# Patient Record
Sex: FEMALE | Race: BLACK OR AFRICAN AMERICAN | NOT HISPANIC OR LATINO | Employment: OTHER | URBAN - METROPOLITAN AREA
[De-identification: names, ages, dates, MRNs, and addresses within clinical notes are randomized per-mention and may not be internally consistent; named-entity substitution may affect disease eponyms.]

---

## 2023-10-01 RX ORDER — TRAMADOL HYDROCHLORIDE 50 MG/1
1 TABLET ORAL EVERY 6 HOURS PRN
COMMUNITY
End: 2024-01-16 | Stop reason: HOSPADM

## 2023-10-05 PROBLEM — L76.82 PAIN AT SURGICAL INCISION: Status: ACTIVE | Noted: 2023-10-05

## 2023-10-05 RX ORDER — IBUPROFEN 400 MG/1
TABLET ORAL
Status: ON HOLD | COMMUNITY
Start: 2023-05-18 | End: 2024-01-12 | Stop reason: WASHOUT

## 2023-10-05 RX ORDER — ISOPROPYL ALCOHOL 70 ML/100ML
SWAB TOPICAL
COMMUNITY
Start: 2023-04-18

## 2023-10-05 RX ORDER — HYDROCHLOROTHIAZIDE 50 MG/1
50 TABLET ORAL DAILY
COMMUNITY
Start: 2022-10-18 | End: 2024-01-16 | Stop reason: HOSPADM

## 2023-10-05 RX ORDER — ACETAMINOPHEN 500 MG
TABLET ORAL
Status: ON HOLD | COMMUNITY
Start: 2023-09-20 | End: 2024-01-16 | Stop reason: SDUPTHER

## 2023-10-05 RX ORDER — METRONIDAZOLE 500 MG/1
TABLET ORAL
Status: ON HOLD | COMMUNITY
Start: 2023-03-02 | End: 2024-01-12 | Stop reason: WASHOUT

## 2023-10-05 RX ORDER — SKIN PROTECTANT 44 G/100G
OINTMENT TOPICAL
Status: ON HOLD | COMMUNITY
Start: 2023-07-07 | End: 2024-01-12 | Stop reason: WASHOUT

## 2023-10-05 RX ORDER — INSULIN GLARGINE 100 [IU]/ML
20 INJECTION, SOLUTION SUBCUTANEOUS DAILY
Status: ON HOLD | COMMUNITY
Start: 2023-04-19 | End: 2024-01-16 | Stop reason: SDUPTHER

## 2023-10-05 RX ORDER — FLUCONAZOLE 150 MG/1
TABLET ORAL
Status: ON HOLD | COMMUNITY
Start: 2023-03-02 | End: 2024-01-12 | Stop reason: WASHOUT

## 2023-10-05 RX ORDER — AMPICILLIN TRIHYDRATE 500 MG
50 CAPSULE ORAL DAILY
COMMUNITY
Start: 2023-01-19 | End: 2024-01-16 | Stop reason: HOSPADM

## 2023-10-05 RX ORDER — NITROFURANTOIN 25; 75 MG/1; MG/1
CAPSULE ORAL
Status: ON HOLD | COMMUNITY
Start: 2023-03-02 | End: 2024-01-12 | Stop reason: WASHOUT

## 2023-10-05 RX ORDER — PEN NEEDLE, DIABETIC 32 GX 1/4"
NEEDLE, DISPOSABLE MISCELLANEOUS
COMMUNITY
Start: 2023-04-18

## 2023-10-10 ENCOUNTER — APPOINTMENT (OUTPATIENT)
Dept: CARDIAC SURGERY | Facility: CLINIC | Age: 63
End: 2023-10-10
Payer: COMMERCIAL

## 2023-11-03 ENCOUNTER — PHARMACY VISIT (OUTPATIENT)
Dept: PHARMACY | Facility: CLINIC | Age: 63
End: 2023-11-03
Payer: MEDICARE

## 2024-01-11 ENCOUNTER — HOSPITAL ENCOUNTER (EMERGENCY)
Facility: HOSPITAL | Age: 64
Discharge: HOME | DRG: 637 | End: 2024-01-11
Attending: EMERGENCY MEDICINE
Payer: COMMERCIAL

## 2024-01-11 ENCOUNTER — APPOINTMENT (OUTPATIENT)
Dept: CARDIOLOGY | Facility: HOSPITAL | Age: 64
DRG: 637 | End: 2024-01-11
Payer: COMMERCIAL

## 2024-01-11 ENCOUNTER — APPOINTMENT (OUTPATIENT)
Dept: RADIOLOGY | Facility: HOSPITAL | Age: 64
DRG: 637 | End: 2024-01-11
Payer: COMMERCIAL

## 2024-01-11 ENCOUNTER — HOSPITAL ENCOUNTER (INPATIENT)
Facility: HOSPITAL | Age: 64
LOS: 5 days | Discharge: HOME | DRG: 637 | End: 2024-01-16
Attending: EMERGENCY MEDICINE | Admitting: INTERNAL MEDICINE
Payer: COMMERCIAL

## 2024-01-11 VITALS
SYSTOLIC BLOOD PRESSURE: 165 MMHG | BODY MASS INDEX: 28.35 KG/M2 | WEIGHT: 160 LBS | HEIGHT: 63 IN | HEART RATE: 79 BPM | DIASTOLIC BLOOD PRESSURE: 97 MMHG | OXYGEN SATURATION: 99 % | RESPIRATION RATE: 14 BRPM

## 2024-01-11 DIAGNOSIS — E11.00 HYPEROSMOLAR HYPERGLYCEMIC STATE (HHS) (MULTI): Primary | ICD-10-CM

## 2024-01-11 DIAGNOSIS — L76.82 PAIN AT SURGICAL INCISION: ICD-10-CM

## 2024-01-11 DIAGNOSIS — I10 PRIMARY HYPERTENSION: ICD-10-CM

## 2024-01-11 LAB
ALBUMIN SERPL BCP-MCNC: 4 G/DL (ref 3.4–5)
ALBUMIN SERPL BCP-MCNC: 4 G/DL (ref 3.4–5)
ALP SERPL-CCNC: 70 U/L (ref 33–136)
ALT SERPL W P-5'-P-CCNC: 60 U/L (ref 7–45)
ANION GAP BLDA CALCULATED.4IONS-SCNC: 11 MMO/L (ref 10–25)
ANION GAP BLDV CALCULATED.4IONS-SCNC: 12 MMOL/L (ref 10–25)
ANION GAP BLDV CALCULATED.4IONS-SCNC: 5 MMOL/L (ref 10–25)
ANION GAP SERPL CALC-SCNC: 13 MMOL/L (ref 10–20)
ANION GAP SERPL CALC-SCNC: 16 MMOL/L (ref 10–20)
APPEARANCE UR: ABNORMAL
APPEARANCE UR: ABNORMAL
AST SERPL W P-5'-P-CCNC: 70 U/L (ref 9–39)
BACTERIA #/AREA URNS AUTO: ABNORMAL /HPF
BASE EXCESS BLDA CALC-SCNC: 1.2 MMOL/L (ref -2–3)
BASE EXCESS BLDV CALC-SCNC: 1.4 MMOL/L (ref -2–3)
BASE EXCESS BLDV CALC-SCNC: 4 MMOL/L (ref -2–3)
BASOPHILS # BLD MANUAL: 0.1 X10*3/UL (ref 0–0.1)
BASOPHILS NFR BLD MANUAL: 1.7 %
BILIRUB SERPL-MCNC: 0.6 MG/DL (ref 0–1.2)
BILIRUB UR STRIP.AUTO-MCNC: NEGATIVE MG/DL
BILIRUB UR STRIP.AUTO-MCNC: NEGATIVE MG/DL
BODY TEMPERATURE: 37 DEGREES CELSIUS
BUN SERPL-MCNC: 9 MG/DL (ref 6–23)
BUN SERPL-MCNC: 9 MG/DL (ref 6–23)
CA-I BLD-SCNC: 1.19 MMOL/L (ref 1.1–1.33)
CA-I BLDA-SCNC: 1.23 MMOL/L (ref 1.1–1.33)
CA-I BLDV-SCNC: 1.09 MMOL/L (ref 1.1–1.33)
CA-I BLDV-SCNC: 1.24 MMOL/L (ref 1.1–1.33)
CALCIUM SERPL-MCNC: 9.1 MG/DL (ref 8.6–10.6)
CALCIUM SERPL-MCNC: 9.6 MG/DL (ref 8.6–10.6)
CHLORIDE BLDA-SCNC: 98 MMOL/L (ref 98–107)
CHLORIDE BLDV-SCNC: 101 MMOL/L (ref 98–107)
CHLORIDE BLDV-SCNC: 89 MMOL/L (ref 98–107)
CHLORIDE SERPL-SCNC: 90 MMOL/L (ref 98–107)
CHLORIDE SERPL-SCNC: 98 MMOL/L (ref 98–107)
CO2 SERPL-SCNC: 23 MMOL/L (ref 21–32)
CO2 SERPL-SCNC: 26 MMOL/L (ref 21–32)
COLOR UR: YELLOW
COLOR UR: YELLOW
CREAT SERPL-MCNC: 0.73 MG/DL (ref 0.5–1.05)
CREAT SERPL-MCNC: 0.97 MG/DL (ref 0.5–1.05)
EGFRCR SERPLBLD CKD-EPI 2021: 66 ML/MIN/1.73M*2
EGFRCR SERPLBLD CKD-EPI 2021: >90 ML/MIN/1.73M*2
EOSINOPHIL # BLD MANUAL: 0.05 X10*3/UL (ref 0–0.7)
EOSINOPHIL NFR BLD MANUAL: 0.9 %
ERYTHROCYTE [DISTWIDTH] IN BLOOD BY AUTOMATED COUNT: 12.7 % (ref 11.5–14.5)
FLUAV RNA RESP QL NAA+PROBE: NOT DETECTED
FLUBV RNA RESP QL NAA+PROBE: NOT DETECTED
GLUCOSE BLD MANUAL STRIP-MCNC: 585 MG/DL (ref 74–99)
GLUCOSE BLD MANUAL STRIP-MCNC: >600 MG/DL (ref 74–99)
GLUCOSE BLD MANUAL STRIP-MCNC: >600 MG/DL (ref 74–99)
GLUCOSE BLDA-MCNC: 493 MG/DL (ref 74–99)
GLUCOSE BLDV-MCNC: 356 MG/DL (ref 74–99)
GLUCOSE BLDV-MCNC: >685 MG/DL (ref 74–99)
GLUCOSE SERPL-MCNC: 1116 MG/DL (ref 74–99)
GLUCOSE SERPL-MCNC: 413 MG/DL (ref 74–99)
GLUCOSE UR STRIP.AUTO-MCNC: ABNORMAL MG/DL
GLUCOSE UR STRIP.AUTO-MCNC: ABNORMAL MG/DL
HCO3 BLDA-SCNC: 26 MMOL/L (ref 22–26)
HCO3 BLDV-SCNC: 27.2 MMOL/L (ref 22–26)
HCO3 BLDV-SCNC: 29.2 MMOL/L (ref 22–26)
HCT VFR BLD AUTO: 39.1 % (ref 36–46)
HCT VFR BLD EST: 40 % (ref 36–46)
HCT VFR BLD EST: 43 % (ref 36–46)
HCT VFR BLD EST: 46 % (ref 36–46)
HGB BLD-MCNC: 15 G/DL (ref 12–16)
HGB BLDA-MCNC: 13.4 G/DL (ref 12–16)
HGB BLDV-MCNC: 14.2 G/DL (ref 12–16)
HGB BLDV-MCNC: 15.3 G/DL (ref 12–16)
IMM GRANULOCYTES # BLD AUTO: 0.01 X10*3/UL (ref 0–0.7)
IMM GRANULOCYTES NFR BLD AUTO: 0.2 % (ref 0–0.9)
INHALED O2 CONCENTRATION: 21 %
KETONES UR STRIP.AUTO-MCNC: NEGATIVE MG/DL
KETONES UR STRIP.AUTO-MCNC: NEGATIVE MG/DL
LACTATE BLDA-SCNC: 2.4 MMOL/L (ref 0.4–2)
LACTATE BLDV-SCNC: 2.5 MMOL/L (ref 0.4–2)
LACTATE BLDV-SCNC: 3.5 MMOL/L (ref 0.4–2)
LACTATE SERPL-SCNC: 2.9 MMOL/L (ref 0.4–2)
LEUKOCYTE ESTERASE UR QL STRIP.AUTO: NEGATIVE
LEUKOCYTE ESTERASE UR QL STRIP.AUTO: NEGATIVE
LYMPHOCYTES # BLD MANUAL: 2.7 X10*3/UL (ref 1.2–4.8)
LYMPHOCYTES NFR BLD MANUAL: 46.5 %
MAGNESIUM SERPL-MCNC: 1.76 MG/DL (ref 1.6–2.4)
MCH RBC QN AUTO: 32.6 PG (ref 26–34)
MCHC RBC AUTO-ENTMCNC: 38.4 G/DL (ref 32–36)
MCV RBC AUTO: 85 FL (ref 80–100)
MONOCYTES # BLD MANUAL: 0.65 X10*3/UL (ref 0.1–1)
MONOCYTES NFR BLD MANUAL: 11.2 %
NEUTS SEG # BLD MANUAL: 2.3 X10*3/UL (ref 1.2–7)
NEUTS SEG NFR BLD MANUAL: 39.7 %
NITRITE UR QL STRIP.AUTO: POSITIVE
NITRITE UR QL STRIP.AUTO: POSITIVE
NRBC BLD-RTO: 0 /100 WBCS (ref 0–0)
OXYHGB MFR BLDA: 94.6 % (ref 94–98)
OXYHGB MFR BLDV: 64.1 % (ref 45–75)
OXYHGB MFR BLDV: 75.4 % (ref 45–75)
PCO2 BLDA: 41 MM HG (ref 38–42)
PCO2 BLDV: 45 MM HG (ref 41–51)
PCO2 BLDV: 46 MM HG (ref 41–51)
PH BLDA: 7.41 PH (ref 7.38–7.42)
PH BLDV: 7.38 PH (ref 7.33–7.43)
PH BLDV: 7.42 PH (ref 7.33–7.43)
PH UR STRIP.AUTO: 6 [PH]
PH UR STRIP.AUTO: 6 [PH]
PHOSPHATE SERPL-MCNC: 2.5 MG/DL (ref 2.5–4.9)
PLATELET # BLD AUTO: 165 X10*3/UL (ref 150–450)
PO2 BLDA: 94 MM HG (ref 85–95)
PO2 BLDV: 39 MM HG (ref 35–45)
PO2 BLDV: 48 MM HG (ref 35–45)
POTASSIUM BLDA-SCNC: 3.5 MMOL/L (ref 3.5–5.3)
POTASSIUM BLDV-SCNC: 4.5 MMOL/L (ref 3.5–5.3)
POTASSIUM BLDV-SCNC: 8.5 MMOL/L (ref 3.5–5.3)
POTASSIUM SERPL-SCNC: 4 MMOL/L (ref 3.5–5.3)
POTASSIUM SERPL-SCNC: 5.3 MMOL/L (ref 3.5–5.3)
PROT SERPL-MCNC: 7.1 G/DL (ref 6.4–8.2)
PROT UR STRIP.AUTO-MCNC: ABNORMAL MG/DL
PROT UR STRIP.AUTO-MCNC: ABNORMAL MG/DL
RBC # BLD AUTO: 4.6 X10*6/UL (ref 4–5.2)
RBC # UR STRIP.AUTO: ABNORMAL /UL
RBC # UR STRIP.AUTO: ABNORMAL /UL
RBC #/AREA URNS AUTO: >20 /HPF
RBC #/AREA URNS AUTO: >20 /HPF
RBC MORPH BLD: NORMAL
SAO2 % BLDA: 99 % (ref 94–100)
SAO2 % BLDV: 67 % (ref 45–75)
SAO2 % BLDV: 80 % (ref 45–75)
SARS-COV-2 RNA RESP QL NAA+PROBE: NOT DETECTED
SODIUM BLDA-SCNC: 131 MMOL/L (ref 136–145)
SODIUM BLDV-SCNC: 124 MMOL/L (ref 136–145)
SODIUM BLDV-SCNC: 127 MMOL/L (ref 136–145)
SODIUM SERPL-SCNC: 125 MMOL/L (ref 136–145)
SODIUM SERPL-SCNC: 132 MMOL/L (ref 136–145)
SP GR UR STRIP.AUTO: 1.02
SP GR UR STRIP.AUTO: 1.02
TOTAL CELLS COUNTED BLD: 116
UROBILINOGEN UR STRIP.AUTO-MCNC: <2 MG/DL
UROBILINOGEN UR STRIP.AUTO-MCNC: <2 MG/DL
WBC # BLD AUTO: 5.8 X10*3/UL (ref 4.4–11.3)
WBC #/AREA URNS AUTO: ABNORMAL /HPF
WBC #/AREA URNS AUTO: ABNORMAL /HPF

## 2024-01-11 PROCEDURE — 87636 SARSCOV2 & INF A&B AMP PRB: CPT | Performed by: STUDENT IN AN ORGANIZED HEALTH CARE EDUCATION/TRAINING PROGRAM

## 2024-01-11 PROCEDURE — 81001 URINALYSIS AUTO W/SCOPE: CPT | Performed by: STUDENT IN AN ORGANIZED HEALTH CARE EDUCATION/TRAINING PROGRAM

## 2024-01-11 PROCEDURE — 85007 BL SMEAR W/DIFF WBC COUNT: CPT | Performed by: STUDENT IN AN ORGANIZED HEALTH CARE EDUCATION/TRAINING PROGRAM

## 2024-01-11 PROCEDURE — 93005 ELECTROCARDIOGRAM TRACING: CPT

## 2024-01-11 PROCEDURE — 99291 CRITICAL CARE FIRST HOUR: CPT | Performed by: EMERGENCY MEDICINE

## 2024-01-11 PROCEDURE — 82947 ASSAY GLUCOSE BLOOD QUANT: CPT

## 2024-01-11 PROCEDURE — 2020000001 HC ICU ROOM DAILY

## 2024-01-11 PROCEDURE — 36415 COLL VENOUS BLD VENIPUNCTURE: CPT | Performed by: STUDENT IN AN ORGANIZED HEALTH CARE EDUCATION/TRAINING PROGRAM

## 2024-01-11 PROCEDURE — 84132 ASSAY OF SERUM POTASSIUM: CPT | Performed by: STUDENT IN AN ORGANIZED HEALTH CARE EDUCATION/TRAINING PROGRAM

## 2024-01-11 PROCEDURE — 2500000004 HC RX 250 GENERAL PHARMACY W/ HCPCS (ALT 636 FOR OP/ED): Performed by: STUDENT IN AN ORGANIZED HEALTH CARE EDUCATION/TRAINING PROGRAM

## 2024-01-11 PROCEDURE — 85027 COMPLETE CBC AUTOMATED: CPT | Performed by: STUDENT IN AN ORGANIZED HEALTH CARE EDUCATION/TRAINING PROGRAM

## 2024-01-11 PROCEDURE — 84132 ASSAY OF SERUM POTASSIUM: CPT | Mod: 91 | Performed by: INTERNAL MEDICINE

## 2024-01-11 PROCEDURE — 93010 ELECTROCARDIOGRAM REPORT: CPT | Performed by: INTERNAL MEDICINE

## 2024-01-11 PROCEDURE — 84075 ASSAY ALKALINE PHOSPHATASE: CPT | Performed by: STUDENT IN AN ORGANIZED HEALTH CARE EDUCATION/TRAINING PROGRAM

## 2024-01-11 PROCEDURE — 2500000004 HC RX 250 GENERAL PHARMACY W/ HCPCS (ALT 636 FOR OP/ED)

## 2024-01-11 PROCEDURE — 36415 COLL VENOUS BLD VENIPUNCTURE: CPT | Performed by: INTERNAL MEDICINE

## 2024-01-11 PROCEDURE — 2500000004 HC RX 250 GENERAL PHARMACY W/ HCPCS (ALT 636 FOR OP/ED): Performed by: INTERNAL MEDICINE

## 2024-01-11 PROCEDURE — 4500999001 HC ED NO CHARGE

## 2024-01-11 PROCEDURE — 96374 THER/PROPH/DIAG INJ IV PUSH: CPT

## 2024-01-11 PROCEDURE — 84132 ASSAY OF SERUM POTASSIUM: CPT

## 2024-01-11 PROCEDURE — 81001 URINALYSIS AUTO W/SCOPE: CPT | Mod: 91 | Performed by: EMERGENCY MEDICINE

## 2024-01-11 PROCEDURE — 83735 ASSAY OF MAGNESIUM: CPT | Performed by: INTERNAL MEDICINE

## 2024-01-11 PROCEDURE — 2500000004 HC RX 250 GENERAL PHARMACY W/ HCPCS (ALT 636 FOR OP/ED): Mod: SE | Performed by: STUDENT IN AN ORGANIZED HEALTH CARE EDUCATION/TRAINING PROGRAM

## 2024-01-11 PROCEDURE — 70450 CT HEAD/BRAIN W/O DYE: CPT | Performed by: RADIOLOGY

## 2024-01-11 PROCEDURE — 87086 URINE CULTURE/COLONY COUNT: CPT | Performed by: STUDENT IN AN ORGANIZED HEALTH CARE EDUCATION/TRAINING PROGRAM

## 2024-01-11 PROCEDURE — 70450 CT HEAD/BRAIN W/O DYE: CPT

## 2024-01-11 PROCEDURE — 99283 EMERGENCY DEPT VISIT LOW MDM: CPT | Performed by: EMERGENCY MEDICINE

## 2024-01-11 PROCEDURE — 99285 EMERGENCY DEPT VISIT HI MDM: CPT | Performed by: EMERGENCY MEDICINE

## 2024-01-11 PROCEDURE — 84484 ASSAY OF TROPONIN QUANT: CPT | Performed by: STUDENT IN AN ORGANIZED HEALTH CARE EDUCATION/TRAINING PROGRAM

## 2024-01-11 PROCEDURE — 82947 ASSAY GLUCOSE BLOOD QUANT: CPT | Mod: 91

## 2024-01-11 PROCEDURE — 96375 TX/PRO/DX INJ NEW DRUG ADDON: CPT

## 2024-01-11 PROCEDURE — 99291 CRITICAL CARE FIRST HOUR: CPT | Performed by: STUDENT IN AN ORGANIZED HEALTH CARE EDUCATION/TRAINING PROGRAM

## 2024-01-11 PROCEDURE — 71045 X-RAY EXAM CHEST 1 VIEW: CPT

## 2024-01-11 PROCEDURE — 82330 ASSAY OF CALCIUM: CPT | Performed by: INTERNAL MEDICINE

## 2024-01-11 PROCEDURE — 96361 HYDRATE IV INFUSION ADD-ON: CPT

## 2024-01-11 PROCEDURE — 83605 ASSAY OF LACTIC ACID: CPT | Mod: 91 | Performed by: INTERNAL MEDICINE

## 2024-01-11 RX ORDER — INSULIN GLARGINE 100 [IU]/ML
20 INJECTION, SOLUTION SUBCUTANEOUS NIGHTLY
Status: DISCONTINUED | OUTPATIENT
Start: 2024-01-11 | End: 2024-01-11

## 2024-01-11 RX ORDER — DEXTROSE MONOHYDRATE 100 MG/ML
150 INJECTION, SOLUTION INTRAVENOUS CONTINUOUS
Status: DISCONTINUED | OUTPATIENT
Start: 2024-01-11 | End: 2024-01-11

## 2024-01-11 RX ORDER — CEFTRIAXONE 1 G/50ML
1 INJECTION, SOLUTION INTRAVENOUS EVERY 24 HOURS
Status: DISCONTINUED | OUTPATIENT
Start: 2024-01-11 | End: 2024-01-16 | Stop reason: HOSPADM

## 2024-01-11 RX ORDER — MAGNESIUM SULFATE HEPTAHYDRATE 40 MG/ML
2 INJECTION, SOLUTION INTRAVENOUS ONCE
Status: COMPLETED | OUTPATIENT
Start: 2024-01-11 | End: 2024-01-11

## 2024-01-11 RX ORDER — SPIRONOLACTONE 25 MG/1
25 TABLET ORAL DAILY
Status: DISCONTINUED | OUTPATIENT
Start: 2024-01-12 | End: 2024-01-16 | Stop reason: HOSPADM

## 2024-01-11 RX ORDER — INSULIN GLARGINE 100 [IU]/ML
10 INJECTION, SOLUTION SUBCUTANEOUS NIGHTLY
Status: DISCONTINUED | OUTPATIENT
Start: 2024-01-12 | End: 2024-01-13

## 2024-01-11 RX ORDER — SODIUM CHLORIDE 450 MG/100ML
250 INJECTION, SOLUTION INTRAVENOUS CONTINUOUS
Status: DISCONTINUED | OUTPATIENT
Start: 2024-01-11 | End: 2024-01-13

## 2024-01-11 RX ORDER — DEXTROSE 50 % IN WATER (D50W) INTRAVENOUS SYRINGE
50 AS NEEDED
Status: DISCONTINUED | OUTPATIENT
Start: 2024-01-11 | End: 2024-01-16 | Stop reason: HOSPADM

## 2024-01-11 RX ORDER — POTASSIUM CHLORIDE 14.9 MG/ML
20 INJECTION INTRAVENOUS ONCE
Status: COMPLETED | OUTPATIENT
Start: 2024-01-11 | End: 2024-01-11

## 2024-01-11 RX ORDER — NAPROXEN SODIUM 220 MG/1
81 TABLET, FILM COATED ORAL DAILY
Status: DISCONTINUED | OUTPATIENT
Start: 2024-01-12 | End: 2024-01-16 | Stop reason: HOSPADM

## 2024-01-11 RX ORDER — POTASSIUM CHLORIDE 14.9 MG/ML
20 INJECTION INTRAVENOUS ONCE
Status: DISCONTINUED | OUTPATIENT
Start: 2024-01-11 | End: 2024-01-11

## 2024-01-11 RX ORDER — DEXTROSE MONOHYDRATE AND SODIUM CHLORIDE 5; .9 G/100ML; G/100ML
150 INJECTION, SOLUTION INTRAVENOUS CONTINUOUS
Status: DISCONTINUED | OUTPATIENT
Start: 2024-01-11 | End: 2024-01-11

## 2024-01-11 RX ORDER — CARVEDILOL 25 MG/1
25 TABLET ORAL 2 TIMES DAILY
Status: DISCONTINUED | OUTPATIENT
Start: 2024-01-11 | End: 2024-01-16 | Stop reason: HOSPADM

## 2024-01-11 RX ORDER — ATORVASTATIN CALCIUM 80 MG/1
80 TABLET, FILM COATED ORAL DAILY
Status: DISCONTINUED | OUTPATIENT
Start: 2024-01-12 | End: 2024-01-16 | Stop reason: HOSPADM

## 2024-01-11 RX ORDER — SODIUM CHLORIDE 450 MG/100ML
250 INJECTION, SOLUTION INTRAVENOUS CONTINUOUS
Status: DISCONTINUED | OUTPATIENT
Start: 2024-01-11 | End: 2024-01-11

## 2024-01-11 RX ORDER — DEXTROSE 50 % IN WATER (D50W) INTRAVENOUS SYRINGE
50 AS NEEDED
Status: DISCONTINUED | OUTPATIENT
Start: 2024-01-11 | End: 2024-01-11

## 2024-01-11 RX ORDER — AMLODIPINE BESYLATE 10 MG/1
10 TABLET ORAL DAILY
Status: DISCONTINUED | OUTPATIENT
Start: 2024-01-12 | End: 2024-01-16 | Stop reason: HOSPADM

## 2024-01-11 RX ORDER — CLOPIDOGREL BISULFATE 75 MG/1
75 TABLET ORAL DAILY
Status: DISCONTINUED | OUTPATIENT
Start: 2024-01-12 | End: 2024-01-16 | Stop reason: HOSPADM

## 2024-01-11 RX ORDER — ENOXAPARIN SODIUM 100 MG/ML
40 INJECTION SUBCUTANEOUS EVERY 24 HOURS
Status: DISCONTINUED | OUTPATIENT
Start: 2024-01-11 | End: 2024-01-16 | Stop reason: HOSPADM

## 2024-01-11 RX ORDER — HYDRALAZINE HYDROCHLORIDE 25 MG/1
100 TABLET, FILM COATED ORAL EVERY 8 HOURS
Status: DISCONTINUED | OUTPATIENT
Start: 2024-01-11 | End: 2024-01-16 | Stop reason: HOSPADM

## 2024-01-11 RX ORDER — DEXTROSE MONOHYDRATE 100 MG/ML
0.3 INJECTION, SOLUTION INTRAVENOUS ONCE AS NEEDED
Status: DISCONTINUED | OUTPATIENT
Start: 2024-01-11 | End: 2024-01-16 | Stop reason: HOSPADM

## 2024-01-11 RX ORDER — INSULIN LISPRO 100 [IU]/ML
0-5 INJECTION, SOLUTION INTRAVENOUS; SUBCUTANEOUS EVERY 4 HOURS
Status: DISCONTINUED | OUTPATIENT
Start: 2024-01-11 | End: 2024-01-13

## 2024-01-11 RX ORDER — DEXTROSE MONOHYDRATE 100 MG/ML
150 INJECTION, SOLUTION INTRAVENOUS CONTINUOUS
Status: DISCONTINUED | OUTPATIENT
Start: 2024-01-11 | End: 2024-01-13

## 2024-01-11 RX ORDER — INSULIN GLARGINE 100 [IU]/ML
10 INJECTION, SOLUTION SUBCUTANEOUS ONCE
Status: COMPLETED | OUTPATIENT
Start: 2024-01-11 | End: 2024-01-12

## 2024-01-11 RX ORDER — DEXTROSE MONOHYDRATE AND SODIUM CHLORIDE 5; .9 G/100ML; G/100ML
150 INJECTION, SOLUTION INTRAVENOUS CONTINUOUS
Status: DISCONTINUED | OUTPATIENT
Start: 2024-01-11 | End: 2024-01-13

## 2024-01-11 RX ADMIN — INSULIN HUMAN 4 UNITS/HR: 1 INJECTION, SOLUTION INTRAVENOUS at 20:31

## 2024-01-11 RX ADMIN — SODIUM CHLORIDE, SODIUM LACTATE, POTASSIUM CHLORIDE, AND CALCIUM CHLORIDE 1000 ML: 600; 310; 30; 20 INJECTION, SOLUTION INTRAVENOUS at 16:45

## 2024-01-11 RX ADMIN — INSULIN HUMAN 4 UNITS/HR: 1 INJECTION, SOLUTION INTRAVENOUS at 16:16

## 2024-01-11 RX ADMIN — CEFTRIAXONE SODIUM 1 G: 1 INJECTION, SOLUTION INTRAVENOUS at 21:45

## 2024-01-11 RX ADMIN — MAGNESIUM SULFATE HEPTAHYDRATE 2 G: 40 INJECTION, SOLUTION INTRAVENOUS at 22:22

## 2024-01-11 RX ADMIN — SODIUM CHLORIDE 1000 ML: 9 INJECTION, SOLUTION INTRAVENOUS at 15:57

## 2024-01-11 RX ADMIN — ENOXAPARIN SODIUM 40 MG: 100 INJECTION SUBCUTANEOUS at 20:46

## 2024-01-11 RX ADMIN — POTASSIUM CHLORIDE 20 MEQ: 14.9 INJECTION, SOLUTION INTRAVENOUS at 15:58

## 2024-01-11 RX ADMIN — INSULIN HUMAN 4 UNITS/HR: 1 INJECTION, SOLUTION INTRAVENOUS at 21:10

## 2024-01-11 SDOH — SOCIAL STABILITY: SOCIAL INSECURITY: WERE YOU ABLE TO COMPLETE ALL THE BEHAVIORAL HEALTH SCREENINGS?: YES

## 2024-01-11 SDOH — SOCIAL STABILITY: SOCIAL INSECURITY: ABUSE: ADULT

## 2024-01-11 SDOH — SOCIAL STABILITY: SOCIAL INSECURITY: DOES ANYONE TRY TO KEEP YOU FROM HAVING/CONTACTING OTHER FRIENDS OR DOING THINGS OUTSIDE YOUR HOME?: NO

## 2024-01-11 SDOH — SOCIAL STABILITY: SOCIAL INSECURITY: DO YOU FEEL ANYONE HAS EXPLOITED OR TAKEN ADVANTAGE OF YOU FINANCIALLY OR OF YOUR PERSONAL PROPERTY?: NO

## 2024-01-11 SDOH — SOCIAL STABILITY: SOCIAL INSECURITY: HAVE YOU HAD THOUGHTS OF HARMING ANYONE ELSE?: NO

## 2024-01-11 SDOH — SOCIAL STABILITY: SOCIAL INSECURITY: ARE THERE ANY APPARENT SIGNS OF INJURIES/BEHAVIORS THAT COULD BE RELATED TO ABUSE/NEGLECT?: NO

## 2024-01-11 SDOH — SOCIAL STABILITY: SOCIAL INSECURITY: DO YOU FEEL UNSAFE GOING BACK TO THE PLACE WHERE YOU ARE LIVING?: NO

## 2024-01-11 SDOH — SOCIAL STABILITY: SOCIAL INSECURITY: ARE YOU OR HAVE YOU BEEN THREATENED OR ABUSED PHYSICALLY, EMOTIONALLY, OR SEXUALLY BY ANYONE?: NO

## 2024-01-11 SDOH — SOCIAL STABILITY: SOCIAL INSECURITY: HAS ANYONE EVER THREATENED TO HURT YOUR FAMILY OR YOUR PETS?: NO

## 2024-01-11 ASSESSMENT — COGNITIVE AND FUNCTIONAL STATUS - GENERAL
MOBILITY SCORE: 24
DAILY ACTIVITIY SCORE: 24
PATIENT BASELINE BEDBOUND: NO

## 2024-01-11 ASSESSMENT — LIFESTYLE VARIABLES
SKIP TO QUESTIONS 9-10: 1
HOW OFTEN DO YOU HAVE A DRINK CONTAINING ALCOHOL: NEVER
HOW MANY STANDARD DRINKS CONTAINING ALCOHOL DO YOU HAVE ON A TYPICAL DAY: PATIENT DOES NOT DRINK
AUDIT-C TOTAL SCORE: 0
HOW OFTEN DO YOU HAVE 6 OR MORE DRINKS ON ONE OCCASION: NEVER
AUDIT-C TOTAL SCORE: 0

## 2024-01-11 ASSESSMENT — COLUMBIA-SUICIDE SEVERITY RATING SCALE - C-SSRS
6. HAVE YOU EVER DONE ANYTHING, STARTED TO DO ANYTHING, OR PREPARED TO DO ANYTHING TO END YOUR LIFE?: NO
1. IN THE PAST MONTH, HAVE YOU WISHED YOU WERE DEAD OR WISHED YOU COULD GO TO SLEEP AND NOT WAKE UP?: NO
2. HAVE YOU ACTUALLY HAD ANY THOUGHTS OF KILLING YOURSELF?: NO

## 2024-01-11 ASSESSMENT — ACTIVITIES OF DAILY LIVING (ADL)
JUDGMENT_ADEQUATE_SAFELY_COMPLETE_DAILY_ACTIVITIES: YES
HEARING - LEFT EAR: FUNCTIONAL
WALKS IN HOME: INDEPENDENT
ADEQUATE_TO_COMPLETE_ADL: YES
TOILETING: INDEPENDENT
LACK_OF_TRANSPORTATION: NO
BATHING: INDEPENDENT
PATIENT'S MEMORY ADEQUATE TO SAFELY COMPLETE DAILY ACTIVITIES?: YES
HEARING - RIGHT EAR: FUNCTIONAL
DRESSING YOURSELF: INDEPENDENT
GROOMING: INDEPENDENT
FEEDING YOURSELF: INDEPENDENT

## 2024-01-11 ASSESSMENT — PAIN SCALES - GENERAL: PAINLEVEL_OUTOF10: 0 - NO PAIN

## 2024-01-11 ASSESSMENT — PATIENT HEALTH QUESTIONNAIRE - PHQ9
SUM OF ALL RESPONSES TO PHQ9 QUESTIONS 1 & 2: 0
2. FEELING DOWN, DEPRESSED OR HOPELESS: NOT AT ALL
1. LITTLE INTEREST OR PLEASURE IN DOING THINGS: NOT AT ALL

## 2024-01-11 ASSESSMENT — PAIN - FUNCTIONAL ASSESSMENT: PAIN_FUNCTIONAL_ASSESSMENT: 0-10

## 2024-01-11 NOTE — ED TRIAGE NOTES
Pt came in as critical via [facility] pt found unresponsive in elevator and BG reading HI, pt altered on arrival

## 2024-01-11 NOTE — ED PROVIDER NOTES
HPI   Chief Complaint   Patient presents with    Hyperglycemia       Patient is a 63-year-old female with history of insulin-dependent diabetes presenting to the ED for altered mental status and hyperglycemia.  Patient reportedly found down in an elevator by .  Per EMS, blood glucose undetectably high.  Other vital stable per EMS.  Rest of history gathering limited by patient's altered status.      Limitations to History: AMS    Additional History Obtained from: EMS    Records Reviewed: Recent available ED and inpatient notes reviewed in EMR.                          Abrahan Coma Scale Score: 15                  Patient History   No past medical history on file.  No past surgical history on file.  No family history on file.  Social History     Tobacco Use    Smoking status: Not on file    Smokeless tobacco: Not on file   Substance Use Topics    Alcohol use: Not on file    Drug use: Not on file       Physical Exam   ED Triage Vitals [01/11/24 1510]   Temp Heart Rate Resp BP   35.9 °C (96.6 °F) 79 14 (!) 165/97      SpO2 Temp Source Heart Rate Source Patient Position   99 % Temporal -- --      BP Location FiO2 (%)     -- --       Physical Exam  Vitals and nursing note reviewed.   Constitutional:       General: She is not in acute distress.     Appearance: She is ill-appearing.   HENT:      Head: Normocephalic and atraumatic.      Mouth/Throat:      Mouth: Mucous membranes are dry.      Pharynx: Oropharynx is clear.   Eyes:      General: No scleral icterus.     Extraocular Movements: Extraocular movements intact.      Conjunctiva/sclera: Conjunctivae normal.      Pupils: Pupils are equal, round, and reactive to light.   Cardiovascular:      Rate and Rhythm: Normal rate and regular rhythm.      Pulses: Normal pulses.      Heart sounds: Normal heart sounds.   Pulmonary:      Effort: Pulmonary effort is normal.      Breath sounds: Normal breath sounds.   Abdominal:      General: Abdomen is flat.       Palpations: Abdomen is soft.      Tenderness: There is no abdominal tenderness. There is no guarding or rebound.   Musculoskeletal:         General: No deformity.      Cervical back: Neck supple.      Right lower leg: No edema.      Left lower leg: No edema.   Skin:     General: Skin is warm.      Capillary Refill: Capillary refill takes less than 2 seconds.   Neurological:      General: No focal deficit present.      Mental Status: She is disoriented.      Sensory: No sensory deficit.      Motor: No weakness.   Psychiatric:         Mood and Affect: Mood normal.         ED Course & MDM   ED Course as of 24 1441   Thu 2024   164 Initial VB.38/46. Glu: >685. Lactate: 3.5.   CXR: no acute processes.  [KR]      ED Course User Index  [KR] Cori Segundo MD         Diagnoses as of 24 1441   Hyperosmolar hyperglycemic state (HHS) (CMS/[facility])       Medical Decision Making  This is a 63-year-old female with above-stated history including IDDM presenting to the ED for altered mental status.  Patient arrives as a critical for undetectably high glucose and altered mental status.  Patient arrives hemodynamically stable and not in acute distress.  Patient is disoriented but nontoxic-appearing and no focal deficits on my exam.  IV access established and IV fluid resuscitation initiated.  No signs of trauma on exam or focal signs of infection.  Patient's initial VBG inadvertently not uploaded the chart but showed no acidosis with a pH of 7.38 CO2 46, undetectably high glucose, and a lactate of 3.5.  Patient also had a chest x-ray that was obtained and intermittently uploaded to wrong chart which shows no acute processes or infiltrate.  Basic labs obtained and showed no anion gap, normal bicarb.  Believe patient's picture most consistent with HHS over DKA.  Patient started on insulin drip and potassium repleted.  CT of the head was obtained out of precaution and showed no acute intracranial abnormality.   Patient mental status did start to slightly improve after initiation of treatment.  Patient will be admitted to the medical ICU for further management.  Patient significant other at bedside updated and agreeable to the plan.  All questions answered.    Patient seen and discussed with attending physician.    Dispo: Admit to ICU        Procedure  Critical Care    Performed by: Tyler Dobson MD  Authorized by: Tyler Dobson MD    Critical care provider statement:     Critical care time (minutes):  30    Critical care was necessary to treat or prevent imminent or life-threatening deterioration of the following conditions:  CNS failure or compromise (toxic metabolic encephalopathy secondary to HHS requiring insulin infusion, frequent neuro checks and clsoe glucose monitoring)    Critical care was time spent personally by me on the following activities:  Development of treatment plan with patient or surrogate, discussions with consultants, obtaining history from patient or surrogate, re-evaluation of patient's condition, ordering and review of radiographic studies, ordering and review of laboratory studies, examination of patient and evaluation of patient's response to treatment       Cori Segundo MD  Resident  01/12/24 9206    The patient was seen by the resident/fellow.  I have personally performed a substantive portion of the encounter.  I have seen and examined the patient; agree with the workup, evaluation, MDM, management and diagnosis.  The care plan has been discussed with the resident; I have reviewed the resident’s note and agree with the documented findings.         Tyler Dobson MD  01/15/24 7830

## 2024-01-11 NOTE — H&P
MEDICAL INTENSIVE CARE UNIT  ADMISSION H&P    Subjective   HPI:  Myrna Khan is a 63 y.o. female with PMH HFrEF (last TTE 35-40% EF, severely dilated LA mild-mod MR, atrial septal aneurysm), CAD (cath 8/23 with severe 2 vessel disease mLAD and mLCx, no PCI, Off Pump CABG x 2;-LIMA to LAD;-APRIL to OM), HTN, PAD (sp L SFA stent), Renal artery stenosis (renal US showing >60% stenosis of R, unable to visualize L), Carotid artery stenosis (50-69% in L pICA), IDDM2, who presents with AMS after being found unconscious in elevator, glucose reading above upper limit of assay.     Patient had recent admission in 9/23 for hypertensive emergency found to have newly reduced EF and severe 2 vessel CAD, had off pump CABG on 9/5/23 and was discharged home in stable condition. Saw PCP on 11/6 and saw vascular surgery on 11/15 for LLE claudication, who plan for future revascularization of her LLE due to occluded L SFA stent.    ...      ED Course:  Vitals:   T 35.9 °C (96.6 °F)  HR 79  BP (!) 165/97  RR 14  O2 99 % None (Room air)    Relevant studies:  CTH: 1. No evidence of acute cortical infarct, intracranial hemorrhage or  intracranial mass effect.  2. Findings of probable chronic small vessel ischemic changes and age  related diffuse cerebral volume loss.  3. 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    Results from last 7 days   Lab Units 01/11/24  1537   WBC AUTO x10*3/uL 5.8   HEMOGLOBIN g/dL 15.0   HEMATOCRIT % 39.1   PLATELETS AUTO x10*3/uL 165     Results from last 7 days   Lab Units 01/11/24  1537   CO2 mmol/L 23   ANION GAP mmol/L 16   BUN mg/dL 9   CREATININE mg/dL 0.97   GLUCOSE mg/dL 1,116*   CALCIUM mg/dL 9.6      Results from last 7 days   Lab Units 01/11/24  1537   ALT U/L 60*   AST U/L 70*   ALK PHOS U/L 70         Interventions:  ***    Medical History:  PMH: Per HPI    Medications: reviewed ***    Allergies:   Allergies   Allergen Reactions   • Lisinopril Itching      PSH: off-pump CABGx2  "9/5/23  FamHx:     SocHx:  Home: lives ***  ADLs: performs ADLs on own ***  Education/work: ***  Sexual activity: ***  Substance use:   -Alcohol: denies***  -Tobacco: denies***  -Recreational drugs: denies***    ROS: 10-point ROS negative unless otherwise mentioned in HPI      Objective   Vitals:    01/11/24 1515   BP: 153/80   Pulse: 69   Resp: 17   Temp:    SpO2: 99%        Physical Exam:  Physical Exam     Labs:    Lab Results   Component Value Date    WBC 5.8 01/11/2024    HGB 15.0 01/11/2024    HCT 39.1 01/11/2024    MCV 85 01/11/2024     01/11/2024      Hemoglobin   Date Value Ref Range Status   01/11/2024 15.0 12.0 - 16.0 g/dL Final   09/13/2023 10.8 (L) 12.0 - 16.0 g/dL Final   09/12/2023 9.6 (L) 12.0 - 16.0 g/dL Final   09/11/2023 10.2 (L) 12.0 - 16.0 g/dL Final   09/10/2023 9.6 (L) 12.0 - 16.0 g/dL Final        Lab Results   Component Value Date    CREATININE 0.97 01/11/2024    BUN 9 01/11/2024     (L) 01/11/2024    K 4.0 01/11/2024    CL 90 (L) 01/11/2024    CO2 23 01/11/2024      Glucose   Date Value Ref Range Status   01/11/2024 1,116 (HH) 74 - 99 mg/dL Final      Creatinine   Date Value Ref Range Status   01/11/2024 0.97 0.50 - 1.05 mg/dL Final   09/13/2023 0.94 0.50 - 1.05 mg/dL Final   09/12/2023 0.98 0.50 - 1.05 mg/dL Final   09/11/2023 0.92 0.50 - 1.05 mg/dL Final   09/10/2023 0.81 0.50 - 1.05 mg/dL Final      Lab Results   Component Value Date    CALCIUM 9.6 01/11/2024    PHOS 4.3 09/13/2023      No results found for: \"MG\"   Lab Results   Component Value Date    ALT 60 (H) 01/11/2024    AST 70 (H) 01/11/2024    ALKPHOS 70 01/11/2024    BILITOT 0.6 01/11/2024      Lab Results   Component Value Date    INR CANCELED 09/06/2023    INR 1.3 (H) 09/06/2023    INR 1.5 (H) 09/05/2023    PROTIME CANCELED 09/06/2023    PROTIME 14.8 (H) 09/06/2023    PROTIME 16.8 (H) 09/05/2023        Imaging:  === Results for orders placed during the hospital encounter of 01/11/24 ===    CT head wo IV contrast " [[id number]] 01/11/2024    Status: Normal  1. No evidence of acute cortical infarct, intracranial hemorrhage or  intracranial mass effect.  2. Findings of probable chronic small vessel ischemic changes and age  related diffuse cerebral volume loss.  3. 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    I personally reviewed the images/study and I agree with the findings  as stated by Resident Erica Gardner.    MACRO:  None    Signed by: Antwon Harper 1/11/2024 5:02 PM  Dictation workstation:   [id number]     Micro:  No results found for the last 90 days.            Assessment/Plan   Myrna Khan is a 63 y.o. female with PMH HFrEF (last TTE 35-40% EF, severely dilated LA mild-mod MR, atrial septal aneurysm), CAD (cath 8/23 with severe 2 vessel disease mLAD and mLCx, no PCI, Off Pump CABG x 2;-LIMA to LAD;-APRIL to OM), HTN, PAD (sp L SFA stent), Renal artery stenosis (renal US showing >60% stenosis of R, unable to visualize L), Carotid artery stenosis (50-69% in L pICA), IDDM2, who presents with AMS after being found unconscious in elevator, glucose reading above upper limit of assay. High suspicious for HHS, admitted to MICU for insulin gtt.    CNS  #AMS likely 22 HHS    PULM  ***    CV  #HFrEF   #mild-moderate MR  ::last TTE 35-40% EF, severely dilated LA mild-mod MR, atrial septal aneurysm    #CAD  ::sp cath 8/23 with severe 2 vessel disease mLAD and mLCx, no PCI   ::sp Off Pump CABG x 2;-LIMA to LAD;-APRIL to OM      #PAD/LLE Claudication  ::sp L SFA stent, occluded pending outpatient  re-vascularization with vascular      GI  ***    RENAL/  #Pseudohyponatremia  ::Iso HHS    ID  ***    HEME/ONC  ***    ENDO  #HHS  #IDDM type II  ::BG 1116 on admission with associated AMS  ::Discharged 9/13 on lantus 30u, lispro 5u AC, SSI, dapagliflozin, metformin XL 1000mg daily  -HHS protocol    MSK/Skin  ***      F: PRN  E: K>4 Phos>3 Mag>2  N: NPO  A:   O2:   Drips:   Abx:   DVT PPx:   GI PPx:     CODE STATUS: FULL (confirmed  with *** on admission)  SURROGATE DECISION MAKER:     Juliano Ayers MD  Resident, PGY-1

## 2024-01-12 ENCOUNTER — APPOINTMENT (OUTPATIENT)
Dept: CARDIOLOGY | Facility: HOSPITAL | Age: 64
DRG: 637 | End: 2024-01-12
Payer: COMMERCIAL

## 2024-01-12 ENCOUNTER — APPOINTMENT (OUTPATIENT)
Dept: RADIOLOGY | Facility: HOSPITAL | Age: 64
DRG: 637 | End: 2024-01-12
Payer: COMMERCIAL

## 2024-01-12 LAB
ALBUMIN SERPL BCP-MCNC: 3.5 G/DL (ref 3.4–5)
ANION GAP BLDV CALCULATED.4IONS-SCNC: ABNORMAL MMOL/L
ANION GAP SERPL CALC-SCNC: 11 MMOL/L (ref 10–20)
BASE EXCESS BLDV CALC-SCNC: 2.5 MMOL/L (ref -2–3)
BODY TEMPERATURE: 37 DEGREES CELSIUS
BUN SERPL-MCNC: 8 MG/DL (ref 6–23)
CA-I BLD-SCNC: 1.15 MMOL/L (ref 1.1–1.33)
CA-I BLDV-SCNC: 1.1 MMOL/L (ref 1.1–1.33)
CALCIUM SERPL-MCNC: 9.1 MG/DL (ref 8.6–10.6)
CARDIAC TROPONIN I PNL SERPL HS: 22 NG/L (ref 0–34)
CHLORIDE BLDV-SCNC: 97 MMOL/L (ref 98–107)
CHLORIDE SERPL-SCNC: 102 MMOL/L (ref 98–107)
CO2 SERPL-SCNC: 27 MMOL/L (ref 21–32)
CREAT SERPL-MCNC: 0.54 MG/DL (ref 0.5–1.05)
EGFRCR SERPLBLD CKD-EPI 2021: >90 ML/MIN/1.73M*2
ERYTHROCYTE [DISTWIDTH] IN BLOOD BY AUTOMATED COUNT: 12.7 % (ref 11.5–14.5)
GLUCOSE BLD MANUAL STRIP-MCNC: 162 MG/DL (ref 74–99)
GLUCOSE BLD MANUAL STRIP-MCNC: 180 MG/DL (ref 74–99)
GLUCOSE BLD MANUAL STRIP-MCNC: 187 MG/DL (ref 74–99)
GLUCOSE BLD MANUAL STRIP-MCNC: 202 MG/DL (ref 74–99)
GLUCOSE BLD MANUAL STRIP-MCNC: 203 MG/DL (ref 74–99)
GLUCOSE BLD MANUAL STRIP-MCNC: 203 MG/DL (ref 74–99)
GLUCOSE BLD MANUAL STRIP-MCNC: 241 MG/DL (ref 74–99)
GLUCOSE BLD MANUAL STRIP-MCNC: 289 MG/DL (ref 74–99)
GLUCOSE BLD MANUAL STRIP-MCNC: 294 MG/DL (ref 74–99)
GLUCOSE BLD MANUAL STRIP-MCNC: 323 MG/DL (ref 74–99)
GLUCOSE BLD MANUAL STRIP-MCNC: 356 MG/DL (ref 74–99)
GLUCOSE BLD MANUAL STRIP-MCNC: 397 MG/DL (ref 74–99)
GLUCOSE BLD MANUAL STRIP-MCNC: 405 MG/DL (ref 74–99)
GLUCOSE BLD MANUAL STRIP-MCNC: 432 MG/DL (ref 74–99)
GLUCOSE BLDV-MCNC: 399 MG/DL (ref 74–99)
GLUCOSE SERPL-MCNC: 157 MG/DL (ref 74–99)
HCO3 BLDV-SCNC: 29.7 MMOL/L (ref 22–26)
HCT VFR BLD AUTO: 38.1 % (ref 36–46)
HCT VFR BLD EST: 46 % (ref 36–46)
HGB BLD-MCNC: 13.9 G/DL (ref 12–16)
HGB BLDV-MCNC: 15.3 G/DL (ref 12–16)
LACTATE BLDV-SCNC: 2.7 MMOL/L (ref 0.4–2)
LACTATE SERPL-SCNC: 1.3 MMOL/L (ref 0.4–2)
MAGNESIUM SERPL-MCNC: 1.91 MG/DL (ref 1.6–2.4)
MCH RBC QN AUTO: 31.8 PG (ref 26–34)
MCHC RBC AUTO-ENTMCNC: 36.5 G/DL (ref 32–36)
MCV RBC AUTO: 87 FL (ref 80–100)
NRBC BLD-RTO: 0 /100 WBCS (ref 0–0)
OXYHGB MFR BLDV: 18.8 % (ref 45–75)
PCO2 BLDV: 55 MM HG (ref 41–51)
PH BLDV: 7.34 PH (ref 7.33–7.43)
PHOSPHATE SERPL-MCNC: 2.3 MG/DL (ref 2.5–4.9)
PLATELET # BLD AUTO: 179 X10*3/UL (ref 150–450)
PO2 BLDV: 21 MM HG (ref 35–45)
POTASSIUM BLDV-SCNC: >10 MMOL/L (ref 3.5–5.3)
POTASSIUM SERPL-SCNC: 3.8 MMOL/L (ref 3.5–5.3)
RBC # BLD AUTO: 4.37 X10*6/UL (ref 4–5.2)
SAO2 % BLDV: 19 % (ref 45–75)
SODIUM BLDV-SCNC: 125 MMOL/L (ref 136–145)
SODIUM SERPL-SCNC: 136 MMOL/L (ref 136–145)
WBC # BLD AUTO: 8.4 X10*3/UL (ref 4.4–11.3)

## 2024-01-12 PROCEDURE — 97161 PT EVAL LOW COMPLEX 20 MIN: CPT | Mod: GP

## 2024-01-12 PROCEDURE — 71045 X-RAY EXAM CHEST 1 VIEW: CPT

## 2024-01-12 PROCEDURE — 2500000004 HC RX 250 GENERAL PHARMACY W/ HCPCS (ALT 636 FOR OP/ED): Performed by: NURSE PRACTITIONER

## 2024-01-12 PROCEDURE — 2500000001 HC RX 250 WO HCPCS SELF ADMINISTERED DRUGS (ALT 637 FOR MEDICARE OP): Performed by: NURSE PRACTITIONER

## 2024-01-12 PROCEDURE — 73564 X-RAY EXAM KNEE 4 OR MORE: CPT | Mod: RT

## 2024-01-12 PROCEDURE — 85027 COMPLETE CBC AUTOMATED: CPT | Performed by: STUDENT IN AN ORGANIZED HEALTH CARE EDUCATION/TRAINING PROGRAM

## 2024-01-12 PROCEDURE — 2500000001 HC RX 250 WO HCPCS SELF ADMINISTERED DRUGS (ALT 637 FOR MEDICARE OP): Performed by: STUDENT IN AN ORGANIZED HEALTH CARE EDUCATION/TRAINING PROGRAM

## 2024-01-12 PROCEDURE — 82947 ASSAY GLUCOSE BLOOD QUANT: CPT

## 2024-01-12 PROCEDURE — 93010 ELECTROCARDIOGRAM REPORT: CPT | Performed by: INTERNAL MEDICINE

## 2024-01-12 PROCEDURE — 2500000002 HC RX 250 W HCPCS SELF ADMINISTERED DRUGS (ALT 637 FOR MEDICARE OP, ALT 636 FOR OP/ED): Performed by: NURSE PRACTITIONER

## 2024-01-12 PROCEDURE — 2500000002 HC RX 250 W HCPCS SELF ADMINISTERED DRUGS (ALT 637 FOR MEDICARE OP, ALT 636 FOR OP/ED): Performed by: STUDENT IN AN ORGANIZED HEALTH CARE EDUCATION/TRAINING PROGRAM

## 2024-01-12 PROCEDURE — 1210000001 HC SEMI-PRIVATE ROOM DAILY

## 2024-01-12 PROCEDURE — 82330 ASSAY OF CALCIUM: CPT | Performed by: STUDENT IN AN ORGANIZED HEALTH CARE EDUCATION/TRAINING PROGRAM

## 2024-01-12 PROCEDURE — 93005 ELECTROCARDIOGRAM TRACING: CPT

## 2024-01-12 PROCEDURE — 36415 COLL VENOUS BLD VENIPUNCTURE: CPT | Performed by: STUDENT IN AN ORGANIZED HEALTH CARE EDUCATION/TRAINING PROGRAM

## 2024-01-12 PROCEDURE — 83605 ASSAY OF LACTIC ACID: CPT | Performed by: INTERNAL MEDICINE

## 2024-01-12 PROCEDURE — 99231 SBSQ HOSP IP/OBS SF/LOW 25: CPT | Performed by: NURSE PRACTITIONER

## 2024-01-12 PROCEDURE — 71045 X-RAY EXAM CHEST 1 VIEW: CPT | Performed by: RADIOLOGY

## 2024-01-12 PROCEDURE — 73564 X-RAY EXAM KNEE 4 OR MORE: CPT | Mod: RIGHT SIDE | Performed by: RADIOLOGY

## 2024-01-12 PROCEDURE — 80069 RENAL FUNCTION PANEL: CPT | Performed by: STUDENT IN AN ORGANIZED HEALTH CARE EDUCATION/TRAINING PROGRAM

## 2024-01-12 PROCEDURE — 83735 ASSAY OF MAGNESIUM: CPT | Performed by: STUDENT IN AN ORGANIZED HEALTH CARE EDUCATION/TRAINING PROGRAM

## 2024-01-12 RX ORDER — POLYETHYLENE GLYCOL 3350 17 G/17G
17 POWDER, FOR SOLUTION ORAL DAILY PRN
COMMUNITY

## 2024-01-12 RX ORDER — MULTIVIT-MIN/IRON FUM/FOLIC AC 7.5 MG-4
1 TABLET ORAL DAILY
COMMUNITY

## 2024-01-12 RX ORDER — SENNOSIDES 8.6 MG/1
2 TABLET ORAL 2 TIMES DAILY PRN
COMMUNITY

## 2024-01-12 RX ORDER — INSULIN LISPRO 100 [IU]/ML
6 INJECTION, SOLUTION INTRAVENOUS; SUBCUTANEOUS ONCE
Status: COMPLETED | OUTPATIENT
Start: 2024-01-12 | End: 2024-01-12

## 2024-01-12 RX ORDER — ACETAMINOPHEN 325 MG/1
650 TABLET ORAL EVERY 6 HOURS PRN
Status: DISCONTINUED | OUTPATIENT
Start: 2024-01-12 | End: 2024-01-16 | Stop reason: HOSPADM

## 2024-01-12 RX ADMIN — INSULIN GLARGINE 10 UNITS: 100 INJECTION, SOLUTION SUBCUTANEOUS at 20:04

## 2024-01-12 RX ADMIN — INSULIN LISPRO 2 UNITS: 100 INJECTION, SOLUTION INTRAVENOUS; SUBCUTANEOUS at 04:27

## 2024-01-12 RX ADMIN — INSULIN GLARGINE 10 UNITS: 100 INJECTION, SOLUTION SUBCUTANEOUS at 01:03

## 2024-01-12 RX ADMIN — INSULIN LISPRO 5 UNITS: 100 INJECTION, SOLUTION INTRAVENOUS; SUBCUTANEOUS at 20:04

## 2024-01-12 RX ADMIN — AMLODIPINE BESYLATE 10 MG: 10 TABLET ORAL at 08:17

## 2024-01-12 RX ADMIN — CARVEDILOL 25 MG: 25 TABLET, FILM COATED ORAL at 08:17

## 2024-01-12 RX ADMIN — ATORVASTATIN CALCIUM 80 MG: 80 TABLET, FILM COATED ORAL at 08:17

## 2024-01-12 RX ADMIN — INSULIN LISPRO 3 UNITS: 100 INJECTION, SOLUTION INTRAVENOUS; SUBCUTANEOUS at 12:07

## 2024-01-12 RX ADMIN — ACETAMINOPHEN 650 MG: 325 TABLET ORAL at 08:36

## 2024-01-12 RX ADMIN — INSULIN LISPRO 2 UNITS: 100 INJECTION, SOLUTION INTRAVENOUS; SUBCUTANEOUS at 01:03

## 2024-01-12 RX ADMIN — HYDRALAZINE HYDROCHLORIDE 100 MG: 25 TABLET ORAL at 20:02

## 2024-01-12 RX ADMIN — INSULIN LISPRO 6 UNITS: 100 INJECTION, SOLUTION INTRAVENOUS; SUBCUTANEOUS at 16:45

## 2024-01-12 RX ADMIN — HYDRALAZINE HYDROCHLORIDE 100 MG: 25 TABLET ORAL at 13:37

## 2024-01-12 RX ADMIN — INSULIN LISPRO 5 UNITS: 100 INJECTION, SOLUTION INTRAVENOUS; SUBCUTANEOUS at 16:01

## 2024-01-12 RX ADMIN — ENOXAPARIN SODIUM 40 MG: 100 INJECTION SUBCUTANEOUS at 20:03

## 2024-01-12 RX ADMIN — ASPIRIN 81 MG CHEWABLE TABLET 81 MG: 81 TABLET CHEWABLE at 08:17

## 2024-01-12 RX ADMIN — HYDRALAZINE HYDROCHLORIDE 100 MG: 25 TABLET ORAL at 06:06

## 2024-01-12 RX ADMIN — CEFTRIAXONE SODIUM 1 G: 1 INJECTION, SOLUTION INTRAVENOUS at 20:10

## 2024-01-12 RX ADMIN — INSULIN LISPRO 1 UNITS: 100 INJECTION, SOLUTION INTRAVENOUS; SUBCUTANEOUS at 08:00

## 2024-01-12 RX ADMIN — CARVEDILOL 25 MG: 25 TABLET, FILM COATED ORAL at 20:03

## 2024-01-12 RX ADMIN — CLOPIDOGREL BISULFATE 75 MG: 75 TABLET ORAL at 08:17

## 2024-01-12 ASSESSMENT — COGNITIVE AND FUNCTIONAL STATUS - GENERAL
MOVING TO AND FROM BED TO CHAIR: A LITTLE
STANDING UP FROM CHAIR USING ARMS: A LITTLE
WALKING IN HOSPITAL ROOM: A LITTLE
DAILY ACTIVITIY SCORE: 24
CLIMB 3 TO 5 STEPS WITH RAILING: A LOT
MOVING TO AND FROM BED TO CHAIR: A LITTLE
STANDING UP FROM CHAIR USING ARMS: A LITTLE
MOVING FROM LYING ON BACK TO SITTING ON SIDE OF FLAT BED WITH BEDRAILS: A LITTLE
CLIMB 3 TO 5 STEPS WITH RAILING: A LOT
MOVING FROM LYING ON BACK TO SITTING ON SIDE OF FLAT BED WITH BEDRAILS: A LITTLE
MOBILITY SCORE: 17
WALKING IN HOSPITAL ROOM: A LITTLE
TURNING FROM BACK TO SIDE WHILE IN FLAT BAD: A LITTLE
MOBILITY SCORE: 17
TURNING FROM BACK TO SIDE WHILE IN FLAT BAD: A LITTLE

## 2024-01-12 ASSESSMENT — PAIN - FUNCTIONAL ASSESSMENT
PAIN_FUNCTIONAL_ASSESSMENT: 0-10

## 2024-01-12 ASSESSMENT — PAIN SCALES - GENERAL
PAINLEVEL_OUTOF10: 0 - NO PAIN

## 2024-01-12 NOTE — PROGRESS NOTES
"Pharmacy Medication History Review    Myrna Khan is a 63 y.o. female admitted for Hyperosmolar hyperglycemic state (HHS) (CMS/[facility]). Pharmacy reviewed the patient's [id number] medications and allergies for accuracy.    The list below reflects the updated PTA list. Comments regarding how patient may be taking medications differently can be found in the Admit Orders Activity  Prior to Admission Medications   Prescriptions Last Dose Informant   BD Ultra-Fine Micro Pen Needle 32 gauge x 1/4\" needle     Sig: Take per directed   Lantus Solostar U-100 Insulin 100 unit/mL (3 mL) pen     Sig: Inject 20 Units under the skin once daily. Take per directed   Vitamin D3 25 mcg (1,000 unit) capsule     Sig: Take 2 capsules (50 mcg) by mouth once daily. Take per directed   acetaminophen (Tylenol) 500 mg tablet     Sig: Take per directed   alcohol swabs pads, medicated     Sig: Take per directed   amLODIPine (Norvasc) 10 mg tablet     Sig: TAKE 1 TABLET BY MOUTH ONCE DAILY   aspirin 81 mg chewable tablet     Sig: CHEW 1 TABLET BY MOUTH ONCE DAILY   atorvastatin (Lipitor) 80 mg tablet     Sig: TAKE 1 TABLET BY MOUTH ONCE DAILY   carvedilol (Coreg) 25 mg tablet     Sig: TAKE 1 TABLET BY MOUTH TWO TIMES A DAY   clopidogrel (Plavix) 75 mg tablet     Sig: TAKE 1 TABLET BY MOUTH ONCE DAILY   dapagliflozin propanediol (Farxiga) 10 mg Not Taking    Sig: TAKE 1 TABLET BY MOUTH ONCE DAILY   Patient not taking: Reported on 1/12/2024   dapagliflozin propanediol (Farxiga) 10 mg     Sig: TAKE 1 TABLET BY MOUTH ONCE DAILY   furosemide (Lasix) 20 mg tablet     Sig: TAKE 2 TABLETS BY MOUTH ONCE DAILY FOR 5 DAYS THEN THEN DECREASE TO 1 TABLET ONCE DAILY FOR 7 DAYS   hydrALAZINE (Apresoline) 100 mg tablet     Sig: TAKE 1 TABLET BY MOUTH EVERY 8 HOURS   hydroCHLOROthiazide (HYDRODiuril) 50 mg tablet     Sig: Take 1 tablet (50 mg) by mouth once daily. Take per directed   insulin aspart (NovoLOG) 100 unit/mL (3 mL) pen     Sig: INJECT 5 " "UNITS UNDER THE SKIN THREE TIMES A DAY (WITH MEALS)   iron polysaccharides (Nu-Iron,Niferex) 150 mg iron capsule Not Taking    Sig: TAKE 1 CAPSULE BY MOUTH ONCE DAILY FOR ONE MONTH THEN STOP   Patient not taking: Reported on 1/12/2024   isosorbide mononitrate ER (Imdur) 60 mg 24 hr tablet     Sig: TAKE 1 TABLET BY MOUTH ONCE DAILY   metFORMIN XR (Glucophage-XR) 500 mg 24 hr tablet     Sig: TAKE 2 TABLETS BY MOUTH ONCE DAILY   multivitamin with minerals tablet     Sig: Take 1 tablet by mouth once daily.   nicotine (Nicoderm CQ) 7 mg/24 hr patch Not Taking    Sig: APPLY 1 PATCH TRANSDERMAL EVERY 24 HOURS.   Patient not taking: Reported on 1/12/2024   oxyCODONE-acetaminophen (Percocet) 5-325 mg tablet Not Taking    Sig: TAKE 1 TABLET BY MOUTH EVERY 6 HOURS AS NEEDED FOR 2 DAYS FOR POST-SURGICAL PAIN. DO NOT TAKE ANY ADDITIONAL TYLENOL WITH THIS MEDICATION; MAY SWITCH TO PLAIN TYLENOL WHEN NO LONGER TAKING PERCOCET.   Patient not taking: Reported on 1/12/2024   pen needle, diabetic 31 gauge x 3/16\" needle     Sig: USE THREE TIMES A DAY   polyethylene glycol (Glycolax, Miralax) 17 gram packet     Sig: Take 17 g by mouth once daily as needed (constipation).   sacubitriL-valsartan (Entresto) 49-51 mg tablet Not Taking    Sig: TAKE 1 TABLET BY MOUTH TWO TIMES A DAY   Patient not taking: Reported on 1/12/2024   sacubitriL-valsartan (Entresto) 49-51 mg tablet Not Taking    Sig: TAKE 1 TABLET BY MOUTH TWO TIMES A DAY   Patient not taking: Reported on 1/12/2024   sennosides (Senokot) 8.6 mg tablet     Sig: Take 2 tablets (17.2 mg) by mouth 2 times a day as needed for constipation.   spironolactone (Aldactone) 25 mg tablet     Sig: TAKE 1 TABLET BY MOUTH ONCE DAILY   traMADol (Ultram) 50 mg tablet     Sig: Take 1 tablet (50 mg) by mouth every 6 hours if needed (for pain).      Facility-Administered Medications: None       The list below reflects the updated allergy list. Please review each documented allergy for additional " clarification and justification.  Allergies  Reviewed by Thalia Mccabe RN on 1/11/2024        Severity Reactions Comments    Lisinopril Not Specified Itching             Patient accepts M2B at discharge. Pharmacy has been updated to [facility].    Sources used to complete the med history include out patient fill history, [facility], office visit [facility] vascular 11/15/23, discharge note cardiology [facility] 12/8/23, and patient interview - modest historian, recognized medications if listed    Below are additional concerns with the patient's PTA list.  Hydralazine reported as once daily in some [facility] documentation    Domingo Bonner, PharmD  Transitions of Care Pharmacist  [facility]  Please reach out via Secure Chat for questions, or if no response call [employer] or [employer] [facility]

## 2024-01-12 NOTE — CARE PLAN
Problem: Pain - Adult  Goal: Verbalizes/displays adequate comfort level or baseline comfort level  Outcome: Progressing     Problem: Safety - Adult  Goal: Free from fall injury  Outcome: Progressing     Problem: Discharge Planning  Goal: Discharge to home or other facility with appropriate resources  Outcome: Progressing     Problem: Diabetes  Goal: Achieve decreasing blood glucose levels by end of shift  Outcome: Progressing  Goal: Increase stability of blood glucose readings by end of shift  Outcome: Progressing  Goal: Decrease in ketones present in urine by end of shift  Outcome: Progressing  Goal: Maintain electrolyte levels within acceptable range throughout shift  Outcome: Progressing  Goal: Maintain glucose levels >70mg/dl to <250mg/dl throughout shift  Outcome: Progressing  Goal: No changes in neurological exam by end of shift  Outcome: Progressing

## 2024-01-12 NOTE — SIGNIFICANT EVENT
Floor Readiness Note       I, personally, evaluated Myrna Khan prior to transfer to the floor, including reviewing all current laboratory and imaging studies. The patient remains appropriate for transfer to the floor. Bedside nurse and respiratory therapy are also in agreement of patient's readiness for the floor.     Brief summary:  Myrna Khan is a 63 y.o. female who was admitted to the MICU on 1/11/24 for HHS. They have been treated with insulin gtt and antibiotics.     Updated focused Physical Exam:  Neuro:  A/Ox2, follows commands, no focal deficits  CV:  RRR  PULM:  CTAB  GI:  abdomen flat, bowel sounds heard  SKIN:  warm, dry  EXT:  no edema    Current Vital Signs:  Heart Rate: 65 (01/12/24 1000 : Chary Galicia RN)  BP: 107/69 (01/12/24 1000 : Chary Galicia, RN)  Temp: 36.6 °C (97.9 °F) (01/12/24 0800 : Hakan Houser)  Resp: 17 (01/12/24 1000 : Chary Galicia, RN)  SpO2: 100 % (01/12/24 0900 : Chary Galicia RN)    Relevant updates since rounds:  Off insulin gtt and transitioned to subcutaneous long acting insulin   Taking PO diet    Accepting team, Hospitalist CARMEN, received verbal sign out and the Provider Care team/Attending has been updated. Bedside nurse will now call accepting nurse for report and patient will be transferred to [place] [id number].    LEONIDES Edmond-CNP

## 2024-01-12 NOTE — HOSPITAL COURSE
Myrna Khan is a 63 y.o. female presenting with hx IDDM, HFrEF (50-55%), CAD, CABG (09/2023), HTN, PAD s/p L SFA stent, renal artery stenosis, carotid artery stenosis, admitted to MICU 2/2 HHS on an insulin gtt. Presenting to the ED with initial c/f AMS, hyperglycemia found down in building elevator by manager with undetectable glucose per EMS. In the ER, found to be in HHS with glucose on metabolic panel at 1100, Na 125 (correct 141), started on an insulin drip, and given 2L IVF. CT head negative.

## 2024-01-12 NOTE — H&P
History Of Present Illness  Myrna Khan is a 63 y.o. female presenting with hx IDDM, HFrEF (50-55%), CAD, CABG (09/2023), HTN, PAD s/p L SFA stent, renal artery stenosis, carotid artery stenosis, admitted to MICU 2/2 HHS on an insulin gtt. Presenting to the ED with initial c/f AMS, hyperglycemia found down in building elevator by manager with undetectable glucose per EMS. In the ER, found to be in HHS with glucose on metabolic panel at 1100, Na 125 (correct 141), started on an insulin drip, and given 2L IVF. CT head negative.     On arrival to the MICU, pt is mentating well. Endorses she felt dizzy this morning, denying any recent fevers, chest pain, sob, nausea/vomiting, diarrhea, abdominal pain. States she missed her last 2 days of lantus (reported as 20U qd), has otherwise been compliant with her medications. Endorses recalling going into the elevator while feeling dizzy and next recalls being in the hospital. Unclear if syncopal episode, denies cp/sob prior to episode. Currently, states she feels well and denies any persistent dizziness, current chest pain, or sob.     ED course:   Labs: glucose 1116, Na (corrected) 141, Cr 0.97, ALT 60, AST 70, CBC 5.8, hgb 15, pH 7.41, lactate 2.4.   UA: 3+ glucose, 1+ protein, positive nitrite, 1+ bacteria   COVID/Influenza negative  Medications: 1L LR, 1L NS, insulin gtt, 20mEq K  CT Head: no acute changes. chronic small vessel ischemic changes, age related diffuse cerebral volume loss, old small infarct in R occipital lobe.      Past Medical History  No past medical history on file.    Surgical History  No past surgical history on file.     Social History  She reports that she has never smoked. She has never used smokeless tobacco. No history on file for alcohol use and drug use.    Family History  No family history on file.     Allergies  Lisinopril      Physical Exam  Constitutional:       General: She is not in acute distress.     Appearance: Normal appearance. She  "is not ill-appearing.   HENT:      Head: Normocephalic and atraumatic.      Mouth/Throat:      Mouth: Mucous membranes are dry.   Eyes:      Extraocular Movements: Extraocular movements intact.      Pupils: Pupils are equal, round, and reactive to light.   Cardiovascular:      Rate and Rhythm: Normal rate and regular rhythm.      Pulses: Normal pulses.   Pulmonary:      Effort: Pulmonary effort is normal. No respiratory distress.      Breath sounds: Normal breath sounds. No wheezing, rhonchi or rales.   Abdominal:      General: Abdomen is flat. Bowel sounds are normal. There is no distension.      Palpations: Abdomen is soft.      Tenderness: There is no abdominal tenderness. There is no guarding or rebound.   Musculoskeletal:         General: No swelling.      Cervical back: Normal range of motion and neck supple.   Skin:     General: Skin is warm and dry.      Capillary Refill: Capillary refill takes less than 2 seconds.   Neurological:      Mental Status: She is alert.      Comments: Oriented to self and place. Intermittently confused on time (able to state month, not year). Following commands, PAINTING x4   Psychiatric:         Mood and Affect: Mood normal.          Last Recorded Vitals  Blood pressure 167/79, pulse 69, temperature 35.7 °C (96.3 °F), temperature source Temporal, resp. rate 14, height 1.6 m (5' 2.99\"), weight 50.1 kg (110 lb 7.2 oz), SpO2 100 %.    Relevant Results           Assessment/Plan   Principal Problem:    Hyperosmolar hyperglycemic state (HHS) (CMS/HCC)      CNS  AMS 2/2 HHS   -mental status improving, A&Ox2-3 on arrival to ICU (intermittent confused on year, knows month)   -CT Head (1/11) no acute changes   -ctm neuro status     PULM  -satting well on RA   -viral swabs negative     CV  HFrEF  CAD  HTN  HLD  PAD   -c/f syncopal episode, EKG sinus rhythm, borderline 1st degree block, remaining intervals wnl, TWI lateral leads, no STEMI    -repeat echo during hospitalization   -IRENE (09/2023): " EF 50-55%, LA moderate/severely dilated, trace AR, MR   -restart home PO meds amlodipine, asa, atorvastatin, carvedilol, clopidogrel, hydralazine, spironolactone     GI   -NPO while on insulin gtt     RENAL/  Pseudohyponatremia 2/2 hyperglycemia  -corrected Na wnl   -Mg>2, K>4  -monitor I/Os    HEME/ONC   -daily CBC     ENDO  HHS  IDDM   -insulin gtt, fluids per ICU [facility] protocol, glucose rapidly improving will monitor POCT glucose off gtt and restart home lantus, SSI   -replete electrolytes    MSK/SKIN   -per ICU protocol     ID  UTI  -ctx qd  -ucx (1/11) pending       FEN: NPO   A: PIV  O2: RA   Gtt: insulin gtt   Abx: ceftriaxone (1/11-)    DVT ppx: lovenox   GI ppx: hold     CODE STATUS: Full code     Catrachita Hanna DO   Emergency Medicine, PGY3              Catrachita Hanna DO

## 2024-01-12 NOTE — PROGRESS NOTES
Physical Therapy    Physical Therapy Evaluation    Patient Name: Myrna Khan  MRN: [id number]  Today's Date: 1/12/2024   Time Calculation  Start Time: 1204  Stop Time: 1219  Time Calculation (min): 15 min    Assessment/Plan   PT Assessment  PT Assessment Results: Decreased strength, Impaired balance, Decreased mobility, Decreased safety awareness  Rehab Prognosis: Good  End of Session Communication: Bedside nurse  End of Session Patient Position: Bed, 3 rail up, Alarm off, not on at start of session  IP OR SWING BED PT PLAN  Inpatient or Swing Bed: Inpatient  PT Plan  Treatment/Interventions: Bed mobility, Transfer training, Gait training, Balance training, Strengthening, Therapeutic exercise, Therapeutic activity  PT Plan: Skilled PT  PT Frequency: 4 times per week  PT Discharge Recommendations: High intensity level of continued care  PT Recommended Transfer Status: Assist x1  PT - OK to Discharge: Yes      Subjective   General Visit Information:  General  Reason for Referral: admitted to MICU 2/2 HHS on an insulin gtt. Initially presented to the ED with c/f AMS, hyperglycemia, found down in building elevator by manager with undetectable glucose per EMS. CTH (-); X-ray R knee (-) fracture  Past Medical History Relevant to Rehab: IDDM, HFrEF (50-55%), CAD, CABG (09/2023), HTN, PAD s/p L SFA stent, renal artery stenosis, carotid artery stenosis.  Family/Caregiver Present: No  Prior to Session Communication: Bedside nurse  Patient Position Received: Bed, 3 rail up, Alarm off, not on at start of session  General Comment: agreeable to participate in therapy; however, appeared easily irritated during session.  Home Living:  Home Living  Home Living Comments: difficult obtaining information as patient was tangential in conversation; lives in an apartment alone, reports her  stays in another apartment down the patel; tub shower  Prior Level of Function:  Prior Function Per Pt/Caregiver Report  Prior Function  "Comments: reports being independent, but then reported that her  goes grocery shopping for her, (-) drive  Precautions:  Precautions  Medical Precautions: Fall precautions  Vital Signs:       Objective   Pain:  Pain Assessment  Pain Assessment: 0-10  Pain Score:  (reported leg pain, did not rate numerically)  Cognition:  Cognition  Overall Cognitive Status: Impaired  Orientation Level:  (oriented x3)  Safety Judgment:  (appeared mildly impulsive)  Cognition Comments: patient with limited sustained attention span    General Assessments:        Strength  Strength Comments: BUE: appears grossly WFL ((B) LE: grossly >/=3/5 assessed via functional mobility tasks. Patient with inconsistent questionable buckling of R knee in standing)       Static Sitting Balance  Static Sitting-Balance Support: No upper extremity supported  Static Sitting-Level of Assistance: Close supervision  Dynamic Sitting Balance  Dynamic Sitting-Balance Support: No upper extremity supported  Dynamic Sitting-Comments: CGx1    Static Standing Balance  Static Standing-Balance Support: Bilateral upper extremity supported  Static Standing-Comment/Number of Minutes: completed x2 STS with only PT assist with patient holding onto BR and therapist's UE, MINx1 however, patient unable to remain standing, patient stating, \"my knee can't do this!\" PT had RN assist, patient complete 3rd STS with MINx2 with initially buckling of R knee but then patient able to stand without buckling with verbal distraction from PT. Increase retrolean with standing both static and dynamic.  Dynamic Standing Balance  Dynamic Standing-Balance Support: Bilateral upper extremity supported  Dynamic Standing-Comments: MINx2 for lateral side stepping  Functional Assessments:  Bed Mobility  Bed Mobility: Yes  Bed Mobility 1  Bed Mobility 1: Supine to sitting, Sitting to supine  Level of Assistance 1: Contact guard, Minimal verbal cues  Bed Mobility Comments 1: HOB " elevated    Transfers  Transfer: Yes  Transfer 1  Transfer From 1: Sit to, Stand to  Transfer to 1: Sit, Stand  Technique 1: Sit to stand, Stand to sit  Trials/Comments 1: MINx1 durign 1st and 2nd STS attempt, unable to remain in full standing d/t R knee buckling, patient abruptly sitting back EOB, stating that she is unable to stand. PT and RN assisted patient for 3rd STS, MINx2, patient able to complete task.    Ambulation/Gait Training  Ambulation/Gait Training Performed: Yes  Ambulation/Gait Training 1  Surface 1: Level tile  Assistance 1: Arm in arm assistance, Minimum assistance, Moderate verbal cues, Moderate tactile cues  Comments/Distance (ft) 1: x2 assist with lateral side stepping x2 ft towards HOB; patient with impaired step sequencing, retrolean  Extremity/Trunk Assessments:  RUE   RUE :  (AROM WFL)  LUE   LUE:  (AROM WFL)  RLE   RLE :  (AROM WFL)  LLE   LLE :  (AROM WFL)  Outcome Measures:  [facility] Basic Mobility  Turning from your back to your side while in a flat bed without using bedrails: A little  Moving from lying on your back to sitting on the side of a flat bed without using bedrails: A little  Moving to and from bed to chair (including a wheelchair): A little  Standing up from a chair using your arms (e.g. wheelchair or bedside chair): A little  To walk in hospital room: A little  Climbing 3-5 steps with railing: A lot  Basic Mobility - Total Score: 17    FSS-ICU  Ambulation: Walks <50 feet with any assistance x1 or walks any distance with assistance x2 people  Rolling: Minimal assistance (performs 75% or more of task)  Sitting: Minimal assistance (performs 75% or more of task)  Transfer Sit-to-Stand: Total assistance (performs 25% or requires another person)  Transfer Supine-to-Sit: Minimal assistance (performs 75% or more of task)  Total Score: 14      E = Exercise and Early Mobility  Current Activity: Standing    Encounter Problems       Encounter Problems (Active)       PT Problem        Patient will complete bed mobility with supervision x1         Start:  01/12/24    Expected End:  02/02/24            Patient will complete STS with SBAx1 using LRAD without acute LOB         Start:  01/12/24    Expected End:  02/02/24            Patient will ambulate >/=50' with LRAD with SBAx1 without acute LOB, path deviation or postural sway        Start:  01/12/24    Expected End:  02/02/24            Patient will complete static (supervision) and dynamic (stand by assist) standing balance activities using LRAD without acute LOB.        Start:  01/12/24    Expected End:  02/02/24            Patient will complete bed<->chair transfer with stand by assist using LRD as needed without acute LOB        Start:  01/12/24    Expected End:  02/02/24                            Education Documentation  Mobility Training, taught by Laura Barton PT at 1/12/2024  3:10 PM.  Learner: Patient  Readiness: Acceptance  Method: Explanation  Response: Needs Reinforcement    Education Comments  No comments found.      Laura Barton PT, DPT         Jp

## 2024-01-12 NOTE — PROGRESS NOTES
ICU to [facility] Transfer Summary     I:  ICU Admission Reason & Brief ICU Course:    Myrna Khan is a 63 y.o. female presenting with hx IDDM, HFrEF (50-55%), CAD, CABG (09/2023), HTN, PAD s/p L SFA stent, renal artery stenosis, carotid artery stenosis, admitted to MICU 2/2 HHS on an insulin gtt. Presenting to the ED with initial c/f AMS, hyperglycemia found down in building elevator by manager with undetectable glucose per EMS. In the ER, found to be in HHS with glucose on metabolic panel at 1100, Na 125 (correct 141), started on an insulin drip, and given 2L IVF. CT head negative.  UA c/w UTI and started on Ceftriaxone.  Insulin gtt off and on long acting/sliding scale.  Taking PO diet.      C: Code Status/DPOA Info/Goals of Care/ACP Note    Full Code  DPOA/Contact Number: Richard Khan () [phone]    U: Unprescribing & Pertinent High-Risk Medications    Changes to home meds: holding farxiga and entresto     Anticoagulation: Yes - SQHLOVENOX    Antibiotics:   [] N/A - no current planned antimicrobioals  [x]  Ceftriaxone for UTI, follow up cutlures.    P: Pending Tests at the Time of Transfer   EKG, CXR      A: Active consultants, including Rehab:   []  Subspecialty Consultants: NA  [x]  PT  [x]  OT  []  SLP  []  Wound Care    U: Uncertainty Measure/Diagnostic Pause:    Working diagnosis at the time of transfer altered mental status 2/2 HHS  (UTI)     Diagnosis Degree of Certainty: 1. High degree of certainty about the clinical diagnosis.     S: Summary of Major Problems and To-Dos:   #when to resume Farxiga and Entresto    To-do list prior to transfer:  []NA     E: Exam, including Lines/Drains/Airways & Data Review:   Neuro:  A/Ox2, follows commands, no focal deficits  CV:  RRR  PULM:  CTAB  GI:  abdomen flat, bowel sounds heard  SKIN:  warm, dry  EXT:  no edema     Difficult airway? No  Lines/drains assessed for removal? Yes, describe: PIV x2, external catheter    Within 30 minutes of the patient  physically leaving the floor, a Floor Readiness Note needs to be placed with updated vitals.

## 2024-01-13 LAB
GLUCOSE BLD MANUAL STRIP-MCNC: 214 MG/DL (ref 74–99)
GLUCOSE BLD MANUAL STRIP-MCNC: 246 MG/DL (ref 74–99)
GLUCOSE BLD MANUAL STRIP-MCNC: 259 MG/DL (ref 74–99)
GLUCOSE BLD MANUAL STRIP-MCNC: 283 MG/DL (ref 74–99)
GLUCOSE BLD MANUAL STRIP-MCNC: 319 MG/DL (ref 74–99)
GLUCOSE BLD MANUAL STRIP-MCNC: 323 MG/DL (ref 74–99)
GLUCOSE BLD MANUAL STRIP-MCNC: 327 MG/DL (ref 74–99)

## 2024-01-13 PROCEDURE — 2500000002 HC RX 250 W HCPCS SELF ADMINISTERED DRUGS (ALT 637 FOR MEDICARE OP, ALT 636 FOR OP/ED): Performed by: STUDENT IN AN ORGANIZED HEALTH CARE EDUCATION/TRAINING PROGRAM

## 2024-01-13 PROCEDURE — 2500000004 HC RX 250 GENERAL PHARMACY W/ HCPCS (ALT 636 FOR OP/ED): Performed by: NURSE PRACTITIONER

## 2024-01-13 PROCEDURE — 2500000001 HC RX 250 WO HCPCS SELF ADMINISTERED DRUGS (ALT 637 FOR MEDICARE OP): Performed by: NURSE PRACTITIONER

## 2024-01-13 PROCEDURE — 82947 ASSAY GLUCOSE BLOOD QUANT: CPT

## 2024-01-13 PROCEDURE — 99233 SBSQ HOSP IP/OBS HIGH 50: CPT | Performed by: STUDENT IN AN ORGANIZED HEALTH CARE EDUCATION/TRAINING PROGRAM

## 2024-01-13 PROCEDURE — 1210000001 HC SEMI-PRIVATE ROOM DAILY

## 2024-01-13 RX ORDER — INSULIN LISPRO 100 [IU]/ML
0-5 INJECTION, SOLUTION INTRAVENOUS; SUBCUTANEOUS
Status: DISCONTINUED | OUTPATIENT
Start: 2024-01-13 | End: 2024-01-16 | Stop reason: HOSPADM

## 2024-01-13 RX ORDER — INSULIN LISPRO 100 [IU]/ML
5 INJECTION, SOLUTION INTRAVENOUS; SUBCUTANEOUS
Status: DISCONTINUED | OUTPATIENT
Start: 2024-01-13 | End: 2024-01-13

## 2024-01-13 RX ORDER — INSULIN GLARGINE 100 [IU]/ML
20 INJECTION, SOLUTION SUBCUTANEOUS NIGHTLY
Status: DISCONTINUED | OUTPATIENT
Start: 2024-01-13 | End: 2024-01-16

## 2024-01-13 RX ORDER — INSULIN LISPRO 100 [IU]/ML
8 INJECTION, SOLUTION INTRAVENOUS; SUBCUTANEOUS
Status: DISCONTINUED | OUTPATIENT
Start: 2024-01-13 | End: 2024-01-16

## 2024-01-13 RX ADMIN — INSULIN LISPRO 8 UNITS: 100 INJECTION, SOLUTION INTRAVENOUS; SUBCUTANEOUS at 17:03

## 2024-01-13 RX ADMIN — INSULIN LISPRO 5 UNITS: 100 INJECTION, SOLUTION INTRAVENOUS; SUBCUTANEOUS at 10:38

## 2024-01-13 RX ADMIN — CEFTRIAXONE SODIUM 1 G: 1 INJECTION, SOLUTION INTRAVENOUS at 20:36

## 2024-01-13 RX ADMIN — CARVEDILOL 25 MG: 25 TABLET, FILM COATED ORAL at 20:36

## 2024-01-13 RX ADMIN — INSULIN LISPRO 4 UNITS: 100 INJECTION, SOLUTION INTRAVENOUS; SUBCUTANEOUS at 14:30

## 2024-01-13 RX ADMIN — CLOPIDOGREL BISULFATE 75 MG: 75 TABLET ORAL at 09:25

## 2024-01-13 RX ADMIN — HYDRALAZINE HYDROCHLORIDE 100 MG: 25 TABLET ORAL at 20:36

## 2024-01-13 RX ADMIN — INSULIN GLARGINE 20 UNITS: 100 INJECTION, SOLUTION SUBCUTANEOUS at 20:43

## 2024-01-13 RX ADMIN — INSULIN LISPRO 2 UNITS: 100 INJECTION, SOLUTION INTRAVENOUS; SUBCUTANEOUS at 20:43

## 2024-01-13 RX ADMIN — ASPIRIN 81 MG CHEWABLE TABLET 81 MG: 81 TABLET CHEWABLE at 09:25

## 2024-01-13 RX ADMIN — ATORVASTATIN CALCIUM 80 MG: 80 TABLET, FILM COATED ORAL at 09:25

## 2024-01-13 RX ADMIN — INSULIN LISPRO 8 UNITS: 100 INJECTION, SOLUTION INTRAVENOUS; SUBCUTANEOUS at 14:34

## 2024-01-13 RX ADMIN — INSULIN LISPRO 3 UNITS: 100 INJECTION, SOLUTION INTRAVENOUS; SUBCUTANEOUS at 05:27

## 2024-01-13 RX ADMIN — ENOXAPARIN SODIUM 40 MG: 100 INJECTION SUBCUTANEOUS at 20:36

## 2024-01-13 RX ADMIN — HYDRALAZINE HYDROCHLORIDE 100 MG: 25 TABLET ORAL at 14:33

## 2024-01-13 RX ADMIN — INSULIN LISPRO 2 UNITS: 100 INJECTION, SOLUTION INTRAVENOUS; SUBCUTANEOUS at 01:24

## 2024-01-13 RX ADMIN — AMLODIPINE BESYLATE 10 MG: 10 TABLET ORAL at 09:25

## 2024-01-13 RX ADMIN — HYDRALAZINE HYDROCHLORIDE 100 MG: 25 TABLET ORAL at 05:27

## 2024-01-13 RX ADMIN — CARVEDILOL 25 MG: 25 TABLET, FILM COATED ORAL at 09:25

## 2024-01-13 RX ADMIN — INSULIN LISPRO 4 UNITS: 100 INJECTION, SOLUTION INTRAVENOUS; SUBCUTANEOUS at 10:30

## 2024-01-13 RX ADMIN — INSULIN LISPRO 4 UNITS: 100 INJECTION, SOLUTION INTRAVENOUS; SUBCUTANEOUS at 16:56

## 2024-01-13 ASSESSMENT — COGNITIVE AND FUNCTIONAL STATUS - GENERAL
DAILY ACTIVITIY SCORE: 24
TURNING FROM BACK TO SIDE WHILE IN FLAT BAD: A LITTLE
MOVING FROM LYING ON BACK TO SITTING ON SIDE OF FLAT BED WITH BEDRAILS: A LITTLE
MOBILITY SCORE: 18
CLIMB 3 TO 5 STEPS WITH RAILING: A LOT
STANDING UP FROM CHAIR USING ARMS: A LITTLE
WALKING IN HOSPITAL ROOM: A LITTLE

## 2024-01-13 NOTE — PROGRESS NOTES
"Myrna Khan is a 63 y.o. female on hospital day 2 of admission presenting with Hyperosmolar hyperglycemic state (HHS) (CMS/HCC).    Subjective     Patient at baseline mentation. BG remains high. Regimen adjusted. DC when BG controlled. Initiate placement to high intensity therapy per PT.    Objective     GENERAL APPEARANCE: A&Ox3, appears in no acute distress  HEAD: normocephalic, atraumatic  THROAT: Oral cavity and pharynx normal. No inflammation, swelling, exudate, or lesions.  NECK: Neck supple, non-tender without lymphadenopathy, masses or thyromegaly.  CARDIAC: Normal S1 and S2. No S3, S4 or murmurs. Rhythm is regular. There is no peripheral edema, cyanosis or pallor. Extremities are warm and well perfused. No carotid bruits.  LUNGS: Clear to auscultation bilaterally without rales, rhonchi, wheezing or diminished breath sounds.  ABDOMEN: Positive bowel sounds. Soft, nondistended, nontender. No guarding or rebound. No masses.  EXTREMITIES: No significant deformity or joint abnormality. No edema. Peripheral pulses intact. No varicosities.  SKIN: Skin normal color, texture and turgor with no lesions or rash  PSYCHIATRIC: oriented to person, place, and time, good judgement and reason, without hallucinations, abnormal affect or abnormal behaviors during the examination. Patient is not suicidal.        Last Recorded Vitals  Blood pressure 165/85, pulse 66, temperature 36.5 °C (97.7 °F), resp. rate 18, height 1.6 m (5' 2.99\"), weight 51.3 kg (113 lb 1.5 oz), SpO2 99 %.  Intake/Output last 3 Shifts:  I/O last 3 completed shifts:  In: 150 (2.9 mL/kg) [I.V.:50 (1 mL/kg); IV Piggyback:100]  Out: 325 (6.3 mL/kg) [Urine:325 (0.2 mL/kg/hr)]  Weight: 51.3 kg     Relevant Results  Lab Results   Component Value Date    WBC 8.4 01/12/2024    HGB 13.9 01/12/2024    HCT 38.1 01/12/2024    MCV 87 01/12/2024     01/12/2024      Lab Results   Component Value Date    GLUCOSE 157 (H) 01/12/2024    CALCIUM 9.1 01/12/2024    "  01/12/2024    K 3.8 01/12/2024    CO2 27 01/12/2024     01/12/2024    BUN 8 01/12/2024    CREATININE 0.54 01/12/2024     Scheduled medications  amLODIPine, 10 mg, oral, Daily  aspirin, 81 mg, oral, Daily  atorvastatin, 80 mg, oral, Daily  carvedilol, 25 mg, oral, BID  cefTRIAXone, 1 g, intravenous, q24h  clopidogrel, 75 mg, oral, Daily  enoxaparin, 40 mg, subcutaneous, q24h  hydrALAZINE, 100 mg, oral, q8h  insulin glargine, 20 Units, subcutaneous, Nightly  insulin lispro, 0-5 Units, subcutaneous, Before meals & nightly  insulin lispro, 5 Units, subcutaneous, TID with meals  [Held by provider] spironolactone, 25 mg, oral, Daily      Continuous medications     PRN medications  PRN medications: acetaminophen, dextrose 10 % in water (D10W), dextrose, glucagon    Assessment/Plan     Principal Problem:    Hyperosmolar hyperglycemic state (HHS) (CMS/[facility])    HHS  IDDM   - Insulin gtt, fluids per ICU HHS protocol, glucose rapidly improving will monitor POCT glucose off gtt and restart home lantus, SSI   - replete electrolytes  - Increased lantus and added meal time insulin, will continue to trend BG    AMS 2/2 HHS, resolved  - mental status improving, A&Ox2-3 on arrival to ICU (intermittent confused on year, knows month)   - CT Head (1/11) no acute changes   - ctm neuro status      HFrEF  CAD  HTN  HLD  PAD   -c/f syncopal episode, EKG sinus rhythm, borderline 1st degree block, remaining intervals wnl, TWI lateral leads, no STEMI    -repeat echo during hospitalization   -IRENE (09/2023): EF 50-55%, LA moderate/severely dilated, trace AR, MR   -restart home PO meds amlodipine, asa, atorvastatin, carvedilol, clopidogrel, hydralazine, spironolactone      Pseudohyponatremia 2/2 hyperglycemia  - corrected Na wnl   - Mg>2, K>4  - monitor I/O     UTI  - ctx qd  - ucx (1/11) pending         A: PIV  O2: RA   Gtt: insulin gtt   Abx: ceftriaxone (1/11-)    DVT ppx: lovenox   GI ppx: hold      CODE STATUS: Full code     Joni  MD Sylvester     The patient encounter includes all but not limited to; Evaluation of laboratory results, pertinent imaging, and vital signs. Daily updates are discussed with any consulting services and family/medical power of  as needed. The patient's discharge  process begins at admission and daily contact with the patient's TCC and SW is pertinent in their efficient and safe discharge.

## 2024-01-14 LAB
ANION GAP SERPL CALC-SCNC: 12 MMOL/L (ref 10–20)
BACTERIA UR CULT: ABNORMAL
BUN SERPL-MCNC: 12 MG/DL (ref 6–23)
CALCIUM SERPL-MCNC: 8.5 MG/DL (ref 8.6–10.6)
CHLORIDE SERPL-SCNC: 106 MMOL/L (ref 98–107)
CO2 SERPL-SCNC: 21 MMOL/L (ref 21–32)
CREAT SERPL-MCNC: 0.64 MG/DL (ref 0.5–1.05)
EGFRCR SERPLBLD CKD-EPI 2021: >90 ML/MIN/1.73M*2
ERYTHROCYTE [DISTWIDTH] IN BLOOD BY AUTOMATED COUNT: 13 % (ref 11.5–14.5)
GLUCOSE BLD MANUAL STRIP-MCNC: 285 MG/DL (ref 74–99)
GLUCOSE BLD MANUAL STRIP-MCNC: 328 MG/DL (ref 74–99)
GLUCOSE BLD MANUAL STRIP-MCNC: 341 MG/DL (ref 74–99)
GLUCOSE BLD MANUAL STRIP-MCNC: 356 MG/DL (ref 74–99)
GLUCOSE BLD MANUAL STRIP-MCNC: 417 MG/DL (ref 74–99)
GLUCOSE SERPL-MCNC: 368 MG/DL (ref 74–99)
HCT VFR BLD AUTO: 40.5 % (ref 36–46)
HGB BLD-MCNC: 13.7 G/DL (ref 12–16)
MCH RBC QN AUTO: 31.1 PG (ref 26–34)
MCHC RBC AUTO-ENTMCNC: 33.8 G/DL (ref 32–36)
MCV RBC AUTO: 92 FL (ref 80–100)
NRBC BLD-RTO: 0 /100 WBCS (ref 0–0)
PLATELET # BLD AUTO: 161 X10*3/UL (ref 150–450)
POTASSIUM SERPL-SCNC: 3 MMOL/L (ref 3.5–5.3)
RBC # BLD AUTO: 4.41 X10*6/UL (ref 4–5.2)
SODIUM SERPL-SCNC: 136 MMOL/L (ref 136–145)
WBC # BLD AUTO: 6.4 X10*3/UL (ref 4.4–11.3)

## 2024-01-14 PROCEDURE — 2500000004 HC RX 250 GENERAL PHARMACY W/ HCPCS (ALT 636 FOR OP/ED): Performed by: NURSE PRACTITIONER

## 2024-01-14 PROCEDURE — 99232 SBSQ HOSP IP/OBS MODERATE 35: CPT | Performed by: STUDENT IN AN ORGANIZED HEALTH CARE EDUCATION/TRAINING PROGRAM

## 2024-01-14 PROCEDURE — 1210000001 HC SEMI-PRIVATE ROOM DAILY

## 2024-01-14 PROCEDURE — 2500000002 HC RX 250 W HCPCS SELF ADMINISTERED DRUGS (ALT 637 FOR MEDICARE OP, ALT 636 FOR OP/ED): Performed by: STUDENT IN AN ORGANIZED HEALTH CARE EDUCATION/TRAINING PROGRAM

## 2024-01-14 PROCEDURE — 36415 COLL VENOUS BLD VENIPUNCTURE: CPT | Performed by: STUDENT IN AN ORGANIZED HEALTH CARE EDUCATION/TRAINING PROGRAM

## 2024-01-14 PROCEDURE — 2500000004 HC RX 250 GENERAL PHARMACY W/ HCPCS (ALT 636 FOR OP/ED): Performed by: STUDENT IN AN ORGANIZED HEALTH CARE EDUCATION/TRAINING PROGRAM

## 2024-01-14 PROCEDURE — 82947 ASSAY GLUCOSE BLOOD QUANT: CPT

## 2024-01-14 PROCEDURE — 85027 COMPLETE CBC AUTOMATED: CPT | Performed by: STUDENT IN AN ORGANIZED HEALTH CARE EDUCATION/TRAINING PROGRAM

## 2024-01-14 PROCEDURE — 80048 BASIC METABOLIC PNL TOTAL CA: CPT | Performed by: STUDENT IN AN ORGANIZED HEALTH CARE EDUCATION/TRAINING PROGRAM

## 2024-01-14 PROCEDURE — 2500000001 HC RX 250 WO HCPCS SELF ADMINISTERED DRUGS (ALT 637 FOR MEDICARE OP): Performed by: NURSE PRACTITIONER

## 2024-01-14 RX ORDER — INSULIN LISPRO 100 [IU]/ML
6 INJECTION, SOLUTION INTRAVENOUS; SUBCUTANEOUS ONCE
Status: COMPLETED | OUTPATIENT
Start: 2024-01-14 | End: 2024-01-14

## 2024-01-14 RX ORDER — POTASSIUM CHLORIDE 20 MEQ/1
40 TABLET, EXTENDED RELEASE ORAL ONCE
Status: COMPLETED | OUTPATIENT
Start: 2024-01-14 | End: 2024-01-14

## 2024-01-14 RX ADMIN — ENOXAPARIN SODIUM 40 MG: 100 INJECTION SUBCUTANEOUS at 21:10

## 2024-01-14 RX ADMIN — INSULIN LISPRO 4 UNITS: 100 INJECTION, SOLUTION INTRAVENOUS; SUBCUTANEOUS at 08:46

## 2024-01-14 RX ADMIN — INSULIN GLARGINE 20 UNITS: 100 INJECTION, SOLUTION SUBCUTANEOUS at 22:51

## 2024-01-14 RX ADMIN — CARVEDILOL 25 MG: 25 TABLET, FILM COATED ORAL at 21:10

## 2024-01-14 RX ADMIN — CARVEDILOL 25 MG: 25 TABLET, FILM COATED ORAL at 08:45

## 2024-01-14 RX ADMIN — INSULIN LISPRO 6 UNITS: 100 INJECTION, SOLUTION INTRAVENOUS; SUBCUTANEOUS at 15:11

## 2024-01-14 RX ADMIN — HYDRALAZINE HYDROCHLORIDE 100 MG: 25 TABLET ORAL at 15:00

## 2024-01-14 RX ADMIN — POTASSIUM CHLORIDE 40 MEQ: 1500 TABLET, EXTENDED RELEASE ORAL at 17:17

## 2024-01-14 RX ADMIN — ATORVASTATIN CALCIUM 80 MG: 80 TABLET, FILM COATED ORAL at 08:45

## 2024-01-14 RX ADMIN — HYDRALAZINE HYDROCHLORIDE 100 MG: 25 TABLET ORAL at 04:44

## 2024-01-14 RX ADMIN — ACETAMINOPHEN 650 MG: 325 TABLET ORAL at 04:44

## 2024-01-14 RX ADMIN — INSULIN LISPRO 8 UNITS: 100 INJECTION, SOLUTION INTRAVENOUS; SUBCUTANEOUS at 17:19

## 2024-01-14 RX ADMIN — AMLODIPINE BESYLATE 10 MG: 10 TABLET ORAL at 08:45

## 2024-01-14 RX ADMIN — INSULIN LISPRO 3 UNITS: 100 INJECTION, SOLUTION INTRAVENOUS; SUBCUTANEOUS at 22:50

## 2024-01-14 RX ADMIN — HYDRALAZINE HYDROCHLORIDE 100 MG: 25 TABLET ORAL at 22:54

## 2024-01-14 RX ADMIN — INSULIN LISPRO 8 UNITS: 100 INJECTION, SOLUTION INTRAVENOUS; SUBCUTANEOUS at 08:51

## 2024-01-14 RX ADMIN — INSULIN LISPRO 8 UNITS: 100 INJECTION, SOLUTION INTRAVENOUS; SUBCUTANEOUS at 15:00

## 2024-01-14 RX ADMIN — CLOPIDOGREL BISULFATE 75 MG: 75 TABLET ORAL at 08:45

## 2024-01-14 RX ADMIN — INSULIN LISPRO 5 UNITS: 100 INJECTION, SOLUTION INTRAVENOUS; SUBCUTANEOUS at 17:21

## 2024-01-14 RX ADMIN — ASPIRIN 81 MG CHEWABLE TABLET 81 MG: 81 TABLET CHEWABLE at 08:45

## 2024-01-14 ASSESSMENT — COGNITIVE AND FUNCTIONAL STATUS - GENERAL
STANDING UP FROM CHAIR USING ARMS: A LITTLE
MOBILITY SCORE: 24
MOBILITY SCORE: 22
CLIMB 3 TO 5 STEPS WITH RAILING: A LITTLE
DAILY ACTIVITIY SCORE: 24
DAILY ACTIVITIY SCORE: 24

## 2024-01-14 ASSESSMENT — PAIN SCALES - GENERAL
PAINLEVEL_OUTOF10: 0 - NO PAIN
PAINLEVEL_OUTOF10: 7

## 2024-01-14 NOTE — PROGRESS NOTES
"Myrna Khan is a 63 y.o. female on hospital day 3 of admission presenting with Hyperosmolar hyperglycemic state (HHS) (CMS/HCC).    Subjective     Patient's BG still high, however, eating outside of her diet.    Objective     GENERAL APPEARANCE: A&Ox3, appears in no acute distress  HEAD: normocephalic, atraumatic  THROAT: Oral cavity and pharynx normal. No inflammation, swelling, exudate, or lesions.  NECK: Neck supple, non-tender without lymphadenopathy, masses or thyromegaly.  CARDIAC: Normal S1 and S2. No S3, S4 or murmurs. Rhythm is regular. There is no peripheral edema, cyanosis or pallor. Extremities are warm and well perfused. No carotid bruits.  LUNGS: Clear to auscultation bilaterally without rales, rhonchi, wheezing or diminished breath sounds.  ABDOMEN: Positive bowel sounds. Soft, nondistended, nontender. No guarding or rebound. No masses.  EXTREMITIES: No significant deformity or joint abnormality. No edema. Peripheral pulses intact. No varicosities.  SKIN: Skin normal color, texture and turgor with no lesions or rash  PSYCHIATRIC: oriented to person, place, and time, good judgement and reason, without hallucinations, abnormal affect or abnormal behaviors during the examination. Patient is not suicidal.        Last Recorded Vitals  Blood pressure 134/70, pulse 66, temperature 36.7 °C (98.1 °F), resp. rate 20, height 1.6 m (5' 2.99\"), weight 51.3 kg (113 lb 1.5 oz), SpO2 98 %.  Intake/Output last 3 Shifts:  I/O last 3 completed shifts:  In: 580 (11.3 mL/kg) [P.O.:480; IV Piggyback:100]  Out: - (0 mL/kg)   Weight: 51.3 kg     Relevant Results  Lab Results   Component Value Date    WBC 6.4 01/14/2024    HGB 13.7 01/14/2024    HCT 40.5 01/14/2024    MCV 92 01/14/2024     01/14/2024      Lab Results   Component Value Date    GLUCOSE 368 (H) 01/14/2024    CALCIUM 8.5 (L) 01/14/2024     01/14/2024    K 3.0 (L) 01/14/2024    CO2 21 01/14/2024     01/14/2024    BUN 12 01/14/2024    " CREATININE 0.64 01/14/2024     Scheduled medications  amLODIPine, 10 mg, oral, Daily  aspirin, 81 mg, oral, Daily  atorvastatin, 80 mg, oral, Daily  carvedilol, 25 mg, oral, BID  cefTRIAXone, 1 g, intravenous, q24h  clopidogrel, 75 mg, oral, Daily  enoxaparin, 40 mg, subcutaneous, q24h  hydrALAZINE, 100 mg, oral, q8h  insulin glargine, 20 Units, subcutaneous, Nightly  insulin lispro, 0-5 Units, subcutaneous, Before meals & nightly  insulin lispro, 8 Units, subcutaneous, TID with meals  [Held by provider] spironolactone, 25 mg, oral, Daily      Continuous medications     PRN medications  PRN medications: acetaminophen, dextrose 10 % in water (D10W), dextrose, glucagon    Assessment/Plan     Principal Problem:    Hyperosmolar hyperglycemic state (HHS) (CMS/[facility])     HHS  IDDM   - Insulin gtt, fluids per ICU HHS protocol, glucose rapidly improving will monitor POCT glucose off gtt and restart home lantus, SSI   - replete electrolytes  - Increased lantus and added meal time insulin, will continue to trend BG     AMS 2/2 HHS, resolved  - mental status improving, A&Ox2-3 on arrival to ICU (intermittent confused on year, knows month)   - CT Head (1/11) no acute changes   - ctm neuro status      HFrEF  CAD  HTN  HLD  PAD   -c/f syncopal episode, EKG sinus rhythm, borderline 1st degree block, remaining intervals wnl, TWI lateral leads, no STEMI    -repeat echo during hospitalization   -IRENE (09/2023): EF 50-55%, LA moderate/severely dilated, trace AR, MR   -restart home PO meds amlodipine, asa, atorvastatin, carvedilol, clopidogrel, hydralazine, spironolactone      Pseudohyponatremia 2/2 hyperglycemia  - corrected Na wnl   - Mg>2, K>4  - monitor I/O     UTI  - ctx qd  - ucx (1/11) pending         A: PIV  O2: RA   Gtt: insulin gtt   Abx: ceftriaxone (1/11-)    DVT ppx: lovenox   GI ppx: hold      CODE STATUS: Full code      Joni Phan MD     The patient encounter includes all but not limited to; Evaluation of laboratory  results, pertinent imaging, and vital signs. Daily updates are discussed with any consulting services and family/medical power of  as needed. The patient's discharge  process begins at admission and daily contact with the patient's TCC and SW is pertinent in their efficient and safe discharge.

## 2024-01-15 ENCOUNTER — APPOINTMENT (OUTPATIENT)
Dept: CARDIOLOGY | Facility: HOSPITAL | Age: 64
DRG: 637 | End: 2024-01-15
Payer: COMMERCIAL

## 2024-01-15 LAB
ANION GAP SERPL CALC-SCNC: 12 MMOL/L (ref 10–20)
BUN SERPL-MCNC: 10 MG/DL (ref 6–23)
CALCIUM SERPL-MCNC: 8.3 MG/DL (ref 8.6–10.6)
CHLORIDE SERPL-SCNC: 105 MMOL/L (ref 98–107)
CO2 SERPL-SCNC: 21 MMOL/L (ref 21–32)
CREAT SERPL-MCNC: 0.61 MG/DL (ref 0.5–1.05)
EGFRCR SERPLBLD CKD-EPI 2021: >90 ML/MIN/1.73M*2
GLUCOSE BLD MANUAL STRIP-MCNC: 252 MG/DL (ref 74–99)
GLUCOSE BLD MANUAL STRIP-MCNC: 252 MG/DL (ref 74–99)
GLUCOSE BLD MANUAL STRIP-MCNC: 304 MG/DL (ref 74–99)
GLUCOSE BLD MANUAL STRIP-MCNC: 389 MG/DL (ref 74–99)
GLUCOSE SERPL-MCNC: 304 MG/DL (ref 74–99)
POTASSIUM SERPL-SCNC: 3.3 MMOL/L (ref 3.5–5.3)
SODIUM SERPL-SCNC: 135 MMOL/L (ref 136–145)

## 2024-01-15 PROCEDURE — 2500000001 HC RX 250 WO HCPCS SELF ADMINISTERED DRUGS (ALT 637 FOR MEDICARE OP): Performed by: NURSE PRACTITIONER

## 2024-01-15 PROCEDURE — 99233 SBSQ HOSP IP/OBS HIGH 50: CPT | Performed by: STUDENT IN AN ORGANIZED HEALTH CARE EDUCATION/TRAINING PROGRAM

## 2024-01-15 PROCEDURE — 97530 THERAPEUTIC ACTIVITIES: CPT | Mod: GP

## 2024-01-15 PROCEDURE — 2500000004 HC RX 250 GENERAL PHARMACY W/ HCPCS (ALT 636 FOR OP/ED): Performed by: NURSE PRACTITIONER

## 2024-01-15 PROCEDURE — 93005 ELECTROCARDIOGRAM TRACING: CPT

## 2024-01-15 PROCEDURE — 97165 OT EVAL LOW COMPLEX 30 MIN: CPT | Mod: GO

## 2024-01-15 PROCEDURE — 1210000001 HC SEMI-PRIVATE ROOM DAILY

## 2024-01-15 PROCEDURE — 80048 BASIC METABOLIC PNL TOTAL CA: CPT | Performed by: STUDENT IN AN ORGANIZED HEALTH CARE EDUCATION/TRAINING PROGRAM

## 2024-01-15 PROCEDURE — 82947 ASSAY GLUCOSE BLOOD QUANT: CPT

## 2024-01-15 PROCEDURE — 36415 COLL VENOUS BLD VENIPUNCTURE: CPT | Performed by: STUDENT IN AN ORGANIZED HEALTH CARE EDUCATION/TRAINING PROGRAM

## 2024-01-15 RX ADMIN — HYDRALAZINE HYDROCHLORIDE 100 MG: 25 TABLET ORAL at 06:28

## 2024-01-15 RX ADMIN — CARVEDILOL 25 MG: 25 TABLET, FILM COATED ORAL at 09:38

## 2024-01-15 RX ADMIN — INSULIN LISPRO 5 UNITS: 100 INJECTION, SOLUTION INTRAVENOUS; SUBCUTANEOUS at 09:22

## 2024-01-15 RX ADMIN — ENOXAPARIN SODIUM 40 MG: 100 INJECTION SUBCUTANEOUS at 21:36

## 2024-01-15 RX ADMIN — ASPIRIN 81 MG CHEWABLE TABLET 81 MG: 81 TABLET CHEWABLE at 09:37

## 2024-01-15 RX ADMIN — CLOPIDOGREL BISULFATE 75 MG: 75 TABLET ORAL at 09:38

## 2024-01-15 RX ADMIN — INSULIN GLARGINE 20 UNITS: 100 INJECTION, SOLUTION SUBCUTANEOUS at 21:36

## 2024-01-15 RX ADMIN — INSULIN LISPRO 3 UNITS: 100 INJECTION, SOLUTION INTRAVENOUS; SUBCUTANEOUS at 21:37

## 2024-01-15 RX ADMIN — INSULIN LISPRO 3 UNITS: 100 INJECTION, SOLUTION INTRAVENOUS; SUBCUTANEOUS at 13:13

## 2024-01-15 RX ADMIN — CARVEDILOL 25 MG: 25 TABLET, FILM COATED ORAL at 21:38

## 2024-01-15 RX ADMIN — INSULIN LISPRO 3 UNITS: 100 INJECTION, SOLUTION INTRAVENOUS; SUBCUTANEOUS at 17:51

## 2024-01-15 RX ADMIN — INSULIN LISPRO 8 UNITS: 100 INJECTION, SOLUTION INTRAVENOUS; SUBCUTANEOUS at 17:50

## 2024-01-15 RX ADMIN — INSULIN LISPRO 8 UNITS: 100 INJECTION, SOLUTION INTRAVENOUS; SUBCUTANEOUS at 13:10

## 2024-01-15 RX ADMIN — CEFTRIAXONE SODIUM 1 G: 1 INJECTION, SOLUTION INTRAVENOUS at 21:36

## 2024-01-15 RX ADMIN — ATORVASTATIN CALCIUM 80 MG: 80 TABLET, FILM COATED ORAL at 09:38

## 2024-01-15 RX ADMIN — INSULIN LISPRO 8 UNITS: 100 INJECTION, SOLUTION INTRAVENOUS; SUBCUTANEOUS at 09:25

## 2024-01-15 RX ADMIN — AMLODIPINE BESYLATE 10 MG: 10 TABLET ORAL at 09:38

## 2024-01-15 RX ADMIN — HYDRALAZINE HYDROCHLORIDE 100 MG: 25 TABLET ORAL at 14:08

## 2024-01-15 RX ADMIN — ACETAMINOPHEN 650 MG: 325 TABLET ORAL at 21:36

## 2024-01-15 RX ADMIN — HYDRALAZINE HYDROCHLORIDE 100 MG: 25 TABLET ORAL at 21:36

## 2024-01-15 ASSESSMENT — COGNITIVE AND FUNCTIONAL STATUS - GENERAL
STANDING UP FROM CHAIR USING ARMS: A LITTLE
WALKING IN HOSPITAL ROOM: A LITTLE
CLIMB 3 TO 5 STEPS WITH RAILING: A LOT
DRESSING REGULAR LOWER BODY CLOTHING: A LITTLE
MOVING FROM LYING ON BACK TO SITTING ON SIDE OF FLAT BED WITH BEDRAILS: A LITTLE
DRESSING REGULAR UPPER BODY CLOTHING: A LITTLE
MOBILITY SCORE: 21
DRESSING REGULAR UPPER BODY CLOTHING: A LITTLE
MOVING TO AND FROM BED TO CHAIR: A LITTLE
CLIMB 3 TO 5 STEPS WITH RAILING: A LOT
DRESSING REGULAR LOWER BODY CLOTHING: A LITTLE
TOILETING: A LITTLE
HELP NEEDED FOR BATHING: A LITTLE
WALKING IN HOSPITAL ROOM: A LITTLE
DAILY ACTIVITIY SCORE: 20
MOBILITY SCORE: 17
DAILY ACTIVITIY SCORE: 20
HELP NEEDED FOR BATHING: A LITTLE
TOILETING: A LITTLE
TURNING FROM BACK TO SIDE WHILE IN FLAT BAD: A LITTLE

## 2024-01-15 ASSESSMENT — PAIN - FUNCTIONAL ASSESSMENT
PAIN_FUNCTIONAL_ASSESSMENT: 0-10

## 2024-01-15 ASSESSMENT — ACTIVITIES OF DAILY LIVING (ADL)
ADL_ASSISTANCE: INDEPENDENT
BATHING_ASSISTANCE: MINIMAL

## 2024-01-15 ASSESSMENT — PAIN SCALES - GENERAL
PAINLEVEL_OUTOF10: 6
PAINLEVEL_OUTOF10: 0 - NO PAIN
PAINLEVEL_OUTOF10: 3
PAINLEVEL_OUTOF10: 0 - NO PAIN
PAINLEVEL_OUTOF10: 0 - NO PAIN
PAINLEVEL_OUTOF10: 6

## 2024-01-15 ASSESSMENT — PAIN SCALES - WONG BAKER
WONGBAKER_NUMERICALRESPONSE: NO HURT
WONGBAKER_NUMERICALRESPONSE: NO HURT

## 2024-01-15 NOTE — PROGRESS NOTES
Physical Therapy    Physical Therapy Treatment    Patient Name: Myrna Khan  MRN: [id number]  Today's Date: 1/15/2024  Time Calculation  Start Time: 0843  Stop Time: 0852  Time Calculation (min): 9 min       Assessment/Plan   PT Assessment  End of Session Communication: Bedside nurse  End of Session Patient Position: Bed, 3 rail up, Alarm off, not on at start of session     PT Plan  Treatment/Interventions: Bed mobility, Transfer training, Gait training, Balance training, Strengthening, Therapeutic exercise, Therapeutic activity  PT Plan: Skilled PT  PT Frequency: 4 times per week  PT Discharge Recommendations:  (Anticipate pt will be appropriate for low intensity therapy with 24/7 assistance)  PT Recommended Transfer Status: Assist x1      General Visit Information:   PT  Visit  PT Received On: 01/15/24  General  Co-Treatment: OT  Co-Treatment Reason: To maximize pt function, participation, and safety requiring 2 sets of skilled hands  Prior to Session Communication: Bedside nurse  Patient Position Received:  (sitting on EOB)  Preferred Learning Style: auditory  General Comment: RN cleared pt for therapy, Pt seated EOB on arrival. Pt was pleasant, cooperative, and willing to participate in therapy    Subjective   Precautions:  Precautions  Medical Precautions: Fall precautions  Vital Signs:       Objective   Pain:  Pain Assessment  Pain Assessment: 0-10  Pain Score: 6  Pain Type: Acute pain  Pain Location:  (R knee)  Cognition:  Cognition  Overall Cognitive Status: Within Functional Limits  Orientation Level:  (initially states 23 as year corrects to 24 promptly)  Safety Judgment: Decreased awareness of need for safety precautions  Insight: Mild  Impulsive: Mildly  Postural Control:  Static Sitting Balance  Static Sitting-Balance Support: No upper extremity supported  Static Sitting-Level of Assistance:  (SBA)  Dynamic Sitting Balance  Dynamic Sitting-Balance Support: No upper extremity supported  Dynamic  Sitting-Comments: SBA  Static Standing Balance  Static Standing-Balance Support: Bilateral upper extremity supported (walker)  Static Standing-Comment/Number of Minutes: min A  Dynamic Standing Balance  Dynamic Standing-Balance Support: Bilateral upper extremity supported (walker)  Dynamic Standing-Comments: CGA    Treatments:  Bed Mobility  Bed Mobility: Yes  Bed Mobility 1  Bed Mobility 1: Sitting to supine  Level of Assistance 1: Minimal verbal cues (SBA)  Bed Mobility Comments 1: VCs, increased time to perform    Ambulation/Gait Training  Ambulation/Gait Training Performed: Yes  Ambulation/Gait Training 1  Surface 1: Level tile  Device 1: Rolling walker  Assistance 1: Contact guard, Moderate verbal cues  Quality of Gait 1: Soft knee(s) (decreased foot clearance, mild trunk lateral sway, decreased control of COM over NAOMI)  Comments/Distance (ft) 1: 25 ft  Transfers  Transfer: Yes  Transfer 1  Transfer From 1: Sit to  Transfer to 1: Stand  Technique 1: Sit to stand  Transfer Device 1: Walker  Transfer Level of Assistance 1: Minimum assistance, Moderate verbal cues  Trials/Comments 1: VCs, increased time to perform, cues for hip and knee extension  Transfers 2  Transfer From 2: Stand to  Transfer to 2: Sit  Technique 2: Stand to sit  Transfer Device 2: Walker  Transfer Level of Assistance 2: Contact guard, Moderate verbal cues  Trials/Comments 2: VCs, decreased control with descent from stand    Stairs  Stairs: No    Outcome Measures:  [facility] Basic Mobility  Turning from your back to your side while in a flat bed without using bedrails: A little  Moving from lying on your back to sitting on the side of a flat bed without using bedrails: A little  Moving to and from bed to chair (including a wheelchair): A little  Standing up from a chair using your arms (e.g. wheelchair or bedside chair): A little  To walk in hospital room: A little  Climbing 3-5 steps with railing: A lot  Basic Mobility - Total Score:  17    Education Documentation  Mobility Training, taught by Melany Mitchell, PT at 1/15/2024 12:49 PM.  Learner: Patient  Readiness: Acceptance  Method: Explanation  Response: Verbalizes Understanding, Needs Reinforcement  Comment: bed mobility, transfers, gait    Education Comments  No comments found.        OP EDUCATION:  Outpatient Education  Individual(s) Educated: Patient  Education Provided: Fall Risk, POC (bed mobility, transfers, gait)  Patient/Caregiver Demonstrated Understanding: yes  Plan of Care Discussed and Agreed Upon: yes  Patient Response to Education: Patient/Caregiver Verbalized Understanding of Information    Encounter Problems       Encounter Problems (Active)       PT Problem       Patient will complete bed mobility with supervision x1         Start:  01/12/24    Expected End:  02/02/24            Patient will complete STS with SBAx1 using LRAD without acute LOB         Start:  01/12/24    Expected End:  02/02/24            Patient will ambulate >/=50' with LRAD with SBAx1 without acute LOB, path deviation or postural sway        Start:  01/12/24    Expected End:  02/02/24            Patient will complete static (supervision) and dynamic (stand by assist) standing balance activities using LRAD without acute LOB.        Start:  01/12/24    Expected End:  02/02/24            Patient will complete bed<->chair transfer with stand by assist using LRD as needed without acute LOB        Start:  01/12/24    Expected End:  02/02/24               Pain - Adult            Melany Mitchell, PT'

## 2024-01-15 NOTE — PROGRESS NOTES
"Occupational Therapy    Occupational Therapy    Evaluation    Patient Name: Myrna Khan  MRN: [id number]  Today's Date: 1/15/2024  Time Calculation  Start Time: 0842  Stop Time: 0852  Time Calculation (min): 10 min    Assessment     Plan:  Treatment Interventions: ADL retraining, Functional transfer training, Equipment evaluation/education  OT Frequency: 2 times per week  OT Discharge Recommendations: Low intensity level of continued care  OT - OK to Discharge: Yes    Subjective   Current Problem:  1. Hyperosmolar hyperglycemic state (HHS) (CMS/[facility])          General:  Reason for Referral: admitted to MICU 2/2 HHS on an insulin gtt. Initially presented to the ED with c/f AMS, hyperglycemia, found down in building elevator by manager with undetectable glucose per EMS. CTH (-); X-ray R knee (-) fracture  Past Medical History Relevant to Rehab: IDDM, HFrEF (50-55%), CAD, CABG (09/2023), HTN, PAD s/p L SFA stent, renal artery stenosis, carotid artery stenosis.  Co-Treatment: PT  Prior to Session Communication: Bedside nurse  Patient Position Received:  (seated EOB)  General Comment: RN cleared pt for therapy, Pt seated EOB on arrival, willing to participate, pt stating she has been up in her room and to bathroom on her own.   Precautions:  STEADI Fall Risk Score (The score of 4 or more indicates an increased risk of falling): '  Medical Precautions: Fall precautions  Vital Signs:     Pain:  Pain Assessment  Pain Assessment: 0-10  Pain Score: 6  Pain Location: Knee  Pain Orientation: Right  Lines/Tubes/Drains:         Objective   Cognition:  Overall Cognitive Status: Within Functional Limits  Orientation Level:  (Oriented to self, hospital, month and year, Pt initially stated year as \"23\" then corrected to \"24\")  Safety Judgment: Decreased awareness of need for assistance  Insight: Mild  Cognition Test Scores  Cognition Tests:  (4 AT negative)        Home Living:  Type of Home: Apartment  Lives With:  (pt reports she " lives alone with her  next bulding to hers and states  is able to help once D/C home)  Home Adaptive Equipment: Cane  Home Layout: One level  Home Access: Elevator   Prior Function:  Level of [place]: Independent with ADLs and functional transfers, Independent with homemaking with ambulation  ADL Assistance: Independent  Homemaking Assistance: Independent  Ambulatory Assistance: Independent  Prior Function Comments: pt does not drive, gets rides from   IADL History:  Homemaking Responsibilities: Yes  Meal Prep Responsibility: Primary  Laundry Responsibility: Primary  Cleaning Responsibility: Primary  Bill Paying/Finance Responsibility: Primary  Shopping Responsibility: Primary  ADL:  Eating Assistance: Independent  Eating Deficit: Setup  Grooming Assistance: Independent  Grooming Deficit: Setup  Bathing Assistance: Minimal (anticipate)  UE Dressing Assistance: Independent  UE Dressing Deficit: Setup  LE Dressing Assistance: Minimal  Toileting Assistance with Device: Stand by  Activity Tolerance:  Endurance: Endurance does not limit participation in activity  Balance:     Bed Mobility/Transfers:     and Transfers  Transfer:  (Pt participated in room functional mobility using WW and CGA)  Transfer 1  Transfer From 1: Sit to  Transfer to 1: Stand  Technique 1: Sit to stand  Transfer Device 1: Walker  Transfer Level of Assistance 1: Minimum assistance  Transfers 2  Transfer From 2: Stand to  Transfer to 2: Sit  Technique 2: Stand to sit  Transfer Device 2: Walker  Transfer Level of Assistance 2: Contact guard  IADL's:   Homemaking Responsibilities: Yes  Meal Prep Responsibility: Primary  Laundry Responsibility: Primary  Cleaning Responsibility: Primary  Bill Paying/Finance Responsibility: Primary  Shopping Responsibility: Primary  Vision: Vision - Basic Assessment  Current Vision: No visual deficits   and    Sensation:  Light Touch: No apparent deficits  Strength:  Strength Comments: CARMEN DUFFY  WFL    Hand Function:  Hand Function  Gross Grasp: Functional  Extremities: RUE   RUE : Within Functional Limits, LUE   LUE: Within Functional Limits, RLE   RLE : Within Functional Limits, and      Outcome Measures: [facility] Daily Activity  Putting on and taking off regular lower body clothing: A little  Bathing (including washing, rinsing, drying): A little  Putting on and taking off regular upper body clothing: A little  Toileting, which includes using toilet, bedpan or urinal: A little  Taking care of personal grooming such as brushing teeth: None  Eating Meals: None  Daily Activity - Total Score: 20         ,          Education Documentation  ADL Training, taught by Ktaerin Isaac OT at 1/15/2024 10:28 AM.  Learner: Patient  Readiness: Acceptance  Method: Explanation  Response: Verbalizes Understanding    Education Comments  No comments found.        Goals:   Encounter Problems       Encounter Problems (Active)       ADLs       Patient will perform UB and LB bathing with set-up level of assistance and shower chair. (Progressing)       Start:  01/15/24    Expected End:  02/05/24            Patient with complete lower body dressing with independent level of assistance donning and doffing all LE clothes  with no adaptive equipment while edge of bed  (Progressing)       Start:  01/15/24    Expected End:  02/05/24            Patient will complete toileting including hygiene clothing management/hygiene with independent level of assistance and raised toilet seat. (Progressing)       Start:  01/15/24    Expected End:  02/05/24               MOBILITY       Patient will perform Functional mobility Household distances with modified independent level of assistance and least restrictive device in order to improve safety and functional mobility. (Progressing)       Start:  01/15/24    Expected End:  02/05/24               TRANSFERS       Patient will perform bed mobility independent level of assistance in order to improve  safety and independence with mobility (Progressing)       Start:  01/15/24    Expected End:  02/05/24            Patient will complete functional transfer with least restrictive device with modified independent level of assistance. (Progressing)       Start:  01/15/24    Expected End:  02/05/24                     01/15/24 at 10:28 AM   Katerin Isaac OT   Rehab Office: [phone]

## 2024-01-15 NOTE — CARE PLAN
The patient's goals for the shift include      The clinical goals for the shift include Pt will maintain BG equal to or less than 300 throughout this shift    Problem: Pain - Adult  Goal: Verbalizes/displays adequate comfort level or baseline comfort level  Outcome: Progressing     Problem: Safety - Adult  Goal: Free from fall injury  Outcome: Progressing     Problem: Discharge Planning  Goal: Discharge to home or other facility with appropriate resources  Outcome: Progressing     Problem: Chronic Conditions and Co-morbidities  Goal: Patient's chronic conditions and co-morbidity symptoms are monitored and maintained or improved  Outcome: Progressing     Problem: Diabetes  Goal: Achieve decreasing blood glucose levels by end of shift  Outcome: Progressing  Goal: Increase stability of blood glucose readings by end of shift  Outcome: Progressing  Goal: Decrease in ketones present in urine by end of shift  Outcome: Progressing  Goal: Maintain electrolyte levels within acceptable range throughout shift  Outcome: Progressing  Goal: Maintain glucose levels >70mg/dl to <250mg/dl throughout shift  Outcome: Progressing  Goal: No changes in neurological exam by end of shift  Outcome: Progressing  Goal: Learn about and adhere to nutrition recommendations by end of shift  Outcome: Progressing  Goal: Vital signs within normal range for age by end of shift  Outcome: Progressing  Goal: Increase self care and/or family involovement by end of shift  Outcome: Progressing  Goal: Receive DSME education by end of shift  Outcome: Progressing

## 2024-01-15 NOTE — PROGRESS NOTES
"Myrna Khan is a 63 y.o. female on hospital day 4 of admission presenting with Hyperosmolar hyperglycemic state (HHS) (CMS/HCC).    Subjective     Possible discharge tomorrow.    Objective     GENERAL APPEARANCE: A&Ox3, appears in no acute distress  HEAD: normocephalic, atraumatic  THROAT: Oral cavity and pharynx normal. No inflammation, swelling, exudate, or lesions.  NECK: Neck supple, non-tender without lymphadenopathy, masses or thyromegaly.  CARDIAC: Normal S1 and S2. No S3, S4 or murmurs. Rhythm is regular. There is no peripheral edema, cyanosis or pallor. Extremities are warm and well perfused. No carotid bruits.  LUNGS: Clear to auscultation bilaterally without rales, rhonchi, wheezing or diminished breath sounds.  ABDOMEN: Positive bowel sounds. Soft, nondistended, nontender. No guarding or rebound. No masses.  EXTREMITIES: No significant deformity or joint abnormality. No edema. Peripheral pulses intact. No varicosities.  SKIN: Skin normal color, texture and turgor with no lesions or rash  PSYCHIATRIC: oriented to person, place, and time, good judgement and reason, without hallucinations, abnormal affect or abnormal behaviors during the examination. Patient is not suicidal.        Last Recorded Vitals  Blood pressure 155/76, pulse 70, temperature 36.6 °C (97.9 °F), resp. rate 18, height 1.6 m (5' 2.99\"), weight 51.3 kg (113 lb 1.5 oz), SpO2 100 %.  Intake/Output last 3 Shifts:  I/O last 3 completed shifts:  In: 530 (10.3 mL/kg) [P.O.:480; IV Piggyback:50]  Out: - (0 mL/kg)   Weight: 51.3 kg     Relevant Results  Lab Results   Component Value Date    WBC 6.4 01/14/2024    HGB 13.7 01/14/2024    HCT 40.5 01/14/2024    MCV 92 01/14/2024     01/14/2024      Lab Results   Component Value Date    GLUCOSE 304 (H) 01/15/2024    CALCIUM 8.3 (L) 01/15/2024     (L) 01/15/2024    K 3.3 (L) 01/15/2024    CO2 21 01/15/2024     01/15/2024    BUN 10 01/15/2024    CREATININE 0.61 01/15/2024 "     Scheduled medications  amLODIPine, 10 mg, oral, Daily  aspirin, 81 mg, oral, Daily  atorvastatin, 80 mg, oral, Daily  carvedilol, 25 mg, oral, BID  cefTRIAXone, 1 g, intravenous, q24h  clopidogrel, 75 mg, oral, Daily  enoxaparin, 40 mg, subcutaneous, q24h  hydrALAZINE, 100 mg, oral, q8h  insulin glargine, 20 Units, subcutaneous, Nightly  insulin lispro, 0-5 Units, subcutaneous, Before meals & nightly  insulin lispro, 8 Units, subcutaneous, TID with meals  [Held by provider] spironolactone, 25 mg, oral, Daily      Continuous medications     PRN medications  PRN medications: acetaminophen, dextrose 10 % in water (D10W), dextrose, glucagon    Assessment/Plan     Principal Problem:    Hyperosmolar hyperglycemic state (HHS) (CMS/[facility])     HHS  IDDM   - Insulin gtt, fluids per ICU HHS protocol, glucose rapidly improving will monitor POCT glucose off gtt and restart home lantus, SSI   - replete electrolytes  - Cont. Insuline regimen     AMS 2/2 HHS, resolved  - mental status improving, A&Ox2-3 on arrival to ICU (intermittent confused on year, knows month)   - CT Head (1/11) no acute changes   - ctm neuro status      HFrEF  CAD  HTN  HLD  PAD   - c/f syncopal episode, EKG sinus rhythm, borderline 1st degree block, remaining intervals wnl, TWI lateral leads, no STEMI    - repeat echo during hospitalization   - IRENE (09/2023): EF 50-55%, LA moderate/severely dilated, trace AR, MR   - restart home PO meds amlodipine, asa, atorvastatin, carvedilol, clopidogrel, hydralazine, spironolactone      Pseudohyponatremia 2/2 hyperglycemia  - corrected Na wnl   - Mg>2, K>4  - monitor I/O     UTI  - ctx qd  - ucx (1/11) pending         A: PIV  O2: RA   Gtt: insulin gtt   Abx: ceftriaxone (1/11-)    DVT ppx: lovenox   GI ppx: hold      CODE STATUS: Full code      Joni Phan MD     The patient encounter includes all but not limited to; Evaluation of laboratory results, pertinent imaging, and vital signs. Daily updates are discussed  with any consulting services and family/medical power of  as needed. The patient's discharge  process begins at admission and daily contact with the patient's TCC and SW is pertinent in their efficient and safe discharge.

## 2024-01-16 ENCOUNTER — PHARMACY VISIT (OUTPATIENT)
Dept: PHARMACY | Facility: CLINIC | Age: 64
End: 2024-01-16
Payer: MEDICARE

## 2024-01-16 ENCOUNTER — HOME HEALTH ADMISSION (OUTPATIENT)
Dept: HOME HEALTH SERVICES | Facility: HOME HEALTH | Age: 64
End: 2024-01-16
Payer: COMMERCIAL

## 2024-01-16 ENCOUNTER — DOCUMENTATION (OUTPATIENT)
Dept: HOME HEALTH SERVICES | Facility: HOME HEALTH | Age: 64
End: 2024-01-16
Payer: COMMERCIAL

## 2024-01-16 VITALS
TEMPERATURE: 97.9 F | OXYGEN SATURATION: 100 % | HEART RATE: 65 BPM | DIASTOLIC BLOOD PRESSURE: 75 MMHG | WEIGHT: 128.3 LBS | SYSTOLIC BLOOD PRESSURE: 156 MMHG | HEIGHT: 63 IN | RESPIRATION RATE: 18 BRPM | BODY MASS INDEX: 22.73 KG/M2

## 2024-01-16 LAB
ALBUMIN SERPL BCP-MCNC: 3 G/DL (ref 3.4–5)
ANION GAP SERPL CALC-SCNC: 12 MMOL/L (ref 10–20)
BUN SERPL-MCNC: 11 MG/DL (ref 6–23)
CALCIUM SERPL-MCNC: 8.5 MG/DL (ref 8.6–10.6)
CHLORIDE SERPL-SCNC: 104 MMOL/L (ref 98–107)
CO2 SERPL-SCNC: 20 MMOL/L (ref 21–32)
CREAT SERPL-MCNC: 0.64 MG/DL (ref 0.5–1.05)
EGFRCR SERPLBLD CKD-EPI 2021: >90 ML/MIN/1.73M*2
ERYTHROCYTE [DISTWIDTH] IN BLOOD BY AUTOMATED COUNT: 13.3 % (ref 11.5–14.5)
GLUCOSE BLD MANUAL STRIP-MCNC: 199 MG/DL (ref 74–99)
GLUCOSE BLD MANUAL STRIP-MCNC: 302 MG/DL (ref 74–99)
GLUCOSE BLD MANUAL STRIP-MCNC: 306 MG/DL (ref 74–99)
GLUCOSE SERPL-MCNC: 326 MG/DL (ref 74–99)
HCT VFR BLD AUTO: 37.7 % (ref 36–46)
HGB BLD-MCNC: 13.1 G/DL (ref 12–16)
MCH RBC QN AUTO: 31.6 PG (ref 26–34)
MCHC RBC AUTO-ENTMCNC: 34.7 G/DL (ref 32–36)
MCV RBC AUTO: 91 FL (ref 80–100)
NRBC BLD-RTO: 0 /100 WBCS (ref 0–0)
PHOSPHATE SERPL-MCNC: 3.3 MG/DL (ref 2.5–4.9)
PLATELET # BLD AUTO: 144 X10*3/UL (ref 150–450)
POTASSIUM SERPL-SCNC: 3.4 MMOL/L (ref 3.5–5.3)
RBC # BLD AUTO: 4.15 X10*6/UL (ref 4–5.2)
SODIUM SERPL-SCNC: 133 MMOL/L (ref 136–145)
WBC # BLD AUTO: 5.8 X10*3/UL (ref 4.4–11.3)

## 2024-01-16 PROCEDURE — 85027 COMPLETE CBC AUTOMATED: CPT | Performed by: STUDENT IN AN ORGANIZED HEALTH CARE EDUCATION/TRAINING PROGRAM

## 2024-01-16 PROCEDURE — 80069 RENAL FUNCTION PANEL: CPT | Performed by: STUDENT IN AN ORGANIZED HEALTH CARE EDUCATION/TRAINING PROGRAM

## 2024-01-16 PROCEDURE — 99239 HOSP IP/OBS DSCHRG MGMT >30: CPT | Performed by: STUDENT IN AN ORGANIZED HEALTH CARE EDUCATION/TRAINING PROGRAM

## 2024-01-16 PROCEDURE — 2500000002 HC RX 250 W HCPCS SELF ADMINISTERED DRUGS (ALT 637 FOR MEDICARE OP, ALT 636 FOR OP/ED): Performed by: STUDENT IN AN ORGANIZED HEALTH CARE EDUCATION/TRAINING PROGRAM

## 2024-01-16 PROCEDURE — RXMED WILLOW AMBULATORY MEDICATION CHARGE

## 2024-01-16 PROCEDURE — 82947 ASSAY GLUCOSE BLOOD QUANT: CPT

## 2024-01-16 PROCEDURE — 36415 COLL VENOUS BLD VENIPUNCTURE: CPT | Performed by: STUDENT IN AN ORGANIZED HEALTH CARE EDUCATION/TRAINING PROGRAM

## 2024-01-16 PROCEDURE — 2500000004 HC RX 250 GENERAL PHARMACY W/ HCPCS (ALT 636 FOR OP/ED): Performed by: STUDENT IN AN ORGANIZED HEALTH CARE EDUCATION/TRAINING PROGRAM

## 2024-01-16 PROCEDURE — 2500000001 HC RX 250 WO HCPCS SELF ADMINISTERED DRUGS (ALT 637 FOR MEDICARE OP): Performed by: NURSE PRACTITIONER

## 2024-01-16 RX ORDER — POTASSIUM CHLORIDE 20 MEQ/1
40 TABLET, EXTENDED RELEASE ORAL ONCE
Status: COMPLETED | OUTPATIENT
Start: 2024-01-16 | End: 2024-01-16

## 2024-01-16 RX ORDER — CARVEDILOL 25 MG/1
25 TABLET ORAL 2 TIMES DAILY
Qty: 60 TABLET | Refills: 2 | Status: SHIPPED | OUTPATIENT
Start: 2024-01-16

## 2024-01-16 RX ORDER — INSULIN GLARGINE 100 [IU]/ML
24 INJECTION, SOLUTION SUBCUTANEOUS NIGHTLY
Status: DISCONTINUED | OUTPATIENT
Start: 2024-01-16 | End: 2024-01-16 | Stop reason: HOSPADM

## 2024-01-16 RX ORDER — AMLODIPINE BESYLATE 10 MG/1
10 TABLET ORAL DAILY
Qty: 30 TABLET | Refills: 0 | Status: SHIPPED | OUTPATIENT
Start: 2024-01-16 | End: 2024-02-15

## 2024-01-16 RX ORDER — INSULIN ASPART 100 [IU]/ML
10 INJECTION, SOLUTION INTRAVENOUS; SUBCUTANEOUS
Qty: 15 ML | Refills: 1 | Status: SHIPPED | OUTPATIENT
Start: 2024-01-16

## 2024-01-16 RX ORDER — LANCETS
EACH MISCELLANEOUS
Qty: 102 EACH | Refills: 0 | Status: SHIPPED | OUTPATIENT
Start: 2024-01-16

## 2024-01-16 RX ORDER — CLOPIDOGREL BISULFATE 75 MG/1
75 TABLET ORAL DAILY
Qty: 30 TABLET | Refills: 1 | Status: SHIPPED | OUTPATIENT
Start: 2024-01-16 | End: 2024-03-16

## 2024-01-16 RX ORDER — INSULIN GLARGINE 100 [IU]/ML
24 INJECTION, SOLUTION SUBCUTANEOUS DAILY
Qty: 15 ML | Refills: 1 | Status: SHIPPED | OUTPATIENT
Start: 2024-01-16

## 2024-01-16 RX ORDER — INSULIN LISPRO 100 [IU]/ML
12 INJECTION, SOLUTION INTRAVENOUS; SUBCUTANEOUS
Status: DISCONTINUED | OUTPATIENT
Start: 2024-01-16 | End: 2024-01-16 | Stop reason: HOSPADM

## 2024-01-16 RX ORDER — INSULIN PUMP SYRINGE, 3 ML
EACH MISCELLANEOUS
Qty: 1 EACH | Refills: 0 | Status: SHIPPED | OUTPATIENT
Start: 2024-01-16 | End: 2024-01-16 | Stop reason: HOSPADM

## 2024-01-16 RX ORDER — HYDRALAZINE HYDROCHLORIDE 100 MG/1
100 TABLET, FILM COATED ORAL DAILY
Start: 2024-01-16

## 2024-01-16 RX ORDER — ATORVASTATIN CALCIUM 80 MG/1
80 TABLET, FILM COATED ORAL DAILY
Qty: 30 TABLET | Refills: 2 | Status: SHIPPED | OUTPATIENT
Start: 2024-01-16

## 2024-01-16 RX ORDER — DEXTROSE 4 G
TABLET,CHEWABLE ORAL
Qty: 1 EACH | Refills: 0 | Status: SHIPPED | OUTPATIENT
Start: 2024-01-16 | End: 2025-01-15

## 2024-01-16 RX ORDER — ACETAMINOPHEN 500 MG
500 TABLET ORAL EVERY 6 HOURS PRN
Qty: 30 TABLET | Refills: 0
Start: 2024-01-16

## 2024-01-16 RX ADMIN — CLOPIDOGREL BISULFATE 75 MG: 75 TABLET ORAL at 09:26

## 2024-01-16 RX ADMIN — AMLODIPINE BESYLATE 10 MG: 10 TABLET ORAL at 09:26

## 2024-01-16 RX ADMIN — ATORVASTATIN CALCIUM 80 MG: 80 TABLET, FILM COATED ORAL at 09:26

## 2024-01-16 RX ADMIN — HYDRALAZINE HYDROCHLORIDE 100 MG: 25 TABLET ORAL at 04:26

## 2024-01-16 RX ADMIN — INSULIN LISPRO 4 UNITS: 100 INJECTION, SOLUTION INTRAVENOUS; SUBCUTANEOUS at 12:21

## 2024-01-16 RX ADMIN — ASPIRIN 81 MG CHEWABLE TABLET 81 MG: 81 TABLET CHEWABLE at 09:26

## 2024-01-16 RX ADMIN — POTASSIUM CHLORIDE 40 MEQ: 1500 TABLET, EXTENDED RELEASE ORAL at 09:26

## 2024-01-16 RX ADMIN — INSULIN LISPRO 8 UNITS: 100 INJECTION, SOLUTION INTRAVENOUS; SUBCUTANEOUS at 09:28

## 2024-01-16 RX ADMIN — INSULIN LISPRO 12 UNITS: 100 INJECTION, SOLUTION INTRAVENOUS; SUBCUTANEOUS at 12:21

## 2024-01-16 RX ADMIN — CARVEDILOL 25 MG: 25 TABLET, FILM COATED ORAL at 09:26

## 2024-01-16 RX ADMIN — HYDRALAZINE HYDROCHLORIDE 100 MG: 25 TABLET ORAL at 12:20

## 2024-01-16 RX ADMIN — INSULIN LISPRO 4 UNITS: 100 INJECTION, SOLUTION INTRAVENOUS; SUBCUTANEOUS at 09:28

## 2024-01-16 ASSESSMENT — ACTIVITIES OF DAILY LIVING (ADL): LACK_OF_TRANSPORTATION: NO

## 2024-01-16 ASSESSMENT — PAIN - FUNCTIONAL ASSESSMENT: PAIN_FUNCTIONAL_ASSESSMENT: 0-10

## 2024-01-16 ASSESSMENT — PAIN SCALES - GENERAL: PAINLEVEL_OUTOF10: 0 - NO PAIN

## 2024-01-16 NOTE — CARE PLAN
The patient's goals for the shift include      Problem: Safety - Adult  Goal: Free from fall injury  Outcome: Progressing     Problem: Diabetes  Goal: Achieve decreasing blood glucose levels by end of shift  Outcome: Progressing     Problem: Diabetes  Goal: Increase stability of blood glucose readings by end of shift  Outcome: Progressing     Problem: Diabetes  Goal: Vital signs within normal range for age by end of shift  Outcome: Progressing     Problem: Pain - Adult  Goal: Verbalizes/displays adequate comfort level or baseline comfort level  Outcome: Progressing     The clinical goals for the shift include Patient will maintain blood sugar greater than 70 and less than 300 throughout shift    Patient remained free from falls and injury throughout shift. Patient currently resting comfortably with stable vital signs.

## 2024-01-16 NOTE — PROGRESS NOTES
01/16/24 1230   Discharge Planning   Living Arrangements Spouse/significant other   Support Systems Spouse/significant other   Assistance Needed Washing, sweeping and making bed.   Type of Residence Private residence   Do you have animals or pets at home? No   Who is requesting discharge planning? Provider   Home or Post Acute Services In home services   Type of Home Care Services Home health aide;Home nursing visits;Home OT;Home PT   Patient expects to be discharged to: Home with home health care   Does the patient need discharge transport arranged? Yes   RoundTrip coordination needed? Yes   Has discharge transport been arranged? No   What day is the transport expected? 01/16/24   What time is the transport expected? 1600   Financial Resource Strain   How hard is it for you to pay for the very basics like food, housing, medical care, and heating? Not hard   Housing Stability   In the last 12 months, was there a time when you were not able to pay the mortgage or rent on time? N   In the last 12 months, how many places have you lived? 1   In the last 12 months, was there a time when you did not have a steady place to sleep or slept in a shelter (including now)? N   Transportation Needs   In the past 12 months, has lack of transportation kept you from medical appointments or from getting medications? no   In the past 12 months, has lack of transportation kept you from meetings, work, or from getting things needed for daily living? No   Patient Choice   Provider Choice list and CMS website (https://medicare.gov/care-compare#search) for post-acute Quality and Resource Measure Data were provided and reviewed with: Patient   Patient / Family choosing to utilize agency / facility established prior to hospitalization No     Met with pt and introduced myself as care coordinator with Care Transitions Team for discharge planning. Pt stated she feels safe at home. Pt stated she passed out prior to admission and does not  recall events leading up to this. Pt's address, phone number, and contact information was verified. Pt denies any social or financial concerns at this time.    Home care: none (pt agreeable to home health care, no HC agency preference).    DME: cane, walker, lost glucometer + supplies (attending aware)   : none  PCP: Dr. Tab Castañeda MD Last appt:  Follow up scheduled 1/29 Transport to appts: Provide A Ride  Pharm: [facility] Thomas (denies issues affording/obtaining medications)    Discharge Planning: Pt is planned to discharge home today. PT is recommending moderate intensity therapy post discharge. Pt is agreeable to above plan of care. [facility] referral sent (40) to nearby HC agencies to see who can accept. [facility] unable to accept due to pt having a [facility] PCP. Final [facility] for RN/LPN (med assistance, PT/OT, and HHA services attached via [facility]. Pt will need a ride at time of discharge. Will request 1600 wheelchair ride via Roundtrip once pt info updated. Attending and nursing updated of above. Care coordinator will continue to follow for discharge planning needs.  Addendum 1343: [facility] confirmed they are able to accept and will update on SOC. Pt provided with phone number to HC agency to call for SOC if she does not hear from them by 1/18. Transport for 1600 confirmed via [facility] wheelchair in Roundtrip. Attending/nursing/pt were updated on transport time.    Pete Lee RN  Transitional Care Coordinator/TCC  [phone]

## 2024-01-16 NOTE — HH CARE COORDINATION
Home Care received a referral for Nursing, Physical Therapy, Occupational Therapy, and Home Health Aide. Unfortunately, we are unable to accept and process the referral at this time.    Patients, please reach out to the referring provider or your PCP to assist in obtaining an alternative home care agency and/or guidance to meet your needs.    Providers, please reach out to  Home Care with any questions regarding the declined referral.

## 2024-01-18 NOTE — DOCUMENTATION CLARIFICATION NOTE
"    PATIENT:               TANNER PIKE  ACCT #:                  [id number]  MRN:                       [id number]  :                       1960  ADMIT DATE:       2024 3:08 PM  DISCH DATE:        2024 4:36 PM  RESPONDING PROVIDER #:        [id number]          PROVIDER RESPONSE TEXT:    Metabolic encephalopathy 2/2 HHS    CDI QUERY TEXT:    UH_Altered Mental Status        Instruction:    Based on your assessment of the patient and the clinical information, please provide the requested documentation by clicking on the appropriate radio button and enter any additional information if prompted.    Question: Please further clarify the most likely etiology of the altered mental status as    When answering this query, please exercise your independent professional judgment. The fact that a question is being asked, does not imply that any particular answer is desired or expected.    The patient's clinical indicators include:  Clinical Information: From ED Provider Notes on 24-\"Patient is a 63-year-old female with history of insulin-dependent diabetes presenting to the ED for altered mental status and hyperglycemia.  Patient reportedly found down in an elevator by .  Per EMS, blood glucose undetectably high.  Other vital stable per EMS.  Rest of history gathering limited by patient's altered status.\"    Clinical Indicators:    From ED Provider Note on 24-\"Mental Status: She is disoriented.\"  \" Patient mental status did start to slightly improve after initiation of treatment. \"    From DPN on 23-\"A/Ox2, follows commands, no focal deficits\"    From Hospital Course note on 23-\"Found to be in HHS with glucose on metabolic panel at 1100, Na 125 (correct 141), started on an insulin drip, and given 2L IVF. CT head negative.\"    From DPN on 23-\"AMS 2/2 HHS, resolved  - mental status improving, A&Ox2-3 on arrival to ICU (intermittent confused on year, knows month)  - CT Head " "(1/11) no acute changes  - ctm neuro status\"    Treatment: Insulin Drip,  IV Fluids, Potassium, CT of head    Risk Factors: HHS, UTI  Options provided:  -- Metabolic encephalopathy 2/2 HHS  -- Other - I will add my own diagnosis  -- Refer to Clinical Documentation Reviewer    Query created by: Radha Dempsey on 1/15/2024 3:55 PM      Electronically signed by:  EMMANUEL ORTA 1/18/2024 9:38 AM          "

## 2024-01-22 LAB
ATRIAL RATE: 59 BPM
P AXIS: 66 DEGREES
P OFFSET: 172 MS
P ONSET: 119 MS
PR INTERVAL: 194 MS
Q ONSET: 216 MS
QRS COUNT: 10 BEATS
QRS DURATION: 100 MS
QT INTERVAL: 426 MS
QTC CALCULATION(BAZETT): 421 MS
QTC FREDERICIA: 423 MS
R AXIS: 26 DEGREES
T AXIS: 66 DEGREES
T OFFSET: 429 MS
VENTRICULAR RATE: 59 BPM

## 2024-01-23 LAB
ATRIAL RATE: 62 BPM
P AXIS: 82 DEGREES
P OFFSET: 165 MS
P ONSET: 109 MS
PR INTERVAL: 202 MS
Q ONSET: 210 MS
QRS COUNT: 10 BEATS
QRS DURATION: 108 MS
QT INTERVAL: 428 MS
QTC CALCULATION(BAZETT): 434 MS
QTC FREDERICIA: 432 MS
R AXIS: 31 DEGREES
T AXIS: 47 DEGREES
T OFFSET: 424 MS
VENTRICULAR RATE: 62 BPM

## 2024-02-05 ENCOUNTER — HOSPITAL ENCOUNTER (OUTPATIENT)
Facility: HOSPITAL | Age: 64
Discharge: HOME | DRG: 638 | End: 2024-02-06
Attending: EMERGENCY MEDICINE | Admitting: STUDENT IN AN ORGANIZED HEALTH CARE EDUCATION/TRAINING PROGRAM
Payer: COMMERCIAL

## 2024-02-05 ENCOUNTER — APPOINTMENT (OUTPATIENT)
Dept: RADIOLOGY | Facility: HOSPITAL | Age: 64
DRG: 638 | End: 2024-02-05
Payer: COMMERCIAL

## 2024-02-05 ENCOUNTER — CLINICAL SUPPORT (OUTPATIENT)
Dept: EMERGENCY MEDICINE | Facility: HOSPITAL | Age: 64
DRG: 638 | End: 2024-02-05
Payer: COMMERCIAL

## 2024-02-05 DIAGNOSIS — E16.2 HYPOGLYCEMIA: Primary | ICD-10-CM

## 2024-02-05 LAB
ALBUMIN SERPL BCP-MCNC: 4 G/DL (ref 3.4–5)
ALP SERPL-CCNC: 115 U/L (ref 33–136)
ALT SERPL W P-5'-P-CCNC: 46 U/L (ref 7–45)
ANION GAP BLDV CALCULATED.4IONS-SCNC: 6 MMOL/L (ref 10–25)
ANION GAP SERPL CALC-SCNC: 13 MMOL/L (ref 10–20)
AST SERPL W P-5'-P-CCNC: 70 U/L (ref 9–39)
ATRIAL RATE: 49 BPM
BASE EXCESS BLDV CALC-SCNC: 6.5 MMOL/L (ref -2–3)
BASOPHILS # BLD AUTO: 0.06 X10*3/UL (ref 0–0.1)
BASOPHILS NFR BLD AUTO: 0.9 %
BILIRUB SERPL-MCNC: 0.5 MG/DL (ref 0–1.2)
BNP SERPL-MCNC: 105 PG/ML (ref 0–99)
BODY TEMPERATURE: 37 DEGREES CELSIUS
BUN SERPL-MCNC: 14 MG/DL (ref 6–23)
CA-I BLDV-SCNC: 1.13 MMOL/L (ref 1.1–1.33)
CALCIUM SERPL-MCNC: 9.2 MG/DL (ref 8.6–10.6)
CHLORIDE BLDV-SCNC: 95 MMOL/L (ref 98–107)
CHLORIDE SERPL-SCNC: 95 MMOL/L (ref 98–107)
CO2 SERPL-SCNC: 26 MMOL/L (ref 21–32)
CREAT SERPL-MCNC: 0.6 MG/DL (ref 0.5–1.05)
EGFRCR SERPLBLD CKD-EPI 2021: >90 ML/MIN/1.73M*2
EOSINOPHIL # BLD AUTO: 0.1 X10*3/UL (ref 0–0.7)
EOSINOPHIL NFR BLD AUTO: 1.5 %
ERYTHROCYTE [DISTWIDTH] IN BLOOD BY AUTOMATED COUNT: 12.5 % (ref 11.5–14.5)
FLUAV RNA RESP QL NAA+PROBE: NOT DETECTED
FLUBV RNA RESP QL NAA+PROBE: NOT DETECTED
GLUCOSE BLD MANUAL STRIP-MCNC: 114 MG/DL (ref 74–99)
GLUCOSE BLD MANUAL STRIP-MCNC: 131 MG/DL (ref 74–99)
GLUCOSE BLD MANUAL STRIP-MCNC: 57 MG/DL (ref 74–99)
GLUCOSE BLD MANUAL STRIP-MCNC: 65 MG/DL (ref 74–99)
GLUCOSE BLDV-MCNC: 136 MG/DL (ref 74–99)
GLUCOSE SERPL-MCNC: 119 MG/DL (ref 74–99)
HCO3 BLDV-SCNC: 32.4 MMOL/L (ref 22–26)
HCT VFR BLD AUTO: 38.1 % (ref 36–46)
HCT VFR BLD EST: 43 % (ref 36–46)
HGB BLD-MCNC: 14 G/DL (ref 12–16)
HGB BLDV-MCNC: 14.2 G/DL (ref 12–16)
IMM GRANULOCYTES # BLD AUTO: 0.02 X10*3/UL (ref 0–0.7)
IMM GRANULOCYTES NFR BLD AUTO: 0.3 % (ref 0–0.9)
LACTATE BLDV-SCNC: 0.8 MMOL/L (ref 0.4–2)
LYMPHOCYTES # BLD AUTO: 3.69 X10*3/UL (ref 1.2–4.8)
LYMPHOCYTES NFR BLD AUTO: 54.3 %
MAGNESIUM SERPL-MCNC: 1.73 MG/DL (ref 1.6–2.4)
MCH RBC QN AUTO: 32.4 PG (ref 26–34)
MCHC RBC AUTO-ENTMCNC: 36.7 G/DL (ref 32–36)
MCV RBC AUTO: 88 FL (ref 80–100)
MONOCYTES # BLD AUTO: 0.46 X10*3/UL (ref 0.1–1)
MONOCYTES NFR BLD AUTO: 6.8 %
NEUTROPHILS # BLD AUTO: 2.47 X10*3/UL (ref 1.2–7.7)
NEUTROPHILS NFR BLD AUTO: 36.2 %
NRBC BLD-RTO: 0 /100 WBCS (ref 0–0)
OXYHGB MFR BLDV: 82.4 % (ref 45–75)
P AXIS: 66 DEGREES
P OFFSET: 173 MS
P ONSET: 111 MS
PCO2 BLDV: 50 MM HG (ref 41–51)
PH BLDV: 7.42 PH (ref 7.33–7.43)
PLATELET # BLD AUTO: 207 X10*3/UL (ref 150–450)
PO2 BLDV: 61 MM HG (ref 35–45)
POTASSIUM BLDV-SCNC: 5.2 MMOL/L (ref 3.5–5.3)
POTASSIUM SERPL-SCNC: 3.6 MMOL/L (ref 3.5–5.3)
PR INTERVAL: 212 MS
PROT SERPL-MCNC: 7.6 G/DL (ref 6.4–8.2)
Q ONSET: 217 MS
QRS COUNT: 8 BEATS
QRS DURATION: 114 MS
QT INTERVAL: 576 MS
QTC CALCULATION(BAZETT): 520 MS
QTC FREDERICIA: 538 MS
R AXIS: 87 DEGREES
RBC # BLD AUTO: 4.32 X10*6/UL (ref 4–5.2)
SAO2 % BLDV: 89 % (ref 45–75)
SARS-COV-2 RNA RESP QL NAA+PROBE: NOT DETECTED
SODIUM BLDV-SCNC: 128 MMOL/L (ref 136–145)
SODIUM SERPL-SCNC: 130 MMOL/L (ref 136–145)
T AXIS: 38 DEGREES
T OFFSET: 505 MS
TSH SERPL-ACNC: 2.77 MIU/L (ref 0.44–3.98)
VENTRICULAR RATE: 49 BPM
WBC # BLD AUTO: 6.8 X10*3/UL (ref 4.4–11.3)

## 2024-02-05 PROCEDURE — 93010 ELECTROCARDIOGRAM REPORT: CPT | Performed by: EMERGENCY MEDICINE

## 2024-02-05 PROCEDURE — 83880 ASSAY OF NATRIURETIC PEPTIDE: CPT | Performed by: STUDENT IN AN ORGANIZED HEALTH CARE EDUCATION/TRAINING PROGRAM

## 2024-02-05 PROCEDURE — 87636 SARSCOV2 & INF A&B AMP PRB: CPT | Performed by: STUDENT IN AN ORGANIZED HEALTH CARE EDUCATION/TRAINING PROGRAM

## 2024-02-05 PROCEDURE — 96361 HYDRATE IV INFUSION ADD-ON: CPT

## 2024-02-05 PROCEDURE — 82947 ASSAY GLUCOSE BLOOD QUANT: CPT | Mod: 59

## 2024-02-05 PROCEDURE — 2500000005 HC RX 250 GENERAL PHARMACY W/O HCPCS: Mod: SE | Performed by: STUDENT IN AN ORGANIZED HEALTH CARE EDUCATION/TRAINING PROGRAM

## 2024-02-05 PROCEDURE — 71045 X-RAY EXAM CHEST 1 VIEW: CPT

## 2024-02-05 PROCEDURE — 36415 COLL VENOUS BLD VENIPUNCTURE: CPT | Performed by: STUDENT IN AN ORGANIZED HEALTH CARE EDUCATION/TRAINING PROGRAM

## 2024-02-05 PROCEDURE — 99291 CRITICAL CARE FIRST HOUR: CPT | Mod: 25,27 | Performed by: EMERGENCY MEDICINE

## 2024-02-05 PROCEDURE — 99291 CRITICAL CARE FIRST HOUR: CPT | Performed by: EMERGENCY MEDICINE

## 2024-02-05 PROCEDURE — 93005 ELECTROCARDIOGRAM TRACING: CPT

## 2024-02-05 PROCEDURE — 99223 1ST HOSP IP/OBS HIGH 75: CPT

## 2024-02-05 PROCEDURE — 96374 THER/PROPH/DIAG INJ IV PUSH: CPT

## 2024-02-05 PROCEDURE — 84132 ASSAY OF SERUM POTASSIUM: CPT

## 2024-02-05 PROCEDURE — 83735 ASSAY OF MAGNESIUM: CPT | Performed by: STUDENT IN AN ORGANIZED HEALTH CARE EDUCATION/TRAINING PROGRAM

## 2024-02-05 PROCEDURE — 84443 ASSAY THYROID STIM HORMONE: CPT | Performed by: STUDENT IN AN ORGANIZED HEALTH CARE EDUCATION/TRAINING PROGRAM

## 2024-02-05 PROCEDURE — 2500000004 HC RX 250 GENERAL PHARMACY W/ HCPCS (ALT 636 FOR OP/ED): Mod: SE | Performed by: STUDENT IN AN ORGANIZED HEALTH CARE EDUCATION/TRAINING PROGRAM

## 2024-02-05 PROCEDURE — 71045 X-RAY EXAM CHEST 1 VIEW: CPT | Performed by: RADIOLOGY

## 2024-02-05 PROCEDURE — 85025 COMPLETE CBC W/AUTO DIFF WBC: CPT | Performed by: STUDENT IN AN ORGANIZED HEALTH CARE EDUCATION/TRAINING PROGRAM

## 2024-02-05 PROCEDURE — 84132 ASSAY OF SERUM POTASSIUM: CPT | Performed by: STUDENT IN AN ORGANIZED HEALTH CARE EDUCATION/TRAINING PROGRAM

## 2024-02-05 RX ORDER — DEXTROSE 50 % IN WATER (D50W) INTRAVENOUS SYRINGE
25
Status: DISCONTINUED | OUTPATIENT
Start: 2024-02-05 | End: 2024-02-06 | Stop reason: HOSPADM

## 2024-02-05 RX ORDER — DEXTROSE MONOHYDRATE 100 MG/ML
0.3 INJECTION, SOLUTION INTRAVENOUS ONCE AS NEEDED
Status: DISCONTINUED | OUTPATIENT
Start: 2024-02-05 | End: 2024-02-06 | Stop reason: HOSPADM

## 2024-02-05 RX ADMIN — DEXTROSE MONOHYDRATE 25 G: 25 INJECTION, SOLUTION INTRAVENOUS at 21:01

## 2024-02-05 RX ADMIN — SODIUM CHLORIDE, POTASSIUM CHLORIDE, SODIUM LACTATE AND CALCIUM CHLORIDE 1000 ML: 600; 310; 30; 20 INJECTION, SOLUTION INTRAVENOUS at 20:19

## 2024-02-05 ASSESSMENT — COLUMBIA-SUICIDE SEVERITY RATING SCALE - C-SSRS
1. IN THE PAST MONTH, HAVE YOU WISHED YOU WERE DEAD OR WISHED YOU COULD GO TO SLEEP AND NOT WAKE UP?: NO
6. HAVE YOU EVER DONE ANYTHING, STARTED TO DO ANYTHING, OR PREPARED TO DO ANYTHING TO END YOUR LIFE?: NO
2. HAVE YOU ACTUALLY HAD ANY THOUGHTS OF KILLING YOURSELF?: NO

## 2024-02-06 ENCOUNTER — APPOINTMENT (OUTPATIENT)
Dept: RADIOLOGY | Facility: HOSPITAL | Age: 64
DRG: 638 | End: 2024-02-06
Payer: COMMERCIAL

## 2024-02-06 VITALS
HEART RATE: 76 BPM | BODY MASS INDEX: 20.38 KG/M2 | DIASTOLIC BLOOD PRESSURE: 88 MMHG | SYSTOLIC BLOOD PRESSURE: 162 MMHG | TEMPERATURE: 98 F | HEIGHT: 63 IN | OXYGEN SATURATION: 100 % | RESPIRATION RATE: 16 BRPM | WEIGHT: 115 LBS

## 2024-02-06 PROBLEM — E16.2 HYPOGLYCEMIA: Status: ACTIVE | Noted: 2024-02-06

## 2024-02-06 PROBLEM — E16.2 HYPOGLYCEMIA: Status: RESOLVED | Noted: 2024-02-06 | Resolved: 2024-02-06

## 2024-02-06 LAB
ALBUMIN SERPL BCP-MCNC: 3.4 G/DL (ref 3.4–5)
AMPHETAMINES UR QL SCN: ABNORMAL
ANION GAP SERPL CALC-SCNC: 10 MMOL/L (ref 10–20)
APAP SERPL-MCNC: <10 UG/ML
APPEARANCE UR: CLEAR
BACTERIA #/AREA URNS AUTO: ABNORMAL /HPF
BACTERIA UR CULT: NORMAL
BARBITURATES UR QL SCN: ABNORMAL
BENZODIAZ UR QL SCN: ABNORMAL
BILIRUB UR STRIP.AUTO-MCNC: NEGATIVE MG/DL
BUN SERPL-MCNC: 13 MG/DL (ref 6–23)
BZE UR QL SCN: ABNORMAL
CALCIUM SERPL-MCNC: 8.6 MG/DL (ref 8.6–10.6)
CANNABINOIDS UR QL SCN: ABNORMAL
CHLORIDE SERPL-SCNC: 98 MMOL/L (ref 98–107)
CO2 SERPL-SCNC: 27 MMOL/L (ref 21–32)
COLOR UR: ABNORMAL
CREAT SERPL-MCNC: 0.63 MG/DL (ref 0.5–1.05)
EGFRCR SERPLBLD CKD-EPI 2021: >90 ML/MIN/1.73M*2
ERYTHROCYTE [DISTWIDTH] IN BLOOD BY AUTOMATED COUNT: 12.5 % (ref 11.5–14.5)
ETHANOL SERPL-MCNC: <10 MG/DL
FENTANYL+NORFENTANYL UR QL SCN: ABNORMAL
GLUCOSE BLD MANUAL STRIP-MCNC: 251 MG/DL (ref 74–99)
GLUCOSE BLD MANUAL STRIP-MCNC: 255 MG/DL (ref 74–99)
GLUCOSE BLD MANUAL STRIP-MCNC: 313 MG/DL (ref 74–99)
GLUCOSE SERPL-MCNC: 318 MG/DL (ref 74–99)
GLUCOSE UR STRIP.AUTO-MCNC: ABNORMAL MG/DL
HCT VFR BLD AUTO: 36.8 % (ref 36–46)
HGB BLD-MCNC: 13.8 G/DL (ref 12–16)
HOLD SPECIMEN: NORMAL
KETONES UR STRIP.AUTO-MCNC: NEGATIVE MG/DL
LEUKOCYTE ESTERASE UR QL STRIP.AUTO: ABNORMAL
MAGNESIUM SERPL-MCNC: 1.53 MG/DL (ref 1.6–2.4)
MCH RBC QN AUTO: 33.5 PG (ref 26–34)
MCHC RBC AUTO-ENTMCNC: 37.5 G/DL (ref 32–36)
MCV RBC AUTO: 89 FL (ref 80–100)
NITRITE UR QL STRIP.AUTO: NEGATIVE
NRBC BLD-RTO: 0 /100 WBCS (ref 0–0)
OPIATES UR QL SCN: ABNORMAL
OXYCODONE+OXYMORPHONE UR QL SCN: ABNORMAL
PCP UR QL SCN: ABNORMAL
PH UR STRIP.AUTO: 6 [PH]
PHOSPHATE SERPL-MCNC: 3.5 MG/DL (ref 2.5–4.9)
PLATELET # BLD AUTO: 214 X10*3/UL (ref 150–450)
POTASSIUM SERPL-SCNC: 3.1 MMOL/L (ref 3.5–5.3)
PROT UR STRIP.AUTO-MCNC: ABNORMAL MG/DL
RBC # BLD AUTO: 4.12 X10*6/UL (ref 4–5.2)
RBC # UR STRIP.AUTO: NEGATIVE /UL
RBC #/AREA URNS AUTO: ABNORMAL /HPF
SALICYLATES SERPL-MCNC: <3 MG/DL
SODIUM SERPL-SCNC: 132 MMOL/L (ref 136–145)
SP GR UR STRIP.AUTO: 1.01
SQUAMOUS #/AREA URNS AUTO: ABNORMAL /HPF
UROBILINOGEN UR STRIP.AUTO-MCNC: <2 MG/DL
WBC # BLD AUTO: 5 X10*3/UL (ref 4.4–11.3)
WBC #/AREA URNS AUTO: ABNORMAL /HPF

## 2024-02-06 PROCEDURE — 80320 DRUG SCREEN QUANTALCOHOLS: CPT

## 2024-02-06 PROCEDURE — 70450 CT HEAD/BRAIN W/O DYE: CPT | Performed by: RADIOLOGY

## 2024-02-06 PROCEDURE — 82947 ASSAY GLUCOSE BLOOD QUANT: CPT | Mod: 59

## 2024-02-06 PROCEDURE — 99239 HOSP IP/OBS DSCHRG MGMT >30: CPT

## 2024-02-06 PROCEDURE — 80069 RENAL FUNCTION PANEL: CPT

## 2024-02-06 PROCEDURE — 81001 URINALYSIS AUTO W/SCOPE: CPT | Mod: 59 | Performed by: STUDENT IN AN ORGANIZED HEALTH CARE EDUCATION/TRAINING PROGRAM

## 2024-02-06 PROCEDURE — 85027 COMPLETE CBC AUTOMATED: CPT

## 2024-02-06 PROCEDURE — 80307 DRUG TEST PRSMV CHEM ANLYZR: CPT

## 2024-02-06 PROCEDURE — 87086 URINE CULTURE/COLONY COUNT: CPT | Performed by: STUDENT IN AN ORGANIZED HEALTH CARE EDUCATION/TRAINING PROGRAM

## 2024-02-06 PROCEDURE — 70450 CT HEAD/BRAIN W/O DYE: CPT

## 2024-02-06 PROCEDURE — 2500000002 HC RX 250 W HCPCS SELF ADMINISTERED DRUGS (ALT 637 FOR MEDICARE OP, ALT 636 FOR OP/ED)

## 2024-02-06 PROCEDURE — 83735 ASSAY OF MAGNESIUM: CPT

## 2024-02-06 PROCEDURE — 2500000004 HC RX 250 GENERAL PHARMACY W/ HCPCS (ALT 636 FOR OP/ED)

## 2024-02-06 PROCEDURE — 1210000001 HC SEMI-PRIVATE ROOM DAILY

## 2024-02-06 PROCEDURE — 2500000001 HC RX 250 WO HCPCS SELF ADMINISTERED DRUGS (ALT 637 FOR MEDICARE OP)

## 2024-02-06 PROCEDURE — 36415 COLL VENOUS BLD VENIPUNCTURE: CPT

## 2024-02-06 RX ORDER — SPIRONOLACTONE 25 MG/1
25 TABLET ORAL DAILY
Status: DISCONTINUED | OUTPATIENT
Start: 2024-02-06 | End: 2024-02-06 | Stop reason: HOSPADM

## 2024-02-06 RX ORDER — INSULIN LISPRO 100 [IU]/ML
0-5 INJECTION, SOLUTION INTRAVENOUS; SUBCUTANEOUS
Status: DISCONTINUED | OUTPATIENT
Start: 2024-02-06 | End: 2024-02-06 | Stop reason: HOSPADM

## 2024-02-06 RX ORDER — AMOXICILLIN 250 MG
2 CAPSULE ORAL 2 TIMES DAILY
Status: DISCONTINUED | OUTPATIENT
Start: 2024-02-06 | End: 2024-02-06 | Stop reason: HOSPADM

## 2024-02-06 RX ORDER — MULTIVIT-MIN/IRON FUM/FOLIC AC 7.5 MG-4
1 TABLET ORAL DAILY
Status: DISCONTINUED | OUTPATIENT
Start: 2024-02-06 | End: 2024-02-06 | Stop reason: HOSPADM

## 2024-02-06 RX ORDER — POLYETHYLENE GLYCOL 3350 17 G/17G
17 POWDER, FOR SOLUTION ORAL DAILY PRN
Status: DISCONTINUED | OUTPATIENT
Start: 2024-02-06 | End: 2024-02-06 | Stop reason: HOSPADM

## 2024-02-06 RX ORDER — INSULIN GLARGINE 100 [IU]/ML
12 INJECTION, SOLUTION SUBCUTANEOUS EVERY 24 HOURS
Status: DISCONTINUED | OUTPATIENT
Start: 2024-02-06 | End: 2024-02-06 | Stop reason: HOSPADM

## 2024-02-06 RX ORDER — HYDRALAZINE HYDROCHLORIDE 25 MG/1
100 TABLET, FILM COATED ORAL DAILY
Status: DISCONTINUED | OUTPATIENT
Start: 2024-02-06 | End: 2024-02-06 | Stop reason: HOSPADM

## 2024-02-06 RX ORDER — ATORVASTATIN CALCIUM 20 MG/1
80 TABLET, FILM COATED ORAL DAILY
Status: DISCONTINUED | OUTPATIENT
Start: 2024-02-06 | End: 2024-02-06 | Stop reason: HOSPADM

## 2024-02-06 RX ORDER — CLOPIDOGREL BISULFATE 75 MG/1
75 TABLET ORAL DAILY
Status: DISCONTINUED | OUTPATIENT
Start: 2024-02-06 | End: 2024-02-06 | Stop reason: HOSPADM

## 2024-02-06 RX ORDER — ISOSORBIDE MONONITRATE 30 MG/1
60 TABLET, EXTENDED RELEASE ORAL DAILY
Status: DISCONTINUED | OUTPATIENT
Start: 2024-02-06 | End: 2024-02-06 | Stop reason: HOSPADM

## 2024-02-06 RX ORDER — CARVEDILOL 12.5 MG/1
25 TABLET ORAL 2 TIMES DAILY
Status: DISCONTINUED | OUTPATIENT
Start: 2024-02-06 | End: 2024-02-06 | Stop reason: HOSPADM

## 2024-02-06 RX ORDER — NAPROXEN SODIUM 220 MG/1
81 TABLET, FILM COATED ORAL DAILY
Status: DISCONTINUED | OUTPATIENT
Start: 2024-02-06 | End: 2024-02-06 | Stop reason: HOSPADM

## 2024-02-06 RX ORDER — AMLODIPINE BESYLATE 5 MG/1
10 TABLET ORAL DAILY
Status: DISCONTINUED | OUTPATIENT
Start: 2024-02-06 | End: 2024-02-06 | Stop reason: HOSPADM

## 2024-02-06 RX ADMIN — Medication 1 TABLET: at 09:23

## 2024-02-06 RX ADMIN — INSULIN LISPRO 4 UNITS: 100 INJECTION, SOLUTION INTRAVENOUS; SUBCUTANEOUS at 18:21

## 2024-02-06 RX ADMIN — ISOSORBIDE MONONITRATE 60 MG: 30 TABLET, EXTENDED RELEASE ORAL at 09:23

## 2024-02-06 RX ADMIN — CLOPIDOGREL BISULFATE 75 MG: 75 TABLET ORAL at 09:24

## 2024-02-06 RX ADMIN — INSULIN LISPRO 3 UNITS: 100 INJECTION, SOLUTION INTRAVENOUS; SUBCUTANEOUS at 12:48

## 2024-02-06 RX ADMIN — SPIRONOLACTONE 25 MG: 25 TABLET, FILM COATED ORAL at 09:24

## 2024-02-06 RX ADMIN — INSULIN GLARGINE 12 UNITS: 100 INJECTION, SOLUTION SUBCUTANEOUS at 09:25

## 2024-02-06 RX ADMIN — SENNOSIDES AND DOCUSATE SODIUM 2 TABLET: 8.6; 5 TABLET ORAL at 09:23

## 2024-02-06 RX ADMIN — INSULIN LISPRO 4 UNITS: 100 INJECTION, SOLUTION INTRAVENOUS; SUBCUTANEOUS at 09:26

## 2024-02-06 RX ADMIN — AMLODIPINE BESYLATE 10 MG: 5 TABLET ORAL at 09:22

## 2024-02-06 RX ADMIN — POLYETHYLENE GLYCOL 3350 17 G: 17 POWDER, FOR SOLUTION ORAL at 09:22

## 2024-02-06 RX ADMIN — HYDRALAZINE HYDROCHLORIDE 100 MG: 25 TABLET ORAL at 09:23

## 2024-02-06 RX ADMIN — ASPIRIN 81 MG CHEWABLE TABLET 81 MG: 81 TABLET CHEWABLE at 09:23

## 2024-02-06 ASSESSMENT — ENCOUNTER SYMPTOMS
FATIGUE: 1
HEADACHES: 0
DIZZINESS: 0
WHEEZING: 0
FEVER: 0
COUGH: 0
LIGHT-HEADEDNESS: 0
SHORTNESS OF BREATH: 0
DIARRHEA: 0
UNEXPECTED WEIGHT CHANGE: 0
ABDOMINAL PAIN: 0
VOMITING: 0
PALPITATIONS: 0
NAUSEA: 0

## 2024-02-06 NOTE — H&P
History Of Present Illness  Myrna Khan is a 64 y.o. female with history of IDDM (A1c 8.2023), HFrEF (50-55%), CAD, CABG (2023), HTN, PAD s/p L SFA stent, renal artery stenosis, carotid artery stenosis presenting with hypoglycemia. Patient reports episode of dizziness while sitting on her couch at home.  Patient subsequently passed out onto the floor.  She admits LOC (approximately 2 minutes), unsure of head trauma, denies any OAC use.  Patient has been called EMS who noted patient to be hypoglycemic blood glucose 36.  Patient provided D10 bolus and transported to [facility] ED.  Patient states she did take her Lantus 24 units earlier in the morning, but denies any additional insulin administration.  Not currently on sulfonylureas.  Patient only reports sweats earlier that day.  She denies any fevers, chills, lightheadedness, chest pain, palpitations, SOB, abdominal pain, nausea, vomiting, dysuria, blood in urine/stool, sick contacts, tinnitus, unsteadiness, poor oral intake.    ED course  Initial temp also low at 90F, on pito hugger,  CBC:  unremarkable  CMP: glcuose 119, Na 130, ALT 46, AST 70  Flu, Covid, RSV:  negative  BNP: 105  VB.42/ 50/ 14.2 Lactate 0.8  UA: pending  TSH: 2.77  EKG: sinus bradycardia w/ 1st degree heart block (unchanged from previous)  CXR: No acute cardiopulmonary process.   CT head: pending     Provided 1L LR bolus, d50 IV x1    Past Medical History  Past Medical History:   Diagnosis Date    Carotid artery stenosis     Diabetes mellitus (CMS/[facility])     Heart disease     Hypertension     PAD (peripheral artery disease) (CMS/[facility])     Renal artery stenosis (CMS/[facility])        Surgical History  Past Surgical History:   Procedure Laterality Date    CORONARY ARTERY BYPASS GRAFT      2023    FEMORAL ARTERY STENT Left     left SFA        Social History  She reports that she has never smoked. She has never used smokeless tobacco. No history on file for alcohol use and drug use.    Family  "History   Noncontributory      Allergies  Lisinopril, Amoxicillin, and Losartan    Review of Systems   Constitutional:  Positive for fatigue. Negative for fever and unexpected weight change.        Reports feeling tired   Respiratory:  Negative for cough, shortness of breath and wheezing.    Cardiovascular:  Negative for chest pain, palpitations and leg swelling.   Gastrointestinal:  Negative for abdominal pain, diarrhea, nausea and vomiting.   Neurological:  Negative for dizziness, light-headedness and headaches.        Physical Exam  Constitutional:       Comments: Somnolent throughout exam. Bri hugger in place. Pt eating crackers   HENT:      Head: Normocephalic and atraumatic.   Eyes:      General: No scleral icterus.     Conjunctiva/sclera: Conjunctivae normal.   Cardiovascular:      Rate and Rhythm: Normal rate and regular rhythm.      Heart sounds: No murmur heard.     No friction rub. No gallop.   Pulmonary:      Effort: Pulmonary effort is normal. No respiratory distress.      Breath sounds: Normal breath sounds.   Abdominal:      General: There is no distension.      Palpations: Abdomen is soft.      Tenderness: There is no abdominal tenderness.   Musculoskeletal:         General: Normal range of motion.   Skin:     General: Skin is warm and dry.   Neurological:      Mental Status: She is alert and oriented to person, place, and time.   Psychiatric:         Mood and Affect: Mood normal.       Last Recorded Vitals  Blood pressure 159/88, pulse 65, temperature 36.4 °C (97.6 °F), temperature source Oral, resp. rate 14, height 1.6 m (5' 3\"), weight 52.2 kg (115 lb), SpO2 99 %.    Relevant Results  Results for orders placed or performed during the hospital encounter of 02/05/24 (from the past 24 hour(s))   Blood Gas Venous Full Panel Unsolicited   Result Value Ref Range    POCT pH, Venous 7.42 7.33 - 7.43 pH    POCT pCO2, Venous 50 41 - 51 mm Hg    POCT pO2, Venous 61 (H) 35 - 45 mm Hg    POCT SO2, Venous 89 " (H) 45 - 75 %    POCT Oxy Hemoglobin, Venous 82.4 (H) 45.0 - 75.0 %    POCT Hematocrit Calculated, Venous 43.0 36.0 - 46.0 %    POCT Sodium, Venous 128 (L) 136 - 145 mmol/L    POCT Potassium, Venous 5.2 3.5 - 5.3 mmol/L    POCT Chloride, Venous 95 (L) 98 - 107 mmol/L    POCT Ionized Calicum, Venous 1.13 1.10 - 1.33 mmol/L    POCT Glucose, Venous 136 (H) 74 - 99 mg/dL    POCT Lactate, Venous 0.8 0.4 - 2.0 mmol/L    POCT Base Excess, Venous 6.5 (H) -2.0 - 3.0 mmol/L    POCT HCO3 Calculated, Venous 32.4 (H) 22.0 - 26.0 mmol/L    POCT Hemoglobin, Venous 14.2 12.0 - 16.0 g/dL    POCT Anion Gap, Venous 6.0 (L) 10.0 - 25.0 mmol/L    Patient Temperature 37.0 degrees Celsius   CBC and Auto Differential   Result Value Ref Range    WBC 6.8 4.4 - 11.3 x10*3/uL    nRBC 0.0 0.0 - 0.0 /100 WBCs    RBC 4.32 4.00 - 5.20 x10*6/uL    Hemoglobin 14.0 12.0 - 16.0 g/dL    Hematocrit 38.1 36.0 - 46.0 %    MCV 88 80 - 100 fL    MCH 32.4 26.0 - 34.0 pg    MCHC 36.7 (H) 32.0 - 36.0 g/dL    RDW 12.5 11.5 - 14.5 %    Platelets 207 150 - 450 x10*3/uL    Neutrophils % 36.2 40.0 - 80.0 %    Immature Granulocytes %, Automated 0.3 0.0 - 0.9 %    Lymphocytes % 54.3 13.0 - 44.0 %    Monocytes % 6.8 2.0 - 10.0 %    Eosinophils % 1.5 0.0 - 6.0 %    Basophils % 0.9 0.0 - 2.0 %    Neutrophils Absolute 2.47 1.20 - 7.70 x10*3/uL    Immature Granulocytes Absolute, Automated 0.02 0.00 - 0.70 x10*3/uL    Lymphocytes Absolute 3.69 1.20 - 4.80 x10*3/uL    Monocytes Absolute 0.46 0.10 - 1.00 x10*3/uL    Eosinophils Absolute 0.10 0.00 - 0.70 x10*3/uL    Basophils Absolute 0.06 0.00 - 0.10 x10*3/uL   Magnesium   Result Value Ref Range    Magnesium 1.73 1.60 - 2.40 mg/dL   Comprehensive metabolic panel   Result Value Ref Range    Glucose 119 (H) 74 - 99 mg/dL    Sodium 130 (L) 136 - 145 mmol/L    Potassium 3.6 3.5 - 5.3 mmol/L    Chloride 95 (L) 98 - 107 mmol/L    Bicarbonate 26 21 - 32 mmol/L    Anion Gap 13 10 - 20 mmol/L    Urea Nitrogen 14 6 - 23 mg/dL     Creatinine 0.60 0.50 - 1.05 mg/dL    eGFR >90 >60 mL/min/1.73m*2    Calcium 9.2 8.6 - 10.6 mg/dL    Albumin 4.0 3.4 - 5.0 g/dL    Alkaline Phosphatase 115 33 - 136 U/L    Total Protein 7.6 6.4 - 8.2 g/dL    AST 70 (H) 9 - 39 U/L    Bilirubin, Total 0.5 0.0 - 1.2 mg/dL    ALT 46 (H) 7 - 45 U/L   B-Type Natriuretic Peptide   Result Value Ref Range     (H) 0 - 99 pg/mL   TSH with reflex to Free T4 if abnormal   Result Value Ref Range    Thyroid Stimulating Hormone 2.77 0.44 - 3.98 mIU/L   Influenza A, and B PCR   Result Value Ref Range    Flu A Result Not Detected Not Detected    Flu B Result Not Detected Not Detected   SARS-CoV-2 RT PCR   Result Value Ref Range    Coronavirus 2019, PCR Not Detected Not Detected   ECG 12 lead   Result Value Ref Range    Ventricular Rate 49 BPM    Atrial Rate 49 BPM    UT Interval 212 ms    QRS Duration 114 ms    QT Interval 576 ms    QTC Calculation(Bazett) 520 ms    P Axis 66 degrees    R Axis 87 degrees    T Axis 38 degrees    QRS Count 8 beats    Q Onset 217 ms    P Onset 111 ms    P Offset 173 ms    T Offset 505 ms    QTC Fredericia 538 ms   POCT GLUCOSE   Result Value Ref Range    POCT Glucose 57 (L) 74 - 99 mg/dL   POCT GLUCOSE   Result Value Ref Range    POCT Glucose 114 (H) 74 - 99 mg/dL   POCT GLUCOSE   Result Value Ref Range    POCT Glucose 65 (L) 74 - 99 mg/dL   POCT GLUCOSE   Result Value Ref Range    POCT Glucose 131 (H) 74 - 99 mg/dL          Assessment/Plan   Principal Problem:    Hypoglycemia  64 y.o. female with history of IDDM (A1c 8.5 Nov 2023), HFrEF (50-55%), CAD, CABG (09/2023), HTN, PAD s/p L SFA stent, renal artery stenosis, carotid artery stenosis presents with a syncopal episode, likely attributable to hypoglycemia. The differential diagnosis includes cardiogenic etiology due to the patient's known CAD and CABG history; however, review of prior EKGs suggests chronic first-degree heart block, which may not fully explain the acute syncopal event. Given  the history of carotid artery stenosis, symptomatic carotid stenosis is considered. Infectious etiologies are less likely given the absence of leukocytosis, negative chest X-ray, and absence of obvious sources of infection on skin examination. Nevertheless, the presence of sweats at home raises concern. Pending UA.    #syncopal episode  :: likely hypoglycemic event. S/p D50 IV x1  :: consider cardiogenic origin, carotid stenosis (CA duplex 8/30/23 shows R carotid <50%, Left 50-69%), dehydration  :: somnolence s/p fall  :: s/p 1L LR bolus  - hypoglycemia protocol  - Admit to tele floor  -obtain orthostats  - encouraged hydration and oral intake  - follow up CT head to r/o acute traumatic brain injury     #DM2  :: A1c 8.5 Nov 2023  :: BG range 255-57 since arrival  :: Reports Lantus 24units daily at home. Denies lispro with meals. Unsure of metformin use.  - Started on Lantus 12U daily w/ mild SSI  - increased to 26 units daily 9/11 for better glucose control; increased to 30 units daily  - diabetic diet  - continue to monitor BG    #mild hypothermia  ::No cruz wave identified on ECG  -c/w External warming with pito hugger. Reach hypothermia target of 32-34ºC ASAP?maintain at hypothermia target for 24h     #HTN  #HFrEF  - /85 on arrival  - held home coreg 25mg in setting of bradycardia (possibly symptomatic)  - c/w Amlodipine  10mg every day, Hydralazine 100mg every day  - c/w spironolactone 25mg every day , Imdur 60mg every day   - No ACE or ARB's given hx renal artery stenosis, allergies     #HLD  #CAD  #PAD  - continue Atorvastatin, ASA  - c/w Plavix 75mg every day       F: prn   E: prn  N: diabetic diet  GI: pericolace, miralax  DVTppx: lovenox 40 bid, SCDs  Code status: Full code  NOK: Richard Khan () [phone]      Jg Escobedo, DO  Family Medicine PGY-2

## 2024-02-06 NOTE — ED PROCEDURE NOTE
Procedure  Critical Care    Performed by: Fabián Valverde MD  Authorized by: Fabián Valverde MD    Critical care provider statement:     Critical care time (minutes):  37    Critical care time was exclusive of:  Teaching time and separately billable procedures and treating other patients    Critical care was necessary to treat or prevent imminent or life-threatening deterioration of the following conditions:  CNS failure or compromise and endocrine crisis    Critical care was time spent personally by me on the following activities:  Development of treatment plan with patient or surrogate, ordering and performing treatments and interventions, ordering and review of laboratory studies, ordering and review of radiographic studies, re-evaluation of patient's condition, examination of patient, evaluation of patient's response to treatment and obtaining history from patient or surrogate               Fabián Valverde MD  02/05/24 2800

## 2024-02-06 NOTE — ED PROVIDER NOTES
HPI   Chief Complaint   Patient presents with    Hypoglycemia       63 y.o. female presenting with hx IDDM, HFrEF (50-55%), CAD, CABG (09/2023), HTN, PAD s/p L SFA stent, renal artery stenosis, carotid artery stenosis here as a critical activation in the setting of altered mental status in the setting of a blood glucose of 36.  Patient was initially obtunded on arrival for EMS, they started the patient on D10 and she immediately began improving her mental status, and was alert and oriented x 4.  Other than noting that she is cold, she denies any other symptoms including trauma, chest pain, shortness of breath, fevers or chills, recent urinary symptoms.  She does note that she did not eat prior to taking her long-acting insulin, and denies any other symptoms.  She was at home with  when he called for evaluation.      History provided by:  Patient and medical records   used: No                        Abrahan Coma Scale Score: 12                  Patient History   No past medical history on file.  No past surgical history on file.  No family history on file.  Social History     Tobacco Use    Smoking status: Never    Smokeless tobacco: Never   Substance Use Topics    Alcohol use: Not on file    Drug use: Not on file       Physical Exam   ED Triage Vitals [02/05/24 2004]   Temp Heart Rate Respirations BP   -- (!) 48 15 140/85      Pulse Ox Temp src Heart Rate Source Patient Position   100 % -- -- --      BP Location FiO2 (%)     -- --       Physical Exam  Constitutional:       General: She is not in acute distress.     Comments: Initially somnolent but improved with glucose administration   HENT:      Head: Normocephalic and atraumatic.      Right Ear: External ear normal.      Left Ear: External ear normal.      Nose: Nose normal.      Mouth/Throat:      Mouth: Mucous membranes are dry.      Pharynx: Oropharynx is clear.   Eyes:      Extraocular Movements: Extraocular movements intact.       Conjunctiva/sclera: Conjunctivae normal.      Pupils: Pupils are equal, round, and reactive to light.   Cardiovascular:      Rate and Rhythm: Regular rhythm. Bradycardia present.      Pulses: Normal pulses.      Heart sounds: Normal heart sounds.   Pulmonary:      Effort: Pulmonary effort is normal.      Breath sounds: Normal breath sounds.   Abdominal:      Palpations: Abdomen is soft.      Tenderness: There is no abdominal tenderness.   Musculoskeletal:         General: Normal range of motion.      Cervical back: Normal range of motion and neck supple.      Right lower leg: No edema.      Left lower leg: No edema.   Skin:     General: Skin is dry.      Capillary Refill: Capillary refill takes less than 2 seconds.      Comments: Called   Neurological:      General: No focal deficit present.      Mental Status: She is oriented to person, place, and time.   Psychiatric:         Mood and Affect: Mood normal.         Behavior: Behavior normal.         ED Course & MDM   Diagnoses as of 02/06/24 0003   Hypoglycemia       Medical Decision Making  64-year-old female here with hypoglycemia in the setting of taking long-acting insulin for her diabetes.  Presents bradycardic otherwise hemodynamically stable, no acute distress, after glucose administration improved mentation, hypothermic to 90 °F and saturating well on room air.  Physical exam notable for a dehydrated but otherwise well-appearing patient, no sequelae of sepsis or focal infection, no signs of trauma, or cardiopulmonary dysfunction and she has a nonischemic EKG.  After initial glucose resuscitation, she did dip back down into the 60s, was promptly given more dextrose, and started on p.o. dextrose as she was mentating better and able to tolerate p.o.  Her labs remarkable for normal white blood cell count, chest x-ray without signs of pneumonia or infection, I do believe that taking her insulin on an empty stomach is what prompted her symptoms, and given the  long-acting nature, she will need admission for continued monitoring, diabetes education, and warming.  While in the emergency department she was placed on Bri hugger, and improved from 90 to 93 °F.  Signed out in improving condition.    Amount and/or Complexity of Data Reviewed  External Data Reviewed: notes.  Labs: ordered.  Radiology: ordered and independent interpretation performed.  ECG/medicine tests: ordered and independent interpretation performed.     Details: sinus bradycardia with first-degree AV block, no ST changes, normal axis    Risk  Decision regarding hospitalization.        Procedure  Procedures     Gina Garza MD  Resident  02/06/24 0008

## 2024-02-06 NOTE — PROGRESS NOTES
I was consulted by provider to arrange transport for the patient to go home. I confirmed the patient's address. [facility] service arranged. Nurse notified.     Jordy Gomez RN

## 2024-02-06 NOTE — DISCHARGE SUMMARY
Discharge Diagnosis  Hypoglycemia  Cocaine Induced Hypoglycemia     Issues Requiring Follow-Up  - Diabetes management, Insulin Regimen   - Accidental or Intentional cocaine use     Test Results Pending At Discharge  Pending Labs       Order Current Status    Blood Gas Venous Full Panel In process    Urine Culture In process          Hospital Course  Myrna Khan is a 64 y.o. female with history of IDDM (A1c 8.5 Nov 2023), HFrEF (50-55%), CAD, CABG (09/2023), HTN, PAD s/p L SFA stent, renal artery stenosis, carotid artery stenosis presenting with hypoglycemia after passing out at home. Per EMS, initial BG was 33. On arrival to the ED, pt was hypothermic and had a  s/p D10 bolus. Na was 130 and EKG showed sinus bradycardia with known 1st degree heart block but initial workup was otherwise negative for infectious, cardiogenic, hemodynamic or ischemic origin of presentation. Pt was given a 1L LR bolus + IV D50 x1 and placed under a bear hugger. BG's normalized and body temperature returned to normal range. Urine drug screen was added on and showed cocaine in pt's system; pt denies cocaine use but endorses purchasing two individual cigarettes from a gentleman in her building yesterday for $4 a piece which she smoked yesterday afternoon. Pt has never purchased cigarettes from this individual before yesterday and pt believes it is plausible that they were laced with cocaine. Pt remained afebrile, HDS and with appropriate BG's on 12u Lantus + SSI (3 and 4 units for breakfast and lunch respectively) which correlates with pt's home insulin regimen of Lantus 24u daily. Pt is stable for discharge and for outpt fu with primary and endocrinology/diabetes education.     Pertinent Physical Exam At Time of Discharge  Physical Exam  Vitals reviewed.   Constitutional:       General: She is not in acute distress.     Appearance: Normal appearance. She is not ill-appearing.   HENT:      Head: Normocephalic and atraumatic.       Nose: Nose normal.   Eyes:      Extraocular Movements: Extraocular movements intact.      Conjunctiva/sclera: Conjunctivae normal.   Cardiovascular:      Rate and Rhythm: Normal rate and regular rhythm.      Heart sounds: Normal heart sounds.   Pulmonary:      Effort: Pulmonary effort is normal.      Breath sounds: Normal breath sounds.   Abdominal:      General: Abdomen is flat. There is no distension.      Palpations: Abdomen is soft.      Tenderness: There is no abdominal tenderness.   Musculoskeletal:         General: Normal range of motion.      Cervical back: Normal range of motion.      Right lower leg: No edema.      Left lower leg: No edema.   Skin:     General: Skin is warm and dry.   Neurological:      General: No focal deficit present.      Mental Status: She is alert and oriented to person, place, and time.      Cranial Nerves: No cranial nerve deficit.      Motor: No weakness.      Gait: Gait normal.   Psychiatric:         Mood and Affect: Mood normal.         Behavior: Behavior normal.       Home Medications     Medication List      CONTINUE taking these medications     Accu-Chek Jennifer Plus test strp strip; Generic drug: blood sugar   diagnostic; Use 1 strip to check blood sugar 3 times a day with meals.   Accu-Chek Guide Glucose Meter misc; Generic drug: blood-glucose meter;   Use daily or as directed for monitoring of diabetes.   Accu-Chek Softclix Lancets misc; Generic drug: lancets; Use three times   a day to test blood sugar w/ meals   acetaminophen 500 mg tablet; Commonly known as: Tylenol; Take 1 tablet   (500 mg) by mouth every 6 hours if needed for mild pain (1 - 3) or   moderate pain (4 - 6). Take per directed   alcohol swabs pads, medicated   amLODIPine 10 mg tablet; Commonly known as: Norvasc; TAKE 1 TABLET BY   MOUTH ONCE DAILY   aspirin 81 mg chewable tablet; CHEW 1 TABLET BY MOUTH ONCE DAILY   atorvastatin 80 mg tablet; Commonly known as: Lipitor; TAKE 1 TABLET BY   MOUTH ONCE DAILY   *  "BD Ultra-Fine Micro Pen Needle 32 gauge x 1/4\" needle; Generic drug:   pen needle, diabetic   * BD Ultra-Fine Mini Pen Needle 31 gauge x 3/16\" needle; Generic drug:   pen needle, diabetic; USE THREE TIMES A DAY   carvedilol 25 mg tablet; Commonly known as: Coreg; TAKE 1 TABLET BY   MOUTH TWO TIMES A DAY   clopidogrel 75 mg tablet; Commonly known as: Plavix; TAKE 1 TABLET BY   MOUTH ONCE DAILY   hydrALAZINE 100 mg tablet; Commonly known as: Apresoline; Take 1 tablet   (100 mg) by mouth once daily.   insulin aspart 100 unit/mL (3 mL) pen; Commonly known as: NovoLOG;   Inject 10 Units under the skin 3 times a day before meals. Take as   directed per insulin instructions.   isosorbide mononitrate ER 60 mg 24 hr tablet; Commonly known as: Imdur;   TAKE 1 TABLET BY MOUTH ONCE DAILY   Lantus Solostar U-100 Insulin 100 unit/mL (3 mL) pen; Generic drug:   insulin glargine; Inject 24 Units under the skin once daily. Take per   directed   metFORMIN  mg 24 hr tablet; Commonly known as: Glucophage-XR; TAKE   2 TABLETS BY MOUTH ONCE DAILY   multivitamin with minerals tablet   polyethylene glycol 17 gram packet; Commonly known as: Glycolax, Miralax   sennosides 8.6 mg tablet; Commonly known as: Senokot  * This list has 2 medication(s) that are the same as other medications   prescribed for you. Read the directions carefully, and ask your doctor or   other care provider to review them with you.     Outpatient Follow-Up  No future appointments.  FU w/ PCP and Endocrinology ordered    Chema Pino, DO  PGY-1 Family Medicine   "

## 2024-02-06 NOTE — CONSULTS
"Inpatient Diabetes Education Consult    Reason for Visit:  Myrna Khan is a 64 y.o. female who presents for hypoglycemia; IDDM with hx CAD, CABG, HN, PAD.    Consulting Service/Provider: Dr. SARINA Mims    Visit Type: Initial visit    Visit Modality: In-person    Discharge Equipment/Supply Needs:       Patient has supplies at home: Blood glucose meter:  , Testing strips:  , Lancets, Pen needles, and Lantus insulin pens    Patient History and Assessment:  New diagnosis:  n/a  Previous diagnosis: Type 2  Patient known to Diabetes Education department: No  Treatment prior to hospital admission: Insulin: Long acting, via pen  Blood glucose testing: Does not routinely test  Complications: cardiovascular disease, CAD s/p CABG, and peripheral vascular disease  PTA Medications:    Current Outpatient Medications   Medication Instructions    acetaminophen (TYLENOL) 500 mg, oral, Every 6 hours PRN, Take per directed    alcohol swabs pads, medicated <BR>Take per directed    amLODIPine (Norvasc) 10 mg tablet TAKE 1 TABLET BY MOUTH ONCE DAILY    aspirin 81 mg chewable tablet CHEW 1 TABLET BY MOUTH ONCE DAILY    atorvastatin (Lipitor) 80 mg tablet TAKE 1 TABLET BY MOUTH ONCE DAILY    BD Ultra-Fine Micro Pen Needle 32 gauge x 1/4\" needle Take per directed    blood sugar diagnostic strip Use 1 strip to check blood sugar 3 times a day with meals.    blood-glucose meter misc Use daily or as directed for monitoring of diabetes.    carvedilol (Coreg) 25 mg tablet TAKE 1 TABLET BY MOUTH TWO TIMES A DAY    clopidogrel (Plavix) 75 mg tablet TAKE 1 TABLET BY MOUTH ONCE DAILY    hydrALAZINE (APRESOLINE) 100 mg, oral, Daily    insulin aspart (NOVOLOG) 10 Units, subcutaneous, 3 times daily before meals, Take as directed per insulin instructions.    isosorbide mononitrate ER (Imdur) 60 mg 24 hr tablet TAKE 1 TABLET BY MOUTH ONCE DAILY    lancets misc Use three times a day to test blood sugar w/ meals    Lantus Solostar U-100 Insulin 24 " "Units, subcutaneous, Daily, Take per directed    metFORMIN XR (Glucophage-XR) 500 mg 24 hr tablet TAKE 2 TABLETS BY MOUTH ONCE DAILY    multivitamin with minerals tablet 1 tablet, oral, Daily    pen needle, diabetic 31 gauge x 3/16\" needle USE THREE TIMES A DAY    polyethylene glycol (GLYCOLAX, MIRALAX) 17 g, oral, Daily PRN    sennosides (Senokot) 8.6 mg tablet 2 tablets, oral, 2 times daily PRN       Glucose   Date/Time Value Ref Range Status   02/06/2024 06:15  (H) 74 - 99 mg/dL Final   02/05/2024 08:13  (H) 74 - 99 mg/dL Final   01/16/2024 06:54  (H) 74 - 99 mg/dL Final   01/15/2024 06:24  (H) 74 - 99 mg/dL Final   01/14/2024 08:54  (H) 74 - 99 mg/dL Final   01/12/2024 06:17  (H) 74 - 99 mg/dL Final   01/11/2024 08:19  (H) 74 - 99 mg/dL Final   01/11/2024 03:37 PM 1,116 (HH) 74 - 99 mg/dL Final   09/13/2023 10:00  (H) 74 - 99 mg/dL Final   09/12/2023 07:11  (H) 74 - 99 mg/dL Final   09/11/2023 08:43  (H) 74 - 99 mg/dL Final   09/10/2023 08:22  (H) 74 - 99 mg/dL Final   09/09/2023 05:41  (H) 74 - 99 mg/dL Final   09/08/2023 07:00  (H) 74 - 99 mg/dL Final   09/07/2023 07:36  (H) 74 - 99 mg/dL Final   09/06/2023 07:00 PM CANCELED       Comment:     Result canceled by the ancillary.   09/06/2023 12:47  (H) 74 - 99 mg/dL Final   09/06/2023 07:41 AM CANCELED       Comment:     Result canceled by the ancillary.     No results found for: \"CPEPTIDE\"  Hemoglobin A1C   Date Value Ref Range Status   11/15/2023 8.5 (H) 4.3 - 5.6 % Final     Comment:     American Diabetes Association guidelines indicate that patients with HgbA1c in the range 5.7-6.4% are at increased risk for development of diabetes, and intervention by lifestyle modification may be beneficial. HgbA1c greater or equal to 6.5% is considered diagnostic of diabetes.   08/27/2023 7.8 (A) % Final     Comment:          Diagnosis of Diabetes-Adults   Non-Diabetic: < or = 5.6%   " Increased risk for developing diabetes: 5.7-6.4%   Diagnostic of diabetes: > or = 6.5%  .       Monitoring of Diabetes                Age (y)     Therapeutic Goal (%)   Adults:          >18           <7.0   Pediatrics:    13-18           <7.5                   7-12           <8.0                   0- 6            7.5-8.5   American Diabetes Association. Diabetes Care 33(S1), Jan 2010.     07/03/2021 14.4 (H) 4.3 - 5.6 % Final     Comment:     American Diabetes Association guidelines indicate that patients with HgbA1c in   the range 5.7-6.4% are at increased risk for development of diabetes, and   intervention by lifestyle modification may be beneficial. HgbA1c greater or   equal to 6.5% is considered diagnostic of diabetes.   08/14/2020 11.4 (H) 4.3 - 5.6 % Final     Comment:     American Diabetes Association guidelines indicate that patients with HgbA1c in   the range 5.7-6.4% are at increased risk for development of diabetes, and   intervention by lifestyle modification may be beneficial. HgbA1c greater or   equal to 6.5% is considered diagnostic of diabetes.     POCT Hemoglobin A1C   Date Value Ref Range Status   12/09/2021 6.9 (A) 4.2 - 5.6 % Final     Comment:     Location:[facility], [address] [place] [address], [phone]  Point of care (POC) Hemoglobin A1c (HGBA1C) testing is intended to assess  glucose control and provide a management tool for patients known to have  diabetes and their healthcare providers.  Target HGBA1C levels may depend on  specific clinical circumstances.  POC HGBA1C is not intended for use as a  diagnostic or screening test; laboratory-based testing should be used for  diagnostic purposes.  The following information is supplemental and may not  be applicable to specific diabetes management situations:  The POC device   provides a normal range of 4.2% to 6.5% for the HGBA1C POC test.  However, the American Diabetes Association  guidelines  "indicate that  patients with HGBA1C in the range of 5.7% to 6.4% are at increased risk for  development of diabetes and that intervention by lifestyle modification may  be beneficial.  A HGBA1C level greater than or equal to 6.5% is considered  diagnostic of diabetes, pending confirmatory testing.  Use of HGBA1C testing  to evaluate glucose control may not be appropriate for patients with  hemoglobin variants or other conditions (e.g. anemia) that alter red blood  cell lifespan.       Patient Learning/Readiness Assessment:  Ms. Khan is agreeable to diabetes education.      Interventions/Topics Covered:  See After Visit Summary for handouts/information sheets provided to patient.  Education Documentation  No documentation found.        Additional topics covered: We reviewed recent BG trends. She states that prior to admission she had purchased 2 individual cigarettes and after smoking one of them she became ill with sweats, hunger, and dizziness.  States she fell off her couch and spouse found her twitching.  She believes \"those smokes must have been laced with cocaine\"  At time of EMS arrival, her BG was < 40 mg/dL. She monitors BG a few times/week.  Recommended to check BG 1-2 x day.  States she is waiting to get a CGM from her provider at [facility].  She follows with the DM educator there.  We reviewed her insulin regimen and she is comfortable with the insulin pen.  She understands lower dose of Lantus while in hospital.     We reviewed hypoglycemia management.  Recommended she monitor BG whenever she does not feel well or feels that \"something is off\" as it may indicate it is her BG.  She understands this point.  Additional materials provided: n/a    CGM:  To have this set up with her endocrinology provider at [facility].    Education Outcome/Recommendations:        Recommendations for bedside nursing: Allow patient to self-inject insulin (supervised)    Recommendations for Providers: Follow-up w/ PCP " and/or Endocrinology    Additional Comments: Ms. Khan states she has had DM for many years.  States she is comfortable with her regimen.  She is agreeable to further visits.  Time spent:  30 mins.

## 2024-02-06 NOTE — ED TRIAGE NOTES
Pt presents to the ED as a critical pt. Pt brought in by [facility] as an unresponsive pt. Per Ems upon arrival glucose was 36. D10 IV drip was started and pt was brought to ED. Intial sugar upon arrival to ED is 136 and pt is fatigued but alert.
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
## 2024-02-08 ENCOUNTER — APPOINTMENT (OUTPATIENT)
Dept: RADIOLOGY | Facility: HOSPITAL | Age: 64
End: 2024-02-08
Payer: COMMERCIAL

## 2024-02-08 ENCOUNTER — HOSPITAL ENCOUNTER (EMERGENCY)
Facility: HOSPITAL | Age: 64
Discharge: HOME | End: 2024-02-08
Attending: EMERGENCY MEDICINE
Payer: COMMERCIAL

## 2024-02-08 VITALS
TEMPERATURE: 98.3 F | HEART RATE: 79 BPM | OXYGEN SATURATION: 100 % | RESPIRATION RATE: 16 BRPM | SYSTOLIC BLOOD PRESSURE: 138 MMHG | DIASTOLIC BLOOD PRESSURE: 77 MMHG

## 2024-02-08 DIAGNOSIS — H05.232 PERIORBITAL HEMATOMA OF LEFT EYE: ICD-10-CM
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
## 2024-02-08 LAB
ALBUMIN SERPL BCP-MCNC: 3.4 G/DL (ref 3.4–5)
ALP SERPL-CCNC: 106 U/L (ref 33–136)
ALT SERPL W P-5'-P-CCNC: 33 U/L (ref 7–45)
ANION GAP BLDV CALCULATED.4IONS-SCNC: 14 MMOL/L (ref 10–25)
ANION GAP SERPL CALC-SCNC: 14 MMOL/L (ref 10–20)
AST SERPL W P-5'-P-CCNC: 40 U/L (ref 9–39)
BASE EXCESS BLDV CALC-SCNC: -1.4 MMOL/L (ref -2–3)
BASOPHILS # BLD MANUAL: 0.06 X10*3/UL (ref 0–0.1)
BASOPHILS NFR BLD MANUAL: 0.9 %
BILIRUB SERPL-MCNC: 0.5 MG/DL (ref 0–1.2)
BODY TEMPERATURE: 37 DEGREES CELSIUS
BUN SERPL-MCNC: 10 MG/DL (ref 6–23)
BURR CELLS BLD QL SMEAR: NORMAL
CA-I BLDV-SCNC: 1.16 MMOL/L (ref 1.1–1.33)
CALCIUM SERPL-MCNC: 8.9 MG/DL (ref 8.6–10.6)
CHLORIDE BLDV-SCNC: 101 MMOL/L (ref 98–107)
CHLORIDE SERPL-SCNC: 102 MMOL/L (ref 98–107)
CO2 SERPL-SCNC: 23 MMOL/L (ref 21–32)
CREAT SERPL-MCNC: 0.6 MG/DL (ref 0.5–1.05)
DACRYOCYTES BLD QL SMEAR: NORMAL
EGFRCR SERPLBLD CKD-EPI 2021: >90 ML/MIN/1.73M*2
EOSINOPHIL # BLD MANUAL: 0 X10*3/UL (ref 0–0.7)
EOSINOPHIL NFR BLD MANUAL: 0 %
ERYTHROCYTE [DISTWIDTH] IN BLOOD BY AUTOMATED COUNT: 12.6 % (ref 11.5–14.5)
GLUCOSE BLDV-MCNC: 367 MG/DL (ref 74–99)
GLUCOSE SERPL-MCNC: 344 MG/DL (ref 74–99)
HCO3 BLDV-SCNC: 22.8 MMOL/L (ref 22–26)
HCT VFR BLD AUTO: 39 % (ref 36–46)
HCT VFR BLD EST: 44 % (ref 36–46)
HGB BLD-MCNC: 14.8 G/DL (ref 12–16)
HGB BLDV-MCNC: 14.5 G/DL (ref 12–16)
HOLD SPECIMEN: NORMAL
HOLD SPECIMEN: NORMAL
IMM GRANULOCYTES # BLD AUTO: 0.02 X10*3/UL (ref 0–0.7)
IMM GRANULOCYTES NFR BLD AUTO: 0.3 % (ref 0–0.9)
LACTATE BLDV-SCNC: 2.7 MMOL/L (ref 0.4–2)
LYMPHOCYTES # BLD MANUAL: 2.46 X10*3/UL (ref 1.2–4.8)
LYMPHOCYTES NFR BLD MANUAL: 35.6 %
MCH RBC QN AUTO: 33.8 PG (ref 26–34)
MCHC RBC AUTO-ENTMCNC: 37.9 G/DL (ref 32–36)
MCV RBC AUTO: 89 FL (ref 80–100)
MONOCYTES # BLD MANUAL: 0.24 X10*3/UL (ref 0.1–1)
MONOCYTES NFR BLD MANUAL: 3.5 %
NEUTS SEG # BLD MANUAL: 4.14 X10*3/UL (ref 1.2–7)
NEUTS SEG NFR BLD MANUAL: 60 %
NRBC BLD-RTO: 0 /100 WBCS (ref 0–0)
OXYHGB MFR BLDV: 75.1 % (ref 45–75)
PCO2 BLDV: 36 MM HG (ref 41–51)
PH BLDV: 7.41 PH (ref 7.33–7.43)
PLATELET # BLD AUTO: 182 X10*3/UL (ref 150–450)
PO2 BLDV: 52 MM HG (ref 35–45)
POLYCHROMASIA BLD QL SMEAR: NORMAL
POTASSIUM BLDV-SCNC: 3.5 MMOL/L (ref 3.5–5.3)
POTASSIUM SERPL-SCNC: 3.2 MMOL/L (ref 3.5–5.3)
PROT SERPL-MCNC: 6.9 G/DL (ref 6.4–8.2)
RBC # BLD AUTO: 4.38 X10*6/UL (ref 4–5.2)
RBC MORPH BLD: NORMAL
SAO2 % BLDV: 80 % (ref 45–75)
SCHISTOCYTES BLD QL SMEAR: NORMAL
SODIUM BLDV-SCNC: 134 MMOL/L (ref 136–145)
SODIUM SERPL-SCNC: 136 MMOL/L (ref 136–145)
STOMATOCYTES BLD QL SMEAR: NORMAL
TARGETS BLD QL SMEAR: NORMAL
TOTAL CELLS COUNTED BLD: 115
WBC # BLD AUTO: 6.9 X10*3/UL (ref 4.4–11.3)

## 2024-02-08 PROCEDURE — 2500000004 HC RX 250 GENERAL PHARMACY W/ HCPCS (ALT 636 FOR OP/ED): Mod: SE | Performed by: PHYSICIAN ASSISTANT

## 2024-02-08 PROCEDURE — 99285 EMERGENCY DEPT VISIT HI MDM: CPT | Performed by: EMERGENCY MEDICINE

## 2024-02-08 PROCEDURE — 2500000002 HC RX 250 W HCPCS SELF ADMINISTERED DRUGS (ALT 637 FOR MEDICARE OP, ALT 636 FOR OP/ED): Mod: SE | Performed by: PHYSICIAN ASSISTANT

## 2024-02-08 PROCEDURE — 73030 X-RAY EXAM OF SHOULDER: CPT | Mod: LT,FR

## 2024-02-08 PROCEDURE — 76377 3D RENDER W/INTRP POSTPROCES: CPT

## 2024-02-08 PROCEDURE — 84132 ASSAY OF SERUM POTASSIUM: CPT | Mod: 59 | Performed by: PHYSICIAN ASSISTANT

## 2024-02-08 PROCEDURE — 36415 COLL VENOUS BLD VENIPUNCTURE: CPT | Performed by: PHYSICIAN ASSISTANT

## 2024-02-08 PROCEDURE — 72125 CT NECK SPINE W/O DYE: CPT

## 2024-02-08 PROCEDURE — 73502 X-RAY EXAM HIP UNI 2-3 VIEWS: CPT | Mod: LEFT SIDE | Performed by: RADIOLOGY

## 2024-02-08 PROCEDURE — 73030 X-RAY EXAM OF SHOULDER: CPT | Mod: LEFT SIDE | Performed by: RADIOLOGY

## 2024-02-08 PROCEDURE — 99285 EMERGENCY DEPT VISIT HI MDM: CPT | Performed by: PHYSICIAN ASSISTANT

## 2024-02-08 PROCEDURE — 70450 CT HEAD/BRAIN W/O DYE: CPT

## 2024-02-08 PROCEDURE — 85007 BL SMEAR W/DIFF WBC COUNT: CPT | Performed by: PHYSICIAN ASSISTANT

## 2024-02-08 PROCEDURE — 72125 CT NECK SPINE W/O DYE: CPT | Performed by: RADIOLOGY

## 2024-02-08 PROCEDURE — 96360 HYDRATION IV INFUSION INIT: CPT

## 2024-02-08 PROCEDURE — 73502 X-RAY EXAM HIP UNI 2-3 VIEWS: CPT | Mod: LT

## 2024-02-08 PROCEDURE — 84132 ASSAY OF SERUM POTASSIUM: CPT

## 2024-02-08 PROCEDURE — 70486 CT MAXILLOFACIAL W/O DYE: CPT

## 2024-02-08 PROCEDURE — 71046 X-RAY EXAM CHEST 2 VIEWS: CPT

## 2024-02-08 PROCEDURE — 85027 COMPLETE CBC AUTOMATED: CPT | Performed by: PHYSICIAN ASSISTANT

## 2024-02-08 PROCEDURE — 71046 X-RAY EXAM CHEST 2 VIEWS: CPT | Mod: FOREIGN READ | Performed by: RADIOLOGY

## 2024-02-08 RX ORDER — ACETAMINOPHEN 500 MG
500 TABLET ORAL EVERY 6 HOURS PRN
Qty: 30 TABLET | Refills: 0 | Status: SHIPPED | OUTPATIENT
Start: 2024-02-08 | End: 2024-02-08 | Stop reason: SDUPTHER

## 2024-02-08 RX ORDER — ACETAMINOPHEN 500 MG
500 TABLET ORAL EVERY 6 HOURS PRN
Qty: 30 TABLET | Refills: 0 | Status: SHIPPED | OUTPATIENT
Start: 2024-02-08

## 2024-02-08 RX ORDER — ACETAMINOPHEN 325 MG/1
975 TABLET ORAL ONCE
Status: COMPLETED | OUTPATIENT
Start: 2024-02-08 | End: 2024-02-08

## 2024-02-08 RX ORDER — METHOCARBAMOL 750 MG/1
1500 TABLET, FILM COATED ORAL 3 TIMES DAILY PRN
Qty: 21 TABLET | Refills: 0 | Status: SHIPPED | OUTPATIENT
Start: 2024-02-08

## 2024-02-08 RX ORDER — INSULIN GLARGINE 100 [IU]/ML
24 INJECTION, SOLUTION SUBCUTANEOUS ONCE
Status: COMPLETED | OUTPATIENT
Start: 2024-02-08 | End: 2024-02-08

## 2024-02-08 RX ORDER — METHOCARBAMOL 750 MG/1
1500 TABLET, FILM COATED ORAL 3 TIMES DAILY PRN
Qty: 21 TABLET | Refills: 0 | Status: SHIPPED | OUTPATIENT
Start: 2024-02-08 | End: 2024-02-08 | Stop reason: SDUPTHER

## 2024-02-08 RX ORDER — POTASSIUM CHLORIDE 1.5 G/1.58G
40 POWDER, FOR SOLUTION ORAL ONCE
Status: DISCONTINUED | OUTPATIENT
Start: 2024-02-08 | End: 2024-02-08 | Stop reason: HOSPADM

## 2024-02-08 RX ADMIN — INSULIN GLARGINE 24 UNITS: 100 INJECTION, SOLUTION SUBCUTANEOUS at 14:11

## 2024-02-08 RX ADMIN — SODIUM CHLORIDE, POTASSIUM CHLORIDE, SODIUM LACTATE AND CALCIUM CHLORIDE 1000 ML: 600; 310; 30; 20 INJECTION, SOLUTION INTRAVENOUS at 14:12

## 2024-02-08 RX ADMIN — ACETAMINOPHEN 975 MG: 325 TABLET ORAL at 14:12

## 2024-02-08 ASSESSMENT — COLUMBIA-SUICIDE SEVERITY RATING SCALE - C-SSRS
2. HAVE YOU ACTUALLY HAD ANY THOUGHTS OF KILLING YOURSELF?: NO
1. IN THE PAST MONTH, HAVE YOU WISHED YOU WERE DEAD OR WISHED YOU COULD GO TO SLEEP AND NOT WAKE UP?: NO
6. HAVE YOU EVER DONE ANYTHING, STARTED TO DO ANYTHING, OR PREPARED TO DO ANYTHING TO END YOUR LIFE?: NO
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
## 2024-02-08 NOTE — PROGRESS NOTES
"Myrna Khan is a 64 y.o. female on day 0 of admission presenting with No Principal Problem: There is no principal problem currently on the Problem List. Please update the Problem List and refresh..    Sw met with pt bedside following a consult for prescription and transportation barriers. Pt reports she does not have transportation to  her medication and her \"legs, aren't working well\". Pt reports [facility] [address] in [place]. Sw contacted [facility] provide a ride and set up transportation from [facility] ED to [facility] and then to the pts home at [address].  Pts ride is scheduled to pick her up between 6-8pm. Sw verified pts phone number for contact with the provide a ride .   MARA Crockett  "

## 2024-02-22 NOTE — DISCHARGE SUMMARY
Discharge Diagnosis  Hyperosmolar hyperglycemic state (HHS) (CMS/[facility])    Issues Requiring Follow-Up  N/A    Test Results Pending At Discharge  N/A    Hospital Course    HHS  IDDM   - Insulin gtt, fluids per ICU HHS protocol, glucose rapidly improving will monitor POCT glucose off gtt and back on home lantus, SSI   - replete electrolytes  - Cont. Insuline regimen on discharge     AMS 2/2 HHS, resolved  - mental status improving, A&Ox2-3 on arrival to ICU (intermittent confused on year, knows month)   - CT Head (1/11) no acute changes   - ctm neuro status      HFrEF  CAD  HTN  HLD  PAD   - c/f syncopal episode, EKG sinus rhythm, borderline 1st degree block, remaining intervals wnl, TWI lateral leads, no STEMI    - repeat echo during hospitalization   - IRENE (09/2023): EF 50-55%, LA moderate/severely dilated, trace AR, MR   - restart home PO meds amlodipine, asa, atorvastatin, carvedilol, clopidogrel, hydralazine, spironolactone      Pseudohyponatremia 2/2 hyperglycemia  - corrected Na wnl      UTI  - Ceftriaxone regimen complete    Pertinent Physical Exam At Time of Discharge    GENERAL APPEARANCE: A&Ox3, appears in no acute distress  HEAD: normocephalic, atraumatic  THROAT: Oral cavity and pharynx normal. No inflammation, swelling, exudate, or lesions.  NECK: Neck supple, non-tender without lymphadenopathy, masses or thyromegaly.  CARDIAC: Normal S1 and S2. No S3, S4 or murmurs. Rhythm is regular. There is no peripheral edema, cyanosis or pallor. Extremities are warm and well perfused. No carotid bruits.  LUNGS: Clear to auscultation bilaterally without rales, rhonchi, wheezing or diminished breath sounds.  ABDOMEN: Positive bowel sounds. Soft, nondistended, nontender. No guarding or rebound. No masses.  EXTREMITIES: No significant deformity or joint abnormality. No edema. Peripheral pulses intact. No varicosities.  SKIN: Skin normal color, texture and turgor with no lesions or rash  PSYCHIATRIC: oriented to person,  "place, and time, good judgement and reason, without hallucinations, abnormal affect or abnormal behaviors during the examination. Patient is not suicidal.        Home Medications     Medication List      START taking these medications     Accu-Chek Jennifer Plus test strp strip; Generic drug: blood sugar   diagnostic; Use 1 strip to check blood sugar 3 times a day with meals.   Accu-Chek Guide Glucose Meter misc; Generic drug: blood-glucose meter;   Use daily or as directed for monitoring of diabetes.   Accu-Chek Softclix Lancets misc; Generic drug: lancets; Use three times   a day to test blood sugar w/ meals     CHANGE how you take these medications     acetaminophen 500 mg tablet; Commonly known as: Tylenol; Take 1 tablet   (500 mg) by mouth every 6 hours if needed for mild pain (1 - 3) or   moderate pain (4 - 6). Take per directed; What changed: how much to take,   how to take this, when to take this, reasons to take this   hydrALAZINE 100 mg tablet; Commonly known as: Apresoline; Take 1 tablet   (100 mg) by mouth once daily.; What changed: when to take this   insulin aspart 100 unit/mL (3 mL) pen; Commonly known as: NovoLOG;   Inject 10 Units under the skin 3 times a day before meals. Take as   directed per insulin instructions.; What changed: how much to take, how to   take this, when to take this, additional instructions   Lantus Solostar U-100 Insulin 100 unit/mL (3 mL) pen; Generic drug:   insulin glargine; Inject 24 Units under the skin once daily. Take per   directed; What changed: how much to take     CONTINUE taking these medications     alcohol swabs pads, medicated   amLODIPine 10 mg tablet; Commonly known as: Norvasc; TAKE 1 TABLET BY   MOUTH ONCE DAILY   aspirin 81 mg chewable tablet; CHEW 1 TABLET BY MOUTH ONCE DAILY   atorvastatin 80 mg tablet; Commonly known as: Lipitor; TAKE 1 TABLET BY   MOUTH ONCE DAILY   * BD Ultra-Fine Micro Pen Needle 32 gauge x 1/4\" needle; Generic drug:   pen needle, " "diabetic   * BD Ultra-Fine Mini Pen Needle 31 gauge x 3/16\" needle; Generic drug:   pen needle, diabetic; USE THREE TIMES A DAY   carvedilol 25 mg tablet; Commonly known as: Coreg; TAKE 1 TABLET BY   MOUTH TWO TIMES A DAY   clopidogrel 75 mg tablet; Commonly known as: Plavix; TAKE 1 TABLET BY   MOUTH ONCE DAILY   isosorbide mononitrate ER 60 mg 24 hr tablet; Commonly known as: Imdur;   TAKE 1 TABLET BY MOUTH ONCE DAILY   metFORMIN  mg 24 hr tablet; Commonly known as: Glucophage-XR; TAKE   2 TABLETS BY MOUTH ONCE DAILY   multivitamin with minerals tablet   polyethylene glycol 17 gram packet; Commonly known as: Glycolax, Miralax   sennosides 8.6 mg tablet; Commonly known as: Senokot  * This list has 2 medication(s) that are the same as other medications   prescribed for you. Read the directions carefully, and ask your doctor or   other care provider to review them with you.     STOP taking these medications     Entresto 49-51 mg tablet; Generic drug: sacubitriL-valsartan   Farxiga 10 mg; Generic drug: dapagliflozin propanediol   Ferrex 150 150 mg iron capsule; Generic drug: iron polysaccharides   furosemide 20 mg tablet; Commonly known as: Lasix   hydroCHLOROthiazide 50 mg tablet; Commonly known as: HYDRODiuril   nicotine 7 mg/24 hr patch; Commonly known as: Nicoderm CQ   oxyCODONE-acetaminophen 5-325 mg tablet; Commonly known as: Percocet   spironolactone 25 mg tablet; Commonly known as: Aldactone   traMADol 50 mg tablet; Commonly known as: Ultram   Vitamin D3 25 MCG (1000 UT) capsule; Generic drug: cholecalciferol       Outpatient Follow-Up  Follow-up with PCP    Nils Phan MD  "

## 2024-07-15 ENCOUNTER — HOSPITAL ENCOUNTER (INPATIENT)
Facility: HOSPITAL | Age: 64
DRG: 312 | End: 2024-07-15
Attending: EMERGENCY MEDICINE | Admitting: STUDENT IN AN ORGANIZED HEALTH CARE EDUCATION/TRAINING PROGRAM
Payer: COMMERCIAL

## 2024-07-15 ENCOUNTER — CLINICAL SUPPORT (OUTPATIENT)
Dept: EMERGENCY MEDICINE | Facility: HOSPITAL | Age: 64
DRG: 312 | End: 2024-07-15
Payer: COMMERCIAL

## 2024-07-15 ENCOUNTER — APPOINTMENT (OUTPATIENT)
Dept: RADIOLOGY | Facility: HOSPITAL | Age: 64
DRG: 312 | End: 2024-07-15
Payer: COMMERCIAL

## 2024-07-15 DIAGNOSIS — A59.01 TRICHOMONAS VAGINALIS (TV) INFECTION: ICD-10-CM
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
## 2024-07-15 DIAGNOSIS — R55 CARDIAC SYNCOPE: ICD-10-CM

## 2024-07-15 DIAGNOSIS — I73.89 OTHER SPECIFIED PERIPHERAL VASCULAR DISEASES (CMS-HCC): ICD-10-CM

## 2024-07-15 DIAGNOSIS — I10 HYPERTENSION, UNSPECIFIED TYPE: ICD-10-CM

## 2024-07-15 DIAGNOSIS — I70.202 FEMORAL ARTERY OCCLUSION, LEFT (CMS-HCC): ICD-10-CM

## 2024-07-15 DIAGNOSIS — E11.610 TYPE 2 DIABETES MELLITUS WITH DIABETIC NEUROPATHIC ARTHROPATHY, WITH LONG-TERM CURRENT USE OF INSULIN (MULTI): ICD-10-CM

## 2024-07-15 DIAGNOSIS — R07.9 CHEST PAIN, UNSPECIFIED TYPE: ICD-10-CM

## 2024-07-15 DIAGNOSIS — Z79.4 TYPE 2 DIABETES MELLITUS WITH DIABETIC NEUROPATHIC ARTHROPATHY, WITH LONG-TERM CURRENT USE OF INSULIN (MULTI): ICD-10-CM

## 2024-07-15 DIAGNOSIS — E83.42 HYPOMAGNESEMIA: ICD-10-CM

## 2024-07-15 DIAGNOSIS — M79.605 LEFT LEG PAIN: ICD-10-CM

## 2024-07-15 DIAGNOSIS — I73.9 PERIPHERAL ARTERIAL DISEASE (CMS-HCC): ICD-10-CM

## 2024-07-15 DIAGNOSIS — I20.89 ANGINA AT REST (CMS-HCC): ICD-10-CM

## 2024-07-15 DIAGNOSIS — R55 SYNCOPE AND COLLAPSE: ICD-10-CM

## 2024-07-15 DIAGNOSIS — R73.9 HYPERGLYCEMIA: ICD-10-CM

## 2024-07-15 DIAGNOSIS — I73.9 PAD (PERIPHERAL ARTERY DISEASE) (CMS-HCC): ICD-10-CM

## 2024-07-15 PROBLEM — Z95.1 S/P CABG (CORONARY ARTERY BYPASS GRAFT): Status: ACTIVE | Noted: 2024-07-15

## 2024-07-15 PROBLEM — T82.855A CORONARY STENT RESTENOSIS: Status: ACTIVE | Noted: 2024-07-15

## 2024-07-15 PROBLEM — I16.0 HYPERTENSIVE URGENCY: Status: ACTIVE | Noted: 2024-07-15

## 2024-07-15 PROBLEM — E11.65 HYPERGLYCEMIA DUE TO DIABETES MELLITUS (MULTI): Status: ACTIVE | Noted: 2024-07-15

## 2024-07-15 PROBLEM — I65.23 BILATERAL CAROTID ARTERY STENOSIS: Status: ACTIVE | Noted: 2023-11-15

## 2024-07-15 PROBLEM — R53.1 WEAKNESS: Status: ACTIVE | Noted: 2024-07-15

## 2024-07-15 PROBLEM — E87.6 HYPOKALEMIA: Status: ACTIVE | Noted: 2024-07-15

## 2024-07-15 LAB
ALBUMIN SERPL BCP-MCNC: 3.3 G/DL (ref 3.4–5)
ALP SERPL-CCNC: 82 U/L (ref 33–136)
ALT SERPL W P-5'-P-CCNC: 30 U/L (ref 7–45)
ANION GAP BLDV CALCULATED.4IONS-SCNC: 5 MMOL/L (ref 10–25)
ANION GAP BLDV CALCULATED.4IONS-SCNC: 9 MMOL/L (ref 10–25)
ANION GAP SERPL CALC-SCNC: 12 MMOL/L (ref 10–20)
APTT PPP: 30 SECONDS (ref 27–38)
AST SERPL W P-5'-P-CCNC: 36 U/L (ref 9–39)
B-OH-BUTYR SERPL-SCNC: 1.24 MMOL/L (ref 0.02–0.27)
BASE EXCESS BLDV CALC-SCNC: 5 MMOL/L (ref -2–3)
BASE EXCESS BLDV CALC-SCNC: 5.4 MMOL/L (ref -2–3)
BASOPHILS # BLD MANUAL: 0.15 X10*3/UL (ref 0–0.1)
BASOPHILS NFR BLD MANUAL: 2.6 %
BILIRUB SERPL-MCNC: 0.4 MG/DL (ref 0–1.2)
BNP SERPL-MCNC: 189 PG/ML (ref 0–99)
BODY TEMPERATURE: 37 DEGREES CELSIUS
BODY TEMPERATURE: 37 DEGREES CELSIUS
BUN SERPL-MCNC: 10 MG/DL (ref 6–23)
CA-I BLDV-SCNC: 1.21 MMOL/L (ref 1.1–1.33)
CA-I BLDV-SCNC: ABNORMAL MMOL/L
CALCIUM SERPL-MCNC: 8.9 MG/DL (ref 8.6–10.6)
CARDIAC TROPONIN I PNL SERPL HS: 14 NG/L (ref 0–34)
CARDIAC TROPONIN I PNL SERPL HS: 15 NG/L (ref 0–34)
CHLORIDE BLDV-SCNC: 96 MMOL/L (ref 98–107)
CHLORIDE BLDV-SCNC: 99 MMOL/L (ref 98–107)
CHLORIDE SERPL-SCNC: 95 MMOL/L (ref 98–107)
CO2 SERPL-SCNC: 26 MMOL/L (ref 21–32)
CREAT SERPL-MCNC: 0.59 MG/DL (ref 0.5–1.05)
EGFRCR SERPLBLD CKD-EPI 2021: >90 ML/MIN/1.73M*2
EOSINOPHIL # BLD MANUAL: 0.05 X10*3/UL (ref 0–0.7)
EOSINOPHIL NFR BLD MANUAL: 0.9 %
ERYTHROCYTE [DISTWIDTH] IN BLOOD BY AUTOMATED COUNT: 11.9 % (ref 11.5–14.5)
EST. AVERAGE GLUCOSE BLD GHB EST-MCNC: 390 MG/DL
ETHANOL SERPL-MCNC: <10 MG/DL
GLUCOSE BLD MANUAL STRIP-MCNC: 225 MG/DL (ref 74–99)
GLUCOSE BLD MANUAL STRIP-MCNC: 340 MG/DL (ref 74–99)
GLUCOSE BLD MANUAL STRIP-MCNC: 355 MG/DL (ref 74–99)
GLUCOSE BLD MANUAL STRIP-MCNC: 428 MG/DL (ref 74–99)
GLUCOSE BLD MANUAL STRIP-MCNC: 508 MG/DL (ref 74–99)
GLUCOSE BLDV-MCNC: 483 MG/DL (ref 74–99)
GLUCOSE BLDV-MCNC: 628 MG/DL (ref 74–99)
GLUCOSE SERPL-MCNC: 557 MG/DL (ref 74–99)
HBA1C MFR BLD: 15.2 %
HCO3 BLDV-SCNC: 27.9 MMOL/L (ref 22–26)
HCO3 BLDV-SCNC: 29.9 MMOL/L (ref 22–26)
HCT VFR BLD AUTO: 37 % (ref 36–46)
HCT VFR BLD EST: 41 % (ref 36–46)
HCT VFR BLD EST: 44 % (ref 36–46)
HGB BLD-MCNC: 14.1 G/DL (ref 12–16)
HGB BLDV-MCNC: 13.8 G/DL (ref 12–16)
HGB BLDV-MCNC: 14.5 G/DL (ref 12–16)
IMM GRANULOCYTES # BLD AUTO: 0.03 X10*3/UL (ref 0–0.7)
IMM GRANULOCYTES NFR BLD AUTO: 0.5 % (ref 0–0.9)
INHALED O2 CONCENTRATION: 21 %
INHALED O2 CONCENTRATION: 21 %
INR PPP: 1 (ref 0.9–1.1)
LACTATE BLDV-SCNC: 1.2 MMOL/L (ref 0.4–2)
LACTATE BLDV-SCNC: 2.1 MMOL/L (ref 0.4–2)
LYMPHOCYTES # BLD MANUAL: 1.47 X10*3/UL (ref 1.2–4.8)
LYMPHOCYTES NFR BLD MANUAL: 25.4 %
MAGNESIUM SERPL-MCNC: 1.53 MG/DL (ref 1.6–2.4)
MCH RBC QN AUTO: 32.9 PG (ref 26–34)
MCHC RBC AUTO-ENTMCNC: 38.1 G/DL (ref 32–36)
MCV RBC AUTO: 86 FL (ref 80–100)
MONOCYTES # BLD MANUAL: 0.2 X10*3/UL (ref 0.1–1)
MONOCYTES NFR BLD MANUAL: 3.5 %
NEUTS SEG # BLD MANUAL: 3.92 X10*3/UL (ref 1.2–7)
NEUTS SEG NFR BLD MANUAL: 67.6 %
NRBC BLD-RTO: 0 /100 WBCS (ref 0–0)
OXYHGB MFR BLDV: 86.6 % (ref 45–75)
OXYHGB MFR BLDV: 88.7 % (ref 45–75)
PCO2 BLDV: 35 MM HG (ref 41–51)
PCO2 BLDV: 42 MM HG (ref 41–51)
PH BLDV: 7.46 PH (ref 7.33–7.43)
PH BLDV: 7.51 PH (ref 7.33–7.43)
PLATELET # BLD AUTO: 147 X10*3/UL (ref 150–450)
PO2 BLDV: 59 MM HG (ref 35–45)
PO2 BLDV: 64 MM HG (ref 35–45)
POLYCHROMASIA BLD QL SMEAR: ABNORMAL
POTASSIUM BLDV-SCNC: 3 MMOL/L (ref 3.5–5.3)
POTASSIUM BLDV-SCNC: 3.6 MMOL/L (ref 3.5–5.3)
POTASSIUM SERPL-SCNC: 3.4 MMOL/L (ref 3.5–5.3)
PROT SERPL-MCNC: 6.8 G/DL (ref 6.4–8.2)
PROTHROMBIN TIME: 11 SECONDS (ref 9.8–12.8)
RBC # BLD AUTO: 4.29 X10*6/UL (ref 4–5.2)
RBC MORPH BLD: ABNORMAL
SAO2 % BLDV: 91 % (ref 45–75)
SAO2 % BLDV: 94 % (ref 45–75)
SODIUM BLDV-SCNC: 129 MMOL/L (ref 136–145)
SODIUM BLDV-SCNC: 131 MMOL/L (ref 136–145)
SODIUM SERPL-SCNC: 130 MMOL/L (ref 136–145)
TOTAL CELLS COUNTED BLD: 114
TSH SERPL-ACNC: 0.88 MIU/L (ref 0.44–3.98)
WBC # BLD AUTO: 5.8 X10*3/UL (ref 4.4–11.3)

## 2024-07-15 PROCEDURE — 82947 ASSAY GLUCOSE BLOOD QUANT: CPT

## 2024-07-15 PROCEDURE — 84132 ASSAY OF SERUM POTASSIUM: CPT

## 2024-07-15 PROCEDURE — 71046 X-RAY EXAM CHEST 2 VIEWS: CPT

## 2024-07-15 PROCEDURE — 83036 HEMOGLOBIN GLYCOSYLATED A1C: CPT | Performed by: NURSE PRACTITIONER

## 2024-07-15 PROCEDURE — 2500000001 HC RX 250 WO HCPCS SELF ADMINISTERED DRUGS (ALT 637 FOR MEDICARE OP): Mod: SE

## 2024-07-15 PROCEDURE — 93971 EXTREMITY STUDY: CPT

## 2024-07-15 PROCEDURE — 83880 ASSAY OF NATRIURETIC PEPTIDE: CPT

## 2024-07-15 PROCEDURE — G0378 HOSPITAL OBSERVATION PER HR: HCPCS

## 2024-07-15 PROCEDURE — 99285 EMERGENCY DEPT VISIT HI MDM: CPT | Performed by: EMERGENCY MEDICINE

## 2024-07-15 PROCEDURE — 2500000004 HC RX 250 GENERAL PHARMACY W/ HCPCS (ALT 636 FOR OP/ED): Mod: SE | Performed by: NURSE PRACTITIONER

## 2024-07-15 PROCEDURE — 84484 ASSAY OF TROPONIN QUANT: CPT

## 2024-07-15 PROCEDURE — 36415 COLL VENOUS BLD VENIPUNCTURE: CPT

## 2024-07-15 PROCEDURE — 2500000002 HC RX 250 W HCPCS SELF ADMINISTERED DRUGS (ALT 637 FOR MEDICARE OP, ALT 636 FOR OP/ED): Mod: SE

## 2024-07-15 PROCEDURE — 72125 CT NECK SPINE W/O DYE: CPT

## 2024-07-15 PROCEDURE — 99223 1ST HOSP IP/OBS HIGH 75: CPT | Performed by: NURSE PRACTITIONER

## 2024-07-15 PROCEDURE — 2500000002 HC RX 250 W HCPCS SELF ADMINISTERED DRUGS (ALT 637 FOR MEDICARE OP, ALT 636 FOR OP/ED): Mod: SE | Performed by: EMERGENCY MEDICINE

## 2024-07-15 PROCEDURE — 73522 X-RAY EXAM HIPS BI 3-4 VIEWS: CPT

## 2024-07-15 PROCEDURE — 84443 ASSAY THYROID STIM HORMONE: CPT | Performed by: NURSE PRACTITIONER

## 2024-07-15 PROCEDURE — 93010 ELECTROCARDIOGRAM REPORT: CPT | Performed by: EMERGENCY MEDICINE

## 2024-07-15 PROCEDURE — 93971 EXTREMITY STUDY: CPT | Performed by: RADIOLOGY

## 2024-07-15 PROCEDURE — 71046 X-RAY EXAM CHEST 2 VIEWS: CPT | Performed by: RADIOLOGY

## 2024-07-15 PROCEDURE — 99285 EMERGENCY DEPT VISIT HI MDM: CPT | Mod: 25

## 2024-07-15 PROCEDURE — 70450 CT HEAD/BRAIN W/O DYE: CPT

## 2024-07-15 PROCEDURE — 85610 PROTHROMBIN TIME: CPT

## 2024-07-15 PROCEDURE — 70450 CT HEAD/BRAIN W/O DYE: CPT | Performed by: RADIOLOGY

## 2024-07-15 PROCEDURE — 82010 KETONE BODYS QUAN: CPT | Performed by: NURSE PRACTITIONER

## 2024-07-15 PROCEDURE — 85007 BL SMEAR W/DIFF WBC COUNT: CPT

## 2024-07-15 PROCEDURE — 2500000004 HC RX 250 GENERAL PHARMACY W/ HCPCS (ALT 636 FOR OP/ED): Mod: SE

## 2024-07-15 PROCEDURE — 72125 CT NECK SPINE W/O DYE: CPT | Performed by: RADIOLOGY

## 2024-07-15 PROCEDURE — 85730 THROMBOPLASTIN TIME PARTIAL: CPT

## 2024-07-15 PROCEDURE — 83735 ASSAY OF MAGNESIUM: CPT

## 2024-07-15 PROCEDURE — 93005 ELECTROCARDIOGRAM TRACING: CPT

## 2024-07-15 PROCEDURE — 85027 COMPLETE CBC AUTOMATED: CPT

## 2024-07-15 PROCEDURE — 82077 ASSAY SPEC XCP UR&BREATH IA: CPT | Performed by: NURSE PRACTITIONER

## 2024-07-15 PROCEDURE — 2500000001 HC RX 250 WO HCPCS SELF ADMINISTERED DRUGS (ALT 637 FOR MEDICARE OP): Mod: SE | Performed by: NURSE PRACTITIONER

## 2024-07-15 PROCEDURE — 96372 THER/PROPH/DIAG INJ SC/IM: CPT | Performed by: NURSE PRACTITIONER

## 2024-07-15 RX ORDER — DAPAGLIFLOZIN 10 MG/1
10 TABLET, FILM COATED ORAL
Status: DISCONTINUED | OUTPATIENT
Start: 2024-07-16 | End: 2024-07-22 | Stop reason: HOSPADM

## 2024-07-15 RX ORDER — MULTIVIT-MIN/IRON FUM/FOLIC AC 7.5 MG-4
1 TABLET ORAL DAILY
Status: DISCONTINUED | OUTPATIENT
Start: 2024-07-16 | End: 2024-07-22 | Stop reason: HOSPADM

## 2024-07-15 RX ORDER — LIDOCAINE 560 MG/1
2 PATCH PERCUTANEOUS; TOPICAL; TRANSDERMAL DAILY
Status: DISCONTINUED | OUTPATIENT
Start: 2024-07-16 | End: 2024-07-19

## 2024-07-15 RX ORDER — ENOXAPARIN SODIUM 100 MG/ML
40 INJECTION SUBCUTANEOUS EVERY 24 HOURS
Status: DISCONTINUED | OUTPATIENT
Start: 2024-07-15 | End: 2024-07-22 | Stop reason: HOSPADM

## 2024-07-15 RX ORDER — DAPAGLIFLOZIN 10 MG/1
1 TABLET, FILM COATED ORAL
COMMUNITY
Start: 2024-07-08 | End: 2025-01-04

## 2024-07-15 RX ORDER — AMLODIPINE BESYLATE 10 MG/1
10 TABLET ORAL DAILY
Status: DISCONTINUED | OUTPATIENT
Start: 2024-07-15 | End: 2024-07-22 | Stop reason: HOSPADM

## 2024-07-15 RX ORDER — ACETAMINOPHEN 325 MG/1
650 TABLET ORAL EVERY 6 HOURS PRN
Status: DISCONTINUED | OUTPATIENT
Start: 2024-07-15 | End: 2024-07-22 | Stop reason: HOSPADM

## 2024-07-15 RX ORDER — POLYETHYLENE GLYCOL 3350 17 G/17G
17 POWDER, FOR SOLUTION ORAL DAILY
Status: DISCONTINUED | OUTPATIENT
Start: 2024-07-16 | End: 2024-07-22 | Stop reason: HOSPADM

## 2024-07-15 RX ORDER — MAGNESIUM SULFATE HEPTAHYDRATE 40 MG/ML
2 INJECTION, SOLUTION INTRAVENOUS ONCE
Status: COMPLETED | OUTPATIENT
Start: 2024-07-15 | End: 2024-07-15

## 2024-07-15 RX ORDER — INSULIN LISPRO 100 [IU]/ML
10 INJECTION, SOLUTION INTRAVENOUS; SUBCUTANEOUS ONCE
Status: COMPLETED | OUTPATIENT
Start: 2024-07-15 | End: 2024-07-15

## 2024-07-15 RX ORDER — INSULIN GLARGINE 100 [IU]/ML
20 INJECTION, SOLUTION SUBCUTANEOUS EVERY 24 HOURS
Status: DISCONTINUED | OUTPATIENT
Start: 2024-07-15 | End: 2024-07-15

## 2024-07-15 RX ORDER — CARVEDILOL 12.5 MG/1
25 TABLET ORAL ONCE
Status: COMPLETED | OUTPATIENT
Start: 2024-07-15 | End: 2024-07-15

## 2024-07-15 RX ORDER — DEXTROSE 50 % IN WATER (D50W) INTRAVENOUS SYRINGE
25
Status: DISCONTINUED | OUTPATIENT
Start: 2024-07-15 | End: 2024-07-22 | Stop reason: HOSPADM

## 2024-07-15 RX ORDER — CLOPIDOGREL BISULFATE 75 MG/1
75 TABLET ORAL DAILY
Status: DISCONTINUED | OUTPATIENT
Start: 2024-07-15 | End: 2024-07-22 | Stop reason: HOSPADM

## 2024-07-15 RX ORDER — INSULIN GLARGINE 100 [IU]/ML
24 INJECTION, SOLUTION SUBCUTANEOUS EVERY 24 HOURS
Status: DISCONTINUED | OUTPATIENT
Start: 2024-07-16 | End: 2024-07-15

## 2024-07-15 RX ORDER — INSULIN GLARGINE 100 [IU]/ML
24 INJECTION, SOLUTION SUBCUTANEOUS EVERY 24 HOURS
Status: DISCONTINUED | OUTPATIENT
Start: 2024-07-16 | End: 2024-07-22 | Stop reason: HOSPADM

## 2024-07-15 RX ORDER — POTASSIUM CHLORIDE 20 MEQ/1
10 TABLET, EXTENDED RELEASE ORAL ONCE
Status: DISCONTINUED | OUTPATIENT
Start: 2024-07-15 | End: 2024-07-15

## 2024-07-15 RX ORDER — METHOCARBAMOL 750 MG/1
1500 TABLET, FILM COATED ORAL 3 TIMES DAILY PRN
COMMUNITY

## 2024-07-15 RX ORDER — ISOSORBIDE MONONITRATE 30 MG/1
60 TABLET, EXTENDED RELEASE ORAL DAILY
Status: DISCONTINUED | OUTPATIENT
Start: 2024-07-15 | End: 2024-07-22 | Stop reason: HOSPADM

## 2024-07-15 RX ORDER — DEXTROSE 50 % IN WATER (D50W) INTRAVENOUS SYRINGE
12.5
Status: DISCONTINUED | OUTPATIENT
Start: 2024-07-15 | End: 2024-07-22 | Stop reason: HOSPADM

## 2024-07-15 RX ORDER — CLOPIDOGREL BISULFATE 75 MG/1
75 TABLET ORAL
COMMUNITY
Start: 2024-02-22

## 2024-07-15 RX ORDER — INSULIN LISPRO 100 [IU]/ML
0-10 INJECTION, SOLUTION INTRAVENOUS; SUBCUTANEOUS
Status: DISCONTINUED | OUTPATIENT
Start: 2024-07-16 | End: 2024-07-22 | Stop reason: HOSPADM

## 2024-07-15 RX ORDER — DIPHENHYDRAMINE HYDROCHLORIDE 50 MG/ML
50 INJECTION INTRAMUSCULAR; INTRAVENOUS ONCE
Status: COMPLETED | OUTPATIENT
Start: 2024-07-15 | End: 2024-07-15

## 2024-07-15 RX ORDER — POTASSIUM CHLORIDE 20 MEQ/1
20 TABLET, EXTENDED RELEASE ORAL ONCE
Status: COMPLETED | OUTPATIENT
Start: 2024-07-15 | End: 2024-07-15

## 2024-07-15 RX ORDER — ATORVASTATIN CALCIUM 80 MG/1
80 TABLET, FILM COATED ORAL NIGHTLY
Status: DISCONTINUED | OUTPATIENT
Start: 2024-07-15 | End: 2024-07-22 | Stop reason: HOSPADM

## 2024-07-15 RX ORDER — CARVEDILOL 25 MG/1
25 TABLET ORAL 2 TIMES DAILY
Status: DISCONTINUED | OUTPATIENT
Start: 2024-07-15 | End: 2024-07-22 | Stop reason: HOSPADM

## 2024-07-15 RX ORDER — VIT C/E/ZN/COPPR/LUTEIN/ZEAXAN 250MG-90MG
2 CAPSULE ORAL DAILY
COMMUNITY
Start: 2024-02-22 | End: 2025-02-21

## 2024-07-15 RX ORDER — CHOLECALCIFEROL (VITAMIN D3) 25 MCG
2000 TABLET ORAL DAILY
Status: DISCONTINUED | OUTPATIENT
Start: 2024-07-16 | End: 2024-07-22 | Stop reason: HOSPADM

## 2024-07-15 RX ORDER — HYDRALAZINE HYDROCHLORIDE 50 MG/1
100 TABLET, FILM COATED ORAL 3 TIMES DAILY
Status: DISCONTINUED | OUTPATIENT
Start: 2024-07-15 | End: 2024-07-22 | Stop reason: HOSPADM

## 2024-07-15 RX ORDER — NAPROXEN SODIUM 220 MG/1
81 TABLET, FILM COATED ORAL DAILY
Status: DISCONTINUED | OUTPATIENT
Start: 2024-07-16 | End: 2024-07-22 | Stop reason: HOSPADM

## 2024-07-15 RX ORDER — ACETAMINOPHEN 500 MG
5 TABLET ORAL NIGHTLY PRN
Status: DISCONTINUED | OUTPATIENT
Start: 2024-07-15 | End: 2024-07-22 | Stop reason: HOSPADM

## 2024-07-15 ASSESSMENT — LIFESTYLE VARIABLES
EVER FELT BAD OR GUILTY ABOUT YOUR DRINKING: NO
HAVE PEOPLE ANNOYED YOU BY CRITICIZING YOUR DRINKING: NO
EVER HAD A DRINK FIRST THING IN THE MORNING TO STEADY YOUR NERVES TO GET RID OF A HANGOVER: NO
TOTAL SCORE: 0
HAVE YOU EVER FELT YOU SHOULD CUT DOWN ON YOUR DRINKING: NO

## 2024-07-15 ASSESSMENT — ENCOUNTER SYMPTOMS
CONSTIPATION: 0
POLYPHAGIA: 0
WEAKNESS: 1
SLEEP DISTURBANCE: 1
HEADACHES: 0
DIFFICULTY URINATING: 0
NAUSEA: 0
ABDOMINAL PAIN: 0
DIZZINESS: 0
CONFUSION: 0
CHILLS: 0
MYALGIAS: 1
LIGHT-HEADEDNESS: 0
COUGH: 0
POLYDIPSIA: 0
VOMITING: 0
SHORTNESS OF BREATH: 0
DIARRHEA: 0
FEVER: 0

## 2024-07-15 ASSESSMENT — PAIN DESCRIPTION - PAIN TYPE: TYPE: ACUTE PAIN

## 2024-07-15 ASSESSMENT — PAIN DESCRIPTION - LOCATION
LOCATION_2: LEG
LOCATION: ARM

## 2024-07-15 ASSESSMENT — PAIN - FUNCTIONAL ASSESSMENT: PAIN_FUNCTIONAL_ASSESSMENT: 0-10

## 2024-07-15 ASSESSMENT — PAIN SCALES - GENERAL
PAINLEVEL_OUTOF10: 8
PAINLEVEL_OUTOF10: 9
PAINLEVEL_OUTOF10: 8
PAINLEVEL_OUTOF10: 7

## 2024-07-15 ASSESSMENT — PAIN DESCRIPTION - ORIENTATION
ORIENTATION: LEFT
ORIENTATION_2: LEFT

## 2024-07-15 ASSESSMENT — PAIN DESCRIPTION - DESCRIPTORS
DESCRIPTORS: DULL
DESCRIPTORS_2: ACHING
DESCRIPTORS: ACHING

## 2024-07-15 NOTE — ED PROVIDER NOTES
CC: Fall and Hyperglycemia     History provided by: Patient  Limitations to History: None    HPI:  Patient is a 64-year-old female with history of insulin-dependent diabetes, heart failure with reduced EF, CAD status post CABG, hypertension, peripheral arterial disease status post left SFA stent, renal artery stenosis, carotid artery stenosis who presents with multiple medical complaints.  She endorses nonradiating midsternal chest pain ongoing for the past few days.  Denies any associated shortness of breath, fevers, chills.  She states she has not been taking her medication for some time.  Denies any illicit drug use.  She also endorses fall yesterday but does not remember if she hit her head.  Denies any blood thinner use.  She also states that she fell on her left leg but has range of motion intact of the bilateral upper extremities and lower extremities.  She denies any abdominal pain, nausea, vomiting, diarrhea, constipation, urinary complaints.  She denies any numbness, tingling, headache, vision changes, neck pain.  Blood sugar is also high in the 500s.  It does not appear that patient has been taking her insulin.  She states she lives alone near [facility].     I reviewed discharge summary from February 2024 when patient was admitted for syncopal episode and hypoglycemia.  I reviewed patient's home medications which include 20 units of Lantus daily.  Patient's home antihypertensives include carvedilol 25 mg, Imdur 60 mg, additional medications include atorvastatin 80 mg, Farxiga 10 mg, metformin.    External Records Reviewed:  I reviewed prior ED visits, Care Everywhere, discharge summaries and outpatient records as appropriate.   ???????????????????????????????????????????????????????????????  Triage Vitals:  T    HR 94  BP (!) 195/105  RR 16  O2 98 % None (Room air)    Physical Exam  Vitals and nursing note reviewed.   Constitutional:       General: She is not in acute distress.     Appearance:  Normal appearance.   HENT:      Head: Normocephalic and atraumatic.   Eyes:      Conjunctiva/sclera: Conjunctivae normal.   Cardiovascular:      Rate and Rhythm: Normal rate and regular rhythm.   Pulmonary:      Effort: Pulmonary effort is normal. No respiratory distress.   Abdominal:      General: Abdomen is flat. There is no distension.      Palpations: Abdomen is soft.      Tenderness: There is no abdominal tenderness. There is no guarding or rebound.   Musculoskeletal:         General: Normal range of motion.      Cervical back: Normal range of motion and neck supple.      Comments: Negative logroll b/l, right lower extremity is more swollen compared to left, palpable bilateral dorsalis pedis pulses, both lower extremities are warm to touch, sensation is symmetric grossly intact in bilateral lower extremities, 3 out of 5 strength in bilateral lower extremities, flexion and extension at knees intact, no obvious open wounds, strength grossly intact in bilateral upper extremities, no midline C-spine tenderness palpation, head is normocephalic, atraumatic   Skin:     General: Skin is warm and dry.   Neurological:      General: No focal deficit present.      Mental Status: She is alert and oriented to person, place, and time. Mental status is at baseline.   Psychiatric:         Mood and Affect: Mood normal.         Behavior: Behavior normal.        ???????????????????????????????????????????????????????????????  ED Course/Treatment/Medical Decision Making  MDM:  Patient is a 64-year-old female with multiple comorbidities who presents with multiple medical complaints.  Vital signs overall for hypertension consistent with known chronic hypertension complicated by medication noncompliance.  Multiple differentials considered given patient's comorbidities and complaints including but not limited to acute coronary syndrome, PE, DVT, pneumonia, urinary tract infection, intracranial bleed, fracture.  Overall lungs are clear  to auscultation bilaterally, she has no shortness of breath or hypoxia therefore lower suspicion for acute pulmonary embolism or pulmonary pathology.  Patient does have a cardiac history given chest pain I did obtain cardiac workup.  Additionally, given unclear history of falls I will obtain CT head and C-spine, no obvious traumatic injury noted.  No obvious lower extremity deformity noted.  Patient does have right-sided lower extremity swelling without tenderness compared to left therefore DVT ultrasound obtained.  She is overall mentating appropriately at this time, has no focal neurologic deficits.  She has symmetric bilateral lower extremity weakness. Patient is hyperglycemic, DKA, HHS on differential as well.  She has no abdominal tenderness, endorsement of nausea, vomiting, fevers therefore low suspicion for intra-abdominal infection warranting imaging.  I did discuss admission for placement given recurrent falls, medication compliance and multiple comorbidities and patient is agreeable.  Patient given IV fluids.    ED Course:  ED Course as of 07/15/24 1416   Mon Jul 15, 2024   1205 VBG with elevated pH 7.51, CO2 35, normal lactate, I do not suspect DKA [SA]   1205 EKG reviewed normal sinus rhythm rate 73, ID interval 182 ms, QRS 96 ms, QTc 458 ms, no acute ST segment elevations or depressions however there is motion artifact limiting interpretation [SA]   1206 Patient given conservative fluids given HF history, home lantus 20u and antihypertensives [SA]   1327 CXR without acute cardiopulmonary abnormality [SA]   1329 Hypomagnesemia repleted, slightly elevated BNP however patient does not appear fluid overloaded, metabolic panel with elevated blood sugar however bicarbonate is within normal limits and normal anion gap [SA]   1411 CBC with thrombocytopenia similar to prior, WBC and Hgb wnl [SA]   1412 LE XR without acute traumatic pathology [SA]   1415 CT imaging overall wnl as below:  IMPRESSION:  CT head:       1. No acute intracranial abnormality.  2. Similar mild generalized parenchymal volume loss.  3. Similar appearing mild periventricular and subcortical white  matter hyperintensities, nonspecific, however likely represents  sequela of chronic microvascular ischemic disease.      CT cervical spine:      1. The common carotid bifurcations have dense atherosclerotic  calcifications bilaterally.  2. No acute fracture or traumatic subluxation of the cervical spine.   [SA]      ED Course User Index  [SA] Sadaf Moreira DO         Diagnoses as of 07/15/24 1416   Fall, initial encounter   Hyperglycemia   Hypomagnesemia           EKG Interpretation:  See ED Course/Below:    Independent Interpretation of Studies:  I independently interpreted labs/imaging as stated in ED Course or below.    Differential diagnoses considered include but are not limited to: See MDM/Below:    Social Determinants Limiting Care:  Financial difficulties, Housing insecurity, Poor housing conditions, and Food insecurity     Discussion of Management with Other Providers: See MDM/Below:    Disposition:  Patient was signed out to Dr. Rico at 1500 pending completion of their work-up.  Please see the next provider's transition of care note for the remainder of the patient's care.       PHILIP Munroe, PGY-3    I reviewed the case with the attending ED physician. The attending ED physician agrees with the plan. Patient and/or patient´s representative was counseled regarding labs, imaging, likely diagnosis, and plan. All questions were answered.    Disclaimer: This note was dictated by speech recognition.  Attempt at proofreading was made to minimize errors.  Errors in transcription may be present.  Please call if questions.    Procedures ? SmartLinks last updated 7/15/2024 2:16 PM        Sadaf Moreira DO  Resident  07/15/24 1416

## 2024-07-15 NOTE — PROGRESS NOTES
"Myrna Khan is a 64 y.o. female on day 0 of admission presenting with No Principal Problem: There is no principal problem currently on the Problem List. Please update the Problem List and refresh..    Last Recorded Vitals  Blood pressure (!) 205/101, pulse 63, resp. rate 16, height 1.6 m (5' 3\"), weight 51.7 kg (114 lb), SpO2 98%.  Intake/Output last 3 Shifts:  No intake/output data recorded.      Assessment/Plan   Active Problems:  There are no active Hospital Problems.    Patient was handed off to me from the previous team. For full history, physical, and prior ED course, please see previous provider note prior to patient handoff. This is an addendum to the record.    Briefly, this is a 64-year-old female with history of insulin-dependent diabetes, heart failure with reduced EF, CAD status post CABG, hypertension, peripheral arterial disease status post left SFA stent, renal artery stenosis, carotid artery stenosis who presents with multiple medical complaints including chest pain, leg swelling, falls. DVT US negative. ACS workup unremarkable. CT head and c spine negative. Per previous provider, patient as given meds for HTN. Patient found to be hyperglycemic and insulin given and rechecked with improved glucose. Patient admitted for syncope workup, placement for multiple falls.      Throughout the ED stay, the patient was monitored and re-examined for any changes in stability or symptomatology.     Jenifer Rico MD      "

## 2024-07-15 NOTE — H&P
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 ACHS  - hypoglycemia order set placed  - diabetic diet     #hypomagnesemia  #hypokalemia   - Mg 2+ 1.53 on admit and K 3.4 on admit  - s/p 2g magnesium sulfate x1 dose and 20 mEq of potassium chloride PO x1 dose  - RFP ordered for AM    #bilateral hip pain  #unable to ambulate  - xray of bilateral hips: No acute abnormality in the hips.   - lidocaine patch to both hips  - tylenol 650 mg q6h PRN mild-moderate pain  -PT/OT consulted    I spent 75 minutes in the professional and overall care of this patient.      Jessica Villagran, APRN-CNP

## 2024-07-15 NOTE — ED TRIAGE NOTES
"Patient states that she fell yesterday in her kitchen; states she is unsure whether or not she hit her head, as she did loose consciousness for about 1 hour before her  found her; patient is on ASA she reports for a hx of DVT; patient comes in today endorsing L leg and arm pain, states her legs have been \"wobbly\" the past couple of days; states she uses a scooter at home to help her get around, and she lives alone; also endorses CP for the past couple days;     Bg 508 on arrival, patient is a Type II diabetic, hx of HTN as well, states she did not take her medications this morning; A7O x4 on arrival, PERRLA, with no acute signs of respiratory distress noted while on room air   "

## 2024-07-16 ENCOUNTER — APPOINTMENT (OUTPATIENT)
Dept: CARDIOLOGY | Facility: HOSPITAL | Age: 64
DRG: 312 | End: 2024-07-16
Payer: COMMERCIAL

## 2024-07-16 ENCOUNTER — APPOINTMENT (OUTPATIENT)
Dept: RADIOLOGY | Facility: HOSPITAL | Age: 64
DRG: 312 | End: 2024-07-16
Payer: COMMERCIAL

## 2024-07-16 LAB
ALBUMIN SERPL BCP-MCNC: 2.8 G/DL (ref 3.4–5)
AMPHETAMINES UR QL SCN: ABNORMAL
ANION GAP SERPL CALC-SCNC: 13 MMOL/L (ref 10–20)
AORTIC VALVE PEAK VELOCITY: 1.32 M/S
ATRIAL RATE: 73 BPM
AV PEAK GRADIENT: 7 MMHG
AVA (PEAK VEL): 1.72 CM2
BARBITURATES UR QL SCN: ABNORMAL
BENZODIAZ UR QL SCN: ABNORMAL
BUN SERPL-MCNC: 14 MG/DL (ref 6–23)
BZE UR QL SCN: ABNORMAL
CALCIUM SERPL-MCNC: 8.5 MG/DL (ref 8.6–10.6)
CANNABINOIDS UR QL SCN: ABNORMAL
CHLORIDE SERPL-SCNC: 100 MMOL/L (ref 98–107)
CHOLEST SERPL-MCNC: 141 MG/DL (ref 0–199)
CHOLESTEROL/HDL RATIO: 2
CO2 SERPL-SCNC: 26 MMOL/L (ref 21–32)
CREAT SERPL-MCNC: 0.68 MG/DL (ref 0.5–1.05)
EGFRCR SERPLBLD CKD-EPI 2021: >90 ML/MIN/1.73M*2
EJECTION FRACTION APICAL 4 CHAMBER: 72
EJECTION FRACTION: 55 %
ERYTHROCYTE [DISTWIDTH] IN BLOOD BY AUTOMATED COUNT: 12 % (ref 11.5–14.5)
FENTANYL+NORFENTANYL UR QL SCN: ABNORMAL
GLOBAL LONGITUDINAL STRAIN: 11.9 %
GLUCOSE BLD MANUAL STRIP-MCNC: 175 MG/DL (ref 74–99)
GLUCOSE BLD MANUAL STRIP-MCNC: 185 MG/DL (ref 74–99)
GLUCOSE BLD MANUAL STRIP-MCNC: 208 MG/DL (ref 74–99)
GLUCOSE BLD MANUAL STRIP-MCNC: 344 MG/DL (ref 74–99)
GLUCOSE BLD MANUAL STRIP-MCNC: 379 MG/DL (ref 74–99)
GLUCOSE SERPL-MCNC: 177 MG/DL (ref 74–99)
HCT VFR BLD AUTO: 36.8 % (ref 36–46)
HDLC SERPL-MCNC: 70 MG/DL
HGB BLD-MCNC: 13.5 G/DL (ref 12–16)
LDLC SERPL CALC-MCNC: 56 MG/DL
LEFT ATRIUM VOLUME AREA LENGTH INDEX BSA: 36.2 ML/M2
LEFT VENTRICLE INTERNAL DIMENSION DIASTOLE: 4.2 CM (ref 3.5–6)
LEFT VENTRICULAR OUTFLOW TRACT DIAMETER: 1.9 CM
MAGNESIUM SERPL-MCNC: 1.76 MG/DL (ref 1.6–2.4)
MCH RBC QN AUTO: 32.3 PG (ref 26–34)
MCHC RBC AUTO-ENTMCNC: 36.7 G/DL (ref 32–36)
MCV RBC AUTO: 88 FL (ref 80–100)
METHADONE UR QL SCN: ABNORMAL
MITRAL VALVE E/A RATIO: 0.84
MITRAL VALVE E/E' RATIO: 18.6
NON HDL CHOLESTEROL: 71 MG/DL (ref 0–149)
NRBC BLD-RTO: 0 /100 WBCS (ref 0–0)
OPIATES UR QL SCN: ABNORMAL
OXYCODONE+OXYMORPHONE UR QL SCN: ABNORMAL
P AXIS: 80 DEGREES
P OFFSET: 183 MS
P ONSET: 128 MS
PCP UR QL SCN: ABNORMAL
PHOSPHATE SERPL-MCNC: 2.8 MG/DL (ref 2.5–4.9)
PLATELET # BLD AUTO: 209 X10*3/UL (ref 150–450)
POTASSIUM SERPL-SCNC: 3.5 MMOL/L (ref 3.5–5.3)
PR INTERVAL: 182 MS
Q ONSET: 219 MS
QRS COUNT: 12 BEATS
QRS DURATION: 96 MS
QT INTERVAL: 416 MS
QTC CALCULATION(BAZETT): 458 MS
QTC FREDERICIA: 444 MS
R AXIS: 4 DEGREES
RBC # BLD AUTO: 4.18 X10*6/UL (ref 4–5.2)
RIGHT VENTRICLE FREE WALL PEAK S': 6.46 CM/S
SODIUM SERPL-SCNC: 135 MMOL/L (ref 136–145)
T AXIS: 94 DEGREES
T OFFSET: 427 MS
TRICUSPID ANNULAR PLANE SYSTOLIC EXCURSION: 1 CM
TRIGL SERPL-MCNC: 74 MG/DL (ref 0–149)
VENTRICULAR RATE: 73 BPM
VLDL: 15 MG/DL (ref 0–40)
WBC # BLD AUTO: 7.7 X10*3/UL (ref 4.4–11.3)

## 2024-07-16 PROCEDURE — 2500000002 HC RX 250 W HCPCS SELF ADMINISTERED DRUGS (ALT 637 FOR MEDICARE OP, ALT 636 FOR OP/ED): Mod: SE | Performed by: NURSE PRACTITIONER

## 2024-07-16 PROCEDURE — 2500000004 HC RX 250 GENERAL PHARMACY W/ HCPCS (ALT 636 FOR OP/ED): Mod: SE | Performed by: NURSE PRACTITIONER

## 2024-07-16 PROCEDURE — 93306 TTE W/DOPPLER COMPLETE: CPT | Performed by: INTERNAL MEDICINE

## 2024-07-16 PROCEDURE — 97161 PT EVAL LOW COMPLEX 20 MIN: CPT | Mod: GP

## 2024-07-16 PROCEDURE — 2500000005 HC RX 250 GENERAL PHARMACY W/O HCPCS: Mod: SE | Performed by: NURSE PRACTITIONER

## 2024-07-16 PROCEDURE — 36415 COLL VENOUS BLD VENIPUNCTURE: CPT | Performed by: NURSE PRACTITIONER

## 2024-07-16 PROCEDURE — 75561 CARDIAC MRI FOR MORPH W/DYE: CPT

## 2024-07-16 PROCEDURE — 75565 CARD MRI VELOC FLOW MAPPING: CPT | Performed by: RADIOLOGY

## 2024-07-16 PROCEDURE — 93356 MYOCRD STRAIN IMG SPCKL TRCK: CPT | Performed by: INTERNAL MEDICINE

## 2024-07-16 PROCEDURE — 93356 MYOCRD STRAIN IMG SPCKL TRCK: CPT

## 2024-07-16 PROCEDURE — 83735 ASSAY OF MAGNESIUM: CPT | Performed by: NURSE PRACTITIONER

## 2024-07-16 PROCEDURE — 82947 ASSAY GLUCOSE BLOOD QUANT: CPT

## 2024-07-16 PROCEDURE — 2500000001 HC RX 250 WO HCPCS SELF ADMINISTERED DRUGS (ALT 637 FOR MEDICARE OP): Mod: SE | Performed by: NURSE PRACTITIONER

## 2024-07-16 PROCEDURE — 85027 COMPLETE CBC AUTOMATED: CPT | Performed by: NURSE PRACTITIONER

## 2024-07-16 PROCEDURE — G0378 HOSPITAL OBSERVATION PER HR: HCPCS

## 2024-07-16 PROCEDURE — 86335 IMMUNFIX E-PHORSIS/URINE/CSF: CPT | Performed by: STUDENT IN AN ORGANIZED HEALTH CARE EDUCATION/TRAINING PROGRAM

## 2024-07-16 PROCEDURE — 80307 DRUG TEST PRSMV CHEM ANLYZR: CPT | Performed by: NURSE PRACTITIONER

## 2024-07-16 PROCEDURE — 75561 CARDIAC MRI FOR MORPH W/DYE: CPT | Performed by: RADIOLOGY

## 2024-07-16 PROCEDURE — 99233 SBSQ HOSP IP/OBS HIGH 50: CPT | Performed by: STUDENT IN AN ORGANIZED HEALTH CARE EDUCATION/TRAINING PROGRAM

## 2024-07-16 PROCEDURE — 99233 SBSQ HOSP IP/OBS HIGH 50: CPT | Performed by: INTERNAL MEDICINE

## 2024-07-16 PROCEDURE — 80069 RENAL FUNCTION PANEL: CPT | Performed by: NURSE PRACTITIONER

## 2024-07-16 PROCEDURE — 96372 THER/PROPH/DIAG INJ SC/IM: CPT | Performed by: NURSE PRACTITIONER

## 2024-07-16 PROCEDURE — A9575 INJ GADOTERATE MEGLUMI 0.1ML: HCPCS | Mod: SE | Performed by: STUDENT IN AN ORGANIZED HEALTH CARE EDUCATION/TRAINING PROGRAM

## 2024-07-16 PROCEDURE — 2550000001 HC RX 255 CONTRASTS: Mod: SE | Performed by: STUDENT IN AN ORGANIZED HEALTH CARE EDUCATION/TRAINING PROGRAM

## 2024-07-16 PROCEDURE — 80061 LIPID PANEL: CPT | Performed by: NURSE PRACTITIONER

## 2024-07-16 RX ORDER — GADOTERATE MEGLUMINE 376.9 MG/ML
21 INJECTION INTRAVENOUS
Status: COMPLETED | OUTPATIENT
Start: 2024-07-16 | End: 2024-07-16

## 2024-07-16 SDOH — SOCIAL STABILITY: SOCIAL INSECURITY: HAS ANYONE EVER THREATENED TO HURT YOUR FAMILY OR YOUR PETS?: NO

## 2024-07-16 SDOH — ECONOMIC STABILITY: HOUSING INSECURITY: AT ANY TIME IN THE PAST 12 MONTHS, WERE YOU HOMELESS OR LIVING IN A SHELTER (INCLUDING NOW)?: NO

## 2024-07-16 SDOH — ECONOMIC STABILITY: INCOME INSECURITY: HOW HARD IS IT FOR YOU TO PAY FOR THE VERY BASICS LIKE FOOD, HOUSING, MEDICAL CARE, AND HEATING?: NOT HARD AT ALL

## 2024-07-16 SDOH — ECONOMIC STABILITY: TRANSPORTATION INSECURITY
IN THE PAST 12 MONTHS, HAS LACK OF TRANSPORTATION KEPT YOU FROM MEETINGS, WORK, OR FROM GETTING THINGS NEEDED FOR DAILY LIVING?: NO

## 2024-07-16 SDOH — ECONOMIC STABILITY: TRANSPORTATION INSECURITY
IN THE PAST 12 MONTHS, HAS THE LACK OF TRANSPORTATION KEPT YOU FROM MEDICAL APPOINTMENTS OR FROM GETTING MEDICATIONS?: YES

## 2024-07-16 SDOH — SOCIAL STABILITY: SOCIAL INSECURITY: DOES ANYONE TRY TO KEEP YOU FROM HAVING/CONTACTING OTHER FRIENDS OR DOING THINGS OUTSIDE YOUR HOME?: NO

## 2024-07-16 SDOH — ECONOMIC STABILITY: HOUSING INSECURITY: IN THE PAST 12 MONTHS, HOW MANY TIMES HAVE YOU MOVED WHERE YOU WERE LIVING?: 0

## 2024-07-16 SDOH — ECONOMIC STABILITY: INCOME INSECURITY: IN THE LAST 12 MONTHS, WAS THERE A TIME WHEN YOU WERE NOT ABLE TO PAY THE MORTGAGE OR RENT ON TIME?: NO

## 2024-07-16 SDOH — SOCIAL STABILITY: SOCIAL INSECURITY: DO YOU FEEL ANYONE HAS EXPLOITED OR TAKEN ADVANTAGE OF YOU FINANCIALLY OR OF YOUR PERSONAL PROPERTY?: NO

## 2024-07-16 SDOH — SOCIAL STABILITY: SOCIAL INSECURITY: DO YOU FEEL UNSAFE GOING BACK TO THE PLACE WHERE YOU ARE LIVING?: NO

## 2024-07-16 SDOH — HEALTH STABILITY: PHYSICAL HEALTH: ON AVERAGE, HOW MANY DAYS PER WEEK DO YOU ENGAGE IN MODERATE TO STRENUOUS EXERCISE (LIKE A BRISK WALK)?: 0 DAYS

## 2024-07-16 SDOH — ECONOMIC STABILITY: TRANSPORTATION INSECURITY
IN THE PAST 12 MONTHS, HAS THE LACK OF TRANSPORTATION KEPT YOU FROM MEDICAL APPOINTMENTS OR FROM GETTING MEDICATIONS?: NO

## 2024-07-16 SDOH — SOCIAL STABILITY: SOCIAL INSECURITY: WERE YOU ABLE TO COMPLETE ALL THE BEHAVIORAL HEALTH SCREENINGS?: YES

## 2024-07-16 SDOH — SOCIAL STABILITY: SOCIAL INSECURITY: HAVE YOU HAD THOUGHTS OF HARMING ANYONE ELSE?: NO

## 2024-07-16 SDOH — SOCIAL STABILITY: SOCIAL INSECURITY: HAVE YOU HAD ANY THOUGHTS OF HARMING ANYONE ELSE?: NO

## 2024-07-16 SDOH — SOCIAL STABILITY: SOCIAL INSECURITY: ABUSE: ADULT

## 2024-07-16 SDOH — SOCIAL STABILITY: SOCIAL INSECURITY: ARE THERE ANY APPARENT SIGNS OF INJURIES/BEHAVIORS THAT COULD BE RELATED TO ABUSE/NEGLECT?: NO

## 2024-07-16 SDOH — SOCIAL STABILITY: SOCIAL INSECURITY: ARE YOU OR HAVE YOU BEEN THREATENED OR ABUSED PHYSICALLY, EMOTIONALLY, OR SEXUALLY BY ANYONE?: NO

## 2024-07-16 ASSESSMENT — COGNITIVE AND FUNCTIONAL STATUS - GENERAL
STANDING UP FROM CHAIR USING ARMS: A LITTLE
WALKING IN HOSPITAL ROOM: A LOT
MOVING FROM LYING ON BACK TO SITTING ON SIDE OF FLAT BED WITH BEDRAILS: A LITTLE
WALKING IN HOSPITAL ROOM: A LITTLE
DAILY ACTIVITIY SCORE: 24
CLIMB 3 TO 5 STEPS WITH RAILING: A LITTLE
MOBILITY SCORE: 16
PATIENT BASELINE BEDBOUND: NO
MOVING TO AND FROM BED TO CHAIR: A LITTLE
MOBILITY SCORE: 22
CLIMB 3 TO 5 STEPS WITH RAILING: A LOT
TURNING FROM BACK TO SIDE WHILE IN FLAT BAD: A LITTLE

## 2024-07-16 ASSESSMENT — ACTIVITIES OF DAILY LIVING (ADL)
PATIENT'S MEMORY ADEQUATE TO SAFELY COMPLETE DAILY ACTIVITIES?: YES
HEARING - LEFT EAR: FUNCTIONAL
HEARING - RIGHT EAR: FUNCTIONAL
ADL_ASSISTANCE: INDEPENDENT
BATHING: INDEPENDENT
LACK_OF_TRANSPORTATION: NO
JUDGMENT_ADEQUATE_SAFELY_COMPLETE_DAILY_ACTIVITIES: YES
ADEQUATE_TO_COMPLETE_ADL: YES
WALKS IN HOME: INDEPENDENT
TOILETING: INDEPENDENT
DRESSING YOURSELF: INDEPENDENT
GROOMING: INDEPENDENT
FEEDING YOURSELF: INDEPENDENT
ASSISTIVE_DEVICE: WALKER

## 2024-07-16 ASSESSMENT — LIFESTYLE VARIABLES
HOW MANY STANDARD DRINKS CONTAINING ALCOHOL DO YOU HAVE ON A TYPICAL DAY: 1 OR 2
HOW OFTEN DO YOU HAVE 6 OR MORE DRINKS ON ONE OCCASION: WEEKLY
SUBSTANCE_ABUSE_PAST_12_MONTHS: NO
HOW OFTEN DO YOU HAVE A DRINK CONTAINING ALCOHOL: 2-3 TIMES A WEEK
AUDIT-C TOTAL SCORE: 6
AUDIT-C TOTAL SCORE: 6
PRESCIPTION_ABUSE_PAST_12_MONTHS: NO
SKIP TO QUESTIONS 9-10: 0

## 2024-07-16 ASSESSMENT — PATIENT HEALTH QUESTIONNAIRE - PHQ9
2. FEELING DOWN, DEPRESSED OR HOPELESS: NOT AT ALL
SUM OF ALL RESPONSES TO PHQ9 QUESTIONS 1 & 2: 0
1. LITTLE INTEREST OR PLEASURE IN DOING THINGS: NOT AT ALL

## 2024-07-16 ASSESSMENT — PAIN - FUNCTIONAL ASSESSMENT: PAIN_FUNCTIONAL_ASSESSMENT: 0-10

## 2024-07-16 ASSESSMENT — PAIN SCALES - GENERAL
PAINLEVEL_OUTOF10: 0 - NO PAIN

## 2024-07-16 NOTE — HOSPITAL COURSE
64 y.o. female with a PMH of T2DM (last HgA1c 8.5% 11/15/23), HFrEF (last EF 35-40% 8/26/23), CAD s/p CABG, HTN, PAD s/p left SFA stent, renal artery stenosis, and carotid artery stenosis who presents for several days of chest pain and inability to walk. States her claudication sx are unbarable and not able to ambulate safely. There was concern for cardiac amyloidosis given wall thickening on imaging.  Cardiology recommended an MRI and a NM PYP study which were unremarkable.  UPEP was negative.  SPEP remains in process. Overall lower likelihood of cardiac amyloidosis. Given worsening claudication symptoms, and DENISE and lower extremity vascular study was completed.  Patient was seen by vascular surgery who recommended no intervention and recommended following up with her vascular surgeon at [facility]. Pt did complain of vaginal discharge and was found to have trichomonas.  Started on course of metronidazole for 7 days. PT OT recommending SNF and pt agreeable      DISPO: SNF      #PAD s/p SFA stent   - continue home dose of Aspirin 81 mg PO every day and Plavix 75 mg PO every day  -seen at [facility] in 2023 and the [facility] surg note says they want to do revascularization but I am unable to find any documentation of a procedure or follow up  -DENISE ordered and reviewed   -LE vascular/doppler study reviewed. Vascular surgery update reviewed and appreciate. No intervention follow with established surgeon at [facility].    #cardiac wall thickening   -cards cs reviewed   -MRI reviewed   -serum kappa/jeremie free light chains ratio is wnl  -Spep pending   -Upep negative   -PYP scan not indicative of amyloid   -follow up cards OP    #syncope  Per pt, she fell bc her legs gave out on her   - PT/OT   -cocaine +     #Trichomonas infection   -vaginal/discharge swab done and labs ordered follow up  -metronidzole 500 mg every 12 hours for 7 days    #chest pain 2/2 coronary vasospasm 2/2 cocaine use -reolved  #CAD s/p CABG  #HFrEF (last EF 35-40% 8/26/23) -  "ECHO 07/16/24 improved EF 50%  #angina at rest  #cocaine use disorder  - Troponin negative   - EKG on admit: NSR   - Echo discussed with reading cardiologist: concern for cardiac amyloid   - utox: cocaine +    -start Ranolazine 500 mg BID    - continue home dose of Farxiga 10 mg PO every day, Coreg 25 mg PO BID, and Imdur 60 mg PO every day    #uncontrolled hypertension   - Amlodipine 10 mg PO every day   - Hydralazine 100 mg PO TID;  - Carvedilol  25 mg oral, BID    #hyperglycemia in DM2 2/2 pt snacking and eating high sugar foods   #uncontrolled T2DM (last HgA1c 8.5% 11/15/23)  - HgA1c 15.2  - diabetes education  - pt noncompliant with insulin outpatient  - metformin  - continue home dose of Farxiga 10 mg PO every day  -Ctn  Lantus 24 units at bedtime  - accucheck ACHS  - diabetic diet  -discussed with pt that sugars are too high and are 2/2 her eating cookies and soda. Patient responded \"my sugar was good when I woke up\"       #hypomagnesemia  #hypokalemia   - resolved     #bilateral hip pain  #unable to ambulate  - xray of bilateral hips: No acute abnormality in the hips.   - lidocaine patch to both hips  - tylenol 650 mg q6h PRN mild-moderate pain  -PT/OT    "

## 2024-07-16 NOTE — PROGRESS NOTES
Physical Therapy    Physical Therapy Evaluation    Patient Name: Myrna Khan  MRN: [id number]  Today's Date: 7/16/2024   Time Calculation  Start Time: 1107  Stop Time: 1136  Time Calculation (min): 29 min    Assessment/Plan   PT Assessment  PT Assessment Results: Decreased strength, Decreased endurance, Impaired balance, Decreased mobility  Rehab Prognosis: Good  Barriers to Discharge: none  Evaluation/Treatment Tolerance: Patient limited by fatigue  Medical Staff Made Aware: Yes  Strengths: Attitude of self, Coping skills  Barriers to Participation: Comorbidities  End of Session Communication: Bedside nurse  Assessment Comment: The pt demonstrated unsafe mobility using a wheeled walker and appropriate for moderate intensity level therapy after d/c.  End of Session Patient Position: Bed, 2 rail up, Alarm off, not on at start of session  IP OR SWING BED PT PLAN  Inpatient or Swing Bed: Inpatient  PT Plan  Treatment/Interventions: Bed mobility, Transfer training, Gait training, Balance training, Strengthening, Endurance training, Therapeutic exercise, Therapeutic activity, Home exercise program  PT Plan: Ongoing PT  PT Frequency: 3 times per week  PT Discharge Recommendations: Moderate intensity level of continued care  Equipment Recommended upon Discharge:  (none)  PT Recommended Transfer Status: Contact guard  PT - OK to Discharge: Yes      Subjective   General Visit Information:  General  Reason for Referral: Syncope, chest pain and generalized weakness  Past Medical History Relevant to Rehab: h/o DVT, DM II, HTN, heart failure with reduced EF, CAD status post CABG, peripheral arterial disease status post left SFA stent, renal artery stenosis, carotid artery stenosis  Prior to Session Communication: Bedside nurse  Patient Position Received: Bed, 2 rail up, Alarm off, not on at start of session  Preferred Learning Style: verbal, visual, written  General Comment: The pt was pleasant, cooperative and willing to  participate in therapy.  Home Living:  Home Living  Type of Home: Apartment  Lives With: Alone  Home Adaptive Equipment:  (Rollator)  Home Layout: One level  Home Access: Elevator  Bathroom Shower/Tub: Tub/shower unit  Bathroom Toilet: Standard  Bathroom Equipment: Grab bars in shower, Shower chair without back  Prior Level of Function:  Prior Function Per Pt/Caregiver Report  Level of [place]: Independent with ADLs and functional transfers, Independent with homemaking with ambulation  Receives Help From: Family, Friends  ADL Assistance: Independent  Homemaking Assistance: Independent  Ambulatory Assistance: Independent  Vocational: Retired, On disability  Leisure: Swim and run  Hand Dominance: Right  Prior Function Comments: The pt was independent without an assistive device in the household and in the community.  Precautions:  Precautions  Hearing/Visual Limitations: Hearing and vision WFL with glasses.  Medical Precautions: Fall precautions  Precautions Comment: Pt in compliance with precaution throughout PT session.     Objective   Pain:  Pain Assessment  Pain Assessment: 0-10  0-10 (Numeric) Pain Score: 0 - No pain  Cognition:  Cognition  Overall Cognitive Status: Within Functional Limits    General Assessments:  General Observation  General Observation: The pt presented significantly unsteady gait using the wheeled walker.     Activity Tolerance  Endurance: Decreased tolerance for upright activites    Sensation  Light Touch: No apparent deficits  Sensation Comment: WFL    Strength  Strength Comments: Naveen LE decreased strength.  Perception  Inattention/Neglect: Appears intact      Coordination  Movements are Fluid and Coordinated: Yes  Coordination Comment: WFL    Postural Control  Postural Control: Within Functional Limits  Posture Comment: The pt presented with good sitting and standing posture using a wheeled walker.    Static Sitting Balance  Static Sitting-Balance Support: Bilateral upper extremity  supported, Feet supported  Static Sitting-Level of Assistance: Independent  Dynamic Sitting Balance  Dynamic Sitting-Balance Support: Bilateral upper extremity supported, Feet supported  Dynamic Sitting-Comments: Independent    Static Standing Balance  Static Standing-Balance Support: Bilateral upper extremity supported  Static Standing-Level of Assistance: Contact guard  Static Standing-Comment/Number of Minutes: using a wheeled walker.  Dynamic Standing Balance  Dynamic Standing-Balance Support: Bilateral upper extremity supported  Dynamic Standing-Comments: CGA using a wheeled walker.  Functional Assessments:  Bed Mobility  Bed Mobility: No    Transfers  Transfer: Yes  Transfer 1  Transfer From 1: Sit to  Transfer to 1: Stand  Transfer Device 1: Walker  Transfer Level of Assistance 1: Close supervision  Transfers 2  Transfer From 2: Stand to  Transfer to 2: Sit  Transfer Device 2: Walker  Transfer Level of Assistance 2: Close supervision    Ambulation/Gait Training  Ambulation/Gait Training Performed: Yes  Ambulation/Gait Training 1  Surface 1: Level tile  Device 1: Rolling walker  Assistance 1: Contact guard  Quality of Gait 1: Decreased step length, Forward flexed posture, Soft knee(s) (unsteady, decreased bianca, decreased endurance)  Comments/Distance (ft) 1: 25ft  Extremity/Trunk Assessments:  Cervical Spine   Cervical Spine: Within Functional Limits  Lumbar Spine   Lumbar Spine : Within Functional Limits    RUE   RUE : Within Functional Limits  LUE   LUE: Within Functional Limits  RLE   RLE : Exceptions to WFL  AROM RLE (degrees)  RLE AROM Comment: WFL  Strength RLE  R Hip Flexion: 4-/5  R Knee Flexion: 4/5  R Knee Extension: 4/5  R Ankle Dorsiflexion: 5/5  R Ankle Plantar Flexion: 5/5  LLE   LLE : Exceptions to WFL  AROM LLE (degrees)  LLE AROM Comment: WFL  Strength LLE  L Hip Flexion: 4-/5  L Knee Flexion: 4/5  L Knee Extension: 4/5  L Ankle Dorsiflexion: 5/5  L Ankle Plantar Flexion: 5/5  Outcome  Measures:  [facility] Basic Mobility  Turning from your back to your side while in a flat bed without using bedrails: A little  Moving from lying on your back to sitting on the side of a flat bed without using bedrails: A little  Moving to and from bed to chair (including a wheelchair): A little  Standing up from a chair using your arms (e.g. wheelchair or bedside chair): A little  To walk in hospital room: A lot  Climbing 3-5 steps with railing: A lot  Basic Mobility - Total Score: 16    Encounter Problems       Encounter Problems (Active)       Balance       STG - Maintains supervision assistance dynamic standing balance with upper extremity support using a wheeled walker. (Progressing)       Start:  07/16/24    Expected End:  07/30/24            STG - Maintains supervision assistance static standing balance with upper extremity support using a wheeled walker. (Progressing)       Start:  07/16/24    Expected End:  07/30/24               Mobility       STG - Patient will ambulate 125ft with supervision assistance using a wheeled walker. (Progressing)       Start:  07/16/24    Expected End:  07/30/24               PT Transfers       STG - Transfer from bed to chair with supervision assistance using a wheeled walker. (Progressing)       Start:  07/16/24    Expected End:  07/30/24            STG - Patient to transfer to and from sit to supine, independently. (Progressing)       Start:  07/16/24    Expected End:  07/30/24            STG - Patient will transfer sit to and from stand with supervision assistance using a wheeled walker. (Progressing)       Start:  07/16/24    Expected End:  07/30/24                   Education Documentation  Body Mechanics, taught by Warren Oquendo PT at 7/16/2024 12:58 PM.  Learner: Patient  Readiness: Acceptance  Method: Explanation, Demonstration  Response: Verbalizes Understanding, Demonstrated Understanding    Home Exercise Program, taught by Warren Oquendo, BOOGIE at 7/16/2024 12:58  PM.  Learner: Patient  Readiness: Acceptance  Method: Explanation, Demonstration  Response: Verbalizes Understanding, Demonstrated Understanding    Mobility Training, taught by Warren Oquendo PT at 7/16/2024 12:58 PM.  Learner: Patient  Readiness: Acceptance  Method: Explanation, Demonstration  Response: Verbalizes Understanding, Demonstrated Understanding    Education Comments  No comments found.

## 2024-07-16 NOTE — PROGRESS NOTES
Pharmacy Medication History Review    Myrna Khan is a 64 y.o. female admitted for Syncope and collapse. Pharmacy reviewed the patient's [id number] medications and allergies for accuracy.    The list below reflects the updated PTA list. Comments regarding how patient may be taking medications differently can be found in the Admit Orders Activity  Prior to Admission Medications   Prescriptions Informant Patient/Chart Reported?   Farxiga 10 mg Self Yes   Sig: Take 1 tablet (10 mg) by mouth once daily with breakfast.   Lantus Solostar U-100 Insulin 100 unit/mL (3 mL) pen Self Yes   Sig: Inject 20 Units under the skin once daily. Take per directed   acetaminophen (Tylenol) 500 mg tablet Self PRN   Sig: Take 1 tablet (500 mg) by mouth every 6 hours if needed   for mild pain (1 - 3) or moderate pain (4 - 6). Take per directed   amLODIPine (Norvasc) 10 mg tablet Self Yes   Sig: TAKE 1 TABLET BY MOUTH ONCE DAILY   aspirin 81 mg chewable tablet Self Yes   Sig: CHEW 1 TABLET BY MOUTH ONCE DAILY   atorvastatin (Lipitor) 80 mg tablet Self Yes   Sig: TAKE 1 TABLET BY MOUTH ONCE DAILY      carvedilol (Coreg) 25 mg tablet Self Yes   Sig: TAKE 1 TABLET BY MOUTH TWO TIMES A DAY   cholecalciferol (Vitamin D-3) 25 MCG (1000 UT) capsule Self Yes   Sig: Take 2 capsules (50 mcg) by mouth once daily.   clopidogrel (Plavix) 75 mg tablet Self Yes   Sig: Take 1 tablet (75 mg) by mouth once daily.   hydrALAZINE (Apresoline) 100 mg tablet Self Yes   Sig: Take 1 tablet (100 mg) by mouth once daily.  **Patient states she still takes this medication, No Fill History**      isosorbide mononitrate ER (Imdur) 60 mg 24 hr tablet Self Yes   Sig: TAKE 1 TABLET BY MOUTH ONCE DAILY      metFORMIN XR (Glucophage-XR) 500 mg 24 hr tablet Self Yes   Sig: TAKE 2 TABLETS BY MOUTH ONCE DAILY   methocarbamol (Robaxin) 750 mg tablet Self PRN   Sig: Take 2 tablets (1,500 mg) by mouth 3 times a day as   needed (muscle strain).   multivitamin with  minerals tablet Self Yes   Sig: Take 1 tablet by mouth once daily.      polyethylene glycol (Glycolax, Miralax) 17 gram packet Self PRN   Sig: Take 17 g by mouth once daily as needed (constipation).   sennosides (Senokot) 8.6 mg tablet Self PRN   Sig: Take 2 tablets (17.2 mg) by mouth 2 times a day as needed   for constipation.      Facility-Administered Medications: None        The list below reflects the updated allergy list. Please review each documented allergy for additional clarification and justification.  Allergies  Reviewed by Yinka Greenberg on 7/15/2024        Severity Reactions Comments    Lisinopril Not Specified Itching     Amoxicillin Low Rash     Losartan Low Itching, Rash             Patient accepts M2B at discharge. Pharmacy has been updated to [facility].    Sources used to complete the med history include:  Patient interviewed about medications, alert, cooperative, pleasant, knew her medications and doses  Epic Dispense Report reviewed  Care Everywhere reviewed   [facility] Clinical Summary reviewed      Below are additional concerns with the patient's PTA list.  None    ---------------------------------  Yinka Greenberg CPhT  Transitions of Care Technician  Medication reconciliation complete  Please reach out via [employer] Secure Chat for questions,   or if no response call [place] or [employer].  [facility]

## 2024-07-16 NOTE — CONSULTS
Inpatient consult to Cardiology  Consult performed by: Jayy Lehman MD  Consult ordered by: Jerald Ireland DO        Inpatient Cardiology Consult Note  Reason for consult:     HPI:   Myrna Khan is a 64 y.o. female presenting with PMH of T2DM (last HgA1c 8.5% 11/15/23), HFrEF (last EF 35-40% 8/26/23), CAD s/p CABG x2 LIMA to LAD, APRIL-OM, HTN, PAD s/p left SFA stent, renal artery stenosis, and carotid artery stenosis who is admitted for syncope and left sided chest pain. Patient reports chest pain has been ongoing since CABG and is exertional and relieved with rest. Reports was having similar pain on presentation to the ED which resolved with pain medication (unclear what was given though patient described SL pill, though no record of this in chart). Patient also describes episodes of LOC where she is unable to remember preceeding episodes. Denies any prodrome and has prior episodes similar. These episodes have been unwitnessed thus far. Also endorses NIELSEN and feeling unsteady with gait as well. Denies fevers, chills, or sick contacts.     Patient was admitted for observation , and repeat TTE was obtained as part of syncope workup.     Cardiology is consulted on TTE findings suggestive of cardiac amyloidosis.     All system reviewed and negative unless mentioned above.     Cardiac studies:   EKG with no ischemic changes 7/15     Echo:   TTE 7/15   CONCLUSIONS:   1. Left ventricular ejection fraction is low normal, by visual estimate at 55%.   2. Spectral Doppler shows an impaired relaxation pattern of left ventricular diastolic filling.   3. Abnormal septal motion consistent with post-operative status.   4. GLS is reduced at -11.9% with an apical sparing pattern suggestive of possible cardiac amyloid. LV systolic function is difficult to assess given that with swinging of the heart many images become foreshortened and LV appears better in some views than others. Overall does appear to be normal or low  normal without regional wall motion abnormalities. Given the combination of thickened LV and RV walls with somewhat thickened valves and pericardial effusion with reduced GLS with the pattern of apical sparing, would consider cardiac MRI to further assess for possible cardiac amyloid.   5. There is reduced right ventricular systolic function.   6. The left atrium is mildly dilated.   7. Mobile atrial septal aneurysm wtih an agitated saline contrast study that was negative for intracardiac shunt.   8. Mildly thickened MV leaflet tips with doming of the AML with only trace MR and mean MV gradient only 1.5mmHg.   9. Primary service notified of the findings at the time of reporting.  10. Compared with the prior exam from 8/26/2023 there has been improvement of the LV systolic function Previously there was moderatley reduced LV systolic function with apex having worst function. There has been interval surgical revascularization between the two studies. Today's exam was the [facility] echocadiogram to include GLS asessment and raised the possibility of cardiac amyloid.    Cath:   8/2023   Coronary Intervention Comments:  Therapeutic heparin given. Using Ikari L 3.5 guide, the left system was engaged. FFR guidewire was prepared in usual fashion and advanced into LAD. A baseline FFR was 0.84, Following adenosine infusion FFR went down to 0.74. The FFR wire was then taken to LCx, baseline FFR was 0.88, post adenosine FFR went down to 0.69.     Coronary Lesion Summary:  Vessel            Stenosis         Vessel Segment  LAD             70% stenosis            mid  Circumflex 85% in-stent restenosis      mid      Current Facility-Administered Medications:     acetaminophen (Tylenol) tablet 650 mg, 650 mg, oral, q6h PRN, YOU Rich    amLODIPine (Norvasc) tablet 10 mg, 10 mg, oral, Daily, YOU Rich, 10 mg at 07/15/24 2104    aspirin chewable tablet 81 mg, 81 mg, oral, Daily, Jessica Trinidad  T Tyshawn, APRN-CNP, 81 mg at 07/16/24 0815    atorvastatin (Lipitor) tablet 80 mg, 80 mg, oral, Nightly, YOU Rich, 80 mg at 07/15/24 2010    carvedilol (Coreg) tablet 25 mg, 25 mg, oral, BID, YOU Rich, 25 mg at 07/16/24 0814    cholecalciferol (Vitamin D-3) tablet 2,000 Units, 2,000 Units, oral, Daily, LEONIDES Rich-CNP, 2,000 Units at 07/16/24 0815    clopidogrel (Plavix) tablet 75 mg, 75 mg, oral, Daily, YOU Rich, 75 mg at 07/15/24 2104    dapagliflozin propanediol (Farxiga) tablet 10 mg, 10 mg, oral, Daily with breakfast, YOU Rich, 10 mg at 07/16/24 0814    dextrose 50 % injection 12.5 g, 12.5 g, intravenous, q15 min PRN, LEONIDES Rich-CNP    dextrose 50 % injection 25 g, 25 g, intravenous, q15 min PRN, YOU Rich    enoxaparin (Lovenox) syringe 40 mg, 40 mg, subcutaneous, q24h, LEONIDES Rich-CNP, 40 mg at 07/15/24 2104    glucagon (Glucagen) injection 1 mg, 1 mg, intramuscular, q15 min PRN, LEONIDES Rich-CNP    glucagon (Glucagen) injection 1 mg, 1 mg, intramuscular, q15 min PRN, YOU Rich    hydrALAZINE (Apresoline) tablet 100 mg, 100 mg, oral, TID, LEONIDES Rich-CNP, 100 mg at 07/16/24 1559    insulin glargine (Lantus) injection 24 Units, 24 Units, subcutaneous, q24h, YOU Rich    insulin lispro (HumaLOG) injection 0-10 Units, 0-10 Units, subcutaneous, TID, YOU Rich, 2 Units at 07/16/24 1358    isosorbide mononitrate ER (Imdur) 24 hr tablet 60 mg, 60 mg, oral, Daily, YOU Rich, 60 mg at 07/15/24 2104    lidocaine 4 % patch 2 patch, 2 patch, transdermal, Daily, YOU Rich, 2 patch at 07/16/24 0815    melatonin tablet 5 mg, 5 mg, oral, Nightly PRN, YOU Rich, 5 mg at 07/15/24 2104    multivitamin with  minerals 1 tablet, 1 tablet, oral, Daily, YOU Rich, 1 tablet at 07/16/24 0815    polyethylene glycol (Glycolax, Miralax) packet 17 g, 17 g, oral, Daily, YOU Rich    Current Outpatient Medications:     cholecalciferol (Vitamin D-3) 25 MCG (1000 UT) capsule, Take 2 capsules (50 mcg) by mouth once daily., Disp: , Rfl:     clopidogrel (Plavix) 75 mg tablet, Take 1 tablet (75 mg) by mouth once daily., Disp: , Rfl:     Farxiga 10 mg, Take 1 tablet (10 mg) by mouth once daily with breakfast., Disp: , Rfl:     acetaminophen (Tylenol) 500 mg tablet, Take 1 tablet (500 mg) by mouth every 6 hours if needed for mild pain (1 - 3) or moderate pain (4 - 6). Take per directed, Disp: 30 tablet, Rfl: 0    amLODIPine (Norvasc) 10 mg tablet, TAKE 1 TABLET BY MOUTH ONCE DAILY, Disp: 30 tablet, Rfl: 0    aspirin 81 mg chewable tablet, CHEW 1 TABLET BY MOUTH ONCE DAILY, Disp: 30 tablet, Rfl: 3    atorvastatin (Lipitor) 80 mg tablet, TAKE 1 TABLET BY MOUTH ONCE DAILY, Disp: 30 tablet, Rfl: 2    blood sugar diagnostic strip, Use 1 strip to check blood sugar 3 times a day with meals., Disp: 100 each, Rfl: 0    blood-glucose meter misc, Use daily or as directed for monitoring of diabetes., Disp: 1 each, Rfl: 0    carvedilol (Coreg) 25 mg tablet, TAKE 1 TABLET BY MOUTH TWO TIMES A DAY, Disp: 60 tablet, Rfl: 2    hydrALAZINE (Apresoline) 100 mg tablet, Take 1 tablet (100 mg) by mouth once daily., Disp: , Rfl:     insulin aspart (NovoLOG) 100 unit/mL (3 mL) pen, Inject 10 Units under the skin 3 times a day before meals. Take as directed per insulin instructions. (Patient not taking: Reported on 7/15/2024), Disp: 15 mL, Rfl: 1    isosorbide mononitrate ER (Imdur) 60 mg 24 hr tablet, TAKE 1 TABLET BY MOUTH ONCE DAILY, Disp: 30 tablet, Rfl: 0    lancets misc, Use three times a day to test blood sugar w/ meals, Disp: 102 each, Rfl: 0    Lantus Solostar U-100 Insulin 100 unit/mL (3 mL) pen, Inject 24  "Units under the skin once daily. Take per directed (Patient taking differently: Inject 20 Units under the skin once daily. Take per directed), Disp: 15 mL, Rfl: 1    metFORMIN XR (Glucophage-XR) 500 mg 24 hr tablet, TAKE 2 TABLETS BY MOUTH ONCE DAILY, Disp: 60 tablet, Rfl: 0    methocarbamol (Robaxin) 750 mg tablet, Take 2 tablets (1,500 mg) by mouth 3 times a day as needed (muscle strain)., Disp: , Rfl:     multivitamin with minerals tablet, Take 1 tablet by mouth once daily., Disp: , Rfl:     pen needle, diabetic 31 gauge x 3/16\" needle, USE THREE TIMES A DAY, Disp: 200 each, Rfl: 0    polyethylene glycol (Glycolax, Miralax) 17 gram packet, Take 17 g by mouth once daily as needed (constipation)., Disp: , Rfl:     sennosides (Senokot) 8.6 mg tablet, Take 2 tablets (17.2 mg) by mouth 2 times a day as needed for constipation., Disp: , Rfl:     Objective:    Vitals:    07/16/24 1534   BP: 149/77   Pulse: 64   Resp: 16   Temp:    SpO2: 98%       Exam:  General - well appearing   Neck - no JVD   Chest - CTAB   Cardiac - S1, S2, no murmur heard   Lower ext - No LE edema     Labs/Imaging:     Results from last 72 hours   Lab Units 07/16/24  0458 07/15/24  1501   TROPHSCMC ng/L  --  14   SODIUM mmol/L 135*  --    POTASSIUM mmol/L 3.5  --    CO2 mmol/L 26  --    BUN mg/dL 14  --    CREATININE mg/dL 0.68  --    MAGNESIUM mg/dL 1.76  --    PHOSPHORUS mg/dL 2.8  --       Results from last 72 hours   Lab Units 07/16/24  0458   WBC AUTO x10*3/uL 7.7   HEMOGLOBIN g/dL 13.5   PLATELETS AUTO x10*3/uL 209        Assessment and Plan:   64 y.o. female presenting with PMH of T2DM (last HgA1c 8.5% 11/15/23), HFrEF (last EF 35-40% 8/26/23), CAD s/p CABG x2 LIMA to LAD, APRIL-OM, HTN, PAD s/p left SFA stent, renal artery stenosis, and carotid artery stenosis who is admitted for syncope and left sided chest pain with new TTE findings suggestive of amyloidosis.     #Apical Sparing on TTE   #HFrecEF 55%   - TTE obtained 7/15 as part of " syncope workup and significant for apical sparing with strain, but recovered EF to 55%   - cMR obtained with diffuse left ventricle hypertrophy measuring up to 1.8 cm. There  is extensive sub endocardial delayed enhancement with more prominent  involvement of the lateral wall. Findings are nonspecific but can be  seen in cardiac amyloidosis. Cannot exclude component of infarction  in the lateral wall.  Recommendations   - findings can be seen with cardiac amyloidosis and warrants further workup in the setting of hx of cardiomyopathy (can be done outpatient, if patient however is to stay admitted would be reasonable to do inpatient):   - would obtain serum kappa/jeremie free light chains and SPEP and UPEP along with EMILIA   - obtain PYP scan  - if PYP negative but serum testing positive may warrant biopsy       #Stable Angina   #CAD s/p CABGx2 2023   - chest pain likely cardiac given history   - patient reports intensity and frequency unchanged from after CABG, and EKG and troponin reassuring that likely not ACS   - ddx: deconditioning along with stable angina with known CAD   Recommendations:   - start Ranolazine 500 mg BID   - continue imdur 60 mg daily and increase amlodipine to 10 mg (for better anginal and blood pressure control)   - continue coreg 25mg BID     #Syncope   - hx concerning for cardiac etiology, though cMR without evidence of scar and telemetry without any malignant findings   - given extensive cardiac hx would be prudent however to rule out arrythmia with event monitor   Recommendations   - 30 day event monitor and outpatient follow up       - Please arrange Cardiology follow up placing ambulatory referral upon discharge      Recommendations are preliminary until note is co-signed by an attending.     Jayy Lehman MD   Cardiology Fellow   PGY5

## 2024-07-16 NOTE — PROGRESS NOTES
[facility] MEDICINE     PROGRESS NOTE       PATIENT NAME: Myrna Khan   MRN: [id number]   SERVICE DATE: 07/16/24   SERVICE TIME: 11:39 AM      Hospital Medicine/Primary Attending: Jerald Ireland DO   LENGTH OF STAY: 0     CHIEF COMPLAINT: Syncope and collapse   Principal Problem:    Syncope and collapse  Active Problems:    Hypertensive urgency    Hyperglycemia due to diabetes mellitus (Multi)    Hypokalemia    Hypomagnesemia        Assessment/Plan      I reviewed:    All new and relevant labs and imaging   New EKGs  Consultant notes   Vitals Signs   Nursing notes       64 y.o. female with a PMH of T2DM (last HgA1c 8.5% 11/15/23), HFrEF (last EF 35-40% 8/26/23), CAD s/p CABG, HTN, PAD s/p left SFA stent, renal artery stenosis, and carotid artery stenosis who presents for several days of chest pain and inability to walk       #syncope  Per pt, she fell bc her legs gave out on her   - orthostatic BP  - PT/OT     #chest pain  #CAD s/p CABG  #HFrEF (last EF 35-40% 8/26/23) - ECHO 07/16/24 improved EF 50%  - Troponin negative x1  - EKG on admit: NSR   - Echo discussed with reading cardiologist: concern for cardiac amyloid rec cardiology consult  -cardiology consult   - utox still pending collection   - continue home dose of Farxiga 10 mg PO every day, Coreg 25 mg PO BID, and Imdur 60 mg PO every day    #PAD s/p SFA stent   - continue home dose of Aspirin 81 mg PO every day and Plavix 75 mg PO QD    #uncontrolled hypertension   - Amlodipine 10 mg PO every day   - Hydralazine 100 mg PO TID;  - Carvedilol  25 mg oral, BID    #hyperglycemia  #uncontrolled T2DM (last HgA1c 8.5% 11/15/23)  - HgA1c 15.2  - diabetes education  - pt noncompliant with insulin outpatient  - holding home dose of Metformin while hospitalized resume on discharge  - continue home dose of Farxiga 10 mg PO every day  -Ctn  Lantus 24 units at bedtime  - Lispro SSI (0-10) units ordered  - accucheck  "ACHS  - hypoglycemia order set placed  - diabetic diet      #hypomagnesemia  #hypokalemia   - replete and recheck      #bilateral hip pain  #unable to ambulate  - xray of bilateral hips: No acute abnormality in the hips.   - lidocaine patch to both hips  - tylenol 650 mg q6h PRN mild-moderate pain  -PT/OT consulted             DISPOSITION:  Functional Status Prior to Admit:     Medical Necessity for Continued Hospitalization y     Plan of care discussed with:         Subjective   SUBJECTIVE:  Pt seen and examined.   y    Patient denies CP, SOB, fevers, chills, nausea, or emesis.         Objective      PHYSICAL EXAM: Patient Vitals for the past 24 hrs:   BP Temp Temp src Pulse Resp SpO2 Height Weight   07/16/24 0812 (!) 167/106 -- -- 64 17 98 % -- --   07/16/24 0505 120/78 -- -- 65 18 -- -- --   07/16/24 0316 167/87 -- -- 66 14 99 % -- --   07/15/24 2209 152/83 -- -- 68 16 100 % -- --   07/15/24 2138 (!) 173/92 -- -- 71 16 98 % -- --   07/15/24 1953 (!) 198/104 36.3 °C (97.4 °F) -- 69 -- 98 % -- --   07/15/24 1830 165/87 36.6 °C (97.9 °F) Temporal 70 16 99 % -- --   07/15/24 1730 (!) 167/118 -- -- 65 18 99 % -- --   07/15/24 1615 167/90 -- -- 64 18 99 % -- --   07/15/24 1515 (!) 195/106 -- -- 65 16 99 % -- --   07/15/24 1335 (!) 205/101 -- -- 63 16 98 % -- --   07/15/24 1151 -- 36.6 °C (97.9 °F) Temporal -- -- -- -- --   07/15/24 1150 (!) 195/105 -- -- 94 16 98 % 1.6 m (5' 3\") 51.7 kg (114 lb)      Physical Exam  Vitals and nursing note reviewed.   Constitutional:       General: She is not in acute distress.     Appearance: Normal appearance. She is not ill-appearing.   HENT:      Head: Normocephalic and atraumatic.   Pulmonary:      Effort: No respiratory distress.   Abdominal:      Tenderness: There is no guarding.   Musculoskeletal:      Right lower leg: Edema present.      Left lower leg: Edema present.   Skin:     Coloration: Skin is not jaundiced or pale.   Neurological:      Mental Status: She is alert and " "oriented to person, place, and time.   Psychiatric:         Mood and Affect: Mood normal.            MEDICATIONS:   Scheduled medications  amLODIPine, 10 mg, oral, Daily  aspirin, 81 mg, oral, Daily  atorvastatin, 80 mg, oral, Nightly  carvedilol, 25 mg, oral, BID  cholecalciferol, 2,000 Units, oral, Daily  clopidogrel, 75 mg, oral, Daily  dapagliflozin propanediol, 10 mg, oral, Daily with breakfast  enoxaparin, 40 mg, subcutaneous, q24h  hydrALAZINE, 100 mg, oral, TID  insulin glargine, 24 Units, subcutaneous, q24h  insulin lispro, 0-10 Units, subcutaneous, TID  isosorbide mononitrate ER, 60 mg, oral, Daily  lidocaine, 2 patch, transdermal, Daily  multivitamin with minerals, 1 tablet, oral, Daily  polyethylene glycol, 17 g, oral, Daily      Continuous medications     PRN medications  PRN medications: acetaminophen, dextrose, dextrose, glucagon, glucagon, melatonin       DATA:      Diagnostic tests reviewed for today's visit:     CBC:   Results from last 72 hours   Lab Units 07/16/24  0458   WBC AUTO x10*3/uL 7.7   HEMATOCRIT % 36.8   PLATELETS AUTO x10*3/uL 209   MCV fL 88     Coags:   Results from last 72 hours   Lab Units 07/15/24  1843   INR  1.0   APTT seconds 30     CMP:   Results from last 72 hours   Lab Units 07/16/24  0458 07/15/24  1155   SODIUM mmol/L 135* 130*   POTASSIUM mmol/L 3.5 3.4*   CO2 mmol/L 26 26   BUN mg/dL 14 10   MAGNESIUM mg/dL 1.76 1.53*   ALBUMIN g/dL 2.8* 3.3*   ALK PHOS U/L  --  82   ALT U/L  --  30   AST U/L  --  36      Cardiac Enzymes: No lab exists for component: \"TROP\" .lab  Liver Function, Amylase, Lipase:   Results from last 72 hours   Lab Units 07/15/24  1155   ALT U/L 30   AST U/L 36   ALK PHOS U/L 82      MG/PHOS: [id number](mg,p)@   Renal Panel:    Results from last 72 hours   Lab Units 07/16/24  0458   ALBUMIN g/dL 2.8*   BUN mg/dL 14   POTASSIUM mmol/L 3.5   CO2 mmol/L 26   SODIUM mmol/L 135*      Heme:        No lab exists for component: \"RETICP\", \"ABSRETIC\", \"LD\", " "\"AURA\", \"FE\", \"TRANSFERSAT\"   No components found for: \"UALBCR\"      Medication and Non-Pharmacologic VTE Prophylaxis/Anticoagulants    The patient has received anticoagulants recently:  Heparin is ordered or has been given within the last 24 hours OR  Lovenox has been given in the last 24 hours OR  An anticoagulant other than Heparin or Lovenox is on the home med list OR  An anticoagulant other than Heparin or Lovenox has been given within the last 5 days  Last Anticoag Admin            enoxaparin (Lovenox) syringe 40 mg    Given 40 mg at 07/15/24 2104    Frequency: Every 24 hours         No unadministered anticoagulant orders found.                   SIGNATURE: Jerald Ireland [facility] Medicine    PAGER/CONTACT #: Epic Shantal      Disclaimer: Portions of this note may have been generated using Dragon voice recognition software. Reasonable efforts were made to correct any dictation errors that resulted due to the programming of this software but some may still be present.     Portions of this note including HPI, ROS, impression/plan, and examination may have been copied forward from yesterday to July 16, 2024 as to provide important historical information essential in contributing to medical decision making. Documentation has been reviewed and edited as necessary to support clinical decision making for today's visit and to reflect my own independent evaluation of this patient.       The time of this note does not reflect the time I saw the patient but the time that this note was written     "

## 2024-07-16 NOTE — CONSULTS
"Inpatient Diabetes Education Consult    Reason for Visit:  Myrna Khan is a 64 y.o. female who presents for Hyperglycemia; hx T2DM; syncope and falls.    Consulting Service/Provider: YOU Rodriguez    Visit Type: Initial visit    Visit Modality: In-person    Discharge Equipment/Supply Needs:       Patient has supplies at home: Blood glucose meter:  , Testing strips:  , Lancets, and Pen needles    Patient History and Assessment:  New diagnosis:  hyperglycemia  Previous diagnosis: Type 2  Patient known to Diabetes Education department: Yes  Treatment prior to hospital admission: Oral medications Farxiga and Metformin  Insulin: Long acting, via pen  Diet: \"I watch the sugars\"  Blood glucose testing: Daily  Complications: cardiovascular disease and hyperglycemia 2' med non-compliance  PTA Medications:    Current Outpatient Medications   Medication Instructions    acetaminophen (TYLENOL) 500 mg, oral, Every 6 hours PRN, Take per directed    amLODIPine (Norvasc) 10 mg tablet TAKE 1 TABLET BY MOUTH ONCE DAILY    aspirin 81 mg chewable tablet CHEW 1 TABLET BY MOUTH ONCE DAILY    atorvastatin (Lipitor) 80 mg tablet TAKE 1 TABLET BY MOUTH ONCE DAILY    blood sugar diagnostic strip Use 1 strip to check blood sugar 3 times a day with meals.    blood-glucose meter misc Use daily or as directed for monitoring of diabetes.    carvedilol (Coreg) 25 mg tablet TAKE 1 TABLET BY MOUTH TWO TIMES A DAY    cholecalciferol (Vitamin D-3) 25 MCG (1000 UT) capsule 2 capsules, oral, Daily    clopidogrel (PLAVIX) 75 mg, oral, Daily RT    Farxiga 10 mg 1 tablet, oral, Daily with breakfast    hydrALAZINE (APRESOLINE) 100 mg, oral, Daily    insulin aspart (NOVOLOG) 10 Units, subcutaneous, 3 times daily before meals, Take as directed per insulin instructions.    isosorbide mononitrate ER (Imdur) 60 mg 24 hr tablet TAKE 1 TABLET BY MOUTH ONCE DAILY    lancets misc Use three times a day to test blood sugar w/ meals    Lantus Solostar U-100 " "Insulin 24 Units, subcutaneous, Daily, Take per directed    metFORMIN XR (Glucophage-XR) 500 mg 24 hr tablet TAKE 2 TABLETS BY MOUTH ONCE DAILY    methocarbamol (ROBAXIN) 1,500 mg, oral, 3 times daily PRN    multivitamin with minerals tablet 1 tablet, oral, Daily    pen needle, diabetic 31 gauge x 3/16\" needle USE THREE TIMES A DAY    polyethylene glycol (GLYCOLAX, MIRALAX) 17 g, oral, Daily PRN    sennosides (Senokot) 8.6 mg tablet 2 tablets, oral, 2 times daily PRN       Glucose   Date/Time Value Ref Range Status   07/16/2024 04:58  (H) 74 - 99 mg/dL Final   07/15/2024 11:55  (HH) 74 - 99 mg/dL Final   02/08/2024 01:24  (H) 74 - 99 mg/dL Final   02/06/2024 06:15  (H) 74 - 99 mg/dL Final   02/05/2024 08:13  (H) 74 - 99 mg/dL Final   01/16/2024 06:54  (H) 74 - 99 mg/dL Final   01/15/2024 06:24  (H) 74 - 99 mg/dL Final   01/14/2024 08:54  (H) 74 - 99 mg/dL Final   01/12/2024 06:17  (H) 74 - 99 mg/dL Final   01/11/2024 08:19  (H) 74 - 99 mg/dL Final     No results found for: \"CPEPTIDE\"  Hemoglobin A1C   Date Value Ref Range Status   07/15/2024 15.2 (H) see below % Final     Comment:     Hemoglobin A1c is >15%. Marked elevations in HbA1c are commonly due to poor glycemic control in diabetic patients. Rarely, hemoglobin variants may cause false elevation of HbA1c that is discordant with glycemic control status.    11/15/2023 8.5 (H) 4.3 - 5.6 % Final     Comment:     American Diabetes Association guidelines indicate that patients with HgbA1c in the range 5.7-6.4% are at increased risk for development of diabetes, and intervention by lifestyle modification may be beneficial. HgbA1c greater or equal to 6.5% is considered diagnostic of diabetes.   08/27/2023 7.8 (A) % Final     Comment:          Diagnosis of Diabetes-Adults   Non-Diabetic: < or = 5.6%   Increased risk for developing diabetes: 5.7-6.4%   Diagnostic of diabetes: > or = 6.5%  .       Monitoring of " Diabetes                Age (y)     Therapeutic Goal (%)   Adults:          >18           <7.0   Pediatrics:    13-18           <7.5                   7-12           <8.0                   0- 6            7.5-8.5   American Diabetes Association. Diabetes Care 33(S1), Jan 2010.     07/03/2021 14.4 (H) 4.3 - 5.6 % Final     Comment:     American Diabetes Association guidelines indicate that patients with HgbA1c in   the range 5.7-6.4% are at increased risk for development of diabetes, and   intervention by lifestyle modification may be beneficial. HgbA1c greater or   equal to 6.5% is considered diagnostic of diabetes.     POCT Hemoglobin A1C   Date Value Ref Range Status   12/09/2021 6.9 (A) 4.2 - 5.6 % Final     Comment:     Location:[facility], [address] [place] [address], [id number]  Point of care (POC) Hemoglobin A1c (HGBA1C) testing is intended to assess  glucose control and provide a management tool for patients known to have  diabetes and their healthcare providers.  Target HGBA1C levels may depend on  specific clinical circumstances.  POC HGBA1C is not intended for use as a  diagnostic or screening test; laboratory-based testing should be used for  diagnostic purposes.  The following information is supplemental and may not  be applicable to specific diabetes management situations:  The POC device   provides a normal range of 4.2% to 6.5% for the HGBA1C POC test.  However, the American Diabetes Association  guidelines indicate that  patients with HGBA1C in the range of 5.7% to 6.4% are at increased risk for  development of diabetes and that intervention by lifestyle modification may  be beneficial.  A HGBA1C level greater than or equal to 6.5% is considered  diagnostic of diabetes, pending confirmatory testing.  Use of HGBA1C testing  to evaluate glucose control may not be appropriate for patients with  hemoglobin variants or other conditions (e.g. anemia) that alter  "red blood  cell lifespan.       Patient Learning/Readiness Assessment:  Ms. Khan is agreeable to diabetes ed visit.      Interventions/Topics Covered:  See After Visit Summary for handouts/information sheets provided to patient.  Education Documentation  No documentation found.        Additional topics covered: Review of events leading up to hospital admission.  States \"I'll be honest... I don't take my medicine everyday\"  We reviewed her oral meds.  States she takes Metformin but thought that \"Farxiga was too much... I didn't think I needed both\"  Re insulin states she did not take glargine every day.  She monitors BG once a day \"most of the time.\"  Education provided on how the Metformin and Farxiga work to achieve a better BG value.  Review of how glargine insulin works.  After discussion, states, \"I didn't know it was okay to take all of that\"      Additional materials provided: n/a    CGM:  n/a    Education Outcome/Recommendations:        Recommendations for bedside nursing: Allow patient to self-inject insulin (supervised)    Recommendations for Providers: Follow-up w/ PCP and/or Endocrinology    Additional Comments: Ms. Khan follow with endocrinology team at [facility].  Next appt is in August (?)  Encouraged her to follow the regimen as recommended by provider including daily BG monitoring.  States she has an unsteady gait and wants to go to SNF to get her strength improved.  Will follow while inpatient.  Time spent:  35 mins.             "

## 2024-07-17 ENCOUNTER — APPOINTMENT (OUTPATIENT)
Dept: RADIOLOGY | Facility: HOSPITAL | Age: 64
DRG: 312 | End: 2024-07-17
Payer: COMMERCIAL

## 2024-07-17 LAB
ALBUMIN SERPL BCP-MCNC: 2.8 G/DL (ref 3.4–5)
ANION GAP SERPL CALC-SCNC: 12 MMOL/L (ref 10–20)
BUN SERPL-MCNC: 15 MG/DL (ref 6–23)
CALCIUM SERPL-MCNC: 8.3 MG/DL (ref 8.6–10.6)
CHLORIDE SERPL-SCNC: 102 MMOL/L (ref 98–107)
CO2 SERPL-SCNC: 23 MMOL/L (ref 21–32)
CREAT SERPL-MCNC: 0.54 MG/DL (ref 0.5–1.05)
EGFRCR SERPLBLD CKD-EPI 2021: >90 ML/MIN/1.73M*2
GLUCOSE BLD MANUAL STRIP-MCNC: 282 MG/DL (ref 74–99)
GLUCOSE BLD MANUAL STRIP-MCNC: 392 MG/DL (ref 74–99)
GLUCOSE BLD MANUAL STRIP-MCNC: 499 MG/DL (ref 74–99)
GLUCOSE BLD MANUAL STRIP-MCNC: 82 MG/DL (ref 74–99)
GLUCOSE SERPL-MCNC: 121 MG/DL (ref 74–99)
MAGNESIUM SERPL-MCNC: 1.46 MG/DL (ref 1.6–2.4)
PHOSPHATE SERPL-MCNC: 3.8 MG/DL (ref 2.5–4.9)
POTASSIUM SERPL-SCNC: 2.7 MMOL/L (ref 3.5–5.3)
PROT SERPL-MCNC: 5.7 G/DL (ref 6.4–8.2)
PROT UR-ACNC: 129 MG/DL (ref 5–25)
SODIUM SERPL-SCNC: 134 MMOL/L (ref 136–145)

## 2024-07-17 PROCEDURE — 2500000002 HC RX 250 W HCPCS SELF ADMINISTERED DRUGS (ALT 637 FOR MEDICARE OP, ALT 636 FOR OP/ED): Mod: SE | Performed by: NURSE PRACTITIONER

## 2024-07-17 PROCEDURE — 86318 IA INFECTIOUS AGENT ANTIBODY: CPT | Performed by: STUDENT IN AN ORGANIZED HEALTH CARE EDUCATION/TRAINING PROGRAM

## 2024-07-17 PROCEDURE — G0378 HOSPITAL OBSERVATION PER HR: HCPCS

## 2024-07-17 PROCEDURE — 36415 COLL VENOUS BLD VENIPUNCTURE: CPT | Performed by: STUDENT IN AN ORGANIZED HEALTH CARE EDUCATION/TRAINING PROGRAM

## 2024-07-17 PROCEDURE — 2500000004 HC RX 250 GENERAL PHARMACY W/ HCPCS (ALT 636 FOR OP/ED): Mod: SE | Performed by: NURSE PRACTITIONER

## 2024-07-17 PROCEDURE — 2500000001 HC RX 250 WO HCPCS SELF ADMINISTERED DRUGS (ALT 637 FOR MEDICARE OP): Mod: SE | Performed by: STUDENT IN AN ORGANIZED HEALTH CARE EDUCATION/TRAINING PROGRAM

## 2024-07-17 PROCEDURE — 78469 HEART INFARCT IMAGE (3D): CPT

## 2024-07-17 PROCEDURE — 83521 IG LIGHT CHAINS FREE EACH: CPT | Performed by: STUDENT IN AN ORGANIZED HEALTH CARE EDUCATION/TRAINING PROGRAM

## 2024-07-17 PROCEDURE — 2500000005 HC RX 250 GENERAL PHARMACY W/O HCPCS: Mod: SE | Performed by: NURSE PRACTITIONER

## 2024-07-17 PROCEDURE — 84155 ASSAY OF PROTEIN SERUM: CPT | Performed by: STUDENT IN AN ORGANIZED HEALTH CARE EDUCATION/TRAINING PROGRAM

## 2024-07-17 PROCEDURE — 96372 THER/PROPH/DIAG INJ SC/IM: CPT | Performed by: NURSE PRACTITIONER

## 2024-07-17 PROCEDURE — A9538 TC99M PYROPHOSPHATE: HCPCS | Mod: SE | Performed by: STUDENT IN AN ORGANIZED HEALTH CARE EDUCATION/TRAINING PROGRAM

## 2024-07-17 PROCEDURE — 2500000001 HC RX 250 WO HCPCS SELF ADMINISTERED DRUGS (ALT 637 FOR MEDICARE OP): Mod: SE | Performed by: NURSE PRACTITIONER

## 2024-07-17 PROCEDURE — 82947 ASSAY GLUCOSE BLOOD QUANT: CPT

## 2024-07-17 PROCEDURE — 86780 TREPONEMA PALLIDUM: CPT | Performed by: STUDENT IN AN ORGANIZED HEALTH CARE EDUCATION/TRAINING PROGRAM

## 2024-07-17 PROCEDURE — 80069 RENAL FUNCTION PANEL: CPT | Performed by: STUDENT IN AN ORGANIZED HEALTH CARE EDUCATION/TRAINING PROGRAM

## 2024-07-17 PROCEDURE — 3430000001 HC RX 343 DIAGNOSTIC RADIOPHARMACEUTICALS: Mod: SE | Performed by: STUDENT IN AN ORGANIZED HEALTH CARE EDUCATION/TRAINING PROGRAM

## 2024-07-17 PROCEDURE — 97535 SELF CARE MNGMENT TRAINING: CPT | Mod: GO | Performed by: OCCUPATIONAL THERAPIST

## 2024-07-17 PROCEDURE — 2500000002 HC RX 250 W HCPCS SELF ADMINISTERED DRUGS (ALT 637 FOR MEDICARE OP, ALT 636 FOR OP/ED): Mod: SE | Performed by: STUDENT IN AN ORGANIZED HEALTH CARE EDUCATION/TRAINING PROGRAM

## 2024-07-17 PROCEDURE — 97166 OT EVAL MOD COMPLEX 45 MIN: CPT | Mod: GO | Performed by: OCCUPATIONAL THERAPIST

## 2024-07-17 PROCEDURE — 86334 IMMUNOFIX E-PHORESIS SERUM: CPT | Performed by: STUDENT IN AN ORGANIZED HEALTH CARE EDUCATION/TRAINING PROGRAM

## 2024-07-17 PROCEDURE — 99232 SBSQ HOSP IP/OBS MODERATE 35: CPT | Performed by: STUDENT IN AN ORGANIZED HEALTH CARE EDUCATION/TRAINING PROGRAM

## 2024-07-17 PROCEDURE — 83735 ASSAY OF MAGNESIUM: CPT | Performed by: STUDENT IN AN ORGANIZED HEALTH CARE EDUCATION/TRAINING PROGRAM

## 2024-07-17 RX ORDER — RANOLAZINE 500 MG/1
500 TABLET, EXTENDED RELEASE ORAL 2 TIMES DAILY
Status: DISCONTINUED | OUTPATIENT
Start: 2024-07-17 | End: 2024-07-22 | Stop reason: HOSPADM

## 2024-07-17 RX ORDER — INSULIN GLARGINE 100 [IU]/ML
10 INJECTION, SOLUTION SUBCUTANEOUS ONCE
Status: COMPLETED | OUTPATIENT
Start: 2024-07-17 | End: 2024-07-17

## 2024-07-17 RX ORDER — INSULIN LISPRO 100 [IU]/ML
10 INJECTION, SOLUTION INTRAVENOUS; SUBCUTANEOUS ONCE
Status: COMPLETED | OUTPATIENT
Start: 2024-07-17 | End: 2024-07-17

## 2024-07-17 RX ORDER — INSULIN LISPRO 100 [IU]/ML
6 INJECTION, SOLUTION INTRAVENOUS; SUBCUTANEOUS ONCE
Status: COMPLETED | OUTPATIENT
Start: 2024-07-17 | End: 2024-07-17

## 2024-07-17 ASSESSMENT — COGNITIVE AND FUNCTIONAL STATUS - GENERAL
PERSONAL GROOMING: A LITTLE
DAILY ACTIVITIY SCORE: 19
DAILY ACTIVITIY SCORE: 24
HELP NEEDED FOR BATHING: A LITTLE
STANDING UP FROM CHAIR USING ARMS: A LITTLE
WALKING IN HOSPITAL ROOM: A LITTLE
CLIMB 3 TO 5 STEPS WITH RAILING: A LOT
MOVING TO AND FROM BED TO CHAIR: A LITTLE
DRESSING REGULAR LOWER BODY CLOTHING: A LITTLE
TOILETING: A LITTLE
MOBILITY SCORE: 18
DRESSING REGULAR UPPER BODY CLOTHING: A LITTLE
TURNING FROM BACK TO SIDE WHILE IN FLAT BAD: A LITTLE

## 2024-07-17 ASSESSMENT — PAIN SCALES - GENERAL
PAINLEVEL_OUTOF10: 0 - NO PAIN

## 2024-07-17 ASSESSMENT — ACTIVITIES OF DAILY LIVING (ADL)
HOME_MANAGEMENT_TIME_ENTRY: 12
LACK_OF_TRANSPORTATION: NO
ADL_ASSISTANCE: INDEPENDENT

## 2024-07-17 ASSESSMENT — PAIN - FUNCTIONAL ASSESSMENT
PAIN_FUNCTIONAL_ASSESSMENT: 0-10
PAIN_FUNCTIONAL_ASSESSMENT: 0-10

## 2024-07-17 NOTE — SIGNIFICANT EVENT
7000 Physician Certification        As the individual's attending physician , I certify that the above-named patient:  Is being discharged to a nursing facility directly from a hospital after receiving acute patient care at the hospital, and  Requires nursing facility services for the condition for which he/she received care in the hospital, and  Requires fewer than 30 days of nursing facility services, no later than the date of discharge.     I certify that inpatient care is required at the level recommended above. To the best of my knowledge, all information provided about the individual is a true and accurate reflection of the individual's condition.

## 2024-07-17 NOTE — CONSULTS
"Nutrition Initial Assessment:   Nutrition Assessment    Reason for Assessment: Admission nursing screening    Patient is a 64 y.o. female with PMH of T2DM (last HgA1c 8.5% 11/15/23), HFrEF (last EF 35-40% 8/26/23), CAD s/p CABG, HTN, PAD s/p left SFA stent, renal artery stenosis, and carotid artery stenosis. Pt presented to ED this morning for further evaluation on non-radiating chest pain that has been present for the last few days. She also endorses experiencing multiple falls at home yesterday and she is not sure if she hit her head.      Nutrition History:  Energy Intake: Good > 75 %  Food and Nutrient History: Pt reports she has a good appetite and consumes 3 meals daily. Reports usually consumes 100% of meals. Pt reports she has lost some wt without trying. Reports she drinks Ensure supplements at home and would like some during hospital admission.  Food Allergies/Intolerances:  None  GI Symptoms: None  Oral Problems: None       Anthropometrics:  Height: 160 cm (5' 3\")   Weight: 51.7 kg (114 lb)   BMI (Calculated): 20.2  IBW/kg (Dietitian Calculated): 52.3 kg  Percent of IBW: 99 %       Weight History:   Wt Readings from Last 10 Encounters:   07/15/24 51.7 kg (114 lb)   02/05/24 52.2 kg (115 lb)   01/16/24 58.2 kg (128 lb 4.8 oz) - 11.2% loss   01/11/24 72.6 kg (160 lb) - outlier, question accuracy of this wt     Per chart review:  12/08/23 49.4 kg (109 lb)  11/06/23 50 kg (110 lb 4.8 oz)     Weight Change %:  Weight History / % Weight Change: Reports usual body wt of 120lb.  Significant Weight Loss: Yes  Interpretation of Weight Loss: >10% in 6 months    Nutrition Focused Physical Exam Findings:    Subcutaneous Fat Loss:   Orbital Fat Pads: Mild-Moderate (slight dark circles and slight hollowing)  Buccal Fat Pads: Mild-Moderate (flat cheeks, minimal bounce)  Triceps: Severe (negligible fat tissue)  Muscle Wasting:  Temporalis: Mild-Moderate (slight depression)  Pectoralis (Clavicular Region): Mild-Moderate " (some protrusion of clavicle)  Deltoid/Trapezius: Mild-Moderate (slight protrusion of acromion process)  Quadriceps: Severe (depressions on inner and outer thigh)  Gastrocnemius: Severe (minimal muscle definition)  Edema:  Edema: +1 trace  Edema Location: RLE  Physical Findings:  Hair: Negative  Eyes: Negative  Mouth: Negative  Nails: Negative  Skin: Negative    Nutrition Significant Labs:  CBC Trend:   Results from last 7 days   Lab Units 07/16/24  0458 07/15/24  1155   WBC AUTO x10*3/uL 7.7 5.8   RBC AUTO x10*6/uL 4.18 4.29   HEMOGLOBIN g/dL 13.5 14.1   HEMATOCRIT % 36.8 37.0   MCV fL 88 86   PLATELETS AUTO x10*3/uL 209 147*    , BMP Trend:   Results from last 7 days   Lab Units 07/17/24  0550 07/16/24  0458 07/15/24  1155   GLUCOSE mg/dL 121* 177* 557*   CALCIUM mg/dL 8.3* 8.5* 8.9   SODIUM mmol/L 134* 135* 130*   POTASSIUM mmol/L 2.7* 3.5 3.4*   CO2 mmol/L 23 26 26   CHLORIDE mmol/L 102 100 95*   BUN mg/dL 15 14 10   CREATININE mg/dL 0.54 0.68 0.59    , BG POCT trend:   Results from last 7 days   Lab Units 07/17/24  1337 07/17/24  0818 07/16/24  2022 07/16/24  1809 07/16/24  1353   POCT GLUCOSE mg/dL 282* 82 344* 379* 185*    , Renal Lab Trend:   Results from last 7 days   Lab Units 07/17/24  0550 07/16/24  0458 07/15/24  1155 07/15/24  1155   POTASSIUM mmol/L 2.7* 3.5  --  3.4*   PHOSPHORUS mg/dL 3.8 2.8   < >  --    SODIUM mmol/L 134* 135*  --  130*   MAGNESIUM mg/dL 1.46* 1.76  --  1.53*   EGFR mL/min/1.73m*2 >90 >90  --  >90   BUN mg/dL 15 14  --  10   CREATININE mg/dL 0.54 0.68  --  0.59    < > = values in this interval not displayed.       Nutrition Specific Medications:  Scheduled medications  amLODIPine, 10 mg, oral, Daily  aspirin, 81 mg, oral, Daily  atorvastatin, 80 mg, oral, Nightly  carvedilol, 25 mg, oral, BID  cholecalciferol, 2,000 Units, oral, Daily  clopidogrel, 75 mg, oral, Daily  dapagliflozin propanediol, 10 mg, oral, Daily with breakfast  enoxaparin, 40 mg, subcutaneous, q24h  hydrALAZINE,  100 mg, oral, TID  insulin glargine, 24 Units, subcutaneous, q24h  insulin lispro, 0-10 Units, subcutaneous, TID  isosorbide mononitrate ER, 60 mg, oral, Daily  lidocaine, 2 patch, transdermal, Daily  multivitamin with minerals, 1 tablet, oral, Daily  polyethylene glycol, 17 g, oral, Daily  ranolazine, 500 mg, oral, BID      I/O:   No BM documented since admission ;      Dietary Orders (From admission, onward)       Start     Ordered    07/15/24 2004  Adult diet Carb Controlled, 2-3 grams sodium; 75 gram carb/meal, 45 gram Carb evening snack  Diet effective now        Question Answer Comment   Diet type Carb Controlled    Diet type 2-3 grams sodium    Carb diet selection: 75 gram carb/meal, 45 gram Carb evening snack        07/15/24 2004                     Estimated Needs:   Total Energy Estimated Needs (kCal): 1400 kCal  Method for Estimating Needs: 27kcal/kg IBW 52.3kg  Total Protein Estimated Needs (g):  (60-65)  Method for Estimating Needs: 1.2g/kg IBW  Total Fluid Estimated Needs (mL):  (1mL/kcal or per team)           Nutrition Diagnosis   Malnutrition Diagnosis  Patient has Malnutrition Diagnosis: Yes  Diagnosis Status: New (in setting of increased metabolic demand)  Malnutrition Diagnosis: Severe malnutrition related to chronic disease or condition  As Evidenced by: suspect patient meeting <75% of estimated energy needs for >1 month; 11.2% significant weight loss in 6 months; moderate-severe muscle and subcutaneous fat loss.            Nutrition Interventions/Recommendations         Nutrition Prescription:  Glucerna Shake BID (220kcal, 10g protein, 26g carb each).  Recommend carb controlled diet 60 gm carb/meal, 30 gm carb evening snack.  Continue multivitamin with minerals.        Nutrition Interventions:   Interventions: Meals and snacks, Medical food supplement  Goal: consume >50% of meals  Medical Food Supplement: Commercial beverage  Goal: Glucerna Shake BID       Nutrition Education:   Encouraged PO  intake of oral nutrition supplements. Provided pt with educational handouts: [place] Carbohydrate Counting for People with Diabetes. Educated on how many grams of carbohydrates are in 1 serving which is 15g. Educated pt to have 3-4 carbohydrate servings per meal and 1-2 carbohydrate servings per snack. Went over proper portion sizes of grains, starchy vegetables, fruits. Went over protein foods and educated on how they can lower glycemic index.        Nutrition Monitoring and Evaluation   Food/Nutrient Related History Monitoring  Monitoring and Evaluation Plan: Energy intake, Amount of food  Criteria: meet >75% of estimated energy needs  Amount of Food: Medical food intake  Criteria: Glucerna Shake BID    Body Composition/Growth/Weight History  Monitoring and Evaluation Plan: Weight  Criteria: stable weight    Biochemical Data, Medical Tests and Procedures  Monitoring and Evaluation Plan: Electrolyte/renal panel, Glucose/endocrine profile  Criteria: WNL              Time Spent (min): 60 minutes

## 2024-07-17 NOTE — PROGRESS NOTES
Occupational Therapy    Evaluation and Treatment    Patient Name: Myrna Khan  MRN: [id number]  Today's Date: 7/17/2024  [facility]  Time Calculation  Start Time: 1519  Stop Time: 1555  Time Calculation (min): 36 min    Assessment  IP OT Assessment  OT Assessment: Patient is a 64 year old female admitted to hospital with syncope and collapse. Patient participated in OT evaluation and ADL retraining this date. She presents with impaired ADL, functional mobility, functional transfer, activity tolerance, balance and would benefit from skilled OT services while in hospital and post discharge.  Prognosis: Good  Barriers to Discharge: Decreased caregiver support  Evaluation/Treatment Tolerance: Patient tolerated treatment well  Medical Staff Made Aware: Yes  End of Session Communication: Bedside nurse  End of Session Patient Position: Bed, 3 rail up, Alarm on  Plan:  Inpatient Plan  Treatment Interventions: ADL retraining, Functional transfer training, UE strengthening/ROM, Endurance training, Patient/family training, Compensatory technique education (functional bed mobility training, functional mobility training, balance retraining)  OT Frequency: 3 times per week  OT Discharge Recommendations: Moderate intensity level of continued care  OT Recommended Transfer Status:  (CGA)  OT - OK to Discharge: Yes  OT Assessment  Prognosis: Good  Barriers to Discharge: Decreased caregiver support  Evaluation/Treatment Tolerance: Patient tolerated treatment well  Medical Staff Made Aware: Yes  Strengths: Ability to acquire knowledge, Attitude of self  Barriers to Participation: Comorbidities    Subjective   Current Problem:  1. Fall, initial encounter        2. Hyperglycemia        3. Hypomagnesemia        4. Hypertension, unspecified type        5. Syncope and collapse  Transthoracic Echo (TTE) Complete    Transthoracic Echo (TTE) Complete      6. Chest pain, unspecified type  Transthoracic Echo (TTE) Complete     Transthoracic Echo (TTE) Complete      7. PAD (peripheral artery disease) (CMS-[facility])        8. Femoral artery occlusion, left (CMS-[facility])  Vascular US upper extremity venous duplex left    Vascular US upper extremity venous duplex left      9. Left leg pain  Vascular US upper extremity venous duplex left    Vascular US upper extremity venous duplex left      10. Cardiac syncope  Holter Or Event Cardiac Monitor    Holter Or Event Cardiac Monitor        General:  Reason for Referral: Syncope, chest pain and generalized weakness  Past Medical History Relevant to Rehab: h/o DVT, DM II, HTN, heart failure with reduced EF, CAD status post CABG, peripheral arterial disease status post left SFA stent, renal artery stenosis, carotid artery stenosis  Prior to Session Communication: Bedside nurse  Patient Position Received: Bed, 3 rail up, Alarm on   Precautions:  Hearing/Visual Limitations: hearing WFL, reading glasses  Medical Precautions: Fall precautions  Precautions Comment: Patient reports multiple falls within the last year. Patient also reports 5 instances of passing out due to diabetes and  or maintenance finding her.       Pain:  Pain Assessment  Pain Assessment: 0-10  0-10 (Numeric) Pain Score: 0 - No pain           Objective   Cognition:  Overall Cognitive Status: Within Functional Limits  Orientation Level: Oriented X4  Following Commands: Follows all commands and directions without difficulty      4AT  Alertness    xNormal (fully alert, but not agitated, throughout assessment) 0          Mild sleepiness for <10 seconds after waking, then normal 0  Clearly abnormal 4  Score: 0    AMT4  Age, date of birth, place (name of the hospital or building), current year  xNo mistakes 0  1 mistake 1  2 or more mistakes/untestable 2  Score: 0    Attention  Months of the year backwards Achieves   7 months or more correctly 0  xStarts but scores <7 months / refuses to start 1  Untestable (cannot start because unwell, drowsy,  inattentive) 2  Score: 1    Acute change or fluctuating course  Evidence of significant change or fluctuation in: alertness, cognition, other mental function  (eg. paranoia, hallucinations) arising over the last 2 weeks and still evident in last 24hrs  xNo 0  Yes 4  Score: 0    Total score: 1  4 or above: possible delirium +/- cognitive impairment  1-3: possible cognitive impairment  0: delirium or severe cognitive impairment unlikely (but  delirium still possible if [4] information incomplete)      Home Living:  Type of Home: Apartment  Lives With: Alone  Home Adaptive Equipment:  (rollator, electric scooter on order per patient report)  Home Layout:  (apartment on 5th floor)  Home Access: Elevator  Bathroom Shower/Tub: Tub/shower unit  Bathroom Toilet: Standard  Bathroom Equipment: Grab bars in shower, Raised toilet seat without rails   Prior Function:  Level of [place]: Independent with ADLs and functional transfers  Receives Help From: Family, Friends  ADL Assistance: Independent  Homemaking Assistance:  ([employer] reports that  used to assist her, but no longer able to. Patient able to cook and complete only very light IADL.)  Ambulatory Assistance: Independent  Leisure: enjoys wimming, activities at [facility], line dancing  Hand Dominance: Right       ADL:  Eating Assistance: Independent  Grooming Assistance:  (CGA in standing, SBA in sitting)  Bathing Assistance:  (CGA)  UE Dressing Assistance: Minimal  LE Dressing Assistance:  (CGA)  Toileting Assistance with Device:  (CGA)  Activity Tolerance:  Endurance: Tolerates 10 - 20 min exercise with multiple rests  Balance:  Dynamic Sitting Balance  Dynamic Sitting-Balance:  (CGA)  Dynamic Standing Balance  Dynamic Standing-Balance:  (CGA with occasional min assist for balance correction)  Static Sitting Balance  Static Sitting-Level of Assistance: Independent  Static Standing Balance  Static Standing-Level of Assistance: Contact guard  Bed  Mobility/Transfers: Bed Mobility/Transfers: Bed Mobility  Bed Mobility: No   and Transfers  Transfer: Yes  Transfer 1  Transfer From 1: Sit to, Stand to  Transfer to 1: Stand, Sit  Transfer Device 1: Walker  Transfer Level of Assistance 1: Contact guard    Sensation:  Light Touch: No apparent deficits     Coordination:  Movements are Fluid and Coordinated: Yes  Coordination Comment: Patient reports writing impaired. Coordination WFL for self care tasks.        Extremities:   RUE   RUE : Exceptions to WFL  RUE AROM (degrees)  RUE AROM Comment: WFL AROM  RUE Strength  RUE Overall Strength:  (3/5  overall), LUE   LUE: Exceptions to WFL  LUE AROM (degrees)  LUE AROM Comment: WFL  LUE Strength  LUE Overall Strength:  (3/5 overall),  , and        Outcome Measures: [facility] Daily Activity  Putting on and taking off regular lower body clothing: A little  Bathing (including washing, rinsing, drying): A little  Putting on and taking off regular upper body clothing: A little  Toileting, which includes using toilet, bedpan or urinal: A little  Taking care of personal grooming such as brushing teeth: A little  Eating Meals: None  Daily Activity - Total Score: 19         ,     OT Adult Other Outcome Measures  4AT: 1- possible cognitive impairment    Education Documentation  Precautions, taught by Shani Nelson OT at 7/17/2024  6:45 PM.  Learner: Patient  Readiness: Acceptance  Method: Explanation  Response: Verbalizes Understanding    ADL Training, taught by Shani Nelson OT at 7/17/2024  6:45 PM.  Learner: Patient  Readiness: Acceptance  Method: Explanation  Response: Verbalizes Understanding    Education Comments  No comments found.        Goals:   Encounter Problems       Encounter Problems (Active)       ADLs       Patient will perform UB and LB bathing seated on edge of bed with set-up and supervision level of assistance  (Progressing)       Start:  07/17/24    Expected End:  07/31/24            Patient with complete upper  body dressing with set-up and supervision level of assistance donning and doffing all UE clothes with no adaptive equipment while edge of bed  (Progressing)       Start:  07/17/24    Expected End:  07/31/24            Patient with complete lower body dressing with set-up and supervision level of assistance donning and doffing all LE clothes  with no adaptive equipment while edge of bed  (Progressing)       Start:  07/17/24    Expected End:  07/31/24            Patient will complete daily grooming tasks with supervision level of assistance and PRN adaptive equipment while standing sink side. (Progressing)       Start:  07/17/24    Expected End:  07/31/24            Patient will complete toileting including hygiene clothing management/hygiene with supervision level of assistance and grab bars. (Progressing)       Start:  07/17/24    Expected End:  07/31/24               BALANCE       Pt will maintain dynamic standing balance during ADL task with supervision level of assistance in order to demonstrate decreased risk of falling and improved postural control. (Progressing)       Start:  07/17/24    Expected End:  07/31/24               MOBILITY       Patient will perform Functional mobility mod  Household distances/Community Distances with supervision level of assistance and front wheeled walker in order to improve safety and functional mobility. (Progressing)       Start:  07/17/24    Expected End:  07/31/24               TRANSFERS       Patient will perform bed mobility modified independent level of assistance and bed rails in order to improve safety and independence with mobility (Progressing)       Start:  07/17/24    Expected End:  07/31/24            Patient will complete functional transfer to all surfaces with front wheeled walker with supervision level of assistance. (Progressing)       Start:  07/17/24    Expected End:  07/31/24                   Treatment Completed on Evaluation  Cognitive Skill Development:        Activities of Daily Living: Toilet Transfers  Toilet Transfer From: Bed  Toilet Transfer Type: To and from  Toilet Transfer to: Standard toilet  Toilet Transfers: Contact guard (and verbal cues)              LE Dressing  LE Dressing: Yes  Sock Level of Assistance: Contact guard  Shoe Level of Assistance: Contact guard  LE Dressing Comments: Patient instructed in compensatory techniques for donning socks. Patient receptive to instruction.  Toileting  Toileting Adaptive Equipment:  (grab bar)  Toileting Level of Assistance: Contact guard (and verbal cues)  Where Assessed: Toilet    IADL's:               Therapy/Activity:                Splintin/17/24 at 6:45 PM   Shani Nelson, OT   Rehab Office: [phone]

## 2024-07-17 NOTE — NURSING NOTE
Ms. Khan is resting in bed and states she is feeling better today.  We reviewed her BG values and diabetes med regimen.  Re-enforced how the meds work to manage BG.  She is in agreement.  She is looking forward to discharge to rehab after this hospitalization.  Will follow up in am.  Time spent:  15 mins.

## 2024-07-17 NOTE — PROGRESS NOTES
07/17/24 1139   Discharge Planning   Living Arrangements Alone   Support Systems Family members;Spouse/significant other   Assistance Needed new SNF   Type of Residence Private residence   Do you have animals or pets at home? No   Who is requesting discharge planning? Patient   Home or Post Acute Services Post acute facilities (Rehab/SNF/etc)   Type of Post Acute Facility Services Skilled nursing   Expected Discharge Disposition SNF   Does the patient need discharge transport arranged? Yes   RoundTrip coordination needed? Yes   Has discharge transport been arranged? No   Financial Resource Strain   How hard is it for you to pay for the very basics like food, housing, medical care, and heating? Not hard   Housing Stability   In the last 12 months, was there a time when you were not able to pay the mortgage or rent on time? N   At any time in the past 12 months, were you homeless or living in a shelter (including now)? N   Transportation Needs   In the past 12 months, has lack of transportation kept you from medical appointments or from getting medications? no   In the past 12 months, has lack of transportation kept you from meetings, work, or from getting things needed for daily living? No   Patient Choice   Provider Choice list and CMS website (https://medicare.gov/care-compare#search) for post-acute Quality and Resource Measure Data were provided and reviewed with: Patient     Assessment Note:  Met with pt and introduced myself as care coordinator and member of the Care Transitions team for discharge planning.   Pt feels safe at home alone, independent prior to admission.  Pt states she would like HHAs to assist with cooking and cleaning.  Pt uses [facility] or [facility] transport for drs appts.  Pt's address, phone number and contact information was verified.  Pt does not have any other questions/concerns at this time.     Previous Home Care: None  DME: [employer].  Per pt, [facility]  "ordered a \"scooter\".  Pharmacy: [facility]  Falls: Pt had multiple falls prior to admission.  Pt states he legs usually \"give out\".  PCP:  [facility] (recent appt but cannot recall the name of the provider).     Pt admitted with c/o chest pain and syncope, cardiac work up in progress.  ADOD 7/19. PT is recommending moderate intensity, pt is agreeable. Pt was offered preference for SNF; pt did not have a preference and ok with mass referral sent to SNFs close to her home.  Referrals were sent to 9 SNFs via [employer].  TCC will follow up with pt to review responses.     Zoila Son MSN, RN-BC  Transitional Care Coordinator (TCC)  [phone]   "

## 2024-07-17 NOTE — PROGRESS NOTES
[facility]   HOSPITAL MEDICINE     PROGRESS NOTE       PATIENT NAME: Myrna Khan   MRN: [id number]   SERVICE DATE: 07/17/24   SERVICE TIME: 11:54 AM      Hospital Medicine/Primary Attending: Jerald Ireland DO   LENGTH OF STAY: 0     CHIEF COMPLAINT: Syncope and collapse   Principal Problem:    Syncope and collapse  Active Problems:    Hypertensive urgency    Hyperglycemia due to diabetes mellitus (Multi)    Hypokalemia    Hypomagnesemia        Assessment/Plan      I reviewed:    All new and relevant labs and imaging   New EKGs  Consultant notes   Vitals Signs   Nursing notes       64 y.o. female with a PMH of T2DM (last HgA1c 8.5% 11/15/23), HFrEF (last EF 35-40% 8/26/23), CAD s/p CABG, HTN, PAD s/p left SFA stent, renal artery stenosis, and carotid artery stenosis who presents for several days of chest pain and inability to walk     DISPO: SNF when we have pre-cert           #cardiac wall thickening   -cards cs reviewed   -MRI reviewed   -serum kappa/jeremie free light chains and SPEP and UPEP along with EMILIA   -PYP scan   -follow up cards recs   -per cards note, this can be done OP so no need to keep IP if pre-cert issued.    #syncope  Per pt, she fell bc her legs gave out on her   - orthostatic BP  - PT/OT   -cocaine +     #chest pain  #CAD s/p CABG  #HFrEF (last EF 35-40% 8/26/23) - ECHO 07/16/24 improved EF 50%  #angina at rest  #cocaine use disorder  - Troponin negative x1  - EKG on admit: NSR   - Echo discussed with reading cardiologist: concern for cardiac amyloid   - utox: cocaine +    -start Ranolazine 500 mg BID    - continue home dose of Farxiga 10 mg PO every day, Coreg 25 mg PO BID, and Imdur 60 mg PO every day    #PAD s/p SFA stent   - continue home dose of Aspirin 81 mg PO every day and Plavix 75 mg PO QD    #uncontrolled hypertension   - Amlodipine 10 mg PO every day   - Hydralazine 100 mg PO TID;  - Carvedilol  25 mg oral, BID    #hyperglycemia  #uncontrolled  T2DM (last HgA1c 8.5% 11/15/23)  - HgA1c 15.2  - diabetes education  - pt noncompliant with insulin outpatient  - holding home dose of Metformin while hospitalized resume on discharge  - continue home dose of Farxiga 10 mg PO every day  -Ctn  Lantus 24 units at bedtime  - Lispro SSI (0-10) units ordered  - accucheck ACHS  - hypoglycemia order set placed  - diabetic diet      #hypomagnesemia  #hypokalemia   - replete and recheck      #bilateral hip pain  #unable to ambulate  - xray of bilateral hips: No acute abnormality in the hips.   - lidocaine patch to both hips  - tylenol 650 mg q6h PRN mild-moderate pain  -PT/OT                 DISPOSITION:  Functional Status Prior to Admit:     Medical Necessity for Continued Hospitalization y     Plan of care discussed with:         Subjective   SUBJECTIVE:  Pt seen and examined.   y    Patient denies CP, SOB, fevers, chills, nausea, or emesis.         Objective      PHYSICAL EXAM: Patient Vitals for the past 24 hrs:   BP Temp Temp src Pulse Resp SpO2   07/17/24 0819 (!) 182/94 36.5 °C (97.7 °F) -- 65 16 99 %   07/17/24 0119 164/84 (!) 39.6 °C (103.3 °F) Temporal 71 18 98 %   07/16/24 2041 (!) 192/102 -- -- 72 -- --   07/16/24 1945 160/80 36.7 °C (98.1 °F) Temporal 71 17 100 %   07/16/24 1700 106/64 36.6 °C (97.9 °F) Temporal 66 17 99 %   07/16/24 1534 149/77 -- -- 64 16 98 %   07/16/24 1200 (!) 153/97 36.6 °C (97.9 °F) Temporal 62 17 99 %      Physical Exam  Vitals and nursing note reviewed.   Constitutional:       General: She is not in acute distress.     Appearance: Normal appearance. She is not ill-appearing.   HENT:      Head: Normocephalic and atraumatic.   Pulmonary:      Effort: No respiratory distress.   Abdominal:      Tenderness: There is no guarding.   Musculoskeletal:      Right lower leg: Edema present.      Left lower leg: Edema present.   Skin:     Coloration: Skin is not jaundiced or pale.   Neurological:      Mental Status: She is alert and oriented to  "person, place, and time.   Psychiatric:         Mood and Affect: Mood normal.            MEDICATIONS:   Scheduled medications  amLODIPine, 10 mg, oral, Daily  aspirin, 81 mg, oral, Daily  atorvastatin, 80 mg, oral, Nightly  carvedilol, 25 mg, oral, BID  cholecalciferol, 2,000 Units, oral, Daily  clopidogrel, 75 mg, oral, Daily  dapagliflozin propanediol, 10 mg, oral, Daily with breakfast  enoxaparin, 40 mg, subcutaneous, q24h  hydrALAZINE, 100 mg, oral, TID  insulin glargine, 24 Units, subcutaneous, q24h  insulin lispro, 0-10 Units, subcutaneous, TID  isosorbide mononitrate ER, 60 mg, oral, Daily  lidocaine, 2 patch, transdermal, Daily  multivitamin with minerals, 1 tablet, oral, Daily  polyethylene glycol, 17 g, oral, Daily  ranolazine, 500 mg, oral, BID      Continuous medications     PRN medications  PRN medications: acetaminophen, dextrose, dextrose, glucagon, glucagon, melatonin       DATA:      Diagnostic tests reviewed for today's visit:     CBC:   Results from last 72 hours   Lab Units 07/16/24  0458   WBC AUTO x10*3/uL 7.7   HEMATOCRIT % 36.8   PLATELETS AUTO x10*3/uL 209   MCV fL 88     Coags:   Results from last 72 hours   Lab Units 07/15/24  1843   INR  1.0   APTT seconds 30     CMP:   Results from last 72 hours   Lab Units 07/17/24  0550 07/16/24  0458 07/15/24  1155   SODIUM mmol/L 134*   < > 130*   POTASSIUM mmol/L 2.7*   < > 3.4*   CO2 mmol/L 23   < > 26   BUN mg/dL 15   < > 10   MAGNESIUM mg/dL 1.46*   < > 1.53*   ALBUMIN g/dL 2.8*   < > 3.3*   ALK PHOS U/L  --   --  82   ALT U/L  --   --  30   AST U/L  --   --  36    < > = values in this interval not displayed.      Cardiac Enzymes: No lab exists for component: \"TROP\" .lab  Liver Function, Amylase, Lipase:   Results from last 72 hours   Lab Units 07/15/24  1155   ALT U/L 30   AST U/L 36   ALK PHOS U/L 82      MG/PHOS: [id number](mg,p)@   Renal Panel:    Results from last 72 hours   Lab Units 07/17/24  0550   ALBUMIN g/dL 2.8*   BUN mg/dL 15 " "  POTASSIUM mmol/L 2.7*   CO2 mmol/L 23   SODIUM mmol/L 134*      Heme:        No lab exists for component: \"RETICP\", \"ABSRETIC\", \"LD\", \"AURA\", \"FE\", \"TRANSFERSAT\"   No components found for: \"UALBCR\"      Medication and Non-Pharmacologic VTE Prophylaxis/Anticoagulants    The patient has received anticoagulants recently:  Heparin is ordered or has been given within the last 24 hours OR  Lovenox has been given in the last 24 hours OR  An anticoagulant other than Heparin or Lovenox is on the home med list OR  An anticoagulant other than Heparin or Lovenox has been given within the last 5 days  Last Anticoag Admin            enoxaparin (Lovenox) syringe 40 mg    Given 40 mg at 07/15/24 2104    Frequency: Every 24 hours         No unadministered anticoagulant orders found.                   SIGNATURE: Jerald Ireland, [facility] Medicine    PAGER/CONTACT #: Epic Chat      Disclaimer: Portions of this note may have been generated using Dragon voice recognition software. Reasonable efforts were made to correct any dictation errors that resulted due to the programming of this software but some may still be present.     Portions of this note including HPI, ROS, impression/plan, and examination may have been copied forward from yesterday to July 17, 2024 as to provide important historical information essential in contributing to medical decision making. Documentation has been reviewed and edited as necessary to support clinical decision making for today's visit and to reflect my own independent evaluation of this patient.       The time of this note does not reflect the time I saw the patient but the time that this note was written     "

## 2024-07-17 NOTE — CARE PLAN
Problem: Nutrition  Goal: Less than 5 days NPO/clear liquids  Outcome: Progressing  Goal: Oral intake greater than 50%  Outcome: Progressing  Goal: Oral intake greater 75%  Outcome: Progressing  Goal: Consume prescribed supplement  Outcome: Progressing  Goal: Adequate PO fluid intake  Outcome: Progressing  Goal: Nutrition support goals are met within 48 hrs  Outcome: Progressing  Goal: Nutrition support is meeting 75% of nutrient needs  Outcome: Progressing  Goal: Tube feed tolerance  Outcome: Progressing  Goal: BG  mg/dL  Outcome: Progressing  Goal: Lab values WNL  Outcome: Progressing  Goal: Electrolytes WNL  Outcome: Progressing  Goal: Promote healing  Outcome: Progressing  Goal: Maintain stable weight  Outcome: Progressing  Goal: Reduce weight from edema/fluid  Outcome: Progressing  Goal: Gradual weight gain  Outcome: Progressing  Goal: Improve ostomy output  Outcome: Progressing     Problem: Diabetes  Goal: Achieve decreasing blood glucose levels by end of shift  Outcome: Progressing  Goal: Increase stability of blood glucose readings by end of shift  Outcome: Progressing  Goal: Decrease in ketones present in urine by end of shift  Outcome: Progressing  Goal: Maintain electrolyte levels within acceptable range throughout shift  Outcome: Progressing  Goal: Maintain glucose levels >70mg/dl to <250mg/dl throughout shift  Outcome: Progressing  Goal: No changes in neurological exam by end of shift  Outcome: Progressing  Goal: Learn about and adhere to nutrition recommendations by end of shift  Outcome: Progressing  Goal: Vital signs within normal range for age by end of shift  Outcome: Progressing  Goal: Increase self care and/or family involovement by end of shift  Outcome: Progressing  Goal: Receive DSME education by end of shift  Outcome: Progressing     Problem: Fall/Injury  Goal: Not fall by end of shift  Outcome: Progressing  Goal: Be free from injury by end of the shift  Outcome: Progressing  Goal:  Verbalize understanding of personal risk factors for fall in the hospital  Outcome: Progressing  Goal: Verbalize understanding of risk factor reduction measures to prevent injury from fall in the home  Outcome: Progressing  Goal: Use assistive devices by end of the shift  Outcome: Progressing  Goal: Pace activities to prevent fatigue by end of the shift  Outcome: Progressing         The clinical goals for the shift include Patients blood glucose will be controlled during the shift
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
## 2024-07-18 LAB
ALBUMIN MFR UR ELPH: 75.8 %
ALBUMIN SERPL BCP-MCNC: 3 G/DL (ref 3.4–5)
ALPHA1 GLOB MFR UR ELPH: 4.1 %
ALPHA2 GLOB MFR UR ELPH: 3.3 %
ANION GAP SERPL CALC-SCNC: 12 MMOL/L (ref 10–20)
B-GLOBULIN MFR UR ELPH: 7.5 %
BUN SERPL-MCNC: 18 MG/DL (ref 6–23)
CALCIUM SERPL-MCNC: 8.5 MG/DL (ref 8.6–10.6)
CHLORIDE SERPL-SCNC: 103 MMOL/L (ref 98–107)
CO2 SERPL-SCNC: 23 MMOL/L (ref 21–32)
CREAT SERPL-MCNC: 0.72 MG/DL (ref 0.5–1.05)
EGFRCR SERPLBLD CKD-EPI 2021: >90 ML/MIN/1.73M*2
GAMMA GLOB MFR UR ELPH: 9.3 %
GLUCOSE BLD MANUAL STRIP-MCNC: 102 MG/DL (ref 74–99)
GLUCOSE BLD MANUAL STRIP-MCNC: 119 MG/DL (ref 74–99)
GLUCOSE BLD MANUAL STRIP-MCNC: 334 MG/DL (ref 74–99)
GLUCOSE BLD MANUAL STRIP-MCNC: 408 MG/DL (ref 74–99)
GLUCOSE SERPL-MCNC: 81 MG/DL (ref 74–99)
IMMUNOFIXATION COMMENT: NORMAL
KAPPA LC SERPL-MCNC: 5.21 MG/DL (ref 0.33–1.94)
KAPPA LC/LAMBDA SER: 1.62 {RATIO} (ref 0.26–1.65)
LAMBDA LC SERPL-MCNC: 3.21 MG/DL (ref 0.57–2.63)
MAGNESIUM SERPL-MCNC: 1.6 MG/DL (ref 1.6–2.4)
PATH REVIEW - URINE IMMUNOFIXATION: NORMAL
PATH REVIEW-URINE PROTEIN ELECTROPHORESIS: NORMAL
PHOSPHATE SERPL-MCNC: 4 MG/DL (ref 2.5–4.9)
POTASSIUM SERPL-SCNC: 3.4 MMOL/L (ref 3.5–5.3)
SODIUM SERPL-SCNC: 135 MMOL/L (ref 136–145)
URINE ELECTROPHORESIS COMMENT: NORMAL

## 2024-07-18 PROCEDURE — 83735 ASSAY OF MAGNESIUM: CPT | Performed by: STUDENT IN AN ORGANIZED HEALTH CARE EDUCATION/TRAINING PROGRAM

## 2024-07-18 PROCEDURE — 99233 SBSQ HOSP IP/OBS HIGH 50: CPT | Performed by: STUDENT IN AN ORGANIZED HEALTH CARE EDUCATION/TRAINING PROGRAM

## 2024-07-18 PROCEDURE — 2500000005 HC RX 250 GENERAL PHARMACY W/O HCPCS: Mod: SE | Performed by: NURSE PRACTITIONER

## 2024-07-18 PROCEDURE — 2500000004 HC RX 250 GENERAL PHARMACY W/ HCPCS (ALT 636 FOR OP/ED): Performed by: NURSE PRACTITIONER

## 2024-07-18 PROCEDURE — 2500000001 HC RX 250 WO HCPCS SELF ADMINISTERED DRUGS (ALT 637 FOR MEDICARE OP): Mod: SE | Performed by: NURSE PRACTITIONER

## 2024-07-18 PROCEDURE — 2500000002 HC RX 250 W HCPCS SELF ADMINISTERED DRUGS (ALT 637 FOR MEDICARE OP, ALT 636 FOR OP/ED): Performed by: NURSE PRACTITIONER

## 2024-07-18 PROCEDURE — 2500000001 HC RX 250 WO HCPCS SELF ADMINISTERED DRUGS (ALT 637 FOR MEDICARE OP): Mod: SE | Performed by: STUDENT IN AN ORGANIZED HEALTH CARE EDUCATION/TRAINING PROGRAM

## 2024-07-18 PROCEDURE — 80069 RENAL FUNCTION PANEL: CPT | Performed by: STUDENT IN AN ORGANIZED HEALTH CARE EDUCATION/TRAINING PROGRAM

## 2024-07-18 PROCEDURE — 1200000002 HC GENERAL ROOM WITH TELEMETRY DAILY

## 2024-07-18 PROCEDURE — 82947 ASSAY GLUCOSE BLOOD QUANT: CPT

## 2024-07-18 PROCEDURE — 99221 1ST HOSP IP/OBS SF/LOW 40: CPT | Performed by: SURGERY

## 2024-07-18 PROCEDURE — 36415 COLL VENOUS BLD VENIPUNCTURE: CPT | Performed by: STUDENT IN AN ORGANIZED HEALTH CARE EDUCATION/TRAINING PROGRAM

## 2024-07-18 PROCEDURE — 2500000002 HC RX 250 W HCPCS SELF ADMINISTERED DRUGS (ALT 637 FOR MEDICARE OP, ALT 636 FOR OP/ED): Performed by: STUDENT IN AN ORGANIZED HEALTH CARE EDUCATION/TRAINING PROGRAM

## 2024-07-18 RX ORDER — INSULIN LISPRO 100 [IU]/ML
6 INJECTION, SOLUTION INTRAVENOUS; SUBCUTANEOUS
Status: DISCONTINUED | OUTPATIENT
Start: 2024-07-18 | End: 2024-07-22 | Stop reason: HOSPADM

## 2024-07-18 ASSESSMENT — COGNITIVE AND FUNCTIONAL STATUS - GENERAL
MOBILITY SCORE: 18
TOILETING: A LITTLE
DRESSING REGULAR LOWER BODY CLOTHING: A LITTLE
CLIMB 3 TO 5 STEPS WITH RAILING: A LOT
MOVING TO AND FROM BED TO CHAIR: A LITTLE
HELP NEEDED FOR BATHING: A LITTLE
DRESSING REGULAR LOWER BODY CLOTHING: A LITTLE
TOILETING: A LITTLE
DAILY ACTIVITIY SCORE: 21
STANDING UP FROM CHAIR USING ARMS: A LITTLE
MOVING TO AND FROM BED TO CHAIR: A LITTLE
WALKING IN HOSPITAL ROOM: A LOT
MOBILITY SCORE: 19
CLIMB 3 TO 5 STEPS WITH RAILING: A LOT
WALKING IN HOSPITAL ROOM: A LITTLE
STANDING UP FROM CHAIR USING ARMS: A LITTLE
DAILY ACTIVITIY SCORE: 22

## 2024-07-18 ASSESSMENT — PAIN SCALES - GENERAL
PAINLEVEL_OUTOF10: 0 - NO PAIN

## 2024-07-18 NOTE — CARE PLAN
Problem: Diabetes  Goal: Achieve decreasing blood glucose levels by end of shift  Outcome: Progressing  Goal: Increase stability of blood glucose readings by end of shift  Outcome: Progressing  Goal: Decrease in ketones present in urine by end of shift  Outcome: Progressing  Goal: Maintain electrolyte levels within acceptable range throughout shift  Outcome: Progressing  Goal: Maintain glucose levels >70mg/dl to <250mg/dl throughout shift  Outcome: Progressing  Goal: No changes in neurological exam by end of shift  Outcome: Progressing  Goal: Learn about and adhere to nutrition recommendations by end of shift  Outcome: Progressing  Goal: Vital signs within normal range for age by end of shift  Outcome: Progressing  Goal: Increase self care and/or family involovement by end of shift  Outcome: Progressing  Goal: Receive DSME education by end of shift  Outcome: Progressing     The patient's goals for the shift include wants to sleep    The clinical goals for the shift include patient's blood sugar will be WNL.    Over the shift, the patient did not make progress toward the following goals. Barriers to progression include non compliant with her diet (keeps on asking for food). Recommendations to address these barriers include re-educating about her diet, administering her bedtime insulin and extra lispro per MD.

## 2024-07-18 NOTE — PROGRESS NOTES
[facility]   HOSPITAL MEDICINE     PROGRESS NOTE       PATIENT NAME: Myrna Khan   MRN: [id number]   SERVICE DATE: 07/18/24   SERVICE TIME: 11:49 AM      Hospital Medicine/Primary Attending: Jerald Ireland DO   LENGTH OF STAY: 0     CHIEF COMPLAINT: Syncope and collapse   Principal Problem:    Syncope and collapse  Active Problems:    Hypertensive urgency    Hyperglycemia due to diabetes mellitus (Multi)    Hypokalemia    Hypomagnesemia        Assessment/Plan      I reviewed:    All new and relevant labs and imaging   New EKGs  Consultant notes   Vitals Signs   Nursing notes       64 y.o. female with a PMH of T2DM (last HgA1c 8.5% 11/15/23), HFrEF (last EF 35-40% 8/26/23), CAD s/p CABG, HTN, PAD s/p left SFA stent, renal artery stenosis, and carotid artery stenosis who presents for several days of chest pain and inability to walk     DISPO: SNF when we have pre-cert       #PAD s/p SFA stent   - continue home dose of Aspirin 81 mg PO every day and Plavix 75 mg PO every day  -seen at [facility] in 2023 and the vasc surg note says they want to do revascularization but I am unable to find any documentation of a procedure or follow up  -given worsening caludication sx and now inability to walk more than a few steps, will ask vasc surg opinion on need for further workup inpatient.    #cardiac wall thickening   -cards cs reviewed   -MRI reviewed   -serum kappa/jeremie free light chains and SPEP and UPEP along with EMILIA   -PYP scan not indicative of amyloid   -follow up cards recs     #syncope  Per pt, she fell bc her legs gave out on her   - orthostatic BP  - PT/OT   -cocaine +     #chest pain  #CAD s/p CABG  #HFrEF (last EF 35-40% 8/26/23) - ECHO 07/16/24 improved EF 50%  #angina at rest  #cocaine use disorder  - Troponin negative x1  - EKG on admit: NSR   - Echo discussed with reading cardiologist: concern for cardiac amyloid   - utox: cocaine +    -start Ranolazine 500 mg BID    -  continue home dose of Farxiga 10 mg PO every day, Coreg 25 mg PO BID, and Imdur 60 mg PO every day    #uncontrolled hypertension   - Amlodipine 10 mg PO every day   - Hydralazine 100 mg PO TID;  - Carvedilol  25 mg oral, BID    #hyperglycemia  #uncontrolled T2DM (last HgA1c 8.5% 11/15/23)  - HgA1c 15.2  - diabetes education  - pt noncompliant with insulin outpatient  - holding home dose of Metformin while hospitalized resume on discharge  - continue home dose of Farxiga 10 mg PO every day  -Ctn  Lantus 24 units at bedtime  - Lispro SSI (0-10) units ordered  - accucheck ACHS  - hypoglycemia order set placed  - diabetic diet      #hypomagnesemia  #hypokalemia   - replete and recheck      #bilateral hip pain  #unable to ambulate  - xray of bilateral hips: No acute abnormality in the hips.   - lidocaine patch to both hips  - tylenol 650 mg q6h PRN mild-moderate pain  -PT/OT    I spent 60 minutes in professional medical decision making, face-to-face examination and counseling, care coordination, chart review, discussions with consultants, and care planning        Patient does not have a new or declining malnutrition diagnosis that was not addressed      DISPOSITION:  Functional Status Prior to Admit:     Medical Necessity for Continued Hospitalization y     Plan of care discussed with:         Subjective   SUBJECTIVE:  Pt seen and examined.   y    Patient denies CP, SOB, fevers, chills, nausea, or emesis.         Objective      PHYSICAL EXAM: Patient Vitals for the past 24 hrs:   BP Temp Temp src Pulse Resp SpO2   07/18/24 0742 (!) 175/92 -- -- 59 17 98 %   07/18/24 0448 179/83 36 °C (96.8 °F) Temporal 55 18 96 %   07/17/24 2225 163/82 37 °C (98.6 °F) -- 73 20 98 %   07/17/24 2035 168/84 36.9 °C (98.4 °F) -- 79 18 98 %   07/17/24 1713 163/78 36.4 °C (97.5 °F) Temporal 75 16 99 %   07/17/24 1340 162/79 36.3 °C (97.4 °F) -- 64 16 98 %      Physical Exam  Vitals and nursing note reviewed.   Constitutional:       General:  She is not in acute distress.     Appearance: Normal appearance. She is not ill-appearing.   HENT:      Head: Normocephalic and atraumatic.   Pulmonary:      Effort: No respiratory distress.   Abdominal:      Tenderness: There is no guarding.   Musculoskeletal:      Right lower leg: Edema present.      Left lower leg: Edema present.   Skin:     Coloration: Skin is not jaundiced or pale.   Neurological:      Mental Status: She is alert and oriented to person, place, and time.   Psychiatric:         Mood and Affect: Mood normal.            MEDICATIONS:   Scheduled medications  amLODIPine, 10 mg, oral, Daily  aspirin, 81 mg, oral, Daily  atorvastatin, 80 mg, oral, Nightly  carvedilol, 25 mg, oral, BID  cholecalciferol, 2,000 Units, oral, Daily  clopidogrel, 75 mg, oral, Daily  dapagliflozin propanediol, 10 mg, oral, Daily with breakfast  enoxaparin, 40 mg, subcutaneous, q24h  hydrALAZINE, 100 mg, oral, TID  insulin glargine, 24 Units, subcutaneous, q24h  insulin lispro, 0-10 Units, subcutaneous, TID  insulin lispro, 6 Units, subcutaneous, TID  isosorbide mononitrate ER, 60 mg, oral, Daily  lidocaine, 2 patch, transdermal, Daily  multivitamin with minerals, 1 tablet, oral, Daily  polyethylene glycol, 17 g, oral, Daily  ranolazine, 500 mg, oral, BID      Continuous medications     PRN medications  PRN medications: acetaminophen, dextrose, dextrose, glucagon, glucagon, melatonin       DATA:      Diagnostic tests reviewed for today's visit:     CBC:   Results from last 72 hours   Lab Units 07/16/24  0458   WBC AUTO x10*3/uL 7.7   HEMATOCRIT % 36.8   PLATELETS AUTO x10*3/uL 209   MCV fL 88     Coags:   Results from last 72 hours   Lab Units 07/15/24  1843   INR  1.0   APTT seconds 30     CMP:   Results from last 72 hours   Lab Units 07/18/24  0653 07/16/24  0458 07/15/24  1155   SODIUM mmol/L 135*   < > 130*   POTASSIUM mmol/L 3.4*   < > 3.4*   CO2 mmol/L 23   < > 26   BUN mg/dL 18   < > 10   MAGNESIUM mg/dL 1.60   < >  "1.53*   ALBUMIN g/dL 3.0*   < > 3.3*   ALK PHOS U/L  --   --  82   ALT U/L  --   --  30   AST U/L  --   --  36    < > = values in this interval not displayed.      Cardiac Enzymes: No lab exists for component: \"TROP\" .lab  Liver Function, Amylase, Lipase:   Results from last 72 hours   Lab Units 07/15/24  1155   ALT U/L 30   AST U/L 36   ALK PHOS U/L 82      MG/PHOS: [id number](mg,p)@   Renal Panel:    Results from last 72 hours   Lab Units 07/18/24  0653   ALBUMIN g/dL 3.0*   BUN mg/dL 18   POTASSIUM mmol/L 3.4*   CO2 mmol/L 23   SODIUM mmol/L 135*      Heme:        No lab exists for component: \"RETICP\", \"ABSRETIC\", \"LD\", \"AURA\", \"FE\", \"TRANSFERSAT\"   No components found for: \"UALBCR\"      Medication and Non-Pharmacologic VTE Prophylaxis/Anticoagulants    The patient has received anticoagulants recently:  Heparin is ordered or has been given within the last 24 hours OR  Lovenox has been given in the last 24 hours OR  An anticoagulant other than Heparin or Lovenox is on the home med list OR  An anticoagulant other than Heparin or Lovenox has been given within the last 5 days  Last Anticoag Admin            enoxaparin (Lovenox) syringe 40 mg    Given 40 mg at 07/15/24 2104    Frequency: Every 24 hours         No unadministered anticoagulant orders found.                   SIGNATURE: Jerald Ireland [facility]    PAGER/CONTACT #: Epic Shantal      Disclaimer: Portions of this note may have been generated using Dragon voice recognition software. Reasonable efforts were made to correct any dictation errors that resulted due to the programming of this software but some may still be present.     Portions of this note including HPI, ROS, impression/plan, and examination may have been copied forward from yesterday to July 18, 2024 as to provide important historical information essential in contributing to medical decision making. Documentation has been reviewed and edited as necessary to support clinical " decision making for today's visit and to reflect my own independent evaluation of this patient.       The time of this note does not reflect the time I saw the patient but the time that this note was written

## 2024-07-18 NOTE — CARE PLAN
Per attending patient will be discharging Friday or Saturday to [facility].  Asked him to complete goldenrod.  Loida Otto RN, BSN, TCC.  UPDATE:  Jake completed.  Asked DSC to intiate precert.

## 2024-07-18 NOTE — NURSING NOTE
"Ms. Khan is resting in bed.  She recently had 2 packages of yumiko crackers and juice.  .  She is surprised with this result.  Reviewed with her the impact of her \"snack\" on the BG.  We discussed options to carb snacks and perhaps more protein based snacks.  States seen by dietician yesterday.   I will message dietician for other options.  Reviewed insulin regimen with her .  Reviewed how the various meds (Farxiga, Lantus) work to manage BG.    Ms. Khan is agreeable to visit in am to review diabetes self management.  Time spent:  30 mins.  "

## 2024-07-18 NOTE — CARE PLAN
Problem: Diabetes  Goal: Increase stability of blood glucose readings by end of shift  Outcome: Progressing  Goal: Learn about and adhere to nutrition recommendations by end of shift  Outcome: Progressing     Problem: Fall/Injury  Goal: Not fall by end of shift  Outcome: Progressing  Goal: Be free from injury by end of the shift  Outcome: Progressing

## 2024-07-18 NOTE — CONSULTS
"Reason For Consult  B/l LE claudication    History Of Present Illness  Myrna Khan is a 64 y.o. female with hx of CABGx2 in 2023, L SFA stent, presenting initially to hospital with syncope, chest pain and hyperglycemia.  She has a hx of left SFA stent and follows outpatient with Dr. Lawson ([facility]) for her chronic claudication symptoms.  Per review of outside records from November 2023, it appeared that her L SFA stent was occluded, with reconstitution of distal SFA.      Vascular surgery is consulted for her chronic claudication symptoms.       Past Medical History  She has a past medical history of Carotid artery stenosis, Diabetes mellitus (Multi), Heart disease, Hypertension, PAD (peripheral artery disease) (CMS-HCC), and Renal artery stenosis (CMS-[facility]).    Surgical History  She has a past surgical history that includes Coronary artery bypass graft and Femoral artery stent (Left).     Social History  She reports that she has been smoking cigarettes. She has never used smokeless tobacco. She reports that she does not drink alcohol and does not use drugs.    Family History  No family history on file.     Allergies  Lisinopril, Amoxicillin, and Losartan    Review of Systems  All systems are reviewed and negative otherwise stated above.     Physical Exam  Constitutional: no acute distress  Neuro: no gross deficits   Psych: normal affect  HEENT: No deformities, no scleral icterus   Cardiac: RR  Pulmonary: unlabored respirations   Abdomen: soft, non distended, non tender  Skin: warm and dry overall    Extremities: no swelling noted; multiphasic R DP, PT, femoral, multiphasic R DP & fem, monophasic PT, no wound, motor and sensory intact.    MSK: moving all four       Last Recorded Vitals  Blood pressure (!) 175/92, pulse 59, temperature 36 °C (96.8 °F), temperature source Temporal, resp. rate 17, height 1.6 m (5' 3\"), weight 51.7 kg (114 lb), SpO2 98%.    Relevant Results  Medications:  Scheduled " medications  amLODIPine, 10 mg, oral, Daily  aspirin, 81 mg, oral, Daily  atorvastatin, 80 mg, oral, Nightly  carvedilol, 25 mg, oral, BID  cholecalciferol, 2,000 Units, oral, Daily  clopidogrel, 75 mg, oral, Daily  dapagliflozin propanediol, 10 mg, oral, Daily with breakfast  enoxaparin, 40 mg, subcutaneous, q24h  hydrALAZINE, 100 mg, oral, TID  insulin glargine, 24 Units, subcutaneous, q24h  insulin lispro, 0-10 Units, subcutaneous, TID  insulin lispro, 6 Units, subcutaneous, TID  isosorbide mononitrate ER, 60 mg, oral, Daily  lidocaine, 2 patch, transdermal, Daily  multivitamin with minerals, 1 tablet, oral, Daily  polyethylene glycol, 17 g, oral, Daily  ranolazine, 500 mg, oral, BID      Continuous medications     PRN medications  PRN medications: acetaminophen, dextrose, dextrose, glucagon, glucagon, melatonin    Recent Labs:  Results for orders placed or performed during the hospital encounter of 07/15/24 (from the past 24 hour(s))   POCT GLUCOSE   Result Value Ref Range    POCT Glucose 282 (H) 74 - 99 mg/dL   Protein, Total   Result Value Ref Range    Total Protein 5.7 (L) 6.4 - 8.2 g/dL   POCT GLUCOSE   Result Value Ref Range    POCT Glucose 499 (H) 74 - 99 mg/dL   POCT GLUCOSE   Result Value Ref Range    POCT Glucose 392 (H) 74 - 99 mg/dL   Renal Function Panel   Result Value Ref Range    Glucose 81 74 - 99 mg/dL    Sodium 135 (L) 136 - 145 mmol/L    Potassium 3.4 (L) 3.5 - 5.3 mmol/L    Chloride 103 98 - 107 mmol/L    Bicarbonate 23 21 - 32 mmol/L    Anion Gap 12 10 - 20 mmol/L    Urea Nitrogen 18 6 - 23 mg/dL    Creatinine 0.72 0.50 - 1.05 mg/dL    eGFR >90 >60 mL/min/1.73m*2    Calcium 8.5 (L) 8.6 - 10.6 mg/dL    Phosphorus 4.0 2.5 - 4.9 mg/dL    Albumin 3.0 (L) 3.4 - 5.0 g/dL   Magnesium   Result Value Ref Range    Magnesium 1.60 1.60 - 2.40 mg/dL   POCT GLUCOSE   Result Value Ref Range    POCT Glucose 102 (H) 74 - 99 mg/dL   POCT GLUCOSE   Result Value Ref Range    POCT Glucose 408 (H) 74 - 99 mg/dL        Imaging:  NM PYP Cardiac Amyloidosis SPECT only  Narrative: Interpreted By:  Kip Palomino and Liu Scott   STUDY:  Amyloid SPECT Heart Study      INDICATION:  PYP scan to rule out ATTR amyloid depostion      COMPARISON:  None      ACCESSION NUMBER(S):  [id number]      ORDERING CLINICIAN:  AMI HUYNH      TECHNIQUE:  Following the intravenous administration 20.7 mCi of Tc-99m PYP,  images localized to the thorax in the anterior projection were  obtained at 1 hour post injection. In addition, SPECT/CT images were  obtained. Semi-quantitation was performed on these images with  regions of interest drawn over the heart and contralateral lung. In  addition, visual quantitative analysis was performed on SPECT images.      FINDINGS:  Analysis of the planar images reveals mildly increased radiotracer  deposition in the region of the heart.      The Heart to Contralateral lung ratio is 1.24. (Normal ratio is less  than < 1.2, equivocal = 1.21-1.4, > 1.5 is positive for TTR  amyloidosis)      SPECT/CT images reveal no radiotracer deposition in the myocardium  relative to the ribs.      Semi-quantitative visual analysis reveals a GRADE of 0  Grade values are as follows:  1) Grade 0: No uptake in MYOCARDIUM (not bloodpool) and normal bone  uptake  2) Grade 1: Uptake in MYOCARDIUM (not bloodpool) less than rib uptake  3) Grade 2: Uptake in MYOCARDIUM (not bloodpool) equal to rib uptake  4) Grade 3: Uptake in MYOCARDIUM (not bloodpool) greater than rib  uptake with mild/absent rib uptake      Low dose CT demonstrates no overt findings.      Impression: 1. Negative amyloid SPECT heart study without evidence of TTR  amyloidosis.      Note: A negative amyloid SPECT heart study does not rule out other  forms of possible cardiac amyloid deposition such as amyloid light  chain deposition.      I personally reviewed the images/study and I agree with the findings  as stated by resident physician Kenny Loza MD. This study  was  interpreted at [facility],  [place], [place].      MACRO:  None      Signed by: Kip Palomino 7/17/2024 2:09 PM  Dictation workstation:   [id number]          Assessment/Plan   Myrna Khan is a 64 y.o. female with hx of CABGx2 in 2023, L SFA stent, presenting initially to hospital with syncope, chest pain and hyperglycemia.  She has a hx of left SFA stent and follows outpatient with Dr. Lawson ([facility]) for her chronic claudication symptoms.  Vascular surgery is consulted for her chronic claudication symptoms.      - Obtain DENISE (her last one was one year ago)  - Symptom management   - Rest of care per primary team     Pt d/w fellow Dr. Schaffer,    Plans not finalized until signed by attending,    Aki Hernandez MD  General Surgery, PGY-3  Please page service pager with questions  Vascular Surgery [phone]

## 2024-07-18 NOTE — CARE PLAN
Informed by DSC that [facility] cannot take patient's insurance.  Attending updated.  UPDATE: Jamison Goode now saying that they can use secondary insurance of [facility].  DSC will follow up tomorrow with TCC.

## 2024-07-19 ENCOUNTER — APPOINTMENT (OUTPATIENT)
Dept: VASCULAR MEDICINE | Facility: HOSPITAL | Age: 64
DRG: 312 | End: 2024-07-19
Payer: COMMERCIAL

## 2024-07-19 LAB
ALBUMIN SERPL BCP-MCNC: 2.8 G/DL (ref 3.4–5)
ANION GAP SERPL CALC-SCNC: 14 MMOL/L (ref 10–20)
BUN SERPL-MCNC: 17 MG/DL (ref 6–23)
CALCIUM SERPL-MCNC: 8.1 MG/DL (ref 8.6–10.6)
CHLORIDE SERPL-SCNC: 103 MMOL/L (ref 98–107)
CO2 SERPL-SCNC: 22 MMOL/L (ref 21–32)
CREAT SERPL-MCNC: 0.6 MG/DL (ref 0.5–1.05)
EGFRCR SERPLBLD CKD-EPI 2021: >90 ML/MIN/1.73M*2
GLUCOSE BLD MANUAL STRIP-MCNC: 240 MG/DL (ref 74–99)
GLUCOSE BLD MANUAL STRIP-MCNC: 339 MG/DL (ref 74–99)
GLUCOSE BLD MANUAL STRIP-MCNC: 401 MG/DL (ref 74–99)
GLUCOSE BLD MANUAL STRIP-MCNC: 453 MG/DL (ref 74–99)
GLUCOSE BLD MANUAL STRIP-MCNC: 94 MG/DL (ref 74–99)
GLUCOSE SERPL-MCNC: 61 MG/DL (ref 74–99)
MAGNESIUM SERPL-MCNC: 1.61 MG/DL (ref 1.6–2.4)
PHOSPHATE SERPL-MCNC: 3.4 MG/DL (ref 2.5–4.9)
POTASSIUM SERPL-SCNC: 3.3 MMOL/L (ref 3.5–5.3)
SODIUM SERPL-SCNC: 136 MMOL/L (ref 136–145)

## 2024-07-19 PROCEDURE — 2500000002 HC RX 250 W HCPCS SELF ADMINISTERED DRUGS (ALT 637 FOR MEDICARE OP, ALT 636 FOR OP/ED): Performed by: NURSE PRACTITIONER

## 2024-07-19 PROCEDURE — 93926 LOWER EXTREMITY STUDY: CPT

## 2024-07-19 PROCEDURE — 2500000002 HC RX 250 W HCPCS SELF ADMINISTERED DRUGS (ALT 637 FOR MEDICARE OP, ALT 636 FOR OP/ED): Performed by: STUDENT IN AN ORGANIZED HEALTH CARE EDUCATION/TRAINING PROGRAM

## 2024-07-19 PROCEDURE — 80069 RENAL FUNCTION PANEL: CPT | Performed by: STUDENT IN AN ORGANIZED HEALTH CARE EDUCATION/TRAINING PROGRAM

## 2024-07-19 PROCEDURE — 2500000005 HC RX 250 GENERAL PHARMACY W/O HCPCS: Performed by: STUDENT IN AN ORGANIZED HEALTH CARE EDUCATION/TRAINING PROGRAM

## 2024-07-19 PROCEDURE — 2500000004 HC RX 250 GENERAL PHARMACY W/ HCPCS (ALT 636 FOR OP/ED): Performed by: NURSE PRACTITIONER

## 2024-07-19 PROCEDURE — 1100000001 HC PRIVATE ROOM DAILY

## 2024-07-19 PROCEDURE — 83735 ASSAY OF MAGNESIUM: CPT | Performed by: STUDENT IN AN ORGANIZED HEALTH CARE EDUCATION/TRAINING PROGRAM

## 2024-07-19 PROCEDURE — 93922 UPR/L XTREMITY ART 2 LEVELS: CPT | Performed by: INTERNAL MEDICINE

## 2024-07-19 PROCEDURE — 2500000001 HC RX 250 WO HCPCS SELF ADMINISTERED DRUGS (ALT 637 FOR MEDICARE OP): Performed by: NURSE PRACTITIONER

## 2024-07-19 PROCEDURE — 99233 SBSQ HOSP IP/OBS HIGH 50: CPT | Performed by: STUDENT IN AN ORGANIZED HEALTH CARE EDUCATION/TRAINING PROGRAM

## 2024-07-19 PROCEDURE — 93922 UPR/L XTREMITY ART 2 LEVELS: CPT

## 2024-07-19 PROCEDURE — 36415 COLL VENOUS BLD VENIPUNCTURE: CPT | Performed by: STUDENT IN AN ORGANIZED HEALTH CARE EDUCATION/TRAINING PROGRAM

## 2024-07-19 PROCEDURE — 2500000001 HC RX 250 WO HCPCS SELF ADMINISTERED DRUGS (ALT 637 FOR MEDICARE OP): Performed by: STUDENT IN AN ORGANIZED HEALTH CARE EDUCATION/TRAINING PROGRAM

## 2024-07-19 PROCEDURE — 93926 LOWER EXTREMITY STUDY: CPT | Performed by: INTERNAL MEDICINE

## 2024-07-19 PROCEDURE — 82947 ASSAY GLUCOSE BLOOD QUANT: CPT

## 2024-07-19 RX ORDER — LIDOCAINE 560 MG/1
1 PATCH PERCUTANEOUS; TOPICAL; TRANSDERMAL DAILY
Status: DISCONTINUED | OUTPATIENT
Start: 2024-07-20 | End: 2024-07-19

## 2024-07-19 RX ORDER — INSULIN LISPRO 100 [IU]/ML
14 INJECTION, SOLUTION INTRAVENOUS; SUBCUTANEOUS ONCE
Status: COMPLETED | OUTPATIENT
Start: 2024-07-19 | End: 2024-07-19

## 2024-07-19 RX ORDER — LIDOCAINE 560 MG/1
1 PATCH PERCUTANEOUS; TOPICAL; TRANSDERMAL DAILY
Status: DISCONTINUED | OUTPATIENT
Start: 2024-07-19 | End: 2024-07-22 | Stop reason: HOSPADM

## 2024-07-19 RX ORDER — INSULIN LISPRO 100 [IU]/ML
16 INJECTION, SOLUTION INTRAVENOUS; SUBCUTANEOUS ONCE
Status: DISCONTINUED | OUTPATIENT
Start: 2024-07-19 | End: 2024-07-19

## 2024-07-19 ASSESSMENT — COGNITIVE AND FUNCTIONAL STATUS - GENERAL
HELP NEEDED FOR BATHING: A LITTLE
DAILY ACTIVITIY SCORE: 19
DRESSING REGULAR UPPER BODY CLOTHING: A LITTLE
TOILETING: A LITTLE
STANDING UP FROM CHAIR USING ARMS: A LITTLE
WALKING IN HOSPITAL ROOM: A LITTLE
CLIMB 3 TO 5 STEPS WITH RAILING: A LITTLE
DRESSING REGULAR LOWER BODY CLOTHING: A LITTLE
PERSONAL GROOMING: A LITTLE
MOBILITY SCORE: 20
MOVING TO AND FROM BED TO CHAIR: A LITTLE

## 2024-07-19 ASSESSMENT — PAIN SCALES - GENERAL: PAINLEVEL_OUTOF10: 0 - NO PAIN

## 2024-07-19 NOTE — NURSING NOTE
Ms. Khan is on her way off the unit for testing.  Met with her nurse to let her know need for re-enforcing diet and med regimen for discharge.    Ms. Khan is to be discharged to SNF  - date not yet known.   Will follow up next week if she is still inpatient.

## 2024-07-19 NOTE — CARE PLAN
Problem: Nutrition  Goal: Less than 5 days NPO/clear liquids  Outcome: Progressing  Goal: Oral intake greater than 50%  Outcome: Progressing  Goal: Oral intake greater 75%  Outcome: Progressing  Goal: Consume prescribed supplement  Outcome: Progressing  Goal: Adequate PO fluid intake  Outcome: Progressing  Goal: Nutrition support goals are met within 48 hrs  Outcome: Progressing  Goal: Nutrition support is meeting 75% of nutrient needs  Outcome: Progressing  Goal: Tube feed tolerance  Outcome: Progressing  Goal: BG  mg/dL  Outcome: Progressing  Goal: Lab values WNL  Outcome: Progressing  Goal: Electrolytes WNL  Outcome: Progressing  Goal: Promote healing  Outcome: Progressing  Goal: Maintain stable weight  Outcome: Progressing  Goal: Reduce weight from edema/fluid  Outcome: Progressing  Goal: Gradual weight gain  Outcome: Progressing  Goal: Improve ostomy output  Outcome: Progressing   The patient's goals for the shift include wants to sleep    The clinical goals for the shift include Pt will be free from falls/injuries during this shift

## 2024-07-19 NOTE — PROGRESS NOTES
"7/19/24 @1141 Transitional Care Coordinator Note  Received a secure chat from Kecia Preciado RN stating that \"The nurse for this patient (Myrna Khan [id number]) received a phone call from Black, the  at [facility] and would like to speak with TCC/SW about her. Are you able to contact him? [phone].\" I called Black at [phone] and was able to speak with him. Black wanted to know about discharge information pertaining to the pt. I did let him know that pt was accepted to [facility] and that the pt needed a precert for the facility which could take some time. He had no other questions. Kecia and the pt's floor nurse Fatimah Harry LPN was updated that I did speak with Black. Will continue to follow.     7/19/24 @1526 addendum   Went into pt's room to introduce myself, role and to discuss discharge planning. Pt did sign her IMM letter with her initials and was given a copy of her IMM letter. Signed copy in chart. Will continue to follow.   "

## 2024-07-20 PROBLEM — N89.8 VAGINAL DISCHARGE: Status: ACTIVE | Noted: 2024-07-20

## 2024-07-20 LAB
ALBUMIN SERPL BCP-MCNC: 2.8 G/DL (ref 3.4–5)
ANION GAP SERPL CALC-SCNC: 11 MMOL/L (ref 10–20)
BUN SERPL-MCNC: 17 MG/DL (ref 6–23)
CALCIUM SERPL-MCNC: 8.3 MG/DL (ref 8.6–10.6)
CHLORIDE SERPL-SCNC: 104 MMOL/L (ref 98–107)
CLUE CELLS SPEC QL WET PREP: ABNORMAL
CO2 SERPL-SCNC: 25 MMOL/L (ref 21–32)
CREAT SERPL-MCNC: 0.64 MG/DL (ref 0.5–1.05)
EGFRCR SERPLBLD CKD-EPI 2021: >90 ML/MIN/1.73M*2
GLUCOSE BLD MANUAL STRIP-MCNC: 182 MG/DL (ref 74–99)
GLUCOSE BLD MANUAL STRIP-MCNC: 238 MG/DL (ref 74–99)
GLUCOSE BLD MANUAL STRIP-MCNC: 289 MG/DL (ref 74–99)
GLUCOSE BLD MANUAL STRIP-MCNC: 81 MG/DL (ref 74–99)
GLUCOSE SERPL-MCNC: 43 MG/DL (ref 74–99)
HIV 1+2 AB+HIV1 P24 AG SERPL QL IA: NONREACTIVE
MAGNESIUM SERPL-MCNC: 1.73 MG/DL (ref 1.6–2.4)
PHOSPHATE SERPL-MCNC: 3 MG/DL (ref 2.5–4.9)
POTASSIUM SERPL-SCNC: 3.4 MMOL/L (ref 3.5–5.3)
SODIUM SERPL-SCNC: 137 MMOL/L (ref 136–145)
T VAGINALIS SPEC QL WET PREP: PRESENT
TREPONEMA PALLIDUM IGG+IGM AB [PRESENCE] IN SERUM OR PLASMA BY IMMUNOASSAY: REACTIVE
WBC VAG QL WET PREP: ABNORMAL
YEAST VAG QL WET PREP: ABNORMAL

## 2024-07-20 PROCEDURE — 99232 SBSQ HOSP IP/OBS MODERATE 35: CPT | Performed by: STUDENT IN AN ORGANIZED HEALTH CARE EDUCATION/TRAINING PROGRAM

## 2024-07-20 PROCEDURE — 87389 HIV-1 AG W/HIV-1&-2 AB AG IA: CPT | Performed by: STUDENT IN AN ORGANIZED HEALTH CARE EDUCATION/TRAINING PROGRAM

## 2024-07-20 PROCEDURE — 87210 SMEAR WET MOUNT SALINE/INK: CPT | Performed by: STUDENT IN AN ORGANIZED HEALTH CARE EDUCATION/TRAINING PROGRAM

## 2024-07-20 PROCEDURE — 2500000001 HC RX 250 WO HCPCS SELF ADMINISTERED DRUGS (ALT 637 FOR MEDICARE OP): Performed by: NURSE PRACTITIONER

## 2024-07-20 PROCEDURE — 2500000002 HC RX 250 W HCPCS SELF ADMINISTERED DRUGS (ALT 637 FOR MEDICARE OP, ALT 636 FOR OP/ED): Performed by: STUDENT IN AN ORGANIZED HEALTH CARE EDUCATION/TRAINING PROGRAM

## 2024-07-20 PROCEDURE — 87491 CHLMYD TRACH DNA AMP PROBE: CPT | Performed by: STUDENT IN AN ORGANIZED HEALTH CARE EDUCATION/TRAINING PROGRAM

## 2024-07-20 PROCEDURE — 36415 COLL VENOUS BLD VENIPUNCTURE: CPT | Performed by: STUDENT IN AN ORGANIZED HEALTH CARE EDUCATION/TRAINING PROGRAM

## 2024-07-20 PROCEDURE — 2500000001 HC RX 250 WO HCPCS SELF ADMINISTERED DRUGS (ALT 637 FOR MEDICARE OP): Performed by: STUDENT IN AN ORGANIZED HEALTH CARE EDUCATION/TRAINING PROGRAM

## 2024-07-20 PROCEDURE — 82947 ASSAY GLUCOSE BLOOD QUANT: CPT

## 2024-07-20 PROCEDURE — 1100000001 HC PRIVATE ROOM DAILY

## 2024-07-20 PROCEDURE — 2500000002 HC RX 250 W HCPCS SELF ADMINISTERED DRUGS (ALT 637 FOR MEDICARE OP, ALT 636 FOR OP/ED): Performed by: NURSE PRACTITIONER

## 2024-07-20 PROCEDURE — 2500000004 HC RX 250 GENERAL PHARMACY W/ HCPCS (ALT 636 FOR OP/ED): Performed by: NURSE PRACTITIONER

## 2024-07-20 PROCEDURE — 83735 ASSAY OF MAGNESIUM: CPT | Performed by: STUDENT IN AN ORGANIZED HEALTH CARE EDUCATION/TRAINING PROGRAM

## 2024-07-20 PROCEDURE — 80069 RENAL FUNCTION PANEL: CPT | Performed by: STUDENT IN AN ORGANIZED HEALTH CARE EDUCATION/TRAINING PROGRAM

## 2024-07-20 PROCEDURE — 87205 SMEAR GRAM STAIN: CPT | Performed by: STUDENT IN AN ORGANIZED HEALTH CARE EDUCATION/TRAINING PROGRAM

## 2024-07-20 RX ORDER — POTASSIUM CHLORIDE 20 MEQ/1
20 TABLET, EXTENDED RELEASE ORAL 2 TIMES DAILY
Status: COMPLETED | OUTPATIENT
Start: 2024-07-20 | End: 2024-07-22

## 2024-07-20 RX ORDER — METRONIDAZOLE 500 MG/1
500 TABLET ORAL EVERY 12 HOURS SCHEDULED
Status: DISCONTINUED | OUTPATIENT
Start: 2024-07-20 | End: 2024-07-22 | Stop reason: HOSPADM

## 2024-07-20 RX ORDER — FERROUS SULFATE, DRIED 160(50) MG
2 TABLET, EXTENDED RELEASE ORAL DAILY
Status: DISCONTINUED | OUTPATIENT
Start: 2024-07-20 | End: 2024-07-22 | Stop reason: HOSPADM

## 2024-07-20 ASSESSMENT — COGNITIVE AND FUNCTIONAL STATUS - GENERAL
PERSONAL GROOMING: A LITTLE
HELP NEEDED FOR BATHING: A LITTLE
MOBILITY SCORE: 20
CLIMB 3 TO 5 STEPS WITH RAILING: A LITTLE
DRESSING REGULAR LOWER BODY CLOTHING: A LITTLE
WALKING IN HOSPITAL ROOM: A LITTLE
TOILETING: A LITTLE
STANDING UP FROM CHAIR USING ARMS: A LITTLE
DAILY ACTIVITIY SCORE: 19
MOVING TO AND FROM BED TO CHAIR: A LITTLE
DRESSING REGULAR UPPER BODY CLOTHING: A LITTLE

## 2024-07-20 ASSESSMENT — PAIN SCALES - GENERAL
PAINLEVEL_OUTOF10: 0 - NO PAIN
PAINLEVEL_OUTOF10: 1

## 2024-07-20 NOTE — CARE PLAN
The patient's goals for the shift include wants to sleep    The clinical goals for the shift include Pt will remain safe and free of fall/injury during this shift.    Problem: Fall/Injury  Goal: Not fall by end of shift  Outcome: Progressing  Goal: Verbalize understanding of personal risk factors for fall in the hospital  Outcome: Progressing     Problem: Skin  Goal: Participates in plan/prevention/treatment measures  Outcome: Progressing  Goal: Prevent/manage excess moisture  Outcome: Progressing  Goal: Prevent/minimize sheer/friction injuries  Outcome: Progressing

## 2024-07-20 NOTE — CARE PLAN
The patient's goals for the shift include wants to sleep    The clinical goals for the shift include Pt safety will be maintained

## 2024-07-20 NOTE — PROGRESS NOTES
[facility]   HOSPITAL MEDICINE     PROGRESS NOTE       PATIENT NAME: Myrna Khan   MRN: [id number]   SERVICE DATE: 07/20/24   SERVICE TIME: 3:12 PM      Hospital Medicine/Primary Attending: Jerald Ireland DO   LENGTH OF STAY: 2     CHIEF COMPLAINT: Syncope and collapse   Principal Problem:    Syncope and collapse  Active Problems:    Hypertensive urgency    Hyperglycemia due to diabetes mellitus (Multi)    Hypokalemia    Hypomagnesemia    Fall, initial encounter    Vaginal discharge        Assessment/Plan      I reviewed:    All new and relevant labs and imaging   New EKGs  Consultant notes   Vitals Signs   Nursing notes       64 y.o. female with a PMH of T2DM (last HgA1c 8.5% 11/15/23), HFrEF (last EF 35-40% 8/26/23), CAD s/p CABG, HTN, PAD s/p left SFA stent, renal artery stenosis, and carotid artery stenosis who presents for several days of chest pain and inability to walk. States her claudication sx are unbarable and not able to ambulate safely. Additionaly complained of vaginal discharge. ECHO was c/f cardiac amyloid pt underwent workup that has been negative so far (please see below)     DISPO: SNF when we have pre-cert       #PAD s/p SFA stent   - continue home dose of Aspirin 81 mg PO every day and Plavix 75 mg PO every day  -seen at [facility] in 2023 and the [facility] surg note says they want to do revascularization but I am unable to find any documentation of a procedure or follow up  -DENISE ordered and reviewed   -LE vascular/doppler study reviewed. Will discuss with vascular surgery team.     #cardiac wall thickening   -cards cs reviewed   -MRI reviewed   -serum kappa/jeremie free light chains ratio is wnl  -Spep pending   -Upep negative   -PYP scan not indicative of amyloid   -follow up cards recs     #syncope  Per pt, she fell bc her legs gave out on her   - PT/OT   -cocaine +     #Vaginal discharge with c/f bleeding   -vaginal/discharge swab done and labs ordered follow  "up  -follow results   -if cts to have bleeding > pelvic US    #chest pain -reolved  #CAD s/p CABG  #HFrEF (last EF 35-40% 8/26/23) - ECHO 07/16/24 improved EF 50%  #angina at rest  #cocaine use disorder  - Troponin negative   - EKG on admit: NSR   - Echo discussed with reading cardiologist: concern for cardiac amyloid   - utox: cocaine +    -start Ranolazine 500 mg BID    - continue home dose of Farxiga 10 mg PO every day, Coreg 25 mg PO BID, and Imdur 60 mg PO every day    #uncontrolled hypertension   - Amlodipine 10 mg PO every day   - Hydralazine 100 mg PO TID;  - Carvedilol  25 mg oral, BID    #hyperglycemia in DM2 2/2 pt snacking and eating high sugar foods   #uncontrolled T2DM (last HgA1c 8.5% 11/15/23)  - HgA1c 15.2  - diabetes education  - pt noncompliant with insulin outpatient  - holding home dose of Metformin while hospitalized resume on discharge  - continue home dose of Farxiga 10 mg PO every day  -Ctn  Lantus 24 units at bedtime  - Lispro SSI (0-10) units ordered  - accucheck ACHS  - hypoglycemia order set placed  - diabetic diet  -discussed with pt that sugars are too high and are 2/2 her eating cookies and soda. Patient responded \"my sugar was good when I woke up\"       #hypomagnesemia  #hypokalemia   - replete and recheck      #bilateral hip pain  #unable to ambulate  - xray of bilateral hips: No acute abnormality in the hips.   - lidocaine patch to both hips  - tylenol 650 mg q6h PRN mild-moderate pain  -PT/OT        Patient does not have a new or declining malnutrition diagnosis that was not addressed      DISPOSITION:  Functional Status Prior to Admit:     Medical Necessity for Continued Hospitalization y     Plan of care discussed with:         Subjective   SUBJECTIVE:  Pt seen and examined.   y    Patient denies CP, SOB, fevers, chills, nausea, or emesis.         Objective      PHYSICAL EXAM: Patient Vitals for the past 24 hrs:   BP Temp Temp src Pulse Resp SpO2   07/20/24 1155 135/70 36.4 °C " (97.5 °F) Tympanic 63 17 98 %   07/20/24 0749 (!) 192/92 36.4 °C (97.5 °F) Tympanic 62 17 100 %   07/20/24 0341 135/71 36.6 °C (97.9 °F) -- 66 17 100 %   07/20/24 0017 160/76 37.3 °C (99.1 °F) -- 78 17 100 %   07/19/24 1919 146/78 37.5 °C (99.5 °F) -- -- 18 --   07/19/24 1550 141/68 36.9 °C (98.4 °F) -- 70 15 100 %      Physical Exam  Vitals and nursing note reviewed.   Constitutional:       General: She is not in acute distress.     Appearance: Normal appearance. She is not ill-appearing.   HENT:      Head: Normocephalic and atraumatic.   Pulmonary:      Effort: No respiratory distress.   Abdominal:      Tenderness: There is no guarding.   Musculoskeletal:      Right lower leg: Edema present.      Left lower leg: Edema present.   Skin:     Coloration: Skin is not jaundiced or pale.   Neurological:      Mental Status: She is alert and oriented to person, place, and time.   Psychiatric:         Mood and Affect: Mood normal.            MEDICATIONS:   Scheduled medications  amLODIPine, 10 mg, oral, Daily  aspirin, 81 mg, oral, Daily  atorvastatin, 80 mg, oral, Nightly  carvedilol, 25 mg, oral, BID  cholecalciferol, 2,000 Units, oral, Daily  clopidogrel, 75 mg, oral, Daily  dapagliflozin propanediol, 10 mg, oral, Daily with breakfast  enoxaparin, 40 mg, subcutaneous, q24h  hydrALAZINE, 100 mg, oral, TID  insulin glargine, 24 Units, subcutaneous, q24h  insulin lispro, 0-10 Units, subcutaneous, TID  insulin lispro, 6 Units, subcutaneous, TID  isosorbide mononitrate ER, 60 mg, oral, Daily  lidocaine, 1 patch, transdermal, Daily  multivitamin with minerals, 1 tablet, oral, Daily  polyethylene glycol, 17 g, oral, Daily  ranolazine, 500 mg, oral, BID      Continuous medications     PRN medications  PRN medications: acetaminophen, dextrose, dextrose, glucagon, glucagon, melatonin       DATA:      Diagnostic tests reviewed for today's visit:     CBC:         Coags:         CMP:   Results from last 72 hours   Lab Units  "07/20/24  0742   SODIUM mmol/L 137   POTASSIUM mmol/L 3.4*   CO2 mmol/L 25   BUN mg/dL 17   MAGNESIUM mg/dL 1.73   ALBUMIN g/dL 2.8*      Cardiac Enzymes: No lab exists for component: \"TROP\" .lab  Liver Function, Amylase, Lipase:         No lab exists for component: \"TPROT\", \"ALB\", \"TBILI\"     MG/PHOS: [id number](mg,p)@   Renal Panel:    Results from last 72 hours   Lab Units 07/20/24  0742   ALBUMIN g/dL 2.8*   BUN mg/dL 17   POTASSIUM mmol/L 3.4*   CO2 mmol/L 25   SODIUM mmol/L 137      Heme:        No lab exists for component: \"RETICP\", \"ABSRETIC\", \"LD\", \"AURA\", \"FE\", \"TRANSFERSAT\"   No components found for: \"UALBCR\"      Medication and Non-Pharmacologic VTE Prophylaxis/Anticoagulants    The patient has received anticoagulants recently:  Heparin is ordered or has been given within the last 24 hours OR  Lovenox has been given in the last 24 hours OR  An anticoagulant other than Heparin or Lovenox is on the home med list OR  An anticoagulant other than Heparin or Lovenox has been given within the last 5 days  Last Anticoag Admin            enoxaparin (Lovenox) syringe 40 mg    Given 40 mg at 07/15/24 2104    Frequency: Every 24 hours         No unadministered anticoagulant orders found.                   SIGNATURE: Jerald Ireland [facility]    PAGER/CONTACT #: Epic Chat      Disclaimer: Portions of this note may have been generated using Dragon voice recognition software. Reasonable efforts were made to correct any dictation errors that resulted due to the programming of this software but some may still be present.     Portions of this note including HPI, ROS, impression/plan, and examination may have been copied forward from yesterday to July 20, 2024 as to provide important historical information essential in contributing to medical decision making. Documentation has been reviewed and edited as necessary to support clinical decision making for today's visit and to reflect my own independent " evaluation of this patient.       The time of this note does not reflect the time I saw the patient but the time that this note was written

## 2024-07-20 NOTE — PROGRESS NOTES
[facility]   HOSPITAL MEDICINE     PROGRESS NOTE       PATIENT NAME: Myrna Khan   MRN: [id number]   SERVICE DATE: 07/19/24   SERVICE TIME: 9:39 PM      Hospital Medicine/Primary Attending: Jerald Ireland DO   LENGTH OF STAY: 1     CHIEF COMPLAINT: Syncope and collapse   Principal Problem:    Syncope and collapse  Active Problems:    Hypertensive urgency    Hyperglycemia due to diabetes mellitus (Multi)    Hypokalemia    Hypomagnesemia    Fall, initial encounter        Assessment/Plan      I reviewed:    All new and relevant labs and imaging   New EKGs  Consultant notes   Vitals Signs   Nursing notes       64 y.o. female with a PMH of T2DM (last HgA1c 8.5% 11/15/23), HFrEF (last EF 35-40% 8/26/23), CAD s/p CABG, HTN, PAD s/p left SFA stent, renal artery stenosis, and carotid artery stenosis who presents for several days of chest pain and inability to walk. States her claudication sx are unbarable.     DISPO: SNF when we have pre-cert       #PAD s/p SFA stent   - continue home dose of Aspirin 81 mg PO every day and Plavix 75 mg PO every day  -seen at [facility] in 2023 and the [facility] surg note says they want to do revascularization but I am unable to find any documentation of a procedure or follow up  -DENISE ordered and reviewed   -LE vascular/doppler study reviewed. Will discuss with vascular surgery team.     #cardiac wall thickening   -cards cs reviewed   -MRI reviewed   -serum kappa/jeremie free light chains ratio is wnl  -Spep pending   -Upep negative   -PYP scan not indicative of amyloid   -follow up cards recs     #syncope  Per pt, she fell bc her legs gave out on her   - PT/OT   -cocaine +     #Vaginal discharge with c/f bleeding   -start with chlamydia and nisseria and tric amplification   -wanted to do wet prep and fungal cx but no swabs available!  -follow results   -if cts to have bleeding > pelvic US    #chest pain -reolved  #CAD s/p CABG  #HFrEF (last EF 35-40% 8/26/23) -  "ECHO 07/16/24 improved EF 50%  #angina at rest  #cocaine use disorder  - Troponin negative x1  - EKG on admit: NSR   - Echo discussed with reading cardiologist: concern for cardiac amyloid   - utox: cocaine +    -start Ranolazine 500 mg BID    - continue home dose of Farxiga 10 mg PO every day, Coreg 25 mg PO BID, and Imdur 60 mg PO every day    #uncontrolled hypertension   - Amlodipine 10 mg PO every day   - Hydralazine 100 mg PO TID;  - Carvedilol  25 mg oral, BID    #hyperglycemia in DM2 2/2 pt snacking and eating high sugar foods   #uncontrolled T2DM (last HgA1c 8.5% 11/15/23)  - HgA1c 15.2  - diabetes education  - pt noncompliant with insulin outpatient  - holding home dose of Metformin while hospitalized resume on discharge  - continue home dose of Farxiga 10 mg PO every day  -Ctn  Lantus 24 units at bedtime  - Lispro SSI (0-10) units ordered  - accucheck ACHS  - hypoglycemia order set placed  - diabetic diet  -discussed with pt that sugars are too high and are 2/2 her eating cookies and soda. Patient responded \"my sugar was good when I woke up\"       #hypomagnesemia  #hypokalemia   - replete and recheck      #bilateral hip pain  #unable to ambulate  - xray of bilateral hips: No acute abnormality in the hips.   - lidocaine patch to both hips  - tylenol 650 mg q6h PRN mild-moderate pain  -PT/OT        Patient does not have a new or declining malnutrition diagnosis that was not addressed      DISPOSITION:  Functional Status Prior to Admit:     Medical Necessity for Continued Hospitalization y     Plan of care discussed with:         Subjective   SUBJECTIVE:  Pt seen and examined.   y    Patient denies CP, SOB, fevers, chills, nausea, or emesis.         Objective      PHYSICAL EXAM: Patient Vitals for the past 24 hrs:   BP Temp Pulse Resp SpO2   07/19/24 1919 146/78 37.5 °C (99.5 °F) -- 18 --   07/19/24 1550 141/68 36.9 °C (98.4 °F) 70 15 100 %   07/19/24 0812 163/84 -- 72 -- 97 %   07/19/24 0423 155/76 36.6 °C " "(97.9 °F) 71 16 100 %   07/19/24 0007 155/81 -- 77 17 98 %      Physical Exam  Vitals and nursing note reviewed.   Constitutional:       General: She is not in acute distress.     Appearance: Normal appearance. She is not ill-appearing.   HENT:      Head: Normocephalic and atraumatic.   Pulmonary:      Effort: No respiratory distress.   Abdominal:      Tenderness: There is no guarding.   Musculoskeletal:      Right lower leg: Edema present.      Left lower leg: Edema present.   Skin:     Coloration: Skin is not jaundiced or pale.   Neurological:      Mental Status: She is alert and oriented to person, place, and time.   Psychiatric:         Mood and Affect: Mood normal.            MEDICATIONS:   Scheduled medications  amLODIPine, 10 mg, oral, Daily  aspirin, 81 mg, oral, Daily  atorvastatin, 80 mg, oral, Nightly  carvedilol, 25 mg, oral, BID  cholecalciferol, 2,000 Units, oral, Daily  clopidogrel, 75 mg, oral, Daily  dapagliflozin propanediol, 10 mg, oral, Daily with breakfast  enoxaparin, 40 mg, subcutaneous, q24h  hydrALAZINE, 100 mg, oral, TID  insulin glargine, 24 Units, subcutaneous, q24h  insulin lispro, 0-10 Units, subcutaneous, TID  insulin lispro, 6 Units, subcutaneous, TID  isosorbide mononitrate ER, 60 mg, oral, Daily  lidocaine, 1 patch, transdermal, Daily  multivitamin with minerals, 1 tablet, oral, Daily  polyethylene glycol, 17 g, oral, Daily  ranolazine, 500 mg, oral, BID      Continuous medications     PRN medications  PRN medications: acetaminophen, dextrose, dextrose, glucagon, glucagon, melatonin       DATA:      Diagnostic tests reviewed for today's visit:     CBC:         Coags:         CMP:   Results from last 72 hours   Lab Units 07/19/24  0549   SODIUM mmol/L 136   POTASSIUM mmol/L 3.3*   CO2 mmol/L 22   BUN mg/dL 17   MAGNESIUM mg/dL 1.61   ALBUMIN g/dL 2.8*      Cardiac Enzymes: No lab exists for component: \"TROP\" .lab  Liver Function, Amylase, Lipase:         No lab exists for component: " "\"TPROT\", \"ALB\", \"TBILI\"     MG/PHOS: [id number](mg,p)@   Renal Panel:    Results from last 72 hours   Lab Units 07/19/24  0549   ALBUMIN g/dL 2.8*   BUN mg/dL 17   POTASSIUM mmol/L 3.3*   CO2 mmol/L 22   SODIUM mmol/L 136      Heme:        No lab exists for component: \"RETICP\", \"ABSRETIC\", \"LD\", \"AURA\", \"FE\", \"TRANSFERSAT\"   No components found for: \"UALBCR\"      Medication and Non-Pharmacologic VTE Prophylaxis/Anticoagulants    The patient has received anticoagulants recently:  Heparin is ordered or has been given within the last 24 hours OR  Lovenox has been given in the last 24 hours OR  An anticoagulant other than Heparin or Lovenox is on the home med list OR  An anticoagulant other than Heparin or Lovenox has been given within the last 5 days  Last Anticoag Admin            enoxaparin (Lovenox) syringe 40 mg    Given 40 mg at 07/15/24 2104    Frequency: Every 24 hours         No unadministered anticoagulant orders found.                   SIGNATURE: Jerald Ireland [facility]    PAGER/CONTACT #: Epic Chat      Disclaimer: Portions of this note may have been generated using Dragon voice recognition software. Reasonable efforts were made to correct any dictation errors that resulted due to the programming of this software but some may still be present.     Portions of this note including HPI, ROS, impression/plan, and examination may have been copied forward from yesterday to July 19, 2024 as to provide important historical information essential in contributing to medical decision making. Documentation has been reviewed and edited as necessary to support clinical decision making for today's visit and to reflect my own independent evaluation of this patient.       The time of this note does not reflect the time I saw the patient but the time that this note was written     "

## 2024-07-21 VITALS
RESPIRATION RATE: 16 BRPM | WEIGHT: 134.7 LBS | OXYGEN SATURATION: 99 % | HEIGHT: 63 IN | BODY MASS INDEX: 23.87 KG/M2 | HEART RATE: 83 BPM | SYSTOLIC BLOOD PRESSURE: 174 MMHG | TEMPERATURE: 98.6 F | DIASTOLIC BLOOD PRESSURE: 83 MMHG

## 2024-07-21 LAB
C TRACH RRNA SPEC QL NAA+PROBE: NEGATIVE
GLUCOSE BLD MANUAL STRIP-MCNC: 161 MG/DL (ref 74–99)
GLUCOSE BLD MANUAL STRIP-MCNC: 264 MG/DL (ref 74–99)
GLUCOSE BLD MANUAL STRIP-MCNC: 349 MG/DL (ref 74–99)
GLUCOSE BLD MANUAL STRIP-MCNC: 50 MG/DL (ref 74–99)
N GONORRHOEA DNA SPEC QL PROBE+SIG AMP: NEGATIVE
RPR SER QL: NONREACTIVE

## 2024-07-21 PROCEDURE — 82947 ASSAY GLUCOSE BLOOD QUANT: CPT

## 2024-07-21 PROCEDURE — 2500000004 HC RX 250 GENERAL PHARMACY W/ HCPCS (ALT 636 FOR OP/ED): Performed by: NURSE PRACTITIONER

## 2024-07-21 PROCEDURE — 2500000002 HC RX 250 W HCPCS SELF ADMINISTERED DRUGS (ALT 637 FOR MEDICARE OP, ALT 636 FOR OP/ED): Performed by: STUDENT IN AN ORGANIZED HEALTH CARE EDUCATION/TRAINING PROGRAM

## 2024-07-21 PROCEDURE — 2500000001 HC RX 250 WO HCPCS SELF ADMINISTERED DRUGS (ALT 637 FOR MEDICARE OP): Performed by: NURSE PRACTITIONER

## 2024-07-21 PROCEDURE — 2500000001 HC RX 250 WO HCPCS SELF ADMINISTERED DRUGS (ALT 637 FOR MEDICARE OP): Performed by: STUDENT IN AN ORGANIZED HEALTH CARE EDUCATION/TRAINING PROGRAM

## 2024-07-21 PROCEDURE — 2500000002 HC RX 250 W HCPCS SELF ADMINISTERED DRUGS (ALT 637 FOR MEDICARE OP, ALT 636 FOR OP/ED): Performed by: NURSE PRACTITIONER

## 2024-07-21 PROCEDURE — 99232 SBSQ HOSP IP/OBS MODERATE 35: CPT | Performed by: STUDENT IN AN ORGANIZED HEALTH CARE EDUCATION/TRAINING PROGRAM

## 2024-07-21 PROCEDURE — 1100000001 HC PRIVATE ROOM DAILY

## 2024-07-21 PROCEDURE — 2500000005 HC RX 250 GENERAL PHARMACY W/O HCPCS: Performed by: STUDENT IN AN ORGANIZED HEALTH CARE EDUCATION/TRAINING PROGRAM

## 2024-07-21 ASSESSMENT — COGNITIVE AND FUNCTIONAL STATUS - GENERAL
DRESSING REGULAR LOWER BODY CLOTHING: A LITTLE
DRESSING REGULAR LOWER BODY CLOTHING: A LITTLE
HELP NEEDED FOR BATHING: A LITTLE
MOBILITY SCORE: 20
PERSONAL GROOMING: A LITTLE
PERSONAL GROOMING: A LITTLE
HELP NEEDED FOR BATHING: A LITTLE
STANDING UP FROM CHAIR USING ARMS: A LITTLE
MOBILITY SCORE: 20
DAILY ACTIVITIY SCORE: 20
WALKING IN HOSPITAL ROOM: A LITTLE
TOILETING: A LITTLE
MOVING TO AND FROM BED TO CHAIR: A LITTLE
CLIMB 3 TO 5 STEPS WITH RAILING: A LITTLE
WALKING IN HOSPITAL ROOM: A LITTLE
DAILY ACTIVITIY SCORE: 20
CLIMB 3 TO 5 STEPS WITH RAILING: A LITTLE
MOVING TO AND FROM BED TO CHAIR: A LITTLE
STANDING UP FROM CHAIR USING ARMS: A LITTLE
TOILETING: A LITTLE

## 2024-07-21 ASSESSMENT — PAIN SCALES - WONG BAKER: WONGBAKER_NUMERICALRESPONSE: NO HURT

## 2024-07-21 ASSESSMENT — PAIN SCALES - GENERAL: PAINLEVEL_OUTOF10: 0 - NO PAIN

## 2024-07-21 NOTE — PROGRESS NOTES
[facility]   HOSPITAL MEDICINE     PROGRESS NOTE       PATIENT NAME: Myrna Khan   MRN: [id number]   SERVICE DATE: 07/21/24   SERVICE TIME: 1:20 PM      Hospital Medicine/Primary Attending: Jerald Ireland DO   LENGTH OF STAY: 3     CHIEF COMPLAINT: Syncope and collapse   Principal Problem:    Syncope and collapse  Active Problems:    Hypertensive urgency    Hyperglycemia due to diabetes mellitus (Multi)    Hypokalemia    Hypomagnesemia    Fall, initial encounter    Vaginal discharge        Assessment/Plan      I reviewed:    All new and relevant labs and imaging   New EKGs  Consultant notes   Vitals Signs   Nursing notes       64 y.o. female with a PMH of T2DM (last HgA1c 8.5% 11/15/23), HFrEF (last EF 35-40% 8/26/23), CAD s/p CABG, HTN, PAD s/p left SFA stent, renal artery stenosis, and carotid artery stenosis who presents for several days of chest pain and inability to walk. States her claudication sx are unbarable and not able to ambulate safely. There was concern for cardiac amyloidosis given wall thickening on imaging.  Cardiology recommended an MRI and a NM PYP study which were unremarkable.  UPEP was negative.  SPEP remains in process. Overall lower likelihood of cardiac amyloidosis. Given worsening claudication symptoms, and DENISE and lower extremity vascular study was completed.  Patient was seen by vascular surgery who recommended no intervention and recommended following up with her vascular surgeon at [facility]. Pt did complain of vaginal discharge and was found to have trichomonas.  Started on course of metronidazole for 7 days. PT OT recommending SNF and patient is pending that    Dispo: pending SNF      DISPO: SNF when we have pre-cert       #PAD s/p SFA stent   - continue home dose of Aspirin 81 mg PO every day and Plavix 75 mg PO every day  -seen at [facility] in 2023 and the vasc surg note says they want to do revascularization but I am unable to find any documentation of  "a procedure or follow up  -DENISE ordered and reviewed   -LE vascular/doppler study reviewed. Vascular surgery update reviewed and appreciate. No intervention follow with established surgeon at [facility].    #cardiac wall thickening   -cards cs reviewed   -MRI reviewed   -serum kappa/jeremie free light chains ratio is wnl  -Spep pending   -Upep negative   -PYP scan not indicative of amyloid   -follow up cards OP    #syncope  Per pt, she fell bc her legs gave out on her   - PT/OT   -cocaine +     #Trichomonas infection   -vaginal/discharge swab done and labs ordered follow up  -metronidzole 500 mg every 12 hours for 7 days    #chest pain -reolved  #CAD s/p CABG  #HFrEF (last EF 35-40% 8/26/23) - ECHO 07/16/24 improved EF 50%  #angina at rest  #cocaine use disorder  - Troponin negative   - EKG on admit: NSR   - Echo discussed with reading cardiologist: concern for cardiac amyloid   - utox: cocaine +    -start Ranolazine 500 mg BID    - continue home dose of Farxiga 10 mg PO every day, Coreg 25 mg PO BID, and Imdur 60 mg PO every day    #uncontrolled hypertension   - Amlodipine 10 mg PO every day   - Hydralazine 100 mg PO TID;  - Carvedilol  25 mg oral, BID    #hyperglycemia in DM2 2/2 pt snacking and eating high sugar foods   #uncontrolled T2DM (last HgA1c 8.5% 11/15/23)  - HgA1c 15.2  - diabetes education  - pt noncompliant with insulin outpatient  - holding home dose of Metformin while hospitalized resume on discharge  - continue home dose of Farxiga 10 mg PO every day  -Ctn  Lantus 24 units at bedtime  - Lispro SSI (0-10) units ordered  - accucheck ACHS  - hypoglycemia order set placed  - diabetic diet  -discussed with pt that sugars are too high and are 2/2 her eating cookies and soda. Patient responded \"my sugar was good when I woke up\"       #hypomagnesemia  #hypokalemia   - replete and recheck      #bilateral hip pain  #unable to ambulate  - xray of bilateral hips: No acute abnormality in the hips.   - lidocaine patch to " both hips  - tylenol 650 mg q6h PRN mild-moderate pain  -PT/OT        Patient does not have a new or declining malnutrition diagnosis that was not addressed      DISPOSITION:  Functional Status Prior to Admit:     Medical Necessity for Continued Hospitalization y     Plan of care discussed with:         Subjective   SUBJECTIVE:  Pt seen and examined.   y    Patient denies CP, SOB, fevers, chills, nausea, or emesis.         Objective      PHYSICAL EXAM: Patient Vitals for the past 24 hrs:   BP Temp Temp src Pulse Resp SpO2 Weight   07/21/24 0600 -- -- -- -- -- -- 61.1 kg (134 lb 11.2 oz)   07/21/24 0424 -- -- -- -- -- -- 52.9 kg (116 lb 10 oz)   07/20/24 2356 169/83 37 °C (98.6 °F) Tympanic 74 16 96 % --   07/20/24 2032 158/82 36.6 °C (97.9 °F) -- 78 20 97 % --   07/20/24 1619 138/75 36.6 °C (97.9 °F) Tympanic 68 17 98 % --      Physical Exam  Vitals and nursing note reviewed.   Constitutional:       General: She is not in acute distress.     Appearance: Normal appearance. She is not ill-appearing.   HENT:      Head: Normocephalic and atraumatic.   Pulmonary:      Effort: No respiratory distress.   Abdominal:      Tenderness: There is no guarding.   Musculoskeletal:      Right lower leg: Edema present.      Left lower leg: Edema present.   Skin:     Coloration: Skin is not jaundiced or pale.   Neurological:      Mental Status: She is alert and oriented to person, place, and time.   Psychiatric:         Mood and Affect: Mood normal.            MEDICATIONS:   Scheduled medications  amLODIPine, 10 mg, oral, Daily  aspirin, 81 mg, oral, Daily  atorvastatin, 80 mg, oral, Nightly  calcium carbonate-vitamin D3, 2 tablet, oral, Daily  carvedilol, 25 mg, oral, BID  cholecalciferol, 2,000 Units, oral, Daily  clopidogrel, 75 mg, oral, Daily  dapagliflozin propanediol, 10 mg, oral, Daily with breakfast  enoxaparin, 40 mg, subcutaneous, q24h  hydrALAZINE, 100 mg, oral, TID  insulin glargine, 24 Units, subcutaneous, q24h  insulin  "lispro, 0-10 Units, subcutaneous, TID  insulin lispro, 6 Units, subcutaneous, TID  isosorbide mononitrate ER, 60 mg, oral, Daily  lidocaine, 1 patch, transdermal, Daily  metroNIDAZOLE, 500 mg, oral, q12h BOLIVAR  multivitamin with minerals, 1 tablet, oral, Daily  polyethylene glycol, 17 g, oral, Daily  potassium chloride CR, 20 mEq, oral, BID  ranolazine, 500 mg, oral, BID      Continuous medications     PRN medications  PRN medications: acetaminophen, dextrose, dextrose, glucagon, glucagon, melatonin       DATA:      Diagnostic tests reviewed for today's visit:     CBC:         Coags:         CMP:   Results from last 72 hours   Lab Units 07/20/24  0742   SODIUM mmol/L 137   POTASSIUM mmol/L 3.4*   CO2 mmol/L 25   BUN mg/dL 17   MAGNESIUM mg/dL 1.73   ALBUMIN g/dL 2.8*      Cardiac Enzymes: No lab exists for component: \"TROP\" .lab  Liver Function, Amylase, Lipase:         No lab exists for component: \"TPROT\", \"ALB\", \"TBILI\"     MG/PHOS: [id number](mg,p)@   Renal Panel:    Results from last 72 hours   Lab Units 07/20/24  0742   ALBUMIN g/dL 2.8*   BUN mg/dL 17   POTASSIUM mmol/L 3.4*   CO2 mmol/L 25   SODIUM mmol/L 137      Heme:        No lab exists for component: \"RETICP\", \"ABSRETIC\", \"LD\", \"AURA\", \"FE\", \"TRANSFERSAT\"   No components found for: \"UALBCR\"      Medication and Non-Pharmacologic VTE Prophylaxis/Anticoagulants    The patient has received anticoagulants recently:  Heparin is ordered or has been given within the last 24 hours OR  Lovenox has been given in the last 24 hours OR  An anticoagulant other than Heparin or Lovenox is on the home med list OR  An anticoagulant other than Heparin or Lovenox has been given within the last 5 days  Last Anticoag Admin            enoxaparin (Lovenox) syringe 40 mg    Given 40 mg at 07/15/24 3000    Frequency: Every 24 hours         No unadministered anticoagulant orders found.                   SIGNATURE: Jerald Ireland [facility]    PAGER/CONTACT #: Epic " Chat      Disclaimer: Portions of this note may have been generated using Dragon voice recognition software. Reasonable efforts were made to correct any dictation errors that resulted due to the programming of this software but some may still be present.     Portions of this note including HPI, ROS, impression/plan, and examination may have been copied forward from yesterday to July 21, 2024 as to provide important historical information essential in contributing to medical decision making. Documentation has been reviewed and edited as necessary to support clinical decision making for today's visit and to reflect my own independent evaluation of this patient.       The time of this note does not reflect the time I saw the patient but the time that this note was written

## 2024-07-21 NOTE — PROGRESS NOTES
Myrna Khan is a 64 y.o. female on day 3 of admission presenting with Syncope and collapse.    Has hx of L SFA stent, and chronic claudication symptoms      Subjective   Continues to report some lower extremity leg pain       Review of Systems   All systems are reviewed and negative otherwise stated       Objective     Last Recorded Vitals  /83 (BP Location: Left arm, Patient Position: Lying)   Pulse 74   Temp 37 °C (98.6 °F) (Tympanic)   Resp 16   Wt 61.1 kg (134 lb 11.2 oz)   SpO2 96%   Intake/Output last 3 Shifts:    Intake/Output Summary (Last 24 hours) at 7/21/2024 0859  Last data filed at 7/20/2024 2043  Gross per 24 hour   Intake 480 ml   Output --   Net 480 ml       Admission Weight  Weight: 51.7 kg (114 lb) (07/15/24 1150)    Daily Weight  07/21/24 : 61.1 kg (134 lb 11.2 oz)    Image Results  Vascular US lower extremity arterial duplex left              [place] [address]    Tel [phone] and Fax [phone]       Vascular Lab Report  [facility] US LOWER EXTREMITY ARTERIAL DUPLEX LEFT       Patient Name:      MYRNA KHAN     Reading Physician:  [id number] Kenneth Cartagena MD, RPVI  Study Date:        7/19/2024            Ordering Physician: [id number] AMI HUYNH  MRN/PID:           [id number]             Technologist:       Peyton HAIR  Accession#:        [id number]         Technologist 2:  Date of Birth/Age: 1960 / 64 years Encounter#:         [id number]  Gender:            F  Admission Status:  Inpatient            Location Performed: [facility]       Diagnosis/ICD: Unspecified (asymptomatic) atherosclerosis of native arteries of                 extremities, left leg-I70.202  Indication:    Assess flow of SFA stent  CPT Codes:     94687 Peripheral artery Lower arterial Duplex limited       Patient  History CAD and HTN. Outside rercords on 11-23 L SFA occluded stent and                  reconstitution at distal left SFA.       CONCLUSIONS:  Left Lower Arterial: There is >50% stenosis documented at the common femoral artery. Left SFA stent is occluded and reconstitution at distal SFA. Decreased monophasic flow noted distally in left lower extremity. Multiple collateral arteries noted left lower extremity.     Imaging & Doppler Findings:     Right                     Left   PSV                      PSV               EIA        34 cm/s               CFA        185 cm/s        Profunda Proximal 77 cm/s           SFA Distal     10 cm/s            Popliteal     29 cm/s          TONI Proximal    25 cm/s             TONI Mid      48 cm/s           TONI Distal     29 cm/s        Peroneal Proximal 20 cm/s          Peroneal Mid    17 cm/s         Peroneal Distal  14 cm/s          PTA Proximal    15 cm/s             PTA Mid      18 cm/s           PTA Distal     13 cm/s       Left Superficial Femoral Prox Stent 1   0 cm/s  Left Superficial Femoral Mid Stent 1    0 cm/s  Left Superficial Femoral Distal Stent 1 0 cm/s          [id number] Kenneth Cartagena MD, RPVI  Electronically signed by [id number] Kenneth Cartagena MD, RPORALIA on 7/19/2024 at 3:55:12 PM       ** Final **  DENISE without exercise              [place] [address]    Tel [phone] and Fax [phone]       Vascular Lab Report  [facility] ANKLE BRACHIAL INDEX (DENISE) WITHOUT EXERCISE       Patient Name:      TANNER LANDON Rodriguez Physician:  [id number] Cartagena MD, RPVI  Study Date:        7/19/2024            Ordering Physician: [id number] AMI HUYNH  MRN/PID:           [id number]             Technologist:       Peyton HAIR  Accession#:        [id number]         Technologist 2:  Date of Birth/Age:  1960 / 64 years Encounter#:         [id number]  Gender:            F  Admission Status:  Inpatient            Location Performed: [facility]       Diagnosis/ICD: Other specified peripheral vascular diseases-I73.89  Indication:    PAD  CPT Codes:     83710 Peripheral artery DENISE Only       Patient History CAD and HTN. Stent in Left SFA.       CONCLUSIONS:  Right Lower PVR: No evidence of arterial occlusive disease in the right lower extremity at rest. Normal digital perfusion noted. Multiphasic flow is noted in the right common femoral artery, right posterior tibial artery and right dorsalis pedis artery.  Left Lower PVR: No evidence of arterial occlusive disease in the left lower extremity at rest. Decreased digital perfusion noted. Monophasic flow is noted in the left posterior tibial artery and left dorsalis pedis artery. Multiphasic flow is noted in the left common femoral artery.     Imaging & Doppler Findings:     RIGHT Lower PVR                Pressures Ratios  Right Posterior Tibial (Ankle) 176 mmHg  1.30  Right Dorsalis Pedis (Ankle)   156 mmHg  1.16  Right Digit (Great Toe)        119 mmHg  0.88          LEFT Lower PVR                Pressures Ratios  Left Posterior Tibial (Ankle) 176 mmHg  1.30  Left Dorsalis Pedis (Ankle)   156 mmHg  1.16  Left Digit (Great Toe)        35 mmHg   0.26                          Right     Left  Brachial Pressure 135 mmHg 125 mmHg          [id number] Kenneth Cartagena MD, RPVI  Electronically signed by [id number] Kenneth Cartagena MD, RPVI on 7/19/2024 at 3:45:38 PM       ** Final **        Physical Exam  CConstitutional: no acute distress  Neuro: no gross deficits   Psych: normal affect  HEENT: No deformities, no scleral icterus   Cardiac: RR  Pulmonary: unlabored respirations   Abdomen: soft, non distended, non tender  Skin: warm and dry overall    Extremities: no swelling noted; multiphasic R DP, PT, femoral, multiphasic R DP & fem, monophasic PT, no wound, motor and sensory  intact.    MSK: moving all four    Medications:  Scheduled medications  amLODIPine, 10 mg, oral, Daily  aspirin, 81 mg, oral, Daily  atorvastatin, 80 mg, oral, Nightly  calcium carbonate-vitamin D3, 2 tablet, oral, Daily  carvedilol, 25 mg, oral, BID  cholecalciferol, 2,000 Units, oral, Daily  clopidogrel, 75 mg, oral, Daily  dapagliflozin propanediol, 10 mg, oral, Daily with breakfast  enoxaparin, 40 mg, subcutaneous, q24h  hydrALAZINE, 100 mg, oral, TID  insulin glargine, 24 Units, subcutaneous, q24h  insulin lispro, 0-10 Units, subcutaneous, TID  insulin lispro, 6 Units, subcutaneous, TID  isosorbide mononitrate ER, 60 mg, oral, Daily  lidocaine, 1 patch, transdermal, Daily  metroNIDAZOLE, 500 mg, oral, q12h BOLIVAR  multivitamin with minerals, 1 tablet, oral, Daily  polyethylene glycol, 17 g, oral, Daily  potassium chloride CR, 20 mEq, oral, BID  ranolazine, 500 mg, oral, BID      Continuous medications     PRN medications  PRN medications: acetaminophen, dextrose, dextrose, glucagon, glucagon, melatonin    Recent Labs:  Results for orders placed or performed during the hospital encounter of 07/15/24 (from the past 24 hour(s))   POCT GLUCOSE   Result Value Ref Range    POCT Glucose 182 (H) 74 - 99 mg/dL   Trichomonas Wet Prep Reflex to PCR   Result Value Ref Range    Trichomonas Present (A) None Seen    Clue Cells None Seen None Seen    Yeast None Seen None Seen    WBC 11-20    POCT GLUCOSE   Result Value Ref Range    POCT Glucose 289 (H) 74 - 99 mg/dL   POCT GLUCOSE   Result Value Ref Range    POCT Glucose 238 (H) 74 - 99 mg/dL   POCT GLUCOSE   Result Value Ref Range    POCT Glucose 50 (L) 74 - 99 mg/dL         Assessment/Plan      Myrna Khan is a 64 y.o. female with hx of CABGx2 in 2023, L SFA stent, presenting initially to hospital with syncope, chest pain and hyperglycemia.  She has a hx of left SFA stent and follows outpatient with Dr. Lawson ([facility]) for her chronic claudication symptoms.  Vascular surgery  is consulted for her chronic claudication symptoms.       - DENISE and arterial duplex reviewed, no need for vascular surgical intervention at this time is needed  - Patient can continue outpatient follow-up with her vascular surgeon at [facility] (Dr. Lawson)  - Continue ASA, and Plavix  - Symptom management   - Rest of care per primary team     Pt seen with fellow Dr. Ma, and d/w attending surgeon, Dr. Bain,    Aki Hernandez MD  General Surgery, PGY-3  Please page service pager with questions  Vascular Surgery [id number]

## 2024-07-22 VITALS
HEART RATE: 72 BPM | WEIGHT: 123.02 LBS | SYSTOLIC BLOOD PRESSURE: 142 MMHG | DIASTOLIC BLOOD PRESSURE: 76 MMHG | HEIGHT: 63 IN | BODY MASS INDEX: 21.8 KG/M2 | OXYGEN SATURATION: 99 % | TEMPERATURE: 97.9 F | RESPIRATION RATE: 16 BRPM

## 2024-07-22 LAB
BACTERIAL VAGINOSIS VAG-IMP: NORMAL
CLUE CELLS VAG LPF-#/AREA: NORMAL /[LPF]
GLUCOSE BLD MANUAL STRIP-MCNC: 131 MG/DL (ref 74–99)
GLUCOSE BLD MANUAL STRIP-MCNC: 148 MG/DL (ref 74–99)
NUGENT SCORE: 4
T PALLIDUM AB SER QL AGGL: REACTIVE
YEAST VAG WET PREP-#/AREA: NORMAL

## 2024-07-22 PROCEDURE — 2500000005 HC RX 250 GENERAL PHARMACY W/O HCPCS: Performed by: STUDENT IN AN ORGANIZED HEALTH CARE EDUCATION/TRAINING PROGRAM

## 2024-07-22 PROCEDURE — 2500000001 HC RX 250 WO HCPCS SELF ADMINISTERED DRUGS (ALT 637 FOR MEDICARE OP): Performed by: STUDENT IN AN ORGANIZED HEALTH CARE EDUCATION/TRAINING PROGRAM

## 2024-07-22 PROCEDURE — 2500000001 HC RX 250 WO HCPCS SELF ADMINISTERED DRUGS (ALT 637 FOR MEDICARE OP): Performed by: NURSE PRACTITIONER

## 2024-07-22 PROCEDURE — 2500000002 HC RX 250 W HCPCS SELF ADMINISTERED DRUGS (ALT 637 FOR MEDICARE OP, ALT 636 FOR OP/ED): Performed by: STUDENT IN AN ORGANIZED HEALTH CARE EDUCATION/TRAINING PROGRAM

## 2024-07-22 PROCEDURE — 82947 ASSAY GLUCOSE BLOOD QUANT: CPT

## 2024-07-22 PROCEDURE — 99238 HOSP IP/OBS DSCHRG MGMT 30/<: CPT | Performed by: STUDENT IN AN ORGANIZED HEALTH CARE EDUCATION/TRAINING PROGRAM

## 2024-07-22 RX ORDER — INSULIN LISPRO 100 [IU]/ML
6 INJECTION, SOLUTION INTRAVENOUS; SUBCUTANEOUS
Start: 2024-07-22

## 2024-07-22 RX ORDER — METRONIDAZOLE 500 MG/1
500 TABLET ORAL EVERY 12 HOURS SCHEDULED
Start: 2024-07-22 | End: 2024-07-27

## 2024-07-22 RX ORDER — INSULIN LISPRO 100 [IU]/ML
0-10 INJECTION, SOLUTION INTRAVENOUS; SUBCUTANEOUS
Start: 2024-07-22

## 2024-07-22 RX ORDER — RANOLAZINE 500 MG/1
500 TABLET, EXTENDED RELEASE ORAL 2 TIMES DAILY
Start: 2024-07-22

## 2024-07-22 ASSESSMENT — PAIN SCALES - GENERAL
PAINLEVEL_OUTOF10: 0 - NO PAIN
PAINLEVEL_OUTOF10: 0 - NO PAIN

## 2024-07-22 NOTE — CARE PLAN
The patient's goals for the shift include wants to sleep    The clinical goals for the shift include Patient will free from fall/injury during the shift.    Problem: Nutrition  Goal: Oral intake greater 75%  Outcome: Met

## 2024-07-22 NOTE — NURSING NOTE
"Ms. Khan is resting in bed.  States she is to be discharged \"soon\" to SNF.    We reviewed recent BG values and diabetes med regimen:  Farxiga, Lantus and Metformin.  She is able to describe each med and how it basically works to manage DM.    Per nursing she is to be discharged later this afternoon.  Will sign off at this time.  Time spent:  15 mins.  "

## 2024-07-22 NOTE — DISCHARGE SUMMARY
Discharge Diagnosis  Syncope and collapse    Issues Requiring Follow-Up  Follow up vascular surgery at [facility] Dr. Murphy     Discharge Meds     Your medication list        START taking these medications        Instructions Last Dose Given Next Dose Due   insulin lispro 100 unit/mL injection  Commonly known as: HumaLOG      Inject 6 Units under the skin 3 times daily (morning, midday, late afternoon). Take as directed per insulin instructions.       insulin lispro 100 unit/mL injection  Commonly known as: HumaLOG      Inject 0-10 Units under the skin 3 times daily (morning, midday, late afternoon). 0 unit(s) if BG ; 2 unit(s) if -200; 4 unit(s) if -250; 6 unit(s) if -300; 8 unit(s) if -350; 10 unit(s) if -400       metroNIDAZOLE 500 mg tablet  Commonly known as: Flagyl      Take 1 tablet (500 mg) by mouth every 12 hours for 10 doses.       ranolazine 500 mg 12 hr tablet  Commonly known as: Ranexa      Take 1 tablet (500 mg) by mouth 2 times a day. Do not crush, chew, or split.              CONTINUE taking these medications        Instructions Last Dose Given Next Dose Due   Accu-Chek Jennifer Plus test strp strip  Generic drug: blood sugar diagnostic      Use 1 strip to check blood sugar 3 times a day with meals.       Accu-Chek Guide Glucose Meter misc  Generic drug: blood-glucose meter      Use daily or as directed for monitoring of diabetes.       Accu-Chek Softclix Lancets misc  Generic drug: lancets      Use three times a day to test blood sugar w/ meals       acetaminophen 500 mg tablet  Commonly known as: Tylenol      Take 1 tablet (500 mg) by mouth every 6 hours if needed for mild pain (1 - 3) or moderate pain (4 - 6). Take per directed       amLODIPine 10 mg tablet  Commonly known as: Norvasc      TAKE 1 TABLET BY MOUTH ONCE DAILY       aspirin 81 mg chewable tablet      CHEW 1 TABLET BY MOUTH ONCE DAILY       atorvastatin 80 mg tablet  Commonly known as: Lipitor      TAKE 1  "TABLET BY MOUTH ONCE DAILY       BD Ultra-Fine Mini Pen Needle 31 gauge x 3/16\" needle  Generic drug: pen needle, diabetic      USE THREE TIMES A DAY       carvedilol 25 mg tablet  Commonly known as: Coreg      TAKE 1 TABLET BY MOUTH TWO TIMES A DAY       cholecalciferol 25 MCG (1000 UT) capsule  Commonly known as: Vitamin D-3           clopidogrel 75 mg tablet  Commonly known as: Plavix           Farxiga 10 mg  Generic drug: dapagliflozin propanediol           hydrALAZINE 100 mg tablet  Commonly known as: Apresoline      Take 1 tablet (100 mg) by mouth once daily.       isosorbide mononitrate ER 60 mg 24 hr tablet  Commonly known as: Imdur      TAKE 1 TABLET BY MOUTH ONCE DAILY       Lantus Solostar U-100 Insulin 100 unit/mL (3 mL) pen  Generic drug: insulin glargine      Inject 24 Units under the skin once daily. Take per directed       metFORMIN  mg 24 hr tablet  Commonly known as: Glucophage-XR      TAKE 2 TABLETS BY MOUTH ONCE DAILY       methocarbamol 750 mg tablet  Commonly known as: Robaxin           multivitamin with minerals tablet           polyethylene glycol 17 gram packet  Commonly known as: Glycolax, Miralax           sennosides 8.6 mg tablet  Commonly known as: Senokot                  STOP taking these medications      insulin aspart 100 unit/mL (3 mL) pen  Commonly known as: NovoLOG                  Where to Get Your Medications        Information about where to get these medications is not yet available    Ask your nurse or doctor about these medications  insulin lispro 100 unit/mL injection  insulin lispro 100 unit/mL injection  metroNIDAZOLE 500 mg tablet  ranolazine 500 mg 12 hr tablet         Test Results Pending At Discharge  Pending Labs       Order Current Status    Extra Urine Gray Tube Collected (07/15/24 1419)    Urinalysis with Reflex Culture and Microscopic Collected (07/15/24 1419)    Serum Protein Electrophoresis + Immunofixation In process    Serum Protein Electrophoresis + " Immunofixation In process            Hospital Course  64 y.o. female with a PMH of T2DM (last HgA1c 8.5% 11/15/23), HFrEF (last EF 35-40% 8/26/23), CAD s/p CABG, HTN, PAD s/p left SFA stent, renal artery stenosis, and carotid artery stenosis who presents for several days of chest pain and inability to walk. States her claudication sx are unbarable and not able to ambulate safely. There was concern for cardiac amyloidosis given wall thickening on imaging.  Cardiology recommended an MRI and a NM PYP study which were unremarkable.  UPEP was negative.  SPEP remains in process. Overall lower likelihood of cardiac amyloidosis. Given worsening claudication symptoms, and DENISE and lower extremity vascular study was completed.  Patient was seen by vascular surgery who recommended no intervention and recommended following up with her vascular surgeon at [facility]. Pt did complain of vaginal discharge and was found to have trichomonas.  Started on course of metronidazole for 7 days. PT OT recommending SNF and pt agreeable      DISPO: SNF      #PAD s/p SFA stent   - continue home dose of Aspirin 81 mg PO every day and Plavix 75 mg PO every day  -seen at [facility] in 2023 and the vas surg note says they want to do revascularization but I am unable to find any documentation of a procedure or follow up  -DENISE ordered and reviewed   -LE vascular/doppler study reviewed. Vascular surgery update reviewed and appreciate. No intervention follow with established surgeon at [facility].    #cardiac wall thickening   -cards cs reviewed   -MRI reviewed   -serum kappa/jeremie free light chains ratio is wnl  -Spep pending   -Upep negative   -PYP scan not indicative of amyloid   -follow up cards OP    #syncope  Per pt, she fell bc her legs gave out on her   - PT/OT   -cocaine +     #Trichomonas infection   -vaginal/discharge swab done and labs ordered follow up  -metronidzole 500 mg every 12 hours for 7 days    #chest pain 2/2 coronary vasospasm 2/2 cocaine use  "-reolved  #CAD s/p CABG  #HFrEF (last EF 35-40% 8/26/23) - ECHO 07/16/24 improved EF 50%  #angina at rest  #cocaine use disorder  - Troponin negative   - EKG on admit: NSR   - Echo discussed with reading cardiologist: concern for cardiac amyloid   - utox: cocaine +    -start Ranolazine 500 mg BID    - continue home dose of Farxiga 10 mg PO every day, Coreg 25 mg PO BID, and Imdur 60 mg PO every day    #uncontrolled hypertension   - Amlodipine 10 mg PO every day   - Hydralazine 100 mg PO TID;  - Carvedilol  25 mg oral, BID    #hyperglycemia in DM2 2/2 pt snacking and eating high sugar foods   #uncontrolled T2DM (last HgA1c 8.5% 11/15/23)  - HgA1c 15.2  - diabetes education  - pt noncompliant with insulin outpatient  - metformin  - continue home dose of Farxiga 10 mg PO every day  -Ctn  Lantus 24 units at bedtime  - accucheck ACHS  - diabetic diet  -discussed with pt that sugars are too high and are 2/2 her eating cookies and soda. Patient responded \"my sugar was good when I woke up\"       #hypomagnesemia  #hypokalemia   - resolved     #bilateral hip pain  #unable to ambulate  - xray of bilateral hips: No acute abnormality in the hips.   - lidocaine patch to both hips  - tylenol 650 mg q6h PRN mild-moderate pain  -PT/OT      Pertinent Physical Exam At Time of Discharge  Physical Exam  Vitals and nursing note reviewed.   Constitutional:       General: She is not in acute distress.     Appearance: Normal appearance. She is not ill-appearing.   HENT:      Head: Normocephalic and atraumatic.   Pulmonary:      Effort: No respiratory distress.   Abdominal:      Tenderness: There is no guarding.   Skin:     Coloration: Skin is not jaundiced or pale.   Neurological:      Mental Status: She is alert and oriented to person, place, and time.   Psychiatric:         Mood and Affect: Mood normal.         Outpatient Follow-Up  No future appointments.      Jerald Ireland, DO  "

## 2024-07-22 NOTE — NURSING NOTE
Patient discharged to [facility] this evening. A copy of the [place] and AVS were printed and sent with her in a discharge folder. I helped her pack all her belongings. Her IV was removed with tip intact. Her bedside nurse Ariadna CARPENTER called nursing report to [phone]. She was picked up and transported there by the [facility] ambulette service.

## 2024-07-22 NOTE — PROGRESS NOTES
07/22/24 1430   Discharge Planning   Home or Post Acute Services Post acute facilities (Rehab/SNF/etc)   Type of Post Acute Facility Services Skilled nursing   Expected Discharge Disposition SNF   Does the patient need discharge transport arranged? Yes   RoundTrip coordination needed? Yes   Has discharge transport been arranged? Yes   What day is the transport expected? 07/22/24   What time is the transport expected? 1530       Patient will discharge to [facility] under her secondary insurance. Per facility, no precert is needed. Final goldenrod and AVS sent. Transportation is arranged for 3:30 pm via [facility]. Patient will transport by wheelchair. Nurse given number for report.    Number for report: [phone]

## 2024-07-22 NOTE — PROGRESS NOTES
7/22/24 1151 Transitional Care Coordinator Notes:    Updated progress notes sent to [facility]. Awaiting to see if facility can accept under the patient secondary insurance.         Assessment/Plan   Principal Problem:    Syncope and collapse  Active Problems:    Hypertensive urgency    Hyperglycemia due to diabetes mellitus (Multi)    Hypokalemia    Hypomagnesemia    Fall, initial encounter    Vaginal discharge               Alyssa Bryant RN

## 2024-07-22 NOTE — CARE PLAN
The patient's goals for the shift include wants to sleep    The clinical goals for the shift include pt will be free of falls this shift       Problem: Diabetes  Goal: Achieve decreasing blood glucose levels by end of shift  Outcome: Progressing

## 2024-07-22 NOTE — DISCHARGE INSTRUCTIONS
Please make sure to follow up with your vascular surgeon at [facility]   Please follow up with your primary doctor

## 2024-07-24 LAB
ALBUMIN: 2.9 G/DL (ref 3.4–5)
ALPHA 1 GLOBULIN: 0.2 G/DL (ref 0.2–0.6)
ALPHA 2 GLOBULIN: 0.9 G/DL (ref 0.4–1.1)
BETA GLOBULIN: 0.7 G/DL (ref 0.5–1.2)
GAMMA GLOBULIN: 1 G/DL (ref 0.5–1.4)
IMMUNOFIXATION COMMENT: ABNORMAL
PATH REVIEW - SERUM IMMUNOFIXATION: ABNORMAL
PATH REVIEW-SERUM PROTEIN ELECTROPHORESIS: ABNORMAL
PROTEIN ELECTROPHORESIS COMMENT: ABNORMAL

## 2024-08-27 ENCOUNTER — NURSING HOME VISIT (OUTPATIENT)
Dept: POST ACUTE CARE | Facility: EXTERNAL LOCATION | Age: 64
End: 2024-08-27
Payer: COMMERCIAL

## 2024-08-27 VITALS
WEIGHT: 116 LBS | TEMPERATURE: 98 F | BODY MASS INDEX: 20.55 KG/M2 | HEIGHT: 63 IN | RESPIRATION RATE: 18 BRPM | OXYGEN SATURATION: 99 % | DIASTOLIC BLOOD PRESSURE: 80 MMHG | HEART RATE: 77 BPM | SYSTOLIC BLOOD PRESSURE: 150 MMHG

## 2024-08-27 DIAGNOSIS — D69.6 THROMBOCYTOPENIA, UNSPECIFIED (CMS-HCC): ICD-10-CM

## 2024-08-27 DIAGNOSIS — Z79.4 CONTROLLED TYPE 2 DIABETES MELLITUS WITHOUT COMPLICATION, WITH LONG-TERM CURRENT USE OF INSULIN (MULTI): ICD-10-CM

## 2024-08-27 DIAGNOSIS — I73.9 PAD (PERIPHERAL ARTERY DISEASE) (CMS-HCC): ICD-10-CM

## 2024-08-27 DIAGNOSIS — E11.9 CONTROLLED TYPE 2 DIABETES MELLITUS WITHOUT COMPLICATION, WITH LONG-TERM CURRENT USE OF INSULIN (MULTI): ICD-10-CM

## 2024-08-27 DIAGNOSIS — J44.0 CHRONIC OBSTRUCTIVE PULMONARY DISEASE WITH (ACUTE) LOWER RESPIRATORY INFECTION (MULTI): ICD-10-CM

## 2024-08-27 DIAGNOSIS — I25.85 CHRONIC CORONARY MICROVASCULAR DYSFUNCTION: ICD-10-CM

## 2024-08-27 DIAGNOSIS — I10 PRIMARY HYPERTENSION: ICD-10-CM

## 2024-08-27 DIAGNOSIS — J96.01 ACUTE RESPIRATORY FAILURE WITH HYPOXIA (MULTI): Primary | ICD-10-CM

## 2024-08-27 PROCEDURE — 99305 1ST NF CARE MODERATE MDM 35: CPT | Performed by: INTERNAL MEDICINE

## 2024-08-27 ASSESSMENT — PAIN SCALES - GENERAL: PAINLEVEL: 4

## 2024-08-27 NOTE — LETTER
Patient: Myrna Khan  : 1960    Encounter Date: 2024        Subjective  Patient ID: Myrna Khan is a 64 y.o. female who is seen and examined in the facility for an admission note.  Any issues were discussed with the facility staff.   The patient has a history of hypertension, hyperlipidemia, diabetes, who was admitted to the facility after a hospitalization related to a fall late to syncope.  She was stabilized and sent to us.  Her bed was completely surrounded by an array of salty snacks, and in the window seal she had a large sugary sweetened beverage.  I took the liberty to remind her of her diabetes and accelerated high blood pressure and the connection between salt and sugar in the control of these 2 hard to control diseases, particularly in her.  She currently denies chest pain, shortness of breath, fever, cough, or any other new symptoms.   HPI    Review of Systems   Constitutional:  Negative for activity change and chills.   HENT:  Negative for congestion.    Respiratory:  Positive for shortness of breath and wheezing. Negative for chest tightness.    Cardiovascular:  Negative for chest pain and palpitations.   Gastrointestinal:  Positive for constipation. Negative for diarrhea.   Genitourinary:  Positive for frequency.   Musculoskeletal:  Positive for arthralgias and gait problem. Negative for myalgias.   Neurological:  Negative for weakness, light-headedness and headaches.   Psychiatric/Behavioral:  Negative for agitation, behavioral problems and decreased concentration.          Visit Vitals  /80   Pulse 77   Temp 36.7 °C (98 °F)   Resp 18      Objective  Physical Exam  Constitutional:       Appearance: Normal appearance.   HENT:      Head: Normocephalic and atraumatic.      Nose: Nose normal.      Mouth/Throat:      Mouth: Mucous membranes are moist.   Eyes:      Extraocular Movements: Extraocular movements intact.   Cardiovascular:      Rate and Rhythm: Normal rate and  regular rhythm.      Heart sounds: Murmur heard.   Pulmonary:      Effort: No respiratory distress.      Breath sounds: Wheezing present.   Abdominal:      General: Bowel sounds are normal. There is distension.   Musculoskeletal:      Cervical back: Normal range of motion.      Right lower leg: No edema.      Left lower leg: No edema.   Skin:     Comments: See nursing notes   Neurological:      General: No focal deficit present.      Mental Status: She is oriented to person, place, and time.   Psychiatric:         Mood and Affect: Mood normal.         Behavior: Behavior normal.         Assessment/Plan  Assessment & Plan  Acute respiratory failure with hypoxia (Multi)  Her breathing has improved somewhat.       Chronic obstructive pulmonary disease with (acute) lower respiratory infection (Multi)  I advised her she needs to stop smoking.       Thrombocytopenia, unspecified (CMS-[facility])  She shows no signs of bleeding.       PAD (peripheral artery disease) (CMS-HCC)  I also advised her regarding the connection between smoking and circulation issues.       Primary hypertension  She had a number of salty snacks surrounding her bed.  I again made the connection between salt and those snacks and her poorly controlled high blood pressure.       Chronic coronary microvascular dysfunction  We will continue the amlodipine and other medications as ordered.       Controlled type 2 diabetes mellitus without complication, with long-term current use of insulin (Multi)  Her blood sugars have been somewhat labile since admission and she reports a hypoglycemic episode earlier today.  So we will keep a closer eye on her blood sugars.           The patient is stable overall.  Labs reviewed as generated.       Electronically Signed By: Ted Bone MD   8/29/24 10:13 AM

## 2024-08-29 PROBLEM — J96.01 ACUTE RESPIRATORY FAILURE WITH HYPOXIA (MULTI): Status: ACTIVE | Noted: 2024-08-29

## 2024-08-29 PROBLEM — J44.0 CHRONIC OBSTRUCTIVE PULMONARY DISEASE WITH (ACUTE) LOWER RESPIRATORY INFECTION (MULTI): Status: ACTIVE | Noted: 2024-08-29

## 2024-08-29 PROBLEM — D69.6 THROMBOCYTOPENIA, UNSPECIFIED (CMS-HCC): Status: ACTIVE | Noted: 2024-08-29

## 2024-08-29 ASSESSMENT — ENCOUNTER SYMPTOMS
DECREASED CONCENTRATION: 0
DIARRHEA: 0
CONSTIPATION: 1
SHORTNESS OF BREATH: 1
CHILLS: 0
HEADACHES: 0
ACTIVITY CHANGE: 0
ARTHRALGIAS: 1
WHEEZING: 1
MYALGIAS: 0
AGITATION: 0
PALPITATIONS: 0
CHEST TIGHTNESS: 0
WEAKNESS: 0
FREQUENCY: 1
LIGHT-HEADEDNESS: 0

## 2024-08-29 NOTE — PROGRESS NOTES
Subjective   Patient ID: Myrna Khan is a 64 y.o. female who is seen and examined in the facility for an admission note.  Any issues were discussed with the facility staff.   The patient has a history of hypertension, hyperlipidemia, diabetes, who was admitted to the facility after a hospitalization related to a fall late to syncope.  She was stabilized and sent to us.  Her bed was completely surrounded by an array of salty snacks, and in the window seal she had a large sugary sweetened beverage.  I took the liberty to remind her of her diabetes and accelerated high blood pressure and the connection between salt and sugar in the control of these 2 hard to control diseases, particularly in her.  She currently denies chest pain, shortness of breath, fever, cough, or any other new symptoms.   HPI    Review of Systems   Constitutional:  Negative for activity change and chills.   HENT:  Negative for congestion.    Respiratory:  Positive for shortness of breath and wheezing. Negative for chest tightness.    Cardiovascular:  Negative for chest pain and palpitations.   Gastrointestinal:  Positive for constipation. Negative for diarrhea.   Genitourinary:  Positive for frequency.   Musculoskeletal:  Positive for arthralgias and gait problem. Negative for myalgias.   Neurological:  Negative for weakness, light-headedness and headaches.   Psychiatric/Behavioral:  Negative for agitation, behavioral problems and decreased concentration.          Visit Vitals  /80   Pulse 77   Temp 36.7 °C (98 °F)   Resp 18      Objective   Physical Exam  Constitutional:       Appearance: Normal appearance.   HENT:      Head: Normocephalic and atraumatic.      Nose: Nose normal.      Mouth/Throat:      Mouth: Mucous membranes are moist.   Eyes:      Extraocular Movements: Extraocular movements intact.   Cardiovascular:      Rate and Rhythm: Normal rate and regular rhythm.      Heart sounds: Murmur heard.   Pulmonary:      Effort: No  respiratory distress.      Breath sounds: Wheezing present.   Abdominal:      General: Bowel sounds are normal. There is distension.   Musculoskeletal:      Cervical back: Normal range of motion.      Right lower leg: No edema.      Left lower leg: No edema.   Skin:     Comments: See nursing notes   Neurological:      General: No focal deficit present.      Mental Status: She is oriented to person, place, and time.   Psychiatric:         Mood and Affect: Mood normal.         Behavior: Behavior normal.         Assessment/Plan   Assessment & Plan  Acute respiratory failure with hypoxia (Multi)  Her breathing has improved somewhat.       Chronic obstructive pulmonary disease with (acute) lower respiratory infection (Multi)  I advised her she needs to stop smoking.       Thrombocytopenia, unspecified (CMS-HCC)  She shows no signs of bleeding.       PAD (peripheral artery disease) (CMS-HCC)  I also advised her regarding the connection between smoking and circulation issues.       Primary hypertension  She had a number of salty snacks surrounding her bed.  I again made the connection between salt and those snacks and her poorly controlled high blood pressure.       Chronic coronary microvascular dysfunction  We will continue the amlodipine and other medications as ordered.       Controlled type 2 diabetes mellitus without complication, with long-term current use of insulin (Multi)  Her blood sugars have been somewhat labile since admission and she reports a hypoglycemic episode earlier today.  So we will keep a closer eye on her blood sugars.           The patient is stable overall.  Labs reviewed as generated.

## 2024-08-29 NOTE — ASSESSMENT & PLAN NOTE
She had a number of salty snacks surrounding her bed.  I again made the connection between salt and those snacks and her poorly controlled high blood pressure.

## 2025-01-01 ENCOUNTER — APPOINTMENT (OUTPATIENT)
Dept: RADIOLOGY | Facility: HOSPITAL | Age: 65
DRG: 870 | End: 2025-01-01
Payer: MEDICARE

## 2025-01-01 ENCOUNTER — APPOINTMENT (OUTPATIENT)
Dept: CARDIOLOGY | Facility: HOSPITAL | Age: 65
DRG: 870 | End: 2025-01-01
Payer: MEDICARE

## 2025-01-01 ENCOUNTER — CLINICAL SUPPORT (OUTPATIENT)
Dept: EMERGENCY MEDICINE | Facility: HOSPITAL | Age: 65
DRG: 870 | End: 2025-01-01
Payer: MEDICARE

## 2025-01-01 ENCOUNTER — APPOINTMENT (OUTPATIENT)
Dept: NEUROLOGY | Facility: HOSPITAL | Age: 65
DRG: 870 | End: 2025-01-01
Payer: MEDICARE

## 2025-01-01 LAB
ANION GAP BLDV CALCULATED.4IONS-SCNC: 13 MMOL/L (ref 10–25)
BASE EXCESS BLDV CALC-SCNC: -10.6 MMOL/L (ref -2–3)
BODY TEMPERATURE: 37 DEGREES CELSIUS
CA-I BLDV-SCNC: 1.16 MMOL/L (ref 1.1–1.33)
CHLORIDE BLDV-SCNC: 106 MMOL/L (ref 98–107)
GLUCOSE BLDV-MCNC: 149 MG/DL (ref 74–99)
HCO3 BLDV-SCNC: 19.4 MMOL/L (ref 22–26)
HCT VFR BLD EST: 37 % (ref 36–46)
HGB BLDV-MCNC: 12.4 G/DL (ref 12–16)
LACTATE BLDV-SCNC: 1.9 MMOL/L (ref 0.4–2)
OXYHGB MFR BLDV: 77 % (ref 45–75)
PCO2 BLDV: 61 MM HG (ref 41–51)
PH BLDV: 7.11 PH (ref 7.33–7.43)
PO2 BLDV: 53 MM HG (ref 35–45)
POTASSIUM BLDV-SCNC: 4.6 MMOL/L (ref 3.5–5.3)
SAO2 % BLDV: 79 % (ref 45–75)
SODIUM BLDV-SCNC: 134 MMOL/L (ref 136–145)

## 2025-01-01 PROCEDURE — 93005 ELECTROCARDIOGRAM TRACING: CPT

## 2025-01-01 PROCEDURE — 74177 CT ABD & PELVIS W/CONTRAST: CPT

## 2025-01-01 PROCEDURE — 70498 CT ANGIOGRAPHY NECK: CPT

## 2025-01-01 PROCEDURE — 71045 X-RAY EXAM CHEST 1 VIEW: CPT

## 2025-01-01 PROCEDURE — 74018 RADEX ABDOMEN 1 VIEW: CPT

## 2025-01-01 PROCEDURE — 70450 CT HEAD/BRAIN W/O DYE: CPT

## 2025-01-01 PROCEDURE — 74174 CTA ABD&PLVS W/CONTRAST: CPT

## 2025-01-01 PROCEDURE — 70553 MRI BRAIN STEM W/O & W/DYE: CPT

## 2025-01-01 PROCEDURE — 93970 EXTREMITY STUDY: CPT

## 2025-01-01 PROCEDURE — 84132 ASSAY OF SERUM POTASSIUM: CPT

## 2025-01-06 ENCOUNTER — CLINICAL SUPPORT (OUTPATIENT)
Dept: EMERGENCY MEDICINE | Facility: HOSPITAL | Age: 65
DRG: 637 | End: 2025-01-06
Payer: COMMERCIAL

## 2025-01-06 ENCOUNTER — APPOINTMENT (OUTPATIENT)
Dept: RADIOLOGY | Facility: HOSPITAL | Age: 65
End: 2025-01-06
Payer: COMMERCIAL

## 2025-01-06 ENCOUNTER — HOSPITAL ENCOUNTER (INPATIENT)
Facility: HOSPITAL | Age: 65
End: 2025-01-06
Attending: EMERGENCY MEDICINE | Admitting: STUDENT IN AN ORGANIZED HEALTH CARE EDUCATION/TRAINING PROGRAM
Payer: COMMERCIAL

## 2025-01-06 DIAGNOSIS — E11.610 TYPE 2 DIABETES MELLITUS WITH DIABETIC NEUROPATHIC ARTHROPATHY, WITH LONG-TERM CURRENT USE OF INSULIN (MULTI): ICD-10-CM

## 2025-01-06 DIAGNOSIS — Z79.4 TYPE 2 DIABETES MELLITUS WITHOUT COMPLICATION, WITH LONG-TERM CURRENT USE OF INSULIN (MULTI): ICD-10-CM

## 2025-01-06 DIAGNOSIS — M79.671 RIGHT FOOT PAIN: ICD-10-CM

## 2025-01-06 DIAGNOSIS — M79.89 RIGHT LEG SWELLING: ICD-10-CM

## 2025-01-06 DIAGNOSIS — I25.85 CHRONIC CORONARY MICROVASCULAR DYSFUNCTION: ICD-10-CM

## 2025-01-06 DIAGNOSIS — M79.604 PAIN IN RIGHT LEG: ICD-10-CM

## 2025-01-06 DIAGNOSIS — Z79.4 TYPE 2 DIABETES MELLITUS WITH DIABETIC NEUROPATHIC ARTHROPATHY, WITH LONG-TERM CURRENT USE OF INSULIN (MULTI): ICD-10-CM

## 2025-01-06 DIAGNOSIS — E11.65 HYPERGLYCEMIA DUE TO DIABETES MELLITUS (MULTI): ICD-10-CM

## 2025-01-06 DIAGNOSIS — E11.9 TYPE 2 DIABETES MELLITUS WITHOUT COMPLICATION, WITH LONG-TERM CURRENT USE OF INSULIN (MULTI): ICD-10-CM

## 2025-01-06 DIAGNOSIS — I16.0 HYPERTENSIVE URGENCY: ICD-10-CM
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
## 2025-01-06 LAB
ALBUMIN SERPL BCP-MCNC: 3.5 G/DL (ref 3.4–5)
ALP SERPL-CCNC: 235 U/L (ref 33–136)
ALT SERPL W P-5'-P-CCNC: 59 U/L (ref 7–45)
AMPHETAMINES UR QL SCN: ABNORMAL
ANION GAP BLDV CALCULATED.4IONS-SCNC: 10 MMOL/L (ref 10–25)
ANION GAP SERPL CALC-SCNC: 19 MMOL/L (ref 10–20)
APAP SERPL-MCNC: <10 UG/ML
APPEARANCE UR: CLEAR
APTT PPP: 30 SECONDS (ref 27–38)
AST SERPL W P-5'-P-CCNC: 78 U/L (ref 9–39)
BARBITURATES UR QL SCN: ABNORMAL
BASE EXCESS BLDV CALC-SCNC: 9.4 MMOL/L (ref -2–3)
BASOPHILS # BLD AUTO: 0.05 X10*3/UL (ref 0–0.1)
BASOPHILS NFR BLD AUTO: 0.7 %
BENZODIAZ UR QL SCN: ABNORMAL
BILIRUB SERPL-MCNC: 0.7 MG/DL (ref 0–1.2)
BILIRUB UR STRIP.AUTO-MCNC: NEGATIVE MG/DL
BODY TEMPERATURE: 37 DEGREES CELSIUS
BUN SERPL-MCNC: 21 MG/DL (ref 6–23)
BZE UR QL SCN: ABNORMAL
CA-I BLDV-SCNC: 1.2 MMOL/L (ref 1.1–1.33)
CALCIUM SERPL-MCNC: 9.6 MG/DL (ref 8.6–10.6)
CANNABINOIDS UR QL SCN: ABNORMAL
CHLORIDE BLDV-SCNC: 93 MMOL/L (ref 98–107)
CHLORIDE SERPL-SCNC: 91 MMOL/L (ref 98–107)
CO2 SERPL-SCNC: 31 MMOL/L (ref 21–32)
COLOR UR: ABNORMAL
CREAT SERPL-MCNC: 0.65 MG/DL (ref 0.5–1.05)
EGFRCR SERPLBLD CKD-EPI 2021: >90 ML/MIN/1.73M*2
EOSINOPHIL # BLD AUTO: 0.01 X10*3/UL (ref 0–0.7)
EOSINOPHIL NFR BLD AUTO: 0.1 %
ERYTHROCYTE [DISTWIDTH] IN BLOOD BY AUTOMATED COUNT: 11.7 % (ref 11.5–14.5)
ETHANOL SERPL-MCNC: <10 MG/DL
FENTANYL+NORFENTANYL UR QL SCN: ABNORMAL
FLUAV RNA RESP QL NAA+PROBE: NOT DETECTED
FLUBV RNA RESP QL NAA+PROBE: NOT DETECTED
FOLATE SERPL-MCNC: 15.4 NG/ML
GLUCOSE BLD MANUAL STRIP-MCNC: 338 MG/DL (ref 74–99)
GLUCOSE BLD MANUAL STRIP-MCNC: 456 MG/DL (ref 74–99)
GLUCOSE BLDV-MCNC: 399 MG/DL (ref 74–99)
GLUCOSE SERPL-MCNC: 473 MG/DL (ref 74–99)
GLUCOSE UR STRIP.AUTO-MCNC: ABNORMAL MG/DL
HCO3 BLDV-SCNC: 36 MMOL/L (ref 22–26)
HCT VFR BLD AUTO: 47.3 % (ref 36–46)
HCT VFR BLD EST: 54 % (ref 36–46)
HGB BLD-MCNC: 17.8 G/DL (ref 12–16)
HGB BLDV-MCNC: 18.1 G/DL (ref 12–16)
HOLD SPECIMEN: NORMAL
IMM GRANULOCYTES # BLD AUTO: 0.03 X10*3/UL (ref 0–0.7)
IMM GRANULOCYTES NFR BLD AUTO: 0.4 % (ref 0–0.9)
INR PPP: 1 (ref 0.9–1.1)
KETONES UR STRIP.AUTO-MCNC: ABNORMAL MG/DL
LACTATE BLDV-SCNC: 2.3 MMOL/L (ref 0.4–2)
LEUKOCYTE ESTERASE UR QL STRIP.AUTO: ABNORMAL
LYMPHOCYTES # BLD AUTO: 3.16 X10*3/UL (ref 1.2–4.8)
LYMPHOCYTES NFR BLD AUTO: 45.3 %
MAGNESIUM SERPL-MCNC: 1.56 MG/DL (ref 1.6–2.4)
MCH RBC QN AUTO: 32.4 PG (ref 26–34)
MCHC RBC AUTO-ENTMCNC: 37.6 G/DL (ref 32–36)
MCV RBC AUTO: 86 FL (ref 80–100)
METHADONE UR QL SCN: ABNORMAL
MONOCYTES # BLD AUTO: 0.29 X10*3/UL (ref 0.1–1)
MONOCYTES NFR BLD AUTO: 4.2 %
MUCOUS THREADS #/AREA URNS AUTO: ABNORMAL /LPF
NEUTROPHILS # BLD AUTO: 3.44 X10*3/UL (ref 1.2–7.7)
NEUTROPHILS NFR BLD AUTO: 49.3 %
NITRITE UR QL STRIP.AUTO: NEGATIVE
NRBC BLD-RTO: 0 /100 WBCS (ref 0–0)
OPIATES UR QL SCN: ABNORMAL
OXYCODONE+OXYMORPHONE UR QL SCN: ABNORMAL
OXYHGB MFR BLDV: 25.3 % (ref 45–75)
PCO2 BLDV: 53 MM HG (ref 41–51)
PCP UR QL SCN: ABNORMAL
PH BLDV: 7.44 PH (ref 7.33–7.43)
PH UR STRIP.AUTO: 6.5 [PH]
PLATELET # BLD AUTO: 222 X10*3/UL (ref 150–450)
PO2 BLDV: 20 MM HG (ref 35–45)
POTASSIUM BLDV-SCNC: 4.6 MMOL/L (ref 3.5–5.3)
POTASSIUM SERPL-SCNC: 4.1 MMOL/L (ref 3.5–5.3)
PROT SERPL-MCNC: 7.5 G/DL (ref 6.4–8.2)
PROT UR STRIP.AUTO-MCNC: ABNORMAL MG/DL
PROTHROMBIN TIME: 11.2 SECONDS (ref 9.8–12.8)
RBC # BLD AUTO: 5.5 X10*6/UL (ref 4–5.2)
RBC # UR STRIP.AUTO: ABNORMAL /UL
RBC #/AREA URNS AUTO: ABNORMAL /HPF
SALICYLATES SERPL-MCNC: <3 MG/DL
SAO2 % BLDV: 26 % (ref 45–75)
SARS-COV-2 RNA RESP QL NAA+PROBE: NOT DETECTED
SODIUM BLDV-SCNC: 134 MMOL/L (ref 136–145)
SODIUM SERPL-SCNC: 137 MMOL/L (ref 136–145)
SP GR UR STRIP.AUTO: 1.02
SQUAMOUS #/AREA URNS AUTO: ABNORMAL /HPF
UROBILINOGEN UR STRIP.AUTO-MCNC: NORMAL MG/DL
VIT B12 SERPL-MCNC: 558 PG/ML (ref 211–911)
WBC # BLD AUTO: 7 X10*3/UL (ref 4.4–11.3)
WBC #/AREA URNS AUTO: ABNORMAL /HPF

## 2025-01-06 PROCEDURE — 82947 ASSAY GLUCOSE BLOOD QUANT: CPT

## 2025-01-06 PROCEDURE — 80143 DRUG ASSAY ACETAMINOPHEN: CPT

## 2025-01-06 PROCEDURE — 80053 COMPREHEN METABOLIC PANEL: CPT

## 2025-01-06 PROCEDURE — 84075 ASSAY ALKALINE PHOSPHATASE: CPT

## 2025-01-06 PROCEDURE — 82746 ASSAY OF FOLIC ACID SERUM: CPT | Performed by: EMERGENCY MEDICINE

## 2025-01-06 PROCEDURE — 2500000002 HC RX 250 W HCPCS SELF ADMINISTERED DRUGS (ALT 637 FOR MEDICARE OP, ALT 636 FOR OP/ED)

## 2025-01-06 PROCEDURE — 84132 ASSAY OF SERUM POTASSIUM: CPT

## 2025-01-06 PROCEDURE — 2500000004 HC RX 250 GENERAL PHARMACY W/ HCPCS (ALT 636 FOR OP/ED)

## 2025-01-06 PROCEDURE — 87086 URINE CULTURE/COLONY COUNT: CPT

## 2025-01-06 PROCEDURE — 70450 CT HEAD/BRAIN W/O DYE: CPT | Performed by: RADIOLOGY

## 2025-01-06 PROCEDURE — 36415 COLL VENOUS BLD VENIPUNCTURE: CPT

## 2025-01-06 PROCEDURE — 2500000001 HC RX 250 WO HCPCS SELF ADMINISTERED DRUGS (ALT 637 FOR MEDICARE OP)

## 2025-01-06 PROCEDURE — 85730 THROMBOPLASTIN TIME PARTIAL: CPT

## 2025-01-06 PROCEDURE — 99285 EMERGENCY DEPT VISIT HI MDM: CPT | Mod: 25 | Performed by: EMERGENCY MEDICINE

## 2025-01-06 PROCEDURE — 82607 VITAMIN B-12: CPT

## 2025-01-06 PROCEDURE — 93005 ELECTROCARDIOGRAM TRACING: CPT

## 2025-01-06 PROCEDURE — 83735 ASSAY OF MAGNESIUM: CPT

## 2025-01-06 PROCEDURE — 72125 CT NECK SPINE W/O DYE: CPT

## 2025-01-06 PROCEDURE — 72125 CT NECK SPINE W/O DYE: CPT | Performed by: RADIOLOGY

## 2025-01-06 PROCEDURE — 81001 URINALYSIS AUTO W/SCOPE: CPT

## 2025-01-06 PROCEDURE — 85610 PROTHROMBIN TIME: CPT

## 2025-01-06 PROCEDURE — 1210000001 HC SEMI-PRIVATE ROOM DAILY

## 2025-01-06 PROCEDURE — 93010 ELECTROCARDIOGRAM REPORT: CPT | Performed by: EMERGENCY MEDICINE

## 2025-01-06 PROCEDURE — 70450 CT HEAD/BRAIN W/O DYE: CPT

## 2025-01-06 PROCEDURE — 85025 COMPLETE CBC W/AUTO DIFF WBC: CPT

## 2025-01-06 PROCEDURE — 80320 DRUG SCREEN QUANTALCOHOLS: CPT

## 2025-01-06 PROCEDURE — 87636 SARSCOV2 & INF A&B AMP PRB: CPT

## 2025-01-06 PROCEDURE — 80307 DRUG TEST PRSMV CHEM ANLYZR: CPT

## 2025-01-06 PROCEDURE — 99285 EMERGENCY DEPT VISIT HI MDM: CPT | Performed by: EMERGENCY MEDICINE

## 2025-01-06 RX ORDER — ISOSORBIDE MONONITRATE 60 MG/1
60 TABLET, EXTENDED RELEASE ORAL DAILY
Status: DISPENSED | OUTPATIENT
Start: 2025-01-06

## 2025-01-06 RX ORDER — ACETAMINOPHEN 325 MG/1
650 TABLET ORAL EVERY 6 HOURS PRN
Status: DISCONTINUED | OUTPATIENT
Start: 2025-01-06 | End: 2025-01-07

## 2025-01-06 RX ORDER — DEXTROSE 50 % IN WATER (D50W) INTRAVENOUS SYRINGE
25
Status: DISPENSED | OUTPATIENT
Start: 2025-01-06

## 2025-01-06 RX ORDER — DAPAGLIFLOZIN 10 MG/1
10 TABLET, FILM COATED ORAL DAILY
Status: DISPENSED | OUTPATIENT
Start: 2025-01-06

## 2025-01-06 RX ORDER — INSULIN LISPRO 100 [IU]/ML
10 INJECTION, SOLUTION INTRAVENOUS; SUBCUTANEOUS ONCE
Status: COMPLETED | OUTPATIENT
Start: 2025-01-06 | End: 2025-01-06

## 2025-01-06 RX ORDER — MAGNESIUM SULFATE HEPTAHYDRATE 40 MG/ML
2 INJECTION, SOLUTION INTRAVENOUS ONCE
Status: COMPLETED | OUTPATIENT
Start: 2025-01-06 | End: 2025-01-06

## 2025-01-06 RX ORDER — RANOLAZINE 500 MG/1
500 TABLET, EXTENDED RELEASE ORAL 2 TIMES DAILY
Status: DISPENSED | OUTPATIENT
Start: 2025-01-06

## 2025-01-06 RX ORDER — POLYETHYLENE GLYCOL 3350 17 G/17G
17 POWDER, FOR SOLUTION ORAL DAILY
Status: DISPENSED | OUTPATIENT
Start: 2025-01-06

## 2025-01-06 RX ORDER — CLOPIDOGREL BISULFATE 75 MG/1
75 TABLET ORAL DAILY
Status: DISPENSED | OUTPATIENT
Start: 2025-01-06

## 2025-01-06 RX ORDER — HYDRALAZINE HYDROCHLORIDE 50 MG/1
100 TABLET, FILM COATED ORAL 3 TIMES DAILY
Status: DISPENSED | OUTPATIENT
Start: 2025-01-06

## 2025-01-06 RX ORDER — LIDOCAINE 560 MG/1
1 PATCH PERCUTANEOUS; TOPICAL; TRANSDERMAL DAILY
Status: DISPENSED | OUTPATIENT
Start: 2025-01-06

## 2025-01-06 RX ORDER — ENOXAPARIN SODIUM 100 MG/ML
40 INJECTION SUBCUTANEOUS EVERY 24 HOURS
Status: DISPENSED | OUTPATIENT
Start: 2025-01-06

## 2025-01-06 RX ORDER — DEXTROSE 50 % IN WATER (D50W) INTRAVENOUS SYRINGE
12.5
Status: DISPENSED | OUTPATIENT
Start: 2025-01-06

## 2025-01-06 RX ORDER — INSULIN LISPRO 100 [IU]/ML
0-10 INJECTION, SOLUTION INTRAVENOUS; SUBCUTANEOUS
Status: DISCONTINUED | OUTPATIENT
Start: 2025-01-06 | End: 2025-01-10

## 2025-01-06 RX ORDER — SULFAMETHOXAZOLE AND TRIMETHOPRIM 800; 160 MG/1; MG/1
1 TABLET ORAL EVERY 12 HOURS SCHEDULED
Status: COMPLETED | OUTPATIENT
Start: 2025-01-06 | End: 2025-01-09

## 2025-01-06 RX ORDER — ASPIRIN 81 MG/1
81 TABLET ORAL DAILY
Status: DISPENSED | OUTPATIENT
Start: 2025-01-06

## 2025-01-06 RX ORDER — CARVEDILOL 12.5 MG/1
12.5 TABLET ORAL 2 TIMES DAILY
Status: DISPENSED | OUTPATIENT
Start: 2025-01-06

## 2025-01-06 RX ORDER — CLONIDINE HYDROCHLORIDE 0.1 MG/1
0.1 TABLET ORAL ONCE
Status: COMPLETED | OUTPATIENT
Start: 2025-01-06 | End: 2025-01-06

## 2025-01-06 RX ORDER — AMLODIPINE BESYLATE 10 MG/1
10 TABLET ORAL DAILY
Status: DISPENSED | OUTPATIENT
Start: 2025-01-06

## 2025-01-06 RX ORDER — HYDRALAZINE HYDROCHLORIDE 20 MG/ML
5 INJECTION INTRAMUSCULAR; INTRAVENOUS EVERY 4 HOURS PRN
Status: ACTIVE | OUTPATIENT
Start: 2025-01-06

## 2025-01-06 RX ORDER — INSULIN GLARGINE 100 [IU]/ML
24 INJECTION, SOLUTION SUBCUTANEOUS NIGHTLY
Status: DISCONTINUED | OUTPATIENT
Start: 2025-01-06 | End: 2025-01-11

## 2025-01-06 RX ADMIN — HYDRALAZINE HYDROCHLORIDE 100 MG: 50 TABLET ORAL at 20:51

## 2025-01-06 RX ADMIN — CLONIDINE HYDROCHLORIDE 0.1 MG: 0.1 TABLET ORAL at 20:52

## 2025-01-06 RX ADMIN — ASPIRIN 81 MG: 81 TABLET, COATED ORAL at 20:51

## 2025-01-06 RX ADMIN — INSULIN LISPRO 10 UNITS: 100 INJECTION, SOLUTION INTRAVENOUS; SUBCUTANEOUS at 20:51

## 2025-01-06 RX ADMIN — CLOPIDOGREL BISULFATE 75 MG: 75 TABLET ORAL at 20:57

## 2025-01-06 RX ADMIN — INSULIN GLARGINE 24 UNITS: 100 INJECTION, SOLUTION SUBCUTANEOUS at 20:51

## 2025-01-06 RX ADMIN — SULFAMETHOXAZOLE AND TRIMETHOPRIM 1 TABLET: 800; 160 TABLET ORAL at 20:51

## 2025-01-06 RX ADMIN — ENOXAPARIN SODIUM 40 MG: 100 INJECTION SUBCUTANEOUS at 20:51

## 2025-01-06 RX ADMIN — MAGNESIUM SULFATE HEPTAHYDRATE 2 G: 40 INJECTION, SOLUTION INTRAVENOUS at 20:50

## 2025-01-06 RX ADMIN — SODIUM CHLORIDE, POTASSIUM CHLORIDE, SODIUM LACTATE AND CALCIUM CHLORIDE 1000 ML: 600; 310; 30; 20 INJECTION, SOLUTION INTRAVENOUS at 22:59

## 2025-01-06 RX ADMIN — INSULIN HUMAN 10 UNITS: 100 INJECTION, SOLUTION PARENTERAL at 18:56

## 2025-01-06 RX ADMIN — AMLODIPINE BESYLATE 10 MG: 10 TABLET ORAL at 18:56

## 2025-01-06 RX ADMIN — ISOSORBIDE MONONITRATE 60 MG: 60 TABLET, EXTENDED RELEASE ORAL at 21:00

## 2025-01-06 RX ADMIN — DAPAGLIFLOZIN 10 MG: 10 TABLET, FILM COATED ORAL at 20:57

## 2025-01-06 RX ADMIN — CARVEDILOL 12.5 MG: 12.5 TABLET, FILM COATED ORAL at 18:56

## 2025-01-06 ASSESSMENT — LIFESTYLE VARIABLES
EVER HAD A DRINK FIRST THING IN THE MORNING TO STEADY YOUR NERVES TO GET RID OF A HANGOVER: NO
TOTAL SCORE: 0
HAVE PEOPLE ANNOYED YOU BY CRITICIZING YOUR DRINKING: NO
EVER FELT BAD OR GUILTY ABOUT YOUR DRINKING: NO
HAVE YOU EVER FELT YOU SHOULD CUT DOWN ON YOUR DRINKING: NO

## 2025-01-06 ASSESSMENT — COLUMBIA-SUICIDE SEVERITY RATING SCALE - C-SSRS
2. HAVE YOU ACTUALLY HAD ANY THOUGHTS OF KILLING YOURSELF?: NO
6. HAVE YOU EVER DONE ANYTHING, STARTED TO DO ANYTHING, OR PREPARED TO DO ANYTHING TO END YOUR LIFE?: NO
1. IN THE PAST MONTH, HAVE YOU WISHED YOU WERE DEAD OR WISHED YOU COULD GO TO SLEEP AND NOT WAKE UP?: NO

## 2025-01-06 ASSESSMENT — PAIN - FUNCTIONAL ASSESSMENT: PAIN_FUNCTIONAL_ASSESSMENT: 0-10

## 2025-01-06 ASSESSMENT — PAIN SCALES - GENERAL: PAINLEVEL_OUTOF10: 0 - NO PAIN

## 2025-01-06 NOTE — ED PROVIDER NOTES
History of Present Illness     History provided by: Patient and EMS  Limitations to History: None  External Records Reviewed with Brief Summary: Discharge Summary from 7/24/2024 which showed initial admission for concern regarding syncopal episode, evaluated by vascular surgery as well as cardiology with a negative nuclear medicine scan    HPI:  Myrna Khan is a 64 y.o. female with history of T2DM (last HgA1c 8.5% 11/15/23), HTN, HFrEF (last EF 35-40% 8/26/23), CAD s/p CABG, HTN, PAD s/p left SFA stent, renal and carotid artery stenosis, and cocaine and alcohol use disorder who presents today for a fall.  Patient states that she has had multiple falls over the past few weeks.  She states that she is not 100% sure why she is falling, denies syncopal episodes.  She denies any nausea or vomiting, does endorse that she has had decreased p.o. intake.  She denies any chest pain or shortness of breath different from her baseline.  She does endorse daily beer drinking, but denies any other recreational substance use at this time.  She does endorse that she is concerned that if she goes home, she will continue to fall.  She denies any blood thinner use, denies any loss of consciousness, but did strike her head.  She does also endorse some mild neck pain.    Physical Exam   Triage vitals:  T 37 °C (98.6 °F)  HR 70  BP (!) 200/121  RR 20  O2 99 % None (Room air)    Physical Exam  Vitals and nursing note reviewed.   Constitutional:       General: She is not in acute distress.     Appearance: She is not ill-appearing or diaphoretic.      Comments: Appears older than stated age, thin   HENT:      Head: Normocephalic and atraumatic.      Mouth/Throat:      Mouth: Mucous membranes are dry.      Pharynx: No oropharyngeal exudate or posterior oropharyngeal erythema.   Eyes:      General: No scleral icterus.     Extraocular Movements: Extraocular movements intact.      Pupils: Pupils are equal, round, and reactive to light.    Cardiovascular:      Rate and Rhythm: Normal rate and regular rhythm.      Pulses: Normal pulses.      Heart sounds: Normal heart sounds. No murmur heard.     No gallop.   Pulmonary:      Effort: Pulmonary effort is normal. No respiratory distress.      Breath sounds: Normal breath sounds. No stridor. No wheezing, rhonchi or rales.   Abdominal:      General: Bowel sounds are normal. There is no distension.      Palpations: Abdomen is soft. There is no mass.      Tenderness: There is no abdominal tenderness. There is no right CVA tenderness, left CVA tenderness, guarding or rebound.      Hernia: No hernia is present.   Musculoskeletal:         General: No swelling, deformity or signs of injury. Normal range of motion.      Cervical back: Normal range of motion and neck supple. No tenderness.      Comments: 5 out of 5 strength in handgrip elbow flexion extension dorsiflexion plantarflexion hip flexion bilaterally, no tenderness to palpation of bilateral upper or lower extremities   Skin:     General: Skin is warm.      Capillary Refill: Capillary refill takes less than 2 seconds.      Findings: No bruising, erythema, lesion or rash.   Neurological:      General: No focal deficit present.      Mental Status: She is alert and oriented to person, place, and time. Mental status is at baseline.      Cranial Nerves: No cranial nerve deficit.   Psychiatric:         Mood and Affect: Mood normal.         Behavior: Behavior normal.          Medical Decision Making & ED Course   Medical Decision Makin y.o. female with a past medical history of T2DM (last HgA1c 8.5% 11/15/23), HTN, HFrEF (last EF 35-40% 23), CAD s/p CABG, HTN, PAD s/p left SFA stent, renal and carotid artery stenosis, and cocaine and alcohol use disorder who presents today for a fall.  Given the patient's presentation, we will get imaging of her head as well as her neck.  We will also get basic labs to confirm the patient does not have any  electrolyte disturbance that could potentially be resulting in her falls.  Additionally, I am concerned that the patient has had a decrease in her functional status at home, I did discuss with her possible admission for placement to SNF for PT OT.  The patient does request this at this time, especially given her concern that she lives alone, and will have more falls if she returns home in this state.  I feel that this is the most reasonable/safe plan for the patient.  Additionally, I will get magnesium levels as well as a folate and a B12 given the patient's history of alcohol use, she may be experiencing some neuropsychiatric symptoms of B12 deficiency, however, she has grossly intact sensation on my brief sensory exam.  Patient does have a rather elevated glucose of 473 with a normal sodium, which is likely suggestive of hemoconcentration given that she does not have pseudohyponatremia.  We will provide her with her home dose of insulin at this time.  Her kidney function does appear to be normal, she does have what are likely expected LFT derangements in the setting of her alcohol use.  She does also have a low magnesium, which we will replete at this time.  Patient does not have any evidence of a significant micro or macrocytic anemia, no evidence of a white count.  Given patient's elevated glucose as well as the fact that she does have a mildly elevated lactate on her venous full panel, we will provide her with a liter of fluids.  Given the fact she does have a history of heart failure, I will be judicious with my fluid administration.    Additionally, the patient has been quite hypertensive during her time here in the emergency department.  She does endorse that she has been prescribed home medications, however, has been noncompliant with them.  She is not again sure which medication she is supposed to be taking.  Given this, I will provide her with a lower dose of what is her prescribed carvedilol as well as  her home amlodipine.  Patient did remain somewhat hypertensive despite this, so I did put in for a small dose of oral clonidine.  Patient will be admitted to the hospitalist service at this time given that her imaging is negative, and the patient does have plan for a decrease in her glucose as well as her blood pressure to floor acceptable cutoffs.  ----      Differential diagnoses considered include but are not limited to: Falls in the setting of alcohol intoxication, diabetic neuropathy, deconditioning at home, decreased p.o. intake, hypomagnesemia, hypokalemia, hypernatremia     Social Determinants of Health which Significantly Impact Care: Substance use disorder and Problems related to living alone     EKG Independent Interpretation: EKG interpreted by myself. Please see ED Course for full interpretation.    Independent Result Review and Interpretation:  EKG demonstrates normal sinus rhythm with a rate of 65 normal VA, mildly prolonged QRS, mildly prolonged QTc at 487, somewhat poor baseline but no overt evidence of new ST depression, T wave inversion or ST elevation.  There are T wave inversions in lead V4, however, appear to be baseline from EKG from 7/15/2024    Chronic conditions affecting the patient's care: As documented above in [facility]    The patient was discussed with the following consultants/services: Admission Coordinator who accepted the patient for admission    Care Considerations: As documented above in [facility]    ED Course:  ED Course as of 01/08/25 1316   Mon Jan 06, 2025   1620 EKG demonstrates normal sinus rhythm with a rate of 65, occasional PSVT's, but no sustained, questionable T wave inversion in V4 with T wave flattening in V5 and V6, does not appear to to be new from previous.  Otherwise no ST elevation, no ST depression suggestive of MI. [IB]      ED Course User Index  [IB] Karlos Buchanan MD         Diagnoses as of 01/08/25 1316   Fall, initial encounter     Disposition   As a result of their  workup, the patient will require admission to the hospital.  The patient was informed of her diagnosis.  The patient was given the opportunity to ask questions and I answered them. The patient agreed to be admitted to the hospital.    Procedures   Procedures    Patient seen and discussed with ED attending physician.    Karlos Buchanan MD  Emergency Medicine       Karlos Buchanan MD  Resident  01/08/25 [id number]

## 2025-01-07 ENCOUNTER — APPOINTMENT (OUTPATIENT)
Dept: RADIOLOGY | Facility: HOSPITAL | Age: 65
DRG: 637 | End: 2025-01-07
Payer: COMMERCIAL

## 2025-01-07 LAB
ATRIAL RATE: 65 BPM
BACTERIA UR CULT: NORMAL
GLUCOSE BLD MANUAL STRIP-MCNC: 123 MG/DL (ref 74–99)
GLUCOSE BLD MANUAL STRIP-MCNC: 128 MG/DL (ref 74–99)
GLUCOSE BLD MANUAL STRIP-MCNC: 128 MG/DL (ref 74–99)
GLUCOSE BLD MANUAL STRIP-MCNC: 216 MG/DL (ref 74–99)
GLUCOSE BLD MANUAL STRIP-MCNC: 88 MG/DL (ref 74–99)
HOLD SPECIMEN: NORMAL
P AXIS: 79 DEGREES
P OFFSET: 173 MS
P ONSET: 125 MS
PR INTERVAL: 178 MS
Q ONSET: 214 MS
QRS COUNT: 11 BEATS
QRS DURATION: 102 MS
QT INTERVAL: 464 MS
QTC CALCULATION(BAZETT): 482 MS
QTC FREDERICIA: 476 MS
R AXIS: 65 DEGREES
T AXIS: -86 DEGREES
T OFFSET: 446 MS
VENTRICULAR RATE: 65 BPM

## 2025-01-07 PROCEDURE — 2500000001 HC RX 250 WO HCPCS SELF ADMINISTERED DRUGS (ALT 637 FOR MEDICARE OP)

## 2025-01-07 PROCEDURE — 97161 PT EVAL LOW COMPLEX 20 MIN: CPT | Mod: GP

## 2025-01-07 PROCEDURE — 1210000001 HC SEMI-PRIVATE ROOM DAILY

## 2025-01-07 PROCEDURE — 73630 X-RAY EXAM OF FOOT: CPT | Mod: RIGHT SIDE | Performed by: RADIOLOGY

## 2025-01-07 PROCEDURE — 82947 ASSAY GLUCOSE BLOOD QUANT: CPT

## 2025-01-07 PROCEDURE — 2500000004 HC RX 250 GENERAL PHARMACY W/ HCPCS (ALT 636 FOR OP/ED)

## 2025-01-07 PROCEDURE — 99232 SBSQ HOSP IP/OBS MODERATE 35: CPT

## 2025-01-07 PROCEDURE — 2500000002 HC RX 250 W HCPCS SELF ADMINISTERED DRUGS (ALT 637 FOR MEDICARE OP, ALT 636 FOR OP/ED)

## 2025-01-07 PROCEDURE — 73630 X-RAY EXAM OF FOOT: CPT | Mod: RT

## 2025-01-07 RX ORDER — IBUPROFEN 600 MG/1
600 TABLET ORAL EVERY 6 HOURS PRN
Status: DISPENSED | OUTPATIENT
Start: 2025-01-07

## 2025-01-07 RX ORDER — ATORVASTATIN CALCIUM 80 MG/1
80 TABLET, FILM COATED ORAL DAILY
Status: DISPENSED | OUTPATIENT
Start: 2025-01-07

## 2025-01-07 RX ORDER — CHOLECALCIFEROL (VITAMIN D3) 25 MCG
1000 TABLET ORAL DAILY
Status: DISPENSED | OUTPATIENT
Start: 2025-01-07

## 2025-01-07 RX ORDER — ACETAMINOPHEN 325 MG/1
975 TABLET ORAL EVERY 6 HOURS PRN
Status: DISPENSED | OUTPATIENT
Start: 2025-01-07

## 2025-01-07 RX ORDER — INSULIN LISPRO 100 [IU]/ML
6 INJECTION, SOLUTION INTRAVENOUS; SUBCUTANEOUS
Status: DISCONTINUED | OUTPATIENT
Start: 2025-01-07 | End: 2025-01-11

## 2025-01-07 RX ADMIN — AMLODIPINE BESYLATE 10 MG: 10 TABLET ORAL at 08:28

## 2025-01-07 RX ADMIN — ASPIRIN 81 MG: 81 TABLET, COATED ORAL at 08:27

## 2025-01-07 RX ADMIN — CARVEDILOL 12.5 MG: 12.5 TABLET, FILM COATED ORAL at 20:28

## 2025-01-07 RX ADMIN — RANOLAZINE 500 MG: 500 TABLET, EXTENDED RELEASE ORAL at 22:34

## 2025-01-07 RX ADMIN — ISOSORBIDE MONONITRATE 60 MG: 60 TABLET, EXTENDED RELEASE ORAL at 08:29

## 2025-01-07 RX ADMIN — ATORVASTATIN CALCIUM 80 MG: 80 TABLET, FILM COATED ORAL at 08:26

## 2025-01-07 RX ADMIN — INSULIN GLARGINE 24 UNITS: 100 INJECTION, SOLUTION SUBCUTANEOUS at 20:30

## 2025-01-07 RX ADMIN — ACETAMINOPHEN 975 MG: 325 TABLET ORAL at 13:15

## 2025-01-07 RX ADMIN — DAPAGLIFLOZIN 10 MG: 10 TABLET, FILM COATED ORAL at 08:28

## 2025-01-07 RX ADMIN — SODIUM CHLORIDE, POTASSIUM CHLORIDE, SODIUM LACTATE AND CALCIUM CHLORIDE 1000 ML: 600; 310; 30; 20 INJECTION, SOLUTION INTRAVENOUS at 13:17

## 2025-01-07 RX ADMIN — CARVEDILOL 12.5 MG: 12.5 TABLET, FILM COATED ORAL at 08:28

## 2025-01-07 RX ADMIN — SULFAMETHOXAZOLE AND TRIMETHOPRIM 1 TABLET: 800; 160 TABLET ORAL at 08:27

## 2025-01-07 RX ADMIN — IBUPROFEN 600 MG: 600 TABLET, FILM COATED ORAL at 13:15

## 2025-01-07 RX ADMIN — HYDRALAZINE HYDROCHLORIDE 100 MG: 50 TABLET ORAL at 08:28

## 2025-01-07 RX ADMIN — Medication 1000 UNITS: at 08:27

## 2025-01-07 RX ADMIN — CLOPIDOGREL BISULFATE 75 MG: 75 TABLET ORAL at 08:26

## 2025-01-07 RX ADMIN — SULFAMETHOXAZOLE AND TRIMETHOPRIM 1 TABLET: 800; 160 TABLET ORAL at 20:28

## 2025-01-07 RX ADMIN — ENOXAPARIN SODIUM 40 MG: 100 INJECTION SUBCUTANEOUS at 20:28

## 2025-01-07 SDOH — SOCIAL STABILITY: SOCIAL INSECURITY: WITHIN THE LAST YEAR, HAVE YOU BEEN AFRAID OF YOUR PARTNER OR EX-PARTNER?: PATIENT UNABLE TO ANSWER

## 2025-01-07 SDOH — ECONOMIC STABILITY: FOOD INSECURITY: HOW HARD IS IT FOR YOU TO PAY FOR THE VERY BASICS LIKE FOOD, HOUSING, MEDICAL CARE, AND HEATING?: NOT HARD AT ALL

## 2025-01-07 SDOH — SOCIAL STABILITY: SOCIAL INSECURITY: ARE THERE ANY APPARENT SIGNS OF INJURIES/BEHAVIORS THAT COULD BE RELATED TO ABUSE/NEGLECT?: NO

## 2025-01-07 SDOH — SOCIAL STABILITY: SOCIAL INSECURITY: ARE YOU OR HAVE YOU BEEN THREATENED OR ABUSED PHYSICALLY, EMOTIONALLY, OR SEXUALLY BY ANYONE?: NO

## 2025-01-07 SDOH — SOCIAL STABILITY: SOCIAL INSECURITY: DOES ANYONE TRY TO KEEP YOU FROM HAVING/CONTACTING OTHER FRIENDS OR DOING THINGS OUTSIDE YOUR HOME?: NO

## 2025-01-07 SDOH — ECONOMIC STABILITY: FOOD INSECURITY
WITHIN THE PAST 12 MONTHS, YOU WORRIED THAT YOUR FOOD WOULD RUN OUT BEFORE YOU GOT THE MONEY TO BUY MORE.: PATIENT UNABLE TO ANSWER

## 2025-01-07 SDOH — SOCIAL STABILITY: SOCIAL INSECURITY
WITHIN THE LAST YEAR, HAVE YOU BEEN RAPED OR FORCED TO HAVE ANY KIND OF SEXUAL ACTIVITY BY YOUR PARTNER OR EX-PARTNER?: PATIENT UNABLE TO ANSWER

## 2025-01-07 SDOH — ECONOMIC STABILITY: HOUSING INSECURITY: IN THE LAST 12 MONTHS, WAS THERE A TIME WHEN YOU WERE NOT ABLE TO PAY THE MORTGAGE OR RENT ON TIME?: NO

## 2025-01-07 SDOH — ECONOMIC STABILITY: TRANSPORTATION INSECURITY: IN THE PAST 12 MONTHS, HAS LACK OF TRANSPORTATION KEPT YOU FROM MEDICAL APPOINTMENTS OR FROM GETTING MEDICATIONS?: NO

## 2025-01-07 SDOH — ECONOMIC STABILITY: INCOME INSECURITY
IN THE PAST 12 MONTHS HAS THE ELECTRIC, GAS, OIL, OR WATER COMPANY THREATENED TO SHUT OFF SERVICES IN YOUR HOME?: PATIENT DECLINED

## 2025-01-07 SDOH — ECONOMIC STABILITY: HOUSING INSECURITY: AT ANY TIME IN THE PAST 12 MONTHS, WERE YOU HOMELESS OR LIVING IN A SHELTER (INCLUDING NOW)?: NO

## 2025-01-07 SDOH — SOCIAL STABILITY: SOCIAL INSECURITY
WITHIN THE LAST YEAR, HAVE YOU BEEN HUMILIATED OR EMOTIONALLY ABUSED IN OTHER WAYS BY YOUR PARTNER OR EX-PARTNER?: PATIENT UNABLE TO ANSWER

## 2025-01-07 SDOH — SOCIAL STABILITY: SOCIAL INSECURITY: DO YOU FEEL ANYONE HAS EXPLOITED OR TAKEN ADVANTAGE OF YOU FINANCIALLY OR OF YOUR PERSONAL PROPERTY?: NO

## 2025-01-07 SDOH — SOCIAL STABILITY: SOCIAL INSECURITY: HAVE YOU HAD ANY THOUGHTS OF HARMING ANYONE ELSE?: NO

## 2025-01-07 SDOH — ECONOMIC STABILITY: HOUSING INSECURITY: IN THE PAST 12 MONTHS, HOW MANY TIMES HAVE YOU MOVED WHERE YOU WERE LIVING?: 0

## 2025-01-07 SDOH — ECONOMIC STABILITY: FOOD INSECURITY
WITHIN THE PAST 12 MONTHS, THE FOOD YOU BOUGHT JUST DIDN'T LAST AND YOU DIDN'T HAVE MONEY TO GET MORE.: PATIENT UNABLE TO ANSWER

## 2025-01-07 SDOH — SOCIAL STABILITY: SOCIAL INSECURITY: DO YOU FEEL UNSAFE GOING BACK TO THE PLACE WHERE YOU ARE LIVING?: NO

## 2025-01-07 SDOH — SOCIAL STABILITY: SOCIAL INSECURITY: HAS ANYONE EVER THREATENED TO HURT YOUR FAMILY OR YOUR PETS?: NO

## 2025-01-07 SDOH — SOCIAL STABILITY: SOCIAL INSECURITY: ABUSE: ADULT

## 2025-01-07 SDOH — SOCIAL STABILITY: SOCIAL INSECURITY
WITHIN THE LAST YEAR, HAVE YOU BEEN KICKED, HIT, SLAPPED, OR OTHERWISE PHYSICALLY HURT BY YOUR PARTNER OR EX-PARTNER?: PATIENT UNABLE TO ANSWER

## 2025-01-07 SDOH — SOCIAL STABILITY: SOCIAL INSECURITY: HAVE YOU HAD THOUGHTS OF HARMING ANYONE ELSE?: NO

## 2025-01-07 ASSESSMENT — COGNITIVE AND FUNCTIONAL STATUS - GENERAL
HELP NEEDED FOR BATHING: A LITTLE
STANDING UP FROM CHAIR USING ARMS: A LITTLE
DRESSING REGULAR UPPER BODY CLOTHING: A LITTLE
MOBILITY SCORE: 8
CLIMB 3 TO 5 STEPS WITH RAILING: A LOT
MOVING TO AND FROM BED TO CHAIR: TOTAL
MOBILITY SCORE: 19
CLIMB 3 TO 5 STEPS WITH RAILING: TOTAL
WALKING IN HOSPITAL ROOM: A LOT
TURNING FROM BACK TO SIDE WHILE IN FLAT BAD: A LOT
PATIENT BASELINE BEDBOUND: NO
DAILY ACTIVITIY SCORE: 19
DAILY ACTIVITIY SCORE: 19
DRESSING REGULAR LOWER BODY CLOTHING: A LITTLE
PERSONAL GROOMING: A LITTLE
PERSONAL GROOMING: A LITTLE
TOILETING: A LITTLE
WALKING IN HOSPITAL ROOM: TOTAL
WALKING IN HOSPITAL ROOM: A LOT
DRESSING REGULAR LOWER BODY CLOTHING: A LITTLE
MOVING FROM LYING ON BACK TO SITTING ON SIDE OF FLAT BED WITH BEDRAILS: A LOT
HELP NEEDED FOR BATHING: A LITTLE
STANDING UP FROM CHAIR USING ARMS: A LITTLE
TOILETING: A LITTLE
STANDING UP FROM CHAIR USING ARMS: TOTAL
DRESSING REGULAR UPPER BODY CLOTHING: A LITTLE
CLIMB 3 TO 5 STEPS WITH RAILING: A LOT
MOBILITY SCORE: 19

## 2025-01-07 ASSESSMENT — ACTIVITIES OF DAILY LIVING (ADL)
ADEQUATE_TO_COMPLETE_ADL: YES
ADL_ASSISTANCE: INDEPENDENT
PATIENT'S MEMORY ADEQUATE TO SAFELY COMPLETE DAILY ACTIVITIES?: YES
FEEDING YOURSELF: INDEPENDENT
LACK_OF_TRANSPORTATION: YES
ASSISTIVE_DEVICE: WALKER
LACK_OF_TRANSPORTATION: YES
GROOMING: NEEDS ASSISTANCE
HEARING - LEFT EAR: FUNCTIONAL
WALKS IN HOME: NEEDS ASSISTANCE
JUDGMENT_ADEQUATE_SAFELY_COMPLETE_DAILY_ACTIVITIES: YES
LACK_OF_TRANSPORTATION: YES
DRESSING YOURSELF: NEEDS ASSISTANCE
TOILETING: NEEDS ASSISTANCE
BATHING: NEEDS ASSISTANCE
HEARING - RIGHT EAR: FUNCTIONAL

## 2025-01-07 ASSESSMENT — LIFESTYLE VARIABLES
HOW OFTEN DO YOU HAVE A DRINK CONTAINING ALCOHOL: PATIENT UNABLE TO ANSWER
SKIP TO QUESTIONS 9-10: 0
AUDIT-C TOTAL SCORE: -1
HOW OFTEN DO YOU HAVE 6 OR MORE DRINKS ON ONE OCCASION: PATIENT UNABLE TO ANSWER
HOW MANY STANDARD DRINKS CONTAINING ALCOHOL DO YOU HAVE ON A TYPICAL DAY: PATIENT UNABLE TO ANSWER
AUDIT-C TOTAL SCORE: -1

## 2025-01-07 ASSESSMENT — PAIN SCALES - GENERAL
PAINLEVEL_OUTOF10: 0 - NO PAIN
PAINLEVEL_OUTOF10: 0 - NO PAIN

## 2025-01-07 ASSESSMENT — PATIENT HEALTH QUESTIONNAIRE - PHQ9
2. FEELING DOWN, DEPRESSED OR HOPELESS: NOT AT ALL
1. LITTLE INTEREST OR PLEASURE IN DOING THINGS: NOT AT ALL
SUM OF ALL RESPONSES TO PHQ9 QUESTIONS 1 & 2: 0

## 2025-01-07 ASSESSMENT — PAIN - FUNCTIONAL ASSESSMENT
PAIN_FUNCTIONAL_ASSESSMENT: 0-10
PAIN_FUNCTIONAL_ASSESSMENT: 0-10

## 2025-01-07 NOTE — ED TRIAGE NOTES
64 f brought in by ems blayne fall at home. Patient nephew called  Patient has had frequent falls over last week  Patient has cardiac history   Patient Aox2-requiring frequent orientation

## 2025-01-07 NOTE — PROGRESS NOTES
01/07/25 1018   Discharge Planning   Living Arrangements Alone   Support Systems Family members   Assistance Needed Rollator, Power Scooter   Type of Residence Private residence   Number of Stairs to Enter Residence 0  (Patient uses elevator)   Number of Stairs Within Residence 0   Do you have animals or pets at home? No   Who is requesting discharge planning? Provider   Home or Post Acute Services None   Expected Discharge Disposition SNF   Does the patient need discharge transport arranged? Yes   RoundTrip coordination needed? Yes   Financial Resource Strain   How hard is it for you to pay for the very basics like food, housing, medical care, and heating? Not hard   Housing Stability   In the last 12 months, was there a time when you were not able to pay the mortgage or rent on time? N   In the past 12 months, how many times have you moved where you were living? 0   At any time in the past 12 months, were you homeless or living in a shelter (including now)? N   Transportation Needs   In the past 12 months, has lack of transportation kept you from medical appointments or from getting medications? no   In the past 12 months, has lack of transportation kept you from meetings, work, or from getting things needed for daily living? Yes   Patient Choice   Patient / Family choosing to utilize agency / facility established prior to hospitalization Yes   Stroke Family Assessment   Stroke Family Assessment Needed No   Intensity of Service   Intensity of Service 0-30 min     Myrna Khan is a 64 y.o. female on day 1 of admission presenting with Fall, initial encounter.    TCC reviewed chart before patient. TCC introduced self and role in care. Patient reports living in an apartment with no pets. Patient denies having support and reports that she pays her family for rides at times. Patient is  but  lives separately( chart states that  was recently released from correction). Patient reports she needs  help, she keeps asking for help in the home but isn't getting it. Patient reports she is only able to walk short distances and uses a rollator, she has  power scooter but does know how to use it. Patient report she can perform her ADLs but gets tired easily. Patient made aware of need for PT/OT eval and is agreeable to home PT/OT or SNF if needed. Patient reports she had been to a SNF before on [address] [place] ([facility]) and is fine with utilizing their services again. TCC to give patient resources for transportation and will continue to follow patient for discharge planning.    Joi García, TCC

## 2025-01-07 NOTE — PROGRESS NOTES
Myrna Khan is a 64 y.o. female on day 1 of admission presenting with Fall, initial encounter.    Subjective   The patient was examined at bedside this morning. She was sitting up in bed eating breakfast. She reported she is doing better but has pain in her right foot and toes. She reported that she hit her foot when she fell yesterday. She reported she fell after losing her balance and denied any lightheadedness, dizziness, or blacking out.       Objective     Physical Exam  Constitutional:       General: She is not in acute distress.     Appearance: She is not ill-appearing.   HENT:      Head: Normocephalic and atraumatic.   Eyes:      Extraocular Movements: Extraocular movements intact.   Cardiovascular:      Rate and Rhythm: Normal rate and regular rhythm.      Heart sounds: Normal heart sounds. No murmur heard.     No friction rub. No gallop.      Comments: No distal pulses palpable on right foot  Pulmonary:      Effort: Pulmonary effort is normal. No respiratory distress.      Breath sounds: Normal breath sounds. No wheezing.   Musculoskeletal:      Cervical back: Normal range of motion.      Right lower leg: No edema.      Left lower leg: No edema.      Comments: Tenderness to palpation of right foot and toes   Neurological:      Mental Status: She is alert.   Psychiatric:         Mood and Affect: Mood normal.         Behavior: Behavior normal.         Last Recorded Vitals  Blood pressure 126/82, pulse 76, temperature 36.6 °C (97.9 °F), temperature source Temporal, resp. rate 20, weight 55.3 kg (122 lb), SpO2 99%.  Intake/Output last 3 Shifts:  No intake/output data recorded.    Relevant Results                      Assessment/Plan   Assessment & Plan  Fall, initial encounter      64 y.o. female with h/o T2DM (last HgA1c 8.5% 11/15/23), HTN, HFrEF (last EF 35-40% 8/26/23), CAD s/p CABG, HTN, PAD s/p left SFA stent, renal and carotid artery stenosis, and cocaine and alcohol use disorder who presents with  multiple falls within the past few months and inability to care for herself. She was admitted for hyperglycemia, hypertension urgency, and multiple falls. BP elevated to the 200s systolic and 120s diastolic likely in the setting of medication on compliance and cocaine use. She was also found to be hyperglycemic 473 on admission and received 10u of regular insulin. Patient has also been non compliant with glycemic medications and she had a recent hospital admission 2/5/24 for syncope and hypoglycemia therefore, there may be a component of poor medical literacy.      #Multiple falls   #bilateral hip pain  #unable to ambulate  #Right foot pain  :: CT c-spine negative for acute fracture or subluxation   :: XR of bilateral hips 7/2024 negative for acute abnormality in the hips  - pt unable to care for herself and perform her ADLs, will likely need longterm placement   - X-ray of right foot showed no acute fractures  - lidocaine patch to both hips  - tylenol 650 mg q6h PRN mild-moderate pain  - PT/OT and SW consulted     #uncontrolled hypertension (Improving)  #HFrecEF (last EF 55% 7/2024 recovered from last EF 35-40% 8/26/23)  - BP elevated to the 200s systolic and 120s diastolic in the ED  - Urine drug screen was positive for cocaine   - c/w amlodipine 10 mg daily   - c/w hydralazine 100 mg TID   - c/w lissa 25 mg BID   - c/w GDMT: farxiga 10 mg daily, Imdur 60 mg daily  - continue to monitor BP     #Cf UTI   :: positive trichomonas during last admission and was treated with metronidazole    :: exam findings with mild suprapubic pain, pt denied vaginal discharge today   - UA: - nitrite, 250 LE, 1+ blood, 1+ ketones, 4+ gluc, 2+ protein, 11-20 WBC, 11-20 RBC   - Ucx pending   - will adjust Abx course pending Ucx   - Tx w/ bactrim 1 tablet q12h x3 daily      #CAD s/p CABGx2 2023  #PAD s/p left SFA stent  #Stable angina   - c/w home Aspirin 81 mg daily and Plavix 75 mg PO QD  - c/w home ranolazine 500 mg BID       #hyperglycemia (Improving)  #uncontrolled T2DM (last HgA1c 15.2%, 7/2024)  - Gluc 473 on admission s/p regular insulin 10u, POCT glucose 456 s/p 10u Lispro and 24u Lantus  - pt noncompliant with insulin outpatient  - holding home dose of Metformin while hospitalized will resume on discharge  - c/w home Farxiga 10 mg daily   - c/w home Lantus 24 units at bedtime  - c/w home lispro 6 units AC   - SSI #2, accucheck ACHS  - diabetes educator consulted     #hypomagnesemia  - Mg 1.56 on admission   - s/p 2g magnesium sulfate x1 dose  - RFP ordered for AM     Fluids: prn  Diet: Cardiac diet  O2: prn   DVT ppx: lovenox   GI ppx: n/a   Abx: n/a  CODE STATUS: FULL     The patient was seen and dicussed with Dr. Vences.    Prince Benson MD  Family Medicine  PGY-3

## 2025-01-07 NOTE — PROGRESS NOTES
Pharmacy Medication History Review    Myrna Khan is a 64 y.o. female admitted for Fall, initial encounter. Pharmacy reviewed the patient's [id number] medications and allergies for accuracy.    The list below reflects the updated PTA list.   Prior to Admission Medications   Prescriptions Last Dose Informant   Farxiga 10 mg 1/6/2025 Morning Self   Sig: Take 1 tablet (10 mg) by mouth once daily with breakfast.   Lantus Solostar U-100 Insulin 100 unit/mL (3 mL) pen 1/6/2025 Morning Self   Sig: Inject 24 Units under the skin once daily. Take per directed   Patient taking differently: Inject 20 Units under the skin once daily. Take per directed   acetaminophen (Tylenol) 500 mg tablet Unknown Self   Sig: Take 1 tablet (500 mg) by mouth every 6 hours if needed for mild pain (1 - 3) or moderate pain (4 - 6). Take per directed   amLODIPine (Norvasc) 10 mg tablet 1/6/2025 Morning Self   Sig: TAKE 1 TABLET BY MOUTH ONCE DAILY   atorvastatin (Lipitor) 80 mg tablet 1/6/2025 Morning Self   Sig: TAKE 1 TABLET BY MOUTH ONCE DAILY   blood sugar diagnostic strip  Self   Sig: Use 1 strip to check blood sugar 3 times a day with meals.   blood-glucose meter misc  Self   Sig: Use daily or as directed for monitoring of diabetes.   carvedilol (Coreg) 25 mg tablet 1/6/2025 Morning Self   Sig: TAKE 1 TABLET BY MOUTH TWO TIMES A DAY   cholecalciferol (Vitamin D-3) 25 MCG (1000 UT) capsule Unknown Self   Sig: Take 2 capsules (50 mcg) by mouth once daily.   clopidogrel (Plavix) 75 mg tablet 1/6/2025 Morning Self   Sig: Take 1 tablet (75 mg) by mouth once daily.   hydrALAZINE (Apresoline) 100 mg tablet  Self   Sig: Take 1 tablet (100 mg) by mouth once daily.   insulin lispro (HumaLOG) 100 unit/mL injection 1/6/2025 Morning Self   Sig: Inject 6 Units under the skin 3 times daily (morning, midday, late afternoon). Take as directed per insulin instructions.   insulin lispro (HumaLOG) 100 unit/mL injection 1/6/2025 Morning Self   Sig:  Inject 0-10 Units under the skin 3 times daily (morning, midday, late afternoon). 0 unit(s) if BG ; 2 unit(s) if -200; 4 unit(s) if -250; 6 unit(s) if -300; 8 unit(s) if -350; 10 unit(s) if -400   isosorbide mononitrate ER (Imdur) 60 mg 24 hr tablet 1/6/2025 Morning Self   Sig: TAKE 1 TABLET BY MOUTH ONCE DAILY   lancets misc  Self   Sig: Use three times a day to test blood sugar w/ meals   metFORMIN XR (Glucophage-XR) 500 mg 24 hr tablet 1/6/2025 Morning Self   Sig: TAKE 2 TABLETS BY MOUTH ONCE DAILY   multivitamin with minerals tablet Unknown Self   Sig: Take 1 tablet by mouth once daily.   ranolazine (Ranexa) 500 mg 12 hr tablet 1/6/2025 Morning Self   Sig: Take 1 tablet (500 mg) by mouth 2 times a day. Do not crush, chew, or split.      Facility-Administered Medications: None        The list below reflects the updated allergy list. Please review each documented allergy for additional clarification and justification.  Allergies  Reviewed by Keyana Vega RN on 1/6/2025        Severity Reactions Comments    Lisinopril Not Specified Itching     Amoxicillin Low Rash     Losartan Low Itching, Rash             Patient accepts M2B at discharge.     Sources:   Patient Interview - good historian, can independently state most current home medications and confirm the rest from a verbally presented list  Admission [facility]  Care Everywhere [facility] Clinical Summary generated 1/6/25  OARRS - none recent  EPIC medication dispense report    Medications ADDED:  None   Medications CHANGED:  None  Medications REMOVED/MARKED NOT TAKING:   Methocarbamol 750mg  Polyethylene glycol 17g  Sennosides 8.6mg     Additional Comments:  The patient is requesting for a muscle relaxant be ordered while admitted  States still taking hydralazine and ranolazine - both medications last filled 9/2024 for 30 day supply    Tess Alvares, PharmD  Transitions of Care Pharmacist  01/06/25  "    Secure Chat preferred   If no response call [phone] or Vocera \"Med Rec\"    "

## 2025-01-07 NOTE — PROGRESS NOTES
Physical Therapy    Physical Therapy Evaluation    Patient Name: Myrna Khan  MRN: [id number]  Department:   Room: DP02/DPOD02  Today's Date: 1/7/2025   Time Calculation  Start Time: 1214  Stop Time: 1228  Time Calculation (min): 14 min    Assessment/Plan   PT Assessment  PT Assessment Results: Decreased strength, Decreased range of motion, Decreased endurance, Impaired balance, Decreased mobility  Rehab Prognosis: Good  Barriers to Discharge Home: Caregiver assistance, Physical needs  Caregiver Assistance: Patient lives alone and/or does not have reliable caregiver assistance  Physical Needs: Ambulating household distances limited by function/safety, High falls risk due to function or environment  End of Session Communication: Bedside nurse  Assessment Comment: 63yo female presents with dec strength, balance,endurance limiting functional mobility.  Pt would benefit from continued skilled therapy to improve safety and indep  End of Session Patient Position: Bed, 2 rail up (ED bed)  IP OR SWING BED PT PLAN  Inpatient or Swing Bed: Inpatient  PT Plan  Treatment/Interventions: Bed mobility, Transfer training, Gait training, Stair training, Balance training, Strengthening, Endurance training, Range of motion, Therapeutic exercise, Therapeutic activity  PT Plan: Ongoing PT  PT Frequency: 3 times per week  PT Discharge Recommendations: Moderate intensity level of continued care  Equipment Recommended upon Discharge:  (TBD)  PT Recommended Transfer Status: Total assist  PT - OK to Discharge: Yes (PT eval completed & recs made)    Subjective   General Visit Information:  General  Reason for Referral: presents with multiple falls within the past few months and inability to care for herself  Past Medical History Relevant to Rehab: T2DM (last HgA1c 8.5% 11/15/23), HTN, HFrEF (last EF 35-40% 8/26/23), CAD s/p CABG, HTN, PAD s/p left SFA stent, renal and carotid artery stenosis, and cocaine and alcohol use  disorder  Family/Caregiver Present: No  Prior to Session Communication: Bedside nurse  Patient Position Received: Bed, 2 rail up (ED bed)  Preferred Learning Style: auditory, verbal  General Comment: Pt supine in bed upon arrival, cooperative with PT assessment  Home Living:  Home Living  Type of Home: Apartment  Lives With: Alone  Home Adaptive Equipment: Walker rolling or standard, Wheelchair-power  Home Layout: One level  Home Access: Stairs to enter with rails  Entrance Stairs-Number of Steps: 3  Prior Level of Function:  Prior Function Per Pt/Caregiver Report  Level of Perkins: Independent with ADLs and functional transfers  ADL Assistance: Independent  Ambulatory Assistance: Independent (with WW; states recently rec'd power wheelchair but has not used yet)  Precautions:  Precautions  Medical Precautions: Fall precautions  Precautions Comment: h/o falls at home     Vital Signs (Past 2hrs)      Vital Signs Comment: Sitting- 76/54 HR 73; supine 115/54 HR 67     Objective   Pain:  Pain Assessment  Pain Assessment: 0-10  0-10 (Numeric) Pain Score: 0 - No pain  Cognition:  Cognition  Overall Cognitive Status:  (Pt is a ? historian)    General Assessments:     Activity Tolerance  Endurance: Decreased tolerance for upright activites    Sensation  Light Touch: No apparent deficits    Strength  Strength Comments: generalized deconditioning  Strength  Strength Comments: generalized deconditioning    Perception  Inattention/Neglect: Appears intact  Initiation: Appears intact  Motor Planning: Appears intact  Perseveration: Not present      Coordination  Movements are Fluid and Coordinated: Yes    Postural Control  Postural Control: Impaired    Static Sitting Balance  Static Sitting-Balance Support: Feet supported  Static Sitting-Level of Assistance: Contact guard       Functional Assessments:  Bed Mobility  Bed Mobility: Yes  Bed Mobility 1  Bed Mobility 1: Supine to sitting, Sitting to supine  Level of Assistance 1:  Maximum assistance    Transfers  Transfer: No (deferred 2/2 +orthos & dizziness in upright position)    Ambulation/Gait Training  Ambulation/Gait Training Performed: No    Stairs  Stairs: No  Extremity/Trunk Assessments:        RLE   RLE :  (appears very tight with knee ext; limited assessment)  LLE   LLE :  (appears very tight with knee ext; limited assessment)  Outcome Measures:  [facility] Basic Mobility  Turning from your back to your side while in a flat bed without using bedrails: A lot  Moving from lying on your back to sitting on the side of a flat bed without using bedrails: A lot  Moving to and from bed to chair (including a wheelchair): Total  Standing up from a chair using your arms (e.g. wheelchair or bedside chair): Total  To walk in hospital room: Total  Climbing 3-5 steps with railing: Total  Basic Mobility - Total Score: 8    Encounter Problems       Encounter Problems (Active)       Mobility       STG - Patient will ambulate >25' with WW and Kendal (Not Progressing)       Start:  01/07/25    Expected End:  01/21/25               PT Transfers       STG - Patient to transfer to and from sit to supine with CGA (Progressing)       Start:  01/07/25    Expected End:  01/21/25            STG - Patient will transfer sit to and from stand with kendal and WW (Not Progressing)       Start:  01/07/25    Expected End:  01/21/25                   Education Documentation  Precautions, taught by Estrellita Coppola, PT at 1/7/2025  4:59 PM.  Learner: Patient  Readiness: Acceptance  Method: Explanation  Response: Verbalizes Understanding, Needs Reinforcement  Comment: safety with mobility    Mobility Training, taught by Estrellita Coppola PT at 1/7/2025  4:59 PM.  Learner: Patient  Readiness: Acceptance  Method: Explanation  Response: Verbalizes Understanding, Needs Reinforcement  Comment: safety with mobility    Education Comments  No comments found.          01/07/25 at 5:00 PM - Estrellita Coppola PT

## 2025-01-07 NOTE — CONSULTS
Inpatient Diabetes Education Consult    Reason for Visit:  Myrna Khan is a 64 y.o. female who presents for Falls at home; T2DM    Consulting Service/Provider: FELICIA Villagran CNP    Visit Type: Initial visit    Visit Modality: In-person    Discharge Equipment/Supply Needs:       Patient has supplies at home: Blood glucose meter:  , Testing strips:  , Lancets, Pen needles, and unclear what she may need refills for at discharge    Patient History and Assessment:  New diagnosis:  hyperglycemia  Previous diagnosis: Type 2  Patient known to Diabetes Education department: Yes  Treatment prior to hospital admission: Oral medications Farxiga, Metformin  Insulin: Rapid acting, Long acting, via pen  Blood glucose testing: Does not routinely test  Complications: cardiovascular disease and PAD  PTA Medications:    Current Outpatient Medications   Medication Instructions    acetaminophen (TYLENOL) 500 mg, oral, Every 6 hours PRN, Take per directed    amLODIPine (Norvasc) 10 mg tablet TAKE 1 TABLET BY MOUTH ONCE DAILY    atorvastatin (Lipitor) 80 mg tablet TAKE 1 TABLET BY MOUTH ONCE DAILY    blood sugar diagnostic strip Use 1 strip to check blood sugar 3 times a day with meals.    blood-glucose meter misc Use daily or as directed for monitoring of diabetes.    carvedilol (Coreg) 25 mg tablet TAKE 1 TABLET BY MOUTH TWO TIMES A DAY    cholecalciferol (Vitamin D-3) 25 MCG (1000 UT) capsule 2 capsules, oral, Daily    clopidogrel (PLAVIX) 75 mg, Daily RT    hydrALAZINE (APRESOLINE) 100 mg, oral, Daily    insulin lispro 6 Units, subcutaneous, 3 times daily (morning, midday, late afternoon), Take as directed per insulin instructions.    insulin lispro 0-10 Units, subcutaneous, 3 times daily (morning, midday, late afternoon), 0 unit(s) if BG ; 2 unit(s) if -200; 4 unit(s) if -250; 6 unit(s) if -300; 8 unit(s) if -350; 10 unit(s) if -400    isosorbide mononitrate ER (Imdur) 60 mg 24 hr tablet TAKE 1  "TABLET BY MOUTH ONCE DAILY    lancets misc Use three times a day to test blood sugar w/ meals    Lantus Solostar U-100 Insulin 24 Units, subcutaneous, Daily, Take per directed    metFORMIN XR (Glucophage-XR) 500 mg 24 hr tablet TAKE 2 TABLETS BY MOUTH ONCE DAILY    multivitamin with minerals tablet 1 tablet, oral, Daily    ranolazine (RANEXA) 500 mg, oral, 2 times daily, Do not crush, chew, or split.       Glucose   Date/Time Value Ref Range Status   01/06/2025 03:42  (HH) 74 - 99 mg/dL Final   07/20/2024 07:42 AM 43 (LL) 74 - 99 mg/dL Final   07/19/2024 05:49 AM 61 (L) 74 - 99 mg/dL Final   07/18/2024 06:53 AM 81 74 - 99 mg/dL Final   07/17/2024 05:50  (H) 74 - 99 mg/dL Final   07/16/2024 04:58  (H) 74 - 99 mg/dL Final   07/15/2024 11:55  (HH) 74 - 99 mg/dL Final   02/08/2024 01:24  (H) 74 - 99 mg/dL Final   02/06/2024 06:15  (H) 74 - 99 mg/dL Final   02/05/2024 08:13  (H) 74 - 99 mg/dL Final     No results found for: \"CPEPTIDE\"  Hemoglobin A1C   Date Value Ref Range Status   07/15/2024 15.2 (H) see below % Final     Comment:     Hemoglobin A1c is >15%. Marked elevations in HbA1c are commonly due to poor glycemic control in diabetic patients. Rarely, hemoglobin variants may cause false elevation of HbA1c that is discordant with glycemic control status.    11/15/2023 8.5 (H) 4.3 - 5.6 % Final     Comment:     American Diabetes Association guidelines indicate that patients with HgbA1c in the range 5.7-6.4% are at increased risk for development of diabetes, and intervention by lifestyle modification may be beneficial. HgbA1c greater or equal to 6.5% is considered diagnostic of diabetes.   08/27/2023 7.8 (A) % Final     Comment:          Diagnosis of Diabetes-Adults   Non-Diabetic: < or = 5.6%   Increased risk for developing diabetes: 5.7-6.4%   Diagnostic of diabetes: > or = 6.5%  .       Monitoring of Diabetes                Age (y)     Therapeutic Goal (%)   Adults:          " >18           <7.0   Pediatrics:    13-18           <7.5                   7-12           <8.0                   0- 6            7.5-8.5   American Diabetes Association. Diabetes Care 33(S1), Jan 2010.     07/03/2021 14.4 (H) 4.3 - 5.6 % Final     Comment:     American Diabetes Association guidelines indicate that patients with HgbA1c in   the range 5.7-6.4% are at increased risk for development of diabetes, and   intervention by lifestyle modification may be beneficial. HgbA1c greater or   equal to 6.5% is considered diagnostic of diabetes.     POCT Hemoglobin A1C   Date Value Ref Range Status   12/09/2021 6.9 (A) 4.2 - 5.6 % Final     Comment:     Location:[facility], [address] [place] [address], [id number]  Point of care (POC) Hemoglobin A1c (HGBA1C) testing is intended to assess  glucose control and provide a management tool for patients known to have  diabetes and their healthcare providers.  Target HGBA1C levels may depend on  specific clinical circumstances.  POC HGBA1C is not intended for use as a  diagnostic or screening test; laboratory-based testing should be used for  diagnostic purposes.  The following information is supplemental and may not  be applicable to specific diabetes management situations:  The POC device   provides a normal range of 4.2% to 6.5% for the HGBA1C POC test.  However, the American Diabetes Association  guidelines indicate that  patients with HGBA1C in the range of 5.7% to 6.4% are at increased risk for  development of diabetes and that intervention by lifestyle modification may  be beneficial.  A HGBA1C level greater than or equal to 6.5% is considered  diagnostic of diabetes, pending confirmatory testing.  Use of HGBA1C testing  to evaluate glucose control may not be appropriate for patients with  hemoglobin variants or other conditions (e.g. anemia) that alter red blood  cell lifespan.       Patient Learning/Readiness Assessment:  Ms.  Bill is agreeable to this visit today.  She is seeking SNF placement so that she has assist with ADLs and is safe from falling.    Interventions/Topics Covered:  See After Visit Summary for handouts/information sheets provided to patient.  Education Documentation  Self-Care Behaviors, taught by Snehal Starkey RN at 1/7/2025  1:19 PM.  Learner: Patient  Readiness: Acceptance  Method: Explanation  Response: Needs Reinforcement  Comment: Ms. Khan is unable to list all of her diabetes related meds and it unclear in conversation if she checks BG prior to some insulin doses.          Additional topics covered: Reviewed her diabetes history (5 years).  She is able to list some of the diabetes meds but is unclear in conversation if she takes them as recommended.  Admits to missing some doses and also not always checking BG prior to insulin.  Discussed the safety aspects (hypoglycemia events) with not knowing BG.    She describes insulin pen use -- enc her to prime the pen prior to setting the dose and injecting the insulin.    Additional materials provided: n/a    CGM:  to be determined    Education Outcome/Recommendations:        Recommendations for bedside nursing: Allow patient to self-inject insulin (supervised)    Recommendations for Providers: Follow-up w/ PCP and/or Endocrinology    Additional Comments: Ms. Khan expresses fear and concern re returning home at this time.  She is often not able to care for herself.  She describes simple meals she prepares.  Will follow up in am if admitted.  Time spent:  25 mins.

## 2025-01-07 NOTE — H&P
ADMISSION H&P     Subjective   HPI:  Myrna Khan is a 64 y.o. female with h/o  T2DM (last HgA1c 8.5% 11/15/23), HTN, HFrEF (last EF 35-40% 8/26/23), CAD s/p CABG, HTN, PAD s/p left SFA stent, renal and carotid artery stenosis, and cocaine and alcohol use disorder who presents with multiple falls within the past few months and inability to care for herself. Patient states she has had 4 ground level falls in the past few months and hit her head two times. She states she lost her balance on several occasions. She sometimes uses a cane to ambulate however this is difficult for her. She lives alone and does not have anyone to help with her ADLs. She states she was suppose to have home health come however, this was never set up. She does have a  but he recently got out of longterm for murder and she does not want him living with her. She denies syncope, lightheadedness, nausea, vomiting, chest pain, shortness of breath, or abdominal pain.     Of note patient was discharged 7/2024 for several days of chest pain and inability to walk. States her claudication sx are unbarable and not able to ambulate safely. There was concern for cardiac amyloidosis given wall thickening on imaging. Cardiology recommended an MRI and a NM PYP study which were unremarkable. UPEP was negative. SPEP remains in process. Overall lower likelihood of cardiac amyloidosis. Given worsening claudication symptoms, an DENISE and lower extremity vascular study was completed. Patient was seen by vascular surgery who recommended no intervention and recommended following up with her vascular surgeon at [facility]. Pt was found to have trichomonas. Started on course of metronidazole for 7 days. PT OT recommending SNF and pt agreeable     ROS: 10-point ROS negative unless otherwise mentioned in HPI    ED Course:  Vitals:   T 37 °C (98.6 °F)  HR 70  BP (!) 200/121  RR 20  O2 99 % None (Room air)  CBC: hgb 17.8, hematocrit 47.3, plt 222, wbc 7  CMP: gluc 473,  Na 137, K 4.1, Bun 21, Cr 0.65, calcium 9.6, mag 1.56, Alk phos 235, AST 78, ALT 59  VBG: pH 7.44, pco2 53, po2 20, HCO3 36.0, lactate 2.3   Acute toxicology panel: negative   Urine drug screen positive for cocaine   Influenza/covid negative   B12: 558  Folate: 15.4  UA: - nitrite, 250 LE, 1+ blood, 1+ ketones, 4+ gluc, 2+ protein, 11-20 WBC, 11-20 RBC   Ucx: pending     Relevant studies:  CT head: 1. No CT evidence of acute intracranial hemorrhage, mass, or mass effect.      CT cervical spine: 1. No CT evidence of acute cervical spine fracture or subluxation.  2. Straightening of the cervical spine may be seen in the setting of  muscle spasm versus patient positioning.    Interventions:  Humulin 10u, clonidine 0.1 mg, repleted mg     Medical History:  Patient Active Problem List   Diagnosis    Pain at surgical incision    Hyperosmolar hyperglycemic state (HHS) (Multi)    Weakness    Hypertensive urgency    Syncope and collapse    Hyperglycemia due to diabetes mellitus (Multi)    Hypokalemia    Hypomagnesemia    Coronary stent restenosis    PAD (peripheral artery disease) (CMS-HCC)    HTN (hypertension)    MGUS (monoclonal gammopathy of unknown significance)    CAD (coronary artery disease)    Bilateral carotid artery stenosis    S/P CABG (coronary artery bypass graft)    Fall, initial encounter    Vaginal discharge    Acute respiratory failure with hypoxia (Multi)    Chronic obstructive pulmonary disease with (acute) lower respiratory infection (Multi)    Thrombocytopenia, unspecified (CMS-HCC)      Past Medical History:   Diagnosis Date    Carotid artery stenosis     Diabetes mellitus (Multi)     Heart disease     Hypertension     PAD (peripheral artery disease) (CMS-HCC)     Renal artery stenosis (CMS-HCC)      Past Surgical History:   Procedure Laterality Date    CORONARY ARTERY BYPASS GRAFT      9/2023    FEMORAL ARTERY STENT Left     left SFA     Current Outpatient Medications   Medication Instructions     acetaminophen (TYLENOL) 500 mg, oral, Every 6 hours PRN, Take per directed    amLODIPine (Norvasc) 10 mg tablet TAKE 1 TABLET BY MOUTH ONCE DAILY    atorvastatin (Lipitor) 80 mg tablet TAKE 1 TABLET BY MOUTH ONCE DAILY    blood sugar diagnostic strip Use 1 strip to check blood sugar 3 times a day with meals.    blood-glucose meter misc Use daily or as directed for monitoring of diabetes.    carvedilol (Coreg) 25 mg tablet TAKE 1 TABLET BY MOUTH TWO TIMES A DAY    cholecalciferol (Vitamin D-3) 25 MCG (1000 UT) capsule 2 capsules, oral, Daily    clopidogrel (PLAVIX) 75 mg, Daily RT    Farxiga 10 mg 1 tablet, Daily with breakfast    hydrALAZINE (APRESOLINE) 100 mg, oral, Daily    insulin lispro 6 Units, subcutaneous, 3 times daily (morning, midday, late afternoon), Take as directed per insulin instructions.    insulin lispro 0-10 Units, subcutaneous, 3 times daily (morning, midday, late afternoon), 0 unit(s) if BG ; 2 unit(s) if -200; 4 unit(s) if -250; 6 unit(s) if -300; 8 unit(s) if -350; 10 unit(s) if -400    isosorbide mononitrate ER (Imdur) 60 mg 24 hr tablet TAKE 1 TABLET BY MOUTH ONCE DAILY    lancets misc Use three times a day to test blood sugar w/ meals    Lantus Solostar U-100 Insulin 24 Units, subcutaneous, Daily, Take per directed    metFORMIN XR (Glucophage-XR) 500 mg 24 hr tablet TAKE 2 TABLETS BY MOUTH ONCE DAILY    multivitamin with minerals tablet 1 tablet, oral, Daily    ranolazine (RANEXA) 500 mg, oral, 2 times daily, Do not crush, chew, or split.      Allergies   Allergen Reactions    Lisinopril Itching    Amoxicillin Rash    Losartan Itching and Rash      Social History     Tobacco Use    Smoking status: Every Day     Types: Cigarettes    Smokeless tobacco: Never   Vaping Use    Vaping status: Never Used   Substance Use Topics    Alcohol use: Never    Drug use: Never     No family history on file.          Objective   Vitals: reviewed  Exam:  Gen: very frail  appearing and underweight, very dry on exam  Head and neck: NCAT, neck supple without LAD  HEENT: mucous membranes dry, normal nose without congestion  CV: RRR no MRG, radial pulses 2+  Pulm: CTAB, no wheezing or crackles, normal WOB on RA  Abd: soft, non-tender, non-distended, mild suprapubic pain on palpation   Ext: no LE edema, 4/5 strength in lower extremities   Neuro: alert and oriented x3, normal tone, face symmetric, moves all extremities spontaneously    Scheduled medications  amLODIPine, 10 mg, oral, Daily  aspirin, 81 mg, oral, Daily  carvedilol, 12.5 mg, oral, BID  clopidogrel, 75 mg, oral, Daily  dapagliflozin propanediol, 10 mg, oral, Daily  enoxaparin, 40 mg, subcutaneous, q24h  hydrALAZINE, 100 mg, oral, TID  insulin glargine, 24 Units, subcutaneous, Nightly  insulin lispro, 0-10 Units, subcutaneous, TID AC  isosorbide mononitrate ER, 60 mg, oral, Daily  lactated Ringer's, 1,000 mL, intravenous, Once  lidocaine, 1 patch, transdermal, Daily  magnesium sulfate, 2 g, intravenous, Once  polyethylene glycol, 17 g, oral, Daily  ranolazine, 500 mg, oral, BID  sulfamethoxazole-trimethoprim, 1 tablet, oral, q12h BOLIVAR      Continuous medications     PRN medications  PRN medications: acetaminophen, dextrose, dextrose, glucagon, glucagon, hydrALAZINE    Results for orders placed or performed during the hospital encounter of 01/06/25 (from the past 24 hours)   Blood Gas Venous Full Panel Unsolicited   Result Value Ref Range    POCT pH, Venous 7.44 (H) 7.33 - 7.43 pH    POCT pCO2, Venous 53 (H) 41 - 51 mm Hg    POCT pO2, Venous 20 (L) 35 - 45 mm Hg    POCT SO2, Venous 26 (L) 45 - 75 %    POCT Oxy Hemoglobin, Venous 25.3 (L) 45.0 - 75.0 %    POCT Hematocrit Calculated, Venous 54.0 (H) 36.0 - 46.0 %    POCT Sodium, Venous 134 (L) 136 - 145 mmol/L    POCT Potassium, Venous 4.6 3.5 - 5.3 mmol/L    POCT Chloride, Venous 93 (L) 98 - 107 mmol/L    POCT Ionized Calicum, Venous 1.20 1.10 - 1.33 mmol/L    POCT Glucose, Venous  399 (H) 74 - 99 mg/dL    POCT Lactate, Venous 2.3 (H) 0.4 - 2.0 mmol/L    POCT Base Excess, Venous 9.4 (H) -2.0 - 3.0 mmol/L    POCT HCO3 Calculated, Venous 36.0 (H) 22.0 - 26.0 mmol/L    POCT Hemoglobin, Venous 18.1 (H) 12.0 - 16.0 g/dL    POCT Anion Gap, Venous 10.0 10.0 - 25.0 mmol/L    Patient Temperature 37.0 degrees Celsius   CBC and Auto Differential   Result Value Ref Range    WBC 7.0 4.4 - 11.3 x10*3/uL    nRBC 0.0 0.0 - 0.0 /100 WBCs    RBC 5.50 (H) 4.00 - 5.20 x10*6/uL    Hemoglobin 17.8 (H) 12.0 - 16.0 g/dL    Hematocrit 47.3 (H) 36.0 - 46.0 %    MCV 86 80 - 100 fL    MCH 32.4 26.0 - 34.0 pg    MCHC 37.6 (H) 32.0 - 36.0 g/dL    RDW 11.7 11.5 - 14.5 %    Platelets 222 150 - 450 x10*3/uL    Neutrophils % 49.3 40.0 - 80.0 %    Immature Granulocytes %, Automated 0.4 0.0 - 0.9 %    Lymphocytes % 45.3 13.0 - 44.0 %    Monocytes % 4.2 2.0 - 10.0 %    Eosinophils % 0.1 0.0 - 6.0 %    Basophils % 0.7 0.0 - 2.0 %    Neutrophils Absolute 3.44 1.20 - 7.70 x10*3/uL    Immature Granulocytes Absolute, Automated 0.03 0.00 - 0.70 x10*3/uL    Lymphocytes Absolute 3.16 1.20 - 4.80 x10*3/uL    Monocytes Absolute 0.29 0.10 - 1.00 x10*3/uL    Eosinophils Absolute 0.01 0.00 - 0.70 x10*3/uL    Basophils Absolute 0.05 0.00 - 0.10 x10*3/uL   Comprehensive metabolic panel   Result Value Ref Range    Glucose 473 (HH) 74 - 99 mg/dL    Sodium 137 136 - 145 mmol/L    Potassium 4.1 3.5 - 5.3 mmol/L    Chloride 91 (L) 98 - 107 mmol/L    Bicarbonate 31 21 - 32 mmol/L    Anion Gap 19 10 - 20 mmol/L    Urea Nitrogen 21 6 - 23 mg/dL    Creatinine 0.65 0.50 - 1.05 mg/dL    eGFR >90 >60 mL/min/1.73m*2    Calcium 9.6 8.6 - 10.6 mg/dL    Albumin 3.5 3.4 - 5.0 g/dL    Alkaline Phosphatase 235 (H) 33 - 136 U/L    Total Protein 7.5 6.4 - 8.2 g/dL    AST 78 (H) 9 - 39 U/L    Bilirubin, Total 0.7 0.0 - 1.2 mg/dL    ALT 59 (H) 7 - 45 U/L   Magnesium   Result Value Ref Range    Magnesium 1.56 (L) 1.60 - 2.40 mg/dL   Acute Toxicology Panel, Blood    Result Value Ref Range    Acetaminophen <10.0 10.0 - 30.0 ug/mL    Salicylate  <3 4 - 20 mg/dL    Alcohol <10 <=10 mg/dL   Coagulation Screen   Result Value Ref Range    Protime 11.2 9.8 - 12.8 seconds    INR 1.0 0.9 - 1.1    aPTT 30 27 - 38 seconds   Lavender Top   Result Value Ref Range    Extra Tube Hold for add-ons.    Influenza A, and B PCR   Result Value Ref Range    Flu A Result Not Detected Not Detected    Flu B Result Not Detected Not Detected   Sars-CoV-2 PCR   Result Value Ref Range    Coronavirus 2019, PCR Not Detected Not Detected   Vitamin B12   Result Value Ref Range    Vitamin B12 558 211 - 911 pg/mL   Folate   Result Value Ref Range    Folate, Serum 15.4 >5.0 ng/mL   Urinalysis with Reflex Culture and Microscopic   Result Value Ref Range    Color, Urine Light-Yellow Light-Yellow, Yellow, Dark-Yellow    Appearance, Urine Clear Clear    Specific Gravity, Urine 1.023 1.005 - 1.035    pH, Urine 6.5 5.0, 5.5, 6.0, 6.5, 7.0, 7.5, 8.0    Protein, Urine 100 (2+) (A) NEGATIVE, 10 (TRACE), 20 (TRACE) mg/dL    Glucose, Urine OVER (4+) (A) Normal mg/dL    Blood, Urine 0.1 (1+) (A) NEGATIVE    Ketones, Urine 10 (1+) (A) NEGATIVE mg/dL    Bilirubin, Urine NEGATIVE NEGATIVE    Urobilinogen, Urine Normal Normal mg/dL    Nitrite, Urine NEGATIVE NEGATIVE    Leukocyte Esterase, Urine 250 Shaheed/µL (A) NEGATIVE   Microscopic Only, Urine   Result Value Ref Range    WBC, Urine 11-20 (A) 1-5, NONE /HPF    RBC, Urine 11-20 (A) NONE, 1-2, 3-5 /HPF    Squamous Epithelial Cells, Urine 1-9 (SPARSE) Reference range not established. /HPF    Mucus, Urine FEW Reference range not established. /LPF   Drug Screen, Urine   Result Value Ref Range    Amphetamine Screen, Urine Presumptive Negative Presumptive Negative    Barbiturate Screen, Urine Presumptive Negative Presumptive Negative    Benzodiazepines Screen, Urine Presumptive Negative Presumptive Negative    Cannabinoid Screen, Urine Presumptive Negative Presumptive Negative     Cocaine Metabolite Screen, Urine Presumptive Positive (A) Presumptive Negative    Fentanyl Screen, Urine Presumptive Negative Presumptive Negative    Opiate Screen, Urine Presumptive Negative Presumptive Negative    Oxycodone Screen, Urine Presumptive Negative Presumptive Negative    PCP Screen, Urine Presumptive Negative Presumptive Negative    Methadone Screen, Urine Presumptive Negative Presumptive Negative   POCT GLUCOSE   Result Value Ref Range    POCT Glucose 456 (H) 74 - 99 mg/dL       Imaging   CT head wo IV contrast    Result Date: 1/6/2025  Interpreted By:  Chema Salcedo and Hooper Grayson STUDY: [id number]; [id number] CT HEAD WO IV CONTRAST; CT CERVICAL SPINE WO IV CONTRAST   INDICATION: Signs/Symptoms:fall; Signs/Symptoms:GLF, intoxicated   COMPARISON: CT of the head obtained July 15, 2024   ACCESSION NUMBER(S): [id number]; [id number]   ORDERING CLINICIAN: KATIE MARADIAGA   TECHNIQUE: Noncontrast helical CT of the head was performed. Multiplanar reformations in bone and soft tissue algorithm were provided. Noncontrast helical CT of the cervical spine was performed. Multiplanar reformations were provided.   FINDINGS: NONCONTRAST HEAD CT:   Midline brain structures are intact and age appropriate. Mild-to-moderate diffuse cerebral parenchymal volume loss is noted.   There is no loss of gray-white differentiation.   No evidence of acute intracranial hemorrhage.   No intracranial mass or mass effect.   No midline shift or herniation.   No ventriculomegaly. Normal appearance of the basilar cisterns.   Normal CT appearances of the paranasal sinuses. Orbits and globes are normal in CT appearance. No mastoid effusion. No acute or aggressive appearing calvarial lesion. No depressed calvarial fracture. Calcified atheromatous plaques observed in the bilateral internal carotid and vertebral arteries.   NONCONTRAST C-SPINE CT:   There is straightening of the cervical spine noted. No vertebral body  subluxation.   Vertebral body heights are maintained. No evidence of fracture.   Posterior elements appear intact. There is no perched or jumped facet. The dens is equidistant from normal-appearing lateral masses. No evidence of skull base osseous fracture.   Intervertebral disc space heights are maintained. Bilateral parasagittal imaging reveals no evidence of significant osseous encroachment on the neural foramina.   No evidence of significant posterior disc herniation within the limitations of the modality.   Prevertebral soft tissues are not thickened. Limited assessment of the thyroid is unremarkable. Paraseptal and centrilobular pulmonary parenchymal destructive changes are seen of the lung apices. There is a stellate ground-glass nodule observed in the left lung apex measuring 7 mm (axial series 303, image 86). This finding appears stable from the comparison exam.       NONCONTRAST HEAD CT: 1. No CT evidence of acute intracranial hemorrhage, mass, or mass effect.   NONCONTRAST C-SPINE CT: 1. No CT evidence of acute cervical spine fracture or subluxation. 2. Straightening of the cervical spine may be seen in the setting of muscle spasm versus patient positioning.   I personally reviewed the images/study and I agree with the findings as stated. This study was interpreted at [place], [place].   Signed by: Chema Salcedo 1/6/2025 4:59 PM Dictation workstation:   [id number]    CT cervical spine wo IV contrast    Result Date: 1/6/2025  Interpreted By:  Chema Salcedo and Hooper Grayson STUDY: [id number]; [id number] CT HEAD WO IV CONTRAST; CT CERVICAL SPINE WO IV CONTRAST   INDICATION: Signs/Symptoms:fall; Signs/Symptoms:GLF, intoxicated   COMPARISON: CT of the head obtained July 15, 2024   ACCESSION NUMBER(S): [id number]; [id number]   ORDERING CLINICIAN: KATIE MARADIAGA   TECHNIQUE: Noncontrast helical CT of the head was performed. Multiplanar reformations in  bone and soft tissue algorithm were provided. Noncontrast helical CT of the cervical spine was performed. Multiplanar reformations were provided.   FINDINGS: NONCONTRAST HEAD CT:   Midline brain structures are intact and age appropriate. Mild-to-moderate diffuse cerebral parenchymal volume loss is noted.   There is no loss of gray-white differentiation.   No evidence of acute intracranial hemorrhage.   No intracranial mass or mass effect.   No midline shift or herniation.   No ventriculomegaly. Normal appearance of the basilar cisterns.   Normal CT appearances of the paranasal sinuses. Orbits and globes are normal in CT appearance. No mastoid effusion. No acute or aggressive appearing calvarial lesion. No depressed calvarial fracture. Calcified atheromatous plaques observed in the bilateral internal carotid and vertebral arteries.   NONCONTRAST C-SPINE CT:   There is straightening of the cervical spine noted. No vertebral body subluxation.   Vertebral body heights are maintained. No evidence of fracture.   Posterior elements appear intact. There is no perched or jumped facet. The dens is equidistant from normal-appearing lateral masses. No evidence of skull base osseous fracture.   Intervertebral disc space heights are maintained. Bilateral parasagittal imaging reveals no evidence of significant osseous encroachment on the neural foramina.   No evidence of significant posterior disc herniation within the limitations of the modality.   Prevertebral soft tissues are not thickened. Limited assessment of the thyroid is unremarkable. Paraseptal and centrilobular pulmonary parenchymal destructive changes are seen of the lung apices. There is a stellate ground-glass nodule observed in the left lung apex measuring 7 mm (axial series 303, image 86). This finding appears stable from the comparison exam.       NONCONTRAST HEAD CT: 1. No CT evidence of acute intracranial hemorrhage, mass, or mass effect.   NONCONTRAST C-SPINE  CT: 1. No CT evidence of acute cervical spine fracture or subluxation. 2. Straightening of the cervical spine may be seen in the setting of muscle spasm versus patient positioning.   I personally reviewed the images/study and I agree with the findings as stated. This study was interpreted at [facility], [place], [place].   Signed by: Chema Salcedo 1/6/2025 4:59 PM Dictation workstation:   [id number]           Assessment/Plan   64 y.o. female with h/o T2DM (last HgA1c 8.5% 11/15/23), HTN, HFrEF (last EF 35-40% 8/26/23), CAD s/p CABG, HTN, PAD s/p left SFA stent, renal and carotid artery stenosis, and cocaine and alcohol use disorder who presents with multiple falls within the past few months and inability to care for herself. She was admitted for hyperglycemia, hypertension urgency, and multiple falls. BP elevated to the 200s systolic and 120s diastolic likely in the setting of medication on compliance and cocaine use. She was also found to be hyperglycemic 473 on admission and received 10u of regular insulin. Patient has also been non compliant with glycemic medications and she had a recent hospital admission 2/5/24 for syncope and hypoglycemia therefore, there may be a component of poor medical literacy.     Plan:    #Multiple falls   #bilateral hip pain  #unable to ambulate  :: CT c-spine negative for acute fracture or subluxation   :: XR of bilateral hips 7/2024 negative for acute abnormality in the hips  - pt unable to care for herself and perform her ADLs, will likely need longterm placement   - lidocaine patch to both hips  - tylenol 650 mg q6h PRN mild-moderate pain  - PT/OT and SW consulted    #uncontrolled hypertension   #HFrecEF (last EF 55% 7/2024 recovered from last EF 35-40% 8/26/23)  - BP elevated to the 200s systolic and 120s diastolic in the ED  - Urine drug screen was positive for cocaine   - c/w amlodipine 10 mg daily   - c/w hydralazine 100 mg TID   - c/w lissa  25 mg BID   - c/w GDMT: farxiga 10 mg daily, Imdur 60 mg daily  - continue to monitor BP    #Cf UTI   :: positive trichomonas during last admission and was treated with metronidazole    :: exam findings with mild suprapubic pain, pt denied vaginal discharge today   - UA: - nitrite, 250 LE, 1+ blood, 1+ ketones, 4+ gluc, 2+ protein, 11-20 WBC, 11-20 RBC   - Ucx pending   - will treat for UTI pending Ucx   - bactrim 1 tablet q12h x3 daily     #CAD s/p CABGx2 2023  #PAD s/p left SFA stent  #Stable angina   - c/w home Aspirin 81 mg daily and Plavix 75 mg PO QD  - c/w home ranolazine 500 mg BID     #hyperglycemia  #uncontrolled T2DM (last HgA1c 15.2%, 7/2024)  - Gluc 473 on admission s/p regular insulin 10u, POCT glucose 456 s/p 10u Lispro and 24u Lantus  - pt noncompliant with insulin outpatient  - holding home dose of Metformin while hospitalized will resume on discharge  - c/w home Farxiga 10 mg daily   - c/w home Lantus 24 units at bedtime  - c/w home lispro 6 units AC   - SSI #2, accucheck ACHS  - diabetes educator consulted  [ ] may benefit from endocrine consult for better glycemic guidance given very labile glucose levels     #hypomagnesemia  - Mg 1.56 on admission   - s/p 2g magnesium sulfate x1 dose  - RFP ordered for AM    Fluids: prn  Diet: Cardiac diet  O2: prn   DVT ppx: lovenox   GI ppx: n/a   Abx: n/a  CODE STATUS: FULL     Plan preliminary until cosigned by attending physician.    SILVIA Borrego MD   Family Medicine, PGY2

## 2025-01-08 LAB
ALBUMIN SERPL BCP-MCNC: 2.5 G/DL (ref 3.4–5)
ANION GAP SERPL CALC-SCNC: 11 MMOL/L (ref 10–20)
BUN SERPL-MCNC: 18 MG/DL (ref 6–23)
CALCIUM SERPL-MCNC: 8.1 MG/DL (ref 8.6–10.6)
CHLORIDE SERPL-SCNC: 96 MMOL/L (ref 98–107)
CO2 SERPL-SCNC: 30 MMOL/L (ref 21–32)
CREAT SERPL-MCNC: 0.65 MG/DL (ref 0.5–1.05)
EGFRCR SERPLBLD CKD-EPI 2021: >90 ML/MIN/1.73M*2
ERYTHROCYTE [DISTWIDTH] IN BLOOD BY AUTOMATED COUNT: 12 % (ref 11.5–14.5)
GLUCOSE BLD MANUAL STRIP-MCNC: 111 MG/DL (ref 74–99)
GLUCOSE BLD MANUAL STRIP-MCNC: 224 MG/DL (ref 74–99)
GLUCOSE BLD MANUAL STRIP-MCNC: 264 MG/DL (ref 74–99)
GLUCOSE BLD MANUAL STRIP-MCNC: 303 MG/DL (ref 74–99)
GLUCOSE BLD MANUAL STRIP-MCNC: 81 MG/DL (ref 74–99)
GLUCOSE SERPL-MCNC: 76 MG/DL (ref 74–99)
HCT VFR BLD AUTO: 37.3 % (ref 36–46)
HGB BLD-MCNC: 13.5 G/DL (ref 12–16)
MAGNESIUM SERPL-MCNC: 1.68 MG/DL (ref 1.6–2.4)
MCH RBC QN AUTO: 32.2 PG (ref 26–34)
MCHC RBC AUTO-ENTMCNC: 36.2 G/DL (ref 32–36)
MCV RBC AUTO: 89 FL (ref 80–100)
NRBC BLD-RTO: 0 /100 WBCS (ref 0–0)
PHOSPHATE SERPL-MCNC: 2.7 MG/DL (ref 2.5–4.9)
PLATELET # BLD AUTO: 186 X10*3/UL (ref 150–450)
POTASSIUM SERPL-SCNC: 3 MMOL/L (ref 3.5–5.3)
RBC # BLD AUTO: 4.19 X10*6/UL (ref 4–5.2)
SODIUM SERPL-SCNC: 134 MMOL/L (ref 136–145)
WBC # BLD AUTO: 7.5 X10*3/UL (ref 4.4–11.3)

## 2025-01-08 PROCEDURE — 1210000001 HC SEMI-PRIVATE ROOM DAILY

## 2025-01-08 PROCEDURE — 99232 SBSQ HOSP IP/OBS MODERATE 35: CPT

## 2025-01-08 PROCEDURE — 2500000001 HC RX 250 WO HCPCS SELF ADMINISTERED DRUGS (ALT 637 FOR MEDICARE OP)

## 2025-01-08 PROCEDURE — 2500000002 HC RX 250 W HCPCS SELF ADMINISTERED DRUGS (ALT 637 FOR MEDICARE OP, ALT 636 FOR OP/ED)

## 2025-01-08 PROCEDURE — 2500000004 HC RX 250 GENERAL PHARMACY W/ HCPCS (ALT 636 FOR OP/ED)

## 2025-01-08 PROCEDURE — 80069 RENAL FUNCTION PANEL: CPT

## 2025-01-08 PROCEDURE — 82947 ASSAY GLUCOSE BLOOD QUANT: CPT

## 2025-01-08 PROCEDURE — 85027 COMPLETE CBC AUTOMATED: CPT

## 2025-01-08 PROCEDURE — 36415 COLL VENOUS BLD VENIPUNCTURE: CPT

## 2025-01-08 PROCEDURE — 97165 OT EVAL LOW COMPLEX 30 MIN: CPT | Mod: GO

## 2025-01-08 PROCEDURE — 83735 ASSAY OF MAGNESIUM: CPT

## 2025-01-08 RX ORDER — DICLOFENAC SODIUM 10 MG/G
4 GEL TOPICAL 4 TIMES DAILY PRN
Status: DISPENSED | OUTPATIENT
Start: 2025-01-08

## 2025-01-08 RX ORDER — POTASSIUM CHLORIDE 20 MEQ/1
40 TABLET, EXTENDED RELEASE ORAL ONCE
Status: COMPLETED | OUTPATIENT
Start: 2025-01-08 | End: 2025-01-08

## 2025-01-08 RX ADMIN — INSULIN LISPRO 6 UNITS: 100 INJECTION, SOLUTION INTRAVENOUS; SUBCUTANEOUS at 13:57

## 2025-01-08 RX ADMIN — ACETAMINOPHEN 975 MG: 325 TABLET ORAL at 11:23

## 2025-01-08 RX ADMIN — ISOSORBIDE MONONITRATE 60 MG: 60 TABLET, EXTENDED RELEASE ORAL at 10:34

## 2025-01-08 RX ADMIN — IBUPROFEN 600 MG: 600 TABLET, FILM COATED ORAL at 16:57

## 2025-01-08 RX ADMIN — INSULIN LISPRO 4 UNITS: 100 INJECTION, SOLUTION INTRAVENOUS; SUBCUTANEOUS at 17:44

## 2025-01-08 RX ADMIN — POTASSIUM CHLORIDE 40 MEQ: 1500 TABLET, EXTENDED RELEASE ORAL at 11:19

## 2025-01-08 RX ADMIN — ATORVASTATIN CALCIUM 80 MG: 80 TABLET, FILM COATED ORAL at 10:34

## 2025-01-08 RX ADMIN — INSULIN GLARGINE 24 UNITS: 100 INJECTION, SOLUTION SUBCUTANEOUS at 20:51

## 2025-01-08 RX ADMIN — AMLODIPINE BESYLATE 10 MG: 10 TABLET ORAL at 10:34

## 2025-01-08 RX ADMIN — ENOXAPARIN SODIUM 40 MG: 100 INJECTION SUBCUTANEOUS at 20:51

## 2025-01-08 RX ADMIN — IBUPROFEN 600 MG: 600 TABLET, FILM COATED ORAL at 21:01

## 2025-01-08 RX ADMIN — SULFAMETHOXAZOLE AND TRIMETHOPRIM 1 TABLET: 800; 160 TABLET ORAL at 10:34

## 2025-01-08 RX ADMIN — INSULIN LISPRO 6 UNITS: 100 INJECTION, SOLUTION INTRAVENOUS; SUBCUTANEOUS at 17:44

## 2025-01-08 RX ADMIN — ASPIRIN 81 MG: 81 TABLET, COATED ORAL at 10:34

## 2025-01-08 RX ADMIN — Medication 1000 UNITS: at 10:34

## 2025-01-08 RX ADMIN — INSULIN LISPRO 8 UNITS: 100 INJECTION, SOLUTION INTRAVENOUS; SUBCUTANEOUS at 13:57

## 2025-01-08 RX ADMIN — CLOPIDOGREL BISULFATE 75 MG: 75 TABLET ORAL at 10:34

## 2025-01-08 RX ADMIN — RANOLAZINE 500 MG: 500 TABLET, EXTENDED RELEASE ORAL at 10:34

## 2025-01-08 RX ADMIN — RANOLAZINE 500 MG: 500 TABLET, EXTENDED RELEASE ORAL at 20:50

## 2025-01-08 RX ADMIN — CARVEDILOL 12.5 MG: 12.5 TABLET, FILM COATED ORAL at 20:50

## 2025-01-08 RX ADMIN — CARVEDILOL 12.5 MG: 12.5 TABLET, FILM COATED ORAL at 10:34

## 2025-01-08 RX ADMIN — SULFAMETHOXAZOLE AND TRIMETHOPRIM 1 TABLET: 800; 160 TABLET ORAL at 20:50

## 2025-01-08 ASSESSMENT — COGNITIVE AND FUNCTIONAL STATUS - GENERAL
MOVING TO AND FROM BED TO CHAIR: A LITTLE
DRESSING REGULAR LOWER BODY CLOTHING: A LITTLE
PERSONAL GROOMING: A LITTLE
MOBILITY SCORE: 18
MOVING TO AND FROM BED TO CHAIR: A LITTLE
DRESSING REGULAR UPPER BODY CLOTHING: A LITTLE
DRESSING REGULAR LOWER BODY CLOTHING: A LITTLE
HELP NEEDED FOR BATHING: A LOT
EATING MEALS: A LITTLE
WALKING IN HOSPITAL ROOM: A LOT
PERSONAL GROOMING: A LITTLE
DRESSING REGULAR LOWER BODY CLOTHING: A LITTLE
EATING MEALS: A LITTLE
DAILY ACTIVITIY SCORE: 19
STANDING UP FROM CHAIR USING ARMS: A LITTLE
CLIMB 3 TO 5 STEPS WITH RAILING: A LOT
CLIMB 3 TO 5 STEPS WITH RAILING: A LOT
WALKING IN HOSPITAL ROOM: A LOT
DAILY ACTIVITIY SCORE: 18
MOBILITY SCORE: 18
TOILETING: A LITTLE
DRESSING REGULAR UPPER BODY CLOTHING: A LITTLE
TOILETING: A LITTLE
PERSONAL GROOMING: A LITTLE
STANDING UP FROM CHAIR USING ARMS: A LITTLE
DAILY ACTIVITIY SCORE: 19
TOILETING: A LITTLE
DRESSING REGULAR UPPER BODY CLOTHING: A LITTLE

## 2025-01-08 ASSESSMENT — PAIN DESCRIPTION - LOCATION
LOCATION: FOOT
LOCATION: FOOT

## 2025-01-08 ASSESSMENT — PAIN SCALES - GENERAL
PAINLEVEL_OUTOF10: 0 - NO PAIN
PAINLEVEL_OUTOF10: 8
PAINLEVEL_OUTOF10: 7
PAINLEVEL_OUTOF10: 6
PAINLEVEL_OUTOF10: 8
PAINLEVEL_OUTOF10: 4

## 2025-01-08 ASSESSMENT — PAIN - FUNCTIONAL ASSESSMENT
PAIN_FUNCTIONAL_ASSESSMENT: 0-10

## 2025-01-08 ASSESSMENT — ACTIVITIES OF DAILY LIVING (ADL)
BATHING_ASSISTANCE: MODERATE
ADL_ASSISTANCE: INDEPENDENT

## 2025-01-08 ASSESSMENT — PAIN DESCRIPTION - ORIENTATION
ORIENTATION: RIGHT
ORIENTATION: RIGHT

## 2025-01-08 NOTE — PROGRESS NOTES
"Myrna Khan is a 64 y.o. female on day 2 of admission presenting with Fall, initial encounter.    Subjective   The patient was examined at bedside this morning. No acute events overnight. The patient was sleeping in bed and in no acute distress.       Objective     Physical Exam  Constitutional:       General: She is not in acute distress.     Appearance: She is not ill-appearing.   HENT:      Head: Normocephalic and atraumatic.   Cardiovascular:      Rate and Rhythm: Normal rate and regular rhythm.      Heart sounds: Normal heart sounds. No murmur heard.     No friction rub. No gallop.   Pulmonary:      Effort: Pulmonary effort is normal. No respiratory distress.      Breath sounds: Normal breath sounds.   Musculoskeletal:      Right lower leg: No edema.      Left lower leg: No edema.   Skin:     General: Skin is warm and dry.      Findings: Lesion present.      Comments: Multiple small mobile non-tender, nondraining nodules located in B/L axilla. Patient reported they are present in her groin as well and have been present for years     Psychiatric:         Mood and Affect: Mood normal.         Behavior: Behavior normal.         Last Recorded Vitals  Blood pressure 105/64, pulse 65, temperature 36.7 °C (98.1 °F), resp. rate 19, height 1.6 m (5' 3\"), weight (!) 41.8 kg (92 lb 1.6 oz), SpO2 97%.  Intake/Output last 3 Shifts:  No intake/output data recorded.    Relevant Results                      Assessment/Plan   Assessment & Plan  Fall, initial encounter    64 y.o. female with h/o T2DM (last HgA1c 8.5% 11/15/23), HTN, HFrEF (last EF 35-40% 8/26/23), CAD s/p CABG, HTN, PAD s/p left SFA stent, renal and carotid artery stenosis, and cocaine and alcohol use disorder who presents with multiple falls within the past few months and inability to care for herself. She was admitted for hyperglycemia, hypertension urgency, and multiple falls. BP elevated to the 200s systolic and 120s diastolic likely in the setting of " medication on compliance and cocaine use. She was also found to be hyperglycemic 473 on admission and received 10u of regular insulin. Patient has also been non compliant with glycemic medications and she had a recent hospital admission 2/5/24 for syncope and hypoglycemia therefore, there may be a component of poor medical literacy.      #Multiple falls   #bilateral hip pain  #unable to ambulate  #Right foot pain  #Underweight  :: CT c-spine negative for acute fracture or subluxation   :: XR of bilateral hips 7/2024 negative for acute abnormality in the hips  :: BMI 16.31  - pt unable to care for herself and perform her ADLs, will likely need longterm placement   - X-ray of right foot showed no acute fractures  - lidocaine patch to both hips  - tylenol 650 mg q6h PRN mild-moderate pain  - PT/OT recommend moderate intensity, SNF pending  - Nutrition consulted, appreciate recs     #uncontrolled hypertension (Improving)  #HFrecEF (last EF 55% 7/2024 recovered from last EF 35-40% 8/26/23)  - BP elevated to the 200s systolic and 120s diastolic in the ED  - Urine drug screen was positive for cocaine   - c/w amlodipine 10 mg daily   - Held home hydralazine 100 mg TID due to mildly hypotensive BP readings  - c/w coreg 25 mg BID   - c/w GDMT: Imdur 60 mg daily, held farxiga 10 mg daily due to UTI  - continue to monitor BP     #Cf UTI   :: positive trichomonas during last admission and was treated with metronidazole    :: exam findings with mild suprapubic pain, pt denied vaginal discharge today   - UA: - nitrite, 250 LE, 1+ blood, 1+ ketones, 4+ gluc, 2+ protein, 11-20 WBC, 11-20 RBC   - Ucx contaminated  - Tx w/ bactrim 1 tablet q12h x3 daily  - Held home Farxiga iso UTI     #CAD s/p CABGx2 2023  #PAD s/p left SFA stent  #Stable angina   - c/w home Aspirin 81 mg daily and Plavix 75 mg PO QD  - c/w home ranolazine 500 mg BID      #hyperglycemia (Improving)  #uncontrolled T2DM (last HgA1c 15.2%, 7/2024)  - Gluc 473 on  admission s/p regular insulin 10u, POCT glucose 456 s/p 10u Lispro and 24u Lantus  - pt noncompliant with insulin outpatient  - holding home dose of Metformin while hospitalized will resume on discharge  - Held home Farxiga 10 mg daily iso UTI   - c/w home Lantus 24 units at bedtime  - c/w home lispro 6 units AC   - SSI #2, accucheck ACHS  - diabetes educator consulted     #Hypomagnesemia  - Mg 1.56 on admission  - s/p 2g magnesium sulfate x1 dose  - Repeat Mg 1.68  - Daily RFP     Fluids: prn  Diet: Cardiac diet  O2: prn   DVT ppx: lovenox   GI ppx: n/a   Abx: n/a  CODE STATUS: FULL     The patient was seen and discussed with Dr. Vences.    Prince Benson MD  Family Medicine  PGY-3

## 2025-01-08 NOTE — PROGRESS NOTES
Myrna Khan is a 64 y.o. female on day 2 of admission presenting with Fall, initial encounter.    Transitional Care Coordination Progress Note:  Patient discussed during interdisciplinary rounds.   Team members present: MD & TCC  Plan per Medical/Surgical team: Patient medically ready for discharge  Payor: [employer] [place]  Discharge disposition: SNF TCC met with the patient at the bedside to discuss discharge with a SNF list from medicare.gov patient declined the list [facility]  Potential Barriers: None  ADOD: 1/9    TCC will continue to follow and update the plan as warranted.

## 2025-01-08 NOTE — CARE PLAN
The patient's goals for the shift include      The clinical goals for the shift include Pt remain safe and free from injury during this shift 1/8/25 0700.      Problem: Nutrition  Goal: Less than 5 days NPO/clear liquids  Outcome: Progressing  Goal: Oral intake greater than 50%  Outcome: Progressing  Goal: Oral intake greater 75%  Outcome: Progressing  Goal: Consume prescribed supplement  Outcome: Progressing  Goal: Adequate PO fluid intake  Outcome: Progressing  Goal: Nutrition support goals are met within 48 hrs  Outcome: Progressing  Goal: Nutrition support is meeting 75% of nutrient needs  Outcome: Progressing  Goal: Tube feed tolerance  Outcome: Progressing  Goal: BG  mg/dL  Outcome: Progressing  Goal: Lab values WNL  Outcome: Progressing  Goal: Electrolytes WNL  Outcome: Progressing  Goal: Promote healing  Outcome: Progressing  Goal: Maintain stable weight  Outcome: Progressing  Goal: Reduce weight from edema/fluid  Outcome: Progressing  Goal: Gradual weight gain  Outcome: Progressing  Goal: Improve ostomy output  Outcome: Progressing     Problem: Diabetes  Goal: Achieve decreasing blood glucose levels by end of shift  Outcome: Progressing  Goal: Increase stability of blood glucose readings by end of shift  Outcome: Progressing  Goal: Decrease in ketones present in urine by end of shift  Outcome: Progressing  Goal: Maintain electrolyte levels within acceptable range throughout shift  Outcome: Progressing  Goal: Maintain glucose levels >70mg/dl to <250mg/dl throughout shift  Outcome: Progressing  Goal: No changes in neurological exam by end of shift  Outcome: Progressing  Goal: Learn about and adhere to nutrition recommendations by end of shift  Outcome: Progressing  Goal: Vital signs within normal range for age by end of shift  Outcome: Progressing  Goal: Increase self care and/or family involovement by end of shift  Outcome: Progressing  Goal: Receive DSME education by end of shift  Outcome:  Progressing     Problem: Fall/Injury  Goal: Not fall by end of shift  Outcome: Progressing  Goal: Be free from injury by end of the shift  Outcome: Progressing  Goal: Verbalize understanding of personal risk factors for fall in the hospital  Outcome: Progressing  Goal: Verbalize understanding of risk factor reduction measures to prevent injury from fall in the home  Outcome: Progressing  Goal: Use assistive devices by end of the shift  Outcome: Progressing  Goal: Pace activities to prevent fatigue by end of the shift  Outcome: Progressing     Problem: Pain - Adult  Goal: Verbalizes/displays adequate comfort level or baseline comfort level  Outcome: Progressing     Problem: Safety - Adult  Goal: Free from fall injury  Outcome: Progressing     Problem: Discharge Planning  Goal: Discharge to home or other facility with appropriate resources  Outcome: Progressing     Problem: Chronic Conditions and Co-morbidities  Goal: Patient's chronic conditions and co-morbidity symptoms are monitored and maintained or improved  Outcome: Progressing

## 2025-01-08 NOTE — NURSING NOTE
Ms. Khan is resting in bed.  She is planning for discharge to SNF this week.  We reviewed her BG values and current insulin regimen (similar to home regimen).  Her BG is up and down (303 mg/dL prior to lunch).  She is also hungry often and is on regular diet.  Seen by dietician -- see recs.    Will follow as needed while inpatient.  Time spent:  15 mins.

## 2025-01-08 NOTE — PROGRESS NOTES
Occupational Therapy    Evaluation    Patient Name: Myrna Khan  MRN: [id number]  Today's Date: 1/8/2025  Room: [facility]5555-  Time Calculation  Start Time: 1503  Stop Time: 1522  Time Calculation (min): 19 min    Assessment  IP OT Assessment  OT Assessment: Pt presents with impaired functional mobility, transfers, balance, ADLs/IADLs, tolerance to activity, and limiting BLE foot pain (R>L). Pt has safe home setup, good access to DME/AE (although much of her DME/AE requires installation/training), limited social support, and is functioning below baseline. Pt tolerated mobility fairly but was limited by BLE foot pain. Pt is likely to benefit from skilled OT services at a MOD intensity to address noted deficits and recieve training on DME/AE.  Prognosis: Good  Barriers to Discharge Home: Caregiver assistance, Physical needs  Caregiver Assistance: Patient lives alone and/or does not have reliable caregiver assistance  Physical Needs: Intermittent mobility assistance needed, Intermittent ADL assistance needed, High falls risk due to function or environment  Evaluation/Treatment Tolerance: Patient limited by pain  Medical Staff Made Aware: Yes  End of Session Communication: Bedside nurse  End of Session Patient Position: Bed, 3 rail up, Alarm on  Plan:  Inpatient Plan  Treatment Interventions: ADL retraining, Functional transfer training, UE strengthening/ROM, Endurance training, Equipment evaluation/education, Compensatory technique education  OT Frequency: 3 times per week  OT Discharge Recommendations: Moderate intensity level of continued care  Equipment Recommended upon Discharge:  (TBD at next level of care)  OT Recommended Transfer Status: Assist of 1  OT - OK to Discharge: Yes  OT Assessment  OT Assessment Results: Decreased ADL status, Decreased upper extremity strength, Decreased endurance, Decreased functional mobility, Decreased IADLs  Prognosis: Good  Evaluation/Treatment Tolerance: Patient limited by  "pain  Medical Staff Made Aware: Yes  Strengths: Ability to acquire knowledge, Attitude of self, Coping skills, Premorbid level of function  Barriers to Participation: Comorbidities, Support of Caregivers    Subjective   Current Problem:  1. Fall, initial encounter          General:  Reason for Referral: presents with multiple falls within the past few months and inability to care for herself  Past Medical History Relevant to Rehab: T2DM (last HgA1c 8.5% 11/15/23), HTN, HFrEF (last EF 35-40% 8/26/23), CAD s/p CABG, HTN, PAD s/p left SFA stent, renal and carotid artery stenosis, and cocaine and alcohol use disorder  Prior to Session Communication: Bedside nurse  Patient Position Received: Bed, 3 rail up, Alarm on  Family/Caregiver Present: No  General Comment: Pt supine in bed upon arrival. Pleasant and agreeable to OT eval. Pt reporting limited social support and feels unsafe completing ADLs/IADLs without assistance. Pt reporting she has equipment at home that is not setup or she doesn't know how to use it (raised toilet seat not setup, does not know how to use motorized scooter). Pt stated \"I think I need to go somewhere\"   Precautions:  Medical Precautions: Fall precautions  Vital Signs:  Vital Signs (Past 2hrs)        Date/Time Vitals Session Patient Position Pulse Resp SpO2 BP MAP (mmHg)    01/08/25 1433 --  --  73  17  97 %  106/68  --                   Pain:  Pain Assessment  Pain Assessment: 0-10  0-10 (Numeric) Pain Score: 8  Pain Type: Acute pain  Pain Location: Foot  Pain Orientation: Right  Pain Interventions: Repositioned, Ambulation/increased activity, Relaxation technique, Rest  Response to Interventions: Increase in pain (reported pain in L foot with WBing but not at rest)    Objective   Cognition:  Overall Cognitive Status: Within Functional Limits  Arousal/Alertness: Appropriate responses to stimuli  Orientation Level: Oriented X4 (knew January but not exact date)  Following Commands: Follows " "multistep commands without difficulty  Problem Solving: Assistance required to identify errors made  Insight: Mild  Impulsive: Within functional limits  Processing Speed: Within funtional limits    Home Living:  Type of Home: Apartment  Lives With: Alone  Home Adaptive Equipment: Walker rolling or standard, Cane, Scooter  Home Layout: One level, Laundry main level, Able to live on main level with bedroom/bathroom  Home Access: Elevator, Stairs to enter with rails  Entrance Stairs-Number of Steps: 3  Bathroom Shower/Tub: Tub/shower unit  Bathroom Toilet: Other (Comment), Standard (pt reports she has toilet seat riser but it is not installed)  Bathroom Equipment: Grab bars in shower, Shower chair with back (has shower chair that is not setup yet, toilet seat riser? not setup yet)   Prior Function:  Level of [place]: Independent with ADLs and functional transfers, Independent with homemaking with ambulation  ADL Assistance: Independent  Homemaking Assistance: Independent  Ambulatory Assistance: Independent (Mod(I) with FWW, reports recently recieved rollator and motorized scooter but has not been trained on either and does not feel safe to use)  Hand Dominance: Right  Prior Function Comments: Pt reports she has maintained IND for ADLs/IADLs but has become increasingly more difficult and unsafe  IADL History:  Homemaking Responsibilities: Yes  Meal Prep Responsibility: Primary  Laundry Responsibility: Primary  Cleaning Responsibility: Primary  Bill Paying/Finance Responsibility: Primary  Shopping Responsibility: Primary   Responsibility: No  Current License: No  Mode of Transportation: Ride share ([employer])  Occupation: Retired  Type of Occupation: [employer]  Leisure and Hobbies: TV, \"occassional beer\"  ADL:  Eating Assistance: Independent (As demo'd)  Grooming Assistance: Stand by (Anticipated)  Bathing Assistance: Moderate (Anticipated)  UE Dressing Assistance: Minimal (Anticipated)  UE Dressing Deficit: Pull " around back, Pull down in back  LE Dressing Assistance: Minimal (Anticipated)  LE Dressing Deficit: Don/doff R sock, Don/doff L sock, Thread RLE into pants, Thread LLE into pants, Thread RLE into underwear, Thread LLE into underwear, Don/doff R shoe, Don/doff L shoe (demo'd ability to use figure four technique to doff sock but unable to don sock 2/2 reported pain with sustained position)  Toileting Assistance with Device: Stand by (Anticipated)  Activity Tolerance:  Endurance: Decreased tolerance for upright activites  Balance:  Dynamic Sitting Balance  Dynamic Sitting-Balance Support: Feet supported  Dynamic Sitting-Level of Assistance: Close supervision, Contact guard  Dynamic Sitting-Balance: Forward lean, Lateral lean, Reaching for objects (posterior lean, figure four technique)  Dynamic Sitting-Comments: Good-fair dynamic sitting balance  Dynamic Standing Balance  Dynamic Standing-Comments: unable to tolerate 2/2 BLE foot pain  Static Sitting Balance  Static Sitting-Balance Support: Feet supported  Static Sitting-Level of Assistance: Close supervision  Static Standing Balance  Static Standing-Comment/Number of Minutes: unable to tolerate 2/2 BLE foot pain  Bed Mobility/Transfers: Bed Mobility  Bed Mobility: Yes  Bed Mobility 1  Bed Mobility 1: Supine to sitting  Level of Assistance 1: Minimum assistance, Minimal verbal cues  Bed Mobility Comments 1: VCs for strategy, Justus MURRELL for UB management  Bed Mobility 2  Bed Mobility  2: Scooting (laterally EOB)  Level of Assistance 2: Dependent, Moderate verbal cues, Minimal tactile cues  Bed Mobility Comments 2: Educated on strategy to laterally scoot and pt trialed without success 2/2 bilateral foot pain, DepAx1 via craddle strategy under arms and legs  Bed Mobility 3  Bed Mobility 3: Sitting to supine  Level of Assistance 3: Moderate assistance, Minimal verbal cues  Bed Mobility Comments 3: VCs for strategy, increased time and effort to complete without success, [place]  "for BLE management  Functional Mobility  Functional Mobility Performed: No   and Transfers  Transfer: Yes  Transfer 1  Transfer From 1: Bed to, Stand to  Transfer to 1: Stand, Bed  Technique 1: Sit to stand, Stand to sit  Transfer Device 1:  (none)  Transfer Level of Assistance 1: Hand held assistance, Arm in arm assistance, Minimum assistance, Minimal verbal cues  Trials/Comments 1: x3 trials- unsuccessful. Trial 1 ~25% clearance, trials 2-3 ~75% clearance- pt functionally tolerating well requiring only Justus HHA but limited by bilateral foot pain  IADL's:   Homemaking Responsibilities: Yes  Meal Prep Responsibility: Primary  Laundry Responsibility: Primary  Cleaning Responsibility: Primary  Bill Paying/Finance Responsibility: Primary  Shopping Responsibility: Primary   Responsibility: No  Current License: No  Mode of Transportation: Ride share ([employer])  Occupation: Retired  Type of Occupation: STNA  Leisure and Hobbies: TV, \"occassional beer\"  Vision: Vision - Basic Assessment  Current Vision: No visual deficits   and Vision - Complex Assessment  Ocular Range of Motion: Within Functional Limits  Tracking: WFL  Sensation:  Light Touch: No apparent deficits  Strength:  Strength Comments: BUE WFL as demo'd by ROM screen and functional transfers although generalized weakness likely as BUE grossly 4-/5  Perception:  Inattention/Neglect: Appears intact  Initiation: Appears intact  Motor Planning: Appears intact  Perseveration: Not present  Coordination:  Movements are Fluid and Coordinated: Yes  Coordination Comment: opposition intact   Hand Function:  Hand Function  Gross Grasp: Functional  Coordination: Functional  Extremities: RUE   RUE : Within Functional Limits, LUE   LUE: Within Functional Limits    Outcome Measures: [facility] Daily Activity  Putting on and taking off regular lower body clothing: A little  Bathing (including washing, rinsing, drying): A lot  Putting on and taking off regular upper body " clothing: A little  Toileting, which includes using toilet, bedpan or urinal: A little  Taking care of personal grooming such as brushing teeth: A little  Eating Meals: None  Daily Activity - Total Score: 18         ,     OT Adult Other Outcome Measures  4AT: -    Education Documentation  Body Mechanics, taught by Juliano Scott OT at 1/8/2025  3:48 PM.  Learner: Patient  Readiness: Acceptance  Method: Explanation, Demonstration  Response: Verbalizes Understanding, Demonstrated Understanding  Comment: compensatory ADL and mobility strategies, OT POC, rehab expectations    Precautions, taught by Juliano Scott OT at 1/8/2025  3:48 PM.  Learner: Patient  Readiness: Acceptance  Method: Explanation, Demonstration  Response: Verbalizes Understanding, Demonstrated Understanding  Comment: compensatory ADL and mobility strategies, OT POC, rehab expectations    ADL Training, taught by Juliano Scott OT at 1/8/2025  3:48 PM.  Learner: Patient  Readiness: Acceptance  Method: Explanation, Demonstration  Response: Verbalizes Understanding, Demonstrated Understanding  Comment: compensatory ADL and mobility strategies, OT POC, rehab expectations    Education Comments  No comments found.      Goals:     Encounter Problems       Encounter Problems (Active)       ADLs       Pt will complete LB dressing with modified independence while seated and/or standing and AE as needed.        Start:  01/08/25    Expected End:  01/22/25            Pt will complete UB /LB bathing tasks with modified independence while seated and AE as needed.        Start:  01/08/25    Expected End:  01/22/25            Pt will complete toilet hygiene while seated /standing with IND level of assistance.         Start:  01/08/25    Expected End:  01/22/25               BALANCE       Pt will maintain dynamic standing balance during ADL task with modified independent level of assistance in order to demonstrate decreased risk of falling and  improved postural control.       Start:  01/08/25    Expected End:  01/22/25               EXERCISE/STRENGTHENING       Patient will be educated on BUE HEP for increased ADL performance.       Start:  01/08/25    Expected End:  01/22/25               MOBILITY       Patient will perform Functional mobility max Household distances/Community Distances with modified independent level of assistance and least restrictive device in order to improve safety and functional mobility.       Start:  01/08/25    Expected End:  01/22/25               TRANSFERS       Patient will perform bed mobility modified independent level of assistance and bed rails in order to improve safety and independence with mobility       Start:  01/08/25    Expected End:  01/22/25            Patient will complete sit to stand transfer with modified independent level of assistance and least restrictive device in order to improve safety and prepare for out of bed mobility.       Start:  01/08/25    Expected End:  01/22/25 01/08/25 at 3:52 PM   Juliano Scott OT   Rehab Office: [phone]

## 2025-01-08 NOTE — CONSULTS
"Nutrition Assessment      Reason for Assessment: Admission nursing screening    Myrna Khan is a 64 y.o. female who presents with multiple falls within the past few months and inability to care for herself.     PMHx:   T2DM (last HgA1c 8.5% 11/15/23), HTN, HFrEF (last EF 35-40% 8/26/23), CAD s/p CABG, HTN, PAD s/p left SFA stent, renal and carotid artery stenosis, and cocaine and alcohol use disorder     Nutrition History:  Food and Nutrient History: Met with patient who is waiting on her breakfast to be delivered. States she lives alone and drinks coffee in the morning, a breakfast of oatmeal and banana. Lunch is \"a snack or Boost\". Dinner may be \"fried fish\". Patient states she drinks \"Four Boost (a day).\"  Patient requesting strawberry Boost which only comes in vanilla at [facility]. Patiernt agrees to strawberry Ensure Plus and is asking for 2 servings a day.  Vitamin/Herbal Supplement Use: MVI and Vitamin D3.  Food Allergies/Intolerances:  None  GI Symptoms: None  Oral Problems: edentulous, but denies problems chewing or swallowing.       Anthropometrics:  Height: 160 cm (5' 3\")   Weight: (!) 41.8 kg (92 lb 1.6 oz)   BMI (Calculated): 16.32  IBW/kg (Dietitian Calculated): 52.3 kg  Percent of IBW: 80 %       Weight History:   Wt Readings from Last 15 Encounters:   01/07/25 (!) 41.8 kg (92 lb 1.6 oz)   08/27/24 52.6 kg (116 lb)   07/22/24 55.8 kg (123 lb 0.3 oz)   02/05/24 52.2 kg (115 lb)   01/16/24 58.2 kg (128 lb 4.8 oz)   01/11/24 72.6 kg (160 lb)       Weight Change %:  Weight History / % Weight Change: 20.5% wt loss x 4.5 months  Significant Weight Loss: Yes    Nutrition Focused Physical Exam Findings:  Visually appears with severe muscle wasting and fat loss.   Edema:  Edema: none  Physical Findings:  Hair: Negative  Eyes: Negative  Mouth: Negative  Nails: Negative  Skin: Negative    Nutrition Significant Labs:  CBC Trend:   Results from last 7 days   Lab Units 01/08/25  0743 01/06/25  1542   WBC AUTO " "x10*3/uL 7.5 7.0   RBC AUTO x10*6/uL 4.19 5.50*   HEMOGLOBIN g/dL 13.5 17.8*   HEMATOCRIT % 37.3 47.3*   MCV fL 89 86   PLATELETS AUTO x10*3/uL 186 222    , BMP Trend:   Results from last 7 days   Lab Units 01/08/25  0743 01/06/25  1542   GLUCOSE mg/dL 76 473*   CALCIUM mg/dL 8.1* 9.6   SODIUM mmol/L 134* 137   POTASSIUM mmol/L 3.0* 4.1   CO2 mmol/L 30 31   CHLORIDE mmol/L 96* 91*   BUN mg/dL 18 21   CREATININE mg/dL 0.65 0.65    , A1C:  Lab Results   Component Value Date    HGBA1C 15.2 (H) 07/15/2024   , BG POCT trend:   Results from last 7 days   Lab Units 01/08/25  0835 01/07/25  2031 01/07/25  1651 01/07/25  1118 01/07/25  0655   POCT GLUCOSE mg/dL 81 123* 128* 111* 128*    , Renal Lab Trend:   Results from last 7 days   Lab Units 01/08/25  0743 01/06/25  1542   POTASSIUM mmol/L 3.0* 4.1   PHOSPHORUS mg/dL 2.7  --    SODIUM mmol/L 134* 137   MAGNESIUM mg/dL 1.68 1.56*   EGFR mL/min/1.73m*2 >90 >90   BUN mg/dL 18 21   CREATININE mg/dL 0.65 0.65    , Vit D: No results found for: \"VITD25\" , Vit B12:   Lab Results   Component Value Date    [id number] 558 01/06/2025        Nutrition Specific Medications:    Scheduled medications  amLODIPine, 10 mg, oral, Daily  aspirin, 81 mg, oral, Daily  atorvastatin, 80 mg, oral, Daily  carvedilol, 12.5 mg, oral, BID  cholecalciferol, 1,000 Units, oral, Daily  clopidogrel, 75 mg, oral, Daily  [Held by provider] dapagliflozin propanediol, 10 mg, oral, Daily  enoxaparin, 40 mg, subcutaneous, q24h  [Held by provider] hydrALAZINE, 100 mg, oral, TID  insulin glargine, 24 Units, subcutaneous, Nightly  insulin lispro, 0-10 Units, subcutaneous, TID AC  insulin lispro, 6 Units, subcutaneous, TID  isosorbide mononitrate ER, 60 mg, oral, Daily  lidocaine, 1 patch, transdermal, Daily  polyethylene glycol, 17 g, oral, Daily  potassium chloride CR, 40 mEq, oral, Once  ranolazine, 500 mg, oral, BID  sulfamethoxazole-trimethoprim, 1 tablet, oral, q12h [facility]      Continuous medications     PRN " medications  PRN medications: acetaminophen, dextrose, dextrose, glucagon, glucagon, hydrALAZINE, ibuprofen    I/O:    ;      Dietary Orders (From admission, onward)       Start     Ordered    01/08/25 0851  Oral nutritional supplements  Until discontinued        Comments: Chocolate, vanilla, strawberry   Question Answer Comment   Deliver with All meals    Select supplement: Ensure Plus        01/08/25 0851    01/07/25 1827  May Participate in Room Service  ( ROOM SERVICE MAY PARTICIPATE)  Once        Question:  .  Answer:  Yes    01/07/25 1826    01/06/25 1950  Adult diet Regular, Cardiac; 70 gm fat; 2 - 3 grams Sodium  Diet effective now        Question Answer Comment   Diet type Regular    Diet type Cardiac    Fat restriction: 70 gm fat    Sodium restriction: 2 - 3 grams Sodium        01/06/25 1949                     Estimated Needs:   Total Energy Estimated Needs (kCal): 1450 kCal  Method for Estimating Needs: 41.8 kg / 35 kcal  Total Protein Estimated Needs (g): 65 g  Method for Estimating Needs: 41.8 kg / 1.5 g              Nutrition Diagnosis   Malnutrition Diagnosis  Patient has Malnutrition Diagnosis: Yes  Diagnosis Status: New  Malnutrition Diagnosis: Severe malnutrition related to chronic disease or condition  As Evidenced by: suspect meeting < 75% of EER for > 1 month, 20.5% wt loss x 4.5 months, severe muscle wasting, severe fat loss and BMI of 17.            Nutrition Interventions/Recommendations         Nutrition Prescription:  Individualized Nutrition Prescription Provided for : Diet liberalized to Regular for severely malnourished patient. Ensure Plus (350 kcal and 13 g protein each) ordered BID. Order daily MVI. Obtain Vitamin D level.        Nutrition Interventions:   Interventions: Meals and snacks  Meals and Snacks: General healthful diet         Nutrition Education:   Encourage Ensure to maximize PO intake.        Nutrition Monitoring and Evaluation   Food/Nutrient Related History  Monitoring  Monitoring and Evaluation Plan: Energy intake  Criteria: >/= 75% of meals/supplements    Body Composition/Growth/Weight History  Monitoring and Evaluation Plan: Weight  Criteria: Stable weight    Biochemical Data, Medical Tests and Procedures  Monitoring and Evaluation Plan: Electrolyte/renal panel, Glucose/endocrine profile  Criteria: Labs wnl              Time Spent (min): 45 minutes

## 2025-01-09 LAB
ALBUMIN SERPL BCP-MCNC: 2.6 G/DL (ref 3.4–5)
ANION GAP SERPL CALC-SCNC: 12 MMOL/L (ref 10–20)
BUN SERPL-MCNC: 16 MG/DL (ref 6–23)
CALCIUM SERPL-MCNC: 8.6 MG/DL (ref 8.6–10.6)
CHLORIDE SERPL-SCNC: 96 MMOL/L (ref 98–107)
CO2 SERPL-SCNC: 30 MMOL/L (ref 21–32)
CREAT SERPL-MCNC: 0.89 MG/DL (ref 0.5–1.05)
EGFRCR SERPLBLD CKD-EPI 2021: 73 ML/MIN/1.73M*2
ERYTHROCYTE [DISTWIDTH] IN BLOOD BY AUTOMATED COUNT: 11.8 % (ref 11.5–14.5)
EST. AVERAGE GLUCOSE BLD GHB EST-MCNC: 372 MG/DL
GLUCOSE BLD MANUAL STRIP-MCNC: 160 MG/DL (ref 74–99)
GLUCOSE BLD MANUAL STRIP-MCNC: 265 MG/DL (ref 74–99)
GLUCOSE BLD MANUAL STRIP-MCNC: 44 MG/DL (ref 74–99)
GLUCOSE BLD MANUAL STRIP-MCNC: 51 MG/DL (ref 74–99)
GLUCOSE BLD MANUAL STRIP-MCNC: 57 MG/DL (ref 74–99)
GLUCOSE BLD MANUAL STRIP-MCNC: 66 MG/DL (ref 74–99)
GLUCOSE SERPL-MCNC: 112 MG/DL (ref 74–99)
HBA1C MFR BLD: 14.6 %
HCT VFR BLD AUTO: 38.5 % (ref 36–46)
HGB BLD-MCNC: 14 G/DL (ref 12–16)
MAGNESIUM SERPL-MCNC: 1.69 MG/DL (ref 1.6–2.4)
MCH RBC QN AUTO: 32.6 PG (ref 26–34)
MCHC RBC AUTO-ENTMCNC: 36.4 G/DL (ref 32–36)
MCV RBC AUTO: 90 FL (ref 80–100)
NRBC BLD-RTO: 0 /100 WBCS (ref 0–0)
PHOSPHATE SERPL-MCNC: 2.8 MG/DL (ref 2.5–4.9)
PLATELET # BLD AUTO: 193 X10*3/UL (ref 150–450)
POTASSIUM SERPL-SCNC: 4.3 MMOL/L (ref 3.5–5.3)
RBC # BLD AUTO: 4.3 X10*6/UL (ref 4–5.2)
SODIUM SERPL-SCNC: 134 MMOL/L (ref 136–145)
WBC # BLD AUTO: 7.5 X10*3/UL (ref 4.4–11.3)

## 2025-01-09 PROCEDURE — 2500000001 HC RX 250 WO HCPCS SELF ADMINISTERED DRUGS (ALT 637 FOR MEDICARE OP)

## 2025-01-09 PROCEDURE — 1210000001 HC SEMI-PRIVATE ROOM DAILY

## 2025-01-09 PROCEDURE — 80069 RENAL FUNCTION PANEL: CPT

## 2025-01-09 PROCEDURE — 36415 COLL VENOUS BLD VENIPUNCTURE: CPT

## 2025-01-09 PROCEDURE — 2500000004 HC RX 250 GENERAL PHARMACY W/ HCPCS (ALT 636 FOR OP/ED)

## 2025-01-09 PROCEDURE — 83036 HEMOGLOBIN GLYCOSYLATED A1C: CPT | Performed by: STUDENT IN AN ORGANIZED HEALTH CARE EDUCATION/TRAINING PROGRAM

## 2025-01-09 PROCEDURE — 97110 THERAPEUTIC EXERCISES: CPT | Mod: GP

## 2025-01-09 PROCEDURE — 2500000002 HC RX 250 W HCPCS SELF ADMINISTERED DRUGS (ALT 637 FOR MEDICARE OP, ALT 636 FOR OP/ED)

## 2025-01-09 PROCEDURE — 83735 ASSAY OF MAGNESIUM: CPT

## 2025-01-09 PROCEDURE — 85027 COMPLETE CBC AUTOMATED: CPT

## 2025-01-09 PROCEDURE — 82947 ASSAY GLUCOSE BLOOD QUANT: CPT

## 2025-01-09 PROCEDURE — 99232 SBSQ HOSP IP/OBS MODERATE 35: CPT

## 2025-01-09 RX ORDER — ASPIRIN 81 MG/1
81 TABLET ORAL DAILY
Start: 2025-01-10

## 2025-01-09 RX ORDER — DICLOFENAC SODIUM 10 MG/G
4 GEL TOPICAL 4 TIMES DAILY PRN
Start: 2025-01-09

## 2025-01-09 RX ORDER — DAPAGLIFLOZIN 10 MG/1
10 TABLET, FILM COATED ORAL
Start: 2025-01-09

## 2025-01-09 RX ADMIN — INSULIN LISPRO 6 UNITS: 100 INJECTION, SOLUTION INTRAVENOUS; SUBCUTANEOUS at 18:51

## 2025-01-09 RX ADMIN — AMLODIPINE BESYLATE 10 MG: 10 TABLET ORAL at 09:26

## 2025-01-09 RX ADMIN — RANOLAZINE 500 MG: 500 TABLET, EXTENDED RELEASE ORAL at 20:59

## 2025-01-09 RX ADMIN — CARVEDILOL 12.5 MG: 12.5 TABLET, FILM COATED ORAL at 09:26

## 2025-01-09 RX ADMIN — CLOPIDOGREL BISULFATE 75 MG: 75 TABLET ORAL at 09:26

## 2025-01-09 RX ADMIN — SULFAMETHOXAZOLE AND TRIMETHOPRIM 1 TABLET: 800; 160 TABLET ORAL at 09:25

## 2025-01-09 RX ADMIN — Medication 1000 UNITS: at 09:26

## 2025-01-09 RX ADMIN — ATORVASTATIN CALCIUM 80 MG: 80 TABLET, FILM COATED ORAL at 09:26

## 2025-01-09 RX ADMIN — CARVEDILOL 12.5 MG: 12.5 TABLET, FILM COATED ORAL at 20:59

## 2025-01-09 RX ADMIN — ENOXAPARIN SODIUM 40 MG: 100 INJECTION SUBCUTANEOUS at 21:00

## 2025-01-09 RX ADMIN — ASPIRIN 81 MG: 81 TABLET, COATED ORAL at 09:26

## 2025-01-09 RX ADMIN — INSULIN LISPRO 6 UNITS: 100 INJECTION, SOLUTION INTRAVENOUS; SUBCUTANEOUS at 13:39

## 2025-01-09 RX ADMIN — POLYETHYLENE GLYCOL 3350 17 G: 17 POWDER, FOR SOLUTION ORAL at 09:26

## 2025-01-09 RX ADMIN — IBUPROFEN 600 MG: 600 TABLET, FILM COATED ORAL at 09:26

## 2025-01-09 RX ADMIN — RANOLAZINE 500 MG: 500 TABLET, EXTENDED RELEASE ORAL at 09:26

## 2025-01-09 RX ADMIN — ISOSORBIDE MONONITRATE 60 MG: 60 TABLET, EXTENDED RELEASE ORAL at 09:26

## 2025-01-09 RX ADMIN — INSULIN LISPRO 2 UNITS: 100 INJECTION, SOLUTION INTRAVENOUS; SUBCUTANEOUS at 18:51

## 2025-01-09 ASSESSMENT — PAIN SCALES - GENERAL
PAINLEVEL_OUTOF10: 0 - NO PAIN
PAINLEVEL_OUTOF10: 1
PAINLEVEL_OUTOF10: 0 - NO PAIN
PAINLEVEL_OUTOF10: 0 - NO PAIN

## 2025-01-09 ASSESSMENT — COGNITIVE AND FUNCTIONAL STATUS - GENERAL
TOILETING: A LITTLE
WALKING IN HOSPITAL ROOM: A LOT
CLIMB 3 TO 5 STEPS WITH RAILING: A LOT
DAILY ACTIVITIY SCORE: 19
WALKING IN HOSPITAL ROOM: TOTAL
MOVING TO AND FROM BED TO CHAIR: TOTAL
MOBILITY SCORE: 8
STANDING UP FROM CHAIR USING ARMS: A LOT
HELP NEEDED FOR BATHING: A LITTLE
PERSONAL GROOMING: A LITTLE
DRESSING REGULAR LOWER BODY CLOTHING: A LITTLE
HELP NEEDED FOR BATHING: A LITTLE
MOVING TO AND FROM BED TO CHAIR: A LOT
DRESSING REGULAR LOWER BODY CLOTHING: A LITTLE
CLIMB 3 TO 5 STEPS WITH RAILING: A LOT
MOBILITY SCORE: 16
MOBILITY SCORE: 16
MOVING TO AND FROM BED TO CHAIR: A LOT
MOVING FROM LYING ON BACK TO SITTING ON SIDE OF FLAT BED WITH BEDRAILS: A LOT
DRESSING REGULAR UPPER BODY CLOTHING: A LITTLE
PERSONAL GROOMING: A LITTLE
TURNING FROM BACK TO SIDE WHILE IN FLAT BAD: A LOT
CLIMB 3 TO 5 STEPS WITH RAILING: TOTAL
STANDING UP FROM CHAIR USING ARMS: TOTAL
STANDING UP FROM CHAIR USING ARMS: A LOT
TOILETING: A LITTLE
WALKING IN HOSPITAL ROOM: A LOT
DRESSING REGULAR UPPER BODY CLOTHING: A LITTLE
DAILY ACTIVITIY SCORE: 19

## 2025-01-09 ASSESSMENT — PAIN - FUNCTIONAL ASSESSMENT
PAIN_FUNCTIONAL_ASSESSMENT: 0-10

## 2025-01-09 ASSESSMENT — PAIN DESCRIPTION - LOCATION: LOCATION: FOOT

## 2025-01-09 ASSESSMENT — PAIN DESCRIPTION - ORIENTATION: ORIENTATION: RIGHT

## 2025-01-09 NOTE — PROGRESS NOTES
Myrna Khan is a 64 y.o. female on day 3 of admission presenting with Fall, initial encounter.    TCC met with the patient at the bedside to discuss SNF choices patient gave me permission to send to facilities close by.    [facility] accepted the patient and she is agreeable. TCC requested our team to start precert.    TCC will continue to follow and update the plan as warranted.    KAMLESH MccrayN-RN  Transitional Care Coordinator (TCC)  [phone] ext [phone]

## 2025-01-09 NOTE — CARE PLAN
The patient's goals for the shift include      The clinical goals for the shift include pt will remain free and of falls andinjury until end of shift

## 2025-01-09 NOTE — DISCHARGE INSTRUCTIONS
Dear Ms. Khan,     You were admitted to [facility] for falls, high blood pressures, high blood sugars, and a UTI. You were treated with your home medications and an antibiotic. We continued your home medications and started you on aspirin. We also stopped Hydralazine due to your blood pressure dropping while in the hospital.     Please review the medications and appointment sections for the specific changes and appointment to follow-up outpatient.     We wish you the very best of luck in your recovery.     Your  Care Team

## 2025-01-09 NOTE — PROGRESS NOTES
"Myrna Khan is a 64 y.o. female on day 3 of admission presenting with Fall, initial encounter.    Subjective   The patient was examined at bedside this morning. No acute events overnight. The patient was sitting up in bed and watching TV and in no acute distress. She reported that she is doing well and is hungry. She is waiting for her breakfast and mentioned that her right foot was sore but the Voltaren gel helped.       Objective     Physical Exam  Constitutional:       General: She is not in acute distress.     Appearance: She is not ill-appearing.   HENT:      Head: Normocephalic and atraumatic.   Eyes:      Extraocular Movements: Extraocular movements intact.      Conjunctiva/sclera: Conjunctivae normal.   Cardiovascular:      Rate and Rhythm: Normal rate and regular rhythm.      Heart sounds: Normal heart sounds. No murmur heard.     No friction rub. No gallop.   Pulmonary:      Effort: Pulmonary effort is normal. No respiratory distress.      Breath sounds: Normal breath sounds. No stridor. No wheezing or rales.   Musculoskeletal:      Cervical back: Normal range of motion.      Right lower leg: No edema.      Left lower leg: No edema.   Skin:     Findings: Lesion present.      Comments: Multiple small, round, mobile, non tender lesions present in B/L axilla    Neurological:      Mental Status: She is alert.   Psychiatric:         Mood and Affect: Mood normal.         Behavior: Behavior normal.         Last Recorded Vitals  Blood pressure 122/68, pulse 75, temperature 36.8 °C (98.2 °F), temperature source Temporal, resp. rate 18, height 1.6 m (5' 3\"), weight (!) 41.8 kg (92 lb 1.6 oz), SpO2 95%.  Intake/Output last 3 Shifts:  I/O last 3 completed shifts:  In: 360 (8.6 mL/kg) [P.O.:360]  Out: 225 (5.4 mL/kg) [Urine:225 (0.1 mL/kg/hr)]  Weight: 41.8 kg     Relevant Results                   Assessment/Plan   Assessment & Plan  Fall, initial encounter      64 y.o. female with h/o T2DM (last HgA1c 8.5% " 11/15/23), HTN, HFrEF (last EF 35-40% 8/26/23), CAD s/p CABG, HTN, PAD s/p left SFA stent, renal and carotid artery stenosis, and cocaine and alcohol use disorder who presents with multiple falls within the past few months and inability to care for herself. She was admitted for hyperglycemia, hypertension urgency, and multiple falls. BP elevated to the 200s systolic and 120s diastolic likely in the setting of medication on compliance and cocaine use. She was also found to be hyperglycemic 473 on admission and received 10u of regular insulin. Patient has also been non compliant with glycemic medications and she had a recent hospital admission 2/5/24 for syncope and hypoglycemia therefore, there may be a component of poor medical literacy.      #Multiple falls   #bilateral hip pain  #unable to ambulate  #Right foot pain  #Underweight  :: CT c-spine negative for acute fracture or subluxation   :: XR of bilateral hips 7/2024 negative for acute abnormality in the hips  :: BMI 16.31  - pt unable to care for herself and perform her ADLs, will likely need longterm placement   - X-ray of right foot showed no acute fractures  - lidocaine patch to both hips  - tylenol 650 mg q6h PRN mild-moderate pain  - PT/OT recommend moderate intensity, SNF pending  - Nutrition consulted, appreciate recs     #uncontrolled hypertension (Improving)  #HFrecEF (last EF 55% 7/2024 recovered from last EF 35-40% 8/26/23)  - BP elevated to the 200s systolic and 120s diastolic in the ED  - Urine drug screen was positive for cocaine   - c/w amlodipine 10 mg daily   - Held home hydralazine 100 mg TID due to mildly hypotensive BP readings  - c/w coreg 25 mg BID   - c/w GDMT: Imdur 60 mg daily, held farxiga 10 mg daily due to UTI  - continue to monitor BP     #UTI (Resolved)   :: positive trichomonas during last admission and was treated with metronidazole    :: exam findings with mild suprapubic pain, pt denied vaginal discharge today   - UA: -  nitrite, 250 LE, 1+ blood, 1+ ketones, 4+ gluc, 2+ protein, 11-20 WBC, 11-20 RBC   - Ucx contaminated  - s/p  treatment w/ bactrim 1 tablet q12h for 3 days (1/6 - 1/9)  - Held home Farxiga iso UTI     #CAD s/p CABGx2 2023  #PAD s/p left SFA stent  #Stable angina   - c/w home Aspirin 81 mg daily and Plavix 75 mg PO QD  - c/w home ranolazine 500 mg BID      #hyperglycemia (Improving)  #uncontrolled T2DM (last HgA1c 15.2%, 7/2024)  - Gluc 473 on admission s/p regular insulin 10u, POCT glucose 456 s/p 10u Lispro and 24u Lantus  - pt noncompliant with insulin outpatient  - holding home dose of Metformin while hospitalized will resume on discharge  - Held home Farxiga 10 mg daily iso UTI   - c/w home Lantus 24 units at bedtime  - c/w home lispro 6 units AC   - SSI #2, accucheck ACHS  - diabetes educator consulted     #Hypomagnesemia (Resolved)  - Mg 1.56 on admission  - s/p 2g magnesium sulfate x1 dose  - Repeat Mg 1.68  - Daily RFP    #Dispo: SNF Precert pending     Fluids: prn  Diet: Cardiac diet  O2: prn   DVT ppx: lovenox   GI ppx: n/a   Abx: n/a  CODE STATUS: FULL      The patient was seen and discussed with Dr. Vences.    Prince Benson MD  Family Medicine  PGY-3

## 2025-01-09 NOTE — CARE PLAN
The patient's goals for the shift include      The clinical goals for the shift include pt will remain free and safe from falls and injury until end of shift    Problem: Nutrition  Goal: Less than 5 days NPO/clear liquids  Outcome: Progressing  Goal: Oral intake greater than 50%  Outcome: Progressing  Goal: Oral intake greater 75%  Outcome: Progressing  Goal: Consume prescribed supplement  Outcome: Progressing  Goal: Adequate PO fluid intake  Outcome: Progressing  Goal: Nutrition support goals are met within 48 hrs  Outcome: Progressing  Goal: Nutrition support is meeting 75% of nutrient needs  Outcome: Progressing  Goal: Tube feed tolerance  Outcome: Progressing  Goal: BG  mg/dL  Outcome: Progressing  Goal: Lab values WNL  Outcome: Progressing  Goal: Electrolytes WNL  Outcome: Progressing  Goal: Promote healing  Outcome: Progressing  Goal: Maintain stable weight  Outcome: Progressing  Goal: Reduce weight from edema/fluid  Outcome: Progressing  Goal: Gradual weight gain  Outcome: Progressing  Goal: Improve ostomy output  Outcome: Progressing     Problem: Diabetes  Goal: Achieve decreasing blood glucose levels by end of shift  Outcome: Progressing  Goal: Increase stability of blood glucose readings by end of shift  Outcome: Progressing  Goal: Decrease in ketones present in urine by end of shift  Outcome: Progressing  Goal: Maintain electrolyte levels within acceptable range throughout shift  Outcome: Progressing  Goal: Maintain glucose levels >70mg/dl to <250mg/dl throughout shift  Outcome: Progressing  Goal: No changes in neurological exam by end of shift  Outcome: Progressing  Goal: Learn about and adhere to nutrition recommendations by end of shift  Outcome: Progressing  Goal: Vital signs within normal range for age by end of shift  Outcome: Progressing  Goal: Increase self care and/or family involovement by end of shift  Outcome: Progressing  Goal: Receive DSME education by end of shift  Outcome:  Progressing     Problem: Fall/Injury  Goal: Not fall by end of shift  Outcome: Progressing  Goal: Be free from injury by end of the shift  Outcome: Progressing  Goal: Verbalize understanding of personal risk factors for fall in the hospital  Outcome: Progressing  Goal: Verbalize understanding of risk factor reduction measures to prevent injury from fall in the home  Outcome: Progressing  Goal: Use assistive devices by end of the shift  Outcome: Progressing  Goal: Pace activities to prevent fatigue by end of the shift  Outcome: Progressing     Problem: Pain - Adult  Goal: Verbalizes/displays adequate comfort level or baseline comfort level  Outcome: Progressing     Problem: Safety - Adult  Goal: Free from fall injury  Outcome: Progressing     Problem: Discharge Planning  Goal: Discharge to home or other facility with appropriate resources  Outcome: Progressing     Problem: Chronic Conditions and Co-morbidities  Goal: Patient's chronic conditions and co-morbidity symptoms are monitored and maintained or improved  Outcome: Progressing

## 2025-01-09 NOTE — PROGRESS NOTES
Physical Therapy    Physical Therapy Treatment    Patient Name: Myrna hKan  MRN: [id number]  Today's Date: 1/9/2025  Time Calculation  Start Time: 1019  Stop Time: 1027  Time Calculation (min): 8 min       Assessment/Plan   PT Assessment  Barriers to Discharge Home: Caregiver assistance, Physical needs  Caregiver Assistance: Patient lives alone and/or does not have reliable caregiver assistance  Physical Needs: Ambulating household distances limited by function/safety, High falls risk due to function or environment  End of Session Communication: Bedside nurse  End of Session Patient Position: Bed, 3 rail up, Alarm on     PT Plan  Treatment/Interventions: Bed mobility, Transfer training, Gait training, Stair training, Balance training, Strengthening, Endurance training, Range of motion, Therapeutic exercise, Therapeutic activity  PT Plan: Ongoing PT  PT Frequency: 3 times per week  PT Discharge Recommendations: Moderate intensity level of continued care  Equipment Recommended upon Discharge:  (TBD)  PT Recommended Transfer Status: Total assist  PT - OK to Discharge: Yes (PT eval completed & recs made)      General Visit Information:   PT  Visit  PT Received On: 01/09/25  Prior to Session Communication: Bedside nurse  Patient Position Received: Bed, 3 rail up, Alarm on  General Comment: supine in bed; declined to attempt OOB despite encouragement; pt agreed to perform supine BLE ther-ex     Subjective   Precautions:  Precautions  Medical Precautions: Fall precautions       Objective   Pain:  Pain Assessment  Pain Assessment: 0-10  0-10 (Numeric) Pain Score: 0 - No pain               PT Treatments:  Therapeutic Exercise  Therapeutic Exercise Performed: Yes  Therapeutic Exercise Activity 1: supine BLE x10: AP, SAQ, heel slides, hip abd/add with rest breaks after each exercise        Bed Mobility  Bed Mobility: No (pt refused OOB due to c/o feeling tired)                   Outcome Measures:  [facility] Basic  Mobility  Turning from your back to your side while in a flat bed without using bedrails: A lot  Moving from lying on your back to sitting on the side of a flat bed without using bedrails: A lot  Moving to and from bed to chair (including a wheelchair): Total  Standing up from a chair using your arms (e.g. wheelchair or bedside chair): Total  To walk in hospital room: Total  Climbing 3-5 steps with railing: Total  Basic Mobility - Total Score: 8                            Education Documentation  Mobility Training, taught by Melissa Orellana, PT at 1/9/2025 10:31 AM.  Learner: Patient  Readiness: Acceptance  Method: Explanation  Response: Needs Reinforcement  Comment: benefits of OOB activity    Education Comments  No comments found.               Encounter Problems       Encounter Problems (Active)       Mobility       STG - Patient will ambulate >25' with WW and Kendal (Not Progressing)       Start:  01/07/25    Expected End:  01/21/25               PT Transfers       STG - Patient to transfer to and from sit to supine with CGA (Progressing)       Start:  01/07/25    Expected End:  01/21/25            STG - Patient will transfer sit to and from stand with kendal and WW (Not Progressing)       Start:  01/07/25    Expected End:  01/21/25               Pain - Adult                01/09/25 at 10:31 AM   Melissa Orellana, PT

## 2025-01-09 NOTE — HOSPITAL COURSE
Myrna Khan is a 64 y.o. female with h/o T2DM (last HgA1c 8.5% 11/15/23), HTN, HFrEF (last EF 35-40% 8/26/23), CAD s/p CABG, HTN, PAD s/p left SFA stent, renal and carotid artery stenosis, and cocaine and alcohol use disorder who presents with multiple falls within the past few months and inability to care for herself. She was admitted for hyperglycemia, hypertension urgency, and multiple falls. BP elevated to the 200s systolic and 120s diastolic likely in the setting of medication on compliance and cocaine use. She was also found to be hyperglycemic 473 on admission and received 10u of regular insulin. Patient has also been non compliant with glycemic medications and she had a recent hospital admission 2/5/24 for syncope and hypoglycemia therefore, there may be a component of poor medical literacy.  Due to repeated hypoglycemic events, Lantus decreased to 10 units and Lispro discontinued. 1/14, Patient initially scheduled for discharge with 6 pm transport arranged. However, patient did not receive Lantus due to hold, and had blood glucose readings in low 500 range. Discharge was cancelled and patient was administered insulin and was monitored. Patient had repeat BG levels of 107 and 104 after resuming previous insulin regimen. RLE duplex completed due to new swelling, negative for acute DVT. Patient remained hemodynamically stable, afebrile and medically ready for discharge. Patient will need close follow-up to titrate her blood pressure and insulin regimens. Patient discharged to SNF.

## 2025-01-09 NOTE — NURSING NOTE
Brief visit 2' Ms. Khan feeling fatigued today.  She is looking forward to transfer to SNF.  Reviewed BG values and insulin regimen.  She acknowledges the information.  Will follow as needed while inpatient.  Time spent:  15 mins.

## 2025-01-10 ENCOUNTER — APPOINTMENT (OUTPATIENT)
Dept: CARDIOLOGY | Facility: HOSPITAL | Age: 65
DRG: 637 | End: 2025-01-10
Payer: COMMERCIAL

## 2025-01-10 LAB
ALBUMIN SERPL BCP-MCNC: 2.6 G/DL (ref 3.4–5)
ANION GAP SERPL CALC-SCNC: 11 MMOL/L (ref 10–20)
BUN SERPL-MCNC: 14 MG/DL (ref 6–23)
CALCIUM SERPL-MCNC: 8.6 MG/DL (ref 8.6–10.6)
CHLORIDE SERPL-SCNC: 97 MMOL/L (ref 98–107)
CO2 SERPL-SCNC: 29 MMOL/L (ref 21–32)
CREAT SERPL-MCNC: 0.76 MG/DL (ref 0.5–1.05)
EGFRCR SERPLBLD CKD-EPI 2021: 88 ML/MIN/1.73M*2
ERYTHROCYTE [DISTWIDTH] IN BLOOD BY AUTOMATED COUNT: 12.2 % (ref 11.5–14.5)
GLUCOSE BLD MANUAL STRIP-MCNC: 142 MG/DL (ref 74–99)
GLUCOSE BLD MANUAL STRIP-MCNC: 52 MG/DL (ref 74–99)
GLUCOSE BLD MANUAL STRIP-MCNC: 63 MG/DL (ref 74–99)
GLUCOSE BLD MANUAL STRIP-MCNC: 68 MG/DL (ref 74–99)
GLUCOSE BLD MANUAL STRIP-MCNC: 70 MG/DL (ref 74–99)
GLUCOSE BLD MANUAL STRIP-MCNC: 79 MG/DL (ref 74–99)
GLUCOSE BLD MANUAL STRIP-MCNC: 88 MG/DL (ref 74–99)
GLUCOSE SERPL-MCNC: 43 MG/DL (ref 74–99)
HCT VFR BLD AUTO: 40.3 % (ref 36–46)
HGB BLD-MCNC: 14.1 G/DL (ref 12–16)
MAGNESIUM SERPL-MCNC: 1.7 MG/DL (ref 1.6–2.4)
MCH RBC QN AUTO: 32.2 PG (ref 26–34)
MCHC RBC AUTO-ENTMCNC: 35 G/DL (ref 32–36)
MCV RBC AUTO: 92 FL (ref 80–100)
NRBC BLD-RTO: 0 /100 WBCS (ref 0–0)
PHOSPHATE SERPL-MCNC: 3.7 MG/DL (ref 2.5–4.9)
PLATELET # BLD AUTO: 215 X10*3/UL (ref 150–450)
POTASSIUM SERPL-SCNC: 4.9 MMOL/L (ref 3.5–5.3)
RBC # BLD AUTO: 4.38 X10*6/UL (ref 4–5.2)
SODIUM SERPL-SCNC: 132 MMOL/L (ref 136–145)
WBC # BLD AUTO: 9.5 X10*3/UL (ref 4.4–11.3)

## 2025-01-10 PROCEDURE — 85027 COMPLETE CBC AUTOMATED: CPT

## 2025-01-10 PROCEDURE — 80069 RENAL FUNCTION PANEL: CPT

## 2025-01-10 PROCEDURE — 2500000001 HC RX 250 WO HCPCS SELF ADMINISTERED DRUGS (ALT 637 FOR MEDICARE OP)

## 2025-01-10 PROCEDURE — 2500000004 HC RX 250 GENERAL PHARMACY W/ HCPCS (ALT 636 FOR OP/ED)

## 2025-01-10 PROCEDURE — 36415 COLL VENOUS BLD VENIPUNCTURE: CPT

## 2025-01-10 PROCEDURE — 93005 ELECTROCARDIOGRAM TRACING: CPT

## 2025-01-10 PROCEDURE — 83735 ASSAY OF MAGNESIUM: CPT

## 2025-01-10 PROCEDURE — 93010 ELECTROCARDIOGRAM REPORT: CPT | Performed by: INTERNAL MEDICINE

## 2025-01-10 PROCEDURE — 2500000005 HC RX 250 GENERAL PHARMACY W/O HCPCS

## 2025-01-10 PROCEDURE — 1210000001 HC SEMI-PRIVATE ROOM DAILY

## 2025-01-10 PROCEDURE — 82947 ASSAY GLUCOSE BLOOD QUANT: CPT

## 2025-01-10 PROCEDURE — 2500000001 HC RX 250 WO HCPCS SELF ADMINISTERED DRUGS (ALT 637 FOR MEDICARE OP): Performed by: STUDENT IN AN ORGANIZED HEALTH CARE EDUCATION/TRAINING PROGRAM

## 2025-01-10 PROCEDURE — 99232 SBSQ HOSP IP/OBS MODERATE 35: CPT

## 2025-01-10 RX ORDER — INSULIN LISPRO 100 [IU]/ML
0-5 INJECTION, SOLUTION INTRAVENOUS; SUBCUTANEOUS
Status: ACTIVE | OUTPATIENT
Start: 2025-01-11

## 2025-01-10 RX ORDER — AMOXICILLIN 250 MG
1 CAPSULE ORAL NIGHTLY
Status: DISPENSED | OUTPATIENT
Start: 2025-01-10

## 2025-01-10 RX ORDER — BISACODYL 5 MG
5 TABLET, DELAYED RELEASE (ENTERIC COATED) ORAL DAILY
Status: DISCONTINUED | OUTPATIENT
Start: 2025-01-10 | End: 2025-01-11

## 2025-01-10 RX ORDER — BISACODYL 5 MG
5 TABLET, DELAYED RELEASE (ENTERIC COATED) ORAL DAILY PRN
Status: DISCONTINUED | OUTPATIENT
Start: 2025-01-10 | End: 2025-01-10

## 2025-01-10 RX ORDER — BISACODYL 10 MG/1
10 SUPPOSITORY RECTAL DAILY
Status: DISCONTINUED | OUTPATIENT
Start: 2025-01-10 | End: 2025-01-11

## 2025-01-10 RX ADMIN — RANOLAZINE 500 MG: 500 TABLET, EXTENDED RELEASE ORAL at 09:03

## 2025-01-10 RX ADMIN — DEXTROSE MONOHYDRATE 12.5 G: 25 INJECTION, SOLUTION INTRAVENOUS at 18:45

## 2025-01-10 RX ADMIN — CLOPIDOGREL BISULFATE 75 MG: 75 TABLET ORAL at 09:06

## 2025-01-10 RX ADMIN — CARVEDILOL 12.5 MG: 12.5 TABLET, FILM COATED ORAL at 21:01

## 2025-01-10 RX ADMIN — BISACODYL 10 MG: 10 SUPPOSITORY RECTAL at 22:09

## 2025-01-10 RX ADMIN — ENOXAPARIN SODIUM 40 MG: 100 INJECTION SUBCUTANEOUS at 21:01

## 2025-01-10 RX ADMIN — Medication 1000 UNITS: at 09:03

## 2025-01-10 RX ADMIN — ASPIRIN 81 MG: 81 TABLET, COATED ORAL at 09:04

## 2025-01-10 RX ADMIN — SENNOSIDES AND DOCUSATE SODIUM 1 TABLET: 50; 8.6 TABLET ORAL at 21:01

## 2025-01-10 RX ADMIN — POLYETHYLENE GLYCOL 3350 17 G: 17 POWDER, FOR SOLUTION ORAL at 09:12

## 2025-01-10 RX ADMIN — AMLODIPINE BESYLATE 10 MG: 10 TABLET ORAL at 09:03

## 2025-01-10 RX ADMIN — BISACODYL 5 MG: 5 TABLET, COATED ORAL at 11:42

## 2025-01-10 RX ADMIN — CARVEDILOL 12.5 MG: 12.5 TABLET, FILM COATED ORAL at 09:05

## 2025-01-10 RX ADMIN — IBUPROFEN 600 MG: 600 TABLET, FILM COATED ORAL at 21:08

## 2025-01-10 RX ADMIN — ATORVASTATIN CALCIUM 80 MG: 80 TABLET, FILM COATED ORAL at 09:03

## 2025-01-10 RX ADMIN — ISOSORBIDE MONONITRATE 60 MG: 60 TABLET, EXTENDED RELEASE ORAL at 09:03

## 2025-01-10 RX ADMIN — RANOLAZINE 500 MG: 500 TABLET, EXTENDED RELEASE ORAL at 21:01

## 2025-01-10 ASSESSMENT — COGNITIVE AND FUNCTIONAL STATUS - GENERAL
MOVING FROM LYING ON BACK TO SITTING ON SIDE OF FLAT BED WITH BEDRAILS: A LITTLE
MOVING TO AND FROM BED TO CHAIR: A LOT
MOBILITY SCORE: 15
STANDING UP FROM CHAIR USING ARMS: A LITTLE
DAILY ACTIVITIY SCORE: 19
TOILETING: A LITTLE
HELP NEEDED FOR BATHING: A LITTLE
DRESSING REGULAR UPPER BODY CLOTHING: A LITTLE
DRESSING REGULAR LOWER BODY CLOTHING: A LITTLE
PERSONAL GROOMING: A LITTLE
WALKING IN HOSPITAL ROOM: A LOT
TURNING FROM BACK TO SIDE WHILE IN FLAT BAD: A LITTLE
CLIMB 3 TO 5 STEPS WITH RAILING: A LOT

## 2025-01-10 ASSESSMENT — PAIN SCALES - GENERAL
PAINLEVEL_OUTOF10: 0 - NO PAIN
PAINLEVEL_OUTOF10: 0 - NO PAIN

## 2025-01-10 ASSESSMENT — PAIN - FUNCTIONAL ASSESSMENT: PAIN_FUNCTIONAL_ASSESSMENT: 0-10

## 2025-01-10 NOTE — PROGRESS NOTES
"Myrna Khan is a 64 y.o. female on day 4 of admission presenting with Fall, initial encounter.    Subjective   The patient was examined at bedside this morning. No acute events overnight. She was laying in bed and in no acute distress. The patient reported she is doing well and has no concerns at this time.       Objective     Physical Exam  Constitutional:       General: She is not in acute distress.     Appearance: She is underweight. She is not ill-appearing.   HENT:      Head: Normocephalic and atraumatic.   Eyes:      Extraocular Movements: Extraocular movements intact.      Conjunctiva/sclera: Conjunctivae normal.   Cardiovascular:      Rate and Rhythm: Normal rate and regular rhythm.      Heart sounds: Normal heart sounds. No murmur heard.     No friction rub. No gallop.   Pulmonary:      Effort: Pulmonary effort is normal. No respiratory distress.      Breath sounds: Normal breath sounds. No wheezing.   Musculoskeletal:      Right lower leg: No edema.      Left lower leg: No edema.   Skin:     Findings: Lesion present.      Comments: Multiple small, mobile, non-tender nodules in B/L axilla   Neurological:      Mental Status: She is alert.   Psychiatric:         Mood and Affect: Mood normal.         Behavior: Behavior normal.         Last Recorded Vitals  Blood pressure (!) 147/92, pulse 89, temperature 36.4 °C (97.5 °F), temperature source Temporal, resp. rate 19, height 1.6 m (5' 3\"), weight (!) 41.8 kg (92 lb 1.6 oz), SpO2 98%.  Intake/Output last 3 Shifts:  I/O last 3 completed shifts:  In: 960 (23 mL/kg) [P.O.:960]  Out: 300 (7.2 mL/kg) [Urine:300 (0.2 mL/kg/hr)]  Weight: 41.8 kg     Relevant Results                   Assessment/Plan   Assessment & Plan  Fall, initial encounter    64 y.o. female with h/o T2DM (last HgA1c 8.5% 11/15/23), HTN, HFrEF (last EF 35-40% 8/26/23), CAD s/p CABG, HTN, PAD s/p left SFA stent, renal and carotid artery stenosis, and cocaine and alcohol use disorder who presents " with multiple falls within the past few months and inability to care for herself. She was admitted for hyperglycemia, hypertension urgency, and multiple falls. BP elevated to the 200s systolic and 120s diastolic likely in the setting of medication on compliance and cocaine use. She was also found to be hyperglycemic 473 on admission and received 10u of regular insulin. Patient has also been non compliant with glycemic medications and she had a recent hospital admission 2/5/24 for syncope and hypoglycemia therefore, there may be a component of poor medical literacy.      #Multiple falls   #bilateral hip pain  #unable to ambulate  #Right foot pain  #Underweight  :: CT c-spine negative for acute fracture or subluxation   :: XR of bilateral hips 7/2024 negative for acute abnormality in the hips  :: BMI 16.31  - pt unable to care for herself and perform her ADLs, will likely need longterm placement   - X-ray of right foot showed no acute fractures  - lidocaine patch to both hips  - tylenol 650 mg q6h PRN mild-moderate pain  - PT/OT recommend moderate intensity, SNF pending  - Nutrition consulted, appreciate recs     #uncontrolled hypertension (Improving)  #HFrecEF (last EF 55% 7/2024 recovered from last EF 35-40% 8/26/23)  - BP elevated to the 200s systolic and 120s diastolic in the ED  - Urine drug screen was positive for cocaine   - c/w amlodipine 10 mg daily   - Held home hydralazine 100 mg TID due to mildly hypotensive BP readings  - c/w coreg 25 mg BID   - c/w GDMT: Imdur 60 mg daily, held farxiga 10 mg daily due to UTI  - continue to monitor BP     #UTI (Resolved)   :: positive trichomonas during last admission and was treated with metronidazole    :: exam findings with mild suprapubic pain, pt denied vaginal discharge today   - UA: - nitrite, 250 LE, 1+ blood, 1+ ketones, 4+ gluc, 2+ protein, 11-20 WBC, 11-20 RBC   - Ucx contaminated  - s/p treatment w/ bactrim 1 tablet q12h for 3 days (1/6 - 1/9)  - Held home  Farxiga iso UTI     #CAD s/p CABGx2 2023  #PAD s/p left SFA stent  #Stable angina   - c/w home Aspirin 81 mg daily and Plavix 75 mg PO QD  - c/w home ranolazine 500 mg BID      #hyperglycemia (Improving)  #uncontrolled T2DM (last HgA1c 15.2%, 7/2024)  - Gluc 473 on admission s/p regular insulin 10u, POCT glucose 456 s/p 10u Lispro and 24u Lantus  - pt noncompliant with insulin outpatient  - holding home dose of Metformin while hospitalized will resume on discharge  - Held home Farxiga 10 mg daily iso UTI   - c/w home Lantus 24 units at bedtime  - c/w home lispro 6 units AC   - SSI #2, accucheck ACHS  - diabetes educator consulted     #Hypomagnesemia (Resolved)  - Mg 1.56 on admission  - s/p 2g magnesium sulfate x1 dose  - Repeat Mg 1.68  - Daily RFP     #Dispo: SNF [facility] pending     Fluids: prn  Diet: Cardiac diet  O2: prn   DVT ppx: lovenox   GI ppx: n/a   Abx: n/a  CODE STATUS: FULL      The patient was seen and discussed with Dr. Vences.      Prince Benson MD  Family Medicine  PGY-3

## 2025-01-10 NOTE — CARE PLAN
The clinical goals for the shift include Pt will remain free from falls or injury by the end of the shift.    Over the shift, the patient did make progress toward the goals.

## 2025-01-11 LAB
ALBUMIN SERPL BCP-MCNC: 2.5 G/DL (ref 3.4–5)
ANION GAP SERPL CALC-SCNC: 15 MMOL/L (ref 10–20)
APPEARANCE UR: CLEAR
BILIRUB UR STRIP.AUTO-MCNC: NEGATIVE MG/DL
BUN SERPL-MCNC: 19 MG/DL (ref 6–23)
CALCIUM SERPL-MCNC: 8.4 MG/DL (ref 8.6–10.6)
CHLORIDE SERPL-SCNC: 96 MMOL/L (ref 98–107)
CO2 SERPL-SCNC: 24 MMOL/L (ref 21–32)
COLOR UR: YELLOW
CREAT SERPL-MCNC: 0.74 MG/DL (ref 0.5–1.05)
EGFRCR SERPLBLD CKD-EPI 2021: 90 ML/MIN/1.73M*2
ERYTHROCYTE [DISTWIDTH] IN BLOOD BY AUTOMATED COUNT: 12.5 % (ref 11.5–14.5)
GLUCOSE BLD MANUAL STRIP-MCNC: 135 MG/DL (ref 74–99)
GLUCOSE BLD MANUAL STRIP-MCNC: 169 MG/DL (ref 74–99)
GLUCOSE BLD MANUAL STRIP-MCNC: 197 MG/DL (ref 74–99)
GLUCOSE BLD MANUAL STRIP-MCNC: 233 MG/DL (ref 74–99)
GLUCOSE BLD MANUAL STRIP-MCNC: 241 MG/DL (ref 74–99)
GLUCOSE BLD MANUAL STRIP-MCNC: 250 MG/DL (ref 74–99)
GLUCOSE SERPL-MCNC: 251 MG/DL (ref 74–99)
GLUCOSE UR STRIP.AUTO-MCNC: ABNORMAL MG/DL
HCT VFR BLD AUTO: 42.2 % (ref 36–46)
HGB BLD-MCNC: 14.6 G/DL (ref 12–16)
KETONES UR STRIP.AUTO-MCNC: NEGATIVE MG/DL
LEUKOCYTE ESTERASE UR QL STRIP.AUTO: NEGATIVE
MAGNESIUM SERPL-MCNC: 1.87 MG/DL (ref 1.6–2.4)
MCH RBC QN AUTO: 32.1 PG (ref 26–34)
MCHC RBC AUTO-ENTMCNC: 34.6 G/DL (ref 32–36)
MCV RBC AUTO: 93 FL (ref 80–100)
MUCOUS THREADS #/AREA URNS AUTO: NORMAL /LPF
NITRITE UR QL STRIP.AUTO: NEGATIVE
NRBC BLD-RTO: 0 /100 WBCS (ref 0–0)
PH UR STRIP.AUTO: 7.5 [PH]
PHOSPHATE SERPL-MCNC: 5 MG/DL (ref 2.5–4.9)
PLATELET # BLD AUTO: 152 X10*3/UL (ref 150–450)
POTASSIUM SERPL-SCNC: 4.8 MMOL/L (ref 3.5–5.3)
PROT UR STRIP.AUTO-MCNC: ABNORMAL MG/DL
RBC # BLD AUTO: 4.55 X10*6/UL (ref 4–5.2)
RBC # UR STRIP.AUTO: NEGATIVE /UL
RBC #/AREA URNS AUTO: NORMAL /HPF
SODIUM SERPL-SCNC: 130 MMOL/L (ref 136–145)
SP GR UR STRIP.AUTO: 1.02
UROBILINOGEN UR STRIP.AUTO-MCNC: NORMAL MG/DL
WBC # BLD AUTO: 11.7 X10*3/UL (ref 4.4–11.3)
WBC #/AREA URNS AUTO: NORMAL /HPF

## 2025-01-11 PROCEDURE — 2500000001 HC RX 250 WO HCPCS SELF ADMINISTERED DRUGS (ALT 637 FOR MEDICARE OP)

## 2025-01-11 PROCEDURE — 85027 COMPLETE CBC AUTOMATED: CPT

## 2025-01-11 PROCEDURE — 2500000001 HC RX 250 WO HCPCS SELF ADMINISTERED DRUGS (ALT 637 FOR MEDICARE OP): Performed by: STUDENT IN AN ORGANIZED HEALTH CARE EDUCATION/TRAINING PROGRAM

## 2025-01-11 PROCEDURE — 2500000004 HC RX 250 GENERAL PHARMACY W/ HCPCS (ALT 636 FOR OP/ED)

## 2025-01-11 PROCEDURE — 82947 ASSAY GLUCOSE BLOOD QUANT: CPT

## 2025-01-11 PROCEDURE — 51701 INSERT BLADDER CATHETER: CPT

## 2025-01-11 PROCEDURE — 83735 ASSAY OF MAGNESIUM: CPT

## 2025-01-11 PROCEDURE — 36415 COLL VENOUS BLD VENIPUNCTURE: CPT

## 2025-01-11 PROCEDURE — 80069 RENAL FUNCTION PANEL: CPT

## 2025-01-11 PROCEDURE — 2500000002 HC RX 250 W HCPCS SELF ADMINISTERED DRUGS (ALT 637 FOR MEDICARE OP, ALT 636 FOR OP/ED)

## 2025-01-11 PROCEDURE — 99232 SBSQ HOSP IP/OBS MODERATE 35: CPT

## 2025-01-11 PROCEDURE — 1210000001 HC SEMI-PRIVATE ROOM DAILY

## 2025-01-11 PROCEDURE — 81001 URINALYSIS AUTO W/SCOPE: CPT

## 2025-01-11 RX ORDER — ACETAMINOPHEN 500 MG
5 TABLET ORAL ONCE
Status: COMPLETED | OUTPATIENT
Start: 2025-01-11 | End: 2025-01-11

## 2025-01-11 RX ORDER — BISACODYL 10 MG/1
10 SUPPOSITORY RECTAL DAILY PRN
Status: ACTIVE | OUTPATIENT
Start: 2025-01-11

## 2025-01-11 RX ORDER — INSULIN LISPRO 100 [IU]/ML
3 INJECTION, SOLUTION INTRAVENOUS; SUBCUTANEOUS
Status: DISPENSED | OUTPATIENT
Start: 2025-01-11

## 2025-01-11 RX ORDER — HYDROXYZINE HYDROCHLORIDE 25 MG/1
25 TABLET, FILM COATED ORAL ONCE
Status: COMPLETED | OUTPATIENT
Start: 2025-01-11 | End: 2025-01-11

## 2025-01-11 RX ORDER — INSULIN GLARGINE 100 [IU]/ML
20 INJECTION, SOLUTION SUBCUTANEOUS NIGHTLY
Status: DISCONTINUED | OUTPATIENT
Start: 2025-01-11 | End: 2025-01-12

## 2025-01-11 RX ADMIN — RANOLAZINE 500 MG: 500 TABLET, EXTENDED RELEASE ORAL at 09:34

## 2025-01-11 RX ADMIN — INSULIN LISPRO 3 UNITS: 100 INJECTION, SOLUTION INTRAVENOUS; SUBCUTANEOUS at 13:17

## 2025-01-11 RX ADMIN — INSULIN GLARGINE 20 UNITS: 100 INJECTION, SOLUTION SUBCUTANEOUS at 21:22

## 2025-01-11 RX ADMIN — INSULIN LISPRO 6 UNITS: 100 INJECTION, SOLUTION INTRAVENOUS; SUBCUTANEOUS at 09:41

## 2025-01-11 RX ADMIN — CARVEDILOL 12.5 MG: 12.5 TABLET, FILM COATED ORAL at 09:34

## 2025-01-11 RX ADMIN — CARVEDILOL 12.5 MG: 12.5 TABLET, FILM COATED ORAL at 21:13

## 2025-01-11 RX ADMIN — CLOPIDOGREL BISULFATE 75 MG: 75 TABLET ORAL at 09:34

## 2025-01-11 RX ADMIN — ASPIRIN 81 MG: 81 TABLET, COATED ORAL at 09:34

## 2025-01-11 RX ADMIN — ATORVASTATIN CALCIUM 80 MG: 80 TABLET, FILM COATED ORAL at 09:34

## 2025-01-11 RX ADMIN — RANOLAZINE 500 MG: 500 TABLET, EXTENDED RELEASE ORAL at 21:14

## 2025-01-11 RX ADMIN — INSULIN LISPRO 1 UNITS: 100 INJECTION, SOLUTION INTRAVENOUS; SUBCUTANEOUS at 18:29

## 2025-01-11 RX ADMIN — ENOXAPARIN SODIUM 40 MG: 100 INJECTION SUBCUTANEOUS at 21:14

## 2025-01-11 RX ADMIN — AMLODIPINE BESYLATE 10 MG: 10 TABLET ORAL at 09:34

## 2025-01-11 RX ADMIN — ISOSORBIDE MONONITRATE 60 MG: 60 TABLET, EXTENDED RELEASE ORAL at 09:34

## 2025-01-11 RX ADMIN — INSULIN LISPRO 3 UNITS: 100 INJECTION, SOLUTION INTRAVENOUS; SUBCUTANEOUS at 18:26

## 2025-01-11 RX ADMIN — INSULIN LISPRO 2 UNITS: 100 INJECTION, SOLUTION INTRAVENOUS; SUBCUTANEOUS at 09:42

## 2025-01-11 RX ADMIN — IBUPROFEN 600 MG: 600 TABLET, FILM COATED ORAL at 09:34

## 2025-01-11 RX ADMIN — SENNOSIDES AND DOCUSATE SODIUM 1 TABLET: 50; 8.6 TABLET ORAL at 21:13

## 2025-01-11 RX ADMIN — POLYETHYLENE GLYCOL 3350 17 G: 17 POWDER, FOR SOLUTION ORAL at 09:34

## 2025-01-11 RX ADMIN — INSULIN LISPRO 2 UNITS: 100 INJECTION, SOLUTION INTRAVENOUS; SUBCUTANEOUS at 13:18

## 2025-01-11 RX ADMIN — Medication 5 MG: at 22:56

## 2025-01-11 RX ADMIN — Medication 1000 UNITS: at 09:34

## 2025-01-11 RX ADMIN — HYDROXYZINE HYDROCHLORIDE 25 MG: 25 TABLET ORAL at 22:56

## 2025-01-11 ASSESSMENT — COGNITIVE AND FUNCTIONAL STATUS - GENERAL
STANDING UP FROM CHAIR USING ARMS: A LOT
CLIMB 3 TO 5 STEPS WITH RAILING: A LOT
MOVING TO AND FROM BED TO CHAIR: A LOT
PERSONAL GROOMING: A LITTLE
MOBILITY SCORE: 15
MOVING TO AND FROM BED TO CHAIR: A LOT
PERSONAL GROOMING: A LITTLE
MOBILITY SCORE: 15
WALKING IN HOSPITAL ROOM: A LOT
HELP NEEDED FOR BATHING: A LITTLE
DAILY ACTIVITIY SCORE: 19
HELP NEEDED FOR BATHING: A LITTLE
TOILETING: A LITTLE
TOILETING: A LITTLE
DRESSING REGULAR UPPER BODY CLOTHING: A LITTLE
DRESSING REGULAR UPPER BODY CLOTHING: A LITTLE
DRESSING REGULAR LOWER BODY CLOTHING: A LITTLE
DRESSING REGULAR LOWER BODY CLOTHING: A LITTLE
DAILY ACTIVITIY SCORE: 19
TURNING FROM BACK TO SIDE WHILE IN FLAT BAD: A LITTLE
STANDING UP FROM CHAIR USING ARMS: A LOT
TURNING FROM BACK TO SIDE WHILE IN FLAT BAD: A LITTLE
WALKING IN HOSPITAL ROOM: A LOT
CLIMB 3 TO 5 STEPS WITH RAILING: A LOT

## 2025-01-11 ASSESSMENT — PAIN DESCRIPTION - LOCATION: LOCATION: FOOT

## 2025-01-11 ASSESSMENT — PAIN SCALES - GENERAL
PAINLEVEL_OUTOF10: 1
PAINLEVEL_OUTOF10: 0 - NO PAIN

## 2025-01-11 ASSESSMENT — PAIN - FUNCTIONAL ASSESSMENT
PAIN_FUNCTIONAL_ASSESSMENT: 0-10
PAIN_FUNCTIONAL_ASSESSMENT: 0-10

## 2025-01-11 ASSESSMENT — PAIN DESCRIPTION - ORIENTATION: ORIENTATION: RIGHT

## 2025-01-11 NOTE — PROGRESS NOTES
"Myrna Khan is a 64 y.o. female on day 5 of admission presenting with Fall, initial encounter.    Subjective   The patient was examined at bedside this morning. Overnight, the patient became hypoglycemic and some of her meds were held. She was laying in bed and in no acute distress. The patient reported that she had 2 BM yesterday and feels much better. Per nursing staff, she only had 1 small BM yesterday.       Objective     Physical Exam  Constitutional:       General: She is not in acute distress.     Appearance: She is underweight. She is not ill-appearing.   HENT:      Head: Normocephalic and atraumatic.   Eyes:      Extraocular Movements: Extraocular movements intact.      Conjunctiva/sclera: Conjunctivae normal.   Cardiovascular:      Rate and Rhythm: Normal rate and regular rhythm.      Pulses: Normal pulses.      Heart sounds: Normal heart sounds. No murmur heard.     No friction rub. No gallop.   Pulmonary:      Effort: Pulmonary effort is normal. No respiratory distress.      Breath sounds: Normal breath sounds. No wheezing.   Musculoskeletal:      Cervical back: Normal range of motion.      Right lower leg: No edema.      Left lower leg: No edema.   Skin:     Findings: Lesion present.      Comments: Multiple small, round, mobile non-tender nodules present in B/L axilla    Neurological:      Mental Status: She is alert.   Psychiatric:         Mood and Affect: Mood normal.         Behavior: Behavior normal.         Last Recorded Vitals  Blood pressure 135/80, pulse 82, temperature 36.3 °C (97.3 °F), temperature source Temporal, resp. rate 17, height 1.6 m (5' 3\"), weight (!) 41.8 kg (92 lb 1.6 oz), SpO2 97%.  Intake/Output last 3 Shifts:  I/O last 3 completed shifts:  In: 240 (5.7 mL/kg) [P.O.:240]  Out: 1 (0 mL/kg) [Stool:1]  Weight: 41.8 kg     Relevant Results                   Assessment/Plan   Assessment & Plan  Fall, initial encounter      64 y.o. female with h/o T2DM (last HgA1c 8.5% 11/15/23), " HTN, HFrEF (last EF 35-40% 8/26/23), CAD s/p CABG, HTN, PAD s/p left SFA stent, renal and carotid artery stenosis, and cocaine and alcohol use disorder who presents with multiple falls within the past few months and inability to care for herself. She was admitted for hyperglycemia, hypertension urgency, and multiple falls. BP elevated to the 200s systolic and 120s diastolic likely in the setting of medication on compliance and cocaine use. She was also found to be hyperglycemic 473 on admission and received 10u of regular insulin. Patient has also been non compliant with glycemic medications and she had a recent hospital admission 2/5/24 for syncope and hypoglycemia therefore, there may be a component of poor medical literacy.      #Multiple falls   #bilateral hip pain  #unable to ambulate  #Right foot pain  #Underweight  :: CT c-spine negative for acute fracture or subluxation   :: XR of bilateral hips 7/2024 negative for acute abnormality in the hips  :: BMI 16.31  - pt unable to care for herself and perform her ADLs, will likely need longterm placement   - X-ray of right foot showed no acute fractures  - lidocaine patch to both hips  - tylenol 650 mg q6h PRN mild-moderate pain  - PT/OT recommend moderate intensity, SNF pending  - Nutrition consulted, appreciate recs     #uncontrolled hypertension (Improving)  #HFrecEF (last EF 55% 7/2024 recovered from last EF 35-40% 8/26/23)  - BP elevated to the 200s systolic and 120s diastolic in the ED  - Urine drug screen was positive for cocaine   - c/w amlodipine 10 mg daily   - Held home hydralazine 100 mg TID due to mildly hypotensive BP readings  - c/w coreg 25 mg BID   - c/w GDMT: Imdur 60 mg daily, farxiga 10 mg  - continue to monitor BP     #UTI (Resolved)   :: positive trichomonas during last admission and was treated with metronidazole    :: exam findings with mild suprapubic pain, pt denied vaginal discharge today   - UA: - nitrite, 250 LE, 1+ blood, 1+ ketones,  4+ gluc, 2+ protein, 11-20 WBC, 11-20 RBC   - Ucx contaminated  - s/p treatment w/ bactrim 1 tablet q12h for 3 days (1/6 - 1/9)  - Resumed home Farxiga     #CAD s/p CABGx2 2023  #PAD s/p left SFA stent  #Stable angina   - c/w home Aspirin 81 mg daily and Plavix 75 mg PO QD  - c/w home ranolazine 500 mg BID     #Hypoglycemia   #Hyperglycemia (Resolved)  #uncontrolled T2DM (last HgA1c 15.2%, 7/2024)  - Gluc 473 on admission s/p regular insulin 10u, POCT glucose 456 s/p 10u Lispro and 24u Lantus  - pt noncompliant with insulin outpatient  - holding home dose of Metformin while hospitalized will resume on discharge  - Resumed Farxiga 10 mg daily  - Decreased Lantus from 24 units to 20 units at bedtime  - Decreased lispro from 6 units to 3 units AC   - Decreased SSI #2 to #1, accucheck ACHS  - diabetes educator consulted    #Constipation  - c/w Bowel Regimen; Miralax daily, Bisacodyl 10 mg suppository, & Ayanna-colace QHS     #Hypomagnesemia (Resolved)  - Mg 1.56 on admission  - s/p 2g magnesium sulfate x1 dose  - Repeat Mg 1.68  - Daily RFP     #Dispo: SNF [facility] pending     Fluids: prn  Diet: Cardiac diet  O2: prn   DVT ppx: lovenox   GI ppx: n/a   Abx: n/a  CODE STATUS: FULL      The patient was seen and discussed with Dr. Vences.    Prince Benson MD  Family Medicine  PGY-3

## 2025-01-11 NOTE — CARE PLAN
The patient's goals for the shift include      The clinical goals for the shift include pt will have 1 BM by end of shift

## 2025-01-11 NOTE — CARE PLAN
The patient's goals for the shift include  pt will like to report relief from constipation and pain during bowel elimination

## 2025-01-12 VITALS
HEART RATE: 70 BPM | RESPIRATION RATE: 17 BRPM | DIASTOLIC BLOOD PRESSURE: 67 MMHG | BODY MASS INDEX: 16.32 KG/M2 | HEIGHT: 63 IN | TEMPERATURE: 96.8 F | WEIGHT: 92.1 LBS | SYSTOLIC BLOOD PRESSURE: 149 MMHG | OXYGEN SATURATION: 97 %

## 2025-01-12 LAB
ALBUMIN SERPL BCP-MCNC: 2.5 G/DL (ref 3.4–5)
ANION GAP SERPL CALC-SCNC: 12 MMOL/L (ref 10–20)
BUN SERPL-MCNC: 18 MG/DL (ref 6–23)
CALCIUM SERPL-MCNC: 8 MG/DL (ref 8.6–10.6)
CHLORIDE SERPL-SCNC: 101 MMOL/L (ref 98–107)
CO2 SERPL-SCNC: 23 MMOL/L (ref 21–32)
CREAT SERPL-MCNC: 0.63 MG/DL (ref 0.5–1.05)
EGFRCR SERPLBLD CKD-EPI 2021: >90 ML/MIN/1.73M*2
ERYTHROCYTE [DISTWIDTH] IN BLOOD BY AUTOMATED COUNT: 12.1 % (ref 11.5–14.5)
GLUCOSE BLD MANUAL STRIP-MCNC: 149 MG/DL (ref 74–99)
GLUCOSE BLD MANUAL STRIP-MCNC: 159 MG/DL (ref 74–99)
GLUCOSE BLD MANUAL STRIP-MCNC: 20 MG/DL (ref 74–99)
GLUCOSE BLD MANUAL STRIP-MCNC: 203 MG/DL (ref 74–99)
GLUCOSE BLD MANUAL STRIP-MCNC: 267 MG/DL (ref 74–99)
GLUCOSE BLD MANUAL STRIP-MCNC: 404 MG/DL (ref 74–99)
GLUCOSE BLD MANUAL STRIP-MCNC: 50 MG/DL (ref 74–99)
GLUCOSE SERPL-MCNC: 99 MG/DL (ref 74–99)
HCT VFR BLD AUTO: 36.3 % (ref 36–46)
HGB BLD-MCNC: 12.5 G/DL (ref 12–16)
HOLD SPECIMEN: NORMAL
MAGNESIUM SERPL-MCNC: 1.71 MG/DL (ref 1.6–2.4)
MCH RBC QN AUTO: 32.8 PG (ref 26–34)
MCHC RBC AUTO-ENTMCNC: 34.4 G/DL (ref 32–36)
MCV RBC AUTO: 95 FL (ref 80–100)
NRBC BLD-RTO: 0 /100 WBCS (ref 0–0)
PHOSPHATE SERPL-MCNC: 3.1 MG/DL (ref 2.5–4.9)
PLATELET # BLD AUTO: 204 X10*3/UL (ref 150–450)
POTASSIUM SERPL-SCNC: 3.5 MMOL/L (ref 3.5–5.3)
RBC # BLD AUTO: 3.81 X10*6/UL (ref 4–5.2)
SODIUM SERPL-SCNC: 132 MMOL/L (ref 136–145)
WBC # BLD AUTO: 8.6 X10*3/UL (ref 4.4–11.3)

## 2025-01-12 PROCEDURE — 2500000005 HC RX 250 GENERAL PHARMACY W/O HCPCS

## 2025-01-12 PROCEDURE — 2500000001 HC RX 250 WO HCPCS SELF ADMINISTERED DRUGS (ALT 637 FOR MEDICARE OP)

## 2025-01-12 PROCEDURE — 36415 COLL VENOUS BLD VENIPUNCTURE: CPT

## 2025-01-12 PROCEDURE — 99232 SBSQ HOSP IP/OBS MODERATE 35: CPT | Performed by: STUDENT IN AN ORGANIZED HEALTH CARE EDUCATION/TRAINING PROGRAM

## 2025-01-12 PROCEDURE — 82947 ASSAY GLUCOSE BLOOD QUANT: CPT

## 2025-01-12 PROCEDURE — 2500000004 HC RX 250 GENERAL PHARMACY W/ HCPCS (ALT 636 FOR OP/ED)

## 2025-01-12 PROCEDURE — 85027 COMPLETE CBC AUTOMATED: CPT

## 2025-01-12 PROCEDURE — 80069 RENAL FUNCTION PANEL: CPT

## 2025-01-12 PROCEDURE — 1210000001 HC SEMI-PRIVATE ROOM DAILY

## 2025-01-12 PROCEDURE — 2500000004 HC RX 250 GENERAL PHARMACY W/ HCPCS (ALT 636 FOR OP/ED): Performed by: STUDENT IN AN ORGANIZED HEALTH CARE EDUCATION/TRAINING PROGRAM

## 2025-01-12 PROCEDURE — 83735 ASSAY OF MAGNESIUM: CPT

## 2025-01-12 RX ORDER — INSULIN GLARGINE 100 [IU]/ML
10 INJECTION, SOLUTION SUBCUTANEOUS NIGHTLY
Status: DISPENSED | OUTPATIENT
Start: 2025-01-12

## 2025-01-12 RX ADMIN — SODIUM CHLORIDE 1000 ML: 9 INJECTION, SOLUTION INTRAVENOUS at 14:49

## 2025-01-12 RX ADMIN — ATORVASTATIN CALCIUM 80 MG: 80 TABLET, FILM COATED ORAL at 08:50

## 2025-01-12 RX ADMIN — CARVEDILOL 12.5 MG: 12.5 TABLET, FILM COATED ORAL at 20:59

## 2025-01-12 RX ADMIN — AMLODIPINE BESYLATE 10 MG: 10 TABLET ORAL at 08:50

## 2025-01-12 RX ADMIN — ENOXAPARIN SODIUM 40 MG: 100 INJECTION SUBCUTANEOUS at 20:59

## 2025-01-12 RX ADMIN — CARVEDILOL 12.5 MG: 12.5 TABLET, FILM COATED ORAL at 08:50

## 2025-01-12 RX ADMIN — ISOSORBIDE MONONITRATE 60 MG: 60 TABLET, EXTENDED RELEASE ORAL at 08:50

## 2025-01-12 RX ADMIN — DEXTROSE MONOHYDRATE 25 G: 25 INJECTION, SOLUTION INTRAVENOUS at 06:30

## 2025-01-12 RX ADMIN — Medication 1000 UNITS: at 08:50

## 2025-01-12 RX ADMIN — RANOLAZINE 500 MG: 500 TABLET, EXTENDED RELEASE ORAL at 20:59

## 2025-01-12 RX ADMIN — RANOLAZINE 500 MG: 500 TABLET, EXTENDED RELEASE ORAL at 08:50

## 2025-01-12 RX ADMIN — DEXTROSE MONOHYDRATE 12.5 G: 25 INJECTION, SOLUTION INTRAVENOUS at 16:42

## 2025-01-12 RX ADMIN — CLOPIDOGREL BISULFATE 75 MG: 75 TABLET ORAL at 08:50

## 2025-01-12 RX ADMIN — DAPAGLIFLOZIN 10 MG: 10 TABLET, FILM COATED ORAL at 08:50

## 2025-01-12 RX ADMIN — IBUPROFEN 600 MG: 600 TABLET, FILM COATED ORAL at 02:46

## 2025-01-12 RX ADMIN — ASPIRIN 81 MG: 81 TABLET, COATED ORAL at 08:50

## 2025-01-12 ASSESSMENT — PAIN SCALES - GENERAL
PAINLEVEL_OUTOF10: 0 - NO PAIN
PAINLEVEL_OUTOF10: 7
PAINLEVEL_OUTOF10: 0 - NO PAIN

## 2025-01-12 ASSESSMENT — COGNITIVE AND FUNCTIONAL STATUS - GENERAL
MOVING TO AND FROM BED TO CHAIR: A LOT
PERSONAL GROOMING: A LITTLE
MOBILITY SCORE: 15
WALKING IN HOSPITAL ROOM: A LOT
HELP NEEDED FOR BATHING: A LITTLE
DRESSING REGULAR LOWER BODY CLOTHING: A LITTLE
DRESSING REGULAR UPPER BODY CLOTHING: A LITTLE
DAILY ACTIVITIY SCORE: 19
TURNING FROM BACK TO SIDE WHILE IN FLAT BAD: A LITTLE
CLIMB 3 TO 5 STEPS WITH RAILING: A LOT
TOILETING: A LITTLE
STANDING UP FROM CHAIR USING ARMS: A LOT

## 2025-01-12 ASSESSMENT — PAIN - FUNCTIONAL ASSESSMENT
PAIN_FUNCTIONAL_ASSESSMENT: 0-10
PAIN_FUNCTIONAL_ASSESSMENT: 0-10

## 2025-01-12 NOTE — CARE PLAN
Problem: Nutrition  Goal: Less than 5 days NPO/clear liquids  Outcome: Progressing  Goal: Oral intake greater than 50%  Outcome: Progressing  Goal: Oral intake greater 75%  Outcome: Progressing  Goal: Consume prescribed supplement  Outcome: Progressing  Goal: Adequate PO fluid intake  Outcome: Progressing  Goal: Nutrition support goals are met within 48 hrs  Outcome: Progressing  Goal: Nutrition support is meeting 75% of nutrient needs  Outcome: Progressing  Goal: Tube feed tolerance  Outcome: Progressing  Goal: BG  mg/dL  Outcome: Progressing  Goal: Lab values WNL  Outcome: Progressing  Goal: Electrolytes WNL  Outcome: Progressing  Goal: Promote healing  Outcome: Progressing  Goal: Maintain stable weight  Outcome: Progressing  Goal: Reduce weight from edema/fluid  Outcome: Progressing  Goal: Gradual weight gain  Outcome: Progressing  Goal: Improve ostomy output  Outcome: Progressing     Problem: Diabetes  Goal: Achieve decreasing blood glucose levels by end of shift  Outcome: Progressing  Goal: Increase stability of blood glucose readings by end of shift  Outcome: Progressing  Goal: Decrease in ketones present in urine by end of shift  Outcome: Progressing  Goal: Maintain electrolyte levels within acceptable range throughout shift  Outcome: Progressing  Goal: Maintain glucose levels >70mg/dl to <250mg/dl throughout shift  Outcome: Progressing  Goal: No changes in neurological exam by end of shift  Outcome: Progressing  Goal: Learn about and adhere to nutrition recommendations by end of shift  Outcome: Progressing  Goal: Vital signs within normal range for age by end of shift  Outcome: Progressing  Goal: Increase self care and/or family involovement by end of shift  Outcome: Progressing  Goal: Receive DSME education by end of shift  Outcome: Progressing     Problem: Fall/Injury  Goal: Not fall by end of shift  Outcome: Progressing  Goal: Be free from injury by end of the shift  Outcome: Progressing  Goal:  Verbalize understanding of personal risk factors for fall in the hospital  Outcome: Progressing  Goal: Verbalize understanding of risk factor reduction measures to prevent injury from fall in the home  Outcome: Progressing  Goal: Use assistive devices by end of the shift  Outcome: Progressing  Goal: Pace activities to prevent fatigue by end of the shift  Outcome: Progressing     Problem: Pain - Adult  Goal: Verbalizes/displays adequate comfort level or baseline comfort level  Outcome: Progressing     Problem: Safety - Adult  Goal: Free from fall injury  Outcome: Progressing     Problem: Discharge Planning  Goal: Discharge to home or other facility with appropriate resources  Outcome: Progressing     Problem: Chronic Conditions and Co-morbidities  Goal: Patient's chronic conditions and co-morbidity symptoms are monitored and maintained or improved  Outcome: Progressing         The clinical goals for the shift include Pt will remain free from falls during this shift

## 2025-01-12 NOTE — NURSING NOTE
At 0600 patient was lethargic, moaning and hard to rouse. Blood sugar was checked at 0608, it was at 20. Patient received dextrose 50% injection. Recheck at 0612 was 203. At 0615 patient was more responsive, and was starting to speak.     Doctor Tino Perea was notified at 0620 through secure chat. Patient was sitting up and asking for ice cream by that time.     Per Doctor Tino Perea team will reassess her regimen.

## 2025-01-12 NOTE — CARE PLAN
The patient's goals for the shift include      The clinical goals for the shift include Pt will remain safe and free from injury throughout shift,      Problem: Nutrition  Goal: Less than 5 days NPO/clear liquids  Outcome: Progressing  Goal: Oral intake greater than 50%  Outcome: Progressing  Goal: Oral intake greater 75%  Outcome: Progressing  Goal: Consume prescribed supplement  Outcome: Progressing  Goal: Adequate PO fluid intake  Outcome: Progressing  Goal: Nutrition support goals are met within 48 hrs  Outcome: Progressing  Goal: Nutrition support is meeting 75% of nutrient needs  Outcome: Progressing  Goal: Tube feed tolerance  Outcome: Progressing  Goal: BG  mg/dL  Outcome: Progressing  Goal: Lab values WNL  Outcome: Progressing  Goal: Electrolytes WNL  Outcome: Progressing  Goal: Promote healing  Outcome: Progressing  Goal: Maintain stable weight  Outcome: Progressing  Goal: Reduce weight from edema/fluid  Outcome: Progressing  Goal: Gradual weight gain  Outcome: Progressing  Goal: Improve ostomy output  Outcome: Progressing     Problem: Diabetes  Goal: Achieve decreasing blood glucose levels by end of shift  Outcome: Progressing  Goal: Increase stability of blood glucose readings by end of shift  Outcome: Progressing  Goal: Decrease in ketones present in urine by end of shift  Outcome: Progressing  Goal: Maintain electrolyte levels within acceptable range throughout shift  Outcome: Progressing  Goal: Maintain glucose levels >70mg/dl to <250mg/dl throughout shift  Outcome: Progressing  Goal: No changes in neurological exam by end of shift  Outcome: Progressing  Goal: Learn about and adhere to nutrition recommendations by end of shift  Outcome: Progressing  Goal: Vital signs within normal range for age by end of shift  Outcome: Progressing  Goal: Increase self care and/or family involovement by end of shift  Outcome: Progressing  Goal: Receive DSME education by end of shift  Outcome: Progressing      Problem: Fall/Injury  Goal: Not fall by end of shift  Outcome: Progressing  Goal: Be free from injury by end of the shift  Outcome: Progressing  Goal: Verbalize understanding of personal risk factors for fall in the hospital  Outcome: Progressing  Goal: Verbalize understanding of risk factor reduction measures to prevent injury from fall in the home  Outcome: Progressing  Goal: Use assistive devices by end of the shift  Outcome: Progressing  Goal: Pace activities to prevent fatigue by end of the shift  Outcome: Progressing     Problem: Pain - Adult  Goal: Verbalizes/displays adequate comfort level or baseline comfort level  Outcome: Progressing     Problem: Safety - Adult  Goal: Free from fall injury  Outcome: Progressing     Problem: Discharge Planning  Goal: Discharge to home or other facility with appropriate resources  Outcome: Progressing     Problem: Chronic Conditions and Co-morbidities  Goal: Patient's chronic conditions and co-morbidity symptoms are monitored and maintained or improved  Outcome: Progressing

## 2025-01-12 NOTE — CARE PLAN
Problem: Nutrition  Goal: Less than 5 days NPO/clear liquids  1/12/2025 1834 by Holly Nichole RN  Outcome: Progressing  1/12/2025 1834 by Holly Nichole RN  Outcome: Progressing  Goal: Oral intake greater than 50%  1/12/2025 1834 by Holly Nichole RN  Outcome: Progressing  1/12/2025 1834 by Holly Nichole RN  Outcome: Progressing  Goal: Oral intake greater 75%  1/12/2025 1834 by Holly Nichole RN  Outcome: Progressing  1/12/2025 1834 by Holly Nichole RN  Outcome: Progressing  Goal: Consume prescribed supplement  1/12/2025 1834 by Holly Ncihole RN  Outcome: Progressing  1/12/2025 1834 by Holly Nichole RN  Outcome: Progressing  Goal: Adequate PO fluid intake  1/12/2025 1834 by Holly Nichole RN  Outcome: Progressing  1/12/2025 1834 by Holly Nichole RN  Outcome: Progressing  Goal: Nutrition support goals are met within 48 hrs  1/12/2025 1834 by Holly Nichole RN  Outcome: Progressing  1/12/2025 1834 by Holly Nichole RN  Outcome: Progressing  Goal: Nutrition support is meeting 75% of nutrient needs  1/12/2025 1834 by Holly Nichole RN  Outcome: Progressing  1/12/2025 1834 by Holly Nichole RN  Outcome: Progressing  Goal: Tube feed tolerance  1/12/2025 1834 by Holly Nichole RN  Outcome: Progressing  1/12/2025 1834 by Holly Nichole RN  Outcome: Progressing  Goal: BG  mg/dL  1/12/2025 1834 by Holly Nichole RN  Outcome: Progressing  1/12/2025 1834 by Holly Nichole RN  Outcome: Progressing  Goal: Lab values WNL  1/12/2025 1834 by Holly Nichole RN  Outcome: Progressing  1/12/2025 1834 by Holly Nichole RN  Outcome: Progressing  Goal: Electrolytes WNL  1/12/2025 1834 by Holly Nichole RN  Outcome: Progressing  1/12/2025 1834 by Holly Nichole RN  Outcome: Progressing  Goal: Promote healing  1/12/2025 1834 by Holly Nichole RN  Outcome: Progressing  1/12/2025 1834 by Holly Nichole RN  Outcome: Progressing  Goal: Maintain stable weight  1/12/2025 1834 by Holly Nichole RN  Outcome: Progressing  1/12/2025 1834  by Holly Nichole RN  Outcome: Progressing  Goal: Reduce weight from edema/fluid  1/12/2025 1834 by Holly Nichole RN  Outcome: Progressing  1/12/2025 1834 by Holly Nichole RN  Outcome: Progressing  Goal: Gradual weight gain  1/12/2025 1834 by Holly Nichole RN  Outcome: Progressing  1/12/2025 1834 by Holly Nichole RN  Outcome: Progressing  Goal: Improve ostomy output  1/12/2025 1834 by Holly Nichole RN  Outcome: Progressing  1/12/2025 1834 by Holly Nichole RN  Outcome: Progressing     Problem: Diabetes  Goal: Achieve decreasing blood glucose levels by end of shift  1/12/2025 1834 by Holly Nichole RN  Outcome: Progressing  1/12/2025 1834 by Holly Nichole RN  Outcome: Progressing  Goal: Increase stability of blood glucose readings by end of shift  1/12/2025 1834 by Holly Nichole RN  Outcome: Progressing  1/12/2025 1834 by Holly Nichole RN  Outcome: Progressing  Goal: Decrease in ketones present in urine by end of shift  1/12/2025 1834 by Holly Nichole RN  Outcome: Progressing  1/12/2025 1834 by Holly Nichole RN  Outcome: Progressing  Goal: Maintain electrolyte levels within acceptable range throughout shift  1/12/2025 1834 by Holly Nichole RN  Outcome: Progressing  1/12/2025 1834 by Holly Nichole RN  Outcome: Progressing  Goal: Maintain glucose levels >70mg/dl to <250mg/dl throughout shift  1/12/2025 1834 by Holly Nichole RN  Outcome: Progressing  1/12/2025 1834 by Holly Nichole RN  Outcome: Progressing  Goal: No changes in neurological exam by end of shift  1/12/2025 1834 by Holly Nichole RN  Outcome: Progressing  1/12/2025 1834 by Holly Nichole RN  Outcome: Progressing  Goal: Learn about and adhere to nutrition recommendations by end of shift  1/12/2025 1834 by Holly Nichole RN  Outcome: Progressing  1/12/2025 1834 by Holly Nichole RN  Outcome: Progressing  Goal: Vital signs within normal range for age by end of shift  1/12/2025 1834 by Holly Nichole RN  Outcome: Progressing  1/12/2025 1834 by  Holly Nichole RN  Outcome: Progressing  Goal: Increase self care and/or family involovement by end of shift  1/12/2025 1834 by Holly Nichole RN  Outcome: Progressing  1/12/2025 1834 by Holly Nichole RN  Outcome: Progressing  Goal: Receive DSME education by end of shift  1/12/2025 1834 by Holly Nichole RN  Outcome: Progressing  1/12/2025 1834 by Holly Nichole RN  Outcome: Progressing     Problem: Fall/Injury  Goal: Not fall by end of shift  1/12/2025 1834 by Holly Nichole RN  Outcome: Progressing  1/12/2025 1834 by Holly Nichole RN  Outcome: Progressing  Goal: Be free from injury by end of the shift  1/12/2025 1834 by Holly Nichole RN  Outcome: Progressing  1/12/2025 1834 by Holly Nichole RN  Outcome: Progressing  Goal: Verbalize understanding of personal risk factors for fall in the hospital  1/12/2025 1834 by Holly Nichole RN  Outcome: Progressing  1/12/2025 1834 by Holly Nichole RN  Outcome: Progressing  Goal: Verbalize understanding of risk factor reduction measures to prevent injury from fall in the home  1/12/2025 1834 by Holly Nichole RN  Outcome: Progressing  1/12/2025 1834 by Holly Nichole RN  Outcome: Progressing  Goal: Use assistive devices by end of the shift  1/12/2025 1834 by Holly Nichole RN  Outcome: Progressing  1/12/2025 1834 by Holly Nichole RN  Outcome: Progressing  Goal: Pace activities to prevent fatigue by end of the shift  1/12/2025 1834 by Holly Nichole RN  Outcome: Progressing  1/12/2025 1834 by Holly Nichole RN  Outcome: Progressing     Problem: Pain - Adult  Goal: Verbalizes/displays adequate comfort level or baseline comfort level  1/12/2025 1834 by Holly Nichole RN  Outcome: Progressing  1/12/2025 1834 by Holly Nichole RN  Outcome: Progressing     Problem: Safety - Adult  Goal: Free from fall injury  1/12/2025 1834 by Holly Nichole RN  Outcome: Progressing  1/12/2025 1834 by Holly Nichole RN  Outcome: Progressing     Problem: Discharge Planning  Goal: Discharge to  home or other facility with appropriate resources  1/12/2025 1834 by Holly Nichole RN  Outcome: Progressing  1/12/2025 1834 by Holly Nichole RN  Outcome: Progressing     Problem: Chronic Conditions and Co-morbidities  Goal: Patient's chronic conditions and co-morbidity symptoms are monitored and maintained or improved  1/12/2025 1834 by Holly Nichole RN  Outcome: Progressing  1/12/2025 1834 by Holly Nichole RN  Outcome: Progressing   The patient's goals for the shift include      The clinical goals for the shift include Pt will remain safe and free from injury throughout shift,

## 2025-01-12 NOTE — PROGRESS NOTES
"Myrna Khan is a 64 y.o. female on day 6 of admission presenting with Fall, initial encounter.    Subjective   The patient was examined at bedside this morning. Early this morning, hypoglycemic at 20. 2 amps dextrose given and BG back to 200s. Will titrate lantus own tonight, and holding TID insulin; OK to have QAC insulin on sliding scale.        Objective     Physical Exam  Constitutional:       General: She is not in acute distress.     Appearance: She is underweight. She is not ill-appearing.      Comments: BMI 16, cachectic appearance   HENT:      Head: Normocephalic and atraumatic.   Eyes:      Extraocular Movements: Extraocular movements intact.      Conjunctiva/sclera: Conjunctivae normal.   Cardiovascular:      Rate and Rhythm: Normal rate and regular rhythm.      Pulses: Normal pulses.      Heart sounds: Normal heart sounds. No murmur heard.     No friction rub. No gallop.   Pulmonary:      Effort: Pulmonary effort is normal. No respiratory distress.      Breath sounds: Normal breath sounds. No wheezing.   Musculoskeletal:      Cervical back: Normal range of motion.      Right lower leg: No edema.      Left lower leg: No edema.   Skin:     Findings: Lesion present.      Comments: Multiple small, round, mobile non-tender nodules present in B/L axilla    Neurological:      Mental Status: She is alert.   Psychiatric:         Mood and Affect: Mood normal.         Behavior: Behavior normal.         Last Recorded Vitals  Blood pressure 107/62, pulse 72, temperature 36.2 °C (97.2 °F), resp. rate 21, height 1.6 m (5' 3\"), weight (!) 41.8 kg (92 lb 1.6 oz), SpO2 100%.  Intake/Output last 3 Shifts:  I/O last 3 completed shifts:  In: 59.6 (1.4 mL/kg) [I.V.:59.6 (1.4 mL/kg)]  Out: 1326 (31.7 mL/kg) [Urine:1325 (0.9 mL/kg/hr); Stool:1]  Weight: 41.8 kg     Relevant Results    Scheduled medications  amLODIPine, 10 mg, oral, Daily  aspirin, 81 mg, oral, Daily  atorvastatin, 80 mg, oral, Daily  carvedilol, 12.5 mg, " oral, BID  cholecalciferol, 1,000 Units, oral, Daily  clopidogrel, 75 mg, oral, Daily  dapagliflozin propanediol, 10 mg, oral, Daily  enoxaparin, 40 mg, subcutaneous, q24h  [Held by provider] hydrALAZINE, 100 mg, oral, TID  insulin glargine, 10 Units, subcutaneous, Nightly  insulin lispro, 0-5 Units, subcutaneous, TID AC  [Held by provider] insulin lispro, 3 Units, subcutaneous, TID  isosorbide mononitrate ER, 60 mg, oral, Daily  lidocaine, 1 patch, transdermal, Daily  polyethylene glycol, 17 g, oral, Daily  ranolazine, 500 mg, oral, BID  sennosides-docusate sodium, 1 tablet, oral, Nightly  sodium chloride, 1,000 mL, intravenous, Once      Continuous medications     PRN medications  PRN medications: acetaminophen, bisacodyl, dextrose, dextrose, diclofenac sodium, glucagon, glucagon, hydrALAZINE, ibuprofen      Assessment/Plan   Assessment & Plan  Fall, initial encounter      64 y.o. female with h/o T2DM (last HgA1c 8.5% 11/15/23), HTN, HFrEF (last EF 35-40% 8/26/23), CAD s/p CABG, HTN, PAD s/p left SFA stent, renal and carotid artery stenosis, and cocaine and alcohol use disorder who presents with multiple falls within the past few months and inability to care for herself. She was admitted for hyperglycemia, hypertension urgency, and multiple falls. BP elevated to the 200s systolic and 120s diastolic likely in the setting of medication on compliance and cocaine use. She was also found to be hyperglycemic 473 on admission and received 10u of regular insulin. Patient has also been non compliant with glycemic medications and she had a recent hospital admission 2/5/24 for syncope and hypoglycemia therefore, there may be a component of poor medical literacy.      #Multiple falls   #bilateral hip pain  #unable to ambulate  #Right foot pain  #Underweight  :: CT c-spine negative for acute fracture or subluxation   :: XR of bilateral hips 7/2024 negative for acute abnormality in the hips  :: BMI 16.31  - pt unable to care  for herself and perform her ADLs, will likely need longterm placement   - X-ray of right foot showed no acute fractures  - lidocaine patch to both hips  - tylenol 650 mg q6h PRN mild-moderate pain  - PT/OT recommend moderate intensity, SNF pending  - Nutrition consulted, appreciate recs     #Uncontrolled hypertension (Improving)  #HFrecEF (last EF 55% 7/2024 recovered from last EF 35-40% 8/26/23)  - BP elevated to the 200s systolic and 120s diastolic in the ED  - Urine drug screen was positive for cocaine   - c/w amlodipine 10 mg daily   - Held home hydralazine 100 mg TID due to mildly hypotensive BP readings  - c/w coreg 25 mg BID   - c/w GDMT: Imdur 60 mg daily, farxiga 10 mg  - continue to monitor BP     #CAD s/p CABGx2 2023  #PAD s/p left SFA stent  #Stable angina   - c/w home Aspirin 81 mg daily and Plavix 75 mg PO QD  - c/w home ranolazine 500 mg BID     #Hypoglycemia   #Hyperglycemia (Resolved)  #Uncontrolled T2DM (last HgA1c 15.2%, 7/2024)  - Gluc 473 on admission s/p regular insulin 10u, POCT glucose 456 s/p 10u Lispro and 24u Lantus  - pt noncompliant with insulin outpatient  - holding home dose of Metformin while hospitalized will resume on discharge  - Resumed Farxiga 10 mg daily  [ ] Decreased Lantus from 24 units to 20 units at bedtime; 1/12/25 reduced further to 10U  [ ] Decreased lispro from 6 units to 3 units AC; 1/12/25 Dc'd QA insulin until glucose normalizes  [ ] Decreased SSI #2 to #1, accucheck ACHS  [ ] diabetes educator consulted    #Constipation  - c/w Bowel Regimen; Miralax daily, Bisacodyl 10 mg suppository, & Ayanna-colace QHS     #Hypomagnesemia (Resolved)  - Mg 1.56 on admission. S/p 2g magnesium sulfate x1 dose; Repeat Mg 1.68    #UTI (Resolved)   :: positive trichomonas during last admission and was treated with metronidazole    :: exam findings with mild suprapubic pain, pt denied vaginal discharge today   - UA: - nitrite, 250 LE, 1+ blood, 1+ ketones, 4+ gluc, 2+ protein, 11-20 WBC,  11-20 RBC   - Ucx contaminated  - s/p treatment w/ bactrim 1 tablet q12h for 3 days (1/6 - 1/9)  - Resumed home [facility]     #Dispo: SNF Precert pending     Fluids: prn  Diet: Cardiac diet  O2: prn   DVT ppx: lovenox   GI ppx: n/a   Abx: n/a  CODE STATUS: FULL      The patient was seen and discussed with Dr. Vences.    Markel Vences MD  Family Medicine  PGY-3

## 2025-01-13 LAB
GLUCOSE BLD MANUAL STRIP-MCNC: 116 MG/DL (ref 74–99)
GLUCOSE BLD MANUAL STRIP-MCNC: 136 MG/DL (ref 74–99)
GLUCOSE BLD MANUAL STRIP-MCNC: 267 MG/DL (ref 74–99)
GLUCOSE BLD MANUAL STRIP-MCNC: 296 MG/DL (ref 74–99)
GLUCOSE BLD MANUAL STRIP-MCNC: 434 MG/DL (ref 74–99)

## 2025-01-13 PROCEDURE — 2500000001 HC RX 250 WO HCPCS SELF ADMINISTERED DRUGS (ALT 637 FOR MEDICARE OP)

## 2025-01-13 PROCEDURE — 82947 ASSAY GLUCOSE BLOOD QUANT: CPT

## 2025-01-13 PROCEDURE — 97110 THERAPEUTIC EXERCISES: CPT | Mod: GP

## 2025-01-13 PROCEDURE — 1210000001 HC SEMI-PRIVATE ROOM DAILY

## 2025-01-13 PROCEDURE — 2500000002 HC RX 250 W HCPCS SELF ADMINISTERED DRUGS (ALT 637 FOR MEDICARE OP, ALT 636 FOR OP/ED): Performed by: STUDENT IN AN ORGANIZED HEALTH CARE EDUCATION/TRAINING PROGRAM

## 2025-01-13 PROCEDURE — 2500000004 HC RX 250 GENERAL PHARMACY W/ HCPCS (ALT 636 FOR OP/ED)

## 2025-01-13 PROCEDURE — 2500000002 HC RX 250 W HCPCS SELF ADMINISTERED DRUGS (ALT 637 FOR MEDICARE OP, ALT 636 FOR OP/ED)

## 2025-01-13 PROCEDURE — 99232 SBSQ HOSP IP/OBS MODERATE 35: CPT

## 2025-01-13 RX ORDER — INSULIN LISPRO 100 [IU]/ML
4 INJECTION, SOLUTION INTRAVENOUS; SUBCUTANEOUS ONCE
Status: COMPLETED | OUTPATIENT
Start: 2025-01-13 | End: 2025-01-13

## 2025-01-13 RX ORDER — INSULIN LISPRO 100 [IU]/ML
6 INJECTION, SOLUTION INTRAVENOUS; SUBCUTANEOUS ONCE
Status: COMPLETED | OUTPATIENT
Start: 2025-01-13 | End: 2025-01-13

## 2025-01-13 RX ADMIN — ENOXAPARIN SODIUM 40 MG: 100 INJECTION SUBCUTANEOUS at 21:18

## 2025-01-13 RX ADMIN — AMLODIPINE BESYLATE 10 MG: 10 TABLET ORAL at 08:58

## 2025-01-13 RX ADMIN — RANOLAZINE 500 MG: 500 TABLET, EXTENDED RELEASE ORAL at 21:18

## 2025-01-13 RX ADMIN — Medication 1000 UNITS: at 08:58

## 2025-01-13 RX ADMIN — INSULIN LISPRO 3 UNITS: 100 INJECTION, SOLUTION INTRAVENOUS; SUBCUTANEOUS at 12:51

## 2025-01-13 RX ADMIN — ASPIRIN 81 MG: 81 TABLET, COATED ORAL at 08:58

## 2025-01-13 RX ADMIN — ISOSORBIDE MONONITRATE 60 MG: 60 TABLET, EXTENDED RELEASE ORAL at 08:59

## 2025-01-13 RX ADMIN — CARVEDILOL 12.5 MG: 12.5 TABLET, FILM COATED ORAL at 09:00

## 2025-01-13 RX ADMIN — INSULIN LISPRO 4 UNITS: 100 INJECTION, SOLUTION INTRAVENOUS; SUBCUTANEOUS at 00:31

## 2025-01-13 RX ADMIN — INSULIN LISPRO 6 UNITS: 100 INJECTION, SOLUTION INTRAVENOUS; SUBCUTANEOUS at 21:18

## 2025-01-13 RX ADMIN — CARVEDILOL 12.5 MG: 12.5 TABLET, FILM COATED ORAL at 21:18

## 2025-01-13 RX ADMIN — INSULIN LISPRO 3 UNITS: 100 INJECTION, SOLUTION INTRAVENOUS; SUBCUTANEOUS at 17:48

## 2025-01-13 RX ADMIN — ATORVASTATIN CALCIUM 80 MG: 80 TABLET, FILM COATED ORAL at 08:59

## 2025-01-13 RX ADMIN — RANOLAZINE 500 MG: 500 TABLET, EXTENDED RELEASE ORAL at 09:00

## 2025-01-13 RX ADMIN — DAPAGLIFLOZIN 10 MG: 10 TABLET, FILM COATED ORAL at 08:59

## 2025-01-13 RX ADMIN — CLOPIDOGREL BISULFATE 75 MG: 75 TABLET ORAL at 08:58

## 2025-01-13 ASSESSMENT — PAIN - FUNCTIONAL ASSESSMENT
PAIN_FUNCTIONAL_ASSESSMENT: 0-10

## 2025-01-13 ASSESSMENT — COGNITIVE AND FUNCTIONAL STATUS - GENERAL
HELP NEEDED FOR BATHING: A LITTLE
WALKING IN HOSPITAL ROOM: A LITTLE
PERSONAL GROOMING: A LITTLE
HELP NEEDED FOR BATHING: A LITTLE
CLIMB 3 TO 5 STEPS WITH RAILING: A LOT
STANDING UP FROM CHAIR USING ARMS: A LOT
MOBILITY SCORE: 15
DRESSING REGULAR LOWER BODY CLOTHING: A LITTLE
TOILETING: A LITTLE
CLIMB 3 TO 5 STEPS WITH RAILING: TOTAL
WALKING IN HOSPITAL ROOM: A LOT
MOBILITY SCORE: 15
TURNING FROM BACK TO SIDE WHILE IN FLAT BAD: A LITTLE
MOVING TO AND FROM BED TO CHAIR: A LOT
DAILY ACTIVITIY SCORE: 19
DRESSING REGULAR UPPER BODY CLOTHING: A LITTLE
CLIMB 3 TO 5 STEPS WITH RAILING: A LOT
TURNING FROM BACK TO SIDE WHILE IN FLAT BAD: A LITTLE
MOVING TO AND FROM BED TO CHAIR: A LITTLE
MOVING TO AND FROM BED TO CHAIR: A LOT
DRESSING REGULAR LOWER BODY CLOTHING: A LITTLE
STANDING UP FROM CHAIR USING ARMS: A LOT
MOBILITY SCORE: 15
STANDING UP FROM CHAIR USING ARMS: A LOT
DRESSING REGULAR UPPER BODY CLOTHING: A LITTLE
PERSONAL GROOMING: A LITTLE
DAILY ACTIVITIY SCORE: 19
WALKING IN HOSPITAL ROOM: A LOT
MOVING FROM LYING ON BACK TO SITTING ON SIDE OF FLAT BED WITH BEDRAILS: A LITTLE
TURNING FROM BACK TO SIDE WHILE IN FLAT BAD: A LITTLE
TOILETING: A LITTLE

## 2025-01-13 ASSESSMENT — PAIN SCALES - GENERAL
PAINLEVEL_OUTOF10: 0 - NO PAIN

## 2025-01-13 NOTE — PROGRESS NOTES
"Myrna Khan is a 64 y.o. female on day 7 of admission presenting with Fall, initial encounter.    Subjective   No acute overnight events reported. Patient seen resting comfortably in bedside with nurse present. Patient states she is not having any acute pain or any acute concerns. Patient understands she will likely need a nursing facility to regain strength and to return to her baseline and she is agreeable.        Objective     Physical Exam  Constitutional:       General: She is not in acute distress.     Appearance: She is underweight. She is not ill-appearing.      Comments: BMI 16, cachectic appearance   HENT:      Head: Normocephalic and atraumatic.   Eyes:      Extraocular Movements: Extraocular movements intact.      Conjunctiva/sclera: Conjunctivae normal.   Cardiovascular:      Rate and Rhythm: Normal rate and regular rhythm.      Pulses: Normal pulses.      Heart sounds: Normal heart sounds. No murmur heard.     No friction rub. No gallop.   Pulmonary:      Effort: Pulmonary effort is normal. No respiratory distress.      Breath sounds: Normal breath sounds. No wheezing.   Musculoskeletal:      Cervical back: Normal range of motion.      Right lower leg: No edema.      Left lower leg: No edema.   Skin:     Findings: Lesion present.      Comments: Multiple small, round, mobile non-tender nodules present in B/L axilla    Neurological:      Mental Status: She is alert.   Psychiatric:         Mood and Affect: Mood normal.         Behavior: Behavior normal.         Last Recorded Vitals  Blood pressure 138/75, pulse 80, temperature 36.6 °C (97.9 °F), temperature source Temporal, resp. rate 16, height 1.6 m (5' 3\"), weight (!) 41.8 kg (92 lb 1.6 oz), SpO2 99%.  Intake/Output last 3 Shifts:  I/O last 3 completed shifts:  In: 239.6 (5.7 mL/kg) [P.O.:180; I.V.:59.6 (1.4 mL/kg)]  Out: 3450 (82.6 mL/kg) [Urine:3450 (2.3 mL/kg/hr)]  Weight: 41.8 kg     Relevant Results    Scheduled medications  amLODIPine, 10 " mg, oral, Daily  aspirin, 81 mg, oral, Daily  atorvastatin, 80 mg, oral, Daily  carvedilol, 12.5 mg, oral, BID  cholecalciferol, 1,000 Units, oral, Daily  clopidogrel, 75 mg, oral, Daily  dapagliflozin propanediol, 10 mg, oral, Daily  enoxaparin, 40 mg, subcutaneous, q24h  [Held by provider] hydrALAZINE, 100 mg, oral, TID  [Held by provider] insulin glargine, 10 Units, subcutaneous, Nightly  insulin lispro, 0-5 Units, subcutaneous, TID AC  [Held by provider] insulin lispro, 3 Units, subcutaneous, TID  isosorbide mononitrate ER, 60 mg, oral, Daily  lidocaine, 1 patch, transdermal, Daily  polyethylene glycol, 17 g, oral, Daily  ranolazine, 500 mg, oral, BID  sennosides-docusate sodium, 1 tablet, oral, Nightly      Continuous medications     PRN medications  PRN medications: acetaminophen, bisacodyl, dextrose, dextrose, diclofenac sodium, glucagon, glucagon, hydrALAZINE, ibuprofen      Assessment/Plan   Assessment & Plan  Fall, initial encounter      Myrna Khan is a 64 y.o. female with h/o T2DM (last HgA1c 8.5% 11/15/23), HTN, HFrEF (last EF 35-40% 8/26/23), CAD s/p CABG, HTN, PAD s/p left SFA stent, renal and carotid artery stenosis, and cocaine and alcohol use disorder who presents with multiple falls within the past few months and inability to care for herself. She was admitted for hyperglycemia, hypertension urgency, and multiple falls. BP elevated to the 200s systolic and 120s diastolic likely in the setting of medication on compliance and cocaine use. She was also found to be hyperglycemic 473 on admission and received 10u of regular insulin. Patient has also been non compliant with glycemic medications and she had a recent hospital admission 2/5/24 for syncope and hypoglycemia therefore, there may be a component of poor medical literacy.  Due to repeated hypoglycemic events, Lantus decreased to 10 units and Lispro discontinued. Patient currently doing well, pending placement.     #Multiple falls   #bilateral  hip pain  #unable to ambulate  #Right foot pain  #Underweight  :: CT c-spine negative for acute fracture or subluxation   :: XR of bilateral hips 7/2024 negative for acute abnormality in the hips  :: BMI 16.31  - pt unable to care for herself and perform her ADLs, will likely need longterm placement   - X-ray of right foot showed no acute fractures  - lidocaine patch to both hips  - tylenol 650 mg q6h PRN mild-moderate pain  - PT/OT recommend moderate intensity, SNF pending  - Nutrition consulted, appreciate recs     #Uncontrolled hypertension (Improving)  #HFrecEF (last EF 55% 7/2024 recovered from last EF 35-40% 8/26/23)  - BP elevated to the 200s systolic and 120s diastolic in the ED  - Urine drug screen was positive for cocaine   - c/w amlodipine 10 mg daily   - Held home hydralazine 100 mg TID due to mildly hypotensive BP readings  - c/w coreg 25 mg BID   - c/w GDMT: Imdur 60 mg daily, farxiga 10 mg  - continue to monitor BP     #CAD s/p CABGx2 2023  #PAD s/p left SFA stent  #Stable angina   - c/w home Aspirin 81 mg daily and Plavix 75 mg PO QD  - c/w home ranolazine 500 mg BID     #Hypoglycemia   #Hyperglycemia (Resolved)  #Uncontrolled T2DM (last HgA1c 15.2%, 7/2024)  - Gluc 473 on admission s/p regular insulin 10u, POCT glucose 456 s/p 10u Lispro and 24u Lantus  - pt noncompliant with insulin outpatient  - holding home dose of Metformin while hospitalized will resume on discharge  - Resumed Farxiga 10 mg daily  - Lantus from 24 units to 20 units at bedtime;   -- Now 1/12/25 reduced to10U  - Decreased lispro from 6 units to 3 units AC;  -- 1/12/25 Dc'd QAC insulin until glucose normalizes  [ ] Decreased SSI #2 to #1, accucheck ACHS  [ ] diabetes educator consulted    #Constipation  - c/w Bowel Regimen; Miralax daily, Bisacodyl 10 mg suppository, & Ayanna-colace QHS     #Hypomagnesemia (Resolved)  - Mg 1.56 on admission. S/p 2g magnesium sulfate x1 dose; Repeat Mg 1.68    #UTI (Resolved)   :: positive  trichomonas during last admission and was treated with metronidazole    :: exam findings with mild suprapubic pain, pt denied vaginal discharge today   - UA: - nitrite, 250 LE, 1+ blood, 1+ ketones, 4+ gluc, 2+ protein, 11-20 WBC, 11-20 RBC   - Ucx contaminated  - s/p treatment w/ bactrim 1 tablet q12h for 3 days (1/6 - 1/9)  - Resumed home Farxiga  - Malone in place  - will plan for TOV     #Dispo: SNF Precert pending     Fluids: prn  Diet: Cardiac diet  O2: prn   DVT ppx: lovenox   GI ppx: n/a   Abx: n/a  CODE STATUS: FULL      Patient discussed with attending physician Dr. Vences  Plan preliminary until cosigned by attending physician.    Jesse Granados MD  Family Medicine  PGY-2

## 2025-01-13 NOTE — PROGRESS NOTES
01/13/25 1119   Discharge Planning   Home or Post Acute Services Post acute facilities (Rehab/SNF/etc)   Type of Post Acute Facility Services Skilled nursing   Expected Discharge Disposition SNF  ([facility])     Transitional Care Coordination Progress Note:  Patient discussed during interdisciplinary rounds.   Team members present: MD & TCC  Plan per Medical/Surgical team: Patient medically ready for discharge  Payor: [employer] [place]  Discharge disposition: [facility] precert escalated 1/10  Potential Barriers: None  ADOD: 1/14     TCC will continue to follow and update the plan as warranted.

## 2025-01-13 NOTE — CARE PLAN
The patient's goals for the shift include      The clinical goals for the shift include pt will remain free and safe from falls and injury until end of shift

## 2025-01-13 NOTE — CARE PLAN
Problem: Fall/Injury  Goal: Not fall by end of shift  Outcome: Progressing  Goal: Be free from injury by end of the shift  Outcome: Progressing  Goal: Verbalize understanding of personal risk factors for fall in the hospital  Outcome: Progressing  Goal: Verbalize understanding of risk factor reduction measures to prevent injury from fall in the home  Outcome: Progressing  Goal: Use assistive devices by end of the shift  Outcome: Progressing  Goal: Pace activities to prevent fatigue by end of the shift  Outcome: Progressing   The patient's goals for the shift include      The clinical goals for the shift include Pt will remain safe and free from injury throughout shift,    Over the shift, the patient did not make progress toward the following goals.

## 2025-01-13 NOTE — PROGRESS NOTES
Physical Therapy    Physical Therapy Treatment    Patient Name: Myrna Khan  MRN: [id number]  Today's Date: 1/13/2025  Time Calculation  Start Time: 1415  Stop Time: 1427  Time Calculation (min): 12 min       Assessment/Plan   PT Assessment  Barriers to Discharge Home: Caregiver assistance, Physical needs  Caregiver Assistance: Patient lives alone and/or does not have reliable caregiver assistance  Physical Needs: Ambulating household distances limited by function/safety, High falls risk due to function or environment  End of Session Communication: Bedside nurse  End of Session Patient Position: Bed, 3 rail up, Alarm on     PT Plan  Treatment/Interventions: Bed mobility, Transfer training, Gait training, Stair training, Balance training, Strengthening, Endurance training, Range of motion, Therapeutic exercise, Therapeutic activity  PT Plan: Ongoing PT  PT Frequency: 3 times per week  PT Discharge Recommendations: Moderate intensity level of continued care  Equipment Recommended upon Discharge:  (TBD)  PT Recommended Transfer Status: Total assist  PT - OK to Discharge: Yes (PT eval completed & recs made)      General Visit Information:   PT  Visit  PT Received On: 01/13/25  Prior to Session Communication: Bedside nurse  Patient Position Received: Bed, 3 rail up, Alarm off, not on at start of session     Subjective   Precautions:  Precautions  Medical Precautions: Fall precautions       Objective   Pain:  Pain Assessment  Pain Assessment: 0-10  0-10 (Numeric) Pain Score: 0 - No pain       Postural Control:     Static Sitting Balance  Static Sitting-Level of Assistance: Close supervision  Dynamic Sitting Balance  Dynamic Sitting-Level of Assistance: Contact guard  Static Standing Balance  Static Standing-Balance Support: Bilateral upper extremity supported  Static Standing-Level of Assistance: Minimum assistance  Dynamic Standing Balance  Dynamic Standing-Balance Support: Bilateral upper extremity supported  Dynamic  Standing-Level of Assistance:  (min/mod assist)      PT Treatments:  Therapeutic Exercise  Therapeutic Exercise Performed: Yes  Therapeutic Exercise Activity 1: seated BLE x10: AP, LAQ, hip flex, hip abd/add        Bed Mobility  Bed Mobility: Yes  Bed Mobility 1  Bed Mobility 1: Supine to sitting, Sitting to supine  Level of Assistance 1: Minimum assistance  Bed Mobility Comments 1: HOB elevated, verbal cues, use of bed rail  Ambulation/Gait Training  Ambulation/Gait Training Performed: Yes  Ambulation/Gait Training 1  Device 1: Rolling walker  Assistance 1: Minimum assistance  Quality of Gait 1: Diminished heel strike, Decreased step length, Forward flexed posture (decreased bianca, verbal cues for sequencing and upright posture)  Comments/Distance (ft) 1: 5-6 sidesteps; 2 feet fwd/bwd (pt declined further ambulation despite encouragement; c/o feeling tired)  Transfers  Transfer: Yes  Transfer 1  Technique 1: Sit to stand, Stand to sit  Transfer Device 1: Walker  Transfer Level of Assistance 1: Moderate assistance  Trials/Comments 1: x2 trials, verbal cues for hand placement             Outcome Measures:  [facility] Basic Mobility  Turning from your back to your side while in a flat bed without using bedrails: A little  Moving from lying on your back to sitting on the side of a flat bed without using bedrails: A little  Moving to and from bed to chair (including a wheelchair): A little  Standing up from a chair using your arms (e.g. wheelchair or bedside chair): A lot  To walk in hospital room: A little  Climbing 3-5 steps with railing: Total  Basic Mobility - Total Score: 15                            Education Documentation  Precautions, taught by Melissa Orellana PT at 1/13/2025  2:50 PM.  Learner: Patient  Readiness: Acceptance  Method: Explanation  Response: Needs Reinforcement  Comment: safe mobility, falls precautions    Mobility Training, taught by Melissa Orellana, PT at 1/13/2025  2:50 PM.  Learner:  Patient  Readiness: Acceptance  Method: Explanation  Response: Needs Reinforcement  Comment: safe mobility, falls precautions    Education Comments  No comments found.               Encounter Problems       Encounter Problems (Active)       Mobility       STG - Patient will ambulate >25' with WW and Kendal (Not Progressing)       Start:  01/07/25    Expected End:  01/21/25               PT Transfers       STG - Patient to transfer to and from sit to supine with CGA (Progressing)       Start:  01/07/25    Expected End:  01/21/25            STG - Patient will transfer sit to and from stand with kendal and WW (Not Progressing)       Start:  01/07/25    Expected End:  01/21/25               Pain - Adult                01/13/25 at 2:51 PM   Melissa Orellana, PT

## 2025-01-14 LAB
ALBUMIN SERPL BCP-MCNC: 2.6 G/DL (ref 3.4–5)
ANION GAP SERPL CALC-SCNC: 13 MMOL/L (ref 10–20)
ANION GAP SERPL CALC-SCNC: 14 MMOL/L (ref 10–20)
BASOPHILS # BLD AUTO: 0.05 X10*3/UL (ref 0–0.1)
BASOPHILS NFR BLD AUTO: 0.7 %
BUN SERPL-MCNC: 12 MG/DL (ref 6–23)
BUN SERPL-MCNC: 14 MG/DL (ref 6–23)
CALCIUM SERPL-MCNC: 8.1 MG/DL (ref 8.6–10.6)
CALCIUM SERPL-MCNC: 8.2 MG/DL (ref 8.6–10.6)
CHLORIDE SERPL-SCNC: 100 MMOL/L (ref 98–107)
CHLORIDE SERPL-SCNC: 103 MMOL/L (ref 98–107)
CO2 SERPL-SCNC: 22 MMOL/L (ref 21–32)
CO2 SERPL-SCNC: 23 MMOL/L (ref 21–32)
CREAT SERPL-MCNC: 0.56 MG/DL (ref 0.5–1.05)
CREAT SERPL-MCNC: 0.71 MG/DL (ref 0.5–1.05)
EGFRCR SERPLBLD CKD-EPI 2021: >90 ML/MIN/1.73M*2
EGFRCR SERPLBLD CKD-EPI 2021: >90 ML/MIN/1.73M*2
EOSINOPHIL # BLD AUTO: 0.04 X10*3/UL (ref 0–0.7)
EOSINOPHIL NFR BLD AUTO: 0.6 %
ERYTHROCYTE [DISTWIDTH] IN BLOOD BY AUTOMATED COUNT: 12.5 % (ref 11.5–14.5)
GLUCOSE BLD MANUAL STRIP-MCNC: 173 MG/DL (ref 74–99)
GLUCOSE BLD MANUAL STRIP-MCNC: 245 MG/DL (ref 74–99)
GLUCOSE BLD MANUAL STRIP-MCNC: 432 MG/DL (ref 74–99)
GLUCOSE BLD MANUAL STRIP-MCNC: 440 MG/DL (ref 74–99)
GLUCOSE BLD MANUAL STRIP-MCNC: 500 MG/DL (ref 74–99)
GLUCOSE BLD MANUAL STRIP-MCNC: 502 MG/DL (ref 74–99)
GLUCOSE SERPL-MCNC: 202 MG/DL (ref 74–99)
GLUCOSE SERPL-MCNC: 487 MG/DL (ref 74–99)
HCT VFR BLD AUTO: 37.7 % (ref 36–46)
HGB BLD-MCNC: 12.9 G/DL (ref 12–16)
IMM GRANULOCYTES # BLD AUTO: 0.08 X10*3/UL (ref 0–0.7)
IMM GRANULOCYTES NFR BLD AUTO: 1.2 % (ref 0–0.9)
LYMPHOCYTES # BLD AUTO: 3.39 X10*3/UL (ref 1.2–4.8)
LYMPHOCYTES NFR BLD AUTO: 50.1 %
MAGNESIUM SERPL-MCNC: 1.59 MG/DL (ref 1.6–2.4)
MCH RBC QN AUTO: 32.5 PG (ref 26–34)
MCHC RBC AUTO-ENTMCNC: 34.2 G/DL (ref 32–36)
MCV RBC AUTO: 95 FL (ref 80–100)
MONOCYTES # BLD AUTO: 0.71 X10*3/UL (ref 0.1–1)
MONOCYTES NFR BLD AUTO: 10.5 %
NEUTROPHILS # BLD AUTO: 2.5 X10*3/UL (ref 1.2–7.7)
NEUTROPHILS NFR BLD AUTO: 36.9 %
NRBC BLD-RTO: 0 /100 WBCS (ref 0–0)
PHOSPHATE SERPL-MCNC: 2.3 MG/DL (ref 2.5–4.9)
PLATELET # BLD AUTO: 245 X10*3/UL (ref 150–450)
POTASSIUM SERPL-SCNC: 3.5 MMOL/L (ref 3.5–5.3)
POTASSIUM SERPL-SCNC: 4.3 MMOL/L (ref 3.5–5.3)
RBC # BLD AUTO: 3.97 X10*6/UL (ref 4–5.2)
SODIUM SERPL-SCNC: 132 MMOL/L (ref 136–145)
SODIUM SERPL-SCNC: 135 MMOL/L (ref 136–145)
WBC # BLD AUTO: 6.8 X10*3/UL (ref 4.4–11.3)

## 2025-01-14 PROCEDURE — 2500000001 HC RX 250 WO HCPCS SELF ADMINISTERED DRUGS (ALT 637 FOR MEDICARE OP)

## 2025-01-14 PROCEDURE — 36415 COLL VENOUS BLD VENIPUNCTURE: CPT | Performed by: INTERNAL MEDICINE

## 2025-01-14 PROCEDURE — 97110 THERAPEUTIC EXERCISES: CPT | Mod: GO

## 2025-01-14 PROCEDURE — 1210000001 HC SEMI-PRIVATE ROOM DAILY

## 2025-01-14 PROCEDURE — 80069 RENAL FUNCTION PANEL: CPT

## 2025-01-14 PROCEDURE — 97530 THERAPEUTIC ACTIVITIES: CPT | Mod: GO

## 2025-01-14 PROCEDURE — 51701 INSERT BLADDER CATHETER: CPT

## 2025-01-14 PROCEDURE — 83735 ASSAY OF MAGNESIUM: CPT

## 2025-01-14 PROCEDURE — 2500000002 HC RX 250 W HCPCS SELF ADMINISTERED DRUGS (ALT 637 FOR MEDICARE OP, ALT 636 FOR OP/ED): Performed by: STUDENT IN AN ORGANIZED HEALTH CARE EDUCATION/TRAINING PROGRAM

## 2025-01-14 PROCEDURE — 80048 BASIC METABOLIC PNL TOTAL CA: CPT | Mod: CCI | Performed by: INTERNAL MEDICINE

## 2025-01-14 PROCEDURE — 82947 ASSAY GLUCOSE BLOOD QUANT: CPT

## 2025-01-14 PROCEDURE — 2500000001 HC RX 250 WO HCPCS SELF ADMINISTERED DRUGS (ALT 637 FOR MEDICARE OP): Performed by: STUDENT IN AN ORGANIZED HEALTH CARE EDUCATION/TRAINING PROGRAM

## 2025-01-14 PROCEDURE — 2500000002 HC RX 250 W HCPCS SELF ADMINISTERED DRUGS (ALT 637 FOR MEDICARE OP, ALT 636 FOR OP/ED): Performed by: INTERNAL MEDICINE

## 2025-01-14 PROCEDURE — 2500000004 HC RX 250 GENERAL PHARMACY W/ HCPCS (ALT 636 FOR OP/ED)

## 2025-01-14 PROCEDURE — 85025 COMPLETE CBC W/AUTO DIFF WBC: CPT

## 2025-01-14 PROCEDURE — 99232 SBSQ HOSP IP/OBS MODERATE 35: CPT | Performed by: INTERNAL MEDICINE

## 2025-01-14 PROCEDURE — 2500000002 HC RX 250 W HCPCS SELF ADMINISTERED DRUGS (ALT 637 FOR MEDICARE OP, ALT 636 FOR OP/ED)

## 2025-01-14 PROCEDURE — 36415 COLL VENOUS BLD VENIPUNCTURE: CPT

## 2025-01-14 RX ORDER — INSULIN LISPRO 100 [IU]/ML
6 INJECTION, SOLUTION INTRAVENOUS; SUBCUTANEOUS ONCE
Status: COMPLETED | OUTPATIENT
Start: 2025-01-14 | End: 2025-01-14

## 2025-01-14 RX ORDER — INSULIN GLARGINE 100 [IU]/ML
10 INJECTION, SOLUTION SUBCUTANEOUS NIGHTLY
Start: 2025-01-14

## 2025-01-14 RX ORDER — IBUPROFEN 600 MG/1
600 TABLET ORAL EVERY 6 HOURS PRN
Status: DISCONTINUED | OUTPATIENT
Start: 2025-01-14 | End: 2025-01-16 | Stop reason: HOSPADM

## 2025-01-14 RX ORDER — ACETAMINOPHEN 500 MG
10 TABLET ORAL NIGHTLY
Status: DISCONTINUED | OUTPATIENT
Start: 2025-01-14 | End: 2025-01-16 | Stop reason: HOSPADM

## 2025-01-14 RX ADMIN — CARVEDILOL 12.5 MG: 12.5 TABLET, FILM COATED ORAL at 09:08

## 2025-01-14 RX ADMIN — INSULIN LISPRO 5 UNITS: 100 INJECTION, SOLUTION INTRAVENOUS; SUBCUTANEOUS at 09:09

## 2025-01-14 RX ADMIN — ISOSORBIDE MONONITRATE 60 MG: 60 TABLET, EXTENDED RELEASE ORAL at 09:07

## 2025-01-14 RX ADMIN — RANOLAZINE 500 MG: 500 TABLET, EXTENDED RELEASE ORAL at 22:05

## 2025-01-14 RX ADMIN — Medication 10 MG: at 22:05

## 2025-01-14 RX ADMIN — ATORVASTATIN CALCIUM 80 MG: 80 TABLET, FILM COATED ORAL at 09:08

## 2025-01-14 RX ADMIN — INSULIN LISPRO 6 UNITS: 100 INJECTION, SOLUTION INTRAVENOUS; SUBCUTANEOUS at 17:52

## 2025-01-14 RX ADMIN — CARVEDILOL 12.5 MG: 12.5 TABLET, FILM COATED ORAL at 22:06

## 2025-01-14 RX ADMIN — ASPIRIN 81 MG: 81 TABLET, COATED ORAL at 09:08

## 2025-01-14 RX ADMIN — INSULIN LISPRO 6 UNITS: 100 INJECTION, SOLUTION INTRAVENOUS; SUBCUTANEOUS at 22:16

## 2025-01-14 RX ADMIN — AMLODIPINE BESYLATE 10 MG: 10 TABLET ORAL at 09:08

## 2025-01-14 RX ADMIN — DAPAGLIFLOZIN 10 MG: 10 TABLET, FILM COATED ORAL at 09:08

## 2025-01-14 RX ADMIN — CLOPIDOGREL BISULFATE 75 MG: 75 TABLET ORAL at 09:08

## 2025-01-14 RX ADMIN — Medication 1000 UNITS: at 09:08

## 2025-01-14 RX ADMIN — ENOXAPARIN SODIUM 40 MG: 100 INJECTION SUBCUTANEOUS at 22:16

## 2025-01-14 RX ADMIN — INSULIN LISPRO 5 UNITS: 100 INJECTION, SOLUTION INTRAVENOUS; SUBCUTANEOUS at 17:46

## 2025-01-14 RX ADMIN — RANOLAZINE 500 MG: 500 TABLET, EXTENDED RELEASE ORAL at 09:08

## 2025-01-14 RX ADMIN — INSULIN GLARGINE 10 UNITS: 100 INJECTION, SOLUTION SUBCUTANEOUS at 22:06

## 2025-01-14 ASSESSMENT — COGNITIVE AND FUNCTIONAL STATUS - GENERAL
PERSONAL GROOMING: A LITTLE
DRESSING REGULAR LOWER BODY CLOTHING: A LITTLE
TOILETING: A LITTLE
HELP NEEDED FOR BATHING: A LOT
DRESSING REGULAR UPPER BODY CLOTHING: A LITTLE
DAILY ACTIVITIY SCORE: 18

## 2025-01-14 ASSESSMENT — PAIN SCALES - GENERAL
PAINLEVEL_OUTOF10: 0 - NO PAIN
PAINLEVEL_OUTOF10: 0 - NO PAIN

## 2025-01-14 ASSESSMENT — PAIN - FUNCTIONAL ASSESSMENT: PAIN_FUNCTIONAL_ASSESSMENT: 0-10

## 2025-01-14 NOTE — PROGRESS NOTES
01/14/25 1441   Discharge Planning   Home or Post Acute Services Post acute facilities (Rehab/SNF/etc)   Type of Post Acute Facility Services Skilled nursing   Expected Discharge Disposition SNF  ([facility])   Does the patient need discharge transport arranged? Yes   RoundTrip coordination needed? Yes   Has discharge transport been arranged? Yes   What day is the transport expected? 01/14/25   What time is the transport expected? 1800     Transitional Care Coordination Discharge Planning Note:  Patient medically ready for discharge 1/14  Patient received auth to admit to [facility]  RN report # [phone]   TCC requested transport for 6pm  The [employer] Vehicle you requested for Myrna RUSSO in unit/room [place] [id number] on 01/14/2025 is scheduled to arrive at 6:00pm EST! [employer] is handling this ride and you can contact them at [phone].    KAMLESH MccrayN-RN  Transitional Care Coordinator (TCC)  [phone] ext [phone]

## 2025-01-14 NOTE — PROGRESS NOTES
Occupational Therapy    Occupational Therapy Treatment    Name: Myrna Khan  MRN: [id number]  : 1960  Date: 25  Room: [facility]    Time Calculation  Start Time: 947  Stop Time: 1014  Time Calculation (min): 27 min    Assessment:  OT Assessment: Pt made progress towards therapeutic goals this session. Pt tolerated mobility and ther ex well demonstrating good effort throughout. Pt with good effort throughout session, motivated to improve functional independence. Pt continues to be appropriate for MOD intensity OT services.  Prognosis: Good  Barriers to Discharge Home: Caregiver assistance, Physical needs  Caregiver Assistance: Patient lives alone and/or does not have reliable caregiver assistance  Physical Needs: Intermittent mobility assistance needed, Intermittent ADL assistance needed, High falls risk due to function or environment  Evaluation/Treatment Tolerance: Patient tolerated treatment well  Medical Staff Made Aware: Yes  End of Session Communication: Bedside nurse  End of Session Patient Position: Bed, 3 rail up, Alarm off, caregiver present, Alarm off, not on at start of session  Plan:  Treatment Interventions: ADL retraining, Functional transfer training, UE strengthening/ROM, Endurance training, Equipment evaluation/education, Compensatory technique education  OT Frequency: 3 times per week  OT Discharge Recommendations: Moderate intensity level of continued care  Equipment Recommended upon Discharge:  (TBD)  OT Recommended Transfer Status: Assist of 1  OT - OK to Discharge: Yes    Subjective   General:  OT Last Visit  OT Received On: 25  Reason for Referral: presents with multiple falls within the past few months and inability to care for herself  Past Medical History Relevant to Rehab: T2DM (last HgA1c 8.5% 11/15/23), HTN, HFrEF (last EF 35-40% 23), CAD s/p CABG, HTN, PAD s/p left SFA stent, renal and carotid artery stenosis, and cocaine and alcohol use disorder  Prior to  Session Communication: Bedside nurse  Patient Position Received: Bed, 3 rail up, Alarm off, not on at start of session  General Comment: Pt supine in bed upon arrival. Pleasant and agreeable to OT tx. Pt eager to participate in strengthening.   Precautions:  Medical Precautions: Fall precautions  Lines/Tubes/Drains:  Urethral Catheter 18 Fr. (Active)   Number of days: 1     Cognition:  Overall Cognitive Status: Within Functional Limits  Arousal/Alertness: Appropriate responses to stimuli  Orientation Level: Oriented X4  Following Commands: Follows multistep commands with repetition  Problem Solving: Assistance required to identify errors made  Insight: Mild  Impulsive: Within functional limits  Processing Speed: Within funtional limits    Pain Assessment:  Pain Assessment  Pain Assessment: 0-10  0-10 (Numeric) Pain Score: 0 - No pain     Objective     Functional Standing Tolerance:  Functional Mobility  Functional Mobility Performed: Yes  Functional Mobility 1  Comments 1: Pt ambulated ~10 steps with Justus hand held assist and VCs for guidance/safety  Bed Mobility/Transfers:   Bed Mobility  Bed Mobility: Yes  Bed Mobility 1  Bed Mobility 1: Supine to sitting  Level of Assistance 1: Minimum assistance, Minimal verbal cues  Bed Mobility Comments 1: VCs for strategy, pt adamantly requesting Justus hand held assist for UB management  Bed Mobility 2  Bed Mobility  2: Scooting (lateral to HOB)  Level of Assistance 2: Close supervision, Minimal verbal cues  Bed Mobility Comments 2: VCs for strategy  Transfers  Transfer: Yes  Transfer 1  Transfer From 1: Bed to, Stand to  Transfer to 1: Stand, Bed  Technique 1: Sit to stand, Stand to sit  Transfer Level of Assistance 1: Minimum assistance, Hand held assistance, Minimal verbal cues  Trials/Comments 1: VCs for positioning    Balance:  Dynamic Sitting Balance  Dynamic Sitting-Balance Support: Feet supported  Dynamic Sitting-Level of Assistance: Close supervision  Dynamic  Sitting-Balance: Forward lean, Lateral lean, Reaching for objects, Trunk control activities  Dynamic Sitting-Comments: Good sit balance  Dynamic Standing Balance  Dynamic Standing-Balance Support: Bilateral upper extremity supported  Dynamic Standing-Level of Assistance: Minimum assistance  Dynamic Standing-Balance: Turning, Reaching for objects  Dynamic Standing-Comments: Justus MURRELL  Static Sitting Balance  Static Sitting-Balance Support: Feet supported  Static Sitting-Level of Assistance: Independent  Static Standing Balance  Static Standing-Balance Support: Bilateral upper extremity supported  Static Standing-Level of Assistance: Minimum assistance  Static Standing-Comment/Number of Minutes: Justus MURRELL    Therapy/Activity:   Therapeutic Exercise  Therapeutic Exercise Performed: Yes    Therapeutic Exercise Activity 1: Elbow flexion (biceps): 10 reps x 2 sets with yellow T-band held by OT    Therapeutic Exercise Activity 2: Elbow extension (triceps): 10 reps x 2 sets with yellow T-band held by OT    Therapeutic Exercise Activity 3: AROM shoulder flexion: 10 reps x 2 sets    Therapeutic Exercise Activity 4: Shoulder abduction: 8 reps x 2 sets with yellow T-band    Therapeutic Activity  Therapeutic Activity Performed: Yes  Therapeutic Activity 1: Functional mobility and transfer training as noted requiring skilled assistance and cueing for safety and training  Therapeutic Activity 2: Increased time spent on rest PRN between ther ex  Therapeutic Activity 3: Educated on OT POC, rehab expectations, benefits of ther ex in relation to ADLs, mobility, transfers  Therapeutic Activity 4: Seated balance training during ther ex completion for ~15 minutes     Outcome Measures:  [facility] Daily Activity  Putting on and taking off regular lower body clothing: A little  Bathing (including washing, rinsing, drying): A lot  Putting on and taking off regular upper body clothing: A little  Toileting, which includes using toilet, bedpan or  urinal: A little  Taking care of personal grooming such as brushing teeth: A little  Eating Meals: None  Daily Activity - Total Score: 18     Education Documentation  Body Mechanics, taught by Juliano Scott OT at 1/14/2025 11:52 AM.  Learner: Patient  Readiness: Acceptance  Method: Explanation, Demonstration  Response: Verbalizes Understanding, Demonstrated Understanding  Comment: OT POC, rehab expectations, benefits of ther ex in relation to ADLs, mobility, transfers    Precautions, taught by Juliano Scott OT at 1/14/2025 11:52 AM.  Learner: Patient  Readiness: Acceptance  Method: Explanation, Demonstration  Response: Verbalizes Understanding, Demonstrated Understanding  Comment: OT POC, rehab expectations, benefits of ther ex in relation to ADLs, mobility, transfers    Education Comments  No comments found.        Goals:  Encounter Problems       Encounter Problems (Active)       ADLs       Pt will complete LB dressing with modified independence while seated and/or standing and AE as needed.  (Progressing)       Start:  01/08/25    Expected End:  01/22/25            Pt will complete UB /LB bathing tasks with modified independence while seated and AE as needed.  (Progressing)       Start:  01/08/25    Expected End:  01/22/25            Pt will complete toilet hygiene while seated /standing with IND level of assistance.   (Progressing)       Start:  01/08/25    Expected End:  01/22/25               BALANCE       Pt will maintain dynamic standing balance during ADL task with modified independent level of assistance in order to demonstrate decreased risk of falling and improved postural control. (Progressing)       Start:  01/08/25    Expected End:  01/22/25               EXERCISE/STRENGTHENING       Patient will be educated on BUE HEP for increased ADL performance. (Progressing)       Start:  01/08/25    Expected End:  01/22/25               MOBILITY       Patient will perform Functional mobility max  Household distances/Community Distances with modified independent level of assistance and least restrictive device in order to improve safety and functional mobility. (Progressing)       Start:  01/08/25    Expected End:  01/22/25               TRANSFERS       Patient will perform bed mobility modified independent level of assistance and bed rails in order to improve safety and independence with mobility (Progressing)       Start:  01/08/25    Expected End:  01/22/25            Patient will complete sit to stand transfer with modified independent level of assistance and least restrictive device in order to improve safety and prepare for out of bed mobility. (Progressing)       Start:  01/08/25    Expected End:  01/22/25 01/14/25 at 11:53 AM   Juliano Scott, OT   [phone]

## 2025-01-14 NOTE — CARE PLAN
The patient's goals for the shift include      The clinical goals for the shift include pt will remain free from injury throughout shift      Problem: Nutrition  Goal: Less than 5 days NPO/clear liquids  Outcome: Progressing  Goal: Oral intake greater than 50%  Outcome: Progressing  Goal: Oral intake greater 75%  Outcome: Progressing  Goal: Consume prescribed supplement  Outcome: Progressing  Goal: Adequate PO fluid intake  Outcome: Progressing  Goal: Nutrition support goals are met within 48 hrs  Outcome: Progressing  Goal: Nutrition support is meeting 75% of nutrient needs  Outcome: Progressing  Goal: Tube feed tolerance  Outcome: Progressing  Goal: BG  mg/dL  Outcome: Progressing  Goal: Lab values WNL  Outcome: Progressing  Goal: Electrolytes WNL  Outcome: Progressing  Goal: Promote healing  Outcome: Progressing  Goal: Maintain stable weight  Outcome: Progressing  Goal: Reduce weight from edema/fluid  Outcome: Progressing  Goal: Gradual weight gain  Outcome: Progressing  Goal: Improve ostomy output  Outcome: Progressing     Problem: Diabetes  Goal: Achieve decreasing blood glucose levels by end of shift  Outcome: Progressing  Goal: Increase stability of blood glucose readings by end of shift  Outcome: Progressing  Goal: Decrease in ketones present in urine by end of shift  Outcome: Progressing  Goal: Maintain electrolyte levels within acceptable range throughout shift  Outcome: Progressing  Goal: Maintain glucose levels >70mg/dl to <250mg/dl throughout shift  Outcome: Progressing  Goal: No changes in neurological exam by end of shift  Outcome: Progressing  Goal: Learn about and adhere to nutrition recommendations by end of shift  Outcome: Progressing  Goal: Vital signs within normal range for age by end of shift  Outcome: Progressing  Goal: Increase self care and/or family involovement by end of shift  Outcome: Progressing  Goal: Receive DSME education by end of shift  Outcome: Progressing     Problem:  Fall/Injury  Goal: Not fall by end of shift  Outcome: Progressing  Goal: Be free from injury by end of the shift  Outcome: Progressing  Goal: Verbalize understanding of personal risk factors for fall in the hospital  Outcome: Progressing  Goal: Verbalize understanding of risk factor reduction measures to prevent injury from fall in the home  Outcome: Progressing  Goal: Use assistive devices by end of the shift  Outcome: Progressing  Goal: Pace activities to prevent fatigue by end of the shift  Outcome: Progressing

## 2025-01-14 NOTE — CARE PLAN
The patient's goals for the shift include      The clinical goals for the shift include patient will remain safe and free from injury throughout the shift.      Problem: Diabetes  Goal: Maintain glucose levels >70mg/dl to <250mg/dl throughout shift  Outcome: Progressing  Goal: No changes in neurological exam by end of shift  Outcome: Progressing  Goal: Vital signs within normal range for age by end of shift  Outcome: Progressing     Problem: Fall/Injury  Goal: Not fall by end of shift  Outcome: Progressing     Problem: Pain - Adult  Goal: Verbalizes/displays adequate comfort level or baseline comfort level  Outcome: Progressing     Problem: Safety - Adult  Goal: Free from fall injury  Outcome: Progressing     Problem: Pain - Adult  Goal: Verbalizes/displays adequate comfort level or baseline comfort level  Outcome: Progressing     Problem: Safety - Adult  Goal: Free from fall injury  Outcome: Progressing     Problem: Discharge Planning  Goal: Discharge to home or other facility with appropriate resources  Outcome: Progressing     Problem: Chronic Conditions and Co-morbidities  Goal: Patient's chronic conditions and co-morbidity symptoms are monitored and maintained or improved  Outcome: Progressing

## 2025-01-14 NOTE — DISCHARGE SUMMARY
Discharge Diagnosis  Fall, initial encounter    Issues Requiring Follow-Up  PCP - hospital follow up, BP and insulin regimen titration    Test Results Pending At Discharge  Pending Labs       No current pending labs.            Hospital Course  Myrna Khan is a 64 y.o. female with h/o T2DM (last HgA1c 8.5% 11/15/23), HTN, HFrEF (last EF 35-40% 8/26/23), CAD s/p CABG, HTN, PAD s/p left SFA stent, renal and carotid artery stenosis, and cocaine and alcohol use disorder who presents with multiple falls within the past few months and inability to care for herself. She was admitted for hyperglycemia, hypertension urgency, and multiple falls. BP elevated to the 200s systolic and 120s diastolic likely in the setting of medication on compliance and cocaine use. She was also found to be hyperglycemic 473 on admission and received 10u of regular insulin. Patient has also been non compliant with glycemic medications and she had a recent hospital admission 2/5/24 for syncope and hypoglycemia therefore, there may be a component of poor medical literacy.  Due to repeated hypoglycemic events, Lantus decreased to 10 units and Lispro discontinued. Patient remained hemodynamically stable, afebrile and medically ready for discharge. Patient will need close follow-up to titrate her blood pressure and insulin regimens. Patient discharged to SNF.        Pertinent Physical Exam At Time of Discharge  Physical Exam  Constitutional:       General: She is not in acute distress.     Appearance: Normal appearance. She is underweight. She is not ill-appearing.      Comments: Frail appearing   HENT:      Head: Normocephalic and atraumatic.   Eyes:      Extraocular Movements: Extraocular movements intact.      Conjunctiva/sclera: Conjunctivae normal.   Cardiovascular:      Rate and Rhythm: Normal rate and regular rhythm.      Pulses: Normal pulses.      Heart sounds: Normal heart sounds. No murmur heard.     No friction rub. No gallop.    Pulmonary:      Effort: Pulmonary effort is normal. No respiratory distress.      Breath sounds: Normal breath sounds. No wheezing.   Abdominal:      Tenderness: There is no abdominal tenderness. There is no guarding.   Musculoskeletal:      Cervical back: Normal range of motion.      Right lower leg: No edema.      Left lower leg: No edema.   Neurological:      General: No focal deficit present.      Mental Status: She is alert and oriented to person, place, and time.   Psychiatric:         Mood and Affect: Mood normal.         Behavior: Behavior normal.         Home Medications     Medication List      START taking these medications     aspirin 81 mg EC tablet; Take 1 tablet (81 mg) by mouth once daily.   diclofenac sodium 1 % gel; Commonly known as: Voltaren; Apply 4.5 inches   (4 g) topically 4 times a day as needed (pain).   insulin glargine 100 unit/mL injection; Commonly known as: Lantus;   Inject 10 Units under the skin once daily at bedtime. Take as directed per   insulin instructions.; Replaces: Lantus Solostar U-100 Insulin 100 unit/mL   (3 mL) pen     CHANGE how you take these medications     insulin lispro 100 unit/mL injection; Inject 0-10 Units under the skin 3   times daily (morning, midday, late afternoon). 0 unit(s) if BG ; 2   unit(s) if -200; 4 unit(s) if -250; 6 unit(s) if -300; 8   unit(s) if -350; 10 unit(s) if -400; What changed: Another   medication with the same name was removed. Continue taking this   medication, and follow the directions you see here.     CONTINUE taking these medications     Accu-Chek Jennifer Plus test strp strip; Generic drug: blood sugar   diagnostic; Use 1 strip to check blood sugar 3 times a day with meals.   Accu-Chek Guide Glucose Meter misc; Generic drug: blood-glucose meter;   Use daily or as directed for monitoring of diabetes.   Accu-Chek Softclix Lancets misc; Generic drug: lancets; Use three times   a day to test blood  sugar w/ meals   acetaminophen 500 mg tablet; Commonly known as: Tylenol; Take 1 tablet   (500 mg) by mouth every 6 hours if needed for mild pain (1 - 3) or   moderate pain (4 - 6). Take per directed   amLODIPine 10 mg tablet; Commonly known as: Norvasc; TAKE 1 TABLET BY   MOUTH ONCE DAILY   atorvastatin 80 mg tablet; Commonly known as: Lipitor; TAKE 1 TABLET BY   MOUTH ONCE DAILY   carvedilol 25 mg tablet; Commonly known as: Coreg; TAKE 1 TABLET BY   MOUTH TWO TIMES A DAY   cholecalciferol 25 MCG (1000 UT) capsule; Commonly known as: Vitamin D-3   clopidogrel 75 mg tablet; Commonly known as: Plavix   Farxiga 10 mg; Generic drug: dapagliflozin propanediol; Take 1 tablet   (10 mg) by mouth once daily with breakfast.   isosorbide mononitrate ER 60 mg 24 hr tablet; Commonly known as: Imdur;   TAKE 1 TABLET BY MOUTH ONCE DAILY   multivitamin with minerals tablet   ranolazine 500 mg 12 hr tablet; Commonly known as: Ranexa; Take 1 tablet   (500 mg) by mouth 2 times a day. Do not crush, chew, or split.     STOP taking these medications     hydrALAZINE 100 mg tablet; Commonly known as: Apresoline   Lantus Solostar U-100 Insulin 100 unit/mL (3 mL) pen; Generic drug:   insulin glargine; Replaced by: insulin glargine 100 unit/mL injection   metFORMIN  mg 24 hr tablet; Commonly known as: Glucophage-XR       Outpatient Follow-Up  No future appointments.    Jesse Granados MD

## 2025-01-15 ENCOUNTER — APPOINTMENT (OUTPATIENT)
Dept: VASCULAR MEDICINE | Facility: HOSPITAL | Age: 65
DRG: 637 | End: 2025-01-15
Payer: COMMERCIAL

## 2025-01-15 VITALS
RESPIRATION RATE: 16 BRPM | WEIGHT: 92.1 LBS | HEART RATE: 83 BPM | DIASTOLIC BLOOD PRESSURE: 80 MMHG | OXYGEN SATURATION: 99 % | BODY MASS INDEX: 16.32 KG/M2 | HEIGHT: 63 IN | TEMPERATURE: 97.3 F | SYSTOLIC BLOOD PRESSURE: 146 MMHG

## 2025-01-15 PROBLEM — M79.89 RIGHT LEG SWELLING: Status: ACTIVE | Noted: 2025-01-15

## 2025-01-15 LAB
GLUCOSE BLD MANUAL STRIP-MCNC: 104 MG/DL (ref 74–99)
GLUCOSE BLD MANUAL STRIP-MCNC: 107 MG/DL (ref 74–99)
GLUCOSE BLD MANUAL STRIP-MCNC: 327 MG/DL (ref 74–99)
GLUCOSE BLD MANUAL STRIP-MCNC: 426 MG/DL (ref 74–99)

## 2025-01-15 PROCEDURE — 2500000001 HC RX 250 WO HCPCS SELF ADMINISTERED DRUGS (ALT 637 FOR MEDICARE OP)

## 2025-01-15 PROCEDURE — 51701 INSERT BLADDER CATHETER: CPT

## 2025-01-15 PROCEDURE — 82947 ASSAY GLUCOSE BLOOD QUANT: CPT

## 2025-01-15 PROCEDURE — 93971 EXTREMITY STUDY: CPT

## 2025-01-15 PROCEDURE — 2500000002 HC RX 250 W HCPCS SELF ADMINISTERED DRUGS (ALT 637 FOR MEDICARE OP, ALT 636 FOR OP/ED)

## 2025-01-15 PROCEDURE — 99239 HOSP IP/OBS DSCHRG MGMT >30: CPT | Performed by: INTERNAL MEDICINE

## 2025-01-15 PROCEDURE — 2500000002 HC RX 250 W HCPCS SELF ADMINISTERED DRUGS (ALT 637 FOR MEDICARE OP, ALT 636 FOR OP/ED): Performed by: INTERNAL MEDICINE

## 2025-01-15 PROCEDURE — 2500000001 HC RX 250 WO HCPCS SELF ADMINISTERED DRUGS (ALT 637 FOR MEDICARE OP): Performed by: STUDENT IN AN ORGANIZED HEALTH CARE EDUCATION/TRAINING PROGRAM

## 2025-01-15 PROCEDURE — 2500000004 HC RX 250 GENERAL PHARMACY W/ HCPCS (ALT 636 FOR OP/ED)

## 2025-01-15 PROCEDURE — 93971 EXTREMITY STUDY: CPT | Performed by: SURGERY

## 2025-01-15 RX ORDER — INSULIN LISPRO 100 [IU]/ML
0-5 INJECTION, SOLUTION INTRAVENOUS; SUBCUTANEOUS
Start: 2025-01-15

## 2025-01-15 RX ORDER — METFORMIN HYDROCHLORIDE 500 MG/1
1000 TABLET, EXTENDED RELEASE ORAL DAILY
Qty: 60 TABLET | Refills: 0 | Status: SHIPPED | OUTPATIENT
Start: 2025-01-15 | End: 2025-02-14

## 2025-01-15 RX ADMIN — ENOXAPARIN SODIUM 40 MG: 100 INJECTION SUBCUTANEOUS at 20:28

## 2025-01-15 RX ADMIN — AMLODIPINE BESYLATE 10 MG: 10 TABLET ORAL at 08:52

## 2025-01-15 RX ADMIN — INSULIN LISPRO 4 UNITS: 100 INJECTION, SOLUTION INTRAVENOUS; SUBCUTANEOUS at 17:27

## 2025-01-15 RX ADMIN — ISOSORBIDE MONONITRATE 60 MG: 60 TABLET, EXTENDED RELEASE ORAL at 08:52

## 2025-01-15 RX ADMIN — Medication 1000 UNITS: at 08:52

## 2025-01-15 RX ADMIN — ATORVASTATIN CALCIUM 80 MG: 80 TABLET, FILM COATED ORAL at 08:52

## 2025-01-15 RX ADMIN — CARVEDILOL 12.5 MG: 12.5 TABLET, FILM COATED ORAL at 20:22

## 2025-01-15 RX ADMIN — ASPIRIN 81 MG: 81 TABLET, COATED ORAL at 08:52

## 2025-01-15 RX ADMIN — RANOLAZINE 500 MG: 500 TABLET, EXTENDED RELEASE ORAL at 20:28

## 2025-01-15 RX ADMIN — SENNOSIDES AND DOCUSATE SODIUM 1 TABLET: 50; 8.6 TABLET ORAL at 20:22

## 2025-01-15 RX ADMIN — DAPAGLIFLOZIN 10 MG: 10 TABLET, FILM COATED ORAL at 08:52

## 2025-01-15 RX ADMIN — INSULIN GLARGINE 10 UNITS: 100 INJECTION, SOLUTION SUBCUTANEOUS at 20:22

## 2025-01-15 RX ADMIN — CARVEDILOL 12.5 MG: 12.5 TABLET, FILM COATED ORAL at 08:52

## 2025-01-15 RX ADMIN — RANOLAZINE 500 MG: 500 TABLET, EXTENDED RELEASE ORAL at 08:52

## 2025-01-15 RX ADMIN — CLOPIDOGREL BISULFATE 75 MG: 75 TABLET ORAL at 08:52

## 2025-01-15 RX ADMIN — Medication 10 MG: at 20:22

## 2025-01-15 ASSESSMENT — COGNITIVE AND FUNCTIONAL STATUS - GENERAL
MOBILITY SCORE: 20
DRESSING REGULAR UPPER BODY CLOTHING: A LITTLE
MOVING TO AND FROM BED TO CHAIR: A LITTLE
MOVING TO AND FROM BED TO CHAIR: A LITTLE
TOILETING: A LITTLE
HELP NEEDED FOR BATHING: A LITTLE
CLIMB 3 TO 5 STEPS WITH RAILING: A LITTLE
DRESSING REGULAR LOWER BODY CLOTHING: A LITTLE
WALKING IN HOSPITAL ROOM: A LITTLE
PERSONAL GROOMING: A LITTLE
STANDING UP FROM CHAIR USING ARMS: A LITTLE
DAILY ACTIVITIY SCORE: 19
DRESSING REGULAR UPPER BODY CLOTHING: A LITTLE
PERSONAL GROOMING: A LITTLE
TOILETING: A LITTLE
STANDING UP FROM CHAIR USING ARMS: A LITTLE
CLIMB 3 TO 5 STEPS WITH RAILING: A LITTLE
WALKING IN HOSPITAL ROOM: A LITTLE
HELP NEEDED FOR BATHING: A LITTLE
MOBILITY SCORE: 20
DRESSING REGULAR LOWER BODY CLOTHING: A LITTLE
DAILY ACTIVITIY SCORE: 19

## 2025-01-15 ASSESSMENT — PAIN SCALES - GENERAL
PAINLEVEL_OUTOF10: 0 - NO PAIN
PAINLEVEL_OUTOF10: 0 - NO PAIN

## 2025-01-15 NOTE — DISCHARGE SUMMARY
Discharge Diagnosis  Fall, initial encounter    Issues Requiring Follow-Up  PCP - Hospital Follow-up, Titration of Insulin and BP medications    Test Results Pending At Discharge  Pending Labs       No current pending labs.            Hospital Course  Myrna Khan is a 64 y.o. female with h/o T2DM (last HgA1c 8.5% 11/15/23), HTN, HFrEF (last EF 35-40% 8/26/23), CAD s/p CABG, HTN, PAD s/p left SFA stent, renal and carotid artery stenosis, and cocaine and alcohol use disorder who presents with multiple falls within the past few months and inability to care for herself. She was admitted for hyperglycemia, hypertension urgency, and multiple falls. BP elevated to the 200s systolic and 120s diastolic likely in the setting of medication on compliance and cocaine use. She was also found to be hyperglycemic 473 on admission and received 10u of regular insulin. Patient has also been non compliant with glycemic medications and she had a recent hospital admission 2/5/24 for syncope and hypoglycemia therefore, there may be a component of poor medical literacy.  Due to repeated hypoglycemic events, Lantus decreased to 10 units and Lispro discontinued. 1/14, Patient initially scheduled for discharge with 6 pm transport arranged. However, patient did not receive Lantus due to hold, and had blood glucose readings in low 500 range. Discharge was cancelled and patient was administered insulin and was monitored. Patient had repeat BG levels of 107 and 104 after resuming previous insulin regimen. RLE duplex completed due to new swelling, negative for acute DVT. Patient remained hemodynamically stable, afebrile and medically ready for discharge. Patient will need close follow-up to titrate her blood pressure and insulin regimens. Patient discharged to SNF.        Pertinent Physical Exam At Time of Discharge  Physical Exam  Constitutional:       General: She is not in acute distress.     Appearance: She is underweight. She is not  ill-appearing.      Comments: BMI 16, cachectic appearance   HENT:      Head: Normocephalic and atraumatic.   Eyes:      Extraocular Movements: Extraocular movements intact.      Conjunctiva/sclera: Conjunctivae normal.   Cardiovascular:      Rate and Rhythm: Normal rate and regular rhythm.      Pulses: Normal pulses.      Heart sounds: Normal heart sounds. No murmur heard.     No friction rub. No gallop.   Pulmonary:      Effort: Pulmonary effort is normal. No respiratory distress.      Breath sounds: Normal breath sounds. No wheezing.   Musculoskeletal:         General: Swelling (RLE) present.      Cervical back: Normal range of motion.      Right lower leg: No edema.      Left lower leg: No edema.   Neurological:      General: No focal deficit present.      Mental Status: She is alert and oriented to person, place, and time.   Psychiatric:         Mood and Affect: Mood normal.         Behavior: Behavior normal.         Home Medications     Medication List      START taking these medications     aspirin 81 mg EC tablet; Take 1 tablet (81 mg) by mouth once daily.   diclofenac sodium 1 % gel; Commonly known as: Voltaren; Apply 4.5 inches   (4 g) topically 4 times a day as needed (pain).   insulin glargine 100 unit/mL injection; Commonly known as: Lantus;   Inject 10 Units under the skin once daily at bedtime. Take as directed per   insulin instructions.; Replaces: Lantus Solostar U-100 Insulin 100 unit/mL   (3 mL) pen     CHANGE how you take these medications     insulin lispro 100 unit/mL injection; Inject 0-5 Units under the skin 3   times daily (morning, midday, late afternoon). 0 unit(s) if BG ; 1   unit(s) if -200; 2 unit(s) if -250; 3 unit(s) if -300; 4   unit(s) if -350; 5 unit(s) if -400; What changed: how much to   take, additional instructions, Another medication with the same name was   removed. Continue taking this medication, and follow the directions you   see  here.     CONTINUE taking these medications     Accu-Chek Jennifer Plus test strp strip; Generic drug: blood sugar   diagnostic; Use 1 strip to check blood sugar 3 times a day with meals.   Accu-Chek Guide Glucose Meter misc; Generic drug: blood-glucose meter;   Use daily or as directed for monitoring of diabetes.   Accu-Chek Softclix Lancets misc; Generic drug: lancets; Use three times   a day to test blood sugar w/ meals   acetaminophen 500 mg tablet; Commonly known as: Tylenol; Take 1 tablet   (500 mg) by mouth every 6 hours if needed for mild pain (1 - 3) or   moderate pain (4 - 6). Take per directed   amLODIPine 10 mg tablet; Commonly known as: Norvasc; TAKE 1 TABLET BY   MOUTH ONCE DAILY   atorvastatin 80 mg tablet; Commonly known as: Lipitor; TAKE 1 TABLET BY   MOUTH ONCE DAILY   carvedilol 25 mg tablet; Commonly known as: Coreg; TAKE 1 TABLET BY   MOUTH TWO TIMES A DAY   cholecalciferol 25 MCG (1000 UT) capsule; Commonly known as: Vitamin D-3   clopidogrel 75 mg tablet; Commonly known as: Plavix   Farxiga 10 mg; Generic drug: dapagliflozin propanediol; Take 1 tablet   (10 mg) by mouth once daily with breakfast.   isosorbide mononitrate ER 60 mg 24 hr tablet; Commonly known as: Imdur;   TAKE 1 TABLET BY MOUTH ONCE DAILY   metFORMIN  mg 24 hr tablet; Commonly known as: Glucophage-XR; TAKE   2 TABLETS BY MOUTH ONCE DAILY   multivitamin with minerals tablet   ranolazine 500 mg 12 hr tablet; Commonly known as: Ranexa; Take 1 tablet   (500 mg) by mouth 2 times a day. Do not crush, chew, or split.     STOP taking these medications     hydrALAZINE 100 mg tablet; Commonly known as: Apresoline   Lantus Solostar U-100 Insulin 100 unit/mL (3 mL) pen; Generic drug:   insulin glargine; Replaced by: insulin glargine 100 unit/mL injection       Outpatient Follow-Up  No future appointments.    Jesse Granados MD

## 2025-01-15 NOTE — CARE PLAN
The patient's goals for the shift include      The clinical goals for the shift include pt willnot retain urine by end of shift

## 2025-01-15 NOTE — PROGRESS NOTES
Physical Therapy    Therapy Communication Note    Patient Name: Myrna Khan  MRN: [id number]  Today's Date: 1/15/2025       Discipline: Physical Therapy      PT Missed Visit: Yes  Missed Visit Reason: Other (Comment) (pt off floor)      01/15/25 at 10:54 AM   Melissa Orellana, PT

## 2025-01-15 NOTE — PROGRESS NOTES
"Myrna Khan is a 64 y.o. female on day 9 of admission presenting with Fall, initial encounter.    Subjective   No acute overnight events reported. Patient seen resting comfortably in bedside with no acute complaints. Patient states she wishes to have coffee but isn't sure if she can use sugar as a sweetener, explained use of splenda as an alternative and patient agreeable. Patient states she is not having any acute pain or any acute concerns. Patient is looking forward to being discharged.       Objective     Physical Exam  Constitutional:       General: She is not in acute distress.     Appearance: She is underweight. She is not ill-appearing.      Comments: BMI 16, cachectic appearance   HENT:      Head: Normocephalic and atraumatic.   Eyes:      Extraocular Movements: Extraocular movements intact.      Conjunctiva/sclera: Conjunctivae normal.   Cardiovascular:      Rate and Rhythm: Normal rate and regular rhythm.      Pulses: Normal pulses.      Heart sounds: Normal heart sounds. No murmur heard.     No friction rub. No gallop.   Pulmonary:      Effort: Pulmonary effort is normal. No respiratory distress.      Breath sounds: Normal breath sounds. No wheezing.   Musculoskeletal:      Cervical back: Normal range of motion.      Right lower leg: No edema.      Left lower leg: No edema.   Neurological:      Mental Status: She is alert.   Psychiatric:         Mood and Affect: Mood normal.         Behavior: Behavior normal.         Last Recorded Vitals  Blood pressure 151/82, pulse 72, temperature 36.8 °C (98.2 °F), temperature source Temporal, resp. rate 16, height 1.6 m (5' 3\"), weight (!) 41.8 kg (92 lb 1.6 oz), SpO2 99%.  Intake/Output last 3 Shifts:  I/O last 3 completed shifts:  In: 240 (5.7 mL/kg) [P.O.:240]  Out: 3325 (79.6 mL/kg) [Urine:3325 (2.2 mL/kg/hr)]  Weight: 41.8 kg     Relevant Results    Scheduled medications  amLODIPine, 10 mg, oral, Daily  aspirin, 81 mg, oral, Daily  atorvastatin, 80 mg, " oral, Daily  carvedilol, 12.5 mg, oral, BID  cholecalciferol, 1,000 Units, oral, Daily  clopidogrel, 75 mg, oral, Daily  dapagliflozin propanediol, 10 mg, oral, Daily  enoxaparin, 40 mg, subcutaneous, q24h  [Held by provider] hydrALAZINE, 100 mg, oral, TID  insulin glargine, 10 Units, subcutaneous, Nightly  insulin lispro, 0-5 Units, subcutaneous, TID AC  insulin lispro, 3 Units, subcutaneous, TID  isosorbide mononitrate ER, 60 mg, oral, Daily  lidocaine, 1 patch, transdermal, Daily  melatonin, 10 mg, oral, Nightly  polyethylene glycol, 17 g, oral, Daily  ranolazine, 500 mg, oral, BID  sennosides-docusate sodium, 1 tablet, oral, Nightly      Continuous medications     PRN medications  PRN medications: acetaminophen, bisacodyl, dextrose, dextrose, diclofenac sodium, glucagon, glucagon, hydrALAZINE, ibuprofen      Assessment/Plan   Assessment & Plan  Fall, initial encounter    Right leg swelling      Myrna Khan is a 64 y.o. female with h/o T2DM (last HgA1c 8.5% 11/15/23), HTN, HFrEF (last EF 35-40% 8/26/23), CAD s/p CABG, HTN, PAD s/p left SFA stent, renal and carotid artery stenosis, and cocaine and alcohol use disorder who presents with multiple falls within the past few months and inability to care for herself. She was admitted for hyperglycemia, hypertension urgency, and multiple falls. BP elevated to the 200s systolic and 120s diastolic likely in the setting of medication on compliance and cocaine use. She was also found to be hyperglycemic 473 on admission and received 10u of regular insulin. Patient has also been non compliant with glycemic medications and she had a recent hospital admission 2/5/24 for syncope and hypoglycemia therefore, there may be a component of poor medical literacy.  Due to repeated hypoglycemic events, Lantus decreased to 10 units and Lispro discontinued. Patient currently doing well, pending placement.    Patient initially scheduled for discharge with 6 pm transport arranged.  However, patient did not receive Lantus due to hold, and had blood glucose readings in low 500 range. Discharge was cancelled and patient was administered insulin and was monitored.      #Multiple falls   #bilateral hip pain  #unable to ambulate  #Right foot pain  #Underweight  :: CT c-spine negative for acute fracture or subluxation   :: XR of bilateral hips 7/2024 negative for acute abnormality in the hips  :: BMI 16.31  - pt unable to care for herself and perform her ADLs, will likely need longterm placement   - X-ray of right foot showed no acute fractures  - lidocaine patch to both hips  - tylenol 650 mg q6h PRN mild-moderate pain  - PT/OT recommend moderate intensity, SNF accepted  - Nutrition consulted, appreciate recs     #Uncontrolled hypertension (Improving)  #HFrecEF (last EF 55% 7/2024 recovered from last EF 35-40% 8/26/23)  - BP elevated to the 200s systolic and 120s diastolic in the ED  - Urine drug screen was positive for cocaine   - c/w amlodipine 10 mg daily   - Held home hydralazine 100 mg TID due to mildly hypotensive BP readings  - c/w coreg 25 mg BID   - c/w GDMT: Imdur 60 mg daily, farxiga 10 mg  - continue to monitor BP     #CAD s/p CABGx2 2023  #PAD s/p left SFA stent  #Stable angina   - c/w home Aspirin 81 mg daily and Plavix 75 mg PO QD  - c/w home ranolazine 500 mg BID     #Hypoglycemia   #Hyperglycemia (Resolved)  #Uncontrolled T2DM (last HgA1c 15.2%, 7/2024)  - Gluc 473 on admission s/p regular insulin 10u, POCT glucose 456 s/p 10u Lispro and 24u Lantus  - pt noncompliant with insulin outpatient  - holding home dose of Metformin while hospitalized will resume on discharge  - Resumed Farxiga 10 mg daily  - Lantus from 24 units to 20 units at bedtime;   -- Now 1/12/25 reduced to10U  - Decreased lispro from 6 units to 3 units AC;  -- 1/12/25 Dc'd QAC insulin until glucose normalizes  [ ] Decreased SSI #2 to #1, accucheck ACHS  [ ] diabetes educator consulted  - Patient initially scheduled  for discharge with 6 pm transport arranged. However, patient did not receive Lantus due to hold, and had blood glucose readings in low 500 range. Discharge was cancelled and patient was administered insulin and was monitored. 1/14    #Constipation  - c/w Bowel Regimen; Miralax daily, Bisacodyl 10 mg suppository, & Ayanna-colace QHS     #Hypomagnesemia (Resolved)  - Mg 1.56 on admission. S/p 2g magnesium sulfate x1 dose; Repeat Mg 1.68    #UTI (Resolved)   :: positive trichomonas during last admission and was treated with metronidazole    :: exam findings with mild suprapubic pain, pt denied vaginal discharge today   - UA: - nitrite, 250 LE, 1+ blood, 1+ ketones, 4+ gluc, 2+ protein, 11-20 WBC, 11-20 RBC   - Ucx contaminated  - s/p treatment w/ bactrim 1 tablet q12h for 3 days (1/6 - 1/9)  - Resumed home Farxiga  - Malone in place, failed TOV       #Dispo: SNF accepted     Fluids: prn  Diet: Cardiac diet  O2: prn   DVT ppx: lovenox   GI ppx: n/a   Abx: n/a  CODE STATUS: FULL      Patient discussed with attending physician Dr. Jane  Plan preliminary until cosigned by attending physician.    Jesse Granados MD  Family Medicine  PGY-2

## 2025-01-15 NOTE — NURSING NOTE
This nurse called report to Devonte reynaga to Nurse Moy, no further questions at this time.   IV removed. Belongings with patient.

## 2025-01-15 NOTE — DOCUMENTATION CLARIFICATION NOTE
"    PATIENT:               TANNER PIKE  ACCT #:                  [id number]  MRN:                       [id number]  :                       1960  ADMIT DATE:       2025 3:18 PM  DISCH DATE:  RESPONDING PROVIDER #:        [id number]          PROVIDER RESPONSE TEXT:    I agree with dietician diagnosis of Severe Malnutrition on 2025    CDI QUERY TEXT:    Clarification    Instruction:    Based on your assessment of the patient and the clinical information, please provide the requested documentation by clicking on the appropriate radio button and enter any additional information if prompted.    Question: Please further clarify this patient nutritional status as    When answering this query, please exercise your independent professional judgment. The fact that a question is being asked, does not imply that any particular answer is desired or expected.    The patient's clinical indicators include:  Clinical Information: 64 YOF who presents with multiple falls within the past few months and inability to care for herself.    Clinical Indicators: BMI 16.32    25 Nutrition Consult note: \"Nutrition Diagnosis  Malnutrition Diagnosis  Patient has Malnutrition Diagnosis: Yes  Diagnosis Status: New  Malnutrition Diagnosis: Severe malnutrition related to chronic disease or condition  As Evidenced by: suspect meeting < 75% of EER for > 1 month, 20.5% wt loss x 4.5 months, severe muscle wasting, severe fat loss and BMI of 17.\"    Treatment: Nutrition consult, Ensure Plus (350 kcal and 13 g protein each) ordered BID    Risk Factors:  suspect meeting < 75% of EER for > 1 month, 20.5% wt loss x 4.5 months, severe muscle wasting, severe fat loss and BMI of 17  Options provided:  -- I agree with dietician diagnosis of Severe Malnutrition on 2025  -- Other - I will add my own diagnosis  -- Refer to Clinical Documentation Reviewer    Query created by: Mary Anne Loving on 2025 6:02 PM      Electronically signed by:  " MAX NIX MD 1/15/2025 6:31 AM

## 2025-01-16 NOTE — NURSING NOTE
Patient was discharged at stable condition via stretcher , iv was removed , all due medications were given , patient BS  mg/dl, lantus units was given ,v/s stable ,day cathter intact

## 2025-01-17 LAB
ATRIAL RATE: 96 BPM
P AXIS: 84 DEGREES
P OFFSET: 171 MS
P ONSET: 111 MS
PR INTERVAL: 194 MS
Q ONSET: 208 MS
QRS COUNT: 16 BEATS
QRS DURATION: 108 MS
QT INTERVAL: 386 MS
QTC CALCULATION(BAZETT): 487 MS
QTC FREDERICIA: 451 MS
R AXIS: 58 DEGREES
T AXIS: 92 DEGREES
T OFFSET: 401 MS
VENTRICULAR RATE: 96 BPM

## 2025-01-18 ENCOUNTER — APPOINTMENT (OUTPATIENT)
Dept: RADIOLOGY | Facility: HOSPITAL | Age: 65
End: 2025-01-18
Payer: COMMERCIAL

## 2025-01-18 ENCOUNTER — CLINICAL SUPPORT (OUTPATIENT)
Dept: EMERGENCY MEDICINE | Facility: HOSPITAL | Age: 65
End: 2025-01-18
Payer: COMMERCIAL

## 2025-01-18 ENCOUNTER — HOSPITAL ENCOUNTER (EMERGENCY)
Facility: HOSPITAL | Age: 65
Discharge: HOME | End: 2025-01-18
Attending: EMERGENCY MEDICINE
Payer: COMMERCIAL

## 2025-01-18 VITALS
TEMPERATURE: 98.6 F | RESPIRATION RATE: 16 BRPM | HEART RATE: 75 BPM | SYSTOLIC BLOOD PRESSURE: 153 MMHG | DIASTOLIC BLOOD PRESSURE: 93 MMHG | OXYGEN SATURATION: 100 %
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
## 2025-01-18 DIAGNOSIS — N30.01 ACUTE CYSTITIS WITH HEMATURIA: ICD-10-CM

## 2025-01-18 LAB
ALBUMIN SERPL BCP-MCNC: 2.7 G/DL (ref 3.4–5)
ALP SERPL-CCNC: 133 U/L (ref 33–136)
ALT SERPL W P-5'-P-CCNC: 34 U/L (ref 7–45)
ANION GAP SERPL CALC-SCNC: 10 MMOL/L (ref 10–20)
APPEARANCE UR: CLEAR
AST SERPL W P-5'-P-CCNC: 41 U/L (ref 9–39)
BASOPHILS # BLD AUTO: 0.08 X10*3/UL (ref 0–0.1)
BASOPHILS NFR BLD AUTO: 1.1 %
BILIRUB SERPL-MCNC: 0.3 MG/DL (ref 0–1.2)
BILIRUB UR STRIP.AUTO-MCNC: NEGATIVE MG/DL
BUN SERPL-MCNC: 12 MG/DL (ref 6–23)
CALCIUM SERPL-MCNC: 8.2 MG/DL (ref 8.6–10.6)
CARDIAC TROPONIN I PNL SERPL HS: 14 NG/L (ref 0–34)
CARDIAC TROPONIN I PNL SERPL HS: 14 NG/L (ref 0–34)
CHLORIDE SERPL-SCNC: 102 MMOL/L (ref 98–107)
CO2 SERPL-SCNC: 26 MMOL/L (ref 21–32)
COLOR UR: ABNORMAL
CREAT SERPL-MCNC: 0.45 MG/DL (ref 0.5–1.05)
EGFRCR SERPLBLD CKD-EPI 2021: >90 ML/MIN/1.73M*2
EOSINOPHIL # BLD AUTO: 0.1 X10*3/UL (ref 0–0.7)
EOSINOPHIL NFR BLD AUTO: 1.3 %
ERYTHROCYTE [DISTWIDTH] IN BLOOD BY AUTOMATED COUNT: 12.4 % (ref 11.5–14.5)
GLUCOSE BLD MANUAL STRIP-MCNC: 183 MG/DL (ref 74–99)
GLUCOSE BLD MANUAL STRIP-MCNC: 61 MG/DL (ref 74–99)
GLUCOSE SERPL-MCNC: 57 MG/DL (ref 74–99)
GLUCOSE UR STRIP.AUTO-MCNC: ABNORMAL MG/DL
HCT VFR BLD AUTO: 34.3 % (ref 36–46)
HGB BLD-MCNC: 12.3 G/DL (ref 12–16)
HOLD SPECIMEN: NORMAL
IMM GRANULOCYTES # BLD AUTO: 0.1 X10*3/UL (ref 0–0.7)
IMM GRANULOCYTES NFR BLD AUTO: 1.3 % (ref 0–0.9)
KETONES UR STRIP.AUTO-MCNC: NEGATIVE MG/DL
LEUKOCYTE ESTERASE UR QL STRIP.AUTO: ABNORMAL
LYMPHOCYTES # BLD AUTO: 3.7 X10*3/UL (ref 1.2–4.8)
LYMPHOCYTES NFR BLD AUTO: 49.1 %
MCH RBC QN AUTO: 32.3 PG (ref 26–34)
MCHC RBC AUTO-ENTMCNC: 35.9 G/DL (ref 32–36)
MCV RBC AUTO: 90 FL (ref 80–100)
MONOCYTES # BLD AUTO: 0.48 X10*3/UL (ref 0.1–1)
MONOCYTES NFR BLD AUTO: 6.4 %
MUCOUS THREADS #/AREA URNS AUTO: ABNORMAL /LPF
NEUTROPHILS # BLD AUTO: 3.07 X10*3/UL (ref 1.2–7.7)
NEUTROPHILS NFR BLD AUTO: 40.8 %
NITRITE UR QL STRIP.AUTO: NEGATIVE
NRBC BLD-RTO: 0 /100 WBCS (ref 0–0)
PH UR STRIP.AUTO: 6.5 [PH]
PLATELET # BLD AUTO: 255 X10*3/UL (ref 150–450)
POTASSIUM SERPL-SCNC: 3.6 MMOL/L (ref 3.5–5.3)
PROT SERPL-MCNC: 6.3 G/DL (ref 6.4–8.2)
PROT UR STRIP.AUTO-MCNC: ABNORMAL MG/DL
RBC # BLD AUTO: 3.81 X10*6/UL (ref 4–5.2)
RBC # UR STRIP.AUTO: ABNORMAL /UL
RBC #/AREA URNS AUTO: >20 /HPF
SODIUM SERPL-SCNC: 134 MMOL/L (ref 136–145)
SP GR UR STRIP.AUTO: 1.02
UROBILINOGEN UR STRIP.AUTO-MCNC: NORMAL MG/DL
WBC # BLD AUTO: 7.5 X10*3/UL (ref 4.4–11.3)
WBC #/AREA URNS AUTO: ABNORMAL /HPF

## 2025-01-18 PROCEDURE — 2500000002 HC RX 250 W HCPCS SELF ADMINISTERED DRUGS (ALT 637 FOR MEDICARE OP, ALT 636 FOR OP/ED): Mod: MUE

## 2025-01-18 PROCEDURE — 36415 COLL VENOUS BLD VENIPUNCTURE: CPT

## 2025-01-18 PROCEDURE — 90715 TDAP VACCINE 7 YRS/> IM: CPT

## 2025-01-18 PROCEDURE — 12001 RPR S/N/AX/GEN/TRNK 2.5CM/<: CPT | Performed by: EMERGENCY MEDICINE

## 2025-01-18 PROCEDURE — 84075 ASSAY ALKALINE PHOSPHATASE: CPT

## 2025-01-18 PROCEDURE — 90471 IMMUNIZATION ADMIN: CPT

## 2025-01-18 PROCEDURE — 85025 COMPLETE CBC W/AUTO DIFF WBC: CPT

## 2025-01-18 PROCEDURE — 87086 URINE CULTURE/COLONY COUNT: CPT

## 2025-01-18 PROCEDURE — 82947 ASSAY GLUCOSE BLOOD QUANT: CPT | Mod: 59

## 2025-01-18 PROCEDURE — 70450 CT HEAD/BRAIN W/O DYE: CPT

## 2025-01-18 PROCEDURE — 96374 THER/PROPH/DIAG INJ IV PUSH: CPT

## 2025-01-18 PROCEDURE — 2500000005 HC RX 250 GENERAL PHARMACY W/O HCPCS

## 2025-01-18 PROCEDURE — 72125 CT NECK SPINE W/O DYE: CPT

## 2025-01-18 PROCEDURE — 70450 CT HEAD/BRAIN W/O DYE: CPT | Performed by: RADIOLOGY

## 2025-01-18 PROCEDURE — 12001 RPR S/N/AX/GEN/TRNK 2.5CM/<: CPT

## 2025-01-18 PROCEDURE — 72125 CT NECK SPINE W/O DYE: CPT | Performed by: RADIOLOGY

## 2025-01-18 PROCEDURE — 2500000004 HC RX 250 GENERAL PHARMACY W/ HCPCS (ALT 636 FOR OP/ED)

## 2025-01-18 PROCEDURE — 99285 EMERGENCY DEPT VISIT HI MDM: CPT | Performed by: EMERGENCY MEDICINE

## 2025-01-18 PROCEDURE — 99285 EMERGENCY DEPT VISIT HI MDM: CPT | Mod: 25 | Performed by: EMERGENCY MEDICINE

## 2025-01-18 PROCEDURE — 36415 COLL VENOUS BLD VENIPUNCTURE: CPT | Performed by: EMERGENCY MEDICINE

## 2025-01-18 PROCEDURE — 2500000001 HC RX 250 WO HCPCS SELF ADMINISTERED DRUGS (ALT 637 FOR MEDICARE OP)

## 2025-01-18 PROCEDURE — 84484 ASSAY OF TROPONIN QUANT: CPT | Performed by: EMERGENCY MEDICINE

## 2025-01-18 PROCEDURE — 81001 URINALYSIS AUTO W/SCOPE: CPT

## 2025-01-18 PROCEDURE — 93005 ELECTROCARDIOGRAM TRACING: CPT | Mod: 59

## 2025-01-18 RX ORDER — ACETAMINOPHEN 325 MG/1
650 TABLET ORAL ONCE
Status: COMPLETED | OUTPATIENT
Start: 2025-01-18 | End: 2025-01-18

## 2025-01-18 RX ORDER — SULFAMETHOXAZOLE AND TRIMETHOPRIM 800; 160 MG/1; MG/1
1 TABLET ORAL ONCE
Status: COMPLETED | OUTPATIENT
Start: 2025-01-18 | End: 2025-01-18

## 2025-01-18 RX ORDER — SULFAMETHOXAZOLE AND TRIMETHOPRIM 800; 160 MG/1; MG/1
1 TABLET ORAL EVERY 12 HOURS
Qty: 10 TABLET | Refills: 0 | Status: SHIPPED | OUTPATIENT
Start: 2025-01-18 | End: 2025-01-23

## 2025-01-18 RX ORDER — BACITRACIN ZINC 500 UNIT/G
OINTMENT IN PACKET (EA) TOPICAL ONCE
Status: COMPLETED | OUTPATIENT
Start: 2025-01-18 | End: 2025-01-18

## 2025-01-18 RX ORDER — DEXTROSE 50 % IN WATER (D50W) INTRAVENOUS SYRINGE
25 ONCE
Status: COMPLETED | OUTPATIENT
Start: 2025-01-18 | End: 2025-01-18

## 2025-01-18 RX ADMIN — TETANUS TOXOID, REDUCED DIPHTHERIA TOXOID AND ACELLULAR PERTUSSIS VACCINE, ADSORBED 0.5 ML: 5; 2.5; 8; 8; 2.5 SUSPENSION INTRAMUSCULAR at 04:27

## 2025-01-18 RX ADMIN — BACITRACIN 1 APPLICATION: 500 OINTMENT TOPICAL at 09:10

## 2025-01-18 RX ADMIN — ACETAMINOPHEN 650 MG: 325 TABLET ORAL at 10:40

## 2025-01-18 RX ADMIN — Medication 1 APPLICATION: at 06:28

## 2025-01-18 RX ADMIN — DEXTROSE MONOHYDRATE 25 G: 25 INJECTION, SOLUTION INTRAVENOUS at 04:38

## 2025-01-18 RX ADMIN — SULFAMETHOXAZOLE AND TRIMETHOPRIM 1 TABLET: 800; 160 TABLET ORAL at 09:10

## 2025-01-18 ASSESSMENT — PAIN DESCRIPTION - LOCATION
LOCATION: HEAD
LOCATION: HAND

## 2025-01-18 ASSESSMENT — PAIN SCALES - GENERAL
PAINLEVEL_OUTOF10: 2
PAINLEVEL_OUTOF10: 0 - NO PAIN
PAINLEVEL_OUTOF10: 6

## 2025-01-18 ASSESSMENT — PAIN - FUNCTIONAL ASSESSMENT: PAIN_FUNCTIONAL_ASSESSMENT: 0-10

## 2025-01-18 ASSESSMENT — LIFESTYLE VARIABLES
EVER HAD A DRINK FIRST THING IN THE MORNING TO STEADY YOUR NERVES TO GET RID OF A HANGOVER: NO
TOTAL SCORE: 0
HAVE YOU EVER FELT YOU SHOULD CUT DOWN ON YOUR DRINKING: NO
HAVE PEOPLE ANNOYED YOU BY CRITICIZING YOUR DRINKING: NO
EVER FELT BAD OR GUILTY ABOUT YOUR DRINKING: NO

## 2025-01-18 ASSESSMENT — PAIN DESCRIPTION - PAIN TYPE: TYPE: ACUTE PAIN

## 2025-01-18 NOTE — PROGRESS NOTES
I received Myrna Khan in signout from [facility] provider.  Please see the previous note for all HPI, PE and MDM up to the time of signout at 0700.    In brief Myrna Khan is an 64-year-old female presenting after a mechanical fall, has recent history of recurrent falls, had CT head and cervical spine imaging that was negative for injury.  Had a hematoma laceration repaired on the right posterior scalp.  Had borderline hypoglycemia but took her long-acting insulin without p.o. intake likely contributing, was given some dextrose and oral glucose on arrival.  Signed out to me pending reevaluation after repeat troponin and urinalysis as well as follow-up of glucose.        Patient will be treated for UTI with Bactrim, was able to eat and repeat glucose appropriate.  Will be discharged back to nursing facility.  To return for staple removal in 1 week.  Chief Complaint   Patient presents with    Fall       ED Course as of 01/18/25 0930   Sat Jan 18, 2025   0514 64-year-old female with type 2 diabetes on glargine and Lantus, coronary artery disease status post PCI, heart failure with reduced ejection fraction last EF 35% presents after falls with stigmata of head trauma.  Patient states she was getting out of her chair where she fell backwards hitting her right occipital parietal head.  Has a small lack in that area.  Because she saw blood she called EMS.  She has recurrent falls and was previously admitted for such.  She does not know why she falls.  She states that she has been compliant with her glargine.  She denies any chest pain shortness of breath antecedent to the fall.  Denies any fever chills  On physical exam patient is hemodynamically stable and afebrile.  She is bradycardic to the 50s on telemetry but EKG shows a heart rate in the 70s with PACs and PVCs.  Point-of-care glucose is 61.  Patient states she did take her glargine last evening but did not have dinner.  She is in a c-collar and has a small  approximately quarter size area of hematoma with small laceration of her the right posterior scalp she has no cervical spine tenderness.  She is cachectic but has normal breath sounds and normal heart sounds.  She has benign abdomen.  She has some right knee pain on palpation without any obvious bony step-offs.  She is able to flex and extend that knee without difficulty  64-year-old female with recurrent falls, bradycardia on telemetry but normal cardia with PVCs on EKG and hypoglycemia on point-of-care and stigmata of head trauma.  Concern given her age and stigmata of head trauma as the fall could cause an intracranial hemorrhage.  Will need to get a CT brain.  The cause of the fall seems mechanical based on her narrative though she also  has hypoglycemia.  I think hypoglycemia secondary to poor carbohydrate intake in the context of long-acting insulin.  We will give her some dextrose to supplement here.  Patient has a charted heart rate of 52 but this is not seen on EKG.   [CM]   0518 POCT GLUCOSE(!)  Glucose noted to be 61.  Patient administered an amp of D50. [MH]   0520 She does have new PVCs but no evidence of occlusion MI or heart block.  Patient denies any chest pain or shortness of breath.  We will send troponins.  Disposition will depend upon the results of our evaluation [CM]   0603 CBC and Auto Differential(!)  CBC without leukocytosis or anemia. [MH]   0819 Troponin I, High Sensitivity (CMC): 14 [KR]   0819 Troponin I, High Sensitivity (CMC): 14 [KR]   0842 Urinalysis with Reflex Culture and Microscopic(!)  Patient urinalysis concerning for potential UTI, will cover with course of Bactrim. [KR]   0924 POCT Glucose(!): 183 [KR]      ED Course User Index  [CM] Darek Dick MD  [KR] Anu Mark DO  [MH] Asad Thompson MD         Diagnoses as of 01/18/25 0930   Fall, initial encounter   Acute cystitis with hematuria       Pt Disposition: discharge    Procedures

## 2025-01-18 NOTE — DISCHARGE INSTRUCTIONS
Discharged with an antibiotic of Bactrim for a urinary tract infection.  You should follow-up with your primary care provider, your staples will need to come out in the next 7 days.    All wounds have a risk for scarring. To decrease your risk of scarring, please keep your cut moist while it heals, use Vaseline daily on it.  You can wash it regularly with warm soapy water and shower as needed.  Please return to emergency department if you develop any signs of infection including redness, warmth, drainage, or worsening swelling.  Your pain should improve with Tylenol and ibuprofen.  Your staples will need to come out in 7 days.  You can go to any healthcare provider (primary care, urgent care, emergency department) to have this done.

## 2025-01-18 NOTE — ED PROVIDER NOTES
CC: Fall     HPI:  Patient is a 64-year-old female with a past medical history of T2DM, HTN, HFrEF (EF 55% 7/16/2024), CAD s/p CABG on Plavix, HTN, PAD s/p left FSA stent, and renal and carotid artery stenosis who presents to the ED from nursing facility for mechanical fall.  Patient noted that she tripped over her Malone catheter and hit her head this past night.  Denied LOC.  Patient is not noting a headache or any other symptoms.  She is requesting food.  Notes that she has not eaten all day.  Of note, patient has a history of chronic falls and was just discharged for a fall to nursing facility on 1/15/2024.  Denied fevers, chills, chest pain, difficulty breathing, neck pain, abdominal pain, back pain, cough, congestion, and abnormal bowel movements.    Limitations to history: None  Independent historian(s): Patient  Records Reviewed: Recent available ED and inpatient notes reviewed in EMR.    PMHx/PSHx:  Per HPI.   - has a past medical history of Carotid artery stenosis, Diabetes mellitus (Multi), Heart disease, Hypertension, PAD (peripheral artery disease) (CMS-[facility]), and Renal artery stenosis (CMS-HCC).  - has a past surgical history that includes Coronary artery bypass graft and Femoral artery stent (Left).    Medications:  Reviewed in EMR. See EMR for complete list of medications and doses.    Allergies:  Lisinopril, Amoxicillin, and Losartan    Social History:  - Tobacco:  reports that she has been smoking cigarettes. She has never used smokeless tobacco.   - Alcohol:  reports no history of alcohol use.   - Illicit Drugs:  reports no history of drug use.     ROS:  Per HPI.       ???????????????????????????????????????????????????????????????  Triage Vitals:  T 36.3 °C (97.4 °F)  HR 52  /87  RR 14  O2 100 %      Physical Exam  Vitals and nursing note reviewed.   Constitutional:       General: She is not in acute distress.  HENT:      Head:      Comments: 2 cm laceration to the right parietal scalp  that is not actively bleeding.     Nose: Nose normal.      Mouth/Throat:      Mouth: Mucous membranes are moist.   Eyes:      Conjunctiva/sclera: Conjunctivae normal.   Neck:      Comments: In C-spine collar.  Cardiovascular:      Rate and Rhythm: Normal rate and regular rhythm.      Pulses: Normal pulses.   Pulmonary:      Effort: Pulmonary effort is normal. No respiratory distress.      Breath sounds: Normal breath sounds.   Abdominal:      Palpations: Abdomen is soft.      Tenderness: There is no abdominal tenderness.   Genitourinary:     Comments: Malone catheter in place with yellow urine  Musculoskeletal:      Cervical back: No rigidity or tenderness.   Skin:     General: Skin is warm.   Neurological:      General: No focal deficit present.      Mental Status: She is alert and oriented to person, place, and time.      Cranial Nerves: No cranial nerve deficit.      Sensory: No sensory deficit.      Motor: No weakness.           ???????????????????????????????????????????????????????????????  Labs:   Labs Reviewed - No data to display     Imaging:   No orders to display        MDM:  Patient is a 64-year-old female with a past medical history of T2DM, HTN, HFrEF (EF 55% 7/16/2024), CAD s/p CABG on Plavix, HTN, PAD s/p left FSA stent, and renal and carotid artery stenosis who presents to the ED from nursing facility for mechanical fall.  Patient presented HDS.  Physical exam finding significant for 2 cm laceration of the right parietal scalp that is hemostatic.  No foreign bodies noted within the laceration.  Low clinical concern for syncope or seizure.  Point of care glucose noted to be low at 57.  Patient administered an amp of D50.  Patient is noted to be on long-acting insulin.  Given that patient did not eat, her hypoglycemia is likely contributed to receiving her long-acting insulin without eating.  Tetanus updated.  Please see ED course and disposition for remainder of care.    ED Course:  ED Course as of  01/19/25 2241   Sat Jan 18, 2025   0514 64-year-old female with type 2 diabetes on glargine and Lantus, coronary artery disease status post PCI, heart failure with reduced ejection fraction last EF 35% presents after falls with stigmata of head trauma.  Patient states she was getting out of her chair where she fell backwards hitting her right occipital parietal head.  Has a small lack in that area.  Because she saw blood she called EMS.  She has recurrent falls and was previously admitted for such.  She does not know why she falls.  She states that she has been compliant with her glargine.  She denies any chest pain shortness of breath antecedent to the fall.  Denies any fever chills  On physical exam patient is hemodynamically stable and afebrile.  She is bradycardic to the 50s on telemetry but EKG shows a heart rate in the 70s with PACs and PVCs.  Point-of-care glucose is 61.  Patient states she did take her glargine last evening but did not have dinner.  She is in a c-collar and has a small approximately quarter size area of hematoma with small laceration of her the right posterior scalp she has no cervical spine tenderness.  She is cachectic but has normal breath sounds and normal heart sounds.  She has benign abdomen.  She has some right knee pain on palpation without any obvious bony step-offs.  She is able to flex and extend that knee without difficulty  64-year-old female with recurrent falls, bradycardia on telemetry but normal cardia with PVCs on EKG and hypoglycemia on point-of-care and stigmata of head trauma.  Concern given her age and stigmata of head trauma as the fall could cause an intracranial hemorrhage.  Will need to get a CT brain.  The cause of the fall seems mechanical based on her narrative though she also  has hypoglycemia.  I think hypoglycemia secondary to poor carbohydrate intake in the context of long-acting insulin.  We will give her some dextrose to supplement here.  Patient has a  charted heart rate of 52 but this is not seen on EKG.   [CM]   0518 POCT GLUCOSE(!)  Glucose noted to be 61.  Patient administered an amp of D50. [MH]   0520 She does have new PVCs but no evidence of occlusion MI or heart block.  Patient denies any chest pain or shortness of breath.  We will send troponins.  Disposition will depend upon the results of our evaluation [CM]   0603 CBC and Auto Differential(!)  CBC without leukocytosis or anemia. [MH]   0819 Troponin I, High Sensitivity (CMC): 14 [KR]   0819 Troponin I, High Sensitivity (CMC): 14 [KR]   0842 Urinalysis with Reflex Culture and Microscopic(!)  Patient urinalysis concerning for potential UTI, will cover with course of Bactrim. [KR]   0924 POCT Glucose(!): 183 [KR]      ED Course User Index  [CM] Darek Dick MD  [KR] Anu Mark,   [] Asad Thompson MD         Diagnoses as of 01/19/25 2241   Fall, initial encounter   Acute cystitis with hematuria       Social Determinants Limiting Care:  None identified    Disposition:  CT head without any acute intracranial process.  CT C-spine without acute osseous process.  Patient C-spine was cleared.  Laceration repaired with 2 staples.  Patient signed out to Dr. Mark pending the remainder of lab work and patient's most likely disposition being discharged back to nursing facility.    Asad Thompson MD   Emergency Medicine PGY-3  [facility]    Comment: Please note this report has been produced using speech recognition software and may contain errors related to that system including errors in grammar, punctuation, and spelling as well as words and phrases that may be inappropriate.  If there are any questions or concerns please feel free to contact the dictating provider for clarification.    Laceration Repair    Performed by: Asad Thompson MD  Authorized by: Darek Dick MD    Consent:     Consent obtained:  Verbal    Consent given by:  Patient    Risks, benefits,  and alternatives were discussed: yes      Risks discussed:  Infection and pain    Alternatives discussed:  No treatment and delayed treatment  Universal protocol:     Procedure explained and questions answered to patient or proxy's satisfaction: yes      Patient identity confirmed:  Verbally with patient  Anesthesia:     Anesthesia method:  Topical application    Topical anesthetic:  LET  Laceration details:     Location:  Scalp    Scalp location:  R parietal    Length (cm):  2  Treatment:     Area cleansed with:  Saline    Irrigation solution:  Sterile saline    Irrigation method:  Pressure wash  Skin repair:     Repair method:  Staples    Number of staples:  2  Approximation:     Approximation:  Close  Post-procedure details:     Dressing:  Antibiotic ointment    Procedure completion:  Tolerated   ? [employer] last updated 1/18/2025 4:18 AM        Asad Thompson MD  Resident  01/19/25 1157

## 2025-01-18 NOTE — ED TRIAGE NOTES
Pt arrived to ED via EMS from [facility] after tripping over her day catheter and hitting her head. Pt is on Plavix. On arrival, pts head is wrapped with a scant amount of blood seeping through, pt is alert and oriented with complaints of a headache. Pt is hypertensive with all other vital signs stable.

## 2025-01-19 LAB
ATRIAL RATE: 79 BPM
BACTERIA UR CULT: ABNORMAL
P AXIS: 119 DEGREES
P OFFSET: 163 MS
P ONSET: 114 MS
PR INTERVAL: 192 MS
Q ONSET: 210 MS
QRS COUNT: 13 BEATS
QRS DURATION: 114 MS
QT INTERVAL: 400 MS
QTC CALCULATION(BAZETT): 458 MS
QTC FREDERICIA: 438 MS
R AXIS: 36 DEGREES
T AXIS: 180 DEGREES
T OFFSET: 410 MS
VENTRICULAR RATE: 79 BPM

## 2025-01-21 LAB — BACTERIA UR CULT: ABNORMAL

## 2025-01-22 ENCOUNTER — TELEPHONE (OUTPATIENT)
Dept: PHARMACY | Facility: HOSPITAL | Age: 65
End: 2025-01-22
Payer: COMMERCIAL

## 2025-01-22 NOTE — PROGRESS NOTES
EDPD Note: Lab/Chart Reviewed    Reviewed Mr./Mrs./Ms. Myrna NAVARRETE Bill 's chart regarding a positive urine culture/result that was taken during their recent emergency room visit. The patient was transferred back to their rehab/LTC facility .Therefore, I have faxed this information to [facility] at fax number [phone] .    Susceptibility data from last 90 days.  Collected Specimen Info Organism Ampicillin Ciprofloxacin Levofloxacin Nitrofurantoin Penicillin Trimethoprim/Sulfamethoxazole Vancomycin   01/18/25 Urine from Clean Catch/Voided Enterococcus faecalis  S  S  S  S  S  R  S       No further follow up needed from EDPD Team.     Suki Colón, YanciD

## 2025-02-26 RX ORDER — ISOPROPYL ALCOHOL 70 ML/100ML
SWAB TOPICAL
Status: ON HOLD | COMMUNITY
Start: 2024-11-27

## 2025-02-26 RX ORDER — PEN NEEDLE, DIABETIC 32 GX 1/4"
NEEDLE, DISPOSABLE MISCELLANEOUS
Status: ON HOLD | COMMUNITY
Start: 2025-01-14

## 2025-02-28 ENCOUNTER — CLINICAL SUPPORT (OUTPATIENT)
Dept: EMERGENCY MEDICINE | Facility: HOSPITAL | Age: 65
DRG: 270 | End: 2025-02-28
Payer: COMMERCIAL

## 2025-02-28 ENCOUNTER — APPOINTMENT (OUTPATIENT)
Dept: RADIOLOGY | Facility: HOSPITAL | Age: 65
DRG: 270 | End: 2025-02-28
Payer: COMMERCIAL

## 2025-02-28 ENCOUNTER — HOSPITAL ENCOUNTER (INPATIENT)
Facility: HOSPITAL | Age: 65
End: 2025-02-28
Attending: EMERGENCY MEDICINE | Admitting: STUDENT IN AN ORGANIZED HEALTH CARE EDUCATION/TRAINING PROGRAM
Payer: COMMERCIAL

## 2025-02-28 ENCOUNTER — OFFICE VISIT (OUTPATIENT)
Dept: VASCULAR SURGERY | Facility: HOSPITAL | Age: 65
End: 2025-02-28
Payer: COMMERCIAL

## 2025-02-28 VITALS
BODY MASS INDEX: 16.66 KG/M2 | DIASTOLIC BLOOD PRESSURE: 83 MMHG | HEIGHT: 63 IN | HEART RATE: 77 BPM | OXYGEN SATURATION: 100 % | SYSTOLIC BLOOD PRESSURE: 150 MMHG | WEIGHT: 94 LBS

## 2025-02-28 DIAGNOSIS — E55.9 VITAMIN D DEFICIENCY: ICD-10-CM

## 2025-02-28 DIAGNOSIS — I25.10 CORONARY ARTERY DISEASE INVOLVING NATIVE CORONARY ARTERY OF NATIVE HEART WITHOUT ANGINA PECTORIS: ICD-10-CM

## 2025-02-28 DIAGNOSIS — I73.9 PAD (PERIPHERAL ARTERY DISEASE) (CMS-HCC): ICD-10-CM

## 2025-02-28 DIAGNOSIS — I16.0 HYPERTENSIVE URGENCY: ICD-10-CM

## 2025-02-28 DIAGNOSIS — Z79.4 TYPE 2 DIABETES MELLITUS WITHOUT COMPLICATION, WITH LONG-TERM CURRENT USE OF INSULIN (MULTI): ICD-10-CM

## 2025-02-28 DIAGNOSIS — R33.9 URINARY RETENTION: ICD-10-CM

## 2025-02-28 DIAGNOSIS — Z01.818 ENCOUNTER FOR OTHER PREPROCEDURAL EXAMINATION: ICD-10-CM

## 2025-02-28 DIAGNOSIS — I96 DRY GANGRENE (MULTI): Primary | ICD-10-CM

## 2025-02-28 DIAGNOSIS — L03.116 CELLULITIS OF LEFT FOOT: ICD-10-CM

## 2025-02-28 DIAGNOSIS — E11.65 HYPERGLYCEMIA DUE TO DIABETES MELLITUS (MULTI): ICD-10-CM

## 2025-02-28 DIAGNOSIS — I73.9 PAD (PERIPHERAL ARTERY DISEASE) (CMS-HCC): Primary | ICD-10-CM

## 2025-02-28 DIAGNOSIS — E11.9 TYPE 2 DIABETES MELLITUS WITHOUT COMPLICATION, WITH LONG-TERM CURRENT USE OF INSULIN (MULTI): ICD-10-CM

## 2025-02-28 DIAGNOSIS — F01.518: ICD-10-CM

## 2025-02-28 LAB
ALBUMIN SERPL BCP-MCNC: 3.2 G/DL (ref 3.4–5)
ALP SERPL-CCNC: 176 U/L (ref 33–136)
ALT SERPL W P-5'-P-CCNC: 17 U/L (ref 7–45)
ANION GAP SERPL CALC-SCNC: 13 MMOL/L (ref 10–20)
AST SERPL W P-5'-P-CCNC: 19 U/L (ref 9–39)
ATRIAL RATE: 77 BPM
BASOPHILS # BLD AUTO: 0.06 X10*3/UL (ref 0–0.1)
BASOPHILS NFR BLD AUTO: 0.6 %
BILIRUB SERPL-MCNC: 0.3 MG/DL (ref 0–1.2)
BNP SERPL-MCNC: 219 PG/ML (ref 0–99)
BUN SERPL-MCNC: 10 MG/DL (ref 6–23)
CALCIUM SERPL-MCNC: 9.1 MG/DL (ref 8.6–10.6)
CHLORIDE SERPL-SCNC: 96 MMOL/L (ref 98–107)
CO2 SERPL-SCNC: 27 MMOL/L (ref 21–32)
CREAT SERPL-MCNC: 0.58 MG/DL (ref 0.5–1.05)
EGFRCR SERPLBLD CKD-EPI 2021: >90 ML/MIN/1.73M*2
EOSINOPHIL # BLD AUTO: 0.09 X10*3/UL (ref 0–0.7)
EOSINOPHIL NFR BLD AUTO: 0.9 %
ERYTHROCYTE [DISTWIDTH] IN BLOOD BY AUTOMATED COUNT: 11.7 % (ref 11.5–14.5)
GLUCOSE SERPL-MCNC: 171 MG/DL (ref 74–99)
HCT VFR BLD AUTO: 32.1 % (ref 36–46)
HGB BLD-MCNC: 11.3 G/DL (ref 12–16)
IMM GRANULOCYTES # BLD AUTO: 0.11 X10*3/UL (ref 0–0.7)
IMM GRANULOCYTES NFR BLD AUTO: 1 % (ref 0–0.9)
LACTATE SERPL-SCNC: 1.6 MMOL/L (ref 0.4–2)
LYMPHOCYTES # BLD AUTO: 3.98 X10*3/UL (ref 1.2–4.8)
LYMPHOCYTES NFR BLD AUTO: 37.7 %
MCH RBC QN AUTO: 32.5 PG (ref 26–34)
MCHC RBC AUTO-ENTMCNC: 35.2 G/DL (ref 32–36)
MCV RBC AUTO: 92 FL (ref 80–100)
MONOCYTES # BLD AUTO: 0.69 X10*3/UL (ref 0.1–1)
MONOCYTES NFR BLD AUTO: 6.5 %
NEUTROPHILS # BLD AUTO: 5.64 X10*3/UL (ref 1.2–7.7)
NEUTROPHILS NFR BLD AUTO: 53.3 %
NRBC BLD-RTO: 0 /100 WBCS (ref 0–0)
P AXIS: 80 DEGREES
P OFFSET: 185 MS
P ONSET: 133 MS
PLATELET # BLD AUTO: 457 X10*3/UL (ref 150–450)
POTASSIUM SERPL-SCNC: 4.2 MMOL/L (ref 3.5–5.3)
PR INTERVAL: 170 MS
PROT SERPL-MCNC: 7.8 G/DL (ref 6.4–8.2)
Q ONSET: 218 MS
QRS COUNT: 13 BEATS
QRS DURATION: 96 MS
QT INTERVAL: 422 MS
QTC CALCULATION(BAZETT): 477 MS
QTC FREDERICIA: 458 MS
R AXIS: 68 DEGREES
RBC # BLD AUTO: 3.48 X10*6/UL (ref 4–5.2)
SODIUM SERPL-SCNC: 132 MMOL/L (ref 136–145)
T AXIS: 93 DEGREES
T OFFSET: 429 MS
VENTRICULAR RATE: 77 BPM
WBC # BLD AUTO: 10.6 X10*3/UL (ref 4.4–11.3)

## 2025-02-28 PROCEDURE — 96374 THER/PROPH/DIAG INJ IV PUSH: CPT

## 2025-02-28 PROCEDURE — 73552 X-RAY EXAM OF FEMUR 2/>: CPT | Mod: LEFT SIDE | Performed by: STUDENT IN AN ORGANIZED HEALTH CARE EDUCATION/TRAINING PROGRAM

## 2025-02-28 PROCEDURE — 96375 TX/PRO/DX INJ NEW DRUG ADDON: CPT

## 2025-02-28 PROCEDURE — 2500000001 HC RX 250 WO HCPCS SELF ADMINISTERED DRUGS (ALT 637 FOR MEDICARE OP): Performed by: STUDENT IN AN ORGANIZED HEALTH CARE EDUCATION/TRAINING PROGRAM

## 2025-02-28 PROCEDURE — 2500000002 HC RX 250 W HCPCS SELF ADMINISTERED DRUGS (ALT 637 FOR MEDICARE OP, ALT 636 FOR OP/ED): Performed by: STUDENT IN AN ORGANIZED HEALTH CARE EDUCATION/TRAINING PROGRAM

## 2025-02-28 PROCEDURE — 2500000004 HC RX 250 GENERAL PHARMACY W/ HCPCS (ALT 636 FOR OP/ED): Performed by: STUDENT IN AN ORGANIZED HEALTH CARE EDUCATION/TRAINING PROGRAM

## 2025-02-28 PROCEDURE — 73610 X-RAY EXAM OF ANKLE: CPT | Mod: LT

## 2025-02-28 PROCEDURE — 99214 OFFICE O/P EST MOD 30 MIN: CPT | Performed by: STUDENT IN AN ORGANIZED HEALTH CARE EDUCATION/TRAINING PROGRAM

## 2025-02-28 PROCEDURE — 2550000001 HC RX 255 CONTRASTS: Performed by: EMERGENCY MEDICINE

## 2025-02-28 PROCEDURE — 73630 X-RAY EXAM OF FOOT: CPT | Mod: LT

## 2025-02-28 PROCEDURE — 3079F DIAST BP 80-89 MM HG: CPT | Performed by: STUDENT IN AN ORGANIZED HEALTH CARE EDUCATION/TRAINING PROGRAM

## 2025-02-28 PROCEDURE — 2500000005 HC RX 250 GENERAL PHARMACY W/O HCPCS: Performed by: PHARMACIST

## 2025-02-28 PROCEDURE — 96366 THER/PROPH/DIAG IV INF ADDON: CPT

## 2025-02-28 PROCEDURE — 1200000002 HC GENERAL ROOM WITH TELEMETRY DAILY

## 2025-02-28 PROCEDURE — 83880 ASSAY OF NATRIURETIC PEPTIDE: CPT

## 2025-02-28 PROCEDURE — 73610 X-RAY EXAM OF ANKLE: CPT | Mod: LEFT SIDE | Performed by: STUDENT IN AN ORGANIZED HEALTH CARE EDUCATION/TRAINING PROGRAM

## 2025-02-28 PROCEDURE — 73630 X-RAY EXAM OF FOOT: CPT | Mod: LEFT SIDE | Performed by: STUDENT IN AN ORGANIZED HEALTH CARE EDUCATION/TRAINING PROGRAM

## 2025-02-28 PROCEDURE — 80053 COMPREHEN METABOLIC PANEL: CPT | Performed by: EMERGENCY MEDICINE

## 2025-02-28 PROCEDURE — 93010 ELECTROCARDIOGRAM REPORT: CPT

## 2025-02-28 PROCEDURE — 99285 EMERGENCY DEPT VISIT HI MDM: CPT | Mod: 25 | Performed by: EMERGENCY MEDICINE

## 2025-02-28 PROCEDURE — 73552 X-RAY EXAM OF FEMUR 2/>: CPT | Mod: LT

## 2025-02-28 PROCEDURE — 96365 THER/PROPH/DIAG IV INF INIT: CPT

## 2025-02-28 PROCEDURE — 1159F MED LIST DOCD IN RCRD: CPT | Performed by: STUDENT IN AN ORGANIZED HEALTH CARE EDUCATION/TRAINING PROGRAM

## 2025-02-28 PROCEDURE — 86301 IMMUNOASSAY TUMOR CA 19-9: CPT | Performed by: STUDENT IN AN ORGANIZED HEALTH CARE EDUCATION/TRAINING PROGRAM

## 2025-02-28 PROCEDURE — 93005 ELECTROCARDIOGRAM TRACING: CPT

## 2025-02-28 PROCEDURE — 99285 EMERGENCY DEPT VISIT HI MDM: CPT | Performed by: EMERGENCY MEDICINE

## 2025-02-28 PROCEDURE — 2500000004 HC RX 250 GENERAL PHARMACY W/ HCPCS (ALT 636 FOR OP/ED): Performed by: PHARMACIST

## 2025-02-28 PROCEDURE — 83605 ASSAY OF LACTIC ACID: CPT

## 2025-02-28 PROCEDURE — 75635 CT ANGIO ABDOMINAL ARTERIES: CPT | Performed by: STUDENT IN AN ORGANIZED HEALTH CARE EDUCATION/TRAINING PROGRAM

## 2025-02-28 PROCEDURE — 3008F BODY MASS INDEX DOCD: CPT | Performed by: STUDENT IN AN ORGANIZED HEALTH CARE EDUCATION/TRAINING PROGRAM

## 2025-02-28 PROCEDURE — 3077F SYST BP >= 140 MM HG: CPT | Performed by: STUDENT IN AN ORGANIZED HEALTH CARE EDUCATION/TRAINING PROGRAM

## 2025-02-28 PROCEDURE — 85025 COMPLETE CBC W/AUTO DIFF WBC: CPT | Performed by: EMERGENCY MEDICINE

## 2025-02-28 PROCEDURE — 36415 COLL VENOUS BLD VENIPUNCTURE: CPT | Performed by: EMERGENCY MEDICINE

## 2025-02-28 PROCEDURE — 2500000004 HC RX 250 GENERAL PHARMACY W/ HCPCS (ALT 636 FOR OP/ED): Mod: JZ,TB

## 2025-02-28 PROCEDURE — 96376 TX/PRO/DX INJ SAME DRUG ADON: CPT

## 2025-02-28 PROCEDURE — 84484 ASSAY OF TROPONIN QUANT: CPT | Performed by: STUDENT IN AN ORGANIZED HEALTH CARE EDUCATION/TRAINING PROGRAM

## 2025-02-28 PROCEDURE — 3046F HEMOGLOBIN A1C LEVEL >9.0%: CPT | Performed by: STUDENT IN AN ORGANIZED HEALTH CARE EDUCATION/TRAINING PROGRAM

## 2025-02-28 PROCEDURE — 86304 IMMUNOASSAY TUMOR CA 125: CPT | Performed by: STUDENT IN AN ORGANIZED HEALTH CARE EDUCATION/TRAINING PROGRAM

## 2025-02-28 PROCEDURE — 82378 CARCINOEMBRYONIC ANTIGEN: CPT | Performed by: STUDENT IN AN ORGANIZED HEALTH CARE EDUCATION/TRAINING PROGRAM

## 2025-02-28 PROCEDURE — 75635 CT ANGIO ABDOMINAL ARTERIES: CPT

## 2025-02-28 RX ORDER — ATORVASTATIN CALCIUM 80 MG/1
80 TABLET, FILM COATED ORAL NIGHTLY
Status: DISCONTINUED | OUTPATIENT
Start: 2025-02-28 | End: 2025-03-12 | Stop reason: HOSPADM

## 2025-02-28 RX ORDER — CARVEDILOL 25 MG/1
25 TABLET ORAL 2 TIMES DAILY
Status: DISCONTINUED | OUTPATIENT
Start: 2025-02-28 | End: 2025-03-12 | Stop reason: HOSPADM

## 2025-02-28 RX ORDER — VANCOMYCIN HYDROCHLORIDE 1 G/20ML
INJECTION, POWDER, LYOPHILIZED, FOR SOLUTION INTRAVENOUS DAILY PRN
Status: DISCONTINUED | OUTPATIENT
Start: 2025-02-28 | End: 2025-03-11

## 2025-02-28 RX ORDER — DEXTROSE 50 % IN WATER (D50W) INTRAVENOUS SYRINGE
12.5
Status: DISCONTINUED | OUTPATIENT
Start: 2025-02-28 | End: 2025-03-05

## 2025-02-28 RX ORDER — DEXTROSE 50 % IN WATER (D50W) INTRAVENOUS SYRINGE
25
Status: DISCONTINUED | OUTPATIENT
Start: 2025-02-28 | End: 2025-03-12 | Stop reason: HOSPADM

## 2025-02-28 RX ORDER — HEPARIN SODIUM 5000 [USP'U]/ML
5000 INJECTION, SOLUTION INTRAVENOUS; SUBCUTANEOUS EVERY 8 HOURS SCHEDULED
Status: DISCONTINUED | OUTPATIENT
Start: 2025-02-28 | End: 2025-03-06

## 2025-02-28 RX ORDER — INSULIN LISPRO 100 [IU]/ML
0-5 INJECTION, SOLUTION INTRAVENOUS; SUBCUTANEOUS
Status: DISCONTINUED | OUTPATIENT
Start: 2025-03-01 | End: 2025-03-12 | Stop reason: HOSPADM

## 2025-02-28 RX ORDER — ACETAMINOPHEN 325 MG/1
650 TABLET ORAL EVERY 6 HOURS PRN
Status: DISCONTINUED | OUTPATIENT
Start: 2025-02-28 | End: 2025-03-01

## 2025-02-28 RX ORDER — CLOPIDOGREL BISULFATE 75 MG/1
75 TABLET ORAL DAILY
Status: DISCONTINUED | OUTPATIENT
Start: 2025-03-01 | End: 2025-03-06

## 2025-02-28 RX ORDER — CEFEPIME 1 G/50ML
2 INJECTION, SOLUTION INTRAVENOUS EVERY 8 HOURS
Status: DISCONTINUED | OUTPATIENT
Start: 2025-02-28 | End: 2025-03-03

## 2025-02-28 RX ORDER — AMLODIPINE BESYLATE 10 MG/1
10 TABLET ORAL DAILY
Status: DISCONTINUED | OUTPATIENT
Start: 2025-03-01 | End: 2025-03-05

## 2025-02-28 RX ORDER — INSULIN GLARGINE 100 [IU]/ML
8 INJECTION, SOLUTION SUBCUTANEOUS NIGHTLY
Status: DISCONTINUED | OUTPATIENT
Start: 2025-02-28 | End: 2025-03-02

## 2025-02-28 RX ORDER — ISOSORBIDE MONONITRATE 30 MG/1
60 TABLET, EXTENDED RELEASE ORAL DAILY
Status: DISCONTINUED | OUTPATIENT
Start: 2025-03-01 | End: 2025-03-12 | Stop reason: HOSPADM

## 2025-02-28 RX ORDER — RANOLAZINE 500 MG/1
500 TABLET, EXTENDED RELEASE ORAL 2 TIMES DAILY
Status: DISCONTINUED | OUTPATIENT
Start: 2025-02-28 | End: 2025-03-12 | Stop reason: HOSPADM

## 2025-02-28 RX ORDER — POLYETHYLENE GLYCOL 3350 17 G/17G
17 POWDER, FOR SOLUTION ORAL DAILY PRN
Status: DISCONTINUED | OUTPATIENT
Start: 2025-02-28 | End: 2025-03-12 | Stop reason: HOSPADM

## 2025-02-28 RX ORDER — ASPIRIN 81 MG/1
81 TABLET ORAL DAILY
Status: DISCONTINUED | OUTPATIENT
Start: 2025-03-01 | End: 2025-03-08

## 2025-02-28 RX ADMIN — CEFEPIME 2 G: 1 INJECTION, SOLUTION INTRAVENOUS at 21:38

## 2025-02-28 RX ADMIN — CARVEDILOL 25 MG: 12.5 TABLET, FILM COATED ORAL at 22:53

## 2025-02-28 RX ADMIN — IOHEXOL 80 ML: 350 INJECTION, SOLUTION INTRAVENOUS at 20:27

## 2025-02-28 RX ADMIN — ATORVASTATIN CALCIUM 80 MG: 80 TABLET, FILM COATED ORAL at 22:53

## 2025-02-28 RX ADMIN — HEPARIN SODIUM 5000 UNITS: 5000 INJECTION, SOLUTION INTRAVENOUS; SUBCUTANEOUS at 22:53

## 2025-02-28 RX ADMIN — INSULIN GLARGINE 8 UNITS: 100 INJECTION, SOLUTION SUBCUTANEOUS at 22:53

## 2025-02-28 RX ADMIN — HYDROMORPHONE HYDROCHLORIDE 0.5 MG: 1 INJECTION, SOLUTION INTRAMUSCULAR; INTRAVENOUS; SUBCUTANEOUS at 18:47

## 2025-02-28 RX ADMIN — HYDROMORPHONE HYDROCHLORIDE 0.5 MG: 1 INJECTION, SOLUTION INTRAMUSCULAR; INTRAVENOUS; SUBCUTANEOUS at 21:38

## 2025-02-28 RX ADMIN — VANCOMYCIN HYDROCHLORIDE 1250 MG: 5 INJECTION, POWDER, LYOPHILIZED, FOR SOLUTION INTRAVENOUS at 22:16

## 2025-02-28 ASSESSMENT — ENCOUNTER SYMPTOMS
SPEECH DIFFICULTY: 0
UNEXPECTED WEIGHT CHANGE: 0
VOMITING: 0
PALPITATIONS: 0
LIGHT-HEADEDNESS: 0
SHORTNESS OF BREATH: 0
CHEST TIGHTNESS: 0
CONSTIPATION: 0
NAUSEA: 0
ACTIVITY CHANGE: 0
DYSURIA: 0
FATIGUE: 0
COUGH: 0
RHINORRHEA: 0
SEIZURES: 0
FREQUENCY: 0
SINUS PRESSURE: 0
ABDOMINAL DISTENTION: 0
DIZZINESS: 0
HEADACHES: 0
FACIAL ASYMMETRY: 0
DIARRHEA: 0
WEAKNESS: 0
FEVER: 0
SORE THROAT: 0
APPETITE CHANGE: 0
WHEEZING: 0
COLOR CHANGE: 1
CHILLS: 0

## 2025-02-28 ASSESSMENT — PAIN SCALES - GENERAL
PAINLEVEL_OUTOF10: 9
PAINLEVEL_OUTOF10: 10 - WORST POSSIBLE PAIN

## 2025-02-28 ASSESSMENT — COLUMBIA-SUICIDE SEVERITY RATING SCALE - C-SSRS
1. IN THE PAST MONTH, HAVE YOU WISHED YOU WERE DEAD OR WISHED YOU COULD GO TO SLEEP AND NOT WAKE UP?: NO
2. HAVE YOU ACTUALLY HAD ANY THOUGHTS OF KILLING YOURSELF?: NO
6. HAVE YOU EVER DONE ANYTHING, STARTED TO DO ANYTHING, OR PREPARED TO DO ANYTHING TO END YOUR LIFE?: NO

## 2025-02-28 ASSESSMENT — PAIN DESCRIPTION - ORIENTATION
ORIENTATION: LEFT
ORIENTATION: LEFT

## 2025-02-28 ASSESSMENT — PAIN DESCRIPTION - LOCATION
LOCATION: FOOT
LOCATION: LEG

## 2025-02-28 ASSESSMENT — PAIN - FUNCTIONAL ASSESSMENT: PAIN_FUNCTIONAL_ASSESSMENT: 0-10

## 2025-02-28 NOTE — PROGRESS NOTES
Received call from Dr. Pederson who saw patient in clinic this morning, conveys concern for significant skin breakdown over dorsum of patient's left lower extremity. Dr. Pederson conveys that patient had difficult to palpate pulses in the left lower extremity but did have dopplerable signal at PT/DP of the left lower extremity.  Requesting infectious/ischemic workup, vessel imaging of the lower extremity, potential podiatry consultation as well as requesting vascular surgery consultation once roomed.

## 2025-02-28 NOTE — CONSULTS
VASCULAR SURGERY CONSULT NOTE    Assessment/Plan   Myrna Khan is 65 y.o. female with history of carotid stenosis, DM, heart disease, HTN, PAD, renal artery stenosis who was sent to the ED with extensive wounds of LLE and intermittent rest pain.     Plan:  No acute surgical intervention  Recommend admission to medicine management of multiple medical comorbidities  Podiatry consult   Vascular lab studies: lower extremity vein mapping  Cardiology consult for risk stratification if requires extensive debridement, amputation, or operative revascularization given medical history       D/w fellow, Dr. Eugenia Bartholomew DO PGY-1  General Surgery    Fellow Addendum:   This patient was seen separately from the resident team but I agree with what is written.  In brief she has been having subacute worsening of chronic limb threatening ischemia worse on the left than right.  She has intermittent rest pain.  Her pain is worse when ambulating and improved by propping her foot up.  This is not typical for ischemic rest pain.  She has intact sensation in her limb but has difficulty moving her foot at the toes or ankle secondary to pain and says this has been going on between 4 days to 2 weeks.  She smokes about a half a pack per day.  On exam she has extensive wounds described elsewhere in this note and pictured in chart.  Minimal signs of surrounding erythema and infection.  The patient does not have signs of systemic illness.  She does have a right palpable femoral pulse and a right palpable DP pulse and PT signal.  The left limb does not have a palpable femoral pulse.  She does have monophasic left AT and PT signals.  CTA does show occlusion of her common and external iliac arteries as well as known occlusion of her left SFA stent.  She has reconstitution of her left popliteal artery and intact tibial vessel runoff onto the foot.     No acute surgical intervention.  She needs medical optimization and antibiotic  "therapy for her wounds.  She needs to be seen by cardiology and she needs further stratification of her level of ischemia and options for revascularization.    Subjective   HPI:  Myrna Khan is 65 y.o. female with history of carotid stenosis, DM, heart disease, HTN, PAD, renal artery stenosis, HFrEF (7/16/24), CABG on plavix, who was sent to the ED with ischemia of the foot cf dry gangrene vs den 3 Ali.     She notes that the skin changes seen in the chart developed 2 days ago and have spread. She sstates that she has had symptoms of claudication and metatarsalgia for years though they come and go. She further endorses motor loss of the LLE from the ankle to the toes also which have occurred for the past 2 days. She endorses associated peripheral edema, first in the left and then in the right which has been ongoing for weeks.    Vascular History:  SFA stenting on the left    PMH:   Past Medical History:   Diagnosis Date    Carotid artery stenosis     Diabetes mellitus (Multi)     Heart disease     Hypertension     PAD (peripheral artery disease) (CMS-[facility])     Renal artery stenosis (CMS-[facility])         PSH:   Recent Surgeries in General Surgery            No cases to display             Home Meds:  No current facility-administered medications on file prior to encounter.     Current Outpatient Medications on File Prior to Encounter   Medication Sig Dispense Refill    acetaminophen (Tylenol) 500 mg tablet Take 1 tablet (500 mg) by mouth every 6 hours if needed for mild pain (1 - 3) or moderate pain (4 - 6). Take per directed 30 tablet 0    amLODIPine (Norvasc) 10 mg tablet TAKE 1 TABLET BY MOUTH ONCE DAILY 30 tablet 0    aspirin 81 mg EC tablet Take 1 tablet (81 mg) by mouth once daily.      atorvastatin (Lipitor) 80 mg tablet TAKE 1 TABLET BY MOUTH ONCE DAILY 30 tablet 2    BD Ultra-Fine Micro Pen Needle 32 gauge x 1/4\" needle       blood sugar diagnostic strip Use 1 strip to check blood sugar 3 times a day " with meals. 100 each 0    carvedilol (Coreg) 25 mg tablet TAKE 1 TABLET BY MOUTH TWO TIMES A DAY 60 tablet 2    [] cholecalciferol (Vitamin D-3) 25 MCG (1000 UT) capsule Take 2 capsules (50 mcg) by mouth once daily.      clopidogrel (Plavix) 75 mg tablet Take 1 tablet (75 mg) by mouth once daily.      diclofenac sodium (Voltaren) 1 % gel Apply 4.5 inches (4 g) topically 4 times a day as needed (pain).      Easy Touch Alcohol Prep Pads pads, medicated       Farxiga 10 mg Take 1 tablet (10 mg) by mouth once daily with breakfast.      insulin glargine (Lantus) 100 unit/mL injection Inject 10 Units under the skin once daily at bedtime. Take as directed per insulin instructions.      insulin lispro 100 unit/mL injection Inject 0-5 Units under the skin 3 times daily (morning, midday, late afternoon). 0 unit(s) if BG ; 1 unit(s) if -200; 2 unit(s) if -250; 3 unit(s) if -300; 4 unit(s) if -350; 5 unit(s) if -400      isosorbide mononitrate ER (Imdur) 60 mg 24 hr tablet TAKE 1 TABLET BY MOUTH ONCE DAILY 30 tablet 0    lancets misc Use three times a day to test blood sugar w/ meals 102 each 0    metFORMIN XR (Glucophage-XR) 500 mg 24 hr tablet TAKE 2 TABLETS BY MOUTH ONCE DAILY 60 tablet 0    multivitamin with minerals tablet Take 1 tablet by mouth once daily.      ranolazine (Ranexa) 500 mg 12 hr tablet Take 1 tablet (500 mg) by mouth 2 times a day. Do not crush, chew, or split.          Allergies:  Allergies   Allergen Reactions    Lisinopril Itching    Amoxicillin Rash    Losartan Itching and Rash       SH/FH:   Social History     Socioeconomic History    Marital status: Single   Tobacco Use    Smoking status: Some Days     Types: Cigarettes    Smokeless tobacco: Never   Vaping Use    Vaping status: Never Used   Substance and Sexual Activity    Alcohol use: Never    Drug use: Never    Sexual activity: Defer     Social Drivers of Health     Financial Resource Strain: Low Risk   "(1/7/2025)    Overall Financial Resource Strain (CARDIA)     Difficulty of Paying Living Expenses: Not hard at all   Food Insecurity: Patient Unable To Answer (1/7/2025)    Hunger Vital Sign     Worried About Running Out of Food in the Last Year: Patient unable to answer     Ran Out of Food in the Last Year: Patient unable to answer   Transportation Needs: Unmet Transportation Needs (1/7/2025)    PRAPARE - Transportation     Lack of Transportation (Medical): No     Lack of Transportation (Non-Medical): Yes   Physical Activity: Unknown (7/16/2024)    Exercise Vital Sign     Days of Exercise per Week: 0 days   Intimate Partner Violence: Patient Unable To Answer (1/7/2025)    Humiliation, Afraid, Rape, and Kick questionnaire     Fear of Current or Ex-Partner: Patient unable to answer     Emotionally Abused: Patient unable to answer     Physically Abused: Patient unable to answer     Sexually Abused: Patient unable to answer   Housing Stability: Low Risk  (1/7/2025)    Housing Stability Vital Sign     Unable to Pay for Housing in the Last Year: No     Number of Times Moved in the Last Year: 0     Homeless in the Last Year: No        ROS: 12 system negative except HPI    Objective   Vitals:  Heart Rate:  [77-85]   Temperature:  [36.6 °C (97.9 °F)]   Respirations:  [16]   BP: (140-155)/(83-85)   Height:  [160 cm (5' 3\")]   Weight:  [42.6 kg (94 lb)]   Pulse Ox:  [100 %]     Exam:  Constitutional: No acute distress, resting comfortably  Neuro:  AOx3, grossly intact  ENMT: moist mucous membranes  Head/neck: atraumatic  CV: no tachycardia  Pulm: non-labored on room air  GI: soft, non-tender, distended  Extremities:   RUE and LUE with palpable radials and intact strength sensation.  RLE: palpable fem, palpable DP, multiphasic PT signal. Intact strength and sensation.  LLE: non-palpable femoral pulse, monophasic pop signal, Monophasic AT/PT signals.      Labs:                      Imaging:  Most recent noninvasive studies in " 7/2024 reveal >50% stenosis of L CFA and an occluded L SFA stent and recon at the distal SFA. Monophasic flow is noted. PVRs on the right show multiphasic flow of the r CFA, R PTA and R DP. On the Left monophasic flow was noted in the L PTA and L DP.

## 2025-02-28 NOTE — CONSULTS
PODIATRY CONSULT NOTE    SERVICE DATE: 2/28/2025   SERVICE TIME:  1820    REASON FOR CONSULT: Necrosis, infection, edema LLE, unable to doppler pulses  REQUESTING PHYSICIAN: Hina Valdez MD  PRIMARY CARE PHYSICIAN: No primary care provider on file.    Subjective   HPI:  Patient is a 65-year-old female with PMH of CAD s/p CABG, HFrEF, PAD (>50% stenosis of L CFA and occluded L SFA stent and recon at distal SFA) concern for significant skin breakdown over dorsum of patient's left lower extremity. Outpatient cardiologist had difficulty feeling pulses in the left lower extremity but did have dopplerable signal at DP and inaudible PT of the left lower extremity and recommended ER due to acute vascular changes.  Podiatry consulted for wound evaluation with apparent necrosis and possible superimposed infection. Patient noted a left ankle wound over the last few weeks but rapid worsening few days ago with drainage and that it has spread rapidly. Denies constitutional symptoms nausea, vomiting, fever, chills at this time.    ROS: 10-point review of systems was performed and is otherwise negative except as noted in HPI.  PMH: Reviewed/documented below.  PSH:  Noncontributory except per HPI   FH: Reviewed and noncontributory   SOCIAL:  Reviewed/documented below.  ALLERGIES: Reviewed/documented below.  MEDS: Reviewed/documented below.  VS: Reviewed/documented below.    Past Medical History:   Diagnosis Date    Carotid artery stenosis     Diabetes mellitus (Multi)     Heart disease     Hypertension     PAD (peripheral artery disease) (CMS-[facility])     Renal artery stenosis (CMS-[facility])      Past Surgical History:   Procedure Laterality Date    CORONARY ARTERY BYPASS GRAFT      9/2023    FEMORAL ARTERY STENT Left     left SFA     No family history on file.  Social History     Tobacco Use    Smoking status: Some Days     Types: Cigarettes    Smokeless tobacco: Never   Vaping Use    Vaping status: Never Used   Substance Use Topics     Alcohol use: Never    Drug use: Never      (Not in a hospital admission)       Medications:  Scheduled Meds: HYDROmorphone, 0.5 mg, intravenous, Once      Continuous Infusions:    PRN Meds:     Allergies as of 02/28/2025 - Reviewed 02/28/2025   Allergen Reaction Noted    Lisinopril Itching 10/01/2023    Amoxicillin Rash 07/28/2016    Losartan Itching and Rash 07/14/2014            Objective   PHYSICAL EXAM:  Physical Exam Performed:  Vitals:    02/28/25 1456   BP: 140/85   Pulse: 85   Resp: 16   Temp: 36.6 °C (97.9 °F)   SpO2: 100%     Body mass index is 16.65 kg/m².    Patient is AOx3 and in no acute distress. Patient is alert and cooperative. Sitting comfortably in bed with dressing clean, dry and intact. Heel off-loading boots in place B/L.    Vascular: Non-palpable DP/PT pulses B/L. Mild non-pitting edema noted B/L. Hair growth absent B/L. CFT<5 to B/L hallux. Temperature is warm to cool from tibial tuberosity to distal digits B/L. No lymphatic streaking noted B/L.    Musculoskeletal: Gross active and passive ROM diminished to age and activity level left foot and ankle, cannot move toes. Moves all extremities spontaneously. No pain to palpation at feet B/L.     Neurological: Intact light touch sensation B/L. Pain stimuli diminished B/L. Admits to numbness, burning or tingling.    Dermatologic: Skin appears waxy B/L. Web spaces 1-4 B/L are clean, dry and intact. No rashes or nodules noted B/L. No hyperkeratotic tissue noted B/L.     Wound:  Measurements: 12 x 10 cm x 0.2 cm area of partial to full-thickness skin breakdown  Mixed wound base of fibrogranular tissue.   Mild serosanguinous drainage with fibrotic slough.   Mild jatin-wound maceration.   Mild jatin-wound erythema.   No palpable fluctuance. Mild malodor. Mild increased warmth.   No probe to bone or deep structures within the wound base.   No tunneling or tracking.   No undermining. Skin edges irregular but intact.    LABS:   No results found for this or  any previous visit (from the past 24 hours).   Lab Results   Component Value Date    HGBA1C 14.6 (H) 01/09/2025      Lab Results   Component Value Date    CRP 0.79 08/26/2023      Lab Results   Component Value Date    SEDRATE 48 (H) 08/26/2023                      IMAGING REVIEW:  No results found.          Assessment/Plan   Patient is a 65-year-old female with PMH of CAD s/p CABG, HFrEF, PAD (>50% stenosis of L CFA and occluded L SFA stent and recon at distal SFA concern for significant skin breakdown over dorsum of patient's left lower extremity.    #PAD with CLI  #DM2 with neuropathy (A1c 14.6% in 1/2025)  #Superimposed cellulitis ankle  #Dry gangrene left foot    - Patient was seen and evaluated; all findings were discussed and all questions were answered to patient's satisfaction.  - Charts, labs, vitals and imaging all reviewed.   - Imaging: X-rays of foot and ankle, femur reviewed. No acute osseous abnormality. Significant vascular calcifications appreciated. No gas noted in soft tissues.  - Labs: No white count. Lactate 1.6.   - CT angio w/o contrast pending    Plan:  - Patient with apparent ischemic limb with superficial tissue breakdown. Thickening of skin and mild focal calor consistent with superimposed infection but no drainage appreciable to collect for wound culture. Recommend broad spectrum abx IV vanc/cephepime.  - X-rays negative for soft tissue gas. No urgent plan for surgical intervention at this time. Will follow peripherally as ischemic limb work-up progresses.  - Pain Regimen: per primary team  - Plan to follow peripherally as work-up for ischemic limb develops.   - Dressings: Will defer for further assessment for now.  - Nursing staff is able to change/reinforce dressing if & as necessary until next day’s dressing change. Thank you.  - Podiatry will continue to follow peripherally while in house.    Weightbearing: WBAT for now, recommend surgical shoe.    Case to be discussed with attending,  A&P above reflects a tentative plan. Please await for the final signature from the attending physician on service.    Chema Okeefe DPM/PGY-3  Podiatric Medicine & Surgery  [place]            SIGNATURE: Chema Okeefe DPM PATIENT NAME: Myrna Khan   DATE: February 28, 2025 MRN: [id number]   TIME: 6:30 PM CONTACT: Poneto michael

## 2025-02-28 NOTE — ED PROVIDER NOTES
Emergency Department Provider Note        History of Present Illness     History provided by: Patient  Limitations to History: None  External Records Reviewed with Brief Summary:  prior PCP note, intake note    HPI:  Myrna Khan is a 65 y.o. female with past medical history of diabetes, hypertension, PAD, HFrEF 55%, CAD s/p CABG on Plavix here for left lower extremity wound. Patient reports that she had increased swelling to her bilateral legs for the past couple days. She reports the swelling started in her right leg and now has significantly worsened on her left leg. She also endorses pain to the leg and tenderness with palpation. Patient reports she is not being seen by anyone for her wound. Patient reports wound has been there for years but recently worsened. Patient denies fever, productive cough, shortness of breath, chest pain, abdominal pain, diarrhea, constipation.    Physical Exam   Triage vitals:  T 36.6 °C (97.9 °F)  HR 85  /85  RR 16  O2 100 % None (Room air)    General: Awake, alert, in no acute distress  Eyes: Gaze conjugate.  No scleral icterus or injection  HENT: Normo-cephalic, atraumatic. No stridor  CV: Regular rate, regular rhythm. Radial pulses 2+ bilaterally  Resp: Breathing non-labored, speaking in full sentences.    GI: Soft, non-distended.  MSK/Extremities: No gross bony deformities. Moving all extremities, bilateral edema, no palpable pulses, dopplerable pulses to RLE, ultrasoundable pulse to LLE, tender to palpation  Skin: Warm. significant skin breakdown to LLE  Neuro: Alert. Oriented. Face symmetric. Speech is fluent.  Gross strength and sensation intact in b/l UE and LEs  Psych: Appropriate mood and affect    Medical Decision Making & ED Course   Medical Decision Makin y.o. female here for left lower extremity wound. On exam, patient is afebrile hemodynamically stable. Patient has a history of diabetes with concern for left lower extremity wound. Exam is  significant for bilateral lower extremity edema. There is significant skin breakdown to left lower extremity. There are no palpable pulses however there is dopplerable right lower extremity pulse and ultrasoundable left lower extremity pulse. The ultrasound signal to the left lower extremity is weak. There is concern for acute ischemic limb. Labs sent. XR left foot and femur ordered to assess for gas formation. CTA runoff was ordered to assess for arterial occlusion. Vascular surgery and podiatry were consulted. Per vascular recs, medicine admission for wounds, involvement of podiatry to comment on salvageability, and CTA runoff. Per podiatry recs, antibiotics ordered for concern for cellulitis. Xrays shows soft tissue edema with no evidence of gas formation or fracture. Pending CTA read. Patient admitted for concern for dry gangrene, cellulitis. CTA resulted showing complete occlusion of the left proximal common femoral artery which extends to the left distal external iliac artery and reconstitutes distally. Medicine team was informed.      ----  Differential diagnoses considered include but are not limited to: Acute ischemic limb, DVT, gangrene, CHF exacerbation, cellulitis, wound infection     Social Determinants of Health which Significantly Impact Care: None identified     EKG Independent Interpretation: EKG not obtained    Independent Result Review and Interpretation: Relevant laboratory and radiographic results were reviewed and independently interpreted by myself.  As necessary, they are commented on in the ED Course.    Chronic conditions affecting the patient's care: As documented above in [facility]    The patient was discussed with the following consultants/services:  vascular surgery, podiatry    Care Considerations: As documented above in [facility]    ED Course:  ED Course as of 02/28/25 2135 Fri Feb 28, 2025 1932 WBC: 10.6  No leukocytosis [AT]   1933 SODIUM(!): 132  Mild hyponatremia similar to prior;  corrected Na 133-134 [AT]   1933 GLUCOSE(!): 171 [AT]   1934 BNP(!): 219  Mildly elevated compared to 7 mo ago [AT]   1955 Lactate: 1.6 [AT]      ED Course User Index  [AT] Jenifer Rico MD         Diagnoses as of 02/28/25 2135   Dry gangrene (Multi)   Cellulitis of left foot     Disposition   As a result of their workup, the patient will require admission to the hospital.  The patient was informed of her diagnosis.  The patient was given the opportunity to ask questions and I answered them. The patient agreed to be admitted to the hospital.    Procedures   Procedures    Patient seen and discussed with ED attending physician.    Jenifer Rico MD  Emergency Medicine            Jenifer Rico MD  Resident  03/01/25 0028

## 2025-02-28 NOTE — ED TRIAGE NOTES
Pt presents to ED for a wound check on L foot. Wound has been there for an unknown amount of time. Pt was seen at an outpatient cardiology appointment when she was sent here to have a foot wound evalulated. Pt denies any NVD fever chills, or any other symptoms bedsied pn to affected area.     [phone] - [address]  (he brought pt to outpatient appointment)

## 2025-02-28 NOTE — PROGRESS NOTES
Reason for Visit: New Patient Visit.  Referring Clinician: No ref. provider found     History of Present Illness  Myrna Khan is a 65 y.o. female with history of DM, HTN, HLD, FROYLAN, PAD here for surgical evaluation of L leg wounds. Patient reports that she has had long history of bilateral lower extremity swelling. She also has had prior interventions on the LLE including an angiogram with SFA stent placement previously. She was being followed by Dr. Poon at [facility] and has had a known left SFA stent occlusion. She reports that she has been having L leg pain that has been ongoing for the past several months with new wounds that have been ongoing for the past several weeks. She reports that her current facility has been doing dressing changes for her. She reports that she cannot walk much now due to pain. She has not been able to move her left ankle or toes for several weeks.     Past Medical History  She has a past medical history of Carotid artery stenosis, Diabetes mellitus (Multi), Heart disease, Hypertension, PAD (peripheral artery disease) (CMS-[facility]), and Renal artery stenosis (CMS-[facility]).    Past Surgical History  She has a past surgical history that includes Coronary artery bypass graft and Femoral artery stent (Left).    Social History  She reports that she has been smoking cigarettes. She has never used smokeless tobacco. She reports that she does not drink alcohol and does not use drugs.    Family History  No family history on file.    Allergies  Lisinopril, Amoxicillin, and Losartan    Outpatient Medications  Current Outpatient Medications   Medication Instructions    acetaminophen (TYLENOL) 500 mg, oral, Every 6 hours PRN, Take per directed    amLODIPine (Norvasc) 10 mg tablet TAKE 1 TABLET BY MOUTH ONCE DAILY    aspirin 81 mg, oral, Daily    atorvastatin (Lipitor) 80 mg tablet TAKE 1 TABLET BY MOUTH ONCE DAILY    BD Ultra-Fine Micro Pen Needle  "32 gauge x 1/4\" needle     blood sugar diagnostic strip Use 1 strip to check blood sugar 3 times a day with meals.    carvedilol (Coreg) 25 mg tablet TAKE 1 TABLET BY MOUTH TWO TIMES A DAY    clopidogrel (PLAVIX) 75 mg, Daily RT    diclofenac sodium (VOLTAREN) 4 g, Topical, 4 times daily PRN    Easy Touch Alcohol Prep Pads pads, medicated     Farxiga 10 mg, oral, Daily with breakfast    insulin glargine (LANTUS) 10 Units, subcutaneous, Nightly, Take as directed per insulin instructions.    insulin lispro 0-5 Units, subcutaneous, 3 times daily (morning, midday, late afternoon), 0 unit(s) if BG ; 1 unit(s) if -200; 2 unit(s) if -250; 3 unit(s) if -300; 4 unit(s) if -350; 5 unit(s) if -400    isosorbide mononitrate ER (Imdur) 60 mg 24 hr tablet TAKE 1 TABLET BY MOUTH ONCE DAILY    lancets misc Use three times a day to test blood sugar w/ meals    metFORMIN XR (Glucophage-XR) 500 mg 24 hr tablet TAKE 2 TABLETS BY MOUTH ONCE DAILY    multivitamin with minerals tablet 1 tablet, Daily    ranolazine (RANEXA) 500 mg, oral, 2 times daily, Do not crush, chew, or split.       Review of Systems  Review of Systems   Constitutional: Negative.   HENT: Negative.     Eyes: Negative.    Cardiovascular: Negative.    Respiratory: Negative.     Endocrine: Negative.    Hematologic/Lymphatic: Negative.    Skin: Negative.    Musculoskeletal: Negative.    Gastrointestinal: Negative.    Genitourinary: Negative.    Neurological: Negative.    Psychiatric/Behavioral: Negative.         Last Recorded Vitals  Vitals:    02/28/25 1335 02/28/25 1336   BP: 155/85 150/83   BP Location: Right arm Left arm   Patient Position: Sitting Sitting   Pulse: 77    SpO2: 100%    Weight: (!) 42.6 kg (94 lb)    Height: 1.6 m (5' 3\")        Physical Examination  General: Well appearing, well-nourished, in no acute distress.  HEENT: Normocephalic atraumatic, pupils equal and reactive to light, extraocular muscles intact, no " "conjunctival injection, oropharynx clear without exudates.  Neck: Normal carotid arterial pulses, no arterial bruits, no thyromegaly.  Cardiac: Regular rhythm and normal heart rate.  Pulmonary: No increased work of breathing, no wheezes or crackles.  GI: Soft, ND, NT  Lower extremities: Palpable right femoral pulse. Non-palpable L femoral pulse. Monophasic L DP/PT signal. Extensive dry gangrenous changes to the dorsum of the left foot with areas of superficial ulceration. Circumferential dry gangrene of the ankle and lower calf with thrombosis of the superficial capillaries easily noted on exam. L ankle fixed. Unable to passively or actively flex or extend. Areas of patchy dry gangrenous changes of the mid calf. 10-15 degree contracture of the left knee.               Skin: Skin intact. No significant rashes or lesions present.  Neuro: Alert and oriented x 3, normal attention and cognition, no focal motor or sensory neurologic deficits.  Psych: Normal affect and mood.  Musculoskeletal: Normal gait normal muscle tone.    Laboratory Studies  Lab Results   Component Value Date    GLUCOSE 57 (L) 01/18/2025    CALCIUM 8.2 (L) 01/18/2025     (L) 01/18/2025    K 3.6 01/18/2025    CO2 26 01/18/2025     01/18/2025    BUN 12 01/18/2025    CREATININE 0.45 (L) 01/18/2025     Lab Results   Component Value Date    ALT 34 01/18/2025    AST 41 (H) 01/18/2025    ALKPHOS 133 01/18/2025    BILITOT 0.3 01/18/2025         Lab Results   Component Value Date    CHOL 141 07/16/2024     Lab Results   Component Value Date    HDL 70.0 07/16/2024     Lab Results   Component Value Date    LDLCALC 56 07/16/2024     Lab Results   Component Value Date    TRIG 74 07/16/2024     No components found for: \"CHOLHDL\"  Lab Results   Component Value Date    HGBA1C 14.6 (H) 01/09/2025     No components found for: \"UACR\"      Assessment and Plan  Problem List Items Addressed This Visit    None    - Patient is a 65 year old female with hx of HTN, " "HLD, DM, PAD s/p L SFA stent now with suspected combination of chronic venous stasis ulceration with severe poor wound healing due to severe underlying arterial insufficiency. Patient's ankle is fixed and she is unable to move her toes but her sensation in intact. Unfortunately, she has circumferential areas of dry gangrene of her ankle and lower calf. Overall, I think there is low likelihood of limb salvage. I discussed this with the patient in detail. Given the extensive involvement of the posterior calf, I do not believe a BKA is possible either. The level of the proximal amputation will likely be an AKA. I discussed this with the patient at length. She was initially tearful but eventually agreeable \"if that is what is needed\". Given patient's extensive wounds, would recommend admission with CTA of the lower extremities, IV antibiotics, and podiatry consultation. Given her lack of a left femoral pulse. I do not believe she will heal even an AKA. Will start with vascular studies to determine what revascularization options she has; however, overall prognosis for limb salvage is extremely poor.     Bobby Pederson MD      "

## 2025-02-28 NOTE — H&P (VIEW-ONLY)
VASCULAR SURGERY CONSULT NOTE    Assessment/Plan   Myrna Khan is 65 y.o. female with history of carotid stenosis, DM, heart disease, HTN, PAD, renal artery stenosis who was sent to the ED with extensive wounds of LLE and intermittent rest pain.     Plan:  No acute surgical intervention  Recommend admission to medicine management of multiple medical comorbidities  Podiatry consult   Vascular lab studies: lower extremity vein mapping  Cardiology consult for risk stratification if requires extensive debridement, amputation, or operative revascularization given medical history       D/w fellow, Dr. Eugenia Bartholomew DO PGY-1  General Surgery    Fellow Addendum:   This patient was seen separately from the resident team but I agree with what is written.  In brief she has been having subacute worsening of chronic limb threatening ischemia worse on the left than right.  She has intermittent rest pain.  Her pain is worse when ambulating and improved by propping her foot up.  This is not typical for ischemic rest pain.  She has intact sensation in her limb but has difficulty moving her foot at the toes or ankle secondary to pain and says this has been going on between 4 days to 2 weeks.  She smokes about a half a pack per day.  On exam she has extensive wounds described elsewhere in this note and pictured in chart.  Minimal signs of surrounding erythema and infection.  The patient does not have signs of systemic illness.  She does have a right palpable femoral pulse and a right palpable DP pulse and PT signal.  The left limb does not have a palpable femoral pulse.  She does have monophasic left AT and PT signals.  CTA does show occlusion of her common and external iliac arteries as well as known occlusion of her left SFA stent.  She has reconstitution of her left popliteal artery and intact tibial vessel runoff onto the foot.     No acute surgical intervention.  She needs medical optimization and antibiotic  "therapy for her wounds.  She needs to be seen by cardiology and she needs further stratification of her level of ischemia and options for revascularization.    Subjective   HPI:  Myrna Khan is 65 y.o. female with history of carotid stenosis, DM, heart disease, HTN, PAD, renal artery stenosis, HFrEF (7/16/24), CABG on plavix, who was sent to the ED with ischemia of the foot cf dry gangrene vs den 3 Ali.     She notes that the skin changes seen in the chart developed 2 days ago and have spread. She sstates that she has had symptoms of claudication and metatarsalgia for years though they come and go. She further endorses motor loss of the LLE from the ankle to the toes also which have occurred for the past 2 days. She endorses associated peripheral edema, first in the left and then in the right which has been ongoing for weeks.    Vascular History:  SFA stenting on the left    PMH:   Past Medical History:   Diagnosis Date    Carotid artery stenosis     Diabetes mellitus (Multi)     Heart disease     Hypertension     PAD (peripheral artery disease) (CMS-[facility])     Renal artery stenosis (CMS-[facility])         PSH:   Recent Surgeries in General Surgery            No cases to display             Home Meds:  No current facility-administered medications on file prior to encounter.     Current Outpatient Medications on File Prior to Encounter   Medication Sig Dispense Refill    acetaminophen (Tylenol) 500 mg tablet Take 1 tablet (500 mg) by mouth every 6 hours if needed for mild pain (1 - 3) or moderate pain (4 - 6). Take per directed 30 tablet 0    amLODIPine (Norvasc) 10 mg tablet TAKE 1 TABLET BY MOUTH ONCE DAILY 30 tablet 0    aspirin 81 mg EC tablet Take 1 tablet (81 mg) by mouth once daily.      atorvastatin (Lipitor) 80 mg tablet TAKE 1 TABLET BY MOUTH ONCE DAILY 30 tablet 2    BD Ultra-Fine Micro Pen Needle 32 gauge x 1/4\" needle       blood sugar diagnostic strip Use 1 strip to check blood sugar 3 times a day " with meals. 100 each 0    carvedilol (Coreg) 25 mg tablet TAKE 1 TABLET BY MOUTH TWO TIMES A DAY 60 tablet 2    [] cholecalciferol (Vitamin D-3) 25 MCG (1000 UT) capsule Take 2 capsules (50 mcg) by mouth once daily.      clopidogrel (Plavix) 75 mg tablet Take 1 tablet (75 mg) by mouth once daily.      diclofenac sodium (Voltaren) 1 % gel Apply 4.5 inches (4 g) topically 4 times a day as needed (pain).      Easy Touch Alcohol Prep Pads pads, medicated       Farxiga 10 mg Take 1 tablet (10 mg) by mouth once daily with breakfast.      insulin glargine (Lantus) 100 unit/mL injection Inject 10 Units under the skin once daily at bedtime. Take as directed per insulin instructions.      insulin lispro 100 unit/mL injection Inject 0-5 Units under the skin 3 times daily (morning, midday, late afternoon). 0 unit(s) if BG ; 1 unit(s) if -200; 2 unit(s) if -250; 3 unit(s) if -300; 4 unit(s) if -350; 5 unit(s) if -400      isosorbide mononitrate ER (Imdur) 60 mg 24 hr tablet TAKE 1 TABLET BY MOUTH ONCE DAILY 30 tablet 0    lancets misc Use three times a day to test blood sugar w/ meals 102 each 0    metFORMIN XR (Glucophage-XR) 500 mg 24 hr tablet TAKE 2 TABLETS BY MOUTH ONCE DAILY 60 tablet 0    multivitamin with minerals tablet Take 1 tablet by mouth once daily.      ranolazine (Ranexa) 500 mg 12 hr tablet Take 1 tablet (500 mg) by mouth 2 times a day. Do not crush, chew, or split.          Allergies:  Allergies   Allergen Reactions    Lisinopril Itching    Amoxicillin Rash    Losartan Itching and Rash       SH/FH:   Social History     Socioeconomic History    Marital status: Single   Tobacco Use    Smoking status: Some Days     Types: Cigarettes    Smokeless tobacco: Never   Vaping Use    Vaping status: Never Used   Substance and Sexual Activity    Alcohol use: Never    Drug use: Never    Sexual activity: Defer     Social Drivers of Health     Financial Resource Strain: Low Risk   "(1/7/2025)    Overall Financial Resource Strain (CARDIA)     Difficulty of Paying Living Expenses: Not hard at all   Food Insecurity: Patient Unable To Answer (1/7/2025)    Hunger Vital Sign     Worried About Running Out of Food in the Last Year: Patient unable to answer     Ran Out of Food in the Last Year: Patient unable to answer   Transportation Needs: Unmet Transportation Needs (1/7/2025)    PRAPARE - Transportation     Lack of Transportation (Medical): No     Lack of Transportation (Non-Medical): Yes   Physical Activity: Unknown (7/16/2024)    Exercise Vital Sign     Days of Exercise per Week: 0 days   Intimate Partner Violence: Patient Unable To Answer (1/7/2025)    Humiliation, Afraid, Rape, and Kick questionnaire     Fear of Current or Ex-Partner: Patient unable to answer     Emotionally Abused: Patient unable to answer     Physically Abused: Patient unable to answer     Sexually Abused: Patient unable to answer   Housing Stability: Low Risk  (1/7/2025)    Housing Stability Vital Sign     Unable to Pay for Housing in the Last Year: No     Number of Times Moved in the Last Year: 0     Homeless in the Last Year: No        ROS: 12 system negative except HPI    Objective   Vitals:  Heart Rate:  [77-85]   Temperature:  [36.6 °C (97.9 °F)]   Respirations:  [16]   BP: (140-155)/(83-85)   Height:  [160 cm (5' 3\")]   Weight:  [42.6 kg (94 lb)]   Pulse Ox:  [100 %]     Exam:  Constitutional: No acute distress, resting comfortably  Neuro:  AOx3, grossly intact  ENMT: moist mucous membranes  Head/neck: atraumatic  CV: no tachycardia  Pulm: non-labored on room air  GI: soft, non-tender, distended  Extremities:   RUE and LUE with palpable radials and intact strength sensation.  RLE: palpable fem, palpable DP, multiphasic PT signal. Intact strength and sensation.  LLE: non-palpable femoral pulse, monophasic pop signal, Monophasic AT/PT signals.      Labs:                      Imaging:  Most recent noninvasive studies in " 7/2024 reveal >50% stenosis of L CFA and an occluded L SFA stent and recon at the distal SFA. Monophasic flow is noted. PVRs on the right show multiphasic flow of the r CFA, R PTA and R DP. On the Left monophasic flow was noted in the L PTA and L DP.

## 2025-03-01 ENCOUNTER — APPOINTMENT (OUTPATIENT)
Dept: RADIOLOGY | Facility: HOSPITAL | Age: 65
DRG: 270 | End: 2025-03-01
Payer: COMMERCIAL

## 2025-03-01 ENCOUNTER — APPOINTMENT (OUTPATIENT)
Dept: RADIOLOGY | Facility: HOSPITAL | Age: 65
End: 2025-03-01
Payer: COMMERCIAL

## 2025-03-01 LAB
APTT PPP: 35 SECONDS (ref 26–36)
CANCER AG125 SERPL-ACNC: 31.1 U/ML (ref 0–30.2)
CANCER AG19-9 SERPL-ACNC: 74.21 U/ML
CARDIAC TROPONIN I PNL SERPL HS: 5 NG/L (ref 0–34)
CEA SERPL-MCNC: 3 UG/L
GLUCOSE BLD MANUAL STRIP-MCNC: 137 MG/DL (ref 74–99)
GLUCOSE BLD MANUAL STRIP-MCNC: 173 MG/DL (ref 74–99)
GLUCOSE BLD MANUAL STRIP-MCNC: 226 MG/DL (ref 74–99)
GLUCOSE BLD MANUAL STRIP-MCNC: 363 MG/DL (ref 74–99)
GLUCOSE BLD MANUAL STRIP-MCNC: 417 MG/DL (ref 74–99)
INR PPP: 1.2 (ref 0.9–1.1)
PROTHROMBIN TIME: 13.1 SECONDS (ref 9.8–12.4)
VANCOMYCIN SERPL-MCNC: 9.1 UG/ML (ref 5–20)

## 2025-03-01 PROCEDURE — 2500000001 HC RX 250 WO HCPCS SELF ADMINISTERED DRUGS (ALT 637 FOR MEDICARE OP): Performed by: STUDENT IN AN ORGANIZED HEALTH CARE EDUCATION/TRAINING PROGRAM

## 2025-03-01 PROCEDURE — 2500000002 HC RX 250 W HCPCS SELF ADMINISTERED DRUGS (ALT 637 FOR MEDICARE OP, ALT 636 FOR OP/ED): Performed by: STUDENT IN AN ORGANIZED HEALTH CARE EDUCATION/TRAINING PROGRAM

## 2025-03-01 PROCEDURE — 76857 US EXAM PELVIC LIMITED: CPT | Performed by: RADIOLOGY

## 2025-03-01 PROCEDURE — 76856 US EXAM PELVIC COMPLETE: CPT

## 2025-03-01 PROCEDURE — 82947 ASSAY GLUCOSE BLOOD QUANT: CPT

## 2025-03-01 PROCEDURE — 99223 1ST HOSP IP/OBS HIGH 75: CPT

## 2025-03-01 PROCEDURE — 36415 COLL VENOUS BLD VENIPUNCTURE: CPT | Performed by: STUDENT IN AN ORGANIZED HEALTH CARE EDUCATION/TRAINING PROGRAM

## 2025-03-01 PROCEDURE — 2500000004 HC RX 250 GENERAL PHARMACY W/ HCPCS (ALT 636 FOR OP/ED): Performed by: STUDENT IN AN ORGANIZED HEALTH CARE EDUCATION/TRAINING PROGRAM

## 2025-03-01 PROCEDURE — 80202 ASSAY OF VANCOMYCIN: CPT | Performed by: PSYCHOLOGIST

## 2025-03-01 PROCEDURE — 99233 SBSQ HOSP IP/OBS HIGH 50: CPT | Performed by: STUDENT IN AN ORGANIZED HEALTH CARE EDUCATION/TRAINING PROGRAM

## 2025-03-01 PROCEDURE — 85610 PROTHROMBIN TIME: CPT | Performed by: STUDENT IN AN ORGANIZED HEALTH CARE EDUCATION/TRAINING PROGRAM

## 2025-03-01 PROCEDURE — 36415 COLL VENOUS BLD VENIPUNCTURE: CPT | Performed by: PSYCHOLOGIST

## 2025-03-01 PROCEDURE — 1200000002 HC GENERAL ROOM WITH TELEMETRY DAILY

## 2025-03-01 RX ORDER — HYDRALAZINE HYDROCHLORIDE 10 MG/1
10 TABLET, FILM COATED ORAL 2 TIMES DAILY
COMMUNITY
End: 2025-03-12 | Stop reason: HOSPADM

## 2025-03-01 RX ORDER — ACETAMINOPHEN 325 MG/1
650 TABLET ORAL EVERY 8 HOURS
Status: DISCONTINUED | OUTPATIENT
Start: 2025-03-01 | End: 2025-03-07 | Stop reason: SDUPTHER

## 2025-03-01 RX ORDER — OXYCODONE HYDROCHLORIDE 5 MG/1
5 CAPSULE ORAL EVERY 6 HOURS PRN
COMMUNITY
End: 2025-03-12 | Stop reason: HOSPADM

## 2025-03-01 RX ORDER — QUETIAPINE FUMARATE 25 MG/1
25 TABLET, FILM COATED ORAL 2 TIMES DAILY PRN
Status: DISCONTINUED | OUTPATIENT
Start: 2025-03-01 | End: 2025-03-10

## 2025-03-01 RX ORDER — GABAPENTIN 300 MG/1
300 CAPSULE ORAL DAILY
COMMUNITY

## 2025-03-01 RX ORDER — OXYCODONE HYDROCHLORIDE 5 MG/1
5 TABLET ORAL EVERY 6 HOURS PRN
Status: DISCONTINUED | OUTPATIENT
Start: 2025-03-01 | End: 2025-03-04

## 2025-03-01 RX ORDER — OLANZAPINE 10 MG/2ML
5 INJECTION, POWDER, FOR SOLUTION INTRAMUSCULAR ONCE AS NEEDED
Status: DISCONTINUED | OUTPATIENT
Start: 2025-03-01 | End: 2025-03-04

## 2025-03-01 RX ORDER — ACETAMINOPHEN 500 MG
10 TABLET ORAL NIGHTLY
Status: DISCONTINUED | OUTPATIENT
Start: 2025-03-01 | End: 2025-03-12 | Stop reason: HOSPADM

## 2025-03-01 RX ADMIN — RANOLAZINE 500 MG: 500 TABLET, EXTENDED RELEASE ORAL at 11:30

## 2025-03-01 RX ADMIN — CARVEDILOL 25 MG: 12.5 TABLET, FILM COATED ORAL at 20:37

## 2025-03-01 RX ADMIN — CEFEPIME 2 G: 1 INJECTION, SOLUTION INTRAVENOUS at 12:54

## 2025-03-01 RX ADMIN — CARVEDILOL 25 MG: 12.5 TABLET, FILM COATED ORAL at 09:26

## 2025-03-01 RX ADMIN — RANOLAZINE 500 MG: 500 TABLET, EXTENDED RELEASE ORAL at 20:50

## 2025-03-01 RX ADMIN — INSULIN LISPRO 2 UNITS: 100 INJECTION, SOLUTION INTRAVENOUS; SUBCUTANEOUS at 12:54

## 2025-03-01 RX ADMIN — Medication 10 MG: at 21:54

## 2025-03-01 RX ADMIN — ISOSORBIDE MONONITRATE 60 MG: 30 TABLET, EXTENDED RELEASE ORAL at 09:26

## 2025-03-01 RX ADMIN — RANOLAZINE 500 MG: 500 TABLET, EXTENDED RELEASE ORAL at 00:59

## 2025-03-01 RX ADMIN — CEFEPIME 2 G: 1 INJECTION, SOLUTION INTRAVENOUS at 04:35

## 2025-03-01 RX ADMIN — ATORVASTATIN CALCIUM 80 MG: 80 TABLET, FILM COATED ORAL at 20:37

## 2025-03-01 RX ADMIN — HEPARIN SODIUM 5000 UNITS: 5000 INJECTION, SOLUTION INTRAVENOUS; SUBCUTANEOUS at 21:54

## 2025-03-01 RX ADMIN — ACETAMINOPHEN 650 MG: 325 TABLET ORAL at 02:36

## 2025-03-01 RX ADMIN — HEPARIN SODIUM 5000 UNITS: 5000 INJECTION, SOLUTION INTRAVENOUS; SUBCUTANEOUS at 13:02

## 2025-03-01 RX ADMIN — AMLODIPINE BESYLATE 10 MG: 10 TABLET ORAL at 09:26

## 2025-03-01 RX ADMIN — INSULIN LISPRO 1 UNITS: 100 INJECTION, SOLUTION INTRAVENOUS; SUBCUTANEOUS at 17:53

## 2025-03-01 RX ADMIN — OXYCODONE 5 MG: 5 TABLET ORAL at 11:32

## 2025-03-01 RX ADMIN — ASPIRIN 81 MG: 81 TABLET, COATED ORAL at 09:25

## 2025-03-01 RX ADMIN — INSULIN GLARGINE 8 UNITS: 100 INJECTION, SOLUTION SUBCUTANEOUS at 20:56

## 2025-03-01 RX ADMIN — CLOPIDOGREL BISULFATE 75 MG: 75 TABLET ORAL at 09:26

## 2025-03-01 RX ADMIN — CEFEPIME 2 G: 1 INJECTION, SOLUTION INTRAVENOUS at 20:50

## 2025-03-01 RX ADMIN — HEPARIN SODIUM 5000 UNITS: 5000 INJECTION, SOLUTION INTRAVENOUS; SUBCUTANEOUS at 06:37

## 2025-03-01 ASSESSMENT — COGNITIVE AND FUNCTIONAL STATUS - GENERAL
CLIMB 3 TO 5 STEPS WITH RAILING: TOTAL
HELP NEEDED FOR BATHING: A LOT
WALKING IN HOSPITAL ROOM: A LOT
STANDING UP FROM CHAIR USING ARMS: A LOT
MOVING TO AND FROM BED TO CHAIR: A LITTLE
PERSONAL GROOMING: A LITTLE
DRESSING REGULAR LOWER BODY CLOTHING: A LOT
DRESSING REGULAR UPPER BODY CLOTHING: A LOT
MOBILITY SCORE: 14
MOVING FROM LYING ON BACK TO SITTING ON SIDE OF FLAT BED WITH BEDRAILS: A LITTLE
DAILY ACTIVITIY SCORE: 15
TURNING FROM BACK TO SIDE WHILE IN FLAT BAD: A LITTLE
TOILETING: A LOT

## 2025-03-01 ASSESSMENT — PAIN - FUNCTIONAL ASSESSMENT: PAIN_FUNCTIONAL_ASSESSMENT: 0-10

## 2025-03-01 ASSESSMENT — PAIN SCALES - GENERAL: PAINLEVEL_OUTOF10: 0 - NO PAIN

## 2025-03-01 NOTE — PROGRESS NOTES
"  VASCULAR SURGERY PROGRESS NOTE  Assessment/Plan   Myrna Khan is 65 y.o. female with history of carotid stenosis, diabetes, CAD, HTN, PAD and renal artery stenosis who presents with extensive LLE wounds and intermittent rest pain.    Plan:  Admitted to medicine  Podiatry consulted - no urgent intervention  Continue antibiotics  Vascular lab studies: Vein mapping  Cardiology risk stratification for possible extensive debridement vs amputation and if requires operative revascularization    D/w attending, Dr. Devendra Bello MD  Vascular Surgery Fellow  Team Pager [phone]  03/01/25  7:53 AM      Subjective   No acute events overnight.    Objective   Vitals:  Heart Rate:  [71-85]   Temperature:  [36.5 °C (97.7 °F)-36.6 °C (97.9 °F)]   Respirations:  [16]   BP: (140-187)/(75-85)   Height:  [160 cm (5' 3\")]   Weight:  [42.6 kg (94 lb)]   Pulse Ox:  [99 %-100 %]     Exam:  Constitutional: No acute distress, resting comfortably  Neuro:  AOx3, grossly intact  ENMT: moist mucous membranes  CV: no tachycardia  Pulm: non-labored on room air  GI: soft, non-tender, non-distended  Skin: warm and dry  Musculoskeletal: moving all extremities  Extremities: left lower extremity with extensive tissue loss from dosrum of foot to below knee   Pulse Exam:    RLE: palpable femoral pulse, palpable DP, PT signal   LLE: monophasic AT and PT signals    Labs:  Results from last 7 days   Lab Units 02/28/25  1801   WBC AUTO x10*3/uL 10.6   HEMOGLOBIN g/dL 11.3*   PLATELETS AUTO x10*3/uL 457*      Results from last 7 days   Lab Units 02/28/25  1801   SODIUM mmol/L 132*   POTASSIUM mmol/L 4.2   CHLORIDE mmol/L 96*   CO2 mmol/L 27   BUN mg/dL 10   CREATININE mg/dL 0.58   GLUCOSE mg/dL 171*      Results from last 7 days   Lab Units 03/01/25  0633   INR  1.2*   PROTIME seconds 13.1*   APTT seconds 35            "

## 2025-03-01 NOTE — CONSULTS
"Vancomycin Dosing by Pharmacy- Initial    Myrna Khan is a 65 y.o. year old female who Pharmacy has been consulted for vancomycin dosing for  diabetic foot wound . Based on the patient's indication and renal status this patient is being dosed based on a goal AUC of 400-600.     Renal function can be described as Normal Renal Function    Renal function is at patient's baseline      Visit Vitals  /85 (BP Location: Left arm, Patient Position: Sitting)   Pulse 85   Temp 36.6 °C (97.9 °F) (Oral)   Resp 16        Lab Results   Component Value Date    CREATININE 0.58 02/28/2025    CREATININE 0.45 (L) 01/18/2025    CREATININE 0.71 01/14/2025    CREATININE 0.56 01/14/2025        Patient weight is No results found for: \"PTWEIGHT\"    No results found for: \"VANCORANDOM\", \"CULTURE\"     No intake/output data recorded.    Lab Results   Component Value Date    PATIENTTEMP 37.0 01/06/2025    PATIENTTEMP 37.0 07/15/2024    PATIENTTEMP 37.0 07/15/2024          Assessment/Plan    Patient will not be given a loading dose.  Will initiate vancomycin maintenance,  1250 mg every 24 hours.    This dosing regimen is predicted by [employer] to result in the following pharmacokinetic parameters:    Loading dose: N/A  Regimen: 1250 mg IV every 24 hours.  Start time: 21:18 on 02/28/2025  Exposure target: AUC24 (range)400-600 mg/L.hr   [id number]: 371 mg/L.hr  AUC24,ss: 437 mg/L.hr  Probability of AUC24 > 400: 60 %  Ctrough,ss: 10.6 mg/L  Probability of Ctrough,ss > 20: 8 %      Follow-up level will be ordered on 3/1 at AM labs unless clinically indicated sooner.  Will continue to monitor renal function daily while on vancomycin and order serum creatinine at least every 48 hours if not already ordered.  Follow for continued vancomycin needs, clinical response, and signs/symptoms of toxicity.     Thank you for allowing me to participate in the care of this patient.     Gracie Gao, PharmD             "

## 2025-03-01 NOTE — HOSPITAL COURSE
Myrna Khan is a 65 y.o. 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 Pt noting skin sloughing, edema, clear drainage. At op visit, difficulty palpating pulse, had a monophasic AT and PT signals on the LLE and no palpable femoral pulse. CTA showed occlusion of common and external iliac arteries in addition to occluded SFA stent with distal reconstruction. Podiatry consulted, no intervention for now. Pt is started on broad spectrum abx and pending pvr and vein mapping.

## 2025-03-01 NOTE — H&P
History Of Present Illness  Myrna Khan is a 65 y.o. 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    Patient endorses that she was on her usual state of health and that she didn't have any issues with her L leg until a week ago, when she noticed sloughing of the skin on her L leg, followed by edema, clear fluid drainage in addition to significant itching and pain, which progressively got worse over the course of this week. Patient endorses that she had an appointment with her cardiologist today prior to presentation and that she was sent to the ED for further evaluation. Per chart review, Dr. Pederson had difficulty palpating pulses on the LLE in addition to noticing significant skin breakdown on the LLE. Per report patient had a monophasic AT and PT signals on the LLE and no palpable femoral pulse.    Patient underwent CTA at the ED which, per report from vascular team, showed occlusion of common and external iliac arteries in addition to occluded SFA stent with distal reconstruction. Patient was evaluated by podiatry as well and decision for now was on no operative management, ABx and further evaluation/optimization by cardiology.    Patient was seen in bed at the ED and complaining of significant itching of the LLE. Endorses pain, worse on movement and exertion, although mostly tolerable. Denies fever, chills, N/V, constipation or diarrhea or other constitutional symptoms. Denies SOB, CP, palpitations. Do endorses hx of syncope, in which she was evaluated on prior admissions, but no recent episodes.     Past Medical History  Past Medical History:   Diagnosis Date    CAD (coronary artery disease)     Carotid artery stenosis      Diabetes mellitus (Multi)     Heart disease     Hypertension     NSTEMI (non-ST elevated myocardial infarction) (Multi)     PAD (peripheral artery disease) (CMS-HCC)     Renal artery stenosis (CMS-HCC)        Surgical History  Past Surgical History:   Procedure Laterality Date    CORONARY ARTERY BYPASS GRAFT      9/2023    FEMORAL ARTERY STENT Left     left SFA        Social History  She reports that she has been smoking cigarettes. She started smoking about 30 years ago. She has a 30.2 pack-year smoking history. She has never used smokeless tobacco. She reports current alcohol use. She reports that she does not use drugs.    Family History  Family History   Problem Relation Name Age of Onset    Peripheral vascular disease Mother      Hypertension Mother      Diabetes type II Mother      Hypertension Sister      Diabetes type II Sister          Allergies  Lisinopril, Amoxicillin, and Losartan    Review of Systems   Constitutional:  Negative for activity change, appetite change, chills, fatigue, fever and unexpected weight change.   HENT:  Negative for rhinorrhea, sinus pressure and sore throat.    Respiratory:  Negative for cough, chest tightness, shortness of breath and wheezing.    Cardiovascular:  Positive for leg swelling. Negative for chest pain and palpitations.   Gastrointestinal:  Negative for abdominal distention, constipation, diarrhea, nausea and vomiting.   Genitourinary:  Negative for dysuria, frequency and urgency.   Skin:  Positive for color change and pallor.   Neurological:  Negative for dizziness, seizures, syncope (No recent episodes), facial asymmetry, speech difficulty, weakness, light-headedness and headaches.        Physical Exam  Constitutional:       General: She is not in acute distress.     Appearance: Normal appearance. She is ill-appearing (chronically). She is not toxic-appearing or diaphoretic.   HENT:      Mouth/Throat:      Mouth: Mucous membranes are moist.   Eyes:      Pupils:  "Pupils are equal, round, and reactive to light.   Cardiovascular:      Rate and Rhythm: Normal rate and regular rhythm.      Heart sounds: No murmur heard.     No friction rub. No gallop.      Comments: Occasional extra beat on auscultation  Pulmonary:      Effort: Pulmonary effort is normal. No respiratory distress.      Breath sounds: Normal breath sounds. No wheezing or rales.   Abdominal:      General: Abdomen is flat. There is no distension.      Tenderness: There is no abdominal tenderness. There is no guarding.      Hernia: A hernia (umbilical) is present.   Musculoskeletal:         General: Tenderness present.      Left lower leg: Edema present.      Comments: LLE is pale and cold on the location of injuries, concentrated mostly on the anterior surface of the sheen. No drainage noted on exam.    Skin:     General: Skin is warm and dry.      Capillary Refill: Capillary refill takes less than 2 seconds.   Neurological:      General: No focal deficit present.      Mental Status: She is alert and oriented to person, place, and time.          Last Recorded Vitals  Blood pressure 140/85, pulse 85, temperature 36.6 °C (97.9 °F), temperature source Oral, resp. rate 16, height 1.6 m (5' 3\"), weight (!) 42.6 kg (94 lb), SpO2 100%.    Relevant Results    Scheduled medications  [START ON 3/1/2025] amLODIPine, 10 mg, oral, Daily  [START ON 3/1/2025] aspirin, 81 mg, oral, Daily  atorvastatin, 80 mg, oral, Nightly  carvedilol, 25 mg, oral, BID  cefepime, 2 g, intravenous, q8h  [START ON 3/1/2025] clopidogrel, 75 mg, oral, Daily  heparin (porcine), 5,000 Units, subcutaneous, q8h BOLIVAR  insulin glargine, 8 Units, subcutaneous, Nightly  [START ON 3/1/2025] insulin lispro, 0-5 Units, subcutaneous, TID AC  [START ON 3/1/2025] isosorbide mononitrate ER, 60 mg, oral, Daily  ranolazine, 500 mg, oral, BID  vancomycin, 1,250 mg, intravenous, q24h      Continuous medications     PRN medications  PRN medications: acetaminophen, " dextrose, dextrose, glucagon, glucagon, polyethylene glycol, vancomycin    ECG 12 lead    Result Date: 2/28/2025  Normal sinus rhythm Possible Left atrial enlargement Minimal voltage criteria for LVH, may be normal variant ( Ruben product ) Nonspecific ST abnormality Abnormal ECG When compared with ECG of 18-JAN-2025 04:32, Significant changes have occurred See ED provider note for full interpretation and clinical correlation Confirmed by Jen Leon ([id number]) on 2/28/2025 10:15:56 PM    XR ankle left 3+ views    Result Date: 2/28/2025  Interpreted By:  Quentin Madden, STUDY: XR ANKLE LEFT 3+ VIEWS; ;  2/28/2025 7:33 pm   INDICATION: Signs/Symptoms:r/o gas.   COMPARISON: None.   ACCESSION NUMBER(S): [id number]   ORDERING CLINICIAN: INGRID GARNICA   FINDINGS: Generalized soft tissue edema about the ankle. No evidence of soft tissue gas.   Vascular calcifications.   Diffuse osseous demineralization. Normal alignment. No acute fractures detected.       No radiographic evidence of soft tissue gas.   MACRO: None   Signed by: Quentin Madden 2/28/2025 9:33 PM Dictation workstation:   [id number]    XR femur left 2+ views    Result Date: 2/28/2025  Interpreted By:  Quentin Madden, STUDY: XR FEMUR LEFT 2+ VIEWS; ;  2/28/2025 7:33 pm   INDICATION: Signs/Symptoms:pain skin breakdown r/o gas.   COMPARISON: None.   ACCESSION NUMBER(S): [id number]   ORDERING CLINICIAN: INGRID GARNICA   FINDINGS: Vascular calcifications with a stent in the medial thigh.   The bones are diffusely demineralized. No acute fractures or osseous erosions identified. Generalized soft tissue edema about the leg skin folds about the medial thigh. Hyperdense foreign body projecting along the lateral aspect of the proximal thigh (best seen on the AP view), indeterminate.       No acute osseous abnormalities. Generalized soft tissue edema about the thigh. No definite evidence of soft tissue gas although sensitivity is limited due to presence of skin  folds.   Hyperdense object projecting over the lateral aspect of the thigh is indeterminate for possible foreign body. Correlate with physical exam.   MACRO: None   Signed by: Quentin Madden 2/28/2025 9:32 PM Dictation workstation:   [id number]    XR foot left 3+ views    Result Date: 2/28/2025  Interpreted By:  Quentin Madden, STUDY: XR FOOT LEFT 3+ VIEWS; ;  2/28/2025 7:33 pm   INDICATION: Signs/Symptoms:pain skin breakdown r/o gas.   COMPARISON: None.   ACCESSION NUMBER(S): [id number]   ORDERING CLINICIAN: INGRID GARNICA   FINDINGS: Diffuse osseous demineralization. No acute fractures defect detected. No definite evidence of osseous erosion.   Generalized soft tissue edema about the foot. No radiopaque foreign bodies identified. No definite evidence of soft tissue gas. Slight soft tissue irregularity is present along the lateral aspect of the 5th metatarsal.   Vascular calcifications.   Plantar and Achilles calcaneal enthesophytes.       No acute osseous abnormalities. The bones are diffusely demineralized.   There is generalized soft tissue edema about the foot. No definite evidence of soft tissue gas or foreign bodies detected. Slight soft tissue irregularity is present along the lateral aspect of the 5th metatarsal, correlate with physical exam findings.   MACRO: None   Signed by: Quentin Madden 2/28/2025 9:28 PM Dictation workstation:   [id number]     Results for orders placed or performed during the hospital encounter of 02/28/25 (from the past 24 hours)   CBC and Auto Differential   Result Value Ref Range    WBC 10.6 4.4 - 11.3 x10*3/uL    nRBC 0.0 0.0 - 0.0 /100 WBCs    RBC 3.48 (L) 4.00 - 5.20 x10*6/uL    Hemoglobin 11.3 (L) 12.0 - 16.0 g/dL    Hematocrit 32.1 (L) 36.0 - 46.0 %    MCV 92 80 - 100 fL    MCH 32.5 26.0 - 34.0 pg    MCHC 35.2 32.0 - 36.0 g/dL    RDW 11.7 11.5 - 14.5 %    Platelets 457 (H) 150 - 450 x10*3/uL    Neutrophils % 53.3 40.0 - 80.0 %    Immature Granulocytes %, Automated 1.0 (H)  0.0 - 0.9 %    Lymphocytes % 37.7 13.0 - 44.0 %    Monocytes % 6.5 2.0 - 10.0 %    Eosinophils % 0.9 0.0 - 6.0 %    Basophils % 0.6 0.0 - 2.0 %    Neutrophils Absolute 5.64 1.20 - 7.70 x10*3/uL    Immature Granulocytes Absolute, Automated 0.11 0.00 - 0.70 x10*3/uL    Lymphocytes Absolute 3.98 1.20 - 4.80 x10*3/uL    Monocytes Absolute 0.69 0.10 - 1.00 x10*3/uL    Eosinophils Absolute 0.09 0.00 - 0.70 x10*3/uL    Basophils Absolute 0.06 0.00 - 0.10 x10*3/uL   Comprehensive metabolic panel   Result Value Ref Range    Glucose 171 (H) 74 - 99 mg/dL    Sodium 132 (L) 136 - 145 mmol/L    Potassium 4.2 3.5 - 5.3 mmol/L    Chloride 96 (L) 98 - 107 mmol/L    Bicarbonate 27 21 - 32 mmol/L    Anion Gap 13 10 - 20 mmol/L    Urea Nitrogen 10 6 - 23 mg/dL    Creatinine 0.58 0.50 - 1.05 mg/dL    eGFR >90 >60 mL/min/1.73m*2    Calcium 9.1 8.6 - 10.6 mg/dL    Albumin 3.2 (L) 3.4 - 5.0 g/dL    Alkaline Phosphatase 176 (H) 33 - 136 U/L    Total Protein 7.8 6.4 - 8.2 g/dL    AST 19 9 - 39 U/L    Bilirubin, Total 0.3 0.0 - 1.2 mg/dL    ALT 17 7 - 45 U/L   B-type natriuretic peptide   Result Value Ref Range     (H) 0 - 99 pg/mL   Lactate   Result Value Ref Range    Lactate 1.6 0.4 - 2.0 mmol/L   ECG 12 lead   Result Value Ref Range    Ventricular Rate 77 BPM    Atrial Rate 77 BPM    AR Interval 170 ms    QRS Duration 96 ms    QT Interval 422 ms    QTC Calculation(Bazett) 477 ms    P Axis 80 degrees    R Axis 68 degrees    T Axis 93 degrees    QRS Count 13 beats    Q Onset 218 ms    P Onset 133 ms    P Offset 185 ms    T Offset 429 ms    QTC Fredericia 458 ms        Assessment/Plan   Assessment & Plan      Myrna Khan is a 65 y.o. 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, 35-40% in Aug/2023), CAD s/p CABG in 2023 (LIMA-LAD, APRIL-OM), PAD s/p L SFA stent (occluded in Nov/2023 w/ distal SFA reconstitution), renal artery stenosis (Sep/2023 US), carotid artery stenosis (Aug/2023 US), HTN, HLD, T2DM (A1c 14.6% Jan/2025)  presenting with dry gangrene of LLE    #Dry gangrene of LLE  #PAD SFA stent (occluded in 2023 w/ distal SFA reconstitution)  #Chronic claudication (Follows at [facility])  :: CTA read not available, but per [facility] surgery recs shows common and external iliac arteries occlusion + SFA occlusion with reconstitution of her L popliteal artery and intact tibial runoff  :: Xrays not showing gas  :: evaluated by [facility] surgery and podiatry. No surgical interventions for now   - Abx -> cefepime and vanc   - wound care -> wound consult ordered  - will defer to AM team consulting cardiology for ischemic eval and revascularization options  - c/w home aspirin, clopidogrel and atorvastatin for now    #HTN  #HFimpEF (EF 55% July/2024, 35-40% in Aug/2023)  :: prior concerns for amyloid, but MRI and NM PYP negative in addition SPEP and UPEP negative  :: on home amlodipine 10mg, carvedilol 25mg, isosorvide 60mg  - c/w home meds    #CAD s/p CABG in 2023 (LIMA-LAD, APRIL-OM)  :: EKG showing T wave inversions on anterior leads  :: patient asymptomatic from a cardiac stand-point  - given significant vascular disease, T wave inversions, will place patient on telemetry  - will send trops for further eval and risk stratification    #T2DM (A1c 14.6% Jan/2025)  :: on home metformin, dapagliflozin, insulin lantus 10u PM and SSI  - given prior hyperglycemia in Jan/2025, will be more aggressive with insulin regimen  - hold metformin and dapa  - lantus 8u + SSI. Will adjust as needed    F: PRN  E: PRN  N: Diabetic  A: PIV  DVT: SubQ Hep (per orderset)  GI: None  NOK: Richard (spouse) [phone]  Code: Full Code (confirmed on admission)             Gurinder Wells MD

## 2025-03-01 NOTE — PROGRESS NOTES
Assessment/Plan   Myrna Khan is a 65 y.o. 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 Pt noting skin sloughing, edema, clear drainage. At op visit, difficulty palpating pulse, had a monophasic AT and PT signals on the LLE and no palpable femoral pulse. CTA showed occlusion of common and external iliac arteries in addition to occluded SFA stent with distal reconstruction. Podiatry consulted, no intervention for now. Pt is started on broad spectrum abx and pending pvr and vein mapping..    LLE wounds with CLI  PAOD  Dry gangrene  - CT showing common and external iliac arteries occlusion + SFA occlusion with reconstitution of her L popliteal artery and intact tibial runoff   - no gas on imaging  - continue vanc/cefepime  - PVR/vein mapping ordered.   - vasc surgery planning angiogram on Monday  - appreciate podiatry recs  - mulitmodal analgesia with bowel regimen    #HTN  #HFimpEF (EF 55% July/2024, 35-40% in Aug/2023)  - MRI and NM PYP negative for amyloid,  SPEP and UPEP negative  - on home amlodipine 10mg, carvedilol 25mg, isosorvide 60mg  - c/w home meds     #CAD s/p CABG in 2023 (LIMA-LAD, APRIL-OM)  - EKG showing T wave inversions on anterior leads  -  patient asymptomatic, no cp or equivalents  - tele monitoring  - risk stratification if planning procedure      #T2DM (A1c 14.6% Jan/2025)  - on home metformin, dapagliflozin, insulin lantus 10u PM and SSI  - given prior hyperglycemia in Jan/2025, will be more aggressive with insulin regimen  - hold metformin and dapa  - lantus 8u + SSI. Will adjust as needed       Scheduled outpatient appointments in system:   No future  "appointments.  ---------------------------------------------------------------------------------------------------  Subjective   No events. Denies fevers chills. States she cannot talk much while eating thus did not elaborate on history. Reports uncontrolled pain. No cp, sob, dizziness, abd sx/luts. Pt is hopeful that her foot can be salvaged.      ---------------------------------------------------------------------------------------------------  Objective   Last Recorded Vitals  Blood pressure 145/76, pulse 77, temperature 36.4 °C (97.5 °F), temperature source Temporal, resp. rate 20, height 1.6 m (5' 3\"), weight (!) 42.6 kg (94 lb), SpO2 100%.  Intake/Output last 3 Shifts:  I/O last 3 completed shifts:  In: 100 (2.3 mL/kg) [IV Piggyback:100]  Out: - (0 mL/kg)   Weight: 42.6 kg     Physical Exam  Vitals and nursing note reviewed.   Constitutional:       General: She is not in acute distress.     Appearance: Normal appearance. She is not ill-appearing or toxic-appearing.   HENT:      Head: Normocephalic and atraumatic.      Mouth/Throat:      Mouth: Mucous membranes are moist.   Eyes:      General: No scleral icterus.     Extraocular Movements: Extraocular movements intact.      Conjunctiva/sclera: Conjunctivae normal.   Cardiovascular:      Rate and Rhythm: Normal rate and regular rhythm.      Heart sounds: S1 normal and S2 normal. No murmur heard.     Comments: Difficult to palpate distal pulses  Pulmonary:      Effort: Pulmonary effort is normal. No respiratory distress.      Breath sounds: No wheezing, rhonchi or rales.   Abdominal:      General: Bowel sounds are normal. There is no distension.      Palpations: Abdomen is soft.      Tenderness: There is no abdominal tenderness. There is no guarding or rebound.   Musculoskeletal:         General: No swelling or deformity.      Cervical back: Neck supple.   Skin:     Findings: No rash.      Comments: Left foot edema, denuded skin on doral surface, erythema and " ttp    Neurological:      General: No focal deficit present.      Mental Status: She is alert. Mental status is at baseline.   Psychiatric:         Mood and Affect: Mood normal.         Relevant Results  Lab Results   Component Value Date    WBC 10.6 02/28/2025    HGB 11.3 (L) 02/28/2025    HCT 32.1 (L) 02/28/2025    MCV 92 02/28/2025     (H) 02/28/2025      Lab Results   Component Value Date    GLUCOSE 171 (H) 02/28/2025    CALCIUM 9.1 02/28/2025     (L) 02/28/2025    K 4.2 02/28/2025    CO2 27 02/28/2025    CL 96 (L) 02/28/2025    BUN 10 02/28/2025    CREATININE 0.58 02/28/2025     Scheduled medications  acetaminophen, 650 mg, oral, q8h  amLODIPine, 10 mg, oral, Daily  aspirin, 81 mg, oral, Daily  atorvastatin, 80 mg, oral, Nightly  carvedilol, 25 mg, oral, BID  cefepime, 2 g, intravenous, q8h  clopidogrel, 75 mg, oral, Daily  heparin (porcine), 5,000 Units, subcutaneous, q8h BOLIVAR  insulin glargine, 8 Units, subcutaneous, Nightly  insulin lispro, 0-5 Units, subcutaneous, TID AC  isosorbide mononitrate ER, 60 mg, oral, Daily  ranolazine, 500 mg, oral, BID  vancomycin, 1,250 mg, intravenous, q24h      Continuous medications     PRN medications  PRN medications: dextrose, dextrose, glucagon, glucagon, oxyCODONE, polyethylene glycol, vancomycin    Odilon Khalil MD

## 2025-03-01 NOTE — PROGRESS NOTES
"Vancomycin Dosing by Pharmacy- Follow Up    Myrna Khan is a 65 y.o. year old female who Pharmacy has been consulted for vancomycin dosing for  diabetic foot wound . Based on the patient's indication and renal status this patient is being dosed based on a goal AUC of 400-600.     Renal function can be described as Normal Renal Function    Renal function is currently at patient's baseline    Current vancomycin dose: 1250 mg given every 24 hours    Estimated vancomycin AUC on current dose: 442 mg/L.hr     Visit Vitals  /76 (BP Location: Left arm, Patient Position: Lying)   Pulse 77   Temp 36.4 °C (97.5 °F) (Temporal)   Resp 20        Lab Results   Component Value Date    CREATININE 0.58 02/28/2025    CREATININE 0.45 (L) 01/18/2025    CREATININE 0.71 01/14/2025    CREATININE 0.56 01/14/2025        Patient weight is No results found for: \"PTWEIGHT\"    Lab Results   Component Value Date    VANCORANDOM 9.1 03/01/2025        I/O last 3 completed shifts:  In: 100 (2.3 mL/kg) [IV Piggyback:100]  Out: - (0 mL/kg)   Weight: 42.6 kg     Lab Results   Component Value Date    PATIENTTEMP 37.0 01/06/2025    PATIENTTEMP 37.0 07/15/2024    PATIENTTEMP 37.0 07/15/2024        Staph/MRSA Screen Culture   Date/Time Value Ref Range Status   08/30/2023 07:32 PM NO Staphylococcus aureus ISOLATED.  Final     Urine Culture   Date/Time Value Ref Range Status   01/18/2025 06:41 AM >=100,000 CFU/mL Enterococcus faecalis (A)  Final     Blood Culture   Date/Time Value Ref Range Status   08/26/2023 01:50 AM   Final    No Growth at 1 days~No Growth at 2 days~No Growth at 3 days~NO GROWTH at 4 days - FINAL REPORT     Gram Stain   Date/Time Value Ref Range Status   08/31/2023 04:18 AM CANCELED       Comment:     Result canceled by the ancillary.     C. difficile Toxin, PCR   Date/Time Value Ref Range Status   08/28/2023 11:11 AM NOT DETECTED Not Detected Final     Comment:      This assay detects the presence of the tcdB (toxin B)   gene " via DNA amplification, and results should be   interpreted in the context of the patients history   and clinical findings. This test cannot be performed   on formed stools or used as a test of cure, and should   not be performed more than once per 7 days.        Renal function remains stable.    Assessment/Plan    Within goal AUC range. Continue current vancomycin regimen.    This dosing regimen is predicted by [employer] to result in the following pharmacokinetic parameters:    Loading dose: N/A  Regimen: 1250 mg IV every 24 hours.  Start time: 22:16 on 03/01/2025  Exposure target: AUC24 (range)400-600 mg/L.hr   [id number]: 426 mg/L.hr  AUC24,ss: 442 mg/L.hr  Probability of AUC24 > 400: 69 %  Ctrough,ss: 10.8 mg/L  Probability of Ctrough,ss > 20: 3 %      The next level will be obtained on 3/4 at AM labs. May be obtained sooner if clinically indicated.   Will continue to monitor renal function daily while on vancomycin and order serum creatinine at least every 48 hours if not already ordered.  Follow for continued vancomycin needs, clinical response, and signs/symptoms of toxicity.       Gracie Gao, PharmD

## 2025-03-01 NOTE — PROGRESS NOTES
"Pharmacy Medication History Review    Myrna Khan is a 65 y.o. female admitted for Dry gangrene (Multi). Pharmacy reviewed the patient's [id number] medications and allergies for accuracy.    Medications ADDED:  Gabapentin  Hydralazine   Oxycodone   Calcium Citrate   Medications CHANGED:  Lantus   Medications REMOVED/NOT TAKING:   N/A     The list below reflects the updated PTA list.   Prior to Admission Medications   Prescriptions Last Dose Informant   BD Ultra-Fine Micro Pen Needle 32 gauge x 1/4\" needle  Self, Other   CALCIUM CITRATE ORAL  Self, Other   Sig: Take 1 tablet by mouth once daily.   Easy Touch Alcohol Prep Pads pads, medicated  Self, Other   Farxiga 10 mg  Self, Other   Sig: Take 1 tablet (10 mg) by mouth once daily with breakfast.   acetaminophen (Tylenol) 500 mg tablet  Self, Other   Sig: Take 1 tablet (500 mg) by mouth every 6 hours if needed for mild pain (1 - 3) or moderate pain (4 - 6). Take per directed   amLODIPine (Norvasc) 10 mg tablet  Self   Sig: TAKE 1 TABLET BY MOUTH ONCE DAILY   aspirin 81 mg EC tablet  Self, Other   Sig: Take 1 tablet (81 mg) by mouth once daily.   atorvastatin (Lipitor) 80 mg tablet  Self, Other   Sig: TAKE 1 TABLET BY MOUTH ONCE DAILY   blood sugar diagnostic strip  Self, Other   Sig: Use 1 strip to check blood sugar 3 times a day with meals.   carvedilol (Coreg) 25 mg tablet  Self, Other   Sig: TAKE 1 TABLET BY MOUTH TWO TIMES A DAY   cholecalciferol (Vitamin D-3) 25 MCG (1000 UT) capsule  Self   Sig: Take 2 capsules (50 mcg) by mouth once daily.   clopidogrel (Plavix) 75 mg tablet  Self, Other   Sig: Take 1 tablet (75 mg) by mouth once daily.   diclofenac sodium (Voltaren) 1 % gel  Self, Other   Sig: Apply 4.5 inches (4 g) topically 4 times a day as needed (pain).   gabapentin (Neurontin) 300 mg capsule  Self, Other   Sig: Take 1 capsule (300 mg) by mouth once daily.   hydrALAZINE (Apresoline) 10 mg tablet  Self, Other   Sig: Take 1 tablet (10 mg) by " mouth 2 times a day.   insulin glargine (Lantus) 100 unit/mL injection  Self, Other   Sig: Inject 10 Units under the skin once daily at bedtime. Take as directed per insulin instructions.   Patient taking differently: Inject 16 Units under the skin once daily at bedtime. Take as directed per insulin instructions.   insulin lispro 100 unit/mL injection  Self, Other   Sig: Inject 0-5 Units under the skin 3 times daily (morning, midday, late afternoon). 0 unit(s) if BG ; 1 unit(s) if -200; 2 unit(s) if -250; 3 unit(s) if -300; 4 unit(s) if -350; 5 unit(s) if -400   isosorbide mononitrate ER (Imdur) 60 mg 24 hr tablet  Self   Sig: TAKE 1 TABLET BY MOUTH ONCE DAILY   lancets misc  Self, Other   Sig: Use three times a day to test blood sugar w/ meals   metFORMIN XR (Glucophage-XR) 500 mg 24 hr tablet     Sig: TAKE 2 TABLETS BY MOUTH ONCE DAILY   multivitamin with minerals tablet  Self, Other   Sig: Take 1 tablet by mouth once daily.   oxyCODONE (Oxy-IR) 5 mg immediate release capsule  Self, Other   Sig: Take 1 capsule (5 mg) by mouth every 6 hours if needed for severe pain (7 - 10).   ranolazine (Ranexa) 500 mg 12 hr tablet  Self, Other   Sig: Take 1 tablet (500 mg) by mouth 2 times a day. Do not crush, chew, or split.      Facility-Administered Medications: None        The list below reflects the updated allergy list. Please review each documented allergy for additional clarification and justification.  Allergies  Reviewed by Breanna Phoenix on 3/1/2025        Severity Reactions Comments    Lisinopril Not Specified Itching     Amoxicillin Low Rash     Losartan Low Itching, Rash             Patient accepts M2B at discharge.     Sources:   [facility]  Pharmacy dispense history  Patient interview Moderate historian  Other  Chart Review  SNF     Additional Comments:  I initially interviewed Pt, I was not aware that she was from a SNF. She seemed familiar with most of her meds but did not seem  "confident.   Pt reported that she remembers taking her meds this past week.  I called Pts SNF ([facility] [phone]) for a med list.   Nurse reported that patient takes Gabapentin 1 tab daily instead of 1 tab three times daily and Hydralazine 1 tab twice daily instead of 2 tabs three times daily.  There was a recent fill for Hydrochlorothiazide 50mg tabs on 2/27/25, but the nurse did not have the med on her list.        CORTNEY EVANS [place]  Pharmacy Technician  03/01/25     Secure Chat preferred   If no response call [id number] or [id number] \"Med Rec\"   "

## 2025-03-02 ENCOUNTER — ANESTHESIA EVENT (OUTPATIENT)
Dept: OPERATING ROOM | Facility: HOSPITAL | Age: 65
End: 2025-03-02
Payer: COMMERCIAL

## 2025-03-02 VITALS
TEMPERATURE: 97.7 F | SYSTOLIC BLOOD PRESSURE: 187 MMHG | BODY MASS INDEX: 16.66 KG/M2 | HEART RATE: 75 BPM | HEIGHT: 63 IN | WEIGHT: 94 LBS | OXYGEN SATURATION: 97 % | DIASTOLIC BLOOD PRESSURE: 87 MMHG | RESPIRATION RATE: 16 BRPM

## 2025-03-02 LAB
ABO GROUP (TYPE) IN BLOOD: NORMAL
ALBUMIN SERPL BCP-MCNC: 2.9 G/DL (ref 3.4–5)
ANION GAP SERPL CALC-SCNC: 12 MMOL/L (ref 10–20)
ANTIBODY SCREEN: NORMAL
BUN SERPL-MCNC: 13 MG/DL (ref 6–23)
CALCIUM SERPL-MCNC: 8.7 MG/DL (ref 8.6–10.6)
CHLORIDE SERPL-SCNC: 101 MMOL/L (ref 98–107)
CO2 SERPL-SCNC: 23 MMOL/L (ref 21–32)
CREAT SERPL-MCNC: 0.64 MG/DL (ref 0.5–1.05)
EGFRCR SERPLBLD CKD-EPI 2021: >90 ML/MIN/1.73M*2
ERYTHROCYTE [DISTWIDTH] IN BLOOD BY AUTOMATED COUNT: 12 % (ref 11.5–14.5)
GLUCOSE BLD MANUAL STRIP-MCNC: 100 MG/DL (ref 74–99)
GLUCOSE BLD MANUAL STRIP-MCNC: 242 MG/DL (ref 74–99)
GLUCOSE BLD MANUAL STRIP-MCNC: 244 MG/DL (ref 74–99)
GLUCOSE BLD MANUAL STRIP-MCNC: 264 MG/DL (ref 74–99)
GLUCOSE SERPL-MCNC: 46 MG/DL (ref 74–99)
HCT VFR BLD AUTO: 31.1 % (ref 36–46)
HGB BLD-MCNC: 10.1 G/DL (ref 12–16)
MCH RBC QN AUTO: 31.6 PG (ref 26–34)
MCHC RBC AUTO-ENTMCNC: 32.5 G/DL (ref 32–36)
MCV RBC AUTO: 97 FL (ref 80–100)
NRBC BLD-RTO: 0 /100 WBCS (ref 0–0)
PHOSPHATE SERPL-MCNC: 2.9 MG/DL (ref 2.5–4.9)
PLATELET # BLD AUTO: 412 X10*3/UL (ref 150–450)
POTASSIUM SERPL-SCNC: 3.6 MMOL/L (ref 3.5–5.3)
RBC # BLD AUTO: 3.2 X10*6/UL (ref 4–5.2)
RH FACTOR (ANTIGEN D): NORMAL
SODIUM SERPL-SCNC: 132 MMOL/L (ref 136–145)
WBC # BLD AUTO: 10.2 X10*3/UL (ref 4.4–11.3)

## 2025-03-02 PROCEDURE — 1200000002 HC GENERAL ROOM WITH TELEMETRY DAILY

## 2025-03-02 PROCEDURE — 99233 SBSQ HOSP IP/OBS HIGH 50: CPT | Performed by: STUDENT IN AN ORGANIZED HEALTH CARE EDUCATION/TRAINING PROGRAM

## 2025-03-02 PROCEDURE — 80069 RENAL FUNCTION PANEL: CPT | Performed by: STUDENT IN AN ORGANIZED HEALTH CARE EDUCATION/TRAINING PROGRAM

## 2025-03-02 PROCEDURE — 2500000001 HC RX 250 WO HCPCS SELF ADMINISTERED DRUGS (ALT 637 FOR MEDICARE OP): Performed by: STUDENT IN AN ORGANIZED HEALTH CARE EDUCATION/TRAINING PROGRAM

## 2025-03-02 PROCEDURE — 2500000002 HC RX 250 W HCPCS SELF ADMINISTERED DRUGS (ALT 637 FOR MEDICARE OP, ALT 636 FOR OP/ED): Performed by: STUDENT IN AN ORGANIZED HEALTH CARE EDUCATION/TRAINING PROGRAM

## 2025-03-02 PROCEDURE — 36415 COLL VENOUS BLD VENIPUNCTURE: CPT | Performed by: STUDENT IN AN ORGANIZED HEALTH CARE EDUCATION/TRAINING PROGRAM

## 2025-03-02 PROCEDURE — 86901 BLOOD TYPING SEROLOGIC RH(D): CPT | Performed by: STUDENT IN AN ORGANIZED HEALTH CARE EDUCATION/TRAINING PROGRAM

## 2025-03-02 PROCEDURE — 82947 ASSAY GLUCOSE BLOOD QUANT: CPT

## 2025-03-02 PROCEDURE — 2500000004 HC RX 250 GENERAL PHARMACY W/ HCPCS (ALT 636 FOR OP/ED): Performed by: STUDENT IN AN ORGANIZED HEALTH CARE EDUCATION/TRAINING PROGRAM

## 2025-03-02 PROCEDURE — 85027 COMPLETE CBC AUTOMATED: CPT | Performed by: STUDENT IN AN ORGANIZED HEALTH CARE EDUCATION/TRAINING PROGRAM

## 2025-03-02 RX ORDER — INSULIN GLARGINE 100 [IU]/ML
3 INJECTION, SOLUTION SUBCUTANEOUS NIGHTLY
Status: DISCONTINUED | OUTPATIENT
Start: 2025-03-02 | End: 2025-03-05

## 2025-03-02 RX ORDER — INSULIN LISPRO 100 [IU]/ML
6 INJECTION, SOLUTION INTRAVENOUS; SUBCUTANEOUS ONCE
Status: COMPLETED | OUTPATIENT
Start: 2025-03-02 | End: 2025-03-02

## 2025-03-02 RX ORDER — HYDRALAZINE HYDROCHLORIDE 25 MG/1
25 TABLET, FILM COATED ORAL 3 TIMES DAILY PRN
Status: DISCONTINUED | OUTPATIENT
Start: 2025-03-02 | End: 2025-03-12 | Stop reason: HOSPADM

## 2025-03-02 RX ADMIN — VANCOMYCIN HYDROCHLORIDE 1250 MG: 5 INJECTION, POWDER, LYOPHILIZED, FOR SOLUTION INTRAVENOUS at 02:10

## 2025-03-02 RX ADMIN — CARVEDILOL 25 MG: 12.5 TABLET, FILM COATED ORAL at 20:47

## 2025-03-02 RX ADMIN — CEFEPIME 2 G: 1 INJECTION, SOLUTION INTRAVENOUS at 13:39

## 2025-03-02 RX ADMIN — CARVEDILOL 25 MG: 12.5 TABLET, FILM COATED ORAL at 08:42

## 2025-03-02 RX ADMIN — INSULIN LISPRO 2 UNITS: 100 INJECTION, SOLUTION INTRAVENOUS; SUBCUTANEOUS at 17:25

## 2025-03-02 RX ADMIN — INSULIN LISPRO 2 UNITS: 100 INJECTION, SOLUTION INTRAVENOUS; SUBCUTANEOUS at 13:39

## 2025-03-02 RX ADMIN — ACETAMINOPHEN 650 MG: 325 TABLET, FILM COATED ORAL at 20:48

## 2025-03-02 RX ADMIN — CLOPIDOGREL BISULFATE 75 MG: 75 TABLET ORAL at 08:42

## 2025-03-02 RX ADMIN — HEPARIN SODIUM 5000 UNITS: 5000 INJECTION, SOLUTION INTRAVENOUS; SUBCUTANEOUS at 13:39

## 2025-03-02 RX ADMIN — INSULIN GLARGINE 3 UNITS: 100 INJECTION, SOLUTION SUBCUTANEOUS at 20:49

## 2025-03-02 RX ADMIN — Medication 10 MG: at 20:47

## 2025-03-02 RX ADMIN — INSULIN LISPRO 6 UNITS: 100 INJECTION, SOLUTION INTRAVENOUS; SUBCUTANEOUS at 02:10

## 2025-03-02 RX ADMIN — ACETAMINOPHEN 650 MG: 325 TABLET, FILM COATED ORAL at 12:20

## 2025-03-02 RX ADMIN — OXYCODONE 5 MG: 5 TABLET ORAL at 08:42

## 2025-03-02 RX ADMIN — ASPIRIN 81 MG: 81 TABLET, COATED ORAL at 08:41

## 2025-03-02 RX ADMIN — OXYCODONE 5 MG: 5 TABLET ORAL at 18:27

## 2025-03-02 RX ADMIN — ATORVASTATIN CALCIUM 80 MG: 80 TABLET, FILM COATED ORAL at 20:47

## 2025-03-02 RX ADMIN — ISOSORBIDE MONONITRATE 60 MG: 30 TABLET, EXTENDED RELEASE ORAL at 08:43

## 2025-03-02 RX ADMIN — AMLODIPINE BESYLATE 10 MG: 10 TABLET ORAL at 08:41

## 2025-03-02 RX ADMIN — RANOLAZINE 500 MG: 500 TABLET, EXTENDED RELEASE ORAL at 12:20

## 2025-03-02 RX ADMIN — ACETAMINOPHEN 650 MG: 325 TABLET, FILM COATED ORAL at 03:28

## 2025-03-02 RX ADMIN — RANOLAZINE 500 MG: 500 TABLET, EXTENDED RELEASE ORAL at 20:48

## 2025-03-02 RX ADMIN — HEPARIN SODIUM 5000 UNITS: 5000 INJECTION, SOLUTION INTRAVENOUS; SUBCUTANEOUS at 21:03

## 2025-03-02 RX ADMIN — CEFEPIME 2 G: 1 INJECTION, SOLUTION INTRAVENOUS at 20:48

## 2025-03-02 RX ADMIN — CEFEPIME 2 G: 1 INJECTION, SOLUTION INTRAVENOUS at 06:30

## 2025-03-02 SDOH — ECONOMIC STABILITY: INCOME INSECURITY: IN THE PAST 12 MONTHS HAS THE ELECTRIC, GAS, OIL, OR WATER COMPANY THREATENED TO SHUT OFF SERVICES IN YOUR HOME?: NO

## 2025-03-02 SDOH — SOCIAL STABILITY: SOCIAL INSECURITY
WITHIN THE LAST YEAR, HAVE YOU BEEN KICKED, HIT, SLAPPED, OR OTHERWISE PHYSICALLY HURT BY YOUR PARTNER OR EX-PARTNER?: NO

## 2025-03-02 SDOH — SOCIAL STABILITY: SOCIAL INSECURITY: HAS ANYONE EVER THREATENED TO HURT YOUR FAMILY OR YOUR PETS?: NO

## 2025-03-02 SDOH — SOCIAL STABILITY: SOCIAL INSECURITY: WITHIN THE LAST YEAR, HAVE YOU BEEN HUMILIATED OR EMOTIONALLY ABUSED IN OTHER WAYS BY YOUR PARTNER OR EX-PARTNER?: NO

## 2025-03-02 SDOH — SOCIAL STABILITY: SOCIAL INSECURITY: ARE THERE ANY APPARENT SIGNS OF INJURIES/BEHAVIORS THAT COULD BE RELATED TO ABUSE/NEGLECT?: NO

## 2025-03-02 SDOH — SOCIAL STABILITY: SOCIAL INSECURITY: WITHIN THE LAST YEAR, HAVE YOU BEEN AFRAID OF YOUR PARTNER OR EX-PARTNER?: NO

## 2025-03-02 SDOH — SOCIAL STABILITY: SOCIAL INSECURITY: ABUSE: ADULT

## 2025-03-02 SDOH — SOCIAL STABILITY: SOCIAL INSECURITY: DO YOU FEEL ANYONE HAS EXPLOITED OR TAKEN ADVANTAGE OF YOU FINANCIALLY OR OF YOUR PERSONAL PROPERTY?: NO

## 2025-03-02 SDOH — SOCIAL STABILITY: SOCIAL INSECURITY: HAVE YOU HAD ANY THOUGHTS OF HARMING ANYONE ELSE?: NO

## 2025-03-02 SDOH — SOCIAL STABILITY: SOCIAL INSECURITY
WITHIN THE LAST YEAR, HAVE YOU BEEN RAPED OR FORCED TO HAVE ANY KIND OF SEXUAL ACTIVITY BY YOUR PARTNER OR EX-PARTNER?: NO

## 2025-03-02 SDOH — SOCIAL STABILITY: SOCIAL INSECURITY: HAVE YOU HAD THOUGHTS OF HARMING ANYONE ELSE?: NO

## 2025-03-02 SDOH — SOCIAL STABILITY: SOCIAL INSECURITY: DO YOU FEEL UNSAFE GOING BACK TO THE PLACE WHERE YOU ARE LIVING?: NO

## 2025-03-02 SDOH — SOCIAL STABILITY: SOCIAL INSECURITY: ARE YOU OR HAVE YOU BEEN THREATENED OR ABUSED PHYSICALLY, EMOTIONALLY, OR SEXUALLY BY ANYONE?: NO

## 2025-03-02 SDOH — SOCIAL STABILITY: SOCIAL INSECURITY: DOES ANYONE TRY TO KEEP YOU FROM HAVING/CONTACTING OTHER FRIENDS OR DOING THINGS OUTSIDE YOUR HOME?: NO

## 2025-03-02 ASSESSMENT — COGNITIVE AND FUNCTIONAL STATUS - GENERAL
TOILETING: A LOT
CLIMB 3 TO 5 STEPS WITH RAILING: TOTAL
DRESSING REGULAR UPPER BODY CLOTHING: A LOT
DRESSING REGULAR LOWER BODY CLOTHING: A LOT
MOBILITY SCORE: 13
HELP NEEDED FOR BATHING: A LOT
PERSONAL GROOMING: A LITTLE
MOVING TO AND FROM BED TO CHAIR: A LITTLE
CLIMB 3 TO 5 STEPS WITH RAILING: TOTAL
MOVING FROM LYING ON BACK TO SITTING ON SIDE OF FLAT BED WITH BEDRAILS: A LITTLE
MOVING TO AND FROM BED TO CHAIR: A LITTLE
PATIENT BASELINE BEDBOUND: NO
STANDING UP FROM CHAIR USING ARMS: A LOT
DAILY ACTIVITIY SCORE: 15
MOVING FROM LYING ON BACK TO SITTING ON SIDE OF FLAT BED WITH BEDRAILS: A LITTLE
MOVING FROM LYING ON BACK TO SITTING ON SIDE OF FLAT BED WITH BEDRAILS: A LITTLE
TOILETING: A LOT
STANDING UP FROM CHAIR USING ARMS: A LOT
WALKING IN HOSPITAL ROOM: TOTAL
DRESSING REGULAR UPPER BODY CLOTHING: A LOT
TURNING FROM BACK TO SIDE WHILE IN FLAT BAD: A LITTLE
HELP NEEDED FOR BATHING: A LOT
DAILY ACTIVITIY SCORE: 15
DRESSING REGULAR LOWER BODY CLOTHING: A LOT
MOBILITY SCORE: 13
TURNING FROM BACK TO SIDE WHILE IN FLAT BAD: A LITTLE
CLIMB 3 TO 5 STEPS WITH RAILING: TOTAL
TURNING FROM BACK TO SIDE WHILE IN FLAT BAD: A LITTLE
HELP NEEDED FOR BATHING: A LOT
DRESSING REGULAR LOWER BODY CLOTHING: A LOT
MOVING TO AND FROM BED TO CHAIR: A LITTLE
WALKING IN HOSPITAL ROOM: A LOT
DRESSING REGULAR UPPER BODY CLOTHING: A LOT
TOILETING: A LOT
DAILY ACTIVITIY SCORE: 15
MOBILITY SCORE: 14
STANDING UP FROM CHAIR USING ARMS: A LOT
WALKING IN HOSPITAL ROOM: TOTAL
PERSONAL GROOMING: A LITTLE
PERSONAL GROOMING: A LITTLE

## 2025-03-02 ASSESSMENT — PAIN SCALES - GENERAL
PAINLEVEL_OUTOF10: 7
PAINLEVEL_OUTOF10: 4
PAINLEVEL_OUTOF10: 6
PAINLEVEL_OUTOF10: 7
PAINLEVEL_OUTOF10: 5 - MODERATE PAIN
PAINLEVEL_OUTOF10: 4
PAINLEVEL_OUTOF10: 10 - WORST POSSIBLE PAIN
PAINLEVEL_OUTOF10: 5 - MODERATE PAIN
PAINLEVEL_OUTOF10: 0 - NO PAIN

## 2025-03-02 ASSESSMENT — PAIN - FUNCTIONAL ASSESSMENT
PAIN_FUNCTIONAL_ASSESSMENT: 0-10

## 2025-03-02 ASSESSMENT — PAIN DESCRIPTION - DESCRIPTORS
DESCRIPTORS: ACHING;CRAMPING;BURNING
DESCRIPTORS: ACHING;BURNING;CRUSHING
DESCRIPTORS: ACHING;BURNING;SHARP

## 2025-03-02 ASSESSMENT — PAIN SCALES - PAIN ASSESSMENT IN ADVANCED DEMENTIA (PAINAD)
NEGVOCALIZATION: OCCASIONAL MOAN/GROAN, LOW SPEECH, NEGATIVE/DISAPPROVING QUALITY
BREATHING: NORMAL
NEGVOCALIZATION: OCCASIONAL MOAN/GROAN, LOW SPEECH, NEGATIVE/DISAPPROVING QUALITY
CONSOLABILITY: DISTRACTED OR REASSURED BY VOICE/TOUCH
FACIALEXPRESSION: FACIAL GRIMACING
BODYLANGUAGE: RELAXED
TOTALSCORE: MEDICATION (SEE MAR);REPOSITIONED
BREATHING: NORMAL
TOTALSCORE: 4

## 2025-03-02 ASSESSMENT — LIFESTYLE VARIABLES
HOW MANY STANDARD DRINKS CONTAINING ALCOHOL DO YOU HAVE ON A TYPICAL DAY: PATIENT DECLINED
HOW OFTEN DO YOU HAVE 6 OR MORE DRINKS ON ONE OCCASION: PATIENT DECLINED
HOW OFTEN DO YOU HAVE A DRINK CONTAINING ALCOHOL: PATIENT DECLINED
AUDIT-C TOTAL SCORE: -1
SKIP TO QUESTIONS 9-10: 0
AUDIT-C TOTAL SCORE: -1

## 2025-03-02 ASSESSMENT — ACTIVITIES OF DAILY LIVING (ADL)
FEEDING YOURSELF: INDEPENDENT
ADEQUATE_TO_COMPLETE_ADL: YES
JUDGMENT_ADEQUATE_SAFELY_COMPLETE_DAILY_ACTIVITIES: YES
HEARING - LEFT EAR: FUNCTIONAL
GROOMING: NEEDS ASSISTANCE
BATHING: NEEDS ASSISTANCE
WALKS IN HOME: NEEDS ASSISTANCE
DRESSING YOURSELF: NEEDS ASSISTANCE
ASSISTIVE_DEVICE: WHEELCHAIR
TOILETING: NEEDS ASSISTANCE
PATIENT'S MEMORY ADEQUATE TO SAFELY COMPLETE DAILY ACTIVITIES?: YES
LACK_OF_TRANSPORTATION: YES
HEARING - RIGHT EAR: FUNCTIONAL

## 2025-03-02 ASSESSMENT — PAIN DESCRIPTION - LOCATION: LOCATION: LEG

## 2025-03-02 ASSESSMENT — PAIN DESCRIPTION - ORIENTATION
ORIENTATION: LEFT
ORIENTATION: LEFT

## 2025-03-02 NOTE — CARE PLAN
Problem: Pain - Adult  Goal: Verbalizes/displays adequate comfort level or baseline comfort level  Outcome: Progressing     Problem: Safety - Adult  Goal: Free from fall injury  Outcome: Progressing     Problem: Discharge Planning  Goal: Discharge to home or other facility with appropriate resources  Outcome: Progressing     Problem: Chronic Conditions and Co-morbidities  Goal: Patient's chronic conditions and co-morbidity symptoms are monitored and maintained or improved  Outcome: Progressing     Problem: Nutrition  Goal: Nutrient intake appropriate for maintaining nutritional needs  Outcome: Progressing   The patient's goals for the shift include      The clinical goals for the shift include Pt will have normal blood pressure and no pain in L lower leg.    Over the shift, the patient did not make progress toward the following goals. Barriers to progression include hypertension. Recommendations to address these barriers include medication.

## 2025-03-02 NOTE — CONSULTS
"Reason For Consult  Adnexal mass c/f malignancy     History Of Present Illness  66yo G0 with PMHx of HFimpEF (EF 55% July/2024, 35-40% in Aug/2023), CAD s/p CABG in 2023 (LIMA-LAD, APRIL-OM), PAD s/p L SFA stent (occluded in Nov/2023 w/ distal SFA reconstitution), renal artery stenosis (Sep/2023 US), carotid artery stenosis (Aug/2023 US), HTN, HLD, T2DM (A1c 14.6% Jan/2025) currently admitted after a fall for LLE dry gangrene, found to have incidental R adnexal mass on CTA.     On interview, pt denies having any abdominal pain, nausea, or changes in appetite. Pt tangential on interview with difficulty answering questions, continuously stating \"nothing is wrong with me,\" and requesting to go home, perseverating on where her chargers and clothes are, difficulty redirecting patient for interview. Recalls being admitted for a fall, but unable to further discuss current medical issues.     Hosp course:  On arrival T 36.6, P 85, RR 16, /85, SpO2 100   CMP, CBC overall unremarkable  CEA 3,  31, CA 19-9 74  CT angio A/P - notable for fibroid uterus, R cystic lesion measuring 5.4 x 5.1 x 7.3cm. Severely distended urinary bladder with dome at level of midabdomen, mildly edematous L renal parenchyma.  TVUS 3/1 - Complex partially cystic R adnexal mass with solid component demonstrating internal vascularity, measuring 7.6 x 6.8 x 6.8cm.   Prior CT 5/2023 ([facility]) - notes 4.5cm R adnexal cyst stable from 2016, images not available   Initiated on vanc/cefepime    Medical history obtained by chart review -   OBHx - G0   GynHx - Last pap 2021, results not available. Prior 2019 NILM HPV pos   Menarche age 10, menopause at age 50-51,  to male spouse.  PMH - HFimpEF (EF 55% July/2024, 35-40% in Aug/2023), CAD s/p CABG in 2023 (LIMA-LAD, APRIL-OM), PAD s/p L SFA stent (occluded in Nov/2023 w/ distal SFA reconstitution), renal artery stenosis (Sep/2023 US), carotid artery stenosis (Aug/2023 US), HTN, HLD, T2DM (A1c 14.6% " "Jan/2025)   PSH - CABG (2023), L SFA stent,   All - amoxicillin, losartan, lisinopril  Soc - smokes 30 pack years, occ EtOH, no drug use. , lives at [facility]     Objective       Physical Exam  General: confused, uncooperative  HEENT: normocephalic, atraumatic  Heart: warm and well perfused  Lungs: no increased WOB  Abd: visibly distended, declines further exam  Extremities: moving all extremities, symmetric  Neuro: awake and conversant  Psych: A&O x1 (year), tangential and perseverating on where her clothes/chargers are, difficult to redirect     Last Recorded Vitals  Blood pressure 157/70, pulse 68, temperature 36.3 °C (97.3 °F), resp. rate 17, height 1.6 m (5' 3\"), weight (!) 42.6 kg (94 lb), SpO2 100%.    Relevant Results    Lab Results   Component Value Date    WBC 10.6 02/28/2025    HGB 11.3 (L) 02/28/2025    HCT 32.1 (L) 02/28/2025    MCV 92 02/28/2025     (H) 02/28/2025      Lab Results   Component Value Date    GLUCOSE 171 (H) 02/28/2025    CALCIUM 9.1 02/28/2025     (L) 02/28/2025    K 4.2 02/28/2025    CO2 27 02/28/2025    CL 96 (L) 02/28/2025    BUN 10 02/28/2025    CREATININE 0.58 02/28/2025        === 02/28/25 ===    CT ANGIO AORTA AND BILATERAL ILIOFEMORAL RUN OFF INCLUDING WITHOUT CONTRAST IF PERFORMED    - Impression -  VASCULAR:  1. In the left lower extremity, there is complete occlusion of the  left external iliac artery with distal reconstitution in the left  common femoral artery. Subsequently, there is a left SFA stent which  is completely occluded. Distal reconstitution of flow is identified  in the left distal SFA. There is severe atherosclerosis of the left  popliteal artery as well as the calf arteries although a three-vessel  runoff is present at the ankle mortise.  2. In the right lower extremity, there is severe atherosclerosis  resulting in multifocal areas of moderate-to-severe narrowing  throughout. Distal SFA stent is present and stenosed but flow " is  identified within the lumen. A three-vessel runoff is identified at  the ankle mortise.  3. Right-greater-than-left circumferential subcutaneous edema of the  bilateral extremities may be due to venous congestion, lymphedema,  and/or cellulitis. Correlate with physical exam findings.  4. Diffuse circumferential skin ulceration involving the left mid to  distal calf as well as the foot. No drainable collections.    ABDOMEN/PELVIS:  1. Severe distention of the urinary bladder with the dome at the  level of the midabdomen. Consider decompression with a Malone catheter.  2. Mild edematous appearance of the left renal parenchyma with  minimal perinephric stranding is nonspecific and may be related to  reflux due to bladder distention although pyelonephritis is not  excluded. Correlate with urinalysis.  3. Right adnexal cystic lesion measuring 5.4 x 5.1 x 7.3 cm  which  was reported on a prior CTA dated 05/11/2023 in [facility], however images  are not available for direct comparison. The mass was reported to  measure 4.5 cm at that time. Findings are nonspecific with  differential diagnosis including cystic ovarian neoplasm. Recommend  gynecology consultation and consideration for outpatient evaluation  with MRI or ultrasound.  4. Mild circumferential wall thickening of the distal esophagus  likely representing esophagitis.  5. Pancreatic head cystic lesion measuring 1.2 cm may represent a  side branch IPMN. Consider follow-up MRCP in 2 years to ensure  stability.  6. Mild thickening of the rectal wall is nonspecific but can be seen  with proctitis.  7. Additional findings as discussed above.      === 02/28/25 ===    US PELVIS    - Impression -  1. Complex partially cystic right adnexal mass with a solid component  demonstrating internal vascularity. Findings raise concern for a  cystic ovarian neoplasm, especially given elevated tumor markers.  Gynecology oncology consultation is recommended.  2. Markedly distended urinary  bladder. Consider Malone placement.  3. Partially calcified uterine leiomyomas.       Assessment/Plan     66yo G0 with extensive PMHx including HF, CAD s/p CABG, PAD s/p stent, renal artery stenosis, carotid artery stenosis, HTN, HLD, T2DM admitted with gangrene of LLE, with GYN consulted for R adnexal mass on CTA    Recs:   - CT A/P with  R adnexal mass measuring 5.4 x 5.1 x 7.3cm. Present since at least 2021 per [facility] imaging reads, no imaging available but notes measuring 4.5cm . Marked bladder distension on imaging as well.   - TVUS with complex cystic mass with solid components, suspicious for possible malignant neoplasm   - tumor markers with elevated CA 19-9 at 74.  31. CEA wnl.   - limited history with pt declining exam due to suspected delirium. Per primary team, pt not at baseline currently, likely sun-downing. Recommend bladder decompression to potentially aid with mental status. Will plan to re-engage in AM when pt mentation is reportedly closer to baseline per primary team.     D/w Dr. Kaur, for formal recs in AM   Maryse Loza MD  PGY3, Obstetrics and Gynecology

## 2025-03-02 NOTE — PROGRESS NOTES
PODIATRY SERVICE PROGRESS NOTE    SERVICE DATE: 3/2/2025   SERVICE TIME:  1200    Subjective   INTERVAL HPI:   Patient was seen at bedside. Pain well controlled. Patient denies any constitutional symptoms. No other pedal complaints.     Medications:  Scheduled Meds: acetaminophen, 650 mg, oral, q8h  amLODIPine, 10 mg, oral, Daily  aspirin, 81 mg, oral, Daily  atorvastatin, 80 mg, oral, Nightly  carvedilol, 25 mg, oral, BID  cefepime, 2 g, intravenous, q8h  clopidogrel, 75 mg, oral, Daily  heparin (porcine), 5,000 Units, subcutaneous, q8h BOLIVAR  insulin glargine, 8 Units, subcutaneous, Nightly  insulin lispro, 0-5 Units, subcutaneous, TID AC  isosorbide mononitrate ER, 60 mg, oral, Daily  melatonin, 10 mg, oral, Nightly  ranolazine, 500 mg, oral, BID  vancomycin, 1,250 mg, intravenous, q24h      Continuous Infusions:    PRN Meds: PRN medications: dextrose, dextrose, glucagon, glucagon, OLANZapine, oxyCODONE, polyethylene glycol, QUEtiapine, vancomycin         Objective   PHYSICAL EXAM:  Physical Exam Performed:  Vitals:    03/02/25 1152   BP: 139/72   Pulse: 68   Resp: 16   Temp: 36.3 °C (97.3 °F)   SpO2: 97%     Body mass index is 16.65 kg/m².    Patient is AOx3 and in no acute distress. Patient is alert and cooperative. Sitting comfortably in bed with dressing clean, dry and intact. Heel off-loading boots in place B/L.     Vascular: Non-palpable DP/PT pulses B/L. Mild non-pitting edema noted B/L. Hair growth absent B/L. CFT<5 to B/L hallux. Temperature is warm to cool from tibial tuberosity to distal digits B/L. No lymphatic streaking noted B/L.     Musculoskeletal: Gross active and passive ROM diminished to age and activity level left foot and ankle, cannot move toes. Moves all extremities spontaneously. No pain to palpation at feet B/L.      Neurological: Intact light touch sensation B/L. Pain stimuli diminished B/L. Admits to numbness, burning or tingling.     Dermatologic: Skin appears waxy B/L. Web spaces 1-4  B/L are clean, dry and intact. No rashes or nodules noted B/L. No hyperkeratotic tissue noted B/L.      Wound:  Measurements: 12 x 10 cm x 0.2 cm area of partial to full-thickness skin breakdown  Mixed wound base of fibrogranular tissue.   Mild serosanguinous drainage with fibrotic slough.   Mild jatin-wound maceration.   Mild jatin-wound erythema.   No palpable fluctuance. Mild malodor. Mild increased warmth.   No probe to bone or deep structures within the wound base.   No tunneling or tracking.   No undermining. Skin edges irregular but intact.      LABS:   Results for orders placed or performed during the hospital encounter of 02/28/25 (from the past 24 hours)   POCT GLUCOSE   Result Value Ref Range    POCT Glucose 173 (H) 74 - 99 mg/dL   POCT GLUCOSE   Result Value Ref Range    POCT Glucose 363 (H) 74 - 99 mg/dL   POCT GLUCOSE   Result Value Ref Range    POCT Glucose 417 (H) 74 - 99 mg/dL   CBC   Result Value Ref Range    WBC 10.2 4.4 - 11.3 x10*3/uL    nRBC 0.0 0.0 - 0.0 /100 WBCs    RBC 3.20 (L) 4.00 - 5.20 x10*6/uL    Hemoglobin 10.1 (L) 12.0 - 16.0 g/dL    Hematocrit 31.1 (L) 36.0 - 46.0 %    MCV 97 80 - 100 fL    MCH 31.6 26.0 - 34.0 pg    MCHC 32.5 32.0 - 36.0 g/dL    RDW 12.0 11.5 - 14.5 %    Platelets 412 150 - 450 x10*3/uL   Renal Function Panel   Result Value Ref Range    Glucose 46 (LL) 74 - 99 mg/dL    Sodium 132 (L) 136 - 145 mmol/L    Potassium 3.6 3.5 - 5.3 mmol/L    Chloride 101 98 - 107 mmol/L    Bicarbonate 23 21 - 32 mmol/L    Anion Gap 12 10 - 20 mmol/L    Urea Nitrogen 13 6 - 23 mg/dL    Creatinine 0.64 0.50 - 1.05 mg/dL    eGFR >90 >60 mL/min/1.73m*2    Calcium 8.7 8.6 - 10.6 mg/dL    Phosphorus 2.9 2.5 - 4.9 mg/dL    Albumin 2.9 (L) 3.4 - 5.0 g/dL   POCT GLUCOSE   Result Value Ref Range    POCT Glucose 100 (H) 74 - 99 mg/dL   Type and screen   Result Value Ref Range    ABO TYPE O     Rh TYPE POS     ANTIBODY SCREEN NEG    POCT GLUCOSE   Result Value Ref Range    POCT Glucose 244 (H) 74 - 99  mg/dL      Lab Results   Component Value Date    HGBA1C 14.6 (H) 01/09/2025      Lab Results   Component Value Date    CRP 0.79 08/26/2023      Lab Results   Component Value Date    SEDRATE 48 (H) 08/26/2023        Results from last 7 days   Lab Units 03/02/25  0504   WBC AUTO x10*3/uL 10.2   RBC AUTO x10*6/uL 3.20*   HEMOGLOBIN g/dL 10.1*   HEMATOCRIT % 31.1*     Results from last 7 days   Lab Units 03/02/25  0505 02/28/25  1801   SODIUM mmol/L 132* 132*   POTASSIUM mmol/L 3.6 4.2   CHLORIDE mmol/L 101 96*   CO2 mmol/L 23 27   BUN mg/dL 13 10   CREATININE mg/dL 0.64 0.58   CALCIUM mg/dL 8.7 9.1   PHOSPHORUS mg/dL 2.9  --    BILIRUBIN TOTAL mg/dL  --  0.3   ALT U/L  --  17   AST U/L  --  19           IMAGING REVIEW:  US pelvis    Result Date: 3/1/2025  Interpreted By:  Moose Shaw,  and Adilson Oates STUDY: US PELVIS;  3/1/2025 5:15 pm   INDICATION: Signs/Symptoms:Evaluate adnexal mass.  Elevated tumor markers CA 19 9 and .     COMPARISON: CT angio abdomen pelvis with bilateral lower extremity runoff 02/28/2025   ACCESSION NUMBER(S): [id number]   ORDERING CLINICIAN: DAMI EVERETT   TECHNIQUE: Multiple multiplanar static gray scale, color and spectral waveform sonographic images of the pelvis were obtained. Transabdominal ultrasound was performed at patient's request.   FINDINGS: Somewhat suboptimal evaluation due to body habitus, transabdominal approach, and markedly distended urinary bladder.   UTERUS: The uterus is poorly visualized. However, there are multiple echogenic foci with twinkle artifact in the region of the uterus, corresponding to known partially calcified leiomyomas seen on prior CT.   ENDOMETRIUM: Poorly visualized.   RIGHT ADNEXA: The right ovary is not definitively visualized. A 7.6 x 6.8 x 6.8 cm heterogeneous right adnexal lesion is seen with posterior acoustic enhancement, low level internal echoes, internal septations, and a globular mural nodularity measuring  approximately 4.5 x 2.3 x 4.8 cm. There is increased vascularity within the mural nodularity on color Doppler evaluation.   LEFT ADNEXA: The left ovary not visualized.   CUL DE SAC: No gross free fluid is seen in the pelvic cul-de-sac.       1. Complex partially cystic right adnexal mass with a solid component demonstrating internal vascularity. 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 Gynecology oncology consultation is recommended. 2. Markedly distended urinary bladder. Consider Malone placement. 3. Partially calcified uterine leiomyomas.     I personally reviewed the images/study and I agree with the findings as stated by Radiology resident.   MACRO: None   Signed by: Moose Shaw 3/1/2025 6:59 PM Dictation workstation:   [id number]    CT angio aorta and bilateral iliofemoral runoff including without contrast if performed    Result Date: 3/1/2025  Interpreted By:  Quentin Madden  and Nomi Cox STUDY: CT ANGIO AORTA AND BILATERAL ILIOFEMORAL RUN OFF INCLUDING WITHOUT CONTRAST IF PERFORMED;  2/28/2025 8:38 pm   INDICATION: Signs/Symptoms:decreased pulses lle, r/o acute ischemic limb.   Per EMR: History of diabetes, hypertension, PAD, HFrEF 55%, CAD s/p CABG on Plavix here for left lower extremity wound. Patient reports that she had increased swelling to her bilateral legs for the past couple days.   COMPARISON: Chest CT 08/26/2023 CTA abdomen/pelvis 05/11/2023 (report only in epic).   ACCESSION NUMBER(S): [id number]   ORDERING CLINICIAN: INGRID GARNICA   TECHNIQUE: Thin-section axial images of the abdomen, pelvis and bilateral lower extremities were obtained in the arterial phase after intravenous administration of iodinated contrast. Delayed images the legs were obtained for assessment of venous patency. Coronal and sagittal reformatted images were reconstructed from the axial data. Multiplanar MIPs and 3D reconstructions were created on an  independent workstation and reviewed.   FINDINGS: VASCULATURE:   ABDOMINAL AORTA: No abdominal aortic aneurysm or dissection. Severe aortic atherosclerosis.   ABDOMINAL ARTERIES: Celiac axis: Patent. - Superior mesenteric artery: Scattered atherosclerosis with mild-to-moderate narrowing more distally (series 403, image 118). - Inferior mesenteric artery: Moderate narrowing at the origin (series 403, image 114). - Left Renal artery: Scattered atherosclerosis with moderate-to-severe narrowing at the origin. - Right renal artery: Scattered atherosclerosis with moderate-to-severe narrowing at the origin.   RIGHT LOWER EXTREMITY:   - Right Common Iliac Artery: Severe atherosclerosis with moderate multifocal narrowing. - Right External Iliac Artery: Severe atherosclerosis with moderate-to-severe multifocal narrowing (most severe at series 401, image 278). - Right Internal Iliac Artery: Severe atherosclerosis with moderate-to-severe multifocal narrowing.   - Right Common Femoral Artery: Severe atherosclerosis with moderate multifocal narrowing. - Right Profunda Artery: Severe atherosclerosis with moderate multifocal narrowing. - Right Superficial Femoral Artery: Severe atherosclerosis. At least moderate multifocal narrowing. There is a SFA stent present distally (series 402, image 104) with multifocal severe short-segment narrowing however still appears patent (ex. Series 401, image 630). - Right Popliteal Artery: Severe atherosclerosis with multifocal moderate-to-severe narrowing. - Right Anterior Tibial, Posterior Tibial, Peroneal Arteries: Scattered calcifications with moderate-to-severe multifocal narrowing.   LEFT LOWER EXTREMITY:   - Left Common Iliac Artery: Severe atherosclerosis with moderate short-segment narrowing. - Left External Iliac Artery: Completely occluded from the iliac bifurcation to the level of the common femoral artery. - Left Internal Iliac Artery: Moderate atherosclerosis with multifocal  mild-to-moderate narrowing.   - Left Common Femoral Artery: Severe atherosclerosis with multifocal moderate-to-severe narrowing most significant at series 401, image 359. - Left Profunda Artery: Moderate-to-severe atherosclerosis with multifocal areas of moderate-to-severe narrowing. - Left Superficial Femoral Artery: Severe atherosclerosis. There is a stent spanning from the proximal to the mid SFA (series 402, image 73 through 94) which is completely occluded. The mid SFA portion without a stent is also occluded and becomes constituted more distally with moderate to severe multifocal narrowing. - Left Popliteal Artery: Severe atherosclerosis with moderate-to-severe multifocal narrowing. - Left Anterior Tibial, Posterior Tibial, Peroneal Arteries: Scattered atherosclerosis with moderate-to-severe multifocal narrowing.   SOFT TISSUES OF BILATERAL EXTREMITIES:   Right-greater-than-left circumferential subcutaneous edema of the bilateral extremities.   There is diffuse circumferential skin thickening involving the left mid to distal calf as well as the foot (see picture and media). No evidence of a loculated fluid collection to suggest an absent.   ABDOMEN/PELVIS:   Limited evaluation of the solid organs due to contrast timing.   LOWER CHEST: Cardiomegaly. Bibasilar atelectasis. Stranded epicardial leads. Coronary artery calcifications. Circumferential wall thickening of the distal esophagus.   LIVER: Few scattered subcentimeter arterial hyperenhancing foci on early arterial phase, likely representing shunt arterial portal shunts, flash filling hemangioma, or FNH. No suspicious liver lesions detected although evaluation is limited due to contrast timing.   BILE DUCTS: Mild prominence of the extrahepatic bile ducts, likely related to reservoir effect.   GALLBLADDER: Absent.   PANCREAS: Diffuse scattered pancreatic calcifications with a mildly dilated pancreatic duct measuring 0.6 cm (series 401, image 109) consistent  with sequela of chronic pancreatitis. There is a cystic lesion in the pancreatic head measuring 1.3 cm, likely a side branch IPMN (401/149).   SPLEEN: No significant abnormality.   ADRENALS: No significant abnormality.   KIDNEYS, URETERS, BLADDER: There is mild bilateral renal cortical scarring. No nephrolithiasis or hydronephrosis. Slight heterogeneity of the left renal parenchyma with mild perinephric stranding. Severe distention of the urinary bladder with the dome of the bladder extending to the midabdomen. Bilateral fullness of the renal pelvis likely related to reflux   REPRODUCTIVE ORGANS: Bulky calcified fibroid uterus. There is a cystic lesion measuring 5.4 x 5.1 x 7.3 cm in the right adnexa (series 401, image 270 and series 402, image 52) which was reported on a CTA dated 05/11/2023 in [facility], however images are not available for direct comparison..   VESSELS: See above.   RETROPERITONEUM/LYMPH NODES: No acute retroperitoneal abnormality. No enlarged lymph nodes.   BOWEL/PERITONEUM: Mild diffuse thickening of the rectum.   No pneumoperitoneum, significant ascites, or abscess.   MUSCULOSKELETAL/ABDOMINAL WALL: No acute osseous abnormality or suspicious lesion. No significant soft tissue abnormality of the abdominal wall. Mild body wall anasarca.       VASCULAR: 1. In the left lower extremity, there is complete occlusion of the left external iliac artery with distal reconstitution in the left common femoral artery. Subsequently, there is a left SFA stent which is completely occluded. Distal reconstitution of flow is identified in the left distal SFA. There is severe atherosclerosis of the left popliteal artery as well as the calf arteries although a three-vessel runoff is present at the ankle mortise. 2. In the right lower extremity, there is severe atherosclerosis resulting in multifocal areas of moderate-to-severe narrowing throughout. Distal SFA stent is present and stenosed but flow is identified within  the lumen. A three-vessel runoff is identified at the ankle mortise. 3. Right-greater-than-left circumferential subcutaneous edema of the bilateral extremities may be due to venous congestion, lymphedema, and/or cellulitis. Correlate with physical exam findings. 4. Diffuse circumferential skin ulceration involving the left mid to distal calf as well as the foot. No drainable collections.   ABDOMEN/PELVIS: 1. Severe distention of the urinary bladder with the dome at the level of the midabdomen. Consider decompression with a Malone catheter. 2. Mild edematous appearance of the left renal parenchyma with minimal perinephric stranding is nonspecific and may be related to reflux due to bladder distention although pyelonephritis is not excluded. Correlate with urinalysis. 3. Right adnexal cystic lesion measuring 5.4 x 5.1 x 7.3 cm  which was reported on a prior CTA dated 05/11/2023 in [facility], however images are not available for direct comparison. The mass was reported to measure 4.5 cm at that time. Findings are nonspecific with differential diagnosis including cystic ovarian neoplasm. Recommend gynecology consultation and consideration for outpatient evaluation with MRI or ultrasound. 4. Mild circumferential wall thickening of the distal esophagus likely representing esophagitis. 5. Pancreatic head cystic lesion measuring 1.2 cm may represent a side branch IPMN. Consider follow-up MRCP in 2 years to ensure stability. 6. Mild thickening of the rectal wall is nonspecific but can be seen with proctitis. 7. Additional findings as discussed above.   I personally reviewed the images/study and I agree with the findings as stated by Kenny Mosher DO, PGY-3. This study was interpreted at [facility], [place], [place].   MACRO: Critical Finding:  There are multiple critical findings. See findings. notification was initiated on 3/1/2025 at 12:25 am by Quentin Madden.  (**-YCF-**)   Signed by:  Quentin Madden 3/1/2025 12:26 AM Dictation workstation:   [id number]    ECG 12 lead    Result Date: 2/28/2025  Normal sinus rhythm Possible Left atrial enlargement Minimal voltage criteria for LVH, may be normal variant ( Ruben product ) Nonspecific ST abnormality Abnormal ECG When compared with ECG of 18-JAN-2025 04:32, Significant changes have occurred See ED provider note for full interpretation and clinical correlation Confirmed by Jen Leon ([id number]) on 2/28/2025 10:15:56 PM    XR ankle left 3+ views    Result Date: 2/28/2025  Interpreted By:  Quentin Madden, STUDY: XR ANKLE LEFT 3+ VIEWS; ;  2/28/2025 7:33 pm   INDICATION: Signs/Symptoms:r/o gas.   COMPARISON: None.   ACCESSION NUMBER(S): [id number]   ORDERING CLINICIAN: INGRID GARNICA   FINDINGS: Generalized soft tissue edema about the ankle. No evidence of soft tissue gas.   Vascular calcifications.   Diffuse osseous demineralization. Normal alignment. No acute fractures detected.       No radiographic evidence of soft tissue gas.   MACRO: None   Signed by: Quentin Madden 2/28/2025 9:33 PM Dictation workstation:   [id number]    XR femur left 2+ views    Result Date: 2/28/2025  Interpreted By:  Quentin Madden, STUDY: XR FEMUR LEFT 2+ VIEWS; ;  2/28/2025 7:33 pm   INDICATION: Signs/Symptoms:pain skin breakdown r/o gas.   COMPARISON: None.   ACCESSION NUMBER(S): [id number]   ORDERING CLINICIAN: INGRID GARNICA   FINDINGS: Vascular calcifications with a stent in the medial thigh.   The bones are diffusely demineralized. No acute fractures or osseous erosions identified. Generalized soft tissue edema about the leg skin folds about the medial thigh. Hyperdense foreign body projecting along the lateral aspect of the proximal thigh (best seen on the AP view), indeterminate.       No acute osseous abnormalities. Generalized soft tissue edema about the thigh. No definite evidence of soft tissue gas although sensitivity is limited due to presence of skin  folds.   Hyperdense object projecting over the lateral aspect of the thigh is indeterminate for possible foreign body. Correlate with physical exam.   MACRO: None   Signed by: Quentin Madden 2/28/2025 9:32 PM Dictation workstation:   [id number]    XR foot left 3+ views    Result Date: 2/28/2025  Interpreted By:  Quentin Madden, STUDY: XR FOOT LEFT 3+ VIEWS; ;  2/28/2025 7:33 pm   INDICATION: Signs/Symptoms:pain skin breakdown r/o gas.   COMPARISON: None.   ACCESSION NUMBER(S): [id number]   ORDERING CLINICIAN: INGRID GARNICA   FINDINGS: Diffuse osseous demineralization. No acute fractures defect detected. No definite evidence of osseous erosion.   Generalized soft tissue edema about the foot. No radiopaque foreign bodies identified. No definite evidence of soft tissue gas. Slight soft tissue irregularity is present along the lateral aspect of the 5th metatarsal.   Vascular calcifications.   Plantar and Achilles calcaneal enthesophytes.       No acute osseous abnormalities. The bones are diffusely demineralized.   There is generalized soft tissue edema about the foot. No definite evidence of soft tissue gas or foreign bodies detected. Slight soft tissue irregularity is present along the lateral aspect of the 5th metatarsal, correlate with physical exam findings.   MACRO: None   Signed by: Quentin Madden 2/28/2025 9:28 PM Dictation workstation:   [id number]            Assessment/Plan   Patient is a 65-year-old female with PMH of CAD s/p CABG, HFrEF, PAD (>50% stenosis of L CFA and occluded L SFA stent and recon at distal SFA concern for significant skin breakdown over dorsum of patient's left lower extremity.     #PAD with CLI  #DM2 with neuropathy (A1c 14.6% in 1/2025)  #Superimposed cellulitis ankle  #Dry gangrene left foot     - Patient was seen and evaluated; all findings were discussed and all questions were answered to patient's satisfaction.  - Charts, labs, vitals and imaging all reviewed.   - Imaging:  X-rays of foot and ankle, femur reviewed. No acute osseous abnormality. Significant vascular calcifications appreciated. No gas noted in soft tissues.  - Labs: No white count. Lactate 1.6.   - CT angio w/o contrast pending     Plan:  - Patient with apparent ischemic limb with superficial tissue breakdown. Thickening of skin and mild focal calor improved since admission date. Recommend broad spectrum abx IV vanc/cefepime.  - X-rays negative for soft tissue gas. Will follow peripherally as ischemic limb work-up progresses.  - Reviewed with her the risk of proximal amputation. Will follow peripherally and may debride her wounds in the days following her vascular intervention depending on how much deeper tissue has survived to this point and how well her body responds to revascularization procedure.  - Pain Regimen: per primary team  - Plan to follow peripherally as work-up for ischemic limb develops.   - Dressings: Will defer for further assessment for now.  - Nursing staff is able to change/reinforce dressing if & as necessary until next day’s dressing change. Thank you.  - Podiatry will continue to follow peripherally while in house.     Weightbearing: WBAT for now, recommend surgical shoe.     Case to be discussed with attending, A&P above reflects a tentative plan. Please await for the final signature from the attending physician on service.     Chema Okeefe DPM/PGY-3  Podiatric Medicine & Surgery  [place]            SIGNATURE: Chema Okeefe DPM PATIENT NAME: Myrna Khan   DATE: March 2, 2025 MRN: [id number]   TIME: 3:25 PM CONTACT: Haiku Message

## 2025-03-02 NOTE — ED NOTES
Patient unpleasent and cursing at staff. Refusing all care. Attempting to leave the unit.      Tiffanie Cox LPN  03/01/25 2010

## 2025-03-02 NOTE — CARE PLAN
The patient's goals for the shift include patient pain will be controled during shift    The clinical goals for the shift include Patient will remain HDS during shift    Other goals include    Problem: Pain - Adult  Goal: Verbalizes/displays adequate comfort level or baseline comfort level  Outcome: Progressing     Problem: Safety - Adult  Goal: Free from fall injury  Outcome: Progressing     Problem: Discharge Planning  Goal: Discharge to home or other facility with appropriate resources  Outcome: Progressing     Problem: Chronic Conditions and Co-morbidities  Goal: Patient's chronic conditions and co-morbidity symptoms are monitored and maintained or improved  Outcome: Progressing     Problem: Nutrition  Goal: Nutrient intake appropriate for maintaining nutritional needs  Outcome: Progressing     Problem: Skin  Goal: Decreased wound size/increased tissue granulation at next dressing change  3/2/2025 1253 by Petra Lawrence RN  Flowsheets (Taken 3/2/2025 1253)  Decreased wound size/increased tissue granulation at next dressing change:   Protective dressings over bony prominences   Promote sleep for wound healing  3/2/2025 1253 by Petra Lawrence RN  Outcome: Progressing  Flowsheets (Taken 3/2/2025 1253)  Decreased wound size/increased tissue granulation at next dressing change:   Protective dressings over bony prominences   Promote sleep for wound healing  Goal: Participates in plan/prevention/treatment measures  3/2/2025 1253 by Petra Lawrence RN  Flowsheets (Taken 3/2/2025 1253)  Participates in plan/prevention/treatment measures:   Elevate heels   Discuss with provider PT/OT consult  3/2/2025 1253 by Petra Lawrence RN  Outcome: Progressing  Flowsheets (Taken 3/2/2025 1253)  Participates in plan/prevention/treatment measures:   Elevate heels   Discuss with provider PT/OT consult  Goal: Prevent/manage excess moisture  3/2/2025 1253 by Petra Lawrence RN  Flowsheets (Taken 3/2/2025 1253)  Prevent/manage excess  moisture:   Cleanse incontinence/protect with barrier cream   Follow provider orders for dressing changes  3/2/2025 1253 by Petra Lawrence RN  Outcome: Progressing  Flowsheets (Taken 3/2/2025 1253)  Prevent/manage excess moisture:   Cleanse incontinence/protect with barrier cream   Follow provider orders for dressing changes  Goal: Prevent/minimize sheer/friction injuries  3/2/2025 1253 by Petra Lawrence RN  Flowsheets (Taken 3/2/2025 1253)  Prevent/minimize sheer/friction injuries:   Complete micro-shifts as needed if patient unable. Adjust patient position to relieve pressure points, not a full turn   HOB 30 degrees or less   Turn/reposition every 2 hours/use positioning/transfer devices  3/2/2025 1253 by Petra Lawrence RN  Outcome: Progressing  Flowsheets (Taken 3/2/2025 1253)  Prevent/minimize sheer/friction injuries:   Complete micro-shifts as needed if patient unable. Adjust patient position to relieve pressure points, not a full turn   HOB 30 degrees or less   Turn/reposition every 2 hours/use positioning/transfer devices  Goal: Promote/optimize nutrition  3/2/2025 1253 by Petra Lawrence RN  Flowsheets (Taken 3/2/2025 1253)  Promote/optimize nutrition:   Discuss with provider if NPO > 2 days   Consume > 50% meals/supplements   Monitor/record intake including meals  3/2/2025 1253 by Petra Lawrence RN  Outcome: Progressing  Flowsheets (Taken 3/2/2025 1253)  Promote/optimize nutrition:   Discuss with provider if NPO > 2 days   Consume > 50% meals/supplements   Monitor/record intake including meals  Goal: Promote skin healing  3/2/2025 1253 by Petra Lawrence RN  Flowsheets (Taken 3/2/2025 1253)  Promote skin healing:   Protective dressings over bony prominences   Ensure correct size (line/device) and apply per  instructions  3/2/2025 1253 by Petra Lawrence RN  Outcome: Progressing  Flowsheets (Taken 3/2/2025 1253)  Promote skin healing:   Protective dressings over bony prominences   Ensure correct  size (line/device) and apply per  instructions     Problem: Diabetes  Goal: Achieve decreasing blood glucose levels by end of shift  Outcome: Progressing  Goal: Increase stability of blood glucose readings by end of shift  Outcome: Progressing  Goal: Decrease in ketones present in urine by end of shift  Outcome: Progressing  Goal: Maintain electrolyte levels within acceptable range throughout shift  Outcome: Progressing  Goal: Maintain glucose levels >70mg/dl to <250mg/dl throughout shift  Outcome: Progressing  Goal: No changes in neurological exam by end of shift  Outcome: Progressing  Goal: Learn about and adhere to nutrition recommendations by end of shift  Outcome: Progressing  Goal: Vital signs within normal range for age by end of shift  Outcome: Progressing  Goal: Increase self care and/or family involovement by end of shift  Outcome: Progressing  Goal: Receive DSME education by end of shift  Outcome: Progressing     Problem: Pain  Goal: Takes deep breaths with improved pain control throughout the shift  Outcome: Progressing  Goal: Turns in bed with improved pain control throughout the shift  Outcome: Progressing  Goal: Walks with improved pain control throughout the shift  Outcome: Progressing  Goal: Performs ADL's with improved pain control throughout shift  Outcome: Progressing  Goal: Participates in PT with improved pain control throughout the shift  Outcome: Progressing  Goal: Free from opioid side effects throughout the shift  Outcome: Progressing  Goal: Free from acute confusion related to pain meds throughout the shift  Outcome: Progressing

## 2025-03-02 NOTE — PROGRESS NOTES
Assessment/Plan   Myrna Khan is a 65 y.o. 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 Pt noting skin sloughing, edema, clear drainage. At op visit, difficulty palpating pulse, had a monophasic AT and PT signals on the LLE and no palpable femoral pulse. CTA showed occlusion of common and external iliac arteries in addition to occluded SFA stent with distal reconstruction. Podiatry consulted, no intervention for now. Pt is started on broad spectrum abx and pending pvr and vein mapping.. Pt is pending angiogram, echo for preop risk strafication, appreciate cardiology recs. 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 She was delirious overnight 2/2 urinary retention, cr is stable but after day placement nursing reporting 2.5 liters of output, mentation starting to improve after decompression.     #LLE wounds with CLI  #PAOD  #Dry gangrene  - CT showing common and external iliac arteries occlusion + SFA occlusion with reconstitution of her L popliteal artery and intact tibial runoff   - no gas on imaging  - continue vanc/cefepime  - PVR/vein mapping ordered.   - [facility] surgery planning angiogram on Monday  - appreciate podiatry recs  - mulitmodal analgesia with bowel regimen    #Cystic ovarian mass  #urinary retention  - noted on imaging 2023 per rads but unable to compare given no image and just a report.   - pt today reports she was aware of the mass previously.   - - gyne evaluating, appreciate recs.   - with significant urinary retention leading to suspected delirum, s/p day with some improved mentation. 2.5L out on placement. Monitor for signs of injury from rapid decompression v.  "Post obstructive diuresis    #Delirium  - likely 2/2 urine retnetion. Potentially related to opioid started yesterday v. Hospital related sundowning.      #HTN  #HFimpEF (EF 55% July/2024, 35-40% in Aug/2023)  - MRI and NM PYP negative for amyloid,  SPEP and UPEP negative  - on home amlodipine 10mg, carvedilol 25mg, isosorvide 60mg  - c/w home meds     #CAD s/p CABG in 2023 (LIMA-LAD, APRIL-OM)  - EKG showing T wave inversions on anterior leads  -  patient asymptomatic, no cp or equivalents  - tele monitoring  - risk stratification if planning procedure , plan for tomorrow to obtain echo. Appreciate cardiology recs.      #T2DM (A1c 14.6% Jan/2025)  - on home metformin, dapagliflozin, insulin lantus 10u PM and SSI  - given prior hyperglycemia in Jan/2025, will be more aggressive with insulin regimen  - hold metformin and dapa  - lantus 8u + SSI. Will adjust as needed       Scheduled outpatient appointments in system:   No future appointments.  ---------------------------------------------------------------------------------------------------  Subjective   No events. Pt still somewhat confused, not able to answer all orientation questions but now able to report her leg issues and ovarian mass  and understand her urinary retention. Yesterday she was trying to leave ama and stating nothing was wrong with her. Currently pain is controlled. We discussed plans for angiogram and need for further work up of mass.      ---------------------------------------------------------------------------------------------------  Objective   Last Recorded Vitals  Blood pressure 139/72, pulse 68, temperature 36.3 °C (97.3 °F), resp. rate 16, height 1.6 m (5' 3\"), weight (!) 42.6 kg (94 lb), SpO2 97%.  Intake/Output last 3 Shifts:  I/O last 3 completed shifts:  In: 650 (15.2 mL/kg) [IV Piggyback:650]  Out: 400 (9.4 mL/kg) [Urine:400 (0.3 mL/kg/hr)]  Weight: 42.6 kg     Physical Exam  Vitals and nursing note reviewed.   Constitutional:       " General: She is not in acute distress.     Appearance: Normal appearance. She is not ill-appearing or toxic-appearing.   HENT:      Head: Normocephalic and atraumatic.      Mouth/Throat:      Mouth: Mucous membranes are moist.   Eyes:      General: No scleral icterus.     Extraocular Movements: Extraocular movements intact.      Conjunctiva/sclera: Conjunctivae normal.   Cardiovascular:      Rate and Rhythm: Normal rate and regular rhythm.      Heart sounds: S1 normal and S2 normal. No murmur heard.     Comments: Difficult to palpate distal pulses  Pulmonary:      Effort: Pulmonary effort is normal. No respiratory distress.      Breath sounds: No wheezing, rhonchi or rales.   Abdominal:      General: Bowel sounds are normal. There is no distension.      Palpations: Abdomen is soft.      Tenderness: There is no abdominal tenderness. There is no guarding or rebound.   Musculoskeletal:         General: No swelling or deformity.      Cervical back: Neck supple.   Skin:     Findings: No rash.      Comments: Left foot edema, denuded skin on doral surface, erythema and ttp    Neurological:      General: No focal deficit present.      Mental Status: She is alert. Mental status is at baseline.   Psychiatric:         Mood and Affect: Mood normal.         Relevant Results  Lab Results   Component Value Date    WBC 10.2 03/02/2025    HGB 10.1 (L) 03/02/2025    HCT 31.1 (L) 03/02/2025    MCV 97 03/02/2025     03/02/2025      Lab Results   Component Value Date    GLUCOSE 46 (LL) 03/02/2025    CALCIUM 8.7 03/02/2025     (L) 03/02/2025    K 3.6 03/02/2025    CO2 23 03/02/2025     03/02/2025    BUN 13 03/02/2025    CREATININE 0.64 03/02/2025     Scheduled medications  acetaminophen, 650 mg, oral, q8h  amLODIPine, 10 mg, oral, Daily  aspirin, 81 mg, oral, Daily  atorvastatin, 80 mg, oral, Nightly  carvedilol, 25 mg, oral, BID  cefepime, 2 g, intravenous, q8h  clopidogrel, 75 mg, oral, Daily  heparin (porcine),  5,000 Units, subcutaneous, q8h BOLIVAR  insulin glargine, 8 Units, subcutaneous, Nightly  insulin lispro, 0-5 Units, subcutaneous, TID AC  isosorbide mononitrate ER, 60 mg, oral, Daily  melatonin, 10 mg, oral, Nightly  ranolazine, 500 mg, oral, BID  vancomycin, 1,250 mg, intravenous, q24h      Continuous medications     PRN medications  PRN medications: dextrose, dextrose, glucagon, glucagon, OLANZapine, oxyCODONE, polyethylene glycol, QUEtiapine, vancomycin    Odilon Khalil MD

## 2025-03-02 NOTE — H&P (VIEW-ONLY)
Inpatient consult to Cardiology  Consult performed by: Kenneth Norwood MD  Consult ordered by: Odilon Khalil MD        History Of Present Illness:    Myrna Khan is a 65 y.o. female with history of HFimpEF (EF 55% July/2024, 35-40% in Aug/2023), CAD s/p CABG in 2023 (LIMA-LAD, APRIL-OM), PAD s/p L SFA stent (occluded in Nov/2023 w/ distal SFA reconstitution), renal artery stenosis (Sep/2023 US), carotid artery stenosis (Aug/2023 US), HTN, HLD, T2DM (A1c 14.6% Jan/2025) presenting with dry gangrene of LLE currently planned for LLE angiogram and possible intervention (possible extensive debridement vs amputation or revascularization via bypass) with vascular surgery.     Cardiology consulted for pre-op risk stratification.   Patient hemodynamically stable with no acute cardiac complaints. She denies any acute or remote chest pain episodes, palpitations, SOB, NIELSEN, orthopnea, PND, nausea, vomiting, diaphoresis, lightheadedness, dizziness, presyncopal symptoms, LE swelling, LOC.   No signs of acute ACS, active chest pain, malignant arrythmias, decompensated HF or volume overload or symptomatic valvular disease.   EKG showing normal sinus rhythm with no acute ischemic changes.    Cardiac imaging/work-up:  Echocardiogram 7/16/2024:   1. Left ventricular ejection fraction is low normal, by visual estimate at 55%.   2. Spectral Doppler shows an impaired relaxation pattern of left ventricular diastolic filling.   3. Abnormal septal motion consistent with post-operative status.   4. GLS is reduced at -11.9% with an apical sparing pattern suggestive of possible cardiac amyloid. LV systolic function is difficult to assess given that with swinging of the heart many images become foreshortened and LV appears better in some views than others. Overall does appear to be normal or low normal without regional wall motion abnormalities. Given the combination of thickened LV and RV walls with somewhat thickened valves and  pericardial effusion with reduced GLS with the pattern of apical sparing, would consider cardiac MRI to further assess for possible cardiac amyloid.   5. There is reduced right ventricular systolic function.   6. The left atrium is mildly dilated.   7. Mobile atrial septal aneurysm wtih an agitated saline contrast study that was negative for intracardiac shunt.   8. Mildly thickened MV leaflet tips with doming of the AML with only trace MR and mean MV gradient only 1.5mmHg.   9. Primary service notified of the findings at the time of reporting.  10. Compared with the prior exam from 8/26/2023 there has been improvement of the LV systolic function Previously there was moderatley reduced LV systolic function with apex having worst function. There has been interval surgical revascularization between the two studies. Today's exam was the [facility] echocadiogram to include GLS asessment and raised the possibility of cardiac amyloid.      Cardiac MRI 7/2024:  IMPRESSION:  1. The left ventricle is normal in size with low-normal systolic  function. LVEF 51%. There are no segmental wall motion abnormalities.  Quantitative values are as noted above.  2. Diffuse left ventricle hypertrophy measuring up to 1.8 cm. There  is extensive sub endocardial delayed enhancement with more prominent  involvement of the lateral wall. Findings are nonspecific but can be  seen in cardiac amyloidosis. Cannot exclude component of infarction  in the lateral wall.  3. Nonspecific mild mid myocardial delayed enhancement  interventricular septum at the RV insertion sites which can be due to  right heart volume or pressure overload.  4. Normal aortic, mitral, and tricuspid valve function. Aortic  regurgitant volume 1 ml. Aortic regurgitant fraction 3 %. Peak aortic  valve velocity 95 cm/sec.    NM PYP scan 7/2024:  IMPRESSION:  1. Negative amyloid SPECT heart study without evidence of TTR  amyloidosis.      Coronary angiogram  9/4/2023:  ____________________________________________________________________________________  CONCLUSIONS:  1. S/p uncomplicated R/L heart catheterization.  2. RHC access initially through right brachial vein with 6f sheath, however unable to advance Equipment. Switched to RIJ 6f sheath. LHC through right radial artery 6f sheath.  3. Severe 2 vessel disease involving mid LAD and mid LCx, FFR was done and positive in LAD (0.74) and LCx (0.69).  4. Right dominant circulation.  5. Heart team discussion regarding revascularization options.    Now status post CABG (LIMA-LAD, APRIL-OM)     Last Recorded Vitals:  Vitals:    03/01/25 2302 03/01/25 2356 03/02/25 0445 03/02/25 0722   BP: 172/80 157/70 167/75 176/78   BP Location: Right arm   Right arm   Patient Position: Sitting   Lying   Pulse: 82 68 80 79   Resp: 17 17 17 16   Temp: 36 °C (96.8 °F) 36.3 °C (97.3 °F) 36 °C (96.8 °F) 36.5 °C (97.7 °F)   TempSrc:    Temporal   SpO2: 99% 100% 100% 98%   Weight:       Height:           Last Labs:  CBC - 3/2/2025:  5:04 AM  10.2 10.1 412    31.1      CMP - 3/2/2025:  5:05 AM  8.7 7.8 19 --- 0.3   2.9 2.9 17 176      PTT - 3/1/2025:  6:33 AM  1.2   13.1 35     Troponin I, High Sensitivity   Date/Time Value Ref Range Status   01/11/2024 03:37 PM 22 0 - 34 ng/L Final     Troponin I, High Sensitivity ([facility])   Date/Time Value Ref Range Status   02/28/2025 06:01 PM 5 0 - 34 ng/L Final   01/18/2025 06:12 AM 14 0 - 34 ng/L Final   01/18/2025 04:50 AM 14 0 - 34 ng/L Final     BNP   Date/Time Value Ref Range Status   02/28/2025 06:01  (H) 0 - 99 pg/mL Final   07/15/2024 11:55  (H) 0 - 99 pg/mL Final     Hemoglobin A1C   Date/Time Value Ref Range Status   01/09/2025 06:34 AM 14.6 (H) See comment % Final   07/15/2024 11:55 AM 15.2 (H) see below % Final     Comment:     Hemoglobin A1c is >15%. Marked elevations in HbA1c are commonly due to poor glycemic control in diabetic patients. Rarely, hemoglobin variants may cause false  elevation of HbA1c that is discordant with glycemic control status.      LDL Calculated   Date/Time Value Ref Range Status   07/16/2024 04:58 AM 56 <=99 mg/dL Final     Comment:                                 Near   Borderline      AGE      Desirable  Optimal    High     High     Very High     0-19 Y     0 - 109     ---    110-129   >/= 130     ----    20-24 Y     0 - 119     ---    120-159   >/= 160     ----      >24 Y     0 -  99   100-129  130-159   160-189     >/=190       VLDL   Date/Time Value Ref Range Status   07/16/2024 04:58 AM 15 0 - 40 mg/dL Final      Last I/O:  I/O last 3 completed shifts:  In: 650 (15.2 mL/kg) [IV Piggyback:650]  Out: 400 (9.4 mL/kg) [Urine:400 (0.3 mL/kg/hr)]  Weight: 42.6 kg       Past Medical History:  She has a past medical history of CAD (coronary artery disease), Carotid artery stenosis, Diabetes mellitus (Multi), Heart disease, Hypertension, NSTEMI (non-ST elevated myocardial infarction) (Multi), PAD (peripheral artery disease) (CMS-HCC), and Renal artery stenosis (CMS-HCC).    Past Surgical History:  She has a past surgical history that includes Coronary artery bypass graft and Femoral artery stent (Left).      Social History:  She reports that she has been smoking cigarettes. She started smoking about 30 years ago. She has a 30.2 pack-year smoking history. She has never used smokeless tobacco. She reports current alcohol use. She reports that she does not use drugs.    Family History:  Family History   Problem Relation Name Age of Onset    Peripheral vascular disease Mother      Hypertension Mother      Diabetes type II Mother      Hypertension Sister      Diabetes type II Sister          Allergies:  Lisinopril, Amoxicillin, and Losartan    Inpatient Medications:  Scheduled medications   Medication Dose Route Frequency    acetaminophen  650 mg oral q8h    amLODIPine  10 mg oral Daily    aspirin  81 mg oral Daily    atorvastatin  80 mg oral Nightly    carvedilol  25 mg oral  "BID    cefepime  2 g intravenous q8h    clopidogrel  75 mg oral Daily    heparin (porcine)  5,000 Units subcutaneous q8h BOLIVAR    insulin glargine  8 Units subcutaneous Nightly    insulin lispro  0-5 Units subcutaneous TID AC    isosorbide mononitrate ER  60 mg oral Daily    melatonin  10 mg oral Nightly    ranolazine  500 mg oral BID    vancomycin  1,250 mg intravenous q24h     PRN medications   Medication    dextrose    dextrose    glucagon    glucagon    OLANZapine    oxyCODONE    polyethylene glycol    QUEtiapine    vancomycin     Continuous Medications   Medication Dose Last Rate     Outpatient Medications:  Current Outpatient Medications   Medication Instructions    acetaminophen (TYLENOL) 500 mg, oral, Every 6 hours PRN, Take per directed    amLODIPine (Norvasc) 10 mg tablet TAKE 1 TABLET BY MOUTH ONCE DAILY    aspirin 81 mg, oral, Daily    atorvastatin (Lipitor) 80 mg tablet TAKE 1 TABLET BY MOUTH ONCE DAILY    BD Ultra-Fine Micro Pen Needle 32 gauge x 1/4\" needle     blood sugar diagnostic strip Use 1 strip to check blood sugar 3 times a day with meals.    CALCIUM CITRATE ORAL 1 tablet, oral, Daily    carvedilol (Coreg) 25 mg tablet TAKE 1 TABLET BY MOUTH TWO TIMES A DAY    clopidogrel (PLAVIX) 75 mg, oral, Daily RT    diclofenac sodium (VOLTAREN) 4 g, Topical, 4 times daily PRN    Easy Touch Alcohol Prep Pads pads, medicated     Farxiga 10 mg, oral, Daily with breakfast    gabapentin (NEURONTIN) 300 mg, oral, Daily    hydrALAZINE (APRESOLINE) 10 mg, oral, 2 times daily    insulin glargine (LANTUS) 10 Units, subcutaneous, Nightly, Take as directed per insulin instructions.    insulin lispro 0-5 Units, subcutaneous, 3 times daily (morning, midday, late afternoon), 0 unit(s) if BG ; 1 unit(s) if -200; 2 unit(s) if -250; 3 unit(s) if -300; 4 unit(s) if -350; 5 unit(s) if -400    isosorbide mononitrate ER (Imdur) 60 mg 24 hr tablet TAKE 1 TABLET BY MOUTH ONCE DAILY    lancets " misc Use three times a day to test blood sugar w/ meals    metFORMIN XR (Glucophage-XR) 500 mg 24 hr tablet TAKE 2 TABLETS BY MOUTH ONCE DAILY    multivitamin with minerals tablet 1 tablet, oral, Daily    oxyCODONE (OXY-IR) 5 mg, oral, Every 6 hours PRN    ranolazine (RANEXA) 500 mg, oral, 2 times daily, Do not crush, chew, or split.       Physical Exam:  General: NAD, AOx3  HEENT: EOMI, MMM, no LAD, no thyroid nodule or enlargement.   Neck: -JVD, supple  Cardiac: Normal S, S2. No murmurs noted  Lungs:  Good bilateral air entry, clear lungs bilaterally  Abdomen: Soft, non-tender, Non-distended   Extremities: No LE edema. LLE dry gangrene   Neuro: AOx3, no apparent focal deficits       Assessment/Plan   Myrna Khan is a 65 y.o. female with history of HFimpEF (EF 55% July/2024, 35-40% in Aug/2023), CAD s/p CABG in 2023 (LIMA-LAD, APRIL-OM), PAD s/p L SFA stent (occluded in Nov/2023 w/ distal SFA reconstitution), renal artery stenosis (Sep/2023 US), carotid artery stenosis (Aug/2023 US), HTN, HLD, T2DM (A1c 14.6% Jan/2025) presenting with dry gangrene of LLE currently planned for LLE angiogram and possible intervention (possible extensive debridement vs amputation or revascularization via bypass) with vascular surgery.     Cardiology consulted for pre-op risk stratification.     Most recent echocardiogram in July 2024 showing features suggestive of cardiac amyloidosis (apical sparring, LVH) prompting further work-up with cardiac MRI that showed extensive sub endocardial delayed enhancement with more prominent involvement of the lateral wall -->Findings that are nonspecific but can be seen in cardiac amyloidosis.   A technetium PYP scan was done as well and did not show any uptake -->negative for TTR amyloidosis.   A urine immunofixation electrophoresis was done but does not seem that a serum immunofixation and serum free light chains were collected and analysed at the time. --> recommend outpatient follow up and  completion of her evaluation.     Patient hemodynamically stable with no acute cardiac complaints. She denies any acute or remote chest pain episodes, palpitations, SOB, NIELSEN, orthopnea, PND, nausea, vomiting, diaphoresis, lightheadedness, dizziness, presyncopal symptoms, LE swelling, LOC.   No signs of acute ACS, active chest pain, malignant arrythmias, decompensated HF or volume overload or symptomatic valvular disease.     RCRI 3 --> 15% of MACE.  Patient with RCRI of 3, has poor METS mainly related to her PAD and is going to undergo an intermediate/high risk surgery and has history of extensive atherosclerotic disease ---> as such, would recommend repeating a formal echocardiogram in AM to assess LV function.   If no changes on echocardiogram and patient continues to have preserved/normal systolic function, can proceed with surgery with no further cardiac work-up warranted.       Code Status:  Full Code    I spent 60 minutes in the professional and overall care of this patient.        Kenneth Norwood MD

## 2025-03-02 NOTE — PROGRESS NOTES
VASCULAR SURGERY PROGRESS NOTE  Assessment/Plan   Myrna Khan is 65 y.o. female with history of carotid stenosis, diabetes, CAD, HTN, PAD and renal artery stenosis who presents with extensive LLE wounds and intermittent rest pain.     Plan:  Admitted to medicine  OR tomorrow for LLE Angiogram possible intervention  NPO after Midnight  Consent Obtained, Patient Marked per OR Protocol  Podiatry consulted - no urgent intervention  Continue antibiotics  Vascular lab studies: Vein mapping  Cardiology Consult for risk stratification for possible extensive debridement vs amputation and if requires operative revascularization via bypass     D/w attending, Dr. Devendra Andersen MD  General Surgery Resident  Vascular Team Pager [phone]    Subjective   No acute events overnight. Having pain in toes. Denies chest pain or shortness of breath. In agreement with plan for LLE angiography tomorrow.     Objective   Vitals:  Heart Rate:  [68-82]   Temp:  [36 °C (96.8 °F)-36.6 °C (97.9 °F)]   Resp:  [16-20]   BP: (145-179)/(70-88)   SpO2:  [98 %-100 %]     Exam:  Constitutional: No acute distress, resting comfortably  Neuro:  AOx3, grossly intact  ENMT: moist mucous membranes  CV: no tachycardia  Pulm: non-labored on room air  GI: soft, non-tender, non-distended  Skin: warm and dry  Musculoskeletal: moving all extremities  Extremities: left lower extremity with extensive tissue loss from dosrum of foot to below knee   Pulse Exam:               RLE: palpable femoral pulse, palpable DP, PT signal              LLE: monophasic AT and PT signals    Labs:  Results from last 7 days   Lab Units 03/02/25  0504 02/28/25  1801   WBC AUTO x10*3/uL 10.2 10.6   HEMOGLOBIN g/dL 10.1* 11.3*   PLATELETS AUTO x10*3/uL 412 457*      Results from last 7 days   Lab Units 02/28/25  1801   SODIUM mmol/L 132*   POTASSIUM mmol/L 4.2   CHLORIDE mmol/L 96*   CO2 mmol/L 27   BUN mg/dL 10   CREATININE mg/dL 0.58   GLUCOSE mg/dL 171*      Results  from last 7 days   Lab Units 03/01/25  0633   INR  1.2*   PROTIME seconds 13.1*   APTT seconds 35

## 2025-03-03 ENCOUNTER — ANESTHESIA (OUTPATIENT)
Dept: OPERATING ROOM | Facility: HOSPITAL | Age: 65
End: 2025-03-03
Payer: COMMERCIAL

## 2025-03-03 ENCOUNTER — APPOINTMENT (OUTPATIENT)
Dept: VASCULAR MEDICINE | Facility: HOSPITAL | Age: 65
DRG: 270 | End: 2025-03-03
Payer: COMMERCIAL

## 2025-03-03 ENCOUNTER — APPOINTMENT (OUTPATIENT)
Dept: CARDIOLOGY | Facility: HOSPITAL | Age: 65
End: 2025-03-03
Payer: COMMERCIAL

## 2025-03-03 ENCOUNTER — APPOINTMENT (OUTPATIENT)
Dept: RADIOLOGY | Facility: HOSPITAL | Age: 65
DRG: 270 | End: 2025-03-03
Payer: COMMERCIAL

## 2025-03-03 PROBLEM — I50.9 CHF (CONGESTIVE HEART FAILURE): Status: ACTIVE | Noted: 2025-03-03

## 2025-03-03 PROBLEM — Z95.5 STENTED CORONARY ARTERY: Status: ACTIVE | Noted: 2025-03-03

## 2025-03-03 LAB
ALBUMIN SERPL BCP-MCNC: 2.6 G/DL (ref 3.4–5)
ANION GAP SERPL CALC-SCNC: 11 MMOL/L (ref 10–20)
BUN SERPL-MCNC: 12 MG/DL (ref 6–23)
CALCIUM SERPL-MCNC: 8.4 MG/DL (ref 8.6–10.6)
CHLORIDE SERPL-SCNC: 103 MMOL/L (ref 98–107)
CO2 SERPL-SCNC: 22 MMOL/L (ref 21–32)
CREAT SERPL-MCNC: 0.52 MG/DL (ref 0.5–1.05)
EGFRCR SERPLBLD CKD-EPI 2021: >90 ML/MIN/1.73M*2
EJECTION FRACTION APICAL 4 CHAMBER: 50
EJECTION FRACTION: 50 %
ERYTHROCYTE [DISTWIDTH] IN BLOOD BY AUTOMATED COUNT: 11.9 % (ref 11.5–14.5)
GLUCOSE BLD MANUAL STRIP-MCNC: 131 MG/DL (ref 74–99)
GLUCOSE BLD MANUAL STRIP-MCNC: 137 MG/DL (ref 74–99)
GLUCOSE BLD MANUAL STRIP-MCNC: 141 MG/DL (ref 74–99)
GLUCOSE BLD MANUAL STRIP-MCNC: 154 MG/DL (ref 74–99)
GLUCOSE SERPL-MCNC: 158 MG/DL (ref 74–99)
HCT VFR BLD AUTO: 30.5 % (ref 36–46)
HGB BLD-MCNC: 10.1 G/DL (ref 12–16)
LEFT ATRIUM VOLUME AREA LENGTH INDEX BSA: 59.2 ML/M2
LEFT VENTRICLE INTERNAL DIMENSION DIASTOLE: 4.7 CM (ref 3.5–6)
LEFT VENTRICULAR OUTFLOW TRACT DIAMETER: 1.9 CM
MCH RBC QN AUTO: 32.3 PG (ref 26–34)
MCHC RBC AUTO-ENTMCNC: 33.1 G/DL (ref 32–36)
MCV RBC AUTO: 97 FL (ref 80–100)
MITRAL VALVE E/A RATIO: 0.79
NRBC BLD-RTO: 0 /100 WBCS (ref 0–0)
PHOSPHATE SERPL-MCNC: 2.2 MG/DL (ref 2.5–4.9)
PLATELET # BLD AUTO: 418 X10*3/UL (ref 150–450)
POTASSIUM SERPL-SCNC: 3.8 MMOL/L (ref 3.5–5.3)
RBC # BLD AUTO: 3.13 X10*6/UL (ref 4–5.2)
RIGHT VENTRICLE FREE WALL PEAK S': 9.03 CM/S
RIGHT VENTRICLE PEAK SYSTOLIC PRESSURE: 59 MMHG
SODIUM SERPL-SCNC: 132 MMOL/L (ref 136–145)
TRICUSPID ANNULAR PLANE SYSTOLIC EXCURSION: 1.7 CM
WBC # BLD AUTO: 10.6 X10*3/UL (ref 4.4–11.3)

## 2025-03-03 PROCEDURE — 3700000002 HC GENERAL ANESTHESIA TIME - EACH INCREMENTAL 1 MINUTE: Performed by: STUDENT IN AN ORGANIZED HEALTH CARE EDUCATION/TRAINING PROGRAM

## 2025-03-03 PROCEDURE — XXE2X19 MEASUREMENT OF CARDIAC OUTPUT, COMPUTER-AIDED ASSESSMENT, NEW TECHNOLOGY GROUP 9: ICD-10-PCS | Performed by: INTERNAL MEDICINE

## 2025-03-03 PROCEDURE — 2500000001 HC RX 250 WO HCPCS SELF ADMINISTERED DRUGS (ALT 637 FOR MEDICARE OP): Performed by: STUDENT IN AN ORGANIZED HEALTH CARE EDUCATION/TRAINING PROGRAM

## 2025-03-03 PROCEDURE — 75716 ARTERY X-RAYS ARMS/LEGS: CPT | Performed by: STUDENT IN AN ORGANIZED HEALTH CARE EDUCATION/TRAINING PROGRAM

## 2025-03-03 PROCEDURE — 93308 TTE F-UP OR LMTD: CPT | Performed by: INTERNAL MEDICINE

## 2025-03-03 PROCEDURE — 75625 CONTRAST EXAM ABDOMINL AORTA: CPT | Performed by: STUDENT IN AN ORGANIZED HEALTH CARE EDUCATION/TRAINING PROGRAM

## 2025-03-03 PROCEDURE — 82947 ASSAY GLUCOSE BLOOD QUANT: CPT

## 2025-03-03 PROCEDURE — 0932T N-INVS DET HRT FAIL AUG ECHO: CPT

## 2025-03-03 PROCEDURE — B41DZZZ FLUOROSCOPY OF AORTA AND BILATERAL LOWER EXTREMITY ARTERIES: ICD-10-PCS | Performed by: STUDENT IN AN ORGANIZED HEALTH CARE EDUCATION/TRAINING PROGRAM

## 2025-03-03 PROCEDURE — 2500000002 HC RX 250 W HCPCS SELF ADMINISTERED DRUGS (ALT 637 FOR MEDICARE OP, ALT 636 FOR OP/ED): Performed by: STUDENT IN AN ORGANIZED HEALTH CARE EDUCATION/TRAINING PROGRAM

## 2025-03-03 PROCEDURE — 93321 DOPPLER ECHO F-UP/LMTD STD: CPT | Performed by: INTERNAL MEDICINE

## 2025-03-03 PROCEDURE — 2500000004 HC RX 250 GENERAL PHARMACY W/ HCPCS (ALT 636 FOR OP/ED): Performed by: STUDENT IN AN ORGANIZED HEALTH CARE EDUCATION/TRAINING PROGRAM

## 2025-03-03 PROCEDURE — 93970 EXTREMITY STUDY: CPT | Performed by: INTERNAL MEDICINE

## 2025-03-03 PROCEDURE — 85027 COMPLETE CBC AUTOMATED: CPT | Performed by: STUDENT IN AN ORGANIZED HEALTH CARE EDUCATION/TRAINING PROGRAM

## 2025-03-03 PROCEDURE — 2720000007 HC OR 272 NO HCPCS: Performed by: STUDENT IN AN ORGANIZED HEALTH CARE EDUCATION/TRAINING PROGRAM

## 2025-03-03 PROCEDURE — 7100000001 HC RECOVERY ROOM TIME - INITIAL BASE CHARGE: Performed by: STUDENT IN AN ORGANIZED HEALTH CARE EDUCATION/TRAINING PROGRAM

## 2025-03-03 PROCEDURE — 80069 RENAL FUNCTION PANEL: CPT | Performed by: STUDENT IN AN ORGANIZED HEALTH CARE EDUCATION/TRAINING PROGRAM

## 2025-03-03 PROCEDURE — 36415 COLL VENOUS BLD VENIPUNCTURE: CPT | Performed by: STUDENT IN AN ORGANIZED HEALTH CARE EDUCATION/TRAINING PROGRAM

## 2025-03-03 PROCEDURE — 36200 PLACE CATHETER IN AORTA: CPT | Performed by: STUDENT IN AN ORGANIZED HEALTH CARE EDUCATION/TRAINING PROGRAM

## 2025-03-03 PROCEDURE — 93325 DOPPLER ECHO COLOR FLOW MAPG: CPT | Performed by: INTERNAL MEDICINE

## 2025-03-03 PROCEDURE — 3600000008 HC OR TIME - EACH INCREMENTAL 1 MINUTE - PROCEDURE LEVEL THREE: Performed by: STUDENT IN AN ORGANIZED HEALTH CARE EDUCATION/TRAINING PROGRAM

## 2025-03-03 PROCEDURE — C1769 GUIDE WIRE: HCPCS | Performed by: STUDENT IN AN ORGANIZED HEALTH CARE EDUCATION/TRAINING PROGRAM

## 2025-03-03 PROCEDURE — 99233 SBSQ HOSP IP/OBS HIGH 50: CPT | Performed by: NURSE PRACTITIONER

## 2025-03-03 PROCEDURE — 76937 US GUIDE VASCULAR ACCESS: CPT | Performed by: STUDENT IN AN ORGANIZED HEALTH CARE EDUCATION/TRAINING PROGRAM

## 2025-03-03 PROCEDURE — 2500000004 HC RX 250 GENERAL PHARMACY W/ HCPCS (ALT 636 FOR OP/ED): Mod: JZ,TB | Performed by: ANESTHESIOLOGY

## 2025-03-03 PROCEDURE — 99233 SBSQ HOSP IP/OBS HIGH 50: CPT | Performed by: STUDENT IN AN ORGANIZED HEALTH CARE EDUCATION/TRAINING PROGRAM

## 2025-03-03 PROCEDURE — 3700000001 HC GENERAL ANESTHESIA TIME - INITIAL BASE CHARGE: Performed by: STUDENT IN AN ORGANIZED HEALTH CARE EDUCATION/TRAINING PROGRAM

## 2025-03-03 PROCEDURE — 2500000004 HC RX 250 GENERAL PHARMACY W/ HCPCS (ALT 636 FOR OP/ED): Performed by: ANESTHESIOLOGIST ASSISTANT

## 2025-03-03 PROCEDURE — 2550000001 HC RX 255 CONTRASTS: Performed by: STUDENT IN AN ORGANIZED HEALTH CARE EDUCATION/TRAINING PROGRAM

## 2025-03-03 PROCEDURE — A36245 PR PLACE CATH SELECT ART,ABD/PEL: Performed by: ANESTHESIOLOGIST ASSISTANT

## 2025-03-03 PROCEDURE — A36245 PR PLACE CATH SELECT ART,ABD/PEL: Performed by: ANESTHESIOLOGY

## 2025-03-03 PROCEDURE — 7100000002 HC RECOVERY ROOM TIME - EACH INCREMENTAL 1 MINUTE: Performed by: STUDENT IN AN ORGANIZED HEALTH CARE EDUCATION/TRAINING PROGRAM

## 2025-03-03 PROCEDURE — 1200000002 HC GENERAL ROOM WITH TELEMETRY DAILY

## 2025-03-03 PROCEDURE — 3600000003 HC OR TIME - INITIAL BASE CHARGE - PROCEDURE LEVEL THREE: Performed by: STUDENT IN AN ORGANIZED HEALTH CARE EDUCATION/TRAINING PROGRAM

## 2025-03-03 PROCEDURE — 93970 EXTREMITY STUDY: CPT

## 2025-03-03 RX ORDER — IODIXANOL 320 MG/ML
INJECTION, SOLUTION INTRAVASCULAR AS NEEDED
Status: DISCONTINUED | OUTPATIENT
Start: 2025-03-03 | End: 2025-03-03 | Stop reason: HOSPADM

## 2025-03-03 RX ORDER — ONDANSETRON HYDROCHLORIDE 2 MG/ML
4 INJECTION, SOLUTION INTRAVENOUS ONCE AS NEEDED
Status: DISCONTINUED | OUTPATIENT
Start: 2025-03-03 | End: 2025-03-03 | Stop reason: HOSPADM

## 2025-03-03 RX ORDER — OXYCODONE HYDROCHLORIDE 5 MG/1
5 TABLET ORAL EVERY 4 HOURS PRN
Status: DISCONTINUED | OUTPATIENT
Start: 2025-03-03 | End: 2025-03-03 | Stop reason: HOSPADM

## 2025-03-03 RX ORDER — PANTOPRAZOLE SODIUM 40 MG/10ML
40 INJECTION, POWDER, LYOPHILIZED, FOR SOLUTION INTRAVENOUS DAILY
Status: DISCONTINUED | OUTPATIENT
Start: 2025-03-03 | End: 2025-03-11

## 2025-03-03 RX ORDER — ERTAPENEM 1 G/1
1 INJECTION, POWDER, LYOPHILIZED, FOR SOLUTION INTRAMUSCULAR; INTRAVENOUS EVERY 24 HOURS
Status: DISCONTINUED | OUTPATIENT
Start: 2025-03-03 | End: 2025-03-11

## 2025-03-03 RX ORDER — PROPOFOL 10 MG/ML
INJECTION, EMULSION INTRAVENOUS AS NEEDED
Status: DISCONTINUED | OUTPATIENT
Start: 2025-03-03 | End: 2025-03-03

## 2025-03-03 RX ORDER — CEFAZOLIN 1 G/1
INJECTION, POWDER, FOR SOLUTION INTRAVENOUS AS NEEDED
Status: DISCONTINUED | OUTPATIENT
Start: 2025-03-03 | End: 2025-03-03

## 2025-03-03 RX ORDER — ALBUTEROL SULFATE 0.83 MG/ML
2.5 SOLUTION RESPIRATORY (INHALATION) ONCE AS NEEDED
Status: DISCONTINUED | OUTPATIENT
Start: 2025-03-03 | End: 2025-03-03 | Stop reason: HOSPADM

## 2025-03-03 RX ORDER — ROCURONIUM BROMIDE 10 MG/ML
INJECTION, SOLUTION INTRAVENOUS AS NEEDED
Status: DISCONTINUED | OUTPATIENT
Start: 2025-03-03 | End: 2025-03-03

## 2025-03-03 RX ORDER — HYDRALAZINE HYDROCHLORIDE 20 MG/ML
5 INJECTION INTRAMUSCULAR; INTRAVENOUS EVERY 30 MIN PRN
Status: DISCONTINUED | OUTPATIENT
Start: 2025-03-03 | End: 2025-03-03 | Stop reason: HOSPADM

## 2025-03-03 RX ORDER — PHENYLEPHRINE HYDROCHLORIDE 10 MG/ML
INJECTION INTRAVENOUS AS NEEDED
Status: DISCONTINUED | OUTPATIENT
Start: 2025-03-03 | End: 2025-03-03

## 2025-03-03 RX ORDER — LIDOCAINE HYDROCHLORIDE 10 MG/ML
0.1 INJECTION, SOLUTION INFILTRATION; PERINEURAL ONCE
Status: DISCONTINUED | OUTPATIENT
Start: 2025-03-03 | End: 2025-03-03 | Stop reason: HOSPADM

## 2025-03-03 RX ORDER — LABETALOL HYDROCHLORIDE 5 MG/ML
5 INJECTION, SOLUTION INTRAVENOUS ONCE AS NEEDED
Status: DISCONTINUED | OUTPATIENT
Start: 2025-03-03 | End: 2025-03-03 | Stop reason: HOSPADM

## 2025-03-03 RX ORDER — HYDROMORPHONE HYDROCHLORIDE 0.2 MG/ML
0.2 INJECTION INTRAMUSCULAR; INTRAVENOUS; SUBCUTANEOUS EVERY 5 MIN PRN
Status: DISCONTINUED | OUTPATIENT
Start: 2025-03-03 | End: 2025-03-03 | Stop reason: HOSPADM

## 2025-03-03 RX ORDER — SODIUM CHLORIDE, SODIUM LACTATE, POTASSIUM CHLORIDE, CALCIUM CHLORIDE 600; 310; 30; 20 MG/100ML; MG/100ML; MG/100ML; MG/100ML
100 INJECTION, SOLUTION INTRAVENOUS CONTINUOUS
Status: DISCONTINUED | OUTPATIENT
Start: 2025-03-03 | End: 2025-03-03 | Stop reason: HOSPADM

## 2025-03-03 RX ORDER — ONDANSETRON HYDROCHLORIDE 2 MG/ML
INJECTION, SOLUTION INTRAVENOUS AS NEEDED
Status: DISCONTINUED | OUTPATIENT
Start: 2025-03-03 | End: 2025-03-03

## 2025-03-03 RX ORDER — LIDOCAINE HCL/PF 100 MG/5ML
SYRINGE (ML) INTRAVENOUS AS NEEDED
Status: DISCONTINUED | OUTPATIENT
Start: 2025-03-03 | End: 2025-03-03

## 2025-03-03 RX ORDER — HYDRALAZINE HYDROCHLORIDE 25 MG/1
25 TABLET, FILM COATED ORAL 3 TIMES DAILY
Status: DISCONTINUED | OUTPATIENT
Start: 2025-03-03 | End: 2025-03-04

## 2025-03-03 RX ORDER — FENTANYL CITRATE 50 UG/ML
INJECTION, SOLUTION INTRAMUSCULAR; INTRAVENOUS AS NEEDED
Status: DISCONTINUED | OUTPATIENT
Start: 2025-03-03 | End: 2025-03-03

## 2025-03-03 RX ADMIN — HYDROMORPHONE HYDROCHLORIDE 0.5 MG: 1 INJECTION, SOLUTION INTRAMUSCULAR; INTRAVENOUS; SUBCUTANEOUS at 19:07

## 2025-03-03 RX ADMIN — CLOPIDOGREL BISULFATE 75 MG: 75 TABLET ORAL at 10:11

## 2025-03-03 RX ADMIN — AMLODIPINE BESYLATE 10 MG: 10 TABLET ORAL at 10:10

## 2025-03-03 RX ADMIN — ACETAMINOPHEN 650 MG: 325 TABLET, FILM COATED ORAL at 19:25

## 2025-03-03 RX ADMIN — PROPOFOL 150 MG: 10 INJECTION, EMULSION INTRAVENOUS at 16:10

## 2025-03-03 RX ADMIN — INSULIN GLARGINE 3 UNITS: 100 INJECTION, SOLUTION SUBCUTANEOUS at 22:50

## 2025-03-03 RX ADMIN — SODIUM CHLORIDE 1 G: 900 INJECTION INTRAVENOUS at 14:15

## 2025-03-03 RX ADMIN — RANOLAZINE 500 MG: 500 TABLET, EXTENDED RELEASE ORAL at 10:10

## 2025-03-03 RX ADMIN — CEFEPIME 2 G: 1 INJECTION, SOLUTION INTRAVENOUS at 04:46

## 2025-03-03 RX ADMIN — ONDANSETRON 4 MG: 2 INJECTION INTRAMUSCULAR; INTRAVENOUS at 17:06

## 2025-03-03 RX ADMIN — PANTOPRAZOLE SODIUM 40 MG: 40 INJECTION, POWDER, FOR SOLUTION INTRAVENOUS at 15:16

## 2025-03-03 RX ADMIN — HEPARIN SODIUM 5000 UNITS: 5000 INJECTION, SOLUTION INTRAVENOUS; SUBCUTANEOUS at 14:28

## 2025-03-03 RX ADMIN — QUETIAPINE FUMARATE 25 MG: 25 TABLET ORAL at 22:40

## 2025-03-03 RX ADMIN — OXYCODONE 5 MG: 5 TABLET ORAL at 00:28

## 2025-03-03 RX ADMIN — HYDROMORPHONE HYDROCHLORIDE 0.5 MG: 1 INJECTION, SOLUTION INTRAMUSCULAR; INTRAVENOUS; SUBCUTANEOUS at 19:37

## 2025-03-03 RX ADMIN — PHENYLEPHRINE HYDROCHLORIDE 120 MCG: 10 INJECTION INTRAVENOUS at 16:10

## 2025-03-03 RX ADMIN — ATORVASTATIN CALCIUM 80 MG: 80 TABLET, FILM COATED ORAL at 22:48

## 2025-03-03 RX ADMIN — ACETAMINOPHEN 650 MG: 325 TABLET, FILM COATED ORAL at 04:45

## 2025-03-03 RX ADMIN — SUGAMMADEX 200 MG: 100 INJECTION, SOLUTION INTRAVENOUS at 17:24

## 2025-03-03 RX ADMIN — HEPARIN SODIUM 5000 UNITS: 5000 INJECTION, SOLUTION INTRAVENOUS; SUBCUTANEOUS at 06:47

## 2025-03-03 RX ADMIN — ROCURONIUM BROMIDE 50 MG: 10 INJECTION INTRAVENOUS at 16:10

## 2025-03-03 RX ADMIN — ACETAMINOPHEN 650 MG: 325 TABLET, FILM COATED ORAL at 11:44

## 2025-03-03 RX ADMIN — VANCOMYCIN HYDROCHLORIDE 1250 MG: 5 INJECTION, POWDER, LYOPHILIZED, FOR SOLUTION INTRAVENOUS at 02:29

## 2025-03-03 RX ADMIN — QUETIAPINE FUMARATE 25 MG: 25 TABLET ORAL at 15:07

## 2025-03-03 RX ADMIN — Medication 10 MG: at 22:41

## 2025-03-03 RX ADMIN — CEFAZOLIN 1 G: 1 INJECTION, POWDER, FOR SOLUTION INTRAMUSCULAR; INTRAVENOUS at 16:17

## 2025-03-03 RX ADMIN — LIDOCAINE HYDROCHLORIDE 100 MG: 20 INJECTION INTRAVENOUS at 16:10

## 2025-03-03 RX ADMIN — HYDRALAZINE HYDROCHLORIDE 25 MG: 25 TABLET ORAL at 02:45

## 2025-03-03 RX ADMIN — CARVEDILOL 25 MG: 12.5 TABLET, FILM COATED ORAL at 22:41

## 2025-03-03 RX ADMIN — ISOSORBIDE MONONITRATE 60 MG: 30 TABLET, EXTENDED RELEASE ORAL at 10:10

## 2025-03-03 RX ADMIN — CARVEDILOL 25 MG: 12.5 TABLET, FILM COATED ORAL at 10:11

## 2025-03-03 RX ADMIN — SODIUM CHLORIDE, SODIUM LACTATE, POTASSIUM CHLORIDE, AND CALCIUM CHLORIDE: 600; 310; 30; 20 INJECTION, SOLUTION INTRAVENOUS at 16:10

## 2025-03-03 RX ADMIN — HYDRALAZINE HYDROCHLORIDE 25 MG: 25 TABLET ORAL at 12:32

## 2025-03-03 RX ADMIN — FENTANYL CITRATE 100 MCG: 50 INJECTION, SOLUTION INTRAMUSCULAR; INTRAVENOUS at 16:10

## 2025-03-03 RX ADMIN — RANOLAZINE 500 MG: 500 TABLET, EXTENDED RELEASE ORAL at 22:47

## 2025-03-03 RX ADMIN — ASPIRIN 81 MG: 81 TABLET, COATED ORAL at 10:11

## 2025-03-03 RX ADMIN — HYDRALAZINE HYDROCHLORIDE 25 MG: 25 TABLET ORAL at 22:40

## 2025-03-03 ASSESSMENT — PAIN SCALES - GENERAL
PAINLEVEL_OUTOF10: 7
PAINLEVEL_OUTOF10: 8
PAINLEVEL_OUTOF10: 5 - MODERATE PAIN
PAINLEVEL_OUTOF10: 5 - MODERATE PAIN
PAINLEVEL_OUTOF10: 9
PAINLEVEL_OUTOF10: 10 - WORST POSSIBLE PAIN
PAINLEVEL_OUTOF10: 8
PAINLEVEL_OUTOF10: 10 - WORST POSSIBLE PAIN
PAINLEVEL_OUTOF10: 5 - MODERATE PAIN
PAINLEVEL_OUTOF10: 5 - MODERATE PAIN

## 2025-03-03 ASSESSMENT — PAIN SCALES - PAIN ASSESSMENT IN ADVANCED DEMENTIA (PAINAD)
FACIALEXPRESSION: SMILING OR INEXPRESSIVE
NEGVOCALIZATION: OCCASIONAL MOAN/GROAN, LOW SPEECH, NEGATIVE/DISAPPROVING QUALITY
NEGVOCALIZATION: OCCASIONAL MOAN/GROAN, LOW SPEECH, NEGATIVE/DISAPPROVING QUALITY
BREATHING: NORMAL
FACIALEXPRESSION: FACIAL GRIMACING
CONSOLABILITY: DISTRACTED OR REASSURED BY VOICE/TOUCH
BODYLANGUAGE: TENSE, DISTRESSED PACING, FIDGETING
TOTALSCORE: 2
BREATHING: NORMAL
TOTALSCORE: HEAT APPLIED
BODYLANGUAGE: TENSE, DISTRESSED PACING, FIDGETING
BREATHING: NORMAL
TOTALSCORE: 5
CONSOLABILITY: DISTRACTED OR REASSURED BY VOICE/TOUCH
TOTALSCORE: MEDICATION (SEE MAR);REPOSITIONED

## 2025-03-03 ASSESSMENT — COGNITIVE AND FUNCTIONAL STATUS - GENERAL
CLIMB 3 TO 5 STEPS WITH RAILING: TOTAL
DRESSING REGULAR UPPER BODY CLOTHING: A LOT
MOBILITY SCORE: 13
STANDING UP FROM CHAIR USING ARMS: A LOT
DRESSING REGULAR LOWER BODY CLOTHING: A LOT
PERSONAL GROOMING: A LITTLE
TURNING FROM BACK TO SIDE WHILE IN FLAT BAD: A LITTLE
HELP NEEDED FOR BATHING: A LOT
MOVING FROM LYING ON BACK TO SITTING ON SIDE OF FLAT BED WITH BEDRAILS: A LITTLE
MOVING TO AND FROM BED TO CHAIR: A LITTLE
TOILETING: A LOT
DAILY ACTIVITIY SCORE: 15
WALKING IN HOSPITAL ROOM: TOTAL

## 2025-03-03 ASSESSMENT — PAIN SCALES - WONG BAKER
WONGBAKER_NUMERICALRESPONSE: HURTS WHOLE LOT

## 2025-03-03 ASSESSMENT — PAIN - FUNCTIONAL ASSESSMENT
PAIN_FUNCTIONAL_ASSESSMENT: 0-10

## 2025-03-03 ASSESSMENT — PAIN DESCRIPTION - ORIENTATION
ORIENTATION: LEFT

## 2025-03-03 ASSESSMENT — ACTIVITIES OF DAILY LIVING (ADL): LACK_OF_TRANSPORTATION: NO

## 2025-03-03 ASSESSMENT — PAIN DESCRIPTION - LOCATION
LOCATION: LEG

## 2025-03-03 NOTE — INTERVAL H&P NOTE
H&P reviewed. The patient was examined and there are no changes to the H&P.  Consented, LLE Angiography and Intervention still indicated

## 2025-03-03 NOTE — BRIEF OP NOTE
Date: 2025 - 3/3/2025  OR Location: [facility] OR    Name: Myrna Khan, : 1960, Age: 65 y.o., MRN: [id number], Sex: female    Diagnosis  Pre-op Diagnosis      * PAD (peripheral artery disease) (CMS-HCC) [I73.9] Post-op Diagnosis     * PAD (peripheral artery disease) (CMS-HCC) [I73.9]     Procedures  ANGIOGRAM, LOWER EXTREMITY  97366 - CHG ANGIOGRAPHY EXTREMITY UNILATERAL RS&I  Ultrasound guided right common femoral arterial access and manual pressure hemostatis  Diagnostic aortogram and left lower extremity angiography    Surgeons      * Bobby Pederson - Primary    Resident/Fellow/Other Assistant:  Surgeons and Role:     * Raghavendra Pillai MD - Resident - Assisting    Staff:   Circulator: Doug  Scrub Person: Waqar  Scrub Person: Iban    Anesthesia Staff: Anesthesiologist: Armand Houston MD  C-AA: CAMILA Cui; CAMILA Rodriguez    Procedure Summary  Anesthesia: General  ASA: III  Estimated Blood Loss: 5mL  Intra-op Medications:   Administrations occurring from 1100 to 1540 on 25:   Medication Name Total Dose   acetaminophen (Tylenol) tablet 650 mg 650 mg   ertapenem (INVanz) 1 g in sodium chloride 0.9% IV 50 mL 1 g   heparin (porcine) injection 5,000 Units 5,000 Units   hydrALAZINE (Apresoline) tablet 25 mg 25 mg   insulin lispro injection 0-5 Units Cannot be calculated   pantoprazole (ProtoNix) injection 40 mg 40 mg   QUEtiapine (SEROquel) tablet 25 mg 25 mg              Anesthesia Record               Intraprocedure I/O Totals       None           Specimen: No specimens collected     Findings: left EIA occlusion, reconstitutes at distal EIA with CFA short-segment occlusion vs severe stenosis, L SFA stent occlusion, distal SFA reconstitution, patent popliteal and AT runoff to the foot    Complications:  None; patient tolerated the procedure well.     Disposition: PACU - hemodynamically stable.  Condition: stable  Specimens Collected: No specimens collected    Raghavendra  MD Freya, PhD  Vascular Surgery, PGY3  [phone]    Attending Attestation: I was present and scrubbed for the entire procedure.    Bobby Pederson  Phone Number: [phone]

## 2025-03-03 NOTE — NURSING NOTE
Pt had this nurse remove dressing to LLE. Stated it was irritating and hurting her leg. Pt would not let this nurse re-dress the L leg. Call light within reach.

## 2025-03-03 NOTE — SIGNIFICANT EVENT
Vascular Surgery Post-Operative Plan    S/p R CFA access, LLE angiography    Pre-op Vascular Exam: monophasic left PT, multiphasic right DP, PT  Post-op Vascular Exam: same    Plan:  2 hour PACU stay for NV checks  Diet as tolerated after 4 hours  Malone per primary   Antibiotics per primary   Flat time: 6 hours (ending 11pm)  Ok for DVT ppx tomorrow morning   Risk-stratify and optimize for left femoral endarterectomy and tibial bypass    Raghavendra Pillai MD, PhD  Vascular Surgery, PGY3  [id number]

## 2025-03-03 NOTE — ANESTHESIA PROCEDURE NOTES
Airway  Date/Time: 3/3/2025 4:16 PM  Urgency: elective      Staffing  Performed: CAMILA   Authorized by: Armand Houston MD    Performed by: CAMILA Rodriguez  Patient location during procedure: OR    Indications and Patient Condition  Indications for airway management: anesthesia  Spontaneous Ventilation: absent  Sedation level: deep  Preoxygenated: yes  Patient position: sniffing  Mask difficulty assessment: 1 - vent by mask    Final Airway Details  Final airway type: endotracheal airway      Successful airway: ETT  Cuffed: yes   Successful intubation technique: direct laryngoscopy  Facilitating devices/methods: intubating stylet  Endotracheal tube insertion site: oral  Blade: Ernesto  Blade size: #3  ETT size (mm): 7.0  Cormack-Lehane Classification: grade I - full view of glottis  Placement verified by: chest auscultation   Measured from: gums  Number of attempts at approach: 1

## 2025-03-03 NOTE — PROGRESS NOTES
Subjective:  SR 70-80s  Denies CP/ SOB/dizziness, palpitations     Sitter at bedside    TTE: with mildly reduced LVEF to 50%     Last Recorded Vitals:  Vitals:    03/02/25 1914 03/02/25 2350 03/03/25 0100 03/03/25 0638   BP: 176/83 (!) 187/87 174/84 169/80   BP Location: Right arm Left arm     Patient Position: Lying Lying     Pulse: 79 75 70 71   Resp: 16 16  17   Temp: 36.5 °C (97.7 °F) 36.5 °C (97.7 °F)  36.7 °C (98.1 °F)   TempSrc: Temporal Temporal     SpO2: 95% 97%  95%   Weight:       Height:           Last Labs:  CBC - 3/3/2025:  6:47 AM  10.6 10.1 418    30.5      CMP - 3/3/2025:  6:47 AM  8.4 7.8 19 --- 0.3   2.2 2.6 17 176      PTT - 3/1/2025:  6:33 AM  1.2   13.1 35     Troponin I, High Sensitivity   Date/Time Value Ref Range Status   01/11/2024 03:37 PM 22 0 - 34 ng/L Final     Troponin I, High Sensitivity ([facility])   Date/Time Value Ref Range Status   02/28/2025 06:01 PM 5 0 - 34 ng/L Final   01/18/2025 06:12 AM 14 0 - 34 ng/L Final   01/18/2025 04:50 AM 14 0 - 34 ng/L Final     BNP   Date/Time Value Ref Range Status   02/28/2025 06:01  (H) 0 - 99 pg/mL Final   07/15/2024 11:55  (H) 0 - 99 pg/mL Final     Hemoglobin A1C   Date/Time Value Ref Range Status   01/09/2025 06:34 AM 14.6 (H) See comment % Final   07/15/2024 11:55 AM 15.2 (H) see below % Final     Comment:     Hemoglobin A1c is >15%. Marked elevations in HbA1c are commonly due to poor glycemic control in diabetic patients. Rarely, hemoglobin variants may cause false elevation of HbA1c that is discordant with glycemic control status.      LDL Calculated   Date/Time Value Ref Range Status   07/16/2024 04:58 AM 56 <=99 mg/dL Final     Comment:                                 Near   Borderline      AGE      Desirable  Optimal    High     High     Very High     0-19 Y     0 - 109     ---    110-129   >/= 130     ----    20-24 Y     0 - 119     ---    120-159   >/= 160     ----      >24 Y     0 -  99   100-129  130-159   160-189     >/=190        [facility]   Date/Time Value Ref Range Status   07/16/2024 04:58 AM 15 0 - 40 mg/dL Final        EKG showing normal sinus rhythm with no acute ischemic changes.      3/3/25 Transthoracic Echo (TTE) Limited With Doppler And Color   CONCLUSIONS:   1. Left ventricular ejection fraction is mildly decreased, calculated by Messina's biplane at 50%.   2. Spectral Doppler shows a Grade II (pseudonormal pattern) of left ventricular diastolic filling.   3. Abnormal septal motion consistent with post-operative status.   4. There is moderately increased septal and moderately increased posterior left ventricular wall thickness.   5. There is severely increased concentric left ventricular hypertrophy.   6. There is normal right ventricular global systolic function.   7. The left atrial size is severely dilated.   8. There is no evidence of a patent foramen ovale.    Previous Cardiac imaging/work-up:  Echocardiogram 7/16/2024:   1. Left ventricular ejection fraction is low normal, by visual estimate at 55%.   2. Spectral Doppler shows an impaired relaxation pattern of left ventricular diastolic filling.   3. Abnormal septal motion consistent with post-operative status.   4. GLS is reduced at -11.9% with an apical sparing pattern suggestive of possible cardiac amyloid. LV systolic function is difficult to assess given that with swinging of the heart many images become foreshortened and LV appears better in some views than others. Overall does appear to be normal or low normal without regional wall motion abnormalities. Given the combination of thickened LV and RV walls with somewhat thickened valves and pericardial effusion with reduced GLS with the pattern of apical sparing, would consider cardiac MRI to further assess for possible cardiac amyloid.   5. There is reduced right ventricular systolic function.   6. The left atrium is mildly dilated.   7. Mobile atrial septal aneurysm wtih an agitated saline contrast study that was  negative for intracardiac shunt.   8. Mildly thickened MV leaflet tips with doming of the AML with only trace MR and mean MV gradient only 1.5mmHg.   9. Primary service notified of the findings at the time of reporting.  10. Compared with the prior exam from 8/26/2023 there has been improvement of the LV systolic function Previously there was moderatley reduced LV systolic function with apex having worst function. There has been interval surgical revascularization between the two studies. Today's exam was the [facility] echocadiogram to include GLS asessment and raised the possibility of cardiac amyloid.      Cardiac MRI 7/2024:  IMPRESSION:  1. The left ventricle is normal in size with low-normal systolic  function. LVEF 51%. There are no segmental wall motion abnormalities.  Quantitative values are as noted above.  2. Diffuse left ventricle hypertrophy measuring up to 1.8 cm. There  is extensive sub endocardial delayed enhancement with more prominent  involvement of the lateral wall. Findings are nonspecific but can be  seen in cardiac amyloidosis. Cannot exclude component of infarction  in the lateral wall.  3. Nonspecific mild mid myocardial delayed enhancement  interventricular septum at the RV insertion sites which can be due to  right heart volume or pressure overload.  4. Normal aortic, mitral, and tricuspid valve function. Aortic  regurgitant volume 1 ml. Aortic regurgitant fraction 3 %. Peak aortic  valve velocity 95 cm/sec.    NM PYP scan 7/2024:  IMPRESSION:  1. Negative amyloid SPECT heart study without evidence of TTR  amyloidosis.      Coronary angiogram 9/4/2023:  ____________________________________________________________________________________  CONCLUSIONS:  1. S/p uncomplicated R/L heart catheterization.  2. RHC access initially through right brachial vein with 6f sheath, however unable to advance Equipment. Switched to RIJ 6f sheath. LHC through right radial artery 6f sheath.  3. Severe 2 vessel  "disease involving mid LAD and mid LCx, FFR was done and positive in LAD (0.74) and LCx (0.69).  4. Right dominant circulation.  5. Heart team discussion regarding revascularization options.    Now status post CABG (LIMA-LAD, APRIL-OM)      Last I/O:  I/O last 3 completed shifts:  In: 2170 (50.9 mL/kg) [P.O.:1220; IV Piggyback:950]  Out: 3975 (93.2 mL/kg) [Urine:3975 (2.6 mL/kg/hr)]  Weight: 42.6 kg        Allergies:  Lisinopril, Amoxicillin, and Losartan    Inpatient Medications:  Scheduled medications   Medication Dose Route Frequency    acetaminophen  650 mg oral q8h    amLODIPine  10 mg oral Daily    aspirin  81 mg oral Daily    atorvastatin  80 mg oral Nightly    carvedilol  25 mg oral BID    cefepime  2 g intravenous q8h    clopidogrel  75 mg oral Daily    heparin (porcine)  5,000 Units subcutaneous q8h BOLIVAR    insulin glargine  3 Units subcutaneous Nightly    insulin lispro  0-5 Units subcutaneous TID AC    isosorbide mononitrate ER  60 mg oral Daily    melatonin  10 mg oral Nightly    ranolazine  500 mg oral BID    vancomycin  1,250 mg intravenous q24h     PRN medications   Medication    dextrose    dextrose    glucagon    glucagon    hydrALAZINE    OLANZapine    [Held by provider] oxyCODONE    polyethylene glycol    QUEtiapine    vancomycin     Continuous Medications   Medication Dose Last Rate     Outpatient Medications:  Current Outpatient Medications   Medication Instructions    acetaminophen (TYLENOL) 500 mg, oral, Every 6 hours PRN, Take per directed    amLODIPine (Norvasc) 10 mg tablet TAKE 1 TABLET BY MOUTH ONCE DAILY    aspirin 81 mg, oral, Daily    atorvastatin (Lipitor) 80 mg tablet TAKE 1 TABLET BY MOUTH ONCE DAILY    BD Ultra-Fine Micro Pen Needle 32 gauge x 1/4\" needle     blood sugar diagnostic strip Use 1 strip to check blood sugar 3 times a day with meals.    CALCIUM CITRATE ORAL 1 tablet, oral, Daily    carvedilol (Coreg) 25 mg tablet TAKE 1 TABLET BY MOUTH TWO TIMES A DAY    clopidogrel " (PLAVIX) 75 mg, oral, Daily RT    diclofenac sodium (VOLTAREN) 4 g, Topical, 4 times daily PRN    Easy Touch Alcohol Prep Pads pads, medicated     Farxiga 10 mg, oral, Daily with breakfast    gabapentin (NEURONTIN) 300 mg, oral, Daily    hydrALAZINE (APRESOLINE) 10 mg, oral, 2 times daily    insulin glargine (LANTUS) 10 Units, subcutaneous, Nightly, Take as directed per insulin instructions.    insulin lispro 0-5 Units, subcutaneous, 3 times daily (morning, midday, late afternoon), 0 unit(s) if BG ; 1 unit(s) if -200; 2 unit(s) if -250; 3 unit(s) if -300; 4 unit(s) if -350; 5 unit(s) if -400    isosorbide mononitrate ER (Imdur) 60 mg 24 hr tablet TAKE 1 TABLET BY MOUTH ONCE DAILY    lancets misc Use three times a day to test blood sugar w/ meals    metFORMIN XR (Glucophage-XR) 500 mg 24 hr tablet TAKE 2 TABLETS BY MOUTH ONCE DAILY    multivitamin with minerals tablet 1 tablet, oral, Daily    oxyCODONE (OXY-IR) 5 mg, oral, Every 6 hours PRN    ranolazine (RANEXA) 500 mg, oral, 2 times daily, Do not crush, chew, or split.       Physical Exam:  General: NAD, AOx3  HEENT: EOMI, MMM  Neck:  No JVD at 45 degrees   Cardiac: Normal S, S2. No murmurs noted  Lungs:  CTA on left, few rales right base  Abdomen: Soft, non-tender, Non-distended   Extremities: No LE edema. LLE dry gangrene   Neuro: AOx3, no apparent focal deficits       Assessment/Plan   Myrna Khan is a 65 y.o. female with history of HFimpEF (EF 55% July/2024, 35-40% in Aug/2023), CAD s/p CABG in 2023 (LIMA-LAD, APRIL-OM), PAD s/p L SFA stent (occluded in Nov/2023 w/ distal SFA reconstitution), renal artery stenosis (Sep/2023 US), carotid artery stenosis (Aug/2023 US), HTN, HLD, T2DM (A1c 14.6% Jan/2025) presenting with dry gangrene of LLE currently planned for LLE angiogram and possible intervention (possible extensive debridement vs amputation or revascularization via bypass) with vascular surgery.     Cardiology  consulted for pre-op risk stratification.     Most recent echocardiogram in July 2024 showing features suggestive of cardiac amyloidosis (apical sparring, LVH) prompting further work-up with cardiac MRI that showed extensive sub endocardial delayed enhancement with more prominent involvement of the lateral wall -->Findings that are nonspecific but can be seen in cardiac amyloidosis.   A technetium PYP scan was done as well and did not show any uptake -->negative for TTR amyloidosis.   A urine immunofixation electrophoresis was done but does not seem that a serum immunofixation and serum free light chains were collected and analysed at the time. --> recommend outpatient follow up and completion of her evaluation.     Patient hemodynamically stable with no acute cardiac complaints. She denies any acute or remote chest pain episodes, palpitations, SOB, NIELSEN, orthopnea, PND, nausea, vomiting, diaphoresis, lightheadedness, dizziness, presyncopal symptoms, LE swelling, LOC.   No signs of acute ACS, active chest pain, malignant arrythmias, decompensated HF or volume overload or symptomatic valvular disease.     RCRI 3 --> 15% of MACE.  Patient with RCRI of 3, has poor METS mainly related to her PAD and is going to undergo an intermediate/high risk surgery and has history of extensive atherosclerotic disease ---> as such, repeated a formal echocardiogram this AM.   Found to have mildly reduced EF to 50%    Dr. Avila to review        Cardiology will follow      LEONIDES Huerta-CNP  Cardiology Consults    Please call with any questions  General Cardiology Consult Pager: [phone] (weekday 7AM-6PM and weekend 7AM-2PM) and other: [phone]  EP Consult Pager: [phone] (weekday 7AM-6PM and weekend 7AM-2PM) and other: [phone]  CICU Fellow Pager: [phone] anytime  EP Device Nurse Pager: [phone] (weekday 7AM-4PM)  Advanced Heart Failure Consult Pager: [phone] anytime       Code Status:  Full Code

## 2025-03-03 NOTE — PROGRESS NOTES
Assessment/Plan   Myrna Khan is a 65 y.o. 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 Pt noting skin sloughing, edema, clear drainage. At op visit, difficulty palpating pulse, had a monophasic AT and PT signals on the LLE and no palpable femoral pulse. CTA showed occlusion of common and external iliac arteries in addition to occluded SFA stent with distal reconstruction. Podiatry consulted, no intervention for now. Pt is started on broad spectrum abx and pending pvr and vein mapping.. Pt is pending angiogram, echo for preop risk strafication, appreciate cardiology recs. 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 She was delirious overnight 2/2 urinary retention, cr is stable but after day placement nursing reporting 2.5 liters of output, mentation starting to improve after decompression but appears to be a continued issue. Will change to alternative antibiotic in case cefepime contributing to neurotoxicity. Awaiting echo results and plan for angiogram today.     #LLE wounds with CLI  #PAOD  #Dry gangrene  - CT showing common and external iliac arteries occlusion + SFA occlusion with reconstitution of her L popliteal artery and intact tibial runoff   - no gas on imaging  - continue vanc/cefepime. ,Will change to alternative antibiotic in case cefepime contributing to neurotoxicity.   - PVR/vein mapping ordered.   - vasc surgery planning angiogram on Monday  - appreciate podiatry recs  - mulitmodal analgesia with bowel regimen, stop oxy for now and reeval pain needs given encephalopathy  - Awaiting echo results and plan for angiogram today.     #Cystic ovarian mass  #urinary  retention  - noted on imaging 2023 per rads but unable to compare given no image and just a report.   - pt today reports she was aware of the mass previously.   - - gyne evaluating, appreciate recs.   - with significant urinary retention leading to suspected delirum, s/p ady with some improved mentation. 2.5L out on placement. Monitor for signs of injury from rapid decompression v. Post obstructive diuresis  - Net neg 2 L per I/o last 24hrs. Will monitor and likely replace if continued losses after procedure.        #Delirium  - likely 2/2 urine retnetion. Potentially related to opioid started yesterday v. Hospital related sundowning. May be related to infection/pain v. Related to cefepime neurotox.   - hold opioid and reeval needs  - dc cefepime, change to ertapenem.     #HTN  #HFimpEF (EF 55% July/2024, 35-40% in Aug/2023)  - MRI and NM PYP negative for amyloid,  SPEP and UPEP negative  - on home amlodipine 10mg, carvedilol 25mg, isosorvide 60mg  - c/w home meds     #CAD s/p CABG in 2023 (LIMA-LAD, APRIL-OM)  - EKG showing T wave inversions on anterior leads  -  patient asymptomatic, no cp or equivalents  - tele monitoring  - risk stratification if planning procedure , plan for tomorrow to obtain echo. Appreciate cardiology recs. Pending echo and cards input for angiogram today.      #T2DM (A1c 14.6% Jan/2025)  - on home metformin, dapagliflozin, insulin lantus 10u PM and SSI  - given prior hyperglycemia in Jan/2025, will be more aggressive with insulin regimen  - hold metformin and dapa  - lantus 8u + SSI. Will adjust as needed       Scheduled outpatient appointments in system:   Future Appointments   Date Time Provider Department Center   3/3/2025  8:30 AM [facility] INPATIENT ECHO [id number] [facility]   3/3/2025  1:00 PM [facility] 1 [id number] [facility]     ---------------------------------------------------------------------------------------------------  Subjective   No events. Communicating normally  "but memory still impaired, needed to be reminding on her reason for hospitalization and what is going on with her legs. Pt remembers and able to discuss after the reminder. Denied pain. Denied cp, sob, dizziness, abd pain. Malone in place.   ---------------------------------------------------------------------------------------------------  Objective   Last Recorded Vitals  Blood pressure 169/80, pulse 71, temperature 36.7 °C (98.1 °F), resp. rate 17, height 1.6 m (5' 3\"), weight (!) 42.6 kg (94 lb), SpO2 95%.  Intake/Output last 3 Shifts:  I/O last 3 completed shifts:  In: 2170 (50.9 mL/kg) [P.O.:1220; IV Piggyback:950]  Out: 3975 (93.2 mL/kg) [Urine:3975 (2.6 mL/kg/hr)]  Weight: 42.6 kg     Physical Exam  Vitals and nursing note reviewed.   Constitutional:       General: She is not in acute distress.     Appearance: Normal appearance. She is not ill-appearing or toxic-appearing.   HENT:      Head: Normocephalic and atraumatic.      Mouth/Throat:      Mouth: Mucous membranes are moist.   Eyes:      General: No scleral icterus.     Extraocular Movements: Extraocular movements intact.      Conjunctiva/sclera: Conjunctivae normal.   Cardiovascular:      Rate and Rhythm: Normal rate and regular rhythm.      Heart sounds: S1 normal and S2 normal. No murmur heard.     Comments: Difficult to palpate distal pulses  Pulmonary:      Effort: Pulmonary effort is normal. No respiratory distress.      Breath sounds: No wheezing, rhonchi or rales.   Abdominal:      General: Bowel sounds are normal. There is no distension.      Palpations: Abdomen is soft.      Tenderness: There is no abdominal tenderness. There is no guarding or rebound.   Musculoskeletal:         General: No swelling or deformity.      Cervical back: Neck supple.   Skin:     Findings: No rash.      Comments: Left foot edema, denuded skin on doral surface, erythema and ttp    Neurological:      General: No focal deficit present.      Mental Status: She is alert. " Mental status is at baseline.   Psychiatric:         Mood and Affect: Mood normal.         Relevant Results  Lab Results   Component Value Date    WBC 10.6 03/03/2025    HGB 10.1 (L) 03/03/2025    HCT 30.5 (L) 03/03/2025    MCV 97 03/03/2025     03/03/2025      Lab Results   Component Value Date    GLUCOSE 158 (H) 03/03/2025    CALCIUM 8.4 (L) 03/03/2025     (L) 03/03/2025    K 3.8 03/03/2025    CO2 22 03/03/2025     03/03/2025    BUN 12 03/03/2025    CREATININE 0.52 03/03/2025     Scheduled medications  acetaminophen, 650 mg, oral, q8h  amLODIPine, 10 mg, oral, Daily  aspirin, 81 mg, oral, Daily  atorvastatin, 80 mg, oral, Nightly  carvedilol, 25 mg, oral, BID  cefepime, 2 g, intravenous, q8h  clopidogrel, 75 mg, oral, Daily  heparin (porcine), 5,000 Units, subcutaneous, q8h BOLIVAR  insulin glargine, 3 Units, subcutaneous, Nightly  insulin lispro, 0-5 Units, subcutaneous, TID AC  isosorbide mononitrate ER, 60 mg, oral, Daily  melatonin, 10 mg, oral, Nightly  ranolazine, 500 mg, oral, BID  vancomycin, 1,250 mg, intravenous, q24h      Continuous medications     PRN medications  PRN medications: dextrose, dextrose, glucagon, glucagon, hydrALAZINE, OLANZapine, oxyCODONE, polyethylene glycol, QUEtiapine, vancomycin    Odilon Khalil MD

## 2025-03-03 NOTE — NURSING NOTE
Diabetes education visit today for A1c 14.6%.  Ms. Khan is resting in bed.  She c/o pain in her legs and wants to keep this visit short today.  Brief review of recent A1c and BG values.  States she may have missed some insulin doses at home.  States she is able to use the insulin pen but when asked to review the use she could not recall all of the steps (2' discomfort? Fatigue?)  Ms. Khan is agreeable to follow up later this week.  She is uncomfortable at this time.  Time spent:  15 mins.

## 2025-03-03 NOTE — CARE PLAN
Problem: Pain - Adult  Goal: Verbalizes/displays adequate comfort level or baseline comfort level  Outcome: Progressing     Problem: Safety - Adult  Goal: Free from fall injury  Outcome: Progressing     Problem: Discharge Planning  Goal: Discharge to home or other facility with appropriate resources  Outcome: Progressing     Problem: Chronic Conditions and Co-morbidities  Goal: Patient's chronic conditions and co-morbidity symptoms are monitored and maintained or improved  Outcome: Progressing     Problem: Nutrition  Goal: Nutrient intake appropriate for maintaining nutritional needs  Outcome: Progressing     Problem: Skin  Goal: Decreased wound size/increased tissue granulation at next dressing change  Outcome: Progressing  Goal: Participates in plan/prevention/treatment measures  Outcome: Progressing  Goal: Prevent/manage excess moisture  Outcome: Progressing  Goal: Prevent/minimize sheer/friction injuries  Outcome: Progressing  Goal: Promote/optimize nutrition  Outcome: Progressing  Goal: Promote skin healing  Outcome: Progressing     Problem: Diabetes  Goal: Achieve decreasing blood glucose levels by end of shift  Outcome: Progressing  Goal: Increase stability of blood glucose readings by end of shift  Outcome: Progressing  Goal: Decrease in ketones present in urine by end of shift  Outcome: Progressing  Goal: Maintain electrolyte levels within acceptable range throughout shift  Outcome: Progressing  Goal: Maintain glucose levels >70mg/dl to <250mg/dl throughout shift  Outcome: Progressing  Goal: No changes in neurological exam by end of shift  Outcome: Progressing  Goal: Learn about and adhere to nutrition recommendations by end of shift  Outcome: Progressing  Goal: Vital signs within normal range for age by end of shift  Outcome: Progressing  Goal: Increase self care and/or family involovement by end of shift  Outcome: Progressing  Goal: Receive DSME education by end of shift  Outcome: Progressing      Problem: Pain  Goal: Takes deep breaths with improved pain control throughout the shift  Outcome: Progressing  Goal: Turns in bed with improved pain control throughout the shift  Outcome: Progressing  Goal: Walks with improved pain control throughout the shift  Outcome: Progressing  Goal: Performs ADL's with improved pain control throughout shift  Outcome: Progressing  Goal: Participates in PT with improved pain control throughout the shift  Outcome: Progressing  Goal: Free from opioid side effects throughout the shift  Outcome: Progressing  Goal: Free from acute confusion related to pain meds throughout the shift  Outcome: Progressing   The patient's goals for the shift include patient pain will be controled during shift    The clinical goals for the shift include Pt will remain hemodynamically stable throughout this shift.    Over the shift, the patient did not make progress toward the following goals. Barriers to progression include gangrene of L leg. Recommendations to address these barriers include antibiotics and possible surgery.

## 2025-03-03 NOTE — ANESTHESIA POSTPROCEDURE EVALUATION
Patient: Myrna Khan    Procedure Summary       Date: 03/03/25 Room / Location: [facility] OR 16 / [facility] OR    Anesthesia Start: 1602 Anesthesia Stop: 1739    Procedure: ANGIOGRAM, LOWER EXTREMITY (Left) Diagnosis:       PAD (peripheral artery disease) (CMS-HCC)      (PAD (peripheral artery disease) (CMS-HCC) [I73.9])    Surgeons: Bobby Pederson MD Responsible Provider: Armand Houston MD    Anesthesia Type: general ASA Status: 3            Anesthesia Type: general    Vitals Value Taken Time   /88 03/03/25 1739   Temp 36.2 03/03/25 1739   Pulse 78 03/03/25 1739   Resp 16 03/03/25 1739   SpO2 100 03/03/25 1739       Anesthesia Post Evaluation    Patient location during evaluation: PACU  Patient participation: complete - patient participated  Level of consciousness: awake and alert  Pain management: adequate  Airway patency: patent  Cardiovascular status: acceptable and hemodynamically stable  Respiratory status: acceptable and face mask  Hydration status: acceptable  Postoperative Nausea and Vomiting: none      No notable events documented.

## 2025-03-03 NOTE — PROGRESS NOTES
03/03/25 1409   Discharge Planning   Living Arrangements Other (Comment)  ([facility])   Support Systems Family members   Assistance Needed needs assistance with ADL's, wheelchair is at the bedside   Type of Residence Nursing home/residential care   Home or Post Acute Services Post acute facilities (Rehab/SNF/etc)   Expected Discharge Disposition Inter   Does the patient need discharge transport arranged? Yes   RoundTrip coordination needed? Yes   Financial Resource Strain   How hard is it for you to pay for the very basics like food, housing, medical care, and heating? Not hard   Housing Stability   In the last 12 months, was there a time when you were not able to pay the mortgage or rent on time? N   In the past 12 months, how many times have you moved where you were living? 0   At any time in the past 12 months, were you homeless or living in a shelter (including now)? N   Transportation Needs   In the past 12 months, has lack of transportation kept you from medical appointments or from getting medications? no   In the past 12 months, has lack of transportation kept you from meetings, work, or from getting things needed for daily living? No       Transitional Care Coordination Progress Note:  Patient discussed during interdisciplinary rounds.   Team members present: MD and TCC  Plan per Medical/Surgical team: Patient is admitted for dry gangrene of the LLE. Podiatry and Vascular consulted. Patient is scheduled for an angiogram and echocardiogram. Patient is a long term resident from [facility]. She is a bedhold but facility would like to bring in under skilled at discharge.  Payor: [facility]  Discharge disposition: ICF  Potential Barriers: none  ADOD: 2-4 days       Alyssa Bryant RN, BSN  Transitional Care Coordinator

## 2025-03-03 NOTE — ANESTHESIA PREPROCEDURE EVALUATION
Patient: Myrna Khan    Procedure Information       Date/Time: 03/03/25 1100    Procedure: ANGIOGRAM, LOWER EXTREMITY (Left)    Location: [facility] OR 16 / Virtual [facility]    Surgeons: Bobby Pederson MD            Relevant Problems   Cardiac   (+) CAD (coronary artery disease)   (+) CHF (congestive heart failure)   (+) HTN (hypertension)   (+) Hypertensive urgency   (+) PAD (peripheral artery disease) (CMS-HCC)   (+) Stented coronary artery      Pulmonary   (+) Chronic obstructive pulmonary disease with (acute) lower respiratory infection (Multi)      Neuro   (+) Bilateral carotid artery stenosis      Hematology   (+) Thrombocytopenia, unspecified (CMS-[facility])      ID   (+) Chronic obstructive pulmonary disease with (acute) lower respiratory infection (Multi)       Clinical information reviewed:   Tobacco  Allergies  Meds   Med Hx  Surg Hx   Fam Hx  Soc Hx        NPO Detail:  No data recorded     Physical Exam    Airway  Mallampati: II  TM distance: >3 FB     Cardiovascular   Rhythm: regular  Rate: normal     Dental    Pulmonary   Breath sounds clear to auscultation     Abdominal   Abdomen: soft         Anesthesia Plan    History of general anesthesia?: yes  History of complications of general anesthesia?: no    ASA 3     general     intravenous induction   Trial extubation is planned.  Anesthetic plan and risks discussed with patient.    Plan discussed with CRNA.

## 2025-03-04 LAB
ALBUMIN SERPL BCP-MCNC: 2.6 G/DL (ref 3.4–5)
ANION GAP SERPL CALC-SCNC: 12 MMOL/L (ref 10–20)
BODY SURFACE AREA: 1.38 M2
BUN SERPL-MCNC: 9 MG/DL (ref 6–23)
CALCIUM SERPL-MCNC: 8.5 MG/DL (ref 8.6–10.6)
CHLORIDE SERPL-SCNC: 106 MMOL/L (ref 98–107)
CO2 SERPL-SCNC: 24 MMOL/L (ref 21–32)
CREAT SERPL-MCNC: 0.63 MG/DL (ref 0.5–1.05)
EGFRCR SERPLBLD CKD-EPI 2021: >90 ML/MIN/1.73M*2
EJECTION FRACTION APICAL 4 CHAMBER: 50
EJECTION FRACTION: 50 %
ERYTHROCYTE [DISTWIDTH] IN BLOOD BY AUTOMATED COUNT: 11.9 % (ref 11.5–14.5)
GLUCOSE BLD MANUAL STRIP-MCNC: 105 MG/DL (ref 74–99)
GLUCOSE BLD MANUAL STRIP-MCNC: 130 MG/DL (ref 74–99)
GLUCOSE BLD MANUAL STRIP-MCNC: 301 MG/DL (ref 74–99)
GLUCOSE BLD MANUAL STRIP-MCNC: 68 MG/DL (ref 74–99)
GLUCOSE BLD MANUAL STRIP-MCNC: 81 MG/DL (ref 74–99)
GLUCOSE BLD MANUAL STRIP-MCNC: 89 MG/DL (ref 74–99)
GLUCOSE SERPL-MCNC: 84 MG/DL (ref 74–99)
HCT VFR BLD AUTO: 28.5 % (ref 36–46)
HGB BLD-MCNC: 9.6 G/DL (ref 12–16)
LEFT ATRIUM VOLUME AREA LENGTH INDEX BSA: 59.2 ML/M2
LEFT VENTRICLE INTERNAL DIMENSION DIASTOLE: 4.7 CM (ref 3.5–6)
LEFT VENTRICULAR OUTFLOW TRACT DIAMETER: 1.9 CM
MCH RBC QN AUTO: 31.9 PG (ref 26–34)
MCHC RBC AUTO-ENTMCNC: 33.7 G/DL (ref 32–36)
MCV RBC AUTO: 95 FL (ref 80–100)
MITRAL VALVE E/A RATIO: 0.79
NRBC BLD-RTO: 0 /100 WBCS (ref 0–0)
PHOSPHATE SERPL-MCNC: 2.6 MG/DL (ref 2.5–4.9)
PLATELET # BLD AUTO: 436 X10*3/UL (ref 150–450)
POTASSIUM SERPL-SCNC: 3.4 MMOL/L (ref 3.5–5.3)
RBC # BLD AUTO: 3.01 X10*6/UL (ref 4–5.2)
RIGHT VENTRICLE FREE WALL PEAK S': 9.03 CM/S
RIGHT VENTRICLE PEAK SYSTOLIC PRESSURE: 59 MMHG
SODIUM SERPL-SCNC: 139 MMOL/L (ref 136–145)
TRICUSPID ANNULAR PLANE SYSTOLIC EXCURSION: 1.7 CM
VANCOMYCIN SERPL-MCNC: 26.7 UG/ML (ref 5–20)
WBC # BLD AUTO: 10.7 X10*3/UL (ref 4.4–11.3)

## 2025-03-04 PROCEDURE — 2500000004 HC RX 250 GENERAL PHARMACY W/ HCPCS (ALT 636 FOR OP/ED): Performed by: STUDENT IN AN ORGANIZED HEALTH CARE EDUCATION/TRAINING PROGRAM

## 2025-03-04 PROCEDURE — 2500000001 HC RX 250 WO HCPCS SELF ADMINISTERED DRUGS (ALT 637 FOR MEDICARE OP): Performed by: STUDENT IN AN ORGANIZED HEALTH CARE EDUCATION/TRAINING PROGRAM

## 2025-03-04 PROCEDURE — 2500000005 HC RX 250 GENERAL PHARMACY W/O HCPCS: Performed by: STUDENT IN AN ORGANIZED HEALTH CARE EDUCATION/TRAINING PROGRAM

## 2025-03-04 PROCEDURE — 82947 ASSAY GLUCOSE BLOOD QUANT: CPT

## 2025-03-04 PROCEDURE — 36415 COLL VENOUS BLD VENIPUNCTURE: CPT | Performed by: STUDENT IN AN ORGANIZED HEALTH CARE EDUCATION/TRAINING PROGRAM

## 2025-03-04 PROCEDURE — 80069 RENAL FUNCTION PANEL: CPT | Performed by: STUDENT IN AN ORGANIZED HEALTH CARE EDUCATION/TRAINING PROGRAM

## 2025-03-04 PROCEDURE — 99233 SBSQ HOSP IP/OBS HIGH 50: CPT | Performed by: NURSE PRACTITIONER

## 2025-03-04 PROCEDURE — 1200000002 HC GENERAL ROOM WITH TELEMETRY DAILY

## 2025-03-04 PROCEDURE — 99233 SBSQ HOSP IP/OBS HIGH 50: CPT | Performed by: STUDENT IN AN ORGANIZED HEALTH CARE EDUCATION/TRAINING PROGRAM

## 2025-03-04 PROCEDURE — 2500000002 HC RX 250 W HCPCS SELF ADMINISTERED DRUGS (ALT 637 FOR MEDICARE OP, ALT 636 FOR OP/ED): Performed by: STUDENT IN AN ORGANIZED HEALTH CARE EDUCATION/TRAINING PROGRAM

## 2025-03-04 PROCEDURE — 80202 ASSAY OF VANCOMYCIN: CPT | Performed by: PHARMACIST

## 2025-03-04 PROCEDURE — 85027 COMPLETE CBC AUTOMATED: CPT | Performed by: STUDENT IN AN ORGANIZED HEALTH CARE EDUCATION/TRAINING PROGRAM

## 2025-03-04 RX ORDER — POTASSIUM CHLORIDE 1.5 G/1.58G
20 POWDER, FOR SOLUTION ORAL ONCE
Status: COMPLETED | OUTPATIENT
Start: 2025-03-04 | End: 2025-03-04

## 2025-03-04 RX ORDER — OXYCODONE HYDROCHLORIDE 5 MG/1
2.5 TABLET ORAL EVERY 6 HOURS PRN
Status: DISCONTINUED | OUTPATIENT
Start: 2025-03-04 | End: 2025-03-06

## 2025-03-04 RX ORDER — HYDRALAZINE HYDROCHLORIDE 50 MG/1
50 TABLET, FILM COATED ORAL 3 TIMES DAILY
Status: DISCONTINUED | OUTPATIENT
Start: 2025-03-04 | End: 2025-03-08

## 2025-03-04 RX ORDER — OLANZAPINE 10 MG/2ML
5 INJECTION, POWDER, FOR SOLUTION INTRAMUSCULAR 3 TIMES DAILY PRN
Status: DISCONTINUED | OUTPATIENT
Start: 2025-03-04 | End: 2025-03-09

## 2025-03-04 RX ADMIN — RANOLAZINE 500 MG: 500 TABLET, EXTENDED RELEASE ORAL at 20:38

## 2025-03-04 RX ADMIN — CLOPIDOGREL BISULFATE 75 MG: 75 TABLET ORAL at 08:05

## 2025-03-04 RX ADMIN — VANCOMYCIN HYDROCHLORIDE 1250 MG: 5 INJECTION, POWDER, LYOPHILIZED, FOR SOLUTION INTRAVENOUS at 04:43

## 2025-03-04 RX ADMIN — HEPARIN SODIUM 5000 UNITS: 5000 INJECTION, SOLUTION INTRAVENOUS; SUBCUTANEOUS at 14:02

## 2025-03-04 RX ADMIN — ACETAMINOPHEN 650 MG: 325 TABLET, FILM COATED ORAL at 11:49

## 2025-03-04 RX ADMIN — POTASSIUM CHLORIDE 20 MEQ: 1.5 POWDER, FOR SOLUTION ORAL at 10:17

## 2025-03-04 RX ADMIN — CARVEDILOL 25 MG: 12.5 TABLET, FILM COATED ORAL at 08:05

## 2025-03-04 RX ADMIN — SODIUM CHLORIDE 1 G: 900 INJECTION INTRAVENOUS at 14:02

## 2025-03-04 RX ADMIN — ASPIRIN 81 MG: 81 TABLET, COATED ORAL at 08:06

## 2025-03-04 RX ADMIN — CARVEDILOL 25 MG: 12.5 TABLET, FILM COATED ORAL at 20:38

## 2025-03-04 RX ADMIN — AMLODIPINE BESYLATE 10 MG: 10 TABLET ORAL at 08:06

## 2025-03-04 RX ADMIN — HYDRALAZINE HYDROCHLORIDE 50 MG: 50 TABLET ORAL at 20:38

## 2025-03-04 RX ADMIN — Medication 10 MG: at 20:38

## 2025-03-04 RX ADMIN — ACETAMINOPHEN 650 MG: 325 TABLET, FILM COATED ORAL at 18:52

## 2025-03-04 RX ADMIN — ACETAMINOPHEN 650 MG: 325 TABLET, FILM COATED ORAL at 04:40

## 2025-03-04 RX ADMIN — ATORVASTATIN CALCIUM 80 MG: 80 TABLET, FILM COATED ORAL at 20:38

## 2025-03-04 RX ADMIN — OXYCODONE 5 MG: 5 TABLET ORAL at 10:17

## 2025-03-04 RX ADMIN — HEPARIN SODIUM 5000 UNITS: 5000 INJECTION, SOLUTION INTRAVENOUS; SUBCUTANEOUS at 22:05

## 2025-03-04 RX ADMIN — PANTOPRAZOLE SODIUM 40 MG: 40 INJECTION, POWDER, FOR SOLUTION INTRAVENOUS at 08:04

## 2025-03-04 RX ADMIN — DEXTROSE MONOHYDRATE 12.5 G: 25 INJECTION, SOLUTION INTRAVENOUS at 16:26

## 2025-03-04 RX ADMIN — ISOSORBIDE MONONITRATE 60 MG: 30 TABLET, EXTENDED RELEASE ORAL at 08:05

## 2025-03-04 RX ADMIN — HYDRALAZINE HYDROCHLORIDE 25 MG: 25 TABLET ORAL at 08:05

## 2025-03-04 RX ADMIN — HYDRALAZINE HYDROCHLORIDE 50 MG: 50 TABLET ORAL at 15:56

## 2025-03-04 RX ADMIN — RANOLAZINE 500 MG: 500 TABLET, EXTENDED RELEASE ORAL at 08:06

## 2025-03-04 RX ADMIN — INSULIN GLARGINE 3 UNITS: 100 INJECTION, SOLUTION SUBCUTANEOUS at 20:50

## 2025-03-04 RX ADMIN — HEPARIN SODIUM 5000 UNITS: 5000 INJECTION, SOLUTION INTRAVENOUS; SUBCUTANEOUS at 06:40

## 2025-03-04 ASSESSMENT — PAIN SCALES - GENERAL
PAINLEVEL_OUTOF10: 10 - WORST POSSIBLE PAIN
PAINLEVEL_OUTOF10: 10 - WORST POSSIBLE PAIN

## 2025-03-04 ASSESSMENT — PAIN SCALES - WONG BAKER: WONGBAKER_NUMERICALRESPONSE: NO HURT

## 2025-03-04 ASSESSMENT — PAIN - FUNCTIONAL ASSESSMENT
PAIN_FUNCTIONAL_ASSESSMENT: WONG-BAKER FACES
PAIN_FUNCTIONAL_ASSESSMENT: 0-10
PAIN_FUNCTIONAL_ASSESSMENT: 0-10

## 2025-03-04 NOTE — CONSULTS
Wound Care Consult     Visit Date: 3/4/2025      Patient Name: Myrna Khan         MRN: [id number]           YOB: 1960     Reason for Consult: Dry, necrotic ankle and foot to left lower leg        Wound History: Myrna Khan is 65 y.o. female with history of carotid stenosis, diabetes, CAD, HTN, PAD and renal artery stenosis who presents with extensive LLE wounds and intermittent rest pain.        Pertinent Labs:   Albumin   Date Value Ref Range Status   03/04/2025 2.6 (L) 3.4 - 5.0 g/dL Final   07/17/2024 2.9 (L) 3.4 - 5.0 g/dL Final     Albumin %   Date Value Ref Range Status   07/16/2024 75.8 % Final       Wound Assessment:  Wound 03/02/25 Pretibial Left (Active)   Wound Image    03/04/25 0826   Shape 25.5 03/02/25 0801   Wound Length (cm) 31 cm 03/04/25 0826   Wound Width (cm) 15 cm 03/04/25 0826   Wound Surface Area (cm^2) 465 cm^2 03/04/25 0826   State of Healing Slough;Non-healing 03/03/25 1841   Drainage Description Black;Foul odor;Red 03/03/25 1841   Drainage Amount None 03/03/25 1847   Dressing Open to air 03/03/25 1841   Dressing Changed Reinforced 03/03/25 1847   Dressing Status Other (Comment) 03/03/25 1847       Wound 03/03/25 Puncture Leg Proximal;Right;Upper (Active)   Wound Image   03/04/25 0829   Site Assessment Clean;Dry;Intact 03/03/25 1840   Ayanna-Wound Assessment Intact 03/03/25 1840   Wound Length (cm) 0.5 cm 03/04/25 0829   Wound Width (cm) 0.5 cm 03/04/25 0829   Wound Surface Area (cm^2) 0.25 cm^2 03/04/25 0829   Dressing Changed New 03/03/25 1633   Dressing Status Clean;Dry 03/03/25 1840       Wound Team Summary Assessment: Patient C/O pain to left lower leg. She allowed me to start to remove her dressing to the left lower leg for assessment, but stopped me half way through due to the pain.  She then refused to allow me to reapply a wound dressing because of the pain. She had been medicated with OXY about 1 - 1.5 hours prior to dressing change.She received Tylenol  prior to the dressing change.  Left foot and ankle are dry and necrotic. No drainage noted from what I was able to assess.    Recommendations:  - Left lower leg, Clean gently with Vashe, Cover with DSD/ABD, wrap with kerlix and change daily.     Wound Team Plan: Wound team will sign off. Please re-consult as needed.      SUZANNE GERHARDT, RN, BSN, CWOCN  3/4/2025  2:31 PM

## 2025-03-04 NOTE — CARE PLAN
The patient's goals for the shift include patient pain will be controled during shift    The clinical goals for the shift include Pt will remain HDS    Over the shift, the patient did not make progress toward the following goals. Barriers to progression include . Recommendations to address these barriers include .

## 2025-03-04 NOTE — PROGRESS NOTES
Assessment/Plan     Myrna Khan is a 65 y.o. 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 Pt noting skin sloughing, edema, clear drainage. At op visit, difficulty palpating pulse, had a monophasic AT and PT signals on the LLE and no palpable femoral pulse. CTA showed occlusion of common and external iliac arteries in addition to occluded SFA stent with distal reconstruction. Podiatry consulted. Pt is started on broad spectrum abx and ordered pvr and vein mapping..Ordered angiogram, echo for preop risk strafication, appreciate cardiology recs. Pt seeing gyne for cystic ovarian mass suspicious for malignancy, cr is stable but after day placement nursing reporting 2.5 liters of output.      3/4  -Mentation starting to improve after decompression but appears to be a continued issue.    -Reviewed echo and angiogram.   -Changed from cefepime to ertapenem. Will reassess mentation  -Urine retention, day 2.5 liters out. -2 liters yesterday. Need to watch and possibly give some fluid back if post-obstructive diuresis. Do not remove day.   -Pt need reeval from gyne for the ovarian mass concerning for cancer, mentation had been off since the first overnight of her admit.  They were waiting for mentation to be better.   -Podiatry to see if any intervention after revascularization, pending recs. Kept Pt NPO  -Held on ordering regadenoson stress test for to risk stratify her for surgery per cardiology recommendations.     -Plan for interventions Thursday OR with Vascular. Plavix held 3/4.   -Increased Hydralazine. She is quite hypertensive.    -wound care applied   -More awake and alert and oriented   -ID consulted    -Hypokalemia replaced   -Ethics [id number] consulted. Planning for a meeting before Thursday    -Podiatry, no plan to intervene until after vascular surgery does bypass          120 min     -----------------------------------------      #LLE wounds with CLI  #PAOD  #Dry gangrene  - CT showing common and external iliac arteries occlusion + SFA occlusion with reconstitution of her L popliteal artery and intact tibial runoff   - no gas on imaging   - PVR/vein mapping ordered.    - appreciate podiatry recs  - mulitmodal analgesia with bowel regimen, stop oxy for now and reeval pain needs given encephalopathy       #Cystic ovarian mass  #urinary retention  - noted on imaging 2023 per rads but unable to compare given no image and just a report.   - pt today reports she was aware of the mass previously.   -  gyne evaluating, appreciate recs.   - with significant urinary retention leading to suspected delirum, s/p day with some improved mentation. 2.5L out on placement. Monitor for signs of injury from rapid decompression v. Post obstructive diuresis  - Net neg 2 L per I/o last 24hrs. Will monitor and likely replace if continued losses after procedure.        #Delirium, resolving   - likely 2/2 urine retnetion. Potentially related to opioid started yesterday v. Hospital related sundowning. May be related to infection/pain v. Related to cefepime neurotox.   - held opioid and reeval needs  - dc cefepime, change to ertapenem.     #HTN  #HFimpEF (EF 55% July/2024, 35-40% in Aug/2023)  - MRI and NM PYP negative for amyloid,  SPEP and UPEP negative  - on home amlodipine 10mg, carvedilol 25mg, isosorvide 60mg        #CAD s/p CABG in 2023 (LIMA-LAD, APRIL-OM)  - EKG showing T wave inversions on anterior leads  -  patient asymptomatic, no cp or equivalents  - tele monitoring  - Appreciate cardiology recs.        #T2DM (A1c 14.6% Jan/2025)  - on home metformin, dapagliflozin, insulin lantus 10u PM and SSI  - given prior hyperglycemia  "in Jan/2025, will be more aggressive with insulin regimen  - hold metformin and dapa  - lantus 8u + SSI. Will adjust as needed       Scheduled outpatient appointments in system:   No future appointments.    ---------------------------------------------------------------------------------------------------  Subjective   No events.    ---------------------------------------------------------------------------------------------------  Objective   Last Recorded Vitals  Blood pressure 167/75, pulse 80, temperature 37 °C (98.6 °F), temperature source Temporal, resp. rate 16, height 1.6 m (5' 3\"), weight (!) 42.6 kg (94 lb), SpO2 96%.  Intake/Output last 3 Shifts:  I/O last 3 completed shifts:  In: 120 (2.8 mL/kg) [P.O.:30; I.V.:40 (0.9 mL/kg); IV Piggyback:50]  Out: 3010 (70.6 mL/kg) [Urine:3010 (2 mL/kg/hr)]  Weight: 42.6 kg     Physical Exam  Vitals and nursing note reviewed.   Constitutional:       General: She is not in acute distress.     Appearance: Normal appearance. She is not ill-appearing or toxic-appearing.   HENT:      Head: Normocephalic and atraumatic.      Mouth/Throat:      Mouth: Mucous membranes are moist.   Eyes:      General: No scleral icterus.     Extraocular Movements: Extraocular movements intact.      Conjunctiva/sclera: Conjunctivae normal.   Cardiovascular:      Rate and Rhythm: Normal rate and regular rhythm.      Heart sounds: S1 normal and S2 normal. No murmur heard.     Comments: Difficult to palpate distal pulses  Pulmonary:      Effort: Pulmonary effort is normal. No respiratory distress.      Breath sounds: No wheezing, rhonchi or rales.   Abdominal:      General: Bowel sounds are normal. There is no distension.      Palpations: Abdomen is soft.      Tenderness: There is no abdominal tenderness. There is no guarding or rebound.   Musculoskeletal:         General: No swelling or deformity.      Cervical back: Neck supple.   Skin:     Findings: No rash.      Comments: Left foot edema, " denuded skin on doral surface, erythema and ttp    Neurological:      General: No focal deficit present.      Mental Status: She is alert. Mental status is at baseline.   Psychiatric:         Mood and Affect: Mood normal.         Relevant Results  Lab Results   Component Value Date    WBC 10.7 03/04/2025    HGB 9.6 (L) 03/04/2025    HCT 28.5 (L) 03/04/2025    MCV 95 03/04/2025     03/04/2025      Lab Results   Component Value Date    GLUCOSE 84 03/04/2025    CALCIUM 8.5 (L) 03/04/2025     03/04/2025    K 3.4 (L) 03/04/2025    CO2 24 03/04/2025     03/04/2025    BUN 9 03/04/2025    CREATININE 0.63 03/04/2025     Scheduled medications  acetaminophen, 650 mg, oral, q8h  amLODIPine, 10 mg, oral, Daily  aspirin, 81 mg, oral, Daily  atorvastatin, 80 mg, oral, Nightly  carvedilol, 25 mg, oral, BID  [Held by provider] clopidogrel, 75 mg, oral, Daily  ertapenem, 1 g, intravenous, q24h  heparin (porcine), 5,000 Units, subcutaneous, q8h BOLIVAR  hydrALAZINE, 50 mg, oral, TID  insulin glargine, 3 Units, subcutaneous, Nightly  insulin lispro, 0-5 Units, subcutaneous, TID AC  isosorbide mononitrate ER, 60 mg, oral, Daily  melatonin, 10 mg, oral, Nightly  pantoprazole, 40 mg, intravenous, Daily  ranolazine, 500 mg, oral, BID  vancomycin, 1,250 mg, intravenous, q24h      Continuous medications     PRN medications  PRN medications: dextrose, dextrose, glucagon, glucagon, hydrALAZINE, OLANZapine, oxyCODONE, polyethylene glycol, QUEtiapine, vancomycin    Savanna Moulton MD

## 2025-03-04 NOTE — PROGRESS NOTES
VASCULAR SURGERY PROGRESS NOTE  Assessment/Plan   Myrna Khan is 65 y.o. female with history of carotid stenosis, diabetes, CAD, HTN, PAD and renal artery stenosis who presents with extensive LLE wounds and intermittent rest pain.    S/P LLE Angiogram with left EIA occlusion, reconstitutes at distal EIA with CFA short-segment occlusion vs severe stenosis, L SFA stent occlusion, distal SFA reconstitution, patent popliteal and AT runoff to the foot on 3/2/25. Planning for Bypass pending OR timing and clearance. Patient has had worsened mental status and currently lacks capacity and a appropriate surrogate decision maker.      Plan:  Ethics consult for medical decision making  Plan for Bypass pending Cardiology clearance (Likely Thursday)  Consent Obtained, Patient Marked per OR Protocol  Podiatry consulted - no urgent intervention  Continue antibiotics  Cardiology Risk Stratification --> Echo unchanged, final reccs     D/w attending, Dr. Bg Andersen MD  General Surgery Resident  Vascular Team Pager [phone]    Subjective   Patient denies any changes in pain since her angiogram yesterday. Confused.     Objective   Vitals:  Heart Rate:  [46-88]   Temp:  [36 °C (96.8 °F)-39.3 °C (102.7 °F)]   Resp:  [12-18]   BP: (138-175)/(64-91)   SpO2:  [96 %-100 %]     Exam:  Constitutional: no acute distress, lethargic  Skin: warm and dry overall   HEENT: Atraumatic  Cardiac: RR  Pulmonary: Unlabored respirations on room air  Abdomen: Non distended, non tender  Extremities: PAINTING. RLE dry. LLE with dry gangrene on the dorsal aspect of her foot to her mid shin, no sensation at that site.  Vascular: Multiphasic R DP/PT. Monophasic L DP. R groin covered w/ dressing, c/d/I, no strikethrough, soft w/out swelling or ecchymosis. No palpable pulsatile mass.     Labs:  Results from last 7 days   Lab Units 03/04/25  0653 03/03/25  0647 03/02/25  0504   WBC AUTO x10*3/uL 10.7 10.6 10.2   HEMOGLOBIN g/dL 9.6* 10.1* 10.1*    PLATELETS AUTO x10*3/uL 436 418 412      Results from last 7 days   Lab Units 03/04/25  0653 03/03/25  0647 03/02/25  0505   SODIUM mmol/L 139 132* 132*   POTASSIUM mmol/L 3.4* 3.8 3.6   CHLORIDE mmol/L 106 103 101   CO2 mmol/L 24 22 23   BUN mg/dL 9 12 13   CREATININE mg/dL 0.63 0.52 0.64   GLUCOSE mg/dL 84 158* 46*   PHOSPHORUS mg/dL 2.6 2.2* 2.9      Results from last 7 days   Lab Units 03/01/25  0633   INR  1.2*   PROTIME seconds 13.1*   APTT seconds 35

## 2025-03-04 NOTE — PROGRESS NOTES
"Nutrition Initial Assessment:   Reason for Assessment: Dietitian discretion (wound)    Patient is a 65 y.o. female admitted for dry gangrene of left lower extremity. PMH of heart failure, CAD s/p CABG in 2023, PAD, renal artery stenosis,carotid artery stenosis, HTN, HLD, T2DM. Pt is s/p LLE angiography as of 3/3. Pt is oriented only to self.    Nutrition Assessment    Nursing Nutrition Screening:  Malnutrition Screening Tool (MST):  Malnutrition Screening Tool (MST)  Have you recently lost weight without trying?: No  Weight Loss Score: 0  Have you been eating poorly because of a decreased appetite?: No  Malnutrition Score: 0    Nutrition Screen:  Nutrition Screen  Stage 3 or 4 Pressure Injury or Multiple Non-Healing Wounds: No  Home Tube Feeding or Total Parenteral Nutrition (TPN): No  Dietitian Consult Needed: No    Nutrition History:  Food and Nutrient History: Pt is currently NPO, per RN - awaiting podiatry and vascular follow-ups to determine if pt will need procedure or further testing today.        Food Allergies/Intolerances:   none noted  GI Symptoms:  none noted  Oral Problems:  missing teeth    Anthropometrics:  Ht: 160 cm (5' 3\"), Wt: (!) 42.6 kg (94 lb), BMI: 16.66  IBW/kg (Dietitian Calculated): 52.3 kg  Percent of IBW: 81 %       Weight History:  Daily Weight  02/28/25 : (!) 42.6 kg (94 lb)  02/28/25 : (!) 42.6 kg (94 lb)  01/07/25 : (!) 41.8 kg (92 lb 1.6 oz)  08/27/24 : 52.6 kg (116 lb)  07/22/24 : 55.8 kg (123 lb 0.3 oz)  02/05/24 : 52.2 kg (115 lb)  01/16/24 : 58.2 kg (128 lb 4.8 oz)  01/11/24 : 72.6 kg (160 lb)     Weight History / % Weight Change: 10 kg LOSS (19% change) in 6 months  Significant Weight Loss: Yes  Interpretation of Weight Loss: >10% in 6 months       Nutrition Focused Physical Exam Findings:  defer: remote assessment  Physical Findings:  Skin: Positive (left lower extremity wound)  Positive Skin Findings: Impaired wound healing    Nutrition Significant Labs:  BMP Trend:   Results " from last 7 days   Lab Units 03/04/25  0653 03/03/25  0647 03/02/25  0505 02/28/25  1801   GLUCOSE mg/dL 84 158* 46* 171*   CALCIUM mg/dL 8.5* 8.4* 8.7 9.1   SODIUM mmol/L 139 132* 132* 132*   POTASSIUM mmol/L 3.4* 3.8 3.6 4.2   CO2 mmol/L 24 22 23 27   CHLORIDE mmol/L 106 103 101 96*   BUN mg/dL 9 12 13 10   CREATININE mg/dL 0.63 0.52 0.64 0.58        Medications:  acetaminophen, 650 mg, oral, q8h  amLODIPine, 10 mg, oral, Daily  aspirin, 81 mg, oral, Daily  atorvastatin, 80 mg, oral, Nightly  carvedilol, 25 mg, oral, BID  clopidogrel, 75 mg, oral, Daily  ertapenem, 1 g, intravenous, q24h  heparin (porcine), 5,000 Units, subcutaneous, q8h BOLIVAR  hydrALAZINE, 25 mg, oral, TID  insulin glargine, 3 Units, subcutaneous, Nightly  insulin lispro, 0-5 Units, subcutaneous, TID AC  isosorbide mononitrate ER, 60 mg, oral, Daily  melatonin, 10 mg, oral, Nightly  pantoprazole, 40 mg, intravenous, Daily  ranolazine, 500 mg, oral, BID  vancomycin, 1,250 mg, intravenous, q24h           I/O:   Last BM Date: 03/04/25; Stool Appearance: Soft (03/04/25 0956)    Dietary Orders (From admission, onward)       Start     Ordered    03/04/25 1201  NPO Diet; Effective now  Diet effective now         03/04/25 1200    03/02/25 1052  May Participate in Room Service  ( ROOM SERVICE MAY PARTICIPATE)  Once        Question:  .  Answer:  Yes    03/02/25 1051                     Estimated Needs:   Estimated Energy Needs  Total Energy Estimated Needs in 24 hours (kCal): 1704 kCal  Energy Estimated Needs per kg Body Weight in 24 hours (kCal/kg): 40 kCal/kg  Method for Estimating Needs: ABW    Estimated Protein Needs  Total Protein Estimated Needs in 24 Hours (g): 63 g  Protein Estimated Needs per kg Body Weight in 24 Hours (g/kg): 1.5 g/kg  Method for Estimating 24 Hour Protein Needs: ABW    Estimated Fluid Needs  Total Fluid Estimated Needs in 24 Hours (mL): 1704 mL  Total Fluid Estimated Needs in 24 hours (mL/kg): 40 mL/kg  Method for Estimating 24  Hour Fluid Needs: ABW  Patient on Order Fluid Restriction: No        Nutrition Diagnosis        Nutrition Diagnosis  Patient has Nutrition Diagnosis: Yes  Diagnosis Status (1): New  Nutrition Diagnosis 1: Inadequate oral intake  Related to (1): clinical status  As Evidenced by (1): pt is NPO       Nutrition Interventions/Recommendations   Nutrition Prescription:  Individualized Nutrition Prescription Provided for : Recommend advancing diet as tolerated to provide 5686-4204 kcal and 51-63 gm protein daily via PO diet    Nutrition Interventions:   Food and/or Nutrient Delivery Interventions  Interventions: Meals and snacks, Medical food supplement  Meals and Snacks: General healthful diet  Goal: Advance diet as tolerated to general, healthful diet  Goal: Recommend initiating orders for Ensure Plus HP BID (700 kcal, 40 gm protein total) once diet advances    Education Documentation  No documentation found.           Nutrition Monitoring and Evaluation   Food/Nutrient Related History Monitoring  Monitoring and Evaluation Plan: Intake / amount of food  Intake / Amount of food: Other criteria  Criteria: NPO/Clear Liquid diet < 5 days    Anthropometric Measurements  Monitoring and Evaluation Plan: Body weight  Body Weight: Body weight - Promote weight restoration    Biochemical Data, Medical Tests and Procedures  Monitoring and Evaluation Plan: Electrolyte/renal panel  Electrolyte and Renal Panel: Electrolytes within normal limits    Physical Exam Findings  Monitoring and Evaluation Plan: Skin  Skin Finding: Impaired wound healing - Improved wound healing         Time Spent/Follow-up:   Follow Up  Time Spent (min): 45 minutes  Last Date of Nutrition Visit: 03/04/25  Nutrition Follow-Up Needed?: Dietitian to reassess per policy    03/04/25 at 1:23 PM - CARLITO TRINIDAD RDN, SHERLY

## 2025-03-04 NOTE — PROGRESS NOTES
3/4/25 7535 Transitional Care Coordinator Notes:     Transitional Care Coordination Progress Note:  Patient discussed during interdisciplinary rounds.   Team members present: MD and TCC  Plan per Medical/Surgical team: Team consulted Ethics to determine who can give consent for medical procedures. Reached out to [facility] to see if there are any family contacts that can be pursued. Per facility, patient has a spouse but in the past patient stated she did not want the spouse involved. Ethics updated with the information.  Payor: [employer]  Discharge disposition: SNF vs ICF  Potential Barriers: none  ADOD: 2-4 days            Assessment/Plan   Assessment & Plan  Dry gangrene (Multi)    PAD (peripheral artery disease) (CMS-HCC)    Stented coronary artery    CHF (congestive heart failure)               Alyssa Bryant RN

## 2025-03-04 NOTE — CARE PLAN
The patient's goals for the shift include patient pain will be controled during shift    The clinical goals for the shift include Pt will feel less pain throughout the shift.      Problem: Pain - Adult  Goal: Verbalizes/displays adequate comfort level or baseline comfort level  Outcome: Progressing     Problem: Safety - Adult  Goal: Free from fall injury  Outcome: Progressing     Problem: Discharge Planning  Goal: Discharge to home or other facility with appropriate resources  Outcome: Progressing     Problem: Chronic Conditions and Co-morbidities  Goal: Patient's chronic conditions and co-morbidity symptoms are monitored and maintained or improved  Outcome: Progressing     Problem: Nutrition  Goal: Nutrient intake appropriate for maintaining nutritional needs  Outcome: Progressing     Problem: Skin  Goal: Decreased wound size/increased tissue granulation at next dressing change  Outcome: Progressing  Flowsheets (Taken 3/4/2025 1557)  Decreased wound size/increased tissue granulation at next dressing change: Promote sleep for wound healing  Goal: Participates in plan/prevention/treatment measures  Outcome: Progressing  Flowsheets (Taken 3/4/2025 1557)  Participates in plan/prevention/treatment measures: Elevate heels  Goal: Prevent/manage excess moisture  Outcome: Progressing  Flowsheets (Taken 3/4/2025 1557)  Prevent/manage excess moisture:   Cleanse incontinence/protect with barrier cream   Monitor for/manage infection if present  Goal: Prevent/minimize sheer/friction injuries  Outcome: Progressing  Flowsheets (Taken 3/4/2025 1557)  Prevent/minimize sheer/friction injuries:   Use pull sheet   HOB 30 degrees or less   Turn/reposition every 2 hours/use positioning/transfer devices  Goal: Promote/optimize nutrition  Outcome: Progressing  Flowsheets (Taken 3/4/2025 1557)  Promote/optimize nutrition: Monitor/record intake including meals  Goal: Promote skin healing  Outcome: Progressing  Flowsheets (Taken 3/4/2025  1557)  Promote skin healing:   Assess skin/pad under line(s)/device(s)   Rotate device position/do not position patient on device   Turn/reposition every 2 hours/use positioning/transfer devices     Problem: Diabetes  Goal: Achieve decreasing blood glucose levels by end of shift  Outcome: Progressing  Goal: Increase stability of blood glucose readings by end of shift  Outcome: Progressing  Goal: Decrease in ketones present in urine by end of shift  Outcome: Progressing  Goal: Maintain electrolyte levels within acceptable range throughout shift  Outcome: Progressing  Goal: Maintain glucose levels >70mg/dl to <250mg/dl throughout shift  Outcome: Progressing  Goal: No changes in neurological exam by end of shift  Outcome: Progressing  Goal: Learn about and adhere to nutrition recommendations by end of shift  Outcome: Progressing  Goal: Vital signs within normal range for age by end of shift  Outcome: Progressing  Goal: Increase self care and/or family involovement by end of shift  Outcome: Progressing  Goal: Receive DSME education by end of shift  Outcome: Progressing     Problem: Pain  Goal: Takes deep breaths with improved pain control throughout the shift  Outcome: Progressing  Goal: Turns in bed with improved pain control throughout the shift  Outcome: Progressing  Goal: Walks with improved pain control throughout the shift  Outcome: Progressing  Goal: Performs ADL's with improved pain control throughout shift  Outcome: Progressing  Goal: Participates in PT with improved pain control throughout the shift  Outcome: Progressing  Goal: Free from opioid side effects throughout the shift  Outcome: Progressing  Goal: Free from acute confusion related to pain meds throughout the shift  Outcome: Progressing     Problem: Heart Failure  Goal: Improved gas exchange this shift  Outcome: Progressing  Goal: Improved urinary output this shift  Outcome: Progressing  Goal: Reduction in peripheral edema within 24 hours  Outcome:  Progressing  Goal: Report improvement of dyspnea/breathlessness this shift  Outcome: Progressing  Goal: Weight from fluid excess reduced over 2-3 days, then stabilize  Outcome: Progressing  Goal: Increase self care and/or family involvement in 24 hours  Outcome: Progressing

## 2025-03-04 NOTE — PROGRESS NOTES
Vancomycin Dosing by Pharmacy- FOLLOW UP    Myrna Khan is a 65 y.o. year old female who Pharmacy has been consulted for vancomycin dosing for diabetic foot infection. Based on the patient's indication and renal status this patient is being dosed based on a goal AUC of 400-600.     Renal function is currently stable.    Current vancomycin dose: 1250 mg given every 24 hours    Estimated vancomycin AUC on current dose: 442 mg/L.hr     Visit Vitals  BP (!) 175/91   Pulse 88   Temp 37.2 °C (99 °F)   Resp 17        Lab Results   Component Value Date    CREATININE 0.63 2025    CREATININE 0.52 2025    CREATININE 0.64 2025    CREATININE 0.58 2025        Patient weight is as follows:   Vitals:    25 1456   Weight: (!) 42.6 kg (94 lb)       Cultures:  No results found for the encounter in last 14 days.       I/O last 3 completed shifts:  In: 120 (2.8 mL/kg) [P.O.:30; I.V.:40 (0.9 mL/kg); IV Piggyback:50]  Out: 3010 (70.6 mL/kg) [Urine:3010 (2 mL/kg/hr)]  Weight: 42.6 kg   I/O during current shift:  No intake/output data recorded.    Temp (24hrs), Av.1 °C (98.8 °F), Min:36 °C (96.8 °F), Max:39.3 °C (102.7 °F)      Assessment/Plan    Within goal AUC range. Continue current vancomycin regimen.    This dosing regimen is predicted by [employer] to result in the following pharmacokinetic parameters:    Loading dose: N/A  Regimen: 1250 mg IV every 24 hours.  Start time: 04:43 on 2025  Exposure target: AUC24 (range)400-600 mg/L.hr   [id number]: 432 mg/L.hr  AUC24,ss: 442 mg/L.hr  Probability of AUC24 > 400: 67 %  Ctrough,ss: 11.9 mg/L  Probability of Ctrough,ss > 20: 7 %      The next level will be obtained on 36 at AM labs. May be obtained sooner if clinically indicated.   Will continue to monitor renal function daily while on vancomycin and order serum creatinine at least every 48 hours if not already ordered.  Follow for continued vancomycin needs, clinical response, and signs/symptoms of  toxicity.       Hakan Carter, PharmD

## 2025-03-04 NOTE — CONSULTS
ETHICS CONSULTATION NOTE    CONSULT REQUESTER: Juliano Andersen MD    ETHICS QUESTION: Who may consent for procedures on behalf of a patient without next of kin?     BACKGROUND:    Process Steps: I spoke with Dr. Andersen this morning. We reviewed Ms. Khan's reason for admission and current finding. I was informed that Ms. 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 She is currently being evaluated by vascular surgery for either revascularization or amputation of the left lower leg.     Ethically Relevant Medical Information: Ms. Khan is a 65 year old female with a past medical history that includes, but is not limited to, heart failure, CAD, PAD, and wounds of the lower extremities.     Code Status: Full code    Capacity/Decision-Making Considerations: I was informed this morning that Ms. Rodriguez lacks medical decision-making, since she appears oriented only to self.     Advance Directives/Family/Support System: Ms. Khan has a , yet in conversation with her nursing home, the staff at the nursing home report that she does not wish for her  to be involved in medical decision-making.     ETHICS DISCUSSION & ANALYSIS:    When a patient lacks the capacity to participate in medical decision-making, a capacity that ought to reflect patient autonomy, surrogates are asked to perform substituted judgments which reflect the values and preferences of the patient, an extension and safeguard of autonomous decision-making.      Yet when surrogates are absent or unwilling to participate in surrogate decision-making, a best interest standard is used in which the benefits of treatment are weighed against known risks.    ETHICS RECOMMENDATIONS:    Please continue to perform rigorous efforts to find family. A social work consultation may be beneficial.     Until next of kin can be found, Ms. Khan is a patient without proxy. Please page the ethics service for  all non-emergent medical interventions that require informed consent.    In the event of an emergent procedure without which serious further physical and mental disability or death could occur, informed consent or refusal is not required. Please obtain a second medical opinion and record both opinions in the patient's chart and initiate the procedure.    FOLLOW UP:   The Ethics Consultation Service remains available.  Please text via Chat or call [phone].  The Ethics Consultation Service (ECS) can be reached by paging [phone].     Reggie Ha, PhD

## 2025-03-04 NOTE — PROGRESS NOTES
PODIATRY SERVICE PROGRESS NOTE    SERVICE DATE: 3/4/2025   SERVICE TIME:  1600    Subjective   INTERVAL HPI:   Patient was seen at bedside. Pain well controlled. Patient denies any constitutional symptoms. No other pedal complaints.     Medications:  Scheduled Meds: acetaminophen, 650 mg, oral, q8h  amLODIPine, 10 mg, oral, Daily  aspirin, 81 mg, oral, Daily  atorvastatin, 80 mg, oral, Nightly  carvedilol, 25 mg, oral, BID  [Held by provider] clopidogrel, 75 mg, oral, Daily  ertapenem, 1 g, intravenous, q24h  heparin (porcine), 5,000 Units, subcutaneous, q8h BOLIVAR  hydrALAZINE, 50 mg, oral, TID  insulin glargine, 3 Units, subcutaneous, Nightly  insulin lispro, 0-5 Units, subcutaneous, TID AC  isosorbide mononitrate ER, 60 mg, oral, Daily  melatonin, 10 mg, oral, Nightly  pantoprazole, 40 mg, intravenous, Daily  ranolazine, 500 mg, oral, BID  vancomycin, 1,250 mg, intravenous, q24h      Continuous Infusions:    PRN Meds: PRN medications: dextrose, dextrose, glucagon, glucagon, hydrALAZINE, OLANZapine, oxyCODONE, polyethylene glycol, QUEtiapine, vancomycin         Objective   PHYSICAL EXAM:  Physical Exam Performed:  Vitals:    03/04/25 1621   BP: 169/80   Pulse: 78   Resp: 18   Temp: 36.7 °C (98.1 °F)   SpO2: 97%     Body mass index is 16.65 kg/m².    Patient is AOx3 and in no acute distress. Patient is alert and cooperative. Sitting comfortably in bed with dressing clean, dry and intact. Heel off-loading boots in place B/L.     Vascular: Non-palpable DP/PT pulses B/L. Mild non-pitting edema noted B/L. Hair growth absent B/L. CFT<5 to B/L hallux. Temperature is warm to cool from tibial tuberosity to distal digits B/L. No lymphatic streaking noted B/L.     Musculoskeletal: Gross active and passive ROM diminished to age and activity level left foot and ankle, cannot move toes. Moves all extremities spontaneously. No pain to palpation at feet B/L.      Neurological: Intact light touch sensation B/L. Pain stimuli  diminished B/L. Admits to numbness, burning or tingling.     Dermatologic: Skin appears waxy B/L. Web spaces 1-4 B/L are clean, dry and intact. No rashes or nodules noted B/L. No hyperkeratotic tissue noted B/L.      Wound:  Measurements: 12 x 10 cm x 0.2 cm area of partial to full-thickness skin breakdown  Mixed wound base of fibrogranular tissue.   Mild serosanguinous drainage with fibrotic slough.   Mild jatin-wound maceration.   Mild jatin-wound erythema.   No palpable fluctuance. Mild malodor. Mild increased warmth.   No probe to bone or deep structures within the wound base.   No tunneling or tracking.   No undermining. Skin edges irregular but intact.      LABS:   Results for orders placed or performed during the hospital encounter of 02/28/25 (from the past 24 hours)   POCT GLUCOSE   Result Value Ref Range    POCT Glucose 131 (H) 74 - 99 mg/dL   POCT GLUCOSE   Result Value Ref Range    POCT Glucose 154 (H) 74 - 99 mg/dL   POCT GLUCOSE   Result Value Ref Range    POCT Glucose 105 (H) 74 - 99 mg/dL   CBC   Result Value Ref Range    WBC 10.7 4.4 - 11.3 x10*3/uL    nRBC 0.0 0.0 - 0.0 /100 WBCs    RBC 3.01 (L) 4.00 - 5.20 x10*6/uL    Hemoglobin 9.6 (L) 12.0 - 16.0 g/dL    Hematocrit 28.5 (L) 36.0 - 46.0 %    MCV 95 80 - 100 fL    MCH 31.9 26.0 - 34.0 pg    MCHC 33.7 32.0 - 36.0 g/dL    RDW 11.9 11.5 - 14.5 %    Platelets 436 150 - 450 x10*3/uL   Renal Function Panel   Result Value Ref Range    Glucose 84 74 - 99 mg/dL    Sodium 139 136 - 145 mmol/L    Potassium 3.4 (L) 3.5 - 5.3 mmol/L    Chloride 106 98 - 107 mmol/L    Bicarbonate 24 21 - 32 mmol/L    Anion Gap 12 10 - 20 mmol/L    Urea Nitrogen 9 6 - 23 mg/dL    Creatinine 0.63 0.50 - 1.05 mg/dL    eGFR >90 >60 mL/min/1.73m*2    Calcium 8.5 (L) 8.6 - 10.6 mg/dL    Phosphorus 2.6 2.5 - 4.9 mg/dL    Albumin 2.6 (L) 3.4 - 5.0 g/dL   Vancomycin   Result Value Ref Range    Vancomycin 26.7 (H) 5.0 - 20.0 ug/mL   POCT GLUCOSE   Result Value Ref Range    POCT Glucose 89  74 - 99 mg/dL   POCT GLUCOSE   Result Value Ref Range    POCT Glucose 81 74 - 99 mg/dL   POCT GLUCOSE   Result Value Ref Range    POCT Glucose 68 (L) 74 - 99 mg/dL      Lab Results   Component Value Date    HGBA1C 14.6 (H) 01/09/2025      Lab Results   Component Value Date    CRP 0.79 08/26/2023      Lab Results   Component Value Date    SEDRATE 48 (H) 08/26/2023        Results from last 7 days   Lab Units 03/04/25  0653   WBC AUTO x10*3/uL 10.7   RBC AUTO x10*6/uL 3.01*   HEMOGLOBIN g/dL 9.6*   HEMATOCRIT % 28.5*     Results from last 7 days   Lab Units 03/04/25  0653 03/02/25  0505 02/28/25  1801   SODIUM mmol/L 139   < > 132*   POTASSIUM mmol/L 3.4*   < > 4.2   CHLORIDE mmol/L 106   < > 96*   CO2 mmol/L 24   < > 27   BUN mg/dL 9   < > 10   CREATININE mg/dL 0.63   < > 0.58   CALCIUM mg/dL 8.5*   < > 9.1   PHOSPHORUS mg/dL 2.6   < >  --    BILIRUBIN TOTAL mg/dL  --   --  0.3   ALT U/L  --   --  17   AST U/L  --   --  19    < > = values in this interval not displayed.           IMAGING REVIEW:  Transthoracic Echo (TTE) Limited    Result Date: 3/4/2025   [facility], [address] [place] [address]                Tel [phone] and Fax [phone] TRANSTHORACIC ECHOCARDIOGRAM REPORT  Patient Name:       TANNER Rodriguez Physician:    [id number] Mary Lopez MD Study Date:         3/3/2025            Ordering Provider:    [id number] DAMI EVERETT MRN/PID:            [id number]            Fellow:               [id number] Brendon Quintana MD Accession#:         [id number]        Nurse:                Anjelica WHITLOCKN Date of Birth/Age:  1960 / 65      Sonographer:          Kaylin Lindquist RCS                     years Gender assigned at  F                    Additional Staff: Birth: Height:             160.00 cm           Admit Date:           2/28/2025 Weight:             42.64 kg            Admission Status:     Inpatient -                                                               Priority                                                               discharge BSA / BMI:          1.40 m2 / 16.66     Encounter#:           [id number]                     kg/m2 Blood Pressure:     169/80 mmHg         Department Location:  [facility]                                                               Non Invasive Study Type:    TRANSTHORACIC ECHO (TTE) LIMITED Diagnosis/ICD: Atherosclerotic heart disease of native coronary artery without                angina pectoris-I25.10 Indication:    Coronary Artery Disease CPT Code:      Echo Limited-00387; Color Doppler-98648; Doppler Limited-96487 Patient History: Pertinent History: HFrEF, S/P CABG 2023 [place]-LAD-APRIL-OM, PAD S/P SFA stent                    11/2023, Carotid artery stenosis 8/23. Study Detail: The following Echo studies were performed: 2D, M-Mode, Doppler and               color flow. Technically challenging study due to prominent lung               artifact and the patient's lack of cooperation. Agitated saline               used as a contrast agent for intraseptal flow evaluation.  PHYSICIAN INTERPRETATION: Left Ventricle: Left ventricular ejection fraction is mildly decreased, calculated by Messina's biplane at 50%. There is severely increased concentric left ventricular hypertrophy. There are no regional left ventricular wall motion abnormalities. The left ventricular cavity size is upper limits of normal. There is moderately increased septal and moderately increased posterior left ventricular wall thickness. Abnormal (paradoxical) septal motion consistent with post-operative status. Spectral Doppler shows a Grade II (pseudonormal pattern) of left ventricular diastolic filling. Left Atrium: The  left atrial size is severely dilated. There is no evidence of a patent foramen ovale. A bubble study using agitated saline was performed. Bubble study is negative. Right Ventricle: The right ventricle is normal in size. There is mildly increased right ventriclar wall thickness. There is normal right ventricular global systolic function. Right Atrium: The right atrium is normal in size. Aortic Valve: The aortic valve is trileaflet. There is no evidence of aortic valve regurgitation. Mitral Valve: The mitral valve is mildly thickened. There is mild mitral annular calcification. There is trace to mild mitral valve regurgitation. Tricuspid Valve: The tricuspid valve is structurally normal. There is trace tricuspid regurgitation. The right ventricular systolic pressure is unable to be estimated. Pulmonic Valve: The pulmonic valve is structurally normal. There is no indication of pulmonic valve regurgitation. Pericardium: Trivial pericardial effusion. Aorta: The aortic root is normal. Systemic Veins: The inferior vena cava appears normal in size, with IVC inspiratory collapse greater than 50%. In comparison to the previous echocardiogram(s): Compared with study dated 7/16/2024, no significant change.  CONCLUSIONS:  1. Left ventricular ejection fraction is mildly decreased, calculated by Messina's biplane at 50%.  2. Spectral Doppler shows a Grade II (pseudonormal pattern) of left ventricular diastolic filling.  3. Abnormal septal motion consistent with post-operative status.  4. There is moderately increased septal and moderately increased posterior left ventricular wall thickness.  5. There is severely increased concentric left ventricular hypertrophy.  6. There is normal right ventricular global systolic function.  7. The left atrial size is severely dilated.  8. There is no evidence of a patent foramen ovale. RECOMMENDATIONS: Utilizing an FDA cleared automated machine learning algorithm (EchoGo Heart Failure by  [employer]), the analysis of the apical 4-chamber echocardiogram suggests the presence of heart failure with preserved ejection fraction (HFpEF)*. Clinical correlation looking for additional heart failure signs and symptoms is recommended, as a definite diagnosis of heart failure cannot be made by imaging alone. *Per ACC/AHA/HFSA universal diagnosis of heart failure, HFpEF is defined as 1) signs and symptoms leading to clinical diagnosis of heart failure, 2) an ejection fraction of at least 50%, and 3) evidence of elevated intra-cardiac filling pressures by echocardiography, BNP elevation, or catheterization.  QUANTITATIVE DATA SUMMARY:  2D MEASUREMENTS:          Normal Ranges: Ao Root d:       2.60 cm  (2.0-3.7cm) LAs:             3.70 cm  (2.7-4.0cm) IVSd:            1.40 cm  (0.6-1.1cm) LVPWd:           1.40 cm  (0.6-1.1cm) LVPWs:           3.59 cm LVIDd:           4.70 cm  (3.9-5.9cm) LVIDs:           3.59 cm LV Mass Index:   189 g/m2 LVEDV Index:     78 ml/m2 LV % FS          23.6 %  LEFT ATRIUM:                  Normal Ranges: LA Vol A4C:        81.7 ml    (22+/-6mL/m2) LA Vol A2C:        82.7 ml LA Vol BP:         83.1 ml LA Vol Index A4C:  58.2ml/m2 LA Vol Index A2C:  58.9 ml/m2 LA Vol Index BP:   59.2 ml/m2 LA Area A4C:       23.4 cm2 LA Area A2C:       23.3 cm2 LA Major Axis A4C: 5.7 cm LA Major Axis A2C: 5.6 cm LA Volume Index:   59.2 ml/m2 LA Vol A4C:        75.9 ml LA Vol A2C:        77.3 ml LA Vol Index BSA:  54.6 ml/m2  RIGHT ATRIUM:                 Normal Ranges: RA Vol A4C:        45.7 ml    (8.3-19.5ml) RA Vol Index A4C:  32.6 ml/m2 RA Area A4C:       15.2 cm2 RA Major Axis A4C: 4.3 cm  AORTA MEASUREMENTS:         Normal Ranges: Asc Ao, d:          3.00 cm (2.1-3.4cm)  LV SYSTOLIC FUNCTION:                      Normal Ranges: EF-A4C View:    50 % (>=55%) EF-A2C View:    51 % EF-Biplane:     50 % LV EF Reported: 50 %  LV DIASTOLIC FUNCTION:             Normal Ranges: MV Peak E:             0.76 m/s     (0.7-1.2 m/s) MV Peak A:             0.96 m/s    (0.42-0.7 m/s) E/A Ratio:             0.79        (1.0-2.2) MV e'                  0.044 m/s   (>8.0) MV lateral e'          0.05 m/s MV medial e'           0.04 m/s MV A Dur:              151.00 msec E/e' Ratio:            17.43       (<8.0) PulmV Sys Philip:         33.80 cm/s PulmV Lacey Philip:        22.30 cm/s PulmV S/D Philip:         1.50 PulmV A Revs Philip:      23.30 cm/s PulmV A Revs Dur:      106.00 msec  MITRAL VALVE:          Normal Ranges: MV DT:        180 msec (150-240msec)  AORTIC VALVE:          Normal Ranges: LVOT Diameter: 1.90 cm (1.8-2.4cm)  RIGHT VENTRICLE: RV Basal 3.80 cm RV Mid   2.40 cm RV Major 5.3 cm TAPSE:   16.9 mm RV s'    0.09 m/s  TRICUSPID VALVE/RVSP:          Normal Ranges: Peak TR Velocity:     3.74 m/s Est. RA Pressure:     3 mmHg RV Syst Pressure:     59 mmHg  (< 30mmHg) IVC Diam:             1.85 cm  PULMONIC VALVE:          Normal Ranges: PV Accel Time:  95 msec  (>120ms) PV Max Philip:     0.8 m/s  (0.6-0.9m/s) PV Max P.4 mmHg  PULMONARY VEINS: PulmV A Revs Dur: 106.00 msec PulmV A Revs Philip: 23.30 cm/s PulmV Lacey Philip:   22.30 cm/s PulmV S/D Philip:    1.50 PulmV Sys Philip:    33.80 cm/s  [id number] Mary Lopez MD Electronically signed on 3/3/2025 at 10:51:07 AM  ** Final **     FL fluoro images no charge    Result Date: 3/3/2025  These images are not reportable by radiology and will not be interpreted by  Radiologists.    Lower extremity vein mapping bilateral    Result Date: 3/3/2025            [place] [id number]   Tel [phone] and Fax [phone]  Vascular Lab Report VASC US LOWER EXTREMITY VEIN MAPPING BILATERAL  Patient Name:     TANNER Rodriguez Physician: [id number] Ludivina Chen MD Study Date:       3/3/2025            Ordering           [id number] DAMI                                       Physician:         MARGUERITE MRN/PID:          [id number]            Technologist:       Shagufta Keen RVT,                                                          [facility] Accession#:       [id number]        Technologist 2: Date of           1960 / 65      Encounter#:        [id number] Birth/Age:        years Gender:           F Admission Status: Outpatient          Location           [facility]                                       Performed:  Diagnosis/ICD: Encounter for other preprocedural examination-Z01.818 CPT Codes:     67409 Vein mapping complete  CONCLUSIONS: Right Lower Vein Mapping: The right great saphenous vein and small saphenous vein appear widely patent with no evidence of thrombosis or fibrosis. Right lower extremity vein: Technically difficult due to patient position and unable to tolerate due to pain. The mid distal calf GSV splits and becomes small. Left Lower Vein Mapping: The left great saphenous vein and small saphenous vein appear widely patent with no evidence of thrombosis or fibrosis. Left lower extremity vein: Technically difficult due to patient position and unable to tolerate due to pain. The distal thigh GSV is not visualized.  Imaging & Doppler Findings:  Right          Compress Thrombus  Diam SFJ              Yes      None   4.4 mm Prox Thigh GSV   Yes      None   2.2 mm Mid Thigh GSV    Yes      None   2.2 mm Knee GSV         Yes      None   3.1 mm Prox Calf GSV    Yes      None   2.4 mm Mid Calf GSV     Yes      None Dist Calf GSV    Yes      None SSV Prox         Yes      None   2.5 mm SSV Mid          Yes      None   2.0 mm SSV Distal       Yes      None   2.0 mm  Left            Diam SFJ            2.1 mm Prox Thigh GSV 2.4 mm Mid Thigh GSV  2.3 mm Knee GSV       2.3 mm Prox Calf GSV  2.3 mm Mid Calf GSV   2.1 mm SSV Prox       2.8 mm SSV Mid        2.3 mm SSV Distal     2.5 mm  [id number] Ludivina Chen MD Electronically signed by [id number] Ludivina Chen MD on 3/3/2025 at 4:28:47 PM  ** Final **     US pelvis    Result Date: 3/1/2025  Interpreted By:   Moose Shaw  and Adilson Oates STUDY: US PELVIS;  3/1/2025 5:15 pm   INDICATION: Signs/Symptoms:Evaluate adnexal mass.  Elevated tumor markers CA 19 9 and .     COMPARISON: CT angio abdomen pelvis with bilateral lower extremity runoff 02/28/2025   ACCESSION NUMBER(S): [id number]   ORDERING CLINICIAN: DAMI EVERETT   TECHNIQUE: Multiple multiplanar static gray scale, color and spectral waveform sonographic images of the pelvis were obtained. Transabdominal ultrasound was performed at patient's request.   FINDINGS: Somewhat suboptimal evaluation due to body habitus, transabdominal approach, and markedly distended urinary bladder.   UTERUS: The uterus is poorly visualized. However, there are multiple echogenic foci with twinkle artifact in the region of the uterus, corresponding to known partially calcified leiomyomas seen on prior CT.   ENDOMETRIUM: Poorly visualized.   RIGHT ADNEXA: The right ovary is not definitively visualized. A 7.6 x 6.8 x 6.8 cm heterogeneous right adnexal lesion is seen with posterior acoustic enhancement, low level internal echoes, internal septations, and a globular mural nodularity measuring approximately 4.5 x 2.3 x 4.8 cm. There is increased vascularity within the mural nodularity on color Doppler evaluation.   LEFT ADNEXA: The left ovary not visualized.   CUL DE SAC: No gross free fluid is seen in the pelvic cul-de-sac.       1. Complex partially cystic right adnexal mass with a solid component demonstrating internal vascularity. 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 Gynecology oncology consultation is recommended. 2. Markedly distended urinary bladder. Consider Malone placement. 3. Partially calcified uterine leiomyomas.     I personally reviewed the images/study and I agree with the findings as stated by Radiology resident.   MACRO: None   Signed by: Moose Shaw 3/1/2025 6:59 PM Dictation  workstation:   [id number]    CT angio aorta and bilateral iliofemoral runoff including without contrast if performed    Result Date: 3/1/2025  Interpreted By:  Quentin Madden and Ogievich Taessa STUDY: CT ANGIO AORTA AND BILATERAL ILIOFEMORAL RUN OFF INCLUDING WITHOUT CONTRAST IF PERFORMED;  2/28/2025 8:38 pm   INDICATION: Signs/Symptoms:decreased pulses lle, r/o acute ischemic limb.   Per EMR: History of diabetes, hypertension, PAD, HFrEF 55%, CAD s/p CABG on Plavix here for left lower extremity wound. Patient reports that she had increased swelling to her bilateral legs for the past couple days.   COMPARISON: Chest CT 08/26/2023 CTA abdomen/pelvis 05/11/2023 (report only in epic).   ACCESSION NUMBER(S): [id number]   ORDERING CLINICIAN: INGRID GARNICA   TECHNIQUE: Thin-section axial images of the abdomen, pelvis and bilateral lower extremities were obtained in the arterial phase after intravenous administration of iodinated contrast. Delayed images the legs were obtained for assessment of venous patency. Coronal and sagittal reformatted images were reconstructed from the axial data. Multiplanar MIPs and 3D reconstructions were created on an independent workstation and reviewed.   FINDINGS: VASCULATURE:   ABDOMINAL AORTA: No abdominal aortic aneurysm or dissection. Severe aortic atherosclerosis.   ABDOMINAL ARTERIES: Celiac axis: Patent. - Superior mesenteric artery: Scattered atherosclerosis with mild-to-moderate narrowing more distally (series 403, image 118). - Inferior mesenteric artery: Moderate narrowing at the origin (series 403, image 114). - Left Renal artery: Scattered atherosclerosis with moderate-to-severe narrowing at the origin. - Right renal artery: Scattered atherosclerosis with moderate-to-severe narrowing at the origin.   RIGHT LOWER EXTREMITY:   - Right Common Iliac Artery: Severe atherosclerosis with moderate multifocal narrowing. - Right External Iliac Artery: Severe atherosclerosis with  moderate-to-severe multifocal narrowing (most severe at series 401, image 278). - Right Internal Iliac Artery: Severe atherosclerosis with moderate-to-severe multifocal narrowing.   - Right Common Femoral Artery: Severe atherosclerosis with moderate multifocal narrowing. - Right Profunda Artery: Severe atherosclerosis with moderate multifocal narrowing. - Right Superficial Femoral Artery: Severe atherosclerosis. At least moderate multifocal narrowing. There is a SFA stent present distally (series 402, image 104) with multifocal severe short-segment narrowing however still appears patent (ex. Series 401, image 630). - Right Popliteal Artery: Severe atherosclerosis with multifocal moderate-to-severe narrowing. - Right Anterior Tibial, Posterior Tibial, Peroneal Arteries: Scattered calcifications with moderate-to-severe multifocal narrowing.   LEFT LOWER EXTREMITY:   - Left Common Iliac Artery: Severe atherosclerosis with moderate short-segment narrowing. - Left External Iliac Artery: Completely occluded from the iliac bifurcation to the level of the common femoral artery. - Left Internal Iliac Artery: Moderate atherosclerosis with multifocal mild-to-moderate narrowing.   - Left Common Femoral Artery: Severe atherosclerosis with multifocal moderate-to-severe narrowing most significant at series 401, image 359. - Left Profunda Artery: Moderate-to-severe atherosclerosis with multifocal areas of moderate-to-severe narrowing. - Left Superficial Femoral Artery: Severe atherosclerosis. There is a stent spanning from the proximal to the mid SFA (series 402, image 73 through 94) which is completely occluded. The mid SFA portion without a stent is also occluded and becomes constituted more distally with moderate to severe multifocal narrowing. - Left Popliteal Artery: Severe atherosclerosis with moderate-to-severe multifocal narrowing. - Left Anterior Tibial, Posterior Tibial, Peroneal Arteries: Scattered atherosclerosis with  moderate-to-severe multifocal narrowing.   SOFT TISSUES OF BILATERAL EXTREMITIES:   Right-greater-than-left circumferential subcutaneous edema of the bilateral extremities.   There is diffuse circumferential skin thickening involving the left mid to distal calf as well as the foot (see picture and media). No evidence of a loculated fluid collection to suggest an absent.   ABDOMEN/PELVIS:   Limited evaluation of the solid organs due to contrast timing.   LOWER CHEST: Cardiomegaly. Bibasilar atelectasis. Stranded epicardial leads. Coronary artery calcifications. Circumferential wall thickening of the distal esophagus.   LIVER: Few scattered subcentimeter arterial hyperenhancing foci on early arterial phase, likely representing shunt arterial portal shunts, flash filling hemangioma, or FNH. No suspicious liver lesions detected although evaluation is limited due to contrast timing.   BILE DUCTS: Mild prominence of the extrahepatic bile ducts, likely related to reservoir effect.   GALLBLADDER: Absent.   PANCREAS: Diffuse scattered pancreatic calcifications with a mildly dilated pancreatic duct measuring 0.6 cm (series 401, image 109) consistent with sequela of chronic pancreatitis. There is a cystic lesion in the pancreatic head measuring 1.3 cm, likely a side branch IPMN (401/149).   SPLEEN: No significant abnormality.   ADRENALS: No significant abnormality.   KIDNEYS, URETERS, BLADDER: There is mild bilateral renal cortical scarring. No nephrolithiasis or hydronephrosis. Slight heterogeneity of the left renal parenchyma with mild perinephric stranding. Severe distention of the urinary bladder with the dome of the bladder extending to the midabdomen. Bilateral fullness of the renal pelvis likely related to reflux   REPRODUCTIVE ORGANS: Bulky calcified fibroid uterus. There is a cystic lesion measuring 5.4 x 5.1 x 7.3 cm in the right adnexa (series 401, image 270 and series 402, image 52) which was reported on a CTA  dated 05/11/2023 in epic, however images are not available for direct comparison..   VESSELS: See above.   RETROPERITONEUM/LYMPH NODES: No acute retroperitoneal abnormality. No enlarged lymph nodes.   BOWEL/PERITONEUM: Mild diffuse thickening of the rectum.   No pneumoperitoneum, significant ascites, or abscess.   MUSCULOSKELETAL/ABDOMINAL WALL: No acute osseous abnormality or suspicious lesion. No significant soft tissue abnormality of the abdominal wall. Mild body wall anasarca.       VASCULAR: 1. In the left lower extremity, there is complete occlusion of the left external iliac artery with distal reconstitution in the left common femoral artery. Subsequently, there is a left SFA stent which is completely occluded. Distal reconstitution of flow is identified in the left distal SFA. There is severe atherosclerosis of the left popliteal artery as well as the calf arteries although a three-vessel runoff is present at the ankle mortise. 2. In the right lower extremity, there is severe atherosclerosis resulting in multifocal areas of moderate-to-severe narrowing throughout. Distal SFA stent is present and stenosed but flow is identified within the lumen. A three-vessel runoff is identified at the ankle mortise. 3. Right-greater-than-left circumferential subcutaneous edema of the bilateral extremities may be due to venous congestion, lymphedema, and/or cellulitis. Correlate with physical exam findings. 4. Diffuse circumferential skin ulceration involving the left mid to distal calf as well as the foot. No drainable collections.   ABDOMEN/PELVIS: 1. Severe distention of the urinary bladder with the dome at the level of the midabdomen. Consider decompression with a Malone catheter. 2. Mild edematous appearance of the left renal parenchyma with minimal perinephric stranding is nonspecific and may be related to reflux due to bladder distention although pyelonephritis is not excluded. Correlate with urinalysis. 3. Right  adnexal cystic lesion measuring 5.4 x 5.1 x 7.3 cm  which was reported on a prior CTA dated 05/11/2023 in [facility], however images are not available for direct comparison. The mass was reported to measure 4.5 cm at that time. Findings are nonspecific with differential diagnosis including cystic ovarian neoplasm. Recommend gynecology consultation and consideration for outpatient evaluation with MRI or ultrasound. 4. Mild circumferential wall thickening of the distal esophagus likely representing esophagitis. 5. Pancreatic head cystic lesion measuring 1.2 cm may represent a side branch IPMN. Consider follow-up MRCP in 2 years to ensure stability. 6. Mild thickening of the rectal wall is nonspecific but can be seen with proctitis. 7. Additional findings as discussed above.   I personally reviewed the images/study and I agree with the findings as stated by Kenny Mosher DO, PGY-3. This study was interpreted at [place], [place].   MACRO: Critical Finding:  There are multiple critical findings. See findings. notification was initiated on 3/1/2025 at 12:25 am by Quentin Madden.  (**-YCF-**)   Signed by: Quentin Madedn 3/1/2025 12:26 AM Dictation workstation:   [id number]    ECG 12 lead    Result Date: 2/28/2025  Normal sinus rhythm Possible Left atrial enlargement Minimal voltage criteria for LVH, may be normal variant ( Ruben product ) Nonspecific ST abnormality Abnormal ECG When compared with ECG of 18-JAN-2025 04:32, Significant changes have occurred See ED provider note for full interpretation and clinical correlation Confirmed by Jen Leon ([id number]) on 2/28/2025 10:15:56 PM    XR ankle left 3+ views    Result Date: 2/28/2025  Interpreted By:  Quentin Madden, STUDY: XR ANKLE LEFT 3+ VIEWS; ;  2/28/2025 7:33 pm   INDICATION: Signs/Symptoms:r/o gas.   COMPARISON: None.   ACCESSION NUMBER(S): [id number]   ORDERING CLINICIAN: INGRID GARNICA   FINDINGS: Generalized soft  tissue edema about the ankle. No evidence of soft tissue gas.   Vascular calcifications.   Diffuse osseous demineralization. Normal alignment. No acute fractures detected.       No radiographic evidence of soft tissue gas.   MACRO: None   Signed by: Quentin Madden 2/28/2025 9:33 PM Dictation workstation:   [id number]    XR femur left 2+ views    Result Date: 2/28/2025  Interpreted By:  Quentin Madden, STUDY: XR FEMUR LEFT 2+ VIEWS; ;  2/28/2025 7:33 pm   INDICATION: Signs/Symptoms:pain skin breakdown r/o gas.   COMPARISON: None.   ACCESSION NUMBER(S): [id number]   ORDERING CLINICIAN: INGRID GARNICA   FINDINGS: Vascular calcifications with a stent in the medial thigh.   The bones are diffusely demineralized. No acute fractures or osseous erosions identified. Generalized soft tissue edema about the leg skin folds about the medial thigh. Hyperdense foreign body projecting along the lateral aspect of the proximal thigh (best seen on the AP view), indeterminate.       No acute osseous abnormalities. Generalized soft tissue edema about the thigh. No definite evidence of soft tissue gas although sensitivity is limited due to presence of skin folds.   Hyperdense object projecting over the lateral aspect of the thigh is indeterminate for possible foreign body. Correlate with physical exam.   MACRO: None   Signed by: Quentin Madden 2/28/2025 9:32 PM Dictation workstation:   [id number]    XR foot left 3+ views    Result Date: 2/28/2025  Interpreted By:  Quentin Madden, STUDY: XR FOOT LEFT 3+ VIEWS; ;  2/28/2025 7:33 pm   INDICATION: Signs/Symptoms:pain skin breakdown r/o gas.   COMPARISON: None.   ACCESSION NUMBER(S): [id number]   ORDERING CLINICIAN: INGRID GARNICA   FINDINGS: Diffuse osseous demineralization. No acute fractures defect detected. No definite evidence of osseous erosion.   Generalized soft tissue edema about the foot. No radiopaque foreign bodies identified. No definite evidence of soft tissue gas.  Slight soft tissue irregularity is present along the lateral aspect of the 5th metatarsal.   Vascular calcifications.   Plantar and Achilles calcaneal enthesophytes.       No acute osseous abnormalities. The bones are diffusely demineralized.   There is generalized soft tissue edema about the foot. No definite evidence of soft tissue gas or foreign bodies detected. Slight soft tissue irregularity is present along the lateral aspect of the 5th metatarsal, correlate with physical exam findings.   MACRO: None   Signed by: Quentin Madden 2/28/2025 9:28 PM Dictation workstation:   [id number]            Assessment/Plan   Patient is a 65-year-old female with PMH of CAD s/p CABG, HFrEF, PAD (>50% stenosis of L CFA and occluded L SFA stent and recon at distal SFA concern for significant skin breakdown over dorsum of patient's left lower extremity.     #PAD with CLI  #DM2 with neuropathy (A1c 14.6% in 1/2025)  #Superimposed cellulitis ankle  #Dry gangrene left foot     - Patient was seen and evaluated; all findings were discussed and all questions were answered to patient's satisfaction.  - Charts, labs, vitals and imaging all reviewed.   - Imaging: X-rays of foot and ankle, femur reviewed. No acute osseous abnormality. Significant vascular calcifications appreciated. No gas noted in soft tissues.  - Labs: No white count. Lactate 1.6.   - CT angio w/o contrast pending     Plan:  - Patient with apparent ischemic limb with superficial tissue breakdown. Thickening of skin and mild focal calor improved since admission date. Recommend broad spectrum abx IV vanc/cefepime.  - X-rays negative for soft tissue gas. Will follow peripherally as ischemic limb work-up progresses.  - Reviewed with her the risk of proximal amputation. Vascular surgery plans to intervene for her left lower extremity, possibly Thursday depending on if surrogate decision-maker can be procured for her to consent. Once she is optimized by vascular surgery and  able to appropriately consent, will debride left ankle.Will follow peripherally and may debride her wounds in the days following her vascular intervention depending on how much deeper tissue has survived to this point and how well her body responds to revascularization procedure.   - Pain Regimen: per primary team  - Plan to follow peripherally as work-up for ischemic limb develops.   - Dressings: Will defer for further assessment for now.  - Nursing staff is able to change/reinforce dressing if & as necessary until next day’s dressing change. Thank you.  - Podiatry will continue to follow peripherally while in house.     Weightbearing: WBAT for now, recommend surgical shoe.     Case to be discussed with attending, A&P above reflects a tentative plan. Please await for the final signature from the attending physician on service.     Chema Okeefe DPM/PGY-3  Podiatric Medicine & Surgery  [place]          SIGNATURE: Chema Okeefe DPM PATIENT NAME: Myrna Khan   DATE: March 4, 2025 MRN: [id number]   TIME: 5:24 PM CONTACT: Ha[facility] Message

## 2025-03-04 NOTE — PROGRESS NOTES
Subjective:  SR 70-80s  Hypertensive  Febrile  Denies CP/ SOB/dizziness, palpitations     Sitter at bedside    TTE: with mildly reduced LVEF to 50%, unchanged from previous. No WMA     Last Recorded Vitals:  Vitals:    03/03/25 2127 03/04/25 0433 03/04/25 0815 03/04/25 0913   BP: 150/64 161/68 (!) 175/91    BP Location:   Right arm    Patient Position:   Lying    Pulse: 70 73 88    Resp: 16 17 16    Temp: 36.7 °C (98.1 °F) 37.4 °C (99.3 °F) (!) 39.3 °C (102.7 °F) 37.2 °C (99 °F)   TempSrc:   Temporal Temporal   SpO2: 97% 100% 96%    Weight:       Height:           Last Labs:  CBC - 3/4/2025:  6:53 AM  10.7 9.6 436    28.5      CMP - 3/4/2025:  6:53 AM  8.5 7.8 19 --- 0.3   2.6 2.6 17 176      PTT - 3/1/2025:  6:33 AM  1.2   13.1 35     Troponin I, High Sensitivity   Date/Time Value Ref Range Status   01/11/2024 03:37 PM 22 0 - 34 ng/L Final     Troponin I, High Sensitivity (CMC)   Date/Time Value Ref Range Status   02/28/2025 06:01 PM 5 0 - 34 ng/L Final   01/18/2025 06:12 AM 14 0 - 34 ng/L Final   01/18/2025 04:50 AM 14 0 - 34 ng/L Final     BNP   Date/Time Value Ref Range Status   02/28/2025 06:01  (H) 0 - 99 pg/mL Final   07/15/2024 11:55  (H) 0 - 99 pg/mL Final     Hemoglobin A1C   Date/Time Value Ref Range Status   01/09/2025 06:34 AM 14.6 (H) See comment % Final   07/15/2024 11:55 AM 15.2 (H) see below % Final     Comment:     Hemoglobin A1c is >15%. Marked elevations in HbA1c are commonly due to poor glycemic control in diabetic patients. Rarely, hemoglobin variants may cause false elevation of HbA1c that is discordant with glycemic control status.      LDL Calculated   Date/Time Value Ref Range Status   07/16/2024 04:58 AM 56 <=99 mg/dL Final     Comment:                                 Near   Borderline      AGE      Desirable  Optimal    High     High     Very High     0-19 Y     0 - 109     ---    110-129   >/= 130     ----    20-24 Y     0 - 119     ---    120-159   >/= 160     ----      >24  Y     0 -  99   100-129  130-159   160-189     >/=190       VLDL   Date/Time Value Ref Range Status   07/16/2024 04:58 AM 15 0 - 40 mg/dL Final        EKG showing normal sinus rhythm with no acute ischemic changes.      3/3/25 Transthoracic Echo (TTE) Limited With Doppler And Color   CONCLUSIONS:   1. Left ventricular ejection fraction is mildly decreased, calculated by Messina's biplane at 50%.   2. Spectral Doppler shows a Grade II (pseudonormal pattern) of left ventricular diastolic filling.   3. Abnormal septal motion consistent with post-operative status.   4. There is moderately increased septal and moderately increased posterior left ventricular wall thickness.   5. There is severely increased concentric left ventricular hypertrophy.   6. There is normal right ventricular global systolic function.   7. The left atrial size is severely dilated.   8. There is no evidence of a patent foramen ovale.    Previous Cardiac imaging/work-up:  Echocardiogram 7/16/2024:   1. Left ventricular ejection fraction is low normal, by visual estimate at 55%.   2. Spectral Doppler shows an impaired relaxation pattern of left ventricular diastolic filling.   3. Abnormal septal motion consistent with post-operative status.   4. GLS is reduced at -11.9% with an apical sparing pattern suggestive of possible cardiac amyloid. LV systolic function is difficult to assess given that with swinging of the heart many images become foreshortened and LV appears better in some views than others. Overall does appear to be normal or low normal without regional wall motion abnormalities. Given the combination of thickened LV and RV walls with somewhat thickened valves and pericardial effusion with reduced GLS with the pattern of apical sparing, would consider cardiac MRI to further assess for possible cardiac amyloid.   5. There is reduced right ventricular systolic function.   6. The left atrium is mildly dilated.   7. Mobile atrial septal  aneurysm wtih an agitated saline contrast study that was negative for intracardiac shunt.   8. Mildly thickened MV leaflet tips with doming of the AML with only trace MR and mean MV gradient only 1.5mmHg.   9. Primary service notified of the findings at the time of reporting.  10. Compared with the prior exam from 8/26/2023 there has been improvement of the LV systolic function Previously there was moderatley reduced LV systolic function with apex having worst function. There has been interval surgical revascularization between the two studies. Today's exam was the [facility] echocadiogram to include GLS asessment and raised the possibility of cardiac amyloid.      Cardiac MRI 7/2024:  IMPRESSION:  1. The left ventricle is normal in size with low-normal systolic  function. LVEF 51%. There are no segmental wall motion abnormalities.  Quantitative values are as noted above.  2. Diffuse left ventricle hypertrophy measuring up to 1.8 cm. There  is extensive sub endocardial delayed enhancement with more prominent  involvement of the lateral wall. Findings are nonspecific but can be  seen in cardiac amyloidosis. Cannot exclude component of infarction  in the lateral wall.  3. Nonspecific mild mid myocardial delayed enhancement  interventricular septum at the RV insertion sites which can be due to  right heart volume or pressure overload.  4. Normal aortic, mitral, and tricuspid valve function. Aortic  regurgitant volume 1 ml. Aortic regurgitant fraction 3 %. Peak aortic  valve velocity 95 cm/sec.    NM PYP scan 7/2024:  IMPRESSION:  1. Negative amyloid SPECT heart study without evidence of TTR  amyloidosis.      Coronary angiogram 9/4/2023:  ____________________________________________________________________________________  CONCLUSIONS:  1. S/p uncomplicated R/L heart catheterization.  2. RHC access initially through right brachial vein with 6f sheath, however unable to advance Equipment. Switched to [facility] 6f sheath. [facility]  "through right radial artery 6f sheath.  3. Severe 2 vessel disease involving mid LAD and mid LCx, FFR was done and positive in LAD (0.74) and LCx (0.69).  4. Right dominant circulation.  5. Heart team discussion regarding revascularization options.    Now status post CABG (LIMA-LAD, APRIL-OM)      Last I/O:  I/O last 3 completed shifts:  In: 120 (2.8 mL/kg) [P.O.:30; I.V.:40 (0.9 mL/kg); IV Piggyback:50]  Out: 3010 (70.6 mL/kg) [Urine:3010 (2 mL/kg/hr)]  Weight: 42.6 kg        Allergies:  Lisinopril, Amoxicillin, and Losartan    Inpatient Medications:  Scheduled medications   Medication Dose Route Frequency    acetaminophen  650 mg oral q8h    amLODIPine  10 mg oral Daily    aspirin  81 mg oral Daily    atorvastatin  80 mg oral Nightly    carvedilol  25 mg oral BID    clopidogrel  75 mg oral Daily    ertapenem  1 g intravenous q24h    heparin (porcine)  5,000 Units subcutaneous q8h BOLIVAR    hydrALAZINE  25 mg oral TID    insulin glargine  3 Units subcutaneous Nightly    insulin lispro  0-5 Units subcutaneous TID AC    isosorbide mononitrate ER  60 mg oral Daily    melatonin  10 mg oral Nightly    pantoprazole  40 mg intravenous Daily    potassium chloride  20 mEq oral Once    ranolazine  500 mg oral BID    vancomycin  1,250 mg intravenous q24h     PRN medications   Medication    dextrose    dextrose    glucagon    glucagon    hydrALAZINE    OLANZapine    oxyCODONE    polyethylene glycol    QUEtiapine    vancomycin     Continuous Medications   Medication Dose Last Rate     Outpatient Medications:  Current Outpatient Medications   Medication Instructions    acetaminophen (TYLENOL) 500 mg, oral, Every 6 hours PRN, Take per directed    amLODIPine (Norvasc) 10 mg tablet TAKE 1 TABLET BY MOUTH ONCE DAILY    aspirin 81 mg, oral, Daily    atorvastatin (Lipitor) 80 mg tablet TAKE 1 TABLET BY MOUTH ONCE DAILY    BD Ultra-Fine Micro Pen Needle 32 gauge x 1/4\" needle     blood sugar diagnostic strip Use 1 strip to check blood " sugar 3 times a day with meals.    CALCIUM CITRATE ORAL 1 tablet, oral, Daily    carvedilol (Coreg) 25 mg tablet TAKE 1 TABLET BY MOUTH TWO TIMES A DAY    clopidogrel (PLAVIX) 75 mg, oral, Daily RT    diclofenac sodium (VOLTAREN) 4 g, Topical, 4 times daily PRN    Easy Touch Alcohol Prep Pads pads, medicated     Farxiga 10 mg, oral, Daily with breakfast    gabapentin (NEURONTIN) 300 mg, oral, Daily    hydrALAZINE (APRESOLINE) 10 mg, oral, 2 times daily    insulin glargine (LANTUS) 10 Units, subcutaneous, Nightly, Take as directed per insulin instructions.    insulin lispro 0-5 Units, subcutaneous, 3 times daily (morning, midday, late afternoon), 0 unit(s) if BG ; 1 unit(s) if -200; 2 unit(s) if -250; 3 unit(s) if -300; 4 unit(s) if -350; 5 unit(s) if -400    isosorbide mononitrate ER (Imdur) 60 mg 24 hr tablet TAKE 1 TABLET BY MOUTH ONCE DAILY    lancets misc Use three times a day to test blood sugar w/ meals    metFORMIN XR (Glucophage-XR) 500 mg 24 hr tablet TAKE 2 TABLETS BY MOUTH ONCE DAILY    multivitamin with minerals tablet 1 tablet, oral, Daily    oxyCODONE (OXY-IR) 5 mg, oral, Every 6 hours PRN    ranolazine (RANEXA) 500 mg, oral, 2 times daily, Do not crush, chew, or split.       Physical Exam:  General: NAD, AOx1  HEENT: EOMI, MMM  Neck:  No JVD at 45 degrees   Cardiac: RRR, systolic murmur  Lungs:  CTA on left, few rales right base  Abdomen: Soft, non-tender, Non-distended   Extremities: No LE edema. LLE dry gangrene   Neuro: AOx3, no apparent focal deficits       Assessment/Plan   Myrna Khan is a 65 y.o. female with history of HFimpEF (EF 55% July/2024, 35-40% in Aug/2023), CAD s/p CABG in 2023 (LIMA-LAD, APRIL-OM), PAD s/p L SFA stent (occluded in Nov/2023 w/ distal SFA reconstitution), renal artery stenosis (Sep/2023 US), carotid artery stenosis (Aug/2023 US), HTN, HLD, T2DM (A1c 14.6% Jan/2025) presenting with dry gangrene of LLE currently planned for LLE  angiogram and possible intervention (possible extensive debridement vs amputation or revascularization via bypass) with vascular surgery.     Cardiology consulted for pre-op risk stratification.     Most recent echocardiogram in July 2024 showing features suggestive of cardiac amyloidosis (apical sparring, LVH) prompting further work-up with cardiac MRI that showed extensive sub endocardial delayed enhancement with more prominent involvement of the lateral wall -->Findings that are nonspecific but can be seen in cardiac amyloidosis.   A technetium PYP scan was done as well and did not show any uptake -->negative for TTR amyloidosis.   A urine immunofixation electrophoresis was done but does not seem that a serum immunofixation and serum free light chains were collected and analysed at the time. --> recommend outpatient follow up and completion of her evaluation.     Patient hemodynamically stable with no acute cardiac complaints. She denies any acute or remote chest pain episodes, palpitations, SOB, NIELSEN, orthopnea, PND, nausea, vomiting, diaphoresis, lightheadedness, dizziness, presyncopal symptoms, LE swelling, LOC.   No signs of acute ACS, active chest pain, malignant arrythmias, decompensated HF or volume overload or symptomatic valvular disease.     #Risk stratification  RCRI 3 --> 15% of MACE.  Patient with RCRI of 3, has poor METS mainly related to her PAD and is going to undergo an intermediate/high risk surgery and has history of extensive atherosclerotic disease ---> as such, repeated a formal echocardiogram.  Found to have mildly reduced EF to 50%, unchanged from previous TTE 7/2024  No WMA.   No changes on echocardiogram and patient continues to have preserved/low normal systolic function, can proceed with surgery with no further cardiac work-up warranted     #HTN  - continue Amlodipine, Carvedilol, Imdur  - Increase Hydralazine to 50 mg TID. Further increase for goal SBP < 140 mmHg       Discussed with  Dr. Avila   Cardiology will follow    LEONIDES Huerta-CNP  Cardiology Consults    Please call with any questions  General Cardiology Consult Pager: [phone] (weekday 7AM-6PM and weekend 7AM-2PM) and other: [phone]  EP Consult Pager: [phone] (weekday 7AM-6PM and weekend 7AM-2PM) and other: [phone]  CICU Fellow Pager: [phone] anytime  EP Device Nurse Pager: [phone] (weekday 7AM-4PM)  Advanced Heart Failure Consult Pager: [phone] anytime       Code Status:  Full Code

## 2025-03-04 NOTE — NURSING NOTE
Ms. Khan is resting in bed -- declines a visit today.  Per team she will be returning to the long term care facility at discharge.  At this time will sign off.

## 2025-03-04 NOTE — OP NOTE
ANGIOGRAM, LOWER EXTREMITY (L) Operative Note     Date: 3/3/2025  OR Location: [facility] OR    Name: Myrna Khan, : 1960, Age: 65 y.o., MRN: [id number], Sex: female    Diagnosis  Pre-op Diagnosis      * PAD (peripheral artery disease) (CMS-HCC) [I73.9] Post-op Diagnosis     * PAD (peripheral artery disease) (CMS-HCC) [I73.9]     Procedures  ANGIOGRAM, LOWER EXTREMITY  07484 - CHG ANGIOGRAPHY EXTREMITY UNILATERAL RS&I      Surgeons      * Mrinalini Pederson - Primary    Resident/Fellow/Other Assistant:  Surgeons and Role:     * Raghavendra Pillai MD - Resident - Assisting    Staff:   Circulator: Doug  Scrub Person: Waqar Payneub Person: Iban Bosch Scrub: Ellie    Anesthesia Staff: Anesthesiologist: Armand Houston MD  C-AA: CAMILA Cui; CAMILA Rodriguez    Procedure Summary  Anesthesia: General  ASA: III  Estimated Blood Loss: ***mL  Intra-op Medications:   Administrations occurring from  to  on 25:   Medication Name Total Dose   iodixanol (VISIPaque) 320 mg iodine/mL injection 64 mL   heparin (porcine) 10,000 Units in sodium chloride 0.9% 1,000 mL irrigation 10,000 Units   acetaminophen (Tylenol) tablet 650 mg 650 mg   amLODIPine (Norvasc) tablet 10 mg Cannot be calculated   aspirin EC tablet 81 mg Cannot be calculated   atorvastatin (Lipitor) tablet 80 mg Cannot be calculated   carvedilol (Coreg) tablet 25 mg Cannot be calculated   clopidogrel (Plavix) tablet 75 mg Cannot be calculated   dextrose 50 % injection 12.5 g Cannot be calculated   dextrose 50 % injection 25 g Cannot be calculated   ertapenem (INVanz) 1 g in sodium chloride 0.9% IV 50 mL Cannot be calculated   glucagon (Glucagen) injection 1 mg Cannot be calculated   glucagon (Glucagen) injection 1 mg Cannot be calculated   heparin (porcine) injection 5,000 Units Cannot be calculated   hydrALAZINE (Apresoline) tablet 25 mg Cannot be calculated   insulin glargine (Lantus) injection 3 Units Cannot be calculated    insulin lispro injection 0-5 Units Cannot be calculated   isosorbide mononitrate ER (Imdur) 24 hr tablet 60 mg Cannot be calculated   melatonin tablet 10 mg Cannot be calculated   pantoprazole (ProtoNix) injection 40 mg Cannot be calculated   polyethylene glycol (Glycolax, Miralax) packet 17 g Cannot be calculated   QUEtiapine (SEROquel) tablet 25 mg Cannot be calculated   ranolazine (Ranexa) 12 hr tablet 500 mg Cannot be calculated   vancomycin (Vancocin) 1,250 mg in sodium chloride 0.9% 250 mL IV Cannot be calculated   vancomycin (Vancocin) pharmacy to dose - pharmacy monitoring Cannot be calculated   ceFAZolin (Ancef) vial 1 g 1 g   fentaNYL (Sublimaze) injection 50 mcg/mL 100 mcg   hydrALAZINE (Apresoline) tablet 25 mg Cannot be calculated   HYDROmorphone (Dilaudid) injection 0.5 mg 1 mg   LR bolus Cannot be calculated   lidocaine (cardiac) injection 2% prefilled syringe 100 mg   OLANZapine (ZyPREXA) injection 5 mg Cannot be calculated   ondansetron (Zofran) 2 mg/mL injection 4 mg   phenylephrine (Gerardo-Synephrine) injection 120 mcg   propofol (Diprivan) injection 10 mg/mL 150 mg   rocuronium (ZeMuron) 50 mg/5 mL injection 50 mg   sugammadex (Bridion) 200 mg/2 mL injection 200 mg              Anesthesia Record               Intraprocedure I/O Totals          Output    Urine 350 mL    Total Output 350 mL          Specimen: No specimens collected              Drains and/or Catheters:   Urethral Catheter 18 Fr. (Active)   Site Assessment Clean;Skin intact 03/04/25 0943   Collection Container Standard drainage bag 03/03/25 1841   Securement Method Securing device (Describe) 03/03/25 1841   Reason for Continuing Urinary Catheterization acute urinary retention 03/04/25 0943   Output (mL) 550 mL 03/04/25 1530       Tourniquet Times:         Implants:     Findings: ***    Indications: Myrna Khan is an 65 y.o. female who is having surgery for PAD (peripheral artery disease) (CMS-HCC) [I73.9]. ***    The patient was  "seen in the preoperative area. The risks, benefits, complications, treatment options, non-operative alternatives, expected recovery and outcomes were discussed with the patient. The possibilities of reaction to medication, pulmonary aspiration, injury to surrounding structures, bleeding, recurrent infection, the need for additional procedures, failure to diagnose a condition, and creating a complication requiring transfusion or operation were discussed with the patient. The patient concurred with the proposed plan, giving informed consent.  The site of surgery was properly noted/marked if necessary per policy. The patient has been actively warmed in preoperative area. Preoperative antibiotics {OR OPERATIVE ANTIBIOTICS:6942742716} Venous thrombosis prophylaxis {OR OPERATIVE PROPHYLAXIS:3728001220}    Procedure Details: ***  Complications:  {findings; complications:89783::\"None; patient tolerated the procedure well.\"}    Disposition: {Op note disposition:39983}  Condition: {stable/unstable:67999::\"stable\"}                 Additional Details: ***    Attending Attestation: {Op note attestation (Optional):68247}    Bobby Pederson  Phone Number: [phone]      " dry gangrene of her ankle and lower calf.  CTA showed occlusion of the left external iliac artery and left SFA stent.  Difficult to assess the patency of the tibial vessels and remaining runoff on the CTA.  As a result, a left lower extremity angiogram was recommended.  The risks and benefits of the procedure including bleeding, infection, injury to surrounding structures, distal embolization, thrombosis, dissection, need for additional procedures, possible eventual limb loss were all discussed with the patient after which she elected to proceed with the procedure.    The patient was seen in the preoperative area. The risks, benefits, complications, treatment options, non-operative alternatives, expected recovery and outcomes were discussed with the patient. The possibilities of reaction to medication, pulmonary aspiration, injury to surrounding structures, bleeding, recurrent infection, the need for additional procedures, failure to diagnose a condition, and creating a complication requiring transfusion or operation were discussed with the patient. The patient concurred with the proposed plan, giving informed consent.  The site of surgery was properly noted/marked if necessary per policy. The patient has been actively warmed in preoperative area. Preoperative antibiotics are not indicated. Venous thrombosis prophylaxis are not indicated.    Procedure Details:     The patient was taken to the operating room and placed in supine position.  General anesthesia was then induced.  The patient was then prepped and draped in the usual sterile fashion.  A timeout was then performed to verify the patient, procedure, site prior to beginning.    Using ultrasound guidance, the right common femoral artery was accessed with microneedle and microwire.  A micro sheath was then advanced into the right common femoral artery and a right femoral angiogram was performed with a findings as listed above.  Next, a floppy Glidewire was  advanced into the aorta.  The micro sheath was then exchanged for a short 5 Georgian sheath.  A Omni Flush catheter was then advanced into the infrarenal aorta and an aortogram followed by a left lower extremity angiogram was performed with the findings as listed above.  Given the extensive involvement of the left common femoral artery, decision was made to return to the operating room at a later date for a left femoral endarterectomy with iliac stenting and possible SFA recanalization.  At this time, the right common femoral artery sheath was removed and pressure was held for 20 minutes.  There was no hematoma at the end of this.  Patient had monophasic pedal signals bilaterally at the end of the case.  She tolerated the procedure well and was extubated without any difficulty.  She was taken to the PACU in stable condition.  I was present for the entirety of the case.    Complications:  None; patient tolerated the procedure well.    Disposition: PACU - hemodynamically stable.  Condition: stable     Attending Attestation: I was present and scrubbed for the entire procedure.    Bobby Pederson  Phone Number: [phone]

## 2025-03-04 NOTE — SIGNIFICANT EVENT
S:   POD 0 from LLE angiography w/ R CFA access. Patient denies chest pain, shortness of breath, nausea, vomiting. Endorses fatigue. Pain in LLE. Patient tilting to side, refusing to lay flat.    O:   Vital signs are stable, normotensive, afebrile, no new or worsening oxygen requirement, not tachycardic    Visit Vitals  /65   Pulse 73   Temp 36 °C (96.8 °F)   Resp 12      Constitutional: no acute distress, lethargic  Skin: warm and dry overall   HEENT: Atraumatic  Cardiac: RR  Pulmonary: Unlabored respirations on room air  Abdomen: Non distended, non tender  Extremities: PAINTING. RLE dry. LLE with dry gangrene on the dorsal aspect of her foot to her mid shin, no sensation at that site.  Vascular: Multiphasic R DP/PT. Monophasic L DP. R groin covered w/ dressing, c/d/I, no strikethrough, soft w/out swelling or ecchymosis. No palpable pulsatile mass.     A/P:  65F s/p LLE angiography w/ R groin access. Doing well postoperatively.    - Diet as tolerated  - Flat time 6 hours (ending 11pm)  - DVT ppx tomorrow  - Risk-stratify and optimize for left femoral endarterectomy and tibial bypass

## 2025-03-05 LAB
ABO GROUP (TYPE) IN BLOOD: NORMAL
ALBUMIN SERPL BCP-MCNC: 2.7 G/DL (ref 3.4–5)
ANION GAP SERPL CALC-SCNC: 11 MMOL/L (ref 10–20)
ANTIBODY SCREEN: NORMAL
BASOPHILS # BLD AUTO: 0.05 X10*3/UL (ref 0–0.1)
BASOPHILS NFR BLD AUTO: 0.6 %
BUN SERPL-MCNC: 11 MG/DL (ref 6–23)
CALCIUM SERPL-MCNC: 8.4 MG/DL (ref 8.6–10.6)
CHLORIDE SERPL-SCNC: 101 MMOL/L (ref 98–107)
CO2 SERPL-SCNC: 26 MMOL/L (ref 21–32)
CREAT SERPL-MCNC: 0.61 MG/DL (ref 0.5–1.05)
EGFRCR SERPLBLD CKD-EPI 2021: >90 ML/MIN/1.73M*2
EOSINOPHIL # BLD AUTO: 0.17 X10*3/UL (ref 0–0.7)
EOSINOPHIL NFR BLD AUTO: 1.9 %
ERYTHROCYTE [DISTWIDTH] IN BLOOD BY AUTOMATED COUNT: 11.9 % (ref 11.5–14.5)
GLUCOSE BLD MANUAL STRIP-MCNC: 174 MG/DL (ref 74–99)
GLUCOSE BLD MANUAL STRIP-MCNC: 186 MG/DL (ref 74–99)
GLUCOSE BLD MANUAL STRIP-MCNC: 242 MG/DL (ref 74–99)
GLUCOSE BLD MANUAL STRIP-MCNC: 245 MG/DL (ref 74–99)
GLUCOSE BLD MANUAL STRIP-MCNC: 265 MG/DL (ref 74–99)
GLUCOSE BLD MANUAL STRIP-MCNC: 310 MG/DL (ref 74–99)
GLUCOSE BLD MANUAL STRIP-MCNC: 320 MG/DL (ref 74–99)
GLUCOSE SERPL-MCNC: 308 MG/DL (ref 74–99)
HCT VFR BLD AUTO: 30.7 % (ref 36–46)
HGB BLD-MCNC: 10.3 G/DL (ref 12–16)
IMM GRANULOCYTES # BLD AUTO: 0.05 X10*3/UL (ref 0–0.7)
IMM GRANULOCYTES NFR BLD AUTO: 0.6 % (ref 0–0.9)
LYMPHOCYTES # BLD AUTO: 2.54 X10*3/UL (ref 1.2–4.8)
LYMPHOCYTES NFR BLD AUTO: 28.3 %
MCH RBC QN AUTO: 32.3 PG (ref 26–34)
MCHC RBC AUTO-ENTMCNC: 33.6 G/DL (ref 32–36)
MCV RBC AUTO: 96 FL (ref 80–100)
MONOCYTES # BLD AUTO: 0.81 X10*3/UL (ref 0.1–1)
MONOCYTES NFR BLD AUTO: 9 %
NEUTROPHILS # BLD AUTO: 5.34 X10*3/UL (ref 1.2–7.7)
NEUTROPHILS NFR BLD AUTO: 59.6 %
NRBC BLD-RTO: 0 /100 WBCS (ref 0–0)
PHOSPHATE SERPL-MCNC: 2.4 MG/DL (ref 2.5–4.9)
PLATELET # BLD AUTO: 444 X10*3/UL (ref 150–450)
POTASSIUM SERPL-SCNC: 3.8 MMOL/L (ref 3.5–5.3)
RBC # BLD AUTO: 3.19 X10*6/UL (ref 4–5.2)
RH FACTOR (ANTIGEN D): NORMAL
SODIUM SERPL-SCNC: 134 MMOL/L (ref 136–145)
WBC # BLD AUTO: 9 X10*3/UL (ref 4.4–11.3)

## 2025-03-05 PROCEDURE — 2500000001 HC RX 250 WO HCPCS SELF ADMINISTERED DRUGS (ALT 637 FOR MEDICARE OP): Performed by: STUDENT IN AN ORGANIZED HEALTH CARE EDUCATION/TRAINING PROGRAM

## 2025-03-05 PROCEDURE — 1200000002 HC GENERAL ROOM WITH TELEMETRY DAILY

## 2025-03-05 PROCEDURE — 2500000002 HC RX 250 W HCPCS SELF ADMINISTERED DRUGS (ALT 637 FOR MEDICARE OP, ALT 636 FOR OP/ED): Performed by: STUDENT IN AN ORGANIZED HEALTH CARE EDUCATION/TRAINING PROGRAM

## 2025-03-05 PROCEDURE — 2500000004 HC RX 250 GENERAL PHARMACY W/ HCPCS (ALT 636 FOR OP/ED): Performed by: STUDENT IN AN ORGANIZED HEALTH CARE EDUCATION/TRAINING PROGRAM

## 2025-03-05 PROCEDURE — 99233 SBSQ HOSP IP/OBS HIGH 50: CPT | Performed by: NURSE PRACTITIONER

## 2025-03-05 PROCEDURE — 99233 SBSQ HOSP IP/OBS HIGH 50: CPT | Performed by: STUDENT IN AN ORGANIZED HEALTH CARE EDUCATION/TRAINING PROGRAM

## 2025-03-05 PROCEDURE — 86900 BLOOD TYPING SEROLOGIC ABO: CPT | Performed by: STUDENT IN AN ORGANIZED HEALTH CARE EDUCATION/TRAINING PROGRAM

## 2025-03-05 PROCEDURE — 36415 COLL VENOUS BLD VENIPUNCTURE: CPT | Performed by: STUDENT IN AN ORGANIZED HEALTH CARE EDUCATION/TRAINING PROGRAM

## 2025-03-05 PROCEDURE — 86923 COMPATIBILITY TEST ELECTRIC: CPT

## 2025-03-05 PROCEDURE — 82947 ASSAY GLUCOSE BLOOD QUANT: CPT

## 2025-03-05 PROCEDURE — 85025 COMPLETE CBC W/AUTO DIFF WBC: CPT | Performed by: STUDENT IN AN ORGANIZED HEALTH CARE EDUCATION/TRAINING PROGRAM

## 2025-03-05 PROCEDURE — 86850 RBC ANTIBODY SCREEN: CPT | Performed by: STUDENT IN AN ORGANIZED HEALTH CARE EDUCATION/TRAINING PROGRAM

## 2025-03-05 PROCEDURE — 80069 RENAL FUNCTION PANEL: CPT | Performed by: STUDENT IN AN ORGANIZED HEALTH CARE EDUCATION/TRAINING PROGRAM

## 2025-03-05 RX ORDER — INSULIN GLARGINE 100 [IU]/ML
7 INJECTION, SOLUTION SUBCUTANEOUS NIGHTLY
Status: DISCONTINUED | OUTPATIENT
Start: 2025-03-05 | End: 2025-03-07

## 2025-03-05 RX ORDER — INSULIN GLARGINE 100 [IU]/ML
10 INJECTION, SOLUTION SUBCUTANEOUS NIGHTLY
Status: DISCONTINUED | OUTPATIENT
Start: 2025-03-05 | End: 2025-03-05

## 2025-03-05 RX ORDER — NIFEDIPINE 90 MG/1
90 TABLET, EXTENDED RELEASE ORAL
Status: DISCONTINUED | OUTPATIENT
Start: 2025-03-05 | End: 2025-03-12 | Stop reason: HOSPADM

## 2025-03-05 RX ORDER — INSULIN LISPRO 100 [IU]/ML
5 INJECTION, SOLUTION INTRAVENOUS; SUBCUTANEOUS
Status: DISCONTINUED | OUTPATIENT
Start: 2025-03-05 | End: 2025-03-08

## 2025-03-05 RX ADMIN — OXYCODONE 2.5 MG: 5 TABLET ORAL at 08:47

## 2025-03-05 RX ADMIN — INSULIN GLARGINE 7 UNITS: 100 INJECTION, SOLUTION SUBCUTANEOUS at 21:03

## 2025-03-05 RX ADMIN — PANTOPRAZOLE SODIUM 40 MG: 40 INJECTION, POWDER, FOR SOLUTION INTRAVENOUS at 08:47

## 2025-03-05 RX ADMIN — CARVEDILOL 25 MG: 12.5 TABLET, FILM COATED ORAL at 21:10

## 2025-03-05 RX ADMIN — ATORVASTATIN CALCIUM 80 MG: 80 TABLET, FILM COATED ORAL at 20:58

## 2025-03-05 RX ADMIN — ISOSORBIDE MONONITRATE 60 MG: 30 TABLET, EXTENDED RELEASE ORAL at 08:47

## 2025-03-05 RX ADMIN — QUETIAPINE FUMARATE 25 MG: 25 TABLET ORAL at 20:57

## 2025-03-05 RX ADMIN — INSULIN LISPRO 5 UNITS: 100 INJECTION, SOLUTION INTRAVENOUS; SUBCUTANEOUS at 12:19

## 2025-03-05 RX ADMIN — RANOLAZINE 500 MG: 500 TABLET, EXTENDED RELEASE ORAL at 20:58

## 2025-03-05 RX ADMIN — HYDRALAZINE HYDROCHLORIDE 50 MG: 50 TABLET ORAL at 08:47

## 2025-03-05 RX ADMIN — ACETAMINOPHEN 650 MG: 325 TABLET, FILM COATED ORAL at 12:18

## 2025-03-05 RX ADMIN — OXYCODONE 2.5 MG: 5 TABLET ORAL at 16:26

## 2025-03-05 RX ADMIN — QUETIAPINE FUMARATE 25 MG: 25 TABLET ORAL at 03:47

## 2025-03-05 RX ADMIN — INSULIN LISPRO 1 UNITS: 100 INJECTION, SOLUTION INTRAVENOUS; SUBCUTANEOUS at 12:19

## 2025-03-05 RX ADMIN — HEPARIN SODIUM 5000 UNITS: 5000 INJECTION, SOLUTION INTRAVENOUS; SUBCUTANEOUS at 16:26

## 2025-03-05 RX ADMIN — SODIUM CHLORIDE 1 G: 900 INJECTION INTRAVENOUS at 14:40

## 2025-03-05 RX ADMIN — Medication 10 MG: at 20:58

## 2025-03-05 RX ADMIN — NIFEDIPINE 90 MG: 90 TABLET, FILM COATED, EXTENDED RELEASE ORAL at 08:48

## 2025-03-05 RX ADMIN — VANCOMYCIN HYDROCHLORIDE 1250 MG: 5 INJECTION, POWDER, LYOPHILIZED, FOR SOLUTION INTRAVENOUS at 06:32

## 2025-03-05 RX ADMIN — CARVEDILOL 25 MG: 12.5 TABLET, FILM COATED ORAL at 08:48

## 2025-03-05 RX ADMIN — INSULIN LISPRO 5 UNITS: 100 INJECTION, SOLUTION INTRAVENOUS; SUBCUTANEOUS at 08:01

## 2025-03-05 RX ADMIN — HYDRALAZINE HYDROCHLORIDE 50 MG: 50 TABLET ORAL at 16:26

## 2025-03-05 RX ADMIN — INSULIN LISPRO 4 UNITS: 100 INJECTION, SOLUTION INTRAVENOUS; SUBCUTANEOUS at 07:52

## 2025-03-05 RX ADMIN — HEPARIN SODIUM 5000 UNITS: 5000 INJECTION, SOLUTION INTRAVENOUS; SUBCUTANEOUS at 08:47

## 2025-03-05 RX ADMIN — RANOLAZINE 500 MG: 500 TABLET, EXTENDED RELEASE ORAL at 09:11

## 2025-03-05 RX ADMIN — ACETAMINOPHEN 650 MG: 325 TABLET, FILM COATED ORAL at 03:47

## 2025-03-05 RX ADMIN — INSULIN LISPRO 5 UNITS: 100 INJECTION, SOLUTION INTRAVENOUS; SUBCUTANEOUS at 16:28

## 2025-03-05 RX ADMIN — INSULIN LISPRO 3 UNITS: 100 INJECTION, SOLUTION INTRAVENOUS; SUBCUTANEOUS at 16:28

## 2025-03-05 RX ADMIN — ASPIRIN 81 MG: 81 TABLET, COATED ORAL at 08:47

## 2025-03-05 ASSESSMENT — COGNITIVE AND FUNCTIONAL STATUS - GENERAL
DRESSING REGULAR LOWER BODY CLOTHING: A LOT
DRESSING REGULAR UPPER BODY CLOTHING: A LOT
TOILETING: A LOT
MOBILITY SCORE: 13
TOILETING: A LOT
DAILY ACTIVITIY SCORE: 14
EATING MEALS: A LITTLE
EATING MEALS: A LITTLE
DAILY ACTIVITIY SCORE: 14
PERSONAL GROOMING: A LITTLE
MOVING FROM LYING ON BACK TO SITTING ON SIDE OF FLAT BED WITH BEDRAILS: A LITTLE
PERSONAL GROOMING: A LITTLE
MOVING TO AND FROM BED TO CHAIR: A LITTLE
HELP NEEDED FOR BATHING: A LOT
WALKING IN HOSPITAL ROOM: TOTAL
DRESSING REGULAR LOWER BODY CLOTHING: A LOT
DRESSING REGULAR UPPER BODY CLOTHING: A LOT
HELP NEEDED FOR BATHING: A LOT
CLIMB 3 TO 5 STEPS WITH RAILING: TOTAL
STANDING UP FROM CHAIR USING ARMS: A LOT
TURNING FROM BACK TO SIDE WHILE IN FLAT BAD: A LITTLE

## 2025-03-05 ASSESSMENT — PAIN SCALES - GENERAL
PAINLEVEL_OUTOF10: 6
PAINLEVEL_OUTOF10: 5 - MODERATE PAIN
PAINLEVEL_OUTOF10: 9
PAINLEVEL_OUTOF10: 9
PAINLEVEL_OUTOF10: 8
PAINLEVEL_OUTOF10: 0 - NO PAIN
PAINLEVEL_OUTOF10: 7
PAINLEVEL_OUTOF10: 7
PAINLEVEL_OUTOF10: 9

## 2025-03-05 ASSESSMENT — PAIN DESCRIPTION - DESCRIPTORS
DESCRIPTORS: ACHING;BURNING;CRUSHING
DESCRIPTORS: ACHING;BURNING;CRAMPING
DESCRIPTORS: ACHING;CRAMPING;BURNING
DESCRIPTORS: ACHING;BURNING;CRAMPING

## 2025-03-05 ASSESSMENT — PAIN - FUNCTIONAL ASSESSMENT
PAIN_FUNCTIONAL_ASSESSMENT: 0-10

## 2025-03-05 ASSESSMENT — PAIN SCALES - WONG BAKER: WONGBAKER_NUMERICALRESPONSE: NO HURT

## 2025-03-05 NOTE — PROGRESS NOTES
Assessment/Plan     Myrna Khan is a 65 y.o. 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 Pt noting skin sloughing, edema, clear drainage. At op visit, difficulty palpating pulse, had a monophasic AT and PT signals on the LLE and no palpable femoral pulse. CTA showed occlusion of common and external iliac arteries in addition to occluded SFA stent with distal reconstruction. Podiatry consulted. Pt is started on broad spectrum abx and ordered pvr and vein mapping..Ordered angiogram, echo for preop risk strafication, appreciate cardiology recs. Pt seeing gyne for cystic ovarian mass suspicious for malignancy, cr is stable but after day placement nursing reporting 2.5 liters of output.    3/5  -Pt was seen  and examined this am, symptoms have improved since yesterday. Pt has no acute event overnight. Addressed all of the patient concerns and explained the treatment plan. Mentation wise is more awake , alert and oriented.   -stopped Amlodipine and started Nifedipine 90 mg daily instead, trying to get better BP control. next thoughts will be to increase hydralazine to 75 mg TID and eventually increase Imdur to 120 if needed.  -BG is 320, added lispro 5 units, keep Lantus to 7 U, giving he is NPO tonight   -The ethics committee met this morning and support revascularization    -Plan for vascular interventions Thursday OR with Vascular. Plavix held 3/4. NPO midnight.       3/4  -Mentation starting to improve after decompression but appears to be a continued issue.    -Reviewed echo and angiogram.   -Changed from cefepime to ertapenem. Will reassess mentation  -Urine retention, day 2.5 liters out. -2 liters  yesterday. Need to watch and possibly give some fluid back if post-obstructive diuresis. Do not remove day.   -Pt need reeval from gyne for the ovarian mass concerning for cancer, mentation had been off since the first overnight of her admit.  They were waiting for mentation to be better.   -Held on ordering regadenoson stress test for to risk stratify her for surgery per cardiology recommendations.     -Plan for vascular interventions Thursday OR with Vascular. Plavix held 3/4.   -Increased Hydralazine. She was quite hypertensive.    -wound care applied   -More awake and alert and oriented   -ID consulted   -Hypokalemia replaced   -Ethics [id number] consulted. Planning for a meeting before Thursday    -Podiatry, no plan to intervene until after vascular surgery does bypass          40 min     -----------------------------------------      #LLE wounds with CLI  #PAOD  #Dry gangrene  - CT showing common and external iliac arteries occlusion + SFA occlusion with reconstitution of her L popliteal artery and intact tibial runoff   - no gas on imaging   - PVR/vein mapping ordered.    - appreciate podiatry recs  - mulitmodal analgesia with bowel regimen        #Cystic ovarian mass  #urinary retention  - noted on imaging 2023 per rads but unable to compare given no image and just a report.   - pt today reports she was aware of the mass previously.   -  gyne evaluating, appreciate recs.   - with significant urinary retention leading to suspected delirum, s/p day with some improved mentation. 2.5L out on placement. Monitor for signs of injury from rapid decompression v. Post obstructive diuresis  - Net neg 2 L per I/o last 24hrs. Will monitor and likely replace if continued losses after procedure.        #Delirium, resolving   - likely 2/2 urine retnetion. Potentially related to opioid started yesterday v. Hospital related sundowning. May be related to infection/pain v. Related to cefepime neurotox.   - held opioid and reeval  "needs  - dc cefepime, change to ertapenem.     #HTN  #HFimpEF (EF 55% July/2024, 35-40% in Aug/2023)  - MRI and NM PYP negative for amyloid,  SPEP and UPEP negative  - on home amlodipine 10mg, carvedilol 25mg, isosorvide 60mg        #CAD s/p CABG in 2023 (LIMA-LAD, APRIL-OM)  - EKG showing T wave inversions on anterior leads  -  patient asymptomatic, no cp or equivalents  - tele monitoring  - Appreciate cardiology recs.        #T2DM (A1c 14.6% Jan/2025)  - on home metformin, dapagliflozin, insulin lantus 10u PM and SSI  - given prior hyperglycemia in Jan/2025, will be more aggressive with insulin regimen  - hold metformin and dapa  - lantus 8u + SSI. Will adjust as needed       Scheduled outpatient appointments in system:   No future appointments.    ---------------------------------------------------------------------------------------------------  Subjective   No events.    ---------------------------------------------------------------------------------------------------  Objective   Last Recorded Vitals  Blood pressure 121/60, pulse 85, temperature 36.7 °C (98.1 °F), temperature source Temporal, resp. rate 16, height 1.6 m (5' 3\"), weight (!) 42.6 kg (94 lb), SpO2 97%.  Intake/Output last 3 Shifts:  I/O last 3 completed shifts:  In: 165 (3.9 mL/kg) [P.O.:50; I.V.:65 (1.5 mL/kg); IV Piggyback:50]  Out: 2110 (49.5 mL/kg) [Urine:2110 (1.4 mL/kg/hr)]  Weight: 42.6 kg     Physical Exam  Vitals and nursing note reviewed.   Constitutional:       General: She is not in acute distress.     Appearance: Normal appearance. She is not ill-appearing or toxic-appearing.   HENT:      Head: Normocephalic and atraumatic.      Mouth/Throat:      Mouth: Mucous membranes are moist.   Eyes:      General: No scleral icterus.     Extraocular Movements: Extraocular movements intact.      Conjunctiva/sclera: Conjunctivae normal.   Cardiovascular:      Rate and Rhythm: Normal rate and regular rhythm.      Heart sounds: S1 normal and S2 " normal. No murmur heard.     Comments: Difficult to palpate distal pulses  Pulmonary:      Effort: Pulmonary effort is normal. No respiratory distress.      Breath sounds: No wheezing, rhonchi or rales.   Abdominal:      General: Bowel sounds are normal. There is no distension.      Palpations: Abdomen is soft.      Tenderness: There is no abdominal tenderness. There is no guarding or rebound.   Musculoskeletal:         General: No swelling or deformity.      Cervical back: Neck supple.   Skin:     Findings: No rash.      Comments: Left foot edema, denuded skin on doral surface, erythema and ttp    Neurological:      General: No focal deficit present.      Mental Status: She is alert. Mental status is at baseline.   Psychiatric:         Mood and Affect: Mood normal.         Relevant Results  Lab Results   Component Value Date    WBC 9.0 03/05/2025    HGB 10.3 (L) 03/05/2025    HCT 30.7 (L) 03/05/2025    MCV 96 03/05/2025     03/05/2025      Lab Results   Component Value Date    GLUCOSE 308 (H) 03/05/2025    CALCIUM 8.4 (L) 03/05/2025     (L) 03/05/2025    K 3.8 03/05/2025    CO2 26 03/05/2025     03/05/2025    BUN 11 03/05/2025    CREATININE 0.61 03/05/2025     Scheduled medications  acetaminophen, 650 mg, oral, q8h  aspirin, 81 mg, oral, Daily  atorvastatin, 80 mg, oral, Nightly  carvedilol, 25 mg, oral, BID  [Held by provider] clopidogrel, 75 mg, oral, Daily  ertapenem, 1 g, intravenous, q24h  heparin (porcine), 5,000 Units, subcutaneous, q8h BOLIVAR  hydrALAZINE, 50 mg, oral, TID  insulin glargine, 10 Units, subcutaneous, Nightly  insulin lispro, 0-5 Units, subcutaneous, TID AC  insulin lispro, 5 Units, subcutaneous, TID AC  isosorbide mononitrate ER, 60 mg, oral, Daily  melatonin, 10 mg, oral, Nightly  NIFEdipine ER, 90 mg, oral, Daily before breakfast  pantoprazole, 40 mg, intravenous, Daily  ranolazine, 500 mg, oral, BID  vancomycin, 1,250 mg, intravenous, q24h      Continuous medications      PRN medications  PRN medications: dextrose, glucagon, hydrALAZINE, OLANZapine, oxyCODONE, polyethylene glycol, QUEtiapine, vancomycin    Savanna Moulton MD

## 2025-03-05 NOTE — CARE PLAN
The patient's goals for the shift include patient pain will be controled during shift    The clinical goals for the shift include Patient will remain HDS during shift    Other goals include:  Problem: Pain - Adult  Goal: Verbalizes/displays adequate comfort level or baseline comfort level  Outcome: Progressing     Problem: Safety - Adult  Goal: Free from fall injury  Outcome: Progressing     Problem: Discharge Planning  Goal: Discharge to home or other facility with appropriate resources  Outcome: Progressing     Problem: Chronic Conditions and Co-morbidities  Goal: Patient's chronic conditions and co-morbidity symptoms are monitored and maintained or improved  Outcome: Progressing     Problem: Nutrition  Goal: Nutrient intake appropriate for maintaining nutritional needs  Outcome: Progressing     Problem: Skin  Goal: Decreased wound size/increased tissue granulation at next dressing change  3/5/2025 1400 by Petra Lawrence RN  Outcome: Progressing  3/5/2025 1400 by Petra Lawrence RN  Flowsheets (Taken 3/5/2025 1400)  Decreased wound size/increased tissue granulation at next dressing change:   Protective dressings over bony prominences   Promote sleep for wound healing  Goal: Participates in plan/prevention/treatment measures  3/5/2025 1400 by Petra Lawrence RN  Outcome: Progressing  3/5/2025 1400 by Petra Lawrence RN  Flowsheets (Taken 3/5/2025 1400)  Participates in plan/prevention/treatment measures:   Elevate heels   Discuss with provider PT/OT consult  Goal: Prevent/manage excess moisture  3/5/2025 1400 by Petra Lawrenec RN  Outcome: Progressing  3/5/2025 1400 by Petra Lawrence RN  Flowsheets (Taken 3/5/2025 1400)  Prevent/manage excess moisture:   Follow provider orders for dressing changes   Monitor for/manage infection if present  Goal: Prevent/minimize sheer/friction injuries  3/5/2025 1400 by Petra Lawrence RN  Outcome: Progressing  3/5/2025 1400 by Petra Lawrence RN  Flowsheets (Taken 3/5/2025  1400)  Prevent/minimize sheer/friction injuries:   HOB 30 degrees or less   Turn/reposition every 2 hours/use positioning/transfer devices  Goal: Promote/optimize nutrition  3/5/2025 1400 by Petra Lawrence RN  Outcome: Progressing  3/5/2025 1400 by Petra Lawrence RN  Flowsheets (Taken 3/5/2025 1400)  Promote/optimize nutrition:   Consume > 50% meals/supplements   Monitor/record intake including meals  Goal: Promote skin healing  3/5/2025 1400 by Petra Lawrence RN  Outcome: Progressing  3/5/2025 1400 by Petra Lawrence RN  Flowsheets (Taken 3/5/2025 1400)  Promote skin healing:   Protective dressings over bony prominences   Ensure correct size (line/device) and apply per  instructions     Problem: Diabetes  Goal: Achieve decreasing blood glucose levels by end of shift  Outcome: Progressing  Goal: Increase stability of blood glucose readings by end of shift  Outcome: Progressing  Goal: Decrease in ketones present in urine by end of shift  Outcome: Progressing  Goal: Maintain electrolyte levels within acceptable range throughout shift  Outcome: Progressing  Goal: Maintain glucose levels >70mg/dl to <250mg/dl throughout shift  Outcome: Progressing  Goal: No changes in neurological exam by end of shift  Outcome: Progressing  Goal: Learn about and adhere to nutrition recommendations by end of shift  Outcome: Progressing  Goal: Vital signs within normal range for age by end of shift  Outcome: Progressing  Goal: Increase self care and/or family involovement by end of shift  Outcome: Progressing  Goal: Receive DSME education by end of shift  Outcome: Progressing     Problem: Pain  Goal: Takes deep breaths with improved pain control throughout the shift  Outcome: Progressing  Goal: Turns in bed with improved pain control throughout the shift  Outcome: Progressing  Goal: Walks with improved pain control throughout the shift  Outcome: Progressing  Goal: Performs ADL's with improved pain control throughout  shift  Outcome: Progressing  Goal: Participates in PT with improved pain control throughout the shift  Outcome: Progressing  Goal: Free from opioid side effects throughout the shift  Outcome: Progressing  Goal: Free from acute confusion related to pain meds throughout the shift  Outcome: Progressing     Problem: Heart Failure  Goal: Improved gas exchange this shift  Outcome: Progressing  Goal: Improved urinary output this shift  Outcome: Progressing  Goal: Reduction in peripheral edema within 24 hours  Outcome: Progressing  Goal: Report improvement of dyspnea/breathlessness this shift  Outcome: Progressing  Goal: Weight from fluid excess reduced over 2-3 days, then stabilize  Outcome: Progressing  Goal: Increase self care and/or family involvement in 24 hours  Outcome: Progressing     Problem: Fall/Injury  Goal: Not fall by end of shift  Outcome: Progressing  Goal: Be free from injury by end of the shift  Outcome: Progressing  Goal: Verbalize understanding of personal risk factors for fall in the hospital  Outcome: Progressing  Goal: Verbalize understanding of risk factor reduction measures to prevent injury from fall in the home  Outcome: Progressing  Goal: Use assistive devices by end of the shift  Outcome: Progressing  Goal: Pace activities to prevent fatigue by end of the shift  Outcome: Progressing

## 2025-03-05 NOTE — CARE PLAN
The patient's goals for the shift include patient pain will be controled during shift    The clinical goals for the shift include Pt will feel less pain throughout the shift.    Over the shift, the patient did not make progress toward the following goals. Barriers to progression include . Recommendations to address these barriers include .

## 2025-03-05 NOTE — PROGRESS NOTES
Subjective:  SR 70-80s  Hypertensive  Denies CP/ SOB/dizziness, palpitations     Sitter at bedside         Last Recorded Vitals:  Vitals:    03/04/25 1621 03/05/25 0557 03/05/25 0744 03/05/25 1133   BP: 169/80 155/70 161/87 172/90   BP Location: Right arm  Right arm    Patient Position: Lying  Lying    Pulse: 78 85 81 80   Resp: 18 18 16 16   Temp: 36.7 °C (98.1 °F)  37.1 °C (98.8 °F) 36.1 °C (97 °F)   TempSrc: Temporal  Temporal    SpO2: 97% 96% 97% 98%   Weight:       Height:           Last Labs:  CBC - 3/5/2025:  5:50 AM  9.0 10.3 444    30.7      CMP - 3/5/2025:  5:50 AM  8.4 7.8 19 --- 0.3   2.4 2.7 17 176      PTT - 3/1/2025:  6:33 AM  1.2   13.1 35     Troponin I, High Sensitivity   Date/Time Value Ref Range Status   01/11/2024 03:37 PM 22 0 - 34 ng/L Final     Troponin I, High Sensitivity ([facility])   Date/Time Value Ref Range Status   02/28/2025 06:01 PM 5 0 - 34 ng/L Final   01/18/2025 06:12 AM 14 0 - 34 ng/L Final   01/18/2025 04:50 AM 14 0 - 34 ng/L Final     BNP   Date/Time Value Ref Range Status   02/28/2025 06:01  (H) 0 - 99 pg/mL Final   07/15/2024 11:55  (H) 0 - 99 pg/mL Final     Hemoglobin A1C   Date/Time Value Ref Range Status   01/09/2025 06:34 AM 14.6 (H) See comment % Final   07/15/2024 11:55 AM 15.2 (H) see below % Final     Comment:     Hemoglobin A1c is >15%. Marked elevations in HbA1c are commonly due to poor glycemic control in diabetic patients. Rarely, hemoglobin variants may cause false elevation of HbA1c that is discordant with glycemic control status.      LDL Calculated   Date/Time Value Ref Range Status   07/16/2024 04:58 AM 56 <=99 mg/dL Final     Comment:                                 Near   Borderline      AGE      Desirable  Optimal    High     High     Very High     0-19 Y     0 - 109     ---    110-129   >/= 130     ----    20-24 Y     0 - 119     ---    120-159   >/= 160     ----      >24 Y     0 -  99   100-129  130-159   160-189     >/=190       VLDL   Date/Time  Value Ref Range Status   07/16/2024 04:58 AM 15 0 - 40 mg/dL Final        EKG showing normal sinus rhythm with no acute ischemic changes.      3/3/25 Transthoracic Echo (TTE) Limited With Doppler And Color   CONCLUSIONS:   1. Left ventricular ejection fraction is mildly decreased, calculated by Messina's biplane at 50%.   2. Spectral Doppler shows a Grade II (pseudonormal pattern) of left ventricular diastolic filling.   3. Abnormal septal motion consistent with post-operative status.   4. There is moderately increased septal and moderately increased posterior left ventricular wall thickness.   5. There is severely increased concentric left ventricular hypertrophy.   6. There is normal right ventricular global systolic function.   7. The left atrial size is severely dilated.   8. There is no evidence of a patent foramen ovale.    Previous Cardiac imaging/work-up:  Echocardiogram 7/16/2024:   1. Left ventricular ejection fraction is low normal, by visual estimate at 55%.   2. Spectral Doppler shows an impaired relaxation pattern of left ventricular diastolic filling.   3. Abnormal septal motion consistent with post-operative status.   4. GLS is reduced at -11.9% with an apical sparing pattern suggestive of possible cardiac amyloid. LV systolic function is difficult to assess given that with swinging of the heart many images become foreshortened and LV appears better in some views than others. Overall does appear to be normal or low normal without regional wall motion abnormalities. Given the combination of thickened LV and RV walls with somewhat thickened valves and pericardial effusion with reduced GLS with the pattern of apical sparing, would consider cardiac MRI to further assess for possible cardiac amyloid.   5. There is reduced right ventricular systolic function.   6. The left atrium is mildly dilated.   7. Mobile atrial septal aneurysm wtih an agitated saline contrast study that was negative for intracardiac  shunt.   8. Mildly thickened MV leaflet tips with doming of the AML with only trace MR and mean MV gradient only 1.5mmHg.   9. Primary service notified of the findings at the time of reporting.  10. Compared with the prior exam from 8/26/2023 there has been improvement of the LV systolic function Previously there was moderatley reduced LV systolic function with apex having worst function. There has been interval surgical revascularization between the two studies. Today's exam was the [facility] echocadiogram to include GLS asessment and raised the possibility of cardiac amyloid.      Cardiac MRI 7/2024:  IMPRESSION:  1. The left ventricle is normal in size with low-normal systolic  function. LVEF 51%. There are no segmental wall motion abnormalities.  Quantitative values are as noted above.  2. Diffuse left ventricle hypertrophy measuring up to 1.8 cm. There  is extensive sub endocardial delayed enhancement with more prominent  involvement of the lateral wall. Findings are nonspecific but can be  seen in cardiac amyloidosis. Cannot exclude component of infarction  in the lateral wall.  3. Nonspecific mild mid myocardial delayed enhancement  interventricular septum at the RV insertion sites which can be due to  right heart volume or pressure overload.  4. Normal aortic, mitral, and tricuspid valve function. Aortic  regurgitant volume 1 ml. Aortic regurgitant fraction 3 %. Peak aortic  valve velocity 95 cm/sec.    NM PYP scan 7/2024:  IMPRESSION:  1. Negative amyloid SPECT heart study without evidence of TTR  amyloidosis.      Coronary angiogram 9/4/2023:  ____________________________________________________________________________________  CONCLUSIONS:  1. S/p uncomplicated R/L heart catheterization.  2. RHC access initially through right brachial vein with 6f sheath, however unable to advance Equipment. Switched to RIJ 6f sheath. LHC through right radial artery 6f sheath.  3. Severe 2 vessel disease involving mid LAD  "and mid LCx, FFR was done and positive in LAD (0.74) and LCx (0.69).  4. Right dominant circulation.  5. Heart team discussion regarding revascularization options.    Now status post CABG (LIMA-LAD, APRIL-OM)      Last I/O:  I/O last 3 completed shifts:  In: 165 (3.9 mL/kg) [P.O.:50; I.V.:65 (1.5 mL/kg); IV Piggyback:50]  Out: 2110 (49.5 mL/kg) [Urine:2110 (1.4 mL/kg/hr)]  Weight: 42.6 kg        Allergies:  Lisinopril, Amoxicillin, and Losartan    Inpatient Medications:  Scheduled medications   Medication Dose Route Frequency    acetaminophen  650 mg oral q8h    aspirin  81 mg oral Daily    atorvastatin  80 mg oral Nightly    carvedilol  25 mg oral BID    [Held by provider] clopidogrel  75 mg oral Daily    ertapenem  1 g intravenous q24h    heparin (porcine)  5,000 Units subcutaneous q8h BOLIVAR    hydrALAZINE  50 mg oral TID    insulin glargine  10 Units subcutaneous Nightly    insulin lispro  0-5 Units subcutaneous TID AC    insulin lispro  5 Units subcutaneous TID AC    isosorbide mononitrate ER  60 mg oral Daily    melatonin  10 mg oral Nightly    NIFEdipine ER  90 mg oral Daily before breakfast    pantoprazole  40 mg intravenous Daily    ranolazine  500 mg oral BID    vancomycin  1,250 mg intravenous q24h     PRN medications   Medication    dextrose    glucagon    hydrALAZINE    OLANZapine    oxyCODONE    polyethylene glycol    QUEtiapine    vancomycin     Continuous Medications   Medication Dose Last Rate     Outpatient Medications:  Current Outpatient Medications   Medication Instructions    acetaminophen (TYLENOL) 500 mg, oral, Every 6 hours PRN, Take per directed    amLODIPine (Norvasc) 10 mg tablet TAKE 1 TABLET BY MOUTH ONCE DAILY    aspirin 81 mg, oral, Daily    atorvastatin (Lipitor) 80 mg tablet TAKE 1 TABLET BY MOUTH ONCE DAILY    BD Ultra-Fine Micro Pen Needle 32 gauge x 1/4\" needle     blood sugar diagnostic strip Use 1 strip to check blood sugar 3 times a day with meals.    CALCIUM CITRATE ORAL 1 " tablet, oral, Daily    carvedilol (Coreg) 25 mg tablet TAKE 1 TABLET BY MOUTH TWO TIMES A DAY    clopidogrel (PLAVIX) 75 mg, oral, Daily RT    diclofenac sodium (VOLTAREN) 4 g, Topical, 4 times daily PRN    Easy Touch Alcohol Prep Pads pads, medicated     Farxiga 10 mg, oral, Daily with breakfast    gabapentin (NEURONTIN) 300 mg, oral, Daily    hydrALAZINE (APRESOLINE) 10 mg, oral, 2 times daily    insulin glargine (LANTUS) 10 Units, subcutaneous, Nightly, Take as directed per insulin instructions.    insulin lispro 0-5 Units, subcutaneous, 3 times daily (morning, midday, late afternoon), 0 unit(s) if BG ; 1 unit(s) if -200; 2 unit(s) if -250; 3 unit(s) if -300; 4 unit(s) if -350; 5 unit(s) if -400    isosorbide mononitrate ER (Imdur) 60 mg 24 hr tablet TAKE 1 TABLET BY MOUTH ONCE DAILY    lancets misc Use three times a day to test blood sugar w/ meals    metFORMIN XR (Glucophage-XR) 500 mg 24 hr tablet TAKE 2 TABLETS BY MOUTH ONCE DAILY    multivitamin with minerals tablet 1 tablet, oral, Daily    oxyCODONE (OXY-IR) 5 mg, oral, Every 6 hours PRN    ranolazine (RANEXA) 500 mg, oral, 2 times daily, Do not crush, chew, or split.       Physical Exam:  General: NAD, AOx1  HEENT: EOMI, MMM  Neck:  No JVD at 45 degrees   Cardiac: RRR, systolic murmur  Lungs:  CTAB  Abdomen: Soft, non-tender, Non-distended   : Malone with yellow urine  Extremities: No LE edema. LLE dry gangrene   Neuro: AOx3, no apparent focal deficits       Assessment/Plan   Myrna Khan is a 65 y.o. female with history of HFimpEF (EF 55% July/2024, 35-40% in Aug/2023), CAD s/p CABG in 2023 (LIMA-LAD, APRIL-OM), PAD s/p L SFA stent (occluded in Nov/2023 w/ distal SFA reconstitution), renal artery stenosis (Sep/2023 US), carotid artery stenosis (Aug/2023 US), HTN, HLD, T2DM (A1c 14.6% Jan/2025) presenting with dry gangrene of LLE currently planned for LLE angiogram and possible intervention (possible extensive  debridement vs amputation or revascularization via bypass) with vascular surgery.     Cardiology consulted for pre-op risk stratification.     Most recent echocardiogram in July 2024 showing features suggestive of cardiac amyloidosis (apical sparring, LVH) prompting further work-up with cardiac MRI that showed extensive sub endocardial delayed enhancement with more prominent involvement of the lateral wall -->Findings that are nonspecific but can be seen in cardiac amyloidosis.   A technetium PYP scan was done as well and did not show any uptake -->negative for TTR amyloidosis.   A urine immunofixation electrophoresis was done but does not seem that a serum immunofixation and serum free light chains were collected and analysed at the time. --> recommend outpatient follow up and completion of her evaluation.     Patient hemodynamically stable with no acute cardiac complaints. She denies any acute or remote chest pain episodes, palpitations, SOB, NIELSEN, orthopnea, PND, nausea, vomiting, diaphoresis, lightheadedness, dizziness, presyncopal symptoms, LE swelling, LOC.   No signs of acute ACS, active chest pain, malignant arrythmias, decompensated HF or volume overload or symptomatic valvular disease.     #Risk stratification  RCRI 3 --> 15% of MACE.  Patient with RCRI of 3, has poor METS mainly related to her PAD and is going to undergo an intermediate/high risk surgery and has history of extensive atherosclerotic disease ---> as such, repeated a formal echocardiogram.  Found to have mildly reduced EF to 50%, unchanged from previous TTE 7/2024  No WMA.   No changes on echocardiogram and patient continues to have preserved/low normal systolic function, can proceed with surgery with no further cardiac work-up warranted     #HTN  - continue Carvedilol, Imdur  - Increase Hydralazine to 75 mg TID. Further increase for goal SBP < 140 mmHg   - Stop Amlodipine  - Start Nifedipine 90 mg daily      Cardiology will follow    Mary  A Debra, APRN-CNP  Cardiology Consults    Please call with any questions  General Cardiology Consult Pager: [phone] (weekday 7AM-6PM and weekend 7AM-2PM) and other: [phone]  EP Consult Pager: [phone] (weekday 7AM-6PM and weekend 7AM-2PM) and other: [phone]  CICU Fellow Pager: [phone] anytime  EP Device Nurse Pager: [phone] (weekday 7AM-4PM)  Advanced Heart Failure Consult Pager: [phone] anytime       Code Status:  Full Code

## 2025-03-05 NOTE — PROGRESS NOTES
ETHICS PROGRESS NOTE     The Ethics Service is following Ms. Khan as a patient without proxy.     This morning members of the ethics committee discussed the recommendation for a Left Fem Endart, Angio, Possible Intervention, Possible Bypass (revascularization).     Given that Ms. Khan seemed agreeable to limb salvage when her cognitive capacity was better, and given that the benefits of the revascularization procedure outweigh the risks, the ethics committee agrees that this procedure is within Ms. Khan's best interest.     The Ethics Service will continue to follow.      Reggie Ha, PhD

## 2025-03-05 NOTE — PROGRESS NOTES
VASCULAR SURGERY PROGRESS NOTE  Assessment/Plan   Myrna Khan is 65 y.o. female with history of carotid stenosis, diabetes, CAD, HTN, PAD and renal artery stenosis who presents with extensive LLE wounds and intermittent rest pain.     S/P LLE Angiogram with left EIA occlusion, reconstitutes at distal EIA with CFA short-segment occlusion vs severe stenosis, L SFA stent occlusion, distal SFA reconstitution, patent popliteal and AT runoff to the foot on 3/2/25. Planning for Endart/Angio/Bypasss pending OR timing and clearance. Patient has had worsened mental status and currently lacks capacity and a appropriate surrogate decision maker.      Plan:  Ethics Consult for Proxy Decision Making  Consented via Ethics Committee for Left Fem Endart, Angio, Possible Intervention, Possible Bypass   OR Tomorrow for LLE Revascularization  NPO after midnight  Type and Screen Ordered  Podiatry consulted - no urgent intervention  Continue antibiotics  Cardiology Risk Stratification --> Echo Stable, RCRI 3, No further testing required     D/w attending, Dr. Bg Andersen MD  General Surgery Resident  Vascular Team Pager [phone]    Subjective   Patient waxing and waning, currently AAOX2 this am. In agreement with surgery. Still endorses left leg pain.     Objective   Vitals:  Heart Rate:  [78-85]   Temp:  [36.1 °C (97 °F)-37.1 °C (98.8 °F)]   Resp:  [16-18]   BP: (155-172)/(70-90)   SpO2:  [96 %-98 %]     Exam:  Constitutional: no acute distress, more awake this morning, still confused  Skin: warm and dry overall   HEENT: Atraumatic  Cardiac: RR  Pulmonary: Unlabored respirations on room air  Abdomen: Non distended, non tender  Extremities: PAINTING. RLE dry. LLE with dry gangrene on the dorsal aspect of her foot to her mid shin, no sensation at that site.  Vascular: Multiphasic R DP/PT. Monophasic L DP. R groin covered w/ dressing, c/d/I, no strikethrough, soft w/out swelling or ecchymosis. No palpable pulsatile mass.      Labs:  Results from last 7 days   Lab Units 03/05/25  0550 03/04/25  0653 03/03/25  0647   WBC AUTO x10*3/uL 9.0 10.7 10.6   HEMOGLOBIN g/dL 10.3* 9.6* 10.1*   PLATELETS AUTO x10*3/uL 444 436 418      Results from last 7 days   Lab Units 03/05/25  0550 03/04/25  0653 03/03/25  0647   SODIUM mmol/L 134* 139 132*   POTASSIUM mmol/L 3.8 3.4* 3.8   CHLORIDE mmol/L 101 106 103   CO2 mmol/L 26 24 22   BUN mg/dL 11 9 12   CREATININE mg/dL 0.61 0.63 0.52   GLUCOSE mg/dL 308* 84 158*   PHOSPHORUS mg/dL 2.4* 2.6 2.2*      Results from last 7 days   Lab Units 03/01/25  0633   INR  1.2*   PROTIME seconds 13.1*   APTT seconds 35

## 2025-03-05 NOTE — PROGRESS NOTES
3/5/25 4864 Transitional Care Coordinator Notes:     Transitional Care Coordination Progress Note:  Patient discussed during interdisciplinary rounds.   Team members present: MD and TCC  Plan per Medical/Surgical team: Patient will be scheduled for a LLE revascularization tomorrow with Vascular Surgery. Updated notes sent to [facility].  Payor: [facility]  Discharge disposition: ICF  Potential Barriers: none  ADOD: 2-4 days              Assessment/Plan   Assessment & Plan  Dry gangrene (Multi)    PAD (peripheral artery disease) (CMS-[facility])    Stented coronary artery    CHF (congestive heart failure)               Alyssa Bryant RN

## 2025-03-06 ENCOUNTER — APPOINTMENT (OUTPATIENT)
Dept: RADIOLOGY | Facility: HOSPITAL | Age: 65
DRG: 270 | End: 2025-03-06
Payer: COMMERCIAL

## 2025-03-06 ENCOUNTER — ANESTHESIA EVENT (OUTPATIENT)
Dept: OPERATING ROOM | Facility: HOSPITAL | Age: 65
End: 2025-03-06
Payer: COMMERCIAL

## 2025-03-06 ENCOUNTER — ANESTHESIA (OUTPATIENT)
Dept: OPERATING ROOM | Facility: HOSPITAL | Age: 65
End: 2025-03-06
Payer: COMMERCIAL

## 2025-03-06 LAB
ACT BLD: 144 SEC (ref 83–199)
ACT BLD: 155 SEC (ref 83–199)
ACT BLD: 234 SEC (ref 83–199)
ACT BLD: 239 SEC (ref 83–199)
ACT BLD: 244 SEC (ref 83–199)
ACT BLD: 260 SEC (ref 83–199)
ACT BLD: 261 SEC (ref 83–199)
ACT BLD: 263 SEC (ref 83–199)
ACT BLD: 266 SEC (ref 83–199)
ACT BLD: 269 SEC (ref 83–199)
ACT BLD: 273 SEC (ref 83–199)
ACT BLD: 274 SEC (ref 83–199)
ACT BLD: 302 SEC (ref 83–199)
ACT BLD: 305 SEC (ref 83–199)
ACT BLD: 334 SEC (ref 83–199)
ACT BLD: 348 SEC (ref 83–199)
ACT BLD: 350 SEC (ref 83–199)
ALBUMIN SERPL BCP-MCNC: 2.1 G/DL (ref 3.4–5)
ALBUMIN SERPL BCP-MCNC: 2.7 G/DL (ref 3.4–5)
ANION GAP BLDA CALCULATED.4IONS-SCNC: 10 MMO/L (ref 10–25)
ANION GAP BLDA CALCULATED.4IONS-SCNC: 12 MMO/L (ref 10–25)
ANION GAP BLDA CALCULATED.4IONS-SCNC: 8 MMO/L (ref 10–25)
ANION GAP SERPL CALC-SCNC: 12 MMOL/L (ref 10–20)
ANION GAP SERPL CALC-SCNC: 13 MMOL/L (ref 10–20)
APTT PPP: 37 SECONDS (ref 26–36)
BASE EXCESS BLDA CALC-SCNC: -5.2 MMOL/L (ref -2–3)
BASE EXCESS BLDA CALC-SCNC: -5.6 MMOL/L (ref -2–3)
BASE EXCESS BLDA CALC-SCNC: -6.4 MMOL/L (ref -2–3)
BASOPHILS # BLD AUTO: 0.05 X10*3/UL (ref 0–0.1)
BASOPHILS # BLD AUTO: 0.06 X10*3/UL (ref 0–0.1)
BASOPHILS NFR BLD AUTO: 0.3 %
BASOPHILS NFR BLD AUTO: 0.5 %
BLOOD EXPIRATION DATE: NORMAL
BLOOD EXPIRATION DATE: NORMAL
BODY TEMPERATURE: 37 DEGREES CELSIUS
BODY TEMPERATURE: ABNORMAL
BODY TEMPERATURE: ABNORMAL
BUN SERPL-MCNC: 12 MG/DL (ref 6–23)
BUN SERPL-MCNC: 13 MG/DL (ref 6–23)
CA-I BLDA-SCNC: 1.06 MMOL/L (ref 1.1–1.33)
CA-I BLDA-SCNC: 1.07 MMOL/L (ref 1.1–1.33)
CA-I BLDA-SCNC: 1.09 MMOL/L (ref 1.1–1.33)
CA-I BLDA-SCNC: 1.1 MMOL/L (ref 1.1–1.33)
CA-I BLDA-SCNC: 1.11 MMOL/L (ref 1.1–1.33)
CALCIUM SERPL-MCNC: 7.4 MG/DL (ref 8.6–10.6)
CALCIUM SERPL-MCNC: 8.6 MG/DL (ref 8.6–10.6)
CHLORIDE BLDA-SCNC: 102 MMOL/L (ref 98–107)
CHLORIDE BLDA-SCNC: 109 MMOL/L (ref 98–107)
CHLORIDE BLDA-SCNC: 109 MMOL/L (ref 98–107)
CHLORIDE BLDA-SCNC: 110 MMOL/L (ref 98–107)
CHLORIDE BLDA-SCNC: 98 MMOL/L (ref 98–107)
CHLORIDE SERPL-SCNC: 102 MMOL/L (ref 98–107)
CHLORIDE SERPL-SCNC: 105 MMOL/L (ref 98–107)
CO2 SERPL-SCNC: 20 MMOL/L (ref 21–32)
CO2 SERPL-SCNC: 22 MMOL/L (ref 21–32)
CREAT SERPL-MCNC: 0.78 MG/DL (ref 0.5–1.05)
CREAT SERPL-MCNC: 0.85 MG/DL (ref 0.5–1.05)
DISPENSE STATUS: NORMAL
DISPENSE STATUS: NORMAL
EGFRCR SERPLBLD CKD-EPI 2021: 76 ML/MIN/1.73M*2
EGFRCR SERPLBLD CKD-EPI 2021: 84 ML/MIN/1.73M*2
EOSINOPHIL # BLD AUTO: 0.03 X10*3/UL (ref 0–0.7)
EOSINOPHIL # BLD AUTO: 0.22 X10*3/UL (ref 0–0.7)
EOSINOPHIL NFR BLD AUTO: 0.2 %
EOSINOPHIL NFR BLD AUTO: 2 %
ERYTHROCYTE [DISTWIDTH] IN BLOOD BY AUTOMATED COUNT: 11.8 % (ref 11.5–14.5)
ERYTHROCYTE [DISTWIDTH] IN BLOOD BY AUTOMATED COUNT: 15 % (ref 11.5–14.5)
ERYTHROCYTE [DISTWIDTH] IN BLOOD BY AUTOMATED COUNT: 15.4 % (ref 11.5–14.5)
ERYTHROCYTE [DISTWIDTH] IN BLOOD BY AUTOMATED COUNT: 15.8 % (ref 11.5–14.5)
GLUCOSE BLD MANUAL STRIP-MCNC: 141 MG/DL (ref 74–99)
GLUCOSE BLD MANUAL STRIP-MCNC: 145 MG/DL (ref 74–99)
GLUCOSE BLD MANUAL STRIP-MCNC: 253 MG/DL (ref 74–99)
GLUCOSE BLDA-MCNC: 129 MG/DL (ref 74–99)
GLUCOSE BLDA-MCNC: 136 MG/DL (ref 74–99)
GLUCOSE BLDA-MCNC: 148 MG/DL (ref 74–99)
GLUCOSE BLDA-MCNC: 182 MG/DL (ref 74–99)
GLUCOSE BLDA-MCNC: 207 MG/DL (ref 74–99)
GLUCOSE SERPL-MCNC: 139 MG/DL (ref 74–99)
GLUCOSE SERPL-MCNC: 231 MG/DL (ref 74–99)
HCO3 BLDA-SCNC: 18.6 MMOL/L (ref 22–26)
HCO3 BLDA-SCNC: 19.4 MMOL/L (ref 22–26)
HCO3 BLDA-SCNC: 19.9 MMOL/L (ref 22–26)
HCO3 BLDA-SCNC: 19.9 MMOL/L (ref 22–26)
HCO3 BLDA-SCNC: 21.6 MMOL/L (ref 22–26)
HCT VFR BLD AUTO: 30 % (ref 36–46)
HCT VFR BLD AUTO: 32 % (ref 36–46)
HCT VFR BLD AUTO: 32.1 % (ref 36–46)
HCT VFR BLD AUTO: 32.2 % (ref 36–46)
HCT VFR BLD EST: 12 % (ref 36–46)
HCT VFR BLD EST: 16 % (ref 36–46)
HCT VFR BLD EST: 17 % (ref 36–46)
HCT VFR BLD EST: 51 % (ref 36–46)
HCT VFR BLD EST: 56 % (ref 36–46)
HGB BLD-MCNC: 10.2 G/DL (ref 12–16)
HGB BLD-MCNC: 10.6 G/DL (ref 12–16)
HGB BLD-MCNC: 10.8 G/DL (ref 12–16)
HGB BLD-MCNC: 10.9 G/DL (ref 12–16)
HGB BLDA-MCNC: 17.1 G/DL (ref 12–16)
HGB BLDA-MCNC: 18.5 G/DL (ref 12–16)
HGB BLDA-MCNC: 4 G/DL (ref 12–16)
HGB BLDA-MCNC: 5.4 G/DL (ref 12–16)
HGB BLDA-MCNC: 5.5 G/DL (ref 12–16)
IMM GRANULOCYTES # BLD AUTO: 0.06 X10*3/UL (ref 0–0.7)
IMM GRANULOCYTES # BLD AUTO: 0.25 X10*3/UL (ref 0–0.7)
IMM GRANULOCYTES NFR BLD AUTO: 0.5 % (ref 0–0.9)
IMM GRANULOCYTES NFR BLD AUTO: 1.7 % (ref 0–0.9)
INHALED O2 CONCENTRATION: 40 %
INHALED O2 CONCENTRATION: 50 %
INR PPP: 1.3 (ref 0.9–1.1)
LACTATE BLDA-SCNC: 0.9 MMOL/L (ref 0.4–2)
LACTATE BLDA-SCNC: 0.9 MMOL/L (ref 0.4–2)
LACTATE BLDA-SCNC: 1.1 MMOL/L (ref 0.4–2)
LACTATE BLDA-SCNC: 1.1 MMOL/L (ref 0.4–2)
LACTATE BLDA-SCNC: 1.5 MMOL/L (ref 0.4–2)
LYMPHOCYTES # BLD AUTO: 2.34 X10*3/UL (ref 1.2–4.8)
LYMPHOCYTES # BLD AUTO: 3.64 X10*3/UL (ref 1.2–4.8)
LYMPHOCYTES NFR BLD AUTO: 15.8 %
LYMPHOCYTES NFR BLD AUTO: 32.4 %
MAGNESIUM SERPL-MCNC: 1.89 MG/DL (ref 1.6–2.4)
MCH RBC QN AUTO: 30.2 PG (ref 26–34)
MCH RBC QN AUTO: 30.5 PG (ref 26–34)
MCH RBC QN AUTO: 30.6 PG (ref 26–34)
MCH RBC QN AUTO: 32.4 PG (ref 26–34)
MCHC RBC AUTO-ENTMCNC: 33.1 G/DL (ref 32–36)
MCHC RBC AUTO-ENTMCNC: 33.5 G/DL (ref 32–36)
MCHC RBC AUTO-ENTMCNC: 34 G/DL (ref 32–36)
MCHC RBC AUTO-ENTMCNC: 34 G/DL (ref 32–36)
MCV RBC AUTO: 90 FL (ref 80–100)
MCV RBC AUTO: 90 FL (ref 80–100)
MCV RBC AUTO: 92 FL (ref 80–100)
MCV RBC AUTO: 95 FL (ref 80–100)
MONOCYTES # BLD AUTO: 0.24 X10*3/UL (ref 0.1–1)
MONOCYTES # BLD AUTO: 0.81 X10*3/UL (ref 0.1–1)
MONOCYTES NFR BLD AUTO: 1.6 %
MONOCYTES NFR BLD AUTO: 7.2 %
NEUTROPHILS # BLD AUTO: 11.92 X10*3/UL (ref 1.2–7.7)
NEUTROPHILS # BLD AUTO: 6.46 X10*3/UL (ref 1.2–7.7)
NEUTROPHILS NFR BLD AUTO: 57.4 %
NEUTROPHILS NFR BLD AUTO: 80.4 %
NRBC BLD-RTO: 0 /100 WBCS (ref 0–0)
OXYHGB MFR BLDA: 96.6 % (ref 94–98)
OXYHGB MFR BLDA: 97.1 % (ref 94–98)
OXYHGB MFR BLDA: 97.2 % (ref 94–98)
OXYHGB MFR BLDA: 97.5 % (ref 94–98)
OXYHGB MFR BLDA: 97.7 % (ref 94–98)
PCO2 BLDA: 33 MM HG (ref 38–42)
PCO2 BLDA: 36 MM HG (ref 38–42)
PCO2 BLDA: 45 MM HG (ref 38–42)
PH BLDA: 7.29 PH (ref 7.38–7.42)
PH BLDA: 7.34 PH (ref 7.38–7.42)
PH BLDA: 7.35 PH (ref 7.38–7.42)
PH BLDA: 7.35 PH (ref 7.38–7.42)
PH BLDA: 7.36 PH (ref 7.38–7.42)
PHOSPHATE SERPL-MCNC: 3 MG/DL (ref 2.5–4.9)
PHOSPHATE SERPL-MCNC: 4.4 MG/DL (ref 2.5–4.9)
PLATELET # BLD AUTO: 369 X10*3/UL (ref 150–450)
PLATELET # BLD AUTO: 391 X10*3/UL (ref 150–450)
PLATELET # BLD AUTO: 393 X10*3/UL (ref 150–450)
PLATELET # BLD AUTO: 469 X10*3/UL (ref 150–450)
PO2 BLDA: 129 MM HG (ref 85–95)
PO2 BLDA: 132 MM HG (ref 85–95)
PO2 BLDA: 133 MM HG (ref 85–95)
PO2 BLDA: 149 MM HG (ref 85–95)
PO2 BLDA: 179 MM HG (ref 85–95)
POTASSIUM BLDA-SCNC: 3.7 MMOL/L (ref 3.5–5.3)
POTASSIUM BLDA-SCNC: 3.8 MMOL/L (ref 3.5–5.3)
POTASSIUM BLDA-SCNC: 3.9 MMOL/L (ref 3.5–5.3)
POTASSIUM BLDA-SCNC: 4.1 MMOL/L (ref 3.5–5.3)
POTASSIUM BLDA-SCNC: 4.5 MMOL/L (ref 3.5–5.3)
POTASSIUM SERPL-SCNC: 4.2 MMOL/L (ref 3.5–5.3)
POTASSIUM SERPL-SCNC: 4.8 MMOL/L (ref 3.5–5.3)
POTASSIUM SERPL-SCNC: 5 MMOL/L (ref 3.5–5.3)
PRODUCT BLOOD TYPE: 5100
PRODUCT BLOOD TYPE: 5100
PRODUCT CODE: NORMAL
PRODUCT CODE: NORMAL
PROTHROMBIN TIME: 14.4 SECONDS (ref 9.8–12.4)
RBC # BLD AUTO: 3.15 X10*6/UL (ref 4–5.2)
RBC # BLD AUTO: 3.48 X10*6/UL (ref 4–5.2)
RBC # BLD AUTO: 3.56 X10*6/UL (ref 4–5.2)
RBC # BLD AUTO: 3.58 X10*6/UL (ref 4–5.2)
SAO2 % BLDA: 100 % (ref 94–100)
SAO2 % BLDA: 100 % (ref 94–100)
SAO2 % BLDA: 99 % (ref 94–100)
SODIUM BLDA-SCNC: 126 MMOL/L (ref 136–145)
SODIUM BLDA-SCNC: 127 MMOL/L (ref 136–145)
SODIUM BLDA-SCNC: 133 MMOL/L (ref 136–145)
SODIUM BLDA-SCNC: 134 MMOL/L (ref 136–145)
SODIUM BLDA-SCNC: 137 MMOL/L (ref 136–145)
SODIUM SERPL-SCNC: 132 MMOL/L (ref 136–145)
SODIUM SERPL-SCNC: 133 MMOL/L (ref 136–145)
UNIT ABO: NORMAL
UNIT ABO: NORMAL
UNIT NUMBER: NORMAL
UNIT NUMBER: NORMAL
UNIT RH: NORMAL
UNIT RH: NORMAL
UNIT VOLUME: 350
UNIT VOLUME: 350
VANCOMYCIN SERPL-MCNC: 11.3 UG/ML (ref 5–20)
WBC # BLD AUTO: 11.3 X10*3/UL (ref 4.4–11.3)
WBC # BLD AUTO: 14.3 X10*3/UL (ref 4.4–11.3)
WBC # BLD AUTO: 14.8 X10*3/UL (ref 4.4–11.3)
WBC # BLD AUTO: 9.5 X10*3/UL (ref 4.4–11.3)
XM INTEP: NORMAL
XM INTEP: NORMAL

## 2025-03-06 PROCEDURE — 2500000004 HC RX 250 GENERAL PHARMACY W/ HCPCS (ALT 636 FOR OP/ED)

## 2025-03-06 PROCEDURE — 88311 DECALCIFY TISSUE: CPT | Mod: TC,SUR | Performed by: STUDENT IN AN ORGANIZED HEALTH CARE EDUCATION/TRAINING PROGRAM

## 2025-03-06 PROCEDURE — 37799 UNLISTED PX VASCULAR SURGERY: CPT

## 2025-03-06 PROCEDURE — 37799 UNLISTED PX VASCULAR SURGERY: CPT | Performed by: STUDENT IN AN ORGANIZED HEALTH CARE EDUCATION/TRAINING PROGRAM

## 2025-03-06 PROCEDURE — 2780000003 HC OR 278 NO HCPCS: Performed by: STUDENT IN AN ORGANIZED HEALTH CARE EDUCATION/TRAINING PROGRAM

## 2025-03-06 PROCEDURE — 99222 1ST HOSP IP/OBS MODERATE 55: CPT | Performed by: STUDENT IN AN ORGANIZED HEALTH CARE EDUCATION/TRAINING PROGRAM

## 2025-03-06 PROCEDURE — 83735 ASSAY OF MAGNESIUM: CPT | Performed by: STUDENT IN AN ORGANIZED HEALTH CARE EDUCATION/TRAINING PROGRAM

## 2025-03-06 PROCEDURE — 2500000004 HC RX 250 GENERAL PHARMACY W/ HCPCS (ALT 636 FOR OP/ED): Performed by: STUDENT IN AN ORGANIZED HEALTH CARE EDUCATION/TRAINING PROGRAM

## 2025-03-06 PROCEDURE — P9016 RBC LEUKOCYTES REDUCED: HCPCS

## 2025-03-06 PROCEDURE — 7100000002 HC RECOVERY ROOM TIME - EACH INCREMENTAL 1 MINUTE: Performed by: STUDENT IN AN ORGANIZED HEALTH CARE EDUCATION/TRAINING PROGRAM

## 2025-03-06 PROCEDURE — 3700000001 HC GENERAL ANESTHESIA TIME - INITIAL BASE CHARGE: Performed by: STUDENT IN AN ORGANIZED HEALTH CARE EDUCATION/TRAINING PROGRAM

## 2025-03-06 PROCEDURE — 36415 COLL VENOUS BLD VENIPUNCTURE: CPT | Performed by: STUDENT IN AN ORGANIZED HEALTH CARE EDUCATION/TRAINING PROGRAM

## 2025-03-06 PROCEDURE — 85027 COMPLETE CBC AUTOMATED: CPT

## 2025-03-06 PROCEDURE — 2500000001 HC RX 250 WO HCPCS SELF ADMINISTERED DRUGS (ALT 637 FOR MEDICARE OP): Performed by: STUDENT IN AN ORGANIZED HEALTH CARE EDUCATION/TRAINING PROGRAM

## 2025-03-06 PROCEDURE — 84132 ASSAY OF SERUM POTASSIUM: CPT | Performed by: STUDENT IN AN ORGANIZED HEALTH CARE EDUCATION/TRAINING PROGRAM

## 2025-03-06 PROCEDURE — 88304 TISSUE EXAM BY PATHOLOGIST: CPT | Performed by: PATHOLOGY

## 2025-03-06 PROCEDURE — 2500000005 HC RX 250 GENERAL PHARMACY W/O HCPCS

## 2025-03-06 PROCEDURE — C1762 CONN TISS, HUMAN(INC FASCIA): HCPCS | Performed by: STUDENT IN AN ORGANIZED HEALTH CARE EDUCATION/TRAINING PROGRAM

## 2025-03-06 PROCEDURE — B410ZZZ FLUOROSCOPY OF ABDOMINAL AORTA: ICD-10-PCS | Performed by: STUDENT IN AN ORGANIZED HEALTH CARE EDUCATION/TRAINING PROGRAM

## 2025-03-06 PROCEDURE — 82947 ASSAY GLUCOSE BLOOD QUANT: CPT

## 2025-03-06 PROCEDURE — 36430 TRANSFUSION BLD/BLD COMPNT: CPT | Mod: GC

## 2025-03-06 PROCEDURE — 85610 PROTHROMBIN TIME: CPT | Performed by: STUDENT IN AN ORGANIZED HEALTH CARE EDUCATION/TRAINING PROGRAM

## 2025-03-06 PROCEDURE — 84132 ASSAY OF SERUM POTASSIUM: CPT

## 2025-03-06 PROCEDURE — 80069 RENAL FUNCTION PANEL: CPT | Performed by: STUDENT IN AN ORGANIZED HEALTH CARE EDUCATION/TRAINING PROGRAM

## 2025-03-06 PROCEDURE — 2550000001 HC RX 255 CONTRASTS: Performed by: STUDENT IN AN ORGANIZED HEALTH CARE EDUCATION/TRAINING PROGRAM

## 2025-03-06 PROCEDURE — 85025 COMPLETE CBC W/AUTO DIFF WBC: CPT | Performed by: STUDENT IN AN ORGANIZED HEALTH CARE EDUCATION/TRAINING PROGRAM

## 2025-03-06 PROCEDURE — 37221 PR REVSC OPN/PRQ ILIAC ART W/STNT PLMT & ANGIOPLSTY: CPT | Performed by: STUDENT IN AN ORGANIZED HEALTH CARE EDUCATION/TRAINING PROGRAM

## 2025-03-06 PROCEDURE — C1887 CATHETER, GUIDING: HCPCS | Performed by: STUDENT IN AN ORGANIZED HEALTH CARE EDUCATION/TRAINING PROGRAM

## 2025-03-06 PROCEDURE — 85027 COMPLETE CBC AUTOMATED: CPT | Performed by: STUDENT IN AN ORGANIZED HEALTH CARE EDUCATION/TRAINING PROGRAM

## 2025-03-06 PROCEDURE — 35371 RECHANNELING OF ARTERY: CPT | Performed by: STUDENT IN AN ORGANIZED HEALTH CARE EDUCATION/TRAINING PROGRAM

## 2025-03-06 PROCEDURE — C1768 GRAFT, VASCULAR: HCPCS | Performed by: STUDENT IN AN ORGANIZED HEALTH CARE EDUCATION/TRAINING PROGRAM

## 2025-03-06 PROCEDURE — 04UL0KZ SUPPLEMENT LEFT FEMORAL ARTERY WITH NONAUTOLOGOUS TISSUE SUBSTITUTE, OPEN APPROACH: ICD-10-PCS | Performed by: STUDENT IN AN ORGANIZED HEALTH CARE EDUCATION/TRAINING PROGRAM

## 2025-03-06 PROCEDURE — 2720000007 HC OR 272 NO HCPCS: Performed by: STUDENT IN AN ORGANIZED HEALTH CARE EDUCATION/TRAINING PROGRAM

## 2025-03-06 PROCEDURE — 37252 INTRVASC US NONCORONARY 1ST: CPT | Performed by: STUDENT IN AN ORGANIZED HEALTH CARE EDUCATION/TRAINING PROGRAM

## 2025-03-06 PROCEDURE — C1894 INTRO/SHEATH, NON-LASER: HCPCS | Performed by: STUDENT IN AN ORGANIZED HEALTH CARE EDUCATION/TRAINING PROGRAM

## 2025-03-06 PROCEDURE — 2060000001 HC INTERMEDIATE ICU ROOM DAILY

## 2025-03-06 PROCEDURE — 04CL3ZZ EXTIRPATION OF MATTER FROM LEFT FEMORAL ARTERY, PERCUTANEOUS APPROACH: ICD-10-PCS | Performed by: STUDENT IN AN ORGANIZED HEALTH CARE EDUCATION/TRAINING PROGRAM

## 2025-03-06 PROCEDURE — 2500000005 HC RX 250 GENERAL PHARMACY W/O HCPCS: Performed by: STUDENT IN AN ORGANIZED HEALTH CARE EDUCATION/TRAINING PROGRAM

## 2025-03-06 PROCEDURE — C1876 STENT, NON-COA/NON-COV W/DEL: HCPCS | Performed by: STUDENT IN AN ORGANIZED HEALTH CARE EDUCATION/TRAINING PROGRAM

## 2025-03-06 PROCEDURE — 85347 COAGULATION TIME ACTIVATED: CPT

## 2025-03-06 PROCEDURE — 3700000002 HC GENERAL ANESTHESIA TIME - EACH INCREMENTAL 1 MINUTE: Performed by: STUDENT IN AN ORGANIZED HEALTH CARE EDUCATION/TRAINING PROGRAM

## 2025-03-06 PROCEDURE — 80202 ASSAY OF VANCOMYCIN: CPT | Performed by: STUDENT IN AN ORGANIZED HEALTH CARE EDUCATION/TRAINING PROGRAM

## 2025-03-06 PROCEDURE — 99233 SBSQ HOSP IP/OBS HIGH 50: CPT | Performed by: STUDENT IN AN ORGANIZED HEALTH CARE EDUCATION/TRAINING PROGRAM

## 2025-03-06 PROCEDURE — C1769 GUIDE WIRE: HCPCS | Performed by: STUDENT IN AN ORGANIZED HEALTH CARE EDUCATION/TRAINING PROGRAM

## 2025-03-06 PROCEDURE — 2500000004 HC RX 250 GENERAL PHARMACY W/ HCPCS (ALT 636 FOR OP/ED): Mod: TB

## 2025-03-06 PROCEDURE — 76937 US GUIDE VASCULAR ACCESS: CPT | Performed by: STUDENT IN AN ORGANIZED HEALTH CARE EDUCATION/TRAINING PROGRAM

## 2025-03-06 PROCEDURE — C1757 CATH, THROMBECTOMY/EMBOLECT: HCPCS | Performed by: STUDENT IN AN ORGANIZED HEALTH CARE EDUCATION/TRAINING PROGRAM

## 2025-03-06 PROCEDURE — C1874 STENT, COATED/COV W/DEL SYS: HCPCS | Performed by: STUDENT IN AN ORGANIZED HEALTH CARE EDUCATION/TRAINING PROGRAM

## 2025-03-06 PROCEDURE — 7100000001 HC RECOVERY ROOM TIME - INITIAL BASE CHARGE: Performed by: STUDENT IN AN ORGANIZED HEALTH CARE EDUCATION/TRAINING PROGRAM

## 2025-03-06 PROCEDURE — 3600000004 HC OR TIME - INITIAL BASE CHARGE - PROCEDURE LEVEL FOUR: Performed by: STUDENT IN AN ORGANIZED HEALTH CARE EDUCATION/TRAINING PROGRAM

## 2025-03-06 PROCEDURE — C1753 CATH, INTRAVAS ULTRASOUND: HCPCS | Performed by: STUDENT IN AN ORGANIZED HEALTH CARE EDUCATION/TRAINING PROGRAM

## 2025-03-06 PROCEDURE — B41FZZZ FLUOROSCOPY OF RIGHT LOWER EXTREMITY ARTERIES: ICD-10-PCS | Performed by: STUDENT IN AN ORGANIZED HEALTH CARE EDUCATION/TRAINING PROGRAM

## 2025-03-06 PROCEDURE — 36620 INSERTION CATHETER ARTERY: CPT

## 2025-03-06 PROCEDURE — 047J3EZ DILATION OF LEFT EXTERNAL ILIAC ARTERY WITH TWO INTRALUMINAL DEVICES, PERCUTANEOUS APPROACH: ICD-10-PCS | Performed by: STUDENT IN AN ORGANIZED HEALTH CARE EDUCATION/TRAINING PROGRAM

## 2025-03-06 PROCEDURE — 3600000009 HC OR TIME - EACH INCREMENTAL 1 MINUTE - PROCEDURE LEVEL FOUR: Performed by: STUDENT IN AN ORGANIZED HEALTH CARE EDUCATION/TRAINING PROGRAM

## 2025-03-06 PROCEDURE — C1725 CATH, TRANSLUMIN NON-LASER: HCPCS | Performed by: STUDENT IN AN ORGANIZED HEALTH CARE EDUCATION/TRAINING PROGRAM

## 2025-03-06 DEVICE — XENOSURE BIOLOGIC PATCH, 0.8CM X 8CM, EIFU
Type: IMPLANTABLE DEVICE | Site: LEG | Status: FUNCTIONAL
Brand: XENOSURE BIOLOGIC PATCH

## 2025-03-06 DEVICE — STENT EVD35-08-040-080 EF ENTRUST V01
Type: IMPLANTABLE DEVICE | Site: ABDOMEN | Status: FUNCTIONAL
Brand: EVERFLEX™

## 2025-03-06 DEVICE — VIABAHN SX ENDO HEPARIN 18 RO 7MMX10CM 7FR120CM CATH
Type: IMPLANTABLE DEVICE | Site: ABDOMEN | Status: FUNCTIONAL
Brand: GORE VIABAHN ENDOPROSTHESIS WITH HEPARIN

## 2025-03-06 DEVICE — VIABAHN SX ENDO HEPARIN 18 RO 8MMX5CM 7FR 120CM CATH
Type: IMPLANTABLE DEVICE | Status: NON-FUNCTIONAL
Brand: GORE VIABAHN ENDOPROSTHESIS WITH HEPARIN

## 2025-03-06 DEVICE — PATCH, FELT, 1 X 6 IN, EPTFE: Type: IMPLANTABLE DEVICE | Site: LEG | Status: NON-FUNCTIONAL

## 2025-03-06 RX ORDER — PROTAMINE SULFATE 10 MG/ML
INJECTION, SOLUTION INTRAVENOUS AS NEEDED
Status: DISCONTINUED | OUTPATIENT
Start: 2025-03-06 | End: 2025-03-06

## 2025-03-06 RX ORDER — NALOXONE HYDROCHLORIDE 0.4 MG/ML
0.2 INJECTION, SOLUTION INTRAMUSCULAR; INTRAVENOUS; SUBCUTANEOUS EVERY 5 MIN PRN
Status: DISCONTINUED | OUTPATIENT
Start: 2025-03-06 | End: 2025-03-12 | Stop reason: HOSPADM

## 2025-03-06 RX ORDER — HEPARIN SODIUM 5000 [USP'U]/ML
5000 INJECTION, SOLUTION INTRAVENOUS; SUBCUTANEOUS EVERY 8 HOURS
Status: DISCONTINUED | OUTPATIENT
Start: 2025-03-07 | End: 2025-03-08

## 2025-03-06 RX ORDER — FENTANYL CITRATE 50 UG/ML
INJECTION, SOLUTION INTRAMUSCULAR; INTRAVENOUS AS NEEDED
Status: DISCONTINUED | OUTPATIENT
Start: 2025-03-06 | End: 2025-03-06

## 2025-03-06 RX ORDER — HYDROMORPHONE HYDROCHLORIDE 1 MG/ML
INJECTION, SOLUTION INTRAMUSCULAR; INTRAVENOUS; SUBCUTANEOUS AS NEEDED
Status: DISCONTINUED | OUTPATIENT
Start: 2025-03-06 | End: 2025-03-06

## 2025-03-06 RX ORDER — PROPOFOL 10 MG/ML
INJECTION, EMULSION INTRAVENOUS AS NEEDED
Status: DISCONTINUED | OUTPATIENT
Start: 2025-03-06 | End: 2025-03-06

## 2025-03-06 RX ORDER — ONDANSETRON HYDROCHLORIDE 2 MG/ML
4 INJECTION, SOLUTION INTRAVENOUS EVERY 8 HOURS PRN
Status: DISCONTINUED | OUTPATIENT
Start: 2025-03-06 | End: 2025-03-12 | Stop reason: HOSPADM

## 2025-03-06 RX ORDER — IODIXANOL 320 MG/ML
INJECTION, SOLUTION INTRAVASCULAR AS NEEDED
Status: DISCONTINUED | OUTPATIENT
Start: 2025-03-06 | End: 2025-03-06 | Stop reason: HOSPADM

## 2025-03-06 RX ORDER — SODIUM CHLORIDE, SODIUM LACTATE, POTASSIUM CHLORIDE, CALCIUM CHLORIDE 600; 310; 30; 20 MG/100ML; MG/100ML; MG/100ML; MG/100ML
50 INJECTION, SOLUTION INTRAVENOUS CONTINUOUS
Status: DISCONTINUED | OUTPATIENT
Start: 2025-03-06 | End: 2025-03-06 | Stop reason: HOSPADM

## 2025-03-06 RX ORDER — ROCURONIUM BROMIDE 10 MG/ML
INJECTION, SOLUTION INTRAVENOUS AS NEEDED
Status: DISCONTINUED | OUTPATIENT
Start: 2025-03-06 | End: 2025-03-06

## 2025-03-06 RX ORDER — ONDANSETRON 4 MG/1
4 TABLET, ORALLY DISINTEGRATING ORAL EVERY 8 HOURS PRN
Status: DISCONTINUED | OUTPATIENT
Start: 2025-03-06 | End: 2025-03-12 | Stop reason: HOSPADM

## 2025-03-06 RX ORDER — LIDOCAINE HYDROCHLORIDE 10 MG/ML
INJECTION, SOLUTION INFILTRATION; PERINEURAL AS NEEDED
Status: DISCONTINUED | OUTPATIENT
Start: 2025-03-06 | End: 2025-03-06

## 2025-03-06 RX ORDER — ONDANSETRON HYDROCHLORIDE 2 MG/ML
4 INJECTION, SOLUTION INTRAVENOUS ONCE AS NEEDED
Status: DISCONTINUED | OUTPATIENT
Start: 2025-03-06 | End: 2025-03-06 | Stop reason: HOSPADM

## 2025-03-06 RX ORDER — OXYCODONE HYDROCHLORIDE 5 MG/1
5 TABLET ORAL EVERY 6 HOURS PRN
Status: DISCONTINUED | OUTPATIENT
Start: 2025-03-06 | End: 2025-03-09

## 2025-03-06 RX ORDER — ACETAMINOPHEN 325 MG/1
650 TABLET ORAL EVERY 4 HOURS PRN
Status: DISCONTINUED | OUTPATIENT
Start: 2025-03-06 | End: 2025-03-06 | Stop reason: HOSPADM

## 2025-03-06 RX ORDER — OXYCODONE HYDROCHLORIDE 5 MG/1
5 TABLET ORAL EVERY 4 HOURS PRN
Status: DISCONTINUED | OUTPATIENT
Start: 2025-03-06 | End: 2025-03-06 | Stop reason: HOSPADM

## 2025-03-06 RX ORDER — CLOPIDOGREL BISULFATE 75 MG/1
75 TABLET ORAL DAILY
Status: DISCONTINUED | OUTPATIENT
Start: 2025-03-06 | End: 2025-03-12 | Stop reason: HOSPADM

## 2025-03-06 RX ORDER — CEFAZOLIN 1 G/1
INJECTION, POWDER, FOR SOLUTION INTRAVENOUS AS NEEDED
Status: DISCONTINUED | OUTPATIENT
Start: 2025-03-06 | End: 2025-03-06

## 2025-03-06 RX ORDER — MAGNESIUM SULFATE HEPTAHYDRATE 40 MG/ML
INJECTION, SOLUTION INTRAVENOUS AS NEEDED
Status: DISCONTINUED | OUTPATIENT
Start: 2025-03-06 | End: 2025-03-06

## 2025-03-06 RX ORDER — PHENYLEPHRINE HYDROCHLORIDE 10 MG/ML
INJECTION INTRAVENOUS AS NEEDED
Status: DISCONTINUED | OUTPATIENT
Start: 2025-03-06 | End: 2025-03-06

## 2025-03-06 RX ORDER — METOPROLOL TARTRATE 1 MG/ML
INJECTION, SOLUTION INTRAVENOUS AS NEEDED
Status: DISCONTINUED | OUTPATIENT
Start: 2025-03-06 | End: 2025-03-06

## 2025-03-06 RX ORDER — ACETAMINOPHEN 325 MG/1
650 TABLET ORAL EVERY 6 HOURS SCHEDULED
Status: DISCONTINUED | OUTPATIENT
Start: 2025-03-07 | End: 2025-03-12 | Stop reason: HOSPADM

## 2025-03-06 RX ORDER — CEFAZOLIN SODIUM 2 G/100ML
2 INJECTION, SOLUTION INTRAVENOUS EVERY 8 HOURS
Status: COMPLETED | OUTPATIENT
Start: 2025-03-07 | End: 2025-03-07

## 2025-03-06 RX ORDER — ONDANSETRON HYDROCHLORIDE 2 MG/ML
INJECTION, SOLUTION INTRAVENOUS AS NEEDED
Status: DISCONTINUED | OUTPATIENT
Start: 2025-03-06 | End: 2025-03-06

## 2025-03-06 RX ORDER — OXYCODONE HYDROCHLORIDE 5 MG/1
5 TABLET ORAL EVERY 6 HOURS PRN
Status: DISCONTINUED | OUTPATIENT
Start: 2025-03-06 | End: 2025-03-07

## 2025-03-06 RX ORDER — SODIUM CHLORIDE, SODIUM LACTATE, POTASSIUM CHLORIDE, CALCIUM CHLORIDE 600; 310; 30; 20 MG/100ML; MG/100ML; MG/100ML; MG/100ML
INJECTION, SOLUTION INTRAVENOUS CONTINUOUS PRN
Status: DISCONTINUED | OUTPATIENT
Start: 2025-03-06 | End: 2025-03-06

## 2025-03-06 RX ORDER — HYDROMORPHONE HYDROCHLORIDE 0.2 MG/ML
0.2 INJECTION INTRAMUSCULAR; INTRAVENOUS; SUBCUTANEOUS EVERY 5 MIN PRN
Status: DISCONTINUED | OUTPATIENT
Start: 2025-03-06 | End: 2025-03-06 | Stop reason: HOSPADM

## 2025-03-06 RX ORDER — HEPARIN SODIUM 1000 [USP'U]/ML
INJECTION, SOLUTION INTRAVENOUS; SUBCUTANEOUS AS NEEDED
Status: DISCONTINUED | OUTPATIENT
Start: 2025-03-06 | End: 2025-03-06

## 2025-03-06 RX ADMIN — ACETAMINOPHEN 650 MG: 325 TABLET, FILM COATED ORAL at 11:00

## 2025-03-06 RX ADMIN — LIDOCAINE HYDROCHLORIDE 80 MG: 10 INJECTION, SOLUTION INFILTRATION; PERINEURAL at 12:11

## 2025-03-06 RX ADMIN — ROCURONIUM 50 MG: 50 INJECTION, SOLUTION INTRAVENOUS at 12:11

## 2025-03-06 RX ADMIN — HEPARIN SODIUM 1000 UNITS: 1000 INJECTION INTRAVENOUS; SUBCUTANEOUS at 14:17

## 2025-03-06 RX ADMIN — ISOSORBIDE MONONITRATE 60 MG: 30 TABLET, EXTENDED RELEASE ORAL at 09:20

## 2025-03-06 RX ADMIN — PHENYLEPHRINE HYDROCHLORIDE 120 MCG: 10 INJECTION INTRAVENOUS at 19:45

## 2025-03-06 RX ADMIN — HYDROMORPHONE HYDROCHLORIDE 0.2 MG: 1 INJECTION, SOLUTION INTRAMUSCULAR; INTRAVENOUS; SUBCUTANEOUS at 16:38

## 2025-03-06 RX ADMIN — MAGNESIUM SULFATE HEPTAHYDRATE 2 G: 2 INJECTION, SOLUTION INTRAVENOUS at 16:38

## 2025-03-06 RX ADMIN — FENTANYL CITRATE 25 MCG: 50 INJECTION, SOLUTION INTRAMUSCULAR; INTRAVENOUS at 20:11

## 2025-03-06 RX ADMIN — METOPROLOL TARTRATE 5 MG: 5 INJECTION, SOLUTION INTRAVENOUS at 12:09

## 2025-03-06 RX ADMIN — HYDROMORPHONE HYDROCHLORIDE 0.2 MG: 1 INJECTION, SOLUTION INTRAMUSCULAR; INTRAVENOUS; SUBCUTANEOUS at 18:07

## 2025-03-06 RX ADMIN — HEPARIN SODIUM 3000 UNITS: 1000 INJECTION INTRAVENOUS; SUBCUTANEOUS at 15:26

## 2025-03-06 RX ADMIN — HYDROMORPHONE HYDROCHLORIDE 0.2 MG: 0.2 INJECTION, SOLUTION INTRAMUSCULAR; INTRAVENOUS; SUBCUTANEOUS at 22:29

## 2025-03-06 RX ADMIN — HEPARIN SODIUM 6000 UNITS: 1000 INJECTION INTRAVENOUS; SUBCUTANEOUS at 13:25

## 2025-03-06 RX ADMIN — OXYCODONE 5 MG: 5 TABLET ORAL at 09:21

## 2025-03-06 RX ADMIN — CEFAZOLIN 2 G: 1 INJECTION, POWDER, FOR SOLUTION INTRAMUSCULAR; INTRAVENOUS at 16:28

## 2025-03-06 RX ADMIN — RANOLAZINE 500 MG: 500 TABLET, EXTENDED RELEASE ORAL at 09:21

## 2025-03-06 RX ADMIN — ROCURONIUM 10 MG: 50 INJECTION, SOLUTION INTRAVENOUS at 19:32

## 2025-03-06 RX ADMIN — HYDROMORPHONE HYDROCHLORIDE 0.2 MG: 0.2 INJECTION, SOLUTION INTRAMUSCULAR; INTRAVENOUS; SUBCUTANEOUS at 21:28

## 2025-03-06 RX ADMIN — PANTOPRAZOLE SODIUM 40 MG: 40 INJECTION, POWDER, FOR SOLUTION INTRAVENOUS at 09:19

## 2025-03-06 RX ADMIN — ACETAMINOPHEN 650 MG: 325 TABLET, FILM COATED ORAL at 00:29

## 2025-03-06 RX ADMIN — HEPARIN SODIUM 5000 UNITS: 5000 INJECTION, SOLUTION INTRAVENOUS; SUBCUTANEOUS at 00:30

## 2025-03-06 RX ADMIN — ROCURONIUM 10 MG: 50 INJECTION, SOLUTION INTRAVENOUS at 16:54

## 2025-03-06 RX ADMIN — ROCURONIUM 10 MG: 50 INJECTION, SOLUTION INTRAVENOUS at 13:55

## 2025-03-06 RX ADMIN — HEPARIN SODIUM 2000 UNITS: 1000 INJECTION INTRAVENOUS; SUBCUTANEOUS at 17:23

## 2025-03-06 RX ADMIN — OXYCODONE 5 MG: 5 TABLET ORAL at 00:29

## 2025-03-06 RX ADMIN — CARVEDILOL 25 MG: 12.5 TABLET, FILM COATED ORAL at 09:20

## 2025-03-06 RX ADMIN — HYDROMORPHONE HYDROCHLORIDE 0.4 MG: 1 INJECTION, SOLUTION INTRAMUSCULAR; INTRAVENOUS; SUBCUTANEOUS at 18:45

## 2025-03-06 RX ADMIN — ROCURONIUM 10 MG: 50 INJECTION, SOLUTION INTRAVENOUS at 12:56

## 2025-03-06 RX ADMIN — CEFAZOLIN 2 G: 1 INJECTION, POWDER, FOR SOLUTION INTRAMUSCULAR; INTRAVENOUS at 20:22

## 2025-03-06 RX ADMIN — PROPOFOL 50 MG: 10 INJECTION, EMULSION INTRAVENOUS at 12:11

## 2025-03-06 RX ADMIN — HEPARIN SODIUM 2000 UNITS: 1000 INJECTION INTRAVENOUS; SUBCUTANEOUS at 16:38

## 2025-03-06 RX ADMIN — PROTAMINE SULFATE 40 MG: 10 INJECTION, SOLUTION INTRAVENOUS at 19:56

## 2025-03-06 RX ADMIN — PHENYLEPHRINE HYDROCHLORIDE 120 MCG: 10 INJECTION INTRAVENOUS at 19:50

## 2025-03-06 RX ADMIN — VANCOMYCIN HYDROCHLORIDE 1250 MG: 5 INJECTION, POWDER, LYOPHILIZED, FOR SOLUTION INTRAVENOUS at 06:26

## 2025-03-06 RX ADMIN — SUGAMMADEX 200 MG: 100 INJECTION, SOLUTION INTRAVENOUS at 20:42

## 2025-03-06 RX ADMIN — ASPIRIN 81 MG: 81 TABLET, COATED ORAL at 09:20

## 2025-03-06 RX ADMIN — SODIUM CHLORIDE, POTASSIUM CHLORIDE, SODIUM LACTATE AND CALCIUM CHLORIDE: 600; 310; 30; 20 INJECTION, SOLUTION INTRAVENOUS at 12:05

## 2025-03-06 RX ADMIN — ONDANSETRON 4 MG: 2 INJECTION, SOLUTION INTRAMUSCULAR; INTRAVENOUS at 20:38

## 2025-03-06 RX ADMIN — HYDROMORPHONE HYDROCHLORIDE 0.4 MG: 1 INJECTION, SOLUTION INTRAMUSCULAR; INTRAVENOUS; SUBCUTANEOUS at 21:17

## 2025-03-06 RX ADMIN — FENTANYL CITRATE 25 MCG: 50 INJECTION, SOLUTION INTRAMUSCULAR; INTRAVENOUS at 11:58

## 2025-03-06 RX ADMIN — HYDROMORPHONE HYDROCHLORIDE 0.2 MG: 1 INJECTION, SOLUTION INTRAMUSCULAR; INTRAVENOUS; SUBCUTANEOUS at 18:40

## 2025-03-06 RX ADMIN — HEPARIN SODIUM 3000 UNITS: 1000 INJECTION INTRAVENOUS; SUBCUTANEOUS at 14:09

## 2025-03-06 RX ADMIN — CEFAZOLIN 2 G: 1 INJECTION, POWDER, FOR SOLUTION INTRAMUSCULAR; INTRAVENOUS at 12:32

## 2025-03-06 RX ADMIN — HEPARIN SODIUM 1000 UNITS: 1000 INJECTION INTRAVENOUS; SUBCUTANEOUS at 14:40

## 2025-03-06 RX ADMIN — Medication 6 L/MIN: at 20:54

## 2025-03-06 RX ADMIN — ROCURONIUM 10 MG: 50 INJECTION, SOLUTION INTRAVENOUS at 15:51

## 2025-03-06 RX ADMIN — HYDRALAZINE HYDROCHLORIDE 50 MG: 50 TABLET ORAL at 09:20

## 2025-03-06 RX ADMIN — DEXAMETHASONE SODIUM PHOSPHATE 4 MG: 4 INJECTION INTRA-ARTICULAR; INTRALESIONAL; INTRAMUSCULAR; INTRAVENOUS; SOFT TISSUE at 15:13

## 2025-03-06 RX ADMIN — HEPARIN SODIUM 3000 UNITS: 1000 INJECTION INTRAVENOUS; SUBCUTANEOUS at 19:32

## 2025-03-06 ASSESSMENT — PAIN DESCRIPTION - DESCRIPTORS
DESCRIPTORS: ACHING;SORE
DESCRIPTORS: ACHING

## 2025-03-06 ASSESSMENT — COGNITIVE AND FUNCTIONAL STATUS - GENERAL
PERSONAL GROOMING: A LOT
DRESSING REGULAR LOWER BODY CLOTHING: A LOT
TOILETING: A LITTLE
PERSONAL GROOMING: A LOT
TURNING FROM BACK TO SIDE WHILE IN FLAT BAD: A LITTLE
DAILY ACTIVITIY SCORE: 12
CLIMB 3 TO 5 STEPS WITH RAILING: A LITTLE
HELP NEEDED FOR BATHING: A LOT
DRESSING REGULAR LOWER BODY CLOTHING: A LOT
WALKING IN HOSPITAL ROOM: A LOT
EATING MEALS: A LOT
WALKING IN HOSPITAL ROOM: A LITTLE
MOVING FROM LYING ON BACK TO SITTING ON SIDE OF FLAT BED WITH BEDRAILS: A LITTLE
HELP NEEDED FOR BATHING: A LOT
DRESSING REGULAR UPPER BODY CLOTHING: A LITTLE
DRESSING REGULAR LOWER BODY CLOTHING: A LOT
TOILETING: A LOT
DRESSING REGULAR UPPER BODY CLOTHING: A LOT
MOVING TO AND FROM BED TO CHAIR: A LITTLE
CLIMB 3 TO 5 STEPS WITH RAILING: A LITTLE
WALKING IN HOSPITAL ROOM: A LITTLE
EATING MEALS: A LOT
TURNING FROM BACK TO SIDE WHILE IN FLAT BAD: A LITTLE
TURNING FROM BACK TO SIDE WHILE IN FLAT BAD: A LITTLE
STANDING UP FROM CHAIR USING ARMS: A LITTLE
CLIMB 3 TO 5 STEPS WITH RAILING: A LOT
MOBILITY SCORE: 17
TOILETING: A LOT
MOBILITY SCORE: 18
STANDING UP FROM CHAIR USING ARMS: A LITTLE
MOVING FROM LYING ON BACK TO SITTING ON SIDE OF FLAT BED WITH BEDRAILS: A LITTLE
DAILY ACTIVITIY SCORE: 18
MOBILITY SCORE: 18
MOVING TO AND FROM BED TO CHAIR: A LITTLE
MOVING TO AND FROM BED TO CHAIR: A LITTLE
HELP NEEDED FOR BATHING: A LOT
DAILY ACTIVITIY SCORE: 12
DRESSING REGULAR UPPER BODY CLOTHING: A LOT
STANDING UP FROM CHAIR USING ARMS: A LITTLE

## 2025-03-06 ASSESSMENT — PAIN - FUNCTIONAL ASSESSMENT

## 2025-03-06 ASSESSMENT — PAIN SCALES - WONG BAKER
WONGBAKER_NUMERICALRESPONSE: NO HURT

## 2025-03-06 ASSESSMENT — PAIN SCALES - GENERAL
PAINLEVEL_OUTOF10: 0 - NO PAIN
PAINLEVEL_OUTOF10: 0 - NO PAIN
PAINLEVEL_OUTOF10: 6
PAINLEVEL_OUTOF10: 5 - MODERATE PAIN
PAINLEVEL_OUTOF10: 8
PAINLEVEL_OUTOF10: 0 - NO PAIN
PAINLEVEL_OUTOF10: 6
PAINLEVEL_OUTOF10: 5 - MODERATE PAIN
PAINLEVEL_OUTOF10: 8
PAINLEVEL_OUTOF10: 8
PAINLEVEL_OUTOF10: 0 - NO PAIN
PAINLEVEL_OUTOF10: 6
PAINLEVEL_OUTOF10: 9
PAINLEVEL_OUTOF10: 9
PAINLEVEL_OUTOF10: 0 - NO PAIN
PAINLEVEL_OUTOF10: 9
PAINLEVEL_OUTOF10: 6

## 2025-03-06 ASSESSMENT — ENCOUNTER SYMPTOMS
GASTROINTESTINAL NEGATIVE: 1
RESPIRATORY NEGATIVE: 1
NEUROLOGICAL NEGATIVE: 1
CARDIOVASCULAR NEGATIVE: 1
HEMATOLOGIC/LYMPHATIC NEGATIVE: 1
MUSCULOSKELETAL NEGATIVE: 1
PSYCHIATRIC NEGATIVE: 1
EYES NEGATIVE: 1
CONSTITUTIONAL NEGATIVE: 1
ENDOCRINE NEGATIVE: 1

## 2025-03-06 ASSESSMENT — PAIN DESCRIPTION - ORIENTATION
ORIENTATION: RIGHT
ORIENTATION: LEFT
ORIENTATION: LEFT

## 2025-03-06 ASSESSMENT — PAIN DESCRIPTION - LOCATION
LOCATION: LEG

## 2025-03-06 ASSESSMENT — COLUMBIA-SUICIDE SEVERITY RATING SCALE - C-SSRS
6. HAVE YOU EVER DONE ANYTHING, STARTED TO DO ANYTHING, OR PREPARED TO DO ANYTHING TO END YOUR LIFE?: NO
2. HAVE YOU ACTUALLY HAD ANY THOUGHTS OF KILLING YOURSELF?: NO
1. IN THE PAST MONTH, HAVE YOU WISHED YOU WERE DEAD OR WISHED YOU COULD GO TO SLEEP AND NOT WAKE UP?: NO

## 2025-03-06 NOTE — ANESTHESIA PROCEDURE NOTES
Airway  Date/Time: 3/6/2025 12:17 PM  Urgency: elective    Airway not difficult    Staffing  Performed: resident   Authorized by: Daniele Rojas MD    Performed by: Sandra Laird DO  Patient location during procedure: OR    Indications and Patient Condition  Indications for airway management: anesthesia  Spontaneous Ventilation: absent  Sedation level: deep  Preoxygenated: yes  Patient position: sniffing  MILS not maintained throughout  Mask difficulty assessment: 1 - vent by mask  Planned trial extubation    Final Airway Details  Final airway type: endotracheal airway      Successful airway: ETT  Cuffed: yes   Successful intubation technique: direct laryngoscopy  Facilitating devices/methods: intubating stylet  Endotracheal tube insertion site: oral  Blade: Ernesto  Blade size: #3  ETT size (mm): 7.0  Cormack-Lehane Classification: grade I - full view of glottis  Placement verified by: chest auscultation and capnometry   Cuff volume (mL): 6  Measured from: lips  ETT to lips (cm): 21  Number of attempts at approach: 1  Ventilation between attempts: none  Number of other approaches attempted: 0

## 2025-03-06 NOTE — CARE PLAN
The patient's goals for the shift include patient pain will be controled during shift    The clinical goals for the shift include Patient will remain HDS durng shift    Other goals include:  Problem: Pain - Adult  Goal: Verbalizes/displays adequate comfort level or baseline comfort level  Outcome: Progressing     Problem: Safety - Adult  Goal: Free from fall injury  Outcome: Progressing     Problem: Discharge Planning  Goal: Discharge to home or other facility with appropriate resources  Outcome: Progressing     Problem: Chronic Conditions and Co-morbidities  Goal: Patient's chronic conditions and co-morbidity symptoms are monitored and maintained or improved  Outcome: Progressing     Problem: Nutrition  Goal: Nutrient intake appropriate for maintaining nutritional needs  Outcome: Progressing     Problem: Skin  Goal: Decreased wound size/increased tissue granulation at next dressing change  3/6/2025 1152 by Petra Lawrence RN  Outcome: Progressing  3/6/2025 1152 by Petra Lawrence RN  Flowsheets (Taken 3/6/2025 1152)  Decreased wound size/increased tissue granulation at next dressing change: Protective dressings over bony prominences  Goal: Participates in plan/prevention/treatment measures  3/6/2025 1152 by Petra Lawrence RN  Outcome: Progressing  3/6/2025 1152 by Petra Lawrence RN  Flowsheets (Taken 3/6/2025 1152)  Participates in plan/prevention/treatment measures:   Discuss with provider PT/OT consult   Elevate heels  Goal: Prevent/manage excess moisture  3/6/2025 1152 by Petra Lawrence RN  Outcome: Progressing  3/6/2025 1152 by Petra Lawrence RN  Flowsheets (Taken 3/6/2025 1152)  Prevent/manage excess moisture:   Follow provider orders for dressing changes   Monitor for/manage infection if present   Cleanse incontinence/protect with barrier cream  Goal: Prevent/minimize sheer/friction injuries  3/6/2025 1152 by Petra Lawrence RN  Outcome: Progressing  3/6/2025 1152 by Petra Lawrence RN  Flowsheets (Taken 3/6/2025  1152)  Prevent/minimize sheer/friction injuries:   Increase activity/out of bed for meals   HOB 30 degrees or less   Turn/reposition every 2 hours/use positioning/transfer devices  Goal: Promote/optimize nutrition  3/6/2025 1152 by Petra Lawrence RN  Outcome: Progressing  3/6/2025 1152 by Petra Lawrence RN  Flowsheets (Taken 3/6/2025 1152)  Promote/optimize nutrition:   Consume > 50% meals/supplements   Monitor/record intake including meals  Goal: Promote skin healing  3/6/2025 1152 by Petra Lawrence RN  Outcome: Progressing  3/6/2025 1152 by Petra Lawrence RN  Flowsheets (Taken 3/6/2025 1152)  Promote skin healing:   Protective dressings over bony prominences   Ensure correct size (line/device) and apply per  instructions     Problem: Diabetes  Goal: Achieve decreasing blood glucose levels by end of shift  Outcome: Progressing  Goal: Increase stability of blood glucose readings by end of shift  Outcome: Progressing  Goal: Decrease in ketones present in urine by end of shift  Outcome: Progressing  Goal: Maintain electrolyte levels within acceptable range throughout shift  Outcome: Progressing  Goal: Maintain glucose levels >70mg/dl to <250mg/dl throughout shift  Outcome: Progressing  Goal: No changes in neurological exam by end of shift  Outcome: Progressing  Goal: Learn about and adhere to nutrition recommendations by end of shift  Outcome: Progressing  Goal: Vital signs within normal range for age by end of shift  Outcome: Progressing  Goal: Increase self care and/or family involovement by end of shift  Outcome: Progressing  Goal: Receive DSME education by end of shift  Outcome: Progressing     Problem: Pain  Goal: Takes deep breaths with improved pain control throughout the shift  Outcome: Progressing  Goal: Turns in bed with improved pain control throughout the shift  Outcome: Progressing  Goal: Walks with improved pain control throughout the shift  Outcome: Progressing  Goal: Performs ADL's with  improved pain control throughout shift  Outcome: Progressing  Goal: Participates in PT with improved pain control throughout the shift  Outcome: Progressing  Goal: Free from opioid side effects throughout the shift  Outcome: Progressing  Goal: Free from acute confusion related to pain meds throughout the shift  Outcome: Progressing     Problem: Heart Failure  Goal: Improved gas exchange this shift  Outcome: Progressing  Goal: Improved urinary output this shift  Outcome: Progressing  Goal: Reduction in peripheral edema within 24 hours  Outcome: Progressing  Goal: Report improvement of dyspnea/breathlessness this shift  Outcome: Progressing  Goal: Weight from fluid excess reduced over 2-3 days, then stabilize  Outcome: Progressing  Goal: Increase self care and/or family involvement in 24 hours  Outcome: Progressing     Problem: Fall/Injury  Goal: Not fall by end of shift  Outcome: Progressing  Goal: Be free from injury by end of the shift  Outcome: Progressing  Goal: Verbalize understanding of personal risk factors for fall in the hospital  Outcome: Progressing  Goal: Verbalize understanding of risk factor reduction measures to prevent injury from fall in the home  Outcome: Progressing  Goal: Use assistive devices by end of the shift  Outcome: Progressing  Goal: Pace activities to prevent fatigue by end of the shift  Outcome: Progressing

## 2025-03-06 NOTE — CARE PLAN
The patient's goals for the shift include patient pain will be controled during shift    The clinical goals for the shift include Patient will remain HDS during shift    Over the shift, the patient did not make progress toward the following goals. Barriers to progression include . Recommendations to address these barriers include   Problem: Skin  Goal: Prevent/minimize sheer/friction injuries  3/6/2025 0210 by Lyssa Agrawal RN  Flowsheets (Taken 3/6/2025 0210)  Prevent/minimize sheer/friction injuries:   Turn/reposition every 2 hours/use positioning/transfer devices   Use pull sheet  3/6/2025 0210 by Lyssa Agrawal RN  Outcome: Progressing  Flowsheets (Taken 3/6/2025 0210)  Prevent/minimize sheer/friction injuries:   Turn/reposition every 2 hours/use positioning/transfer devices   Use pull sheet     Problem: Skin  Goal: Promote skin healing  3/6/2025 0210 by Lyssa Agrawal RN  Flowsheets (Taken 3/6/2025 0210)  Promote skin healing:   Turn/reposition every 2 hours/use positioning/transfer devices   Protective dressings over bony prominences  3/6/2025 0210 by Lyssa Agrawal RN  Outcome: Progressing  Flowsheets (Taken 3/6/2025 0210)  Promote skin healing:   Turn/reposition every 2 hours/use positioning/transfer devices   Protective dressings over bony prominences   .

## 2025-03-06 NOTE — ANESTHESIA PROCEDURE NOTES
Peripheral IV  Date/Time: 3/6/2025 12:23 PM  Inserted by: Sandra Laird DO    Placement  Needle size: 18 G  Laterality: left  Location: forearm  Local anesthetic: none  Site prep: chlorhexidine  Technique: anatomical landmarks  Attempts: 1

## 2025-03-06 NOTE — CONSULTS
Inpatient consult to Infectious Diseases  Consult performed by: Josee Burdick PA-C  Consult ordered by: Savanna Moulton MD        Referred by Dr. Savanna Moulton    Primary MD: No primary care provider on file.    Reason For Consult  Antibiotic duration for LLE dry gangrene    History Of Present Illness  Myrna Khan is a 65 y.o. female with a PMHx of HTN, HLD, T2DM (A1C 14.6 1/2025), PAD s/p L SFA stent, HFimpEF (55%), CAD s/p CABG in 2023 on Plavix, carotid artery stenosis (August 2023 US), renal artery stenosis (September 2023 US) who presented to the ED on 2/28 with dry gangrene of LLE.  Pt says problems with here left leg started a week ago and noticed skin sloughing, edema, clear fluid drainage, itching and pain. Pt was at 2/28 cardiology appointment with Dr Pederson who had difficulty palpating pulses on LLE and noticed skin breakdown. Per report, pt had monophasic AT and PT signals on LLE and no palpable femoral pulse. Pt advised to go to ED for further evaluation.  At the ED, pt underwent CTA which showed occlusion of common and external iliac arteries and occluded SFA stent. XR left foot with no acute osseous abnormalities. XR left femur with no acute osseous abnormalities, soft tissue edema about the thigh, no definite evidence of soft tissue gas. Pt evaluated by podiatry who advised no operative management for right now, abx and optimization by cardiology. Pt started on vancomycin and cefepime. Overnight from 3/02-3/03, pt reported to have delirium 2/2 urinary retention vs cefepime neurotoxicity. Cefepime discontinued. Pt started on ertapenem (and vancomycin continued). On 3/03, pt had lower extremity angiogram with vascular surgery which showed left external iliac artery occlusion, reconstitutes at distal EIA w/ CFA short-segment occlusion vs severe stenosis, L SFA stent occlusion, patent popliteal.   3/06, pt in the OR for left lower femoral endarterectomy w/ external iliac stent placement. Pt  not seen on 3/06 because pt in the OR. Pt evaluated bedside on 3/07. Endorses 8/10 left leg pain. Denies cough, SOB, fever, chills, abdominal pain, N/V/D. Wound vac in place on left groin. Afebrile, on room air, WBC 13.7.       Past Medical History  She has a past medical history of CAD (coronary artery disease), Carotid artery stenosis, Diabetes mellitus (Multi), Heart disease, Hypertension, NSTEMI (non-ST elevated myocardial infarction) (Multi), PAD (peripheral artery disease) (CMS-[facility]), and Renal artery stenosis (CMS-[facility]).    Surgical History  She has a past surgical history that includes Coronary artery bypass graft and Femoral artery stent (Left).     Social History     Occupational History    Not on file   Tobacco Use    Smoking status: Some Days     Current packs/day: 1.00     Average packs/day: 1 pack/day for 30.2 years (30.2 ttl pk-yrs)     Types: Cigarettes     Start date: 1995    Smokeless tobacco: Never   Vaping Use    Vaping status: Never Used   Substance and Sexual Activity    Alcohol use: Yes     Comment: 6 pack per month    Drug use: Never    Sexual activity: Defer     Travel History   Travel since 02/06/25    No documented travel since 02/06/25            Family History  Family History   Problem Relation Name Age of Onset    Peripheral vascular disease Mother      Hypertension Mother      Diabetes type II Mother      Hypertension Sister      Diabetes type II Sister       Allergies  Lisinopril, Amoxicillin, and Losartan     Immunization History   Administered Date(s) Administered    COVID-19, mRNA, LNP-S, PF, 30 mcg/0.3 mL dose 07/19/2021, 08/26/2021    Flu vaccine (IIV4), preservative free *Check age/dose* 10/12/2016    Hepatitis B vaccine, adult *Check Product/Dose* 09/11/2014, 02/18/2015    Influenza, injectable, quadrivalent 11/02/2017, 01/30/2019, 12/09/2021    Influenza, seasonal, injectable 11/13/2014, 12/02/2015    Pneumococcal conjugate vaccine, 13-valent (PREVNAR 13) 07/19/2017     "Pneumococcal polysaccharide vaccine, 23-valent, age 2 years and older (PNEUMOVAX 23) 05/15/2019    Tdap vaccine, age 7 year and older (BOOSTRIX, ADACEL) 08/09/2018, 01/18/2025     Medications  Home medications:  Medications Prior to Admission   Medication Sig Dispense Refill Last Dose/Taking    acetaminophen (Tylenol) 500 mg tablet Take 1 tablet (500 mg) by mouth every 6 hours if needed for mild pain (1 - 3) or moderate pain (4 - 6). Take per directed 30 tablet 0     amLODIPine (Norvasc) 10 mg tablet TAKE 1 TABLET BY MOUTH ONCE DAILY 30 tablet 0     aspirin 81 mg EC tablet Take 1 tablet (81 mg) by mouth once daily.       atorvastatin (Lipitor) 80 mg tablet TAKE 1 TABLET BY MOUTH ONCE DAILY 30 tablet 2     BD Ultra-Fine Micro Pen Needle 32 gauge x 1/4\" needle        blood sugar diagnostic strip Use 1 strip to check blood sugar 3 times a day with meals. 100 each 0     CALCIUM CITRATE ORAL Take 1 tablet by mouth once daily.       carvedilol (Coreg) 25 mg tablet TAKE 1 TABLET BY MOUTH TWO TIMES A DAY 60 tablet 2     clopidogrel (Plavix) 75 mg tablet Take 1 tablet (75 mg) by mouth once daily.       diclofenac sodium (Voltaren) 1 % gel Apply 4.5 inches (4 g) topically 4 times a day as needed (pain).       Easy Touch Alcohol Prep Pads pads, medicated        Farxiga 10 mg Take 1 tablet (10 mg) by mouth once daily with breakfast.       gabapentin (Neurontin) 300 mg capsule Take 1 capsule (300 mg) by mouth once daily.       hydrALAZINE (Apresoline) 10 mg tablet Take 1 tablet (10 mg) by mouth 2 times a day.       insulin glargine (Lantus) 100 unit/mL injection Inject 10 Units under the skin once daily at bedtime. Take as directed per insulin instructions. (Patient taking differently: Inject 16 Units under the skin once daily at bedtime. Take as directed per insulin instructions.)       insulin lispro 100 unit/mL injection Inject 0-5 Units under the skin 3 times daily (morning, midday, late afternoon). 0 unit(s) if BG ; 1 " unit(s) if -200; 2 unit(s) if -250; 3 unit(s) if -300; 4 unit(s) if -350; 5 unit(s) if -400       isosorbide mononitrate ER (Imdur) 60 mg 24 hr tablet TAKE 1 TABLET BY MOUTH ONCE DAILY 30 tablet 0     lancets misc Use three times a day to test blood sugar w/ meals 102 each 0     metFORMIN XR (Glucophage-XR) 500 mg 24 hr tablet TAKE 2 TABLETS BY MOUTH ONCE DAILY 60 tablet 0     multivitamin with minerals tablet Take 1 tablet by mouth once daily.       oxyCODONE (Oxy-IR) 5 mg immediate release capsule Take 1 capsule (5 mg) by mouth every 6 hours if needed for severe pain (7 - 10).       ranolazine (Ranexa) 500 mg 12 hr tablet Take 1 tablet (500 mg) by mouth 2 times a day. Do not crush, chew, or split.        Current medications:  Scheduled medications  [Transfer Hold] acetaminophen, 650 mg, oral, q8h  [Transfer Hold] aspirin, 81 mg, oral, Daily  [Transfer Hold] atorvastatin, 80 mg, oral, Nightly  [Transfer Hold] carvedilol, 25 mg, oral, BID  [Held by provider] clopidogrel, 75 mg, oral, Daily  [Transfer Hold] ertapenem, 1 g, intravenous, q24h  [Transfer Hold] heparin (porcine), 5,000 Units, subcutaneous, q8h BOLIVAR  [Transfer Hold] hydrALAZINE, 50 mg, oral, TID  [Transfer Hold] insulin glargine, 7 Units, subcutaneous, Nightly  [Transfer Hold] insulin lispro, 0-5 Units, subcutaneous, TID AC  [Held by provider] insulin lispro, 5 Units, subcutaneous, TID AC  [Transfer Hold] isosorbide mononitrate ER, 60 mg, oral, Daily  [Transfer Hold] melatonin, 10 mg, oral, Nightly  [Transfer Hold] NIFEdipine ER, 90 mg, oral, Daily before breakfast  [Transfer Hold] pantoprazole, 40 mg, intravenous, Daily  [Transfer Hold] ranolazine, 500 mg, oral, BID  [Transfer Hold] vancomycin, 1,250 mg, intravenous, q24h      Continuous medications     PRN medications  PRN medications: [Transfer Hold] dextrose, [Transfer Hold] glucagon, [Transfer Hold] hydrALAZINE, [Transfer Hold] OLANZapine, [Transfer Hold] oxyCODONE,  "[Transfer Hold] polyethylene glycol, [Transfer Hold] QUEtiapine, [Transfer Hold] vancomycin    Review of Systems   Constitutional:  Negative for chills and fever.   Respiratory:  Negative for cough and shortness of breath.    Gastrointestinal:  Negative for abdominal pain, diarrhea, nausea and vomiting.   Musculoskeletal:         L Leg and left foot pain        Objective  Range of Vitals (last 24 hours)  Heart Rate:  [76-89]   Temp:  [36.2 °C (97.1 °F)-37.1 °C (98.8 °F)]   Resp:  [15-18]   BP: (121-158)/(58-77)   Height:  [160 cm (5' 3\")]   Weight:  [42.6 kg (94 lb)]   SpO2:  [94 %-100 %]   Daily Weight  03/06/25 : (!) 42.6 kg (94 lb)    Body mass index is 16.65 kg/m².     Physical Exam  Constitutional:       Appearance: Normal appearance.   HENT:      Mouth/Throat:      Mouth: Mucous membranes are moist.      Comments: edentulous  Eyes:      Extraocular Movements: Extraocular movements intact.   Cardiovascular:      Rate and Rhythm: Normal rate and regular rhythm.   Pulmonary:      Effort: Pulmonary effort is normal.      Breath sounds: Normal breath sounds.   Abdominal:      General: Bowel sounds are normal.      Palpations: Abdomen is soft.   Musculoskeletal:         General: Normal range of motion.      Right lower leg: No edema.      Left lower leg: No edema.   Skin:     Comments: L lower extremity with dry gangrene from calf to left foot   Neurological:      Mental Status: She is alert.   Psychiatric:         Mood and Affect: Mood normal.         Behavior: Behavior normal.            Relevant Results  Labs  Results from last 72 hours   Lab Units 03/06/25  0618 03/05/25  0550 03/04/25  0653   WBC AUTO x10*3/uL 11.3 9.0 10.7   HEMOGLOBIN g/dL 10.2* 10.3* 9.6*   HEMATOCRIT % 30.0* 30.7* 28.5*   PLATELETS AUTO x10*3/uL 469* 444 436   NEUTROS PCT AUTO % 57.4 59.6  --    LYMPHS PCT AUTO % 32.4 28.3  --    MONOS PCT AUTO % 7.2 9.0  --    EOS PCT AUTO % 2.0 1.9  --      Results from last 72 hours   Lab Units " "03/06/25  0618 03/05/25  0550 03/04/25  0653   SODIUM mmol/L 133* 134* 139   POTASSIUM mmol/L 4.2 3.8 3.4*   CHLORIDE mmol/L 102 101 106   CO2 mmol/L 22 26 24   BUN mg/dL 12 11 9   CREATININE mg/dL 0.85 0.61 0.63   GLUCOSE mg/dL 139* 308* 84   CALCIUM mg/dL 8.6 8.4* 8.5*   ANION GAP mmol/L 13 11 12   EGFR mL/min/1.73m*2 76 >90 >90   PHOSPHORUS mg/dL 3.0 2.4* 2.6     Results from last 72 hours   Lab Units 03/06/25  0618 03/05/25  0550 03/04/25  0653   ALBUMIN g/dL 2.7* 2.7* 2.6*     Estimated Creatinine Clearance: 44.4 mL/min (by C-G formula based on SCr of 0.85 mg/dL).  CRP   Date Value Ref Range Status   08/26/2023 0.79 mg/dL Final     Comment:     REF VALUE  < 1.00       Sedimentation Rate   Date Value Ref Range Status   08/26/2023 48 (H) 0 - 30 mm/h Final     HIV 1/2 Antigen/Antibody Screen with Reflex to Confirmation   Date Value Ref Range Status   07/20/2024 Nonreactive Nonreactive Final     No results found for: \"HEPCABINIT\", \"HEPCAB\", \"HCVPCRQUANT\"  Microbiology  Susceptibility data from last 90 days.  Collected Specimen Info Organism Ampicillin Ciprofloxacin Levofloxacin Nitrofurantoin Penicillin Trimethoprim/Sulfamethoxazole Vancomycin   01/18/25 Urine from Clean Catch/Voided Enterococcus faecalis  S  S  S  S  S  R  S     Imaging  XR FOOT LEFT 3+ VIEWS; ; 2/28/2025 7:33 pm   IMPRESSION:  No acute osseous abnormalities. The bones are diffusely demineralized.    XR FEMUR LEFT 2+ VIEWS; ; 2/28/2025 7:33 pm   IMPRESSION:  No acute osseous abnormalities. Generalized soft tissue edema about  the thigh. No definite evidence of soft tissue gas although  sensitivity is limited due to presence of skin folds.      Hyperdense object projecting over the lateral aspect of the thigh is  indeterminate for possible foreign body. Correlate with physical exam.    XR ANKLE LEFT 3+ VIEWS; ; 2/28/2025 7:33 pm   IMPRESSION:  No radiographic evidence of soft tissue gas.    CT ANGIO AORTA AND BILATERAL ILIOFEMORAL RUN OFF INCLUDING " WITHOUT CONTRAST IF PERFORMED;  2/28/2025 8:38 pmCT ANGIO AORTA AND BILATERAL ILIOFEMORAL RUN OFF INCLUDING WITHOUT CONTRAST IF PERFORMED;  2/28/2025 8:38 pm  IMPRESSION:  VASCULAR:  1. In the left lower extremity, there is complete occlusion of the  left external iliac artery with distal reconstitution in the left  common femoral artery. Subsequently, there is a left SFA stent which  is completely occluded. Distal reconstitution of flow is identified  in the left distal SFA. There is severe atherosclerosis of the left  popliteal artery as well as the calf arteries although a three-vessel  runoff is present at the ankle mortise.  2. In the right lower extremity, there is severe atherosclerosis  resulting in multifocal areas of moderate-to-severe narrowing  throughout. Distal SFA stent is present and stenosed but flow is  identified within the lumen. A three-vessel runoff is identified at  the ankle mortise.  3. Right-greater-than-left circumferential subcutaneous edema of the  bilateral extremities may be due to venous congestion, lymphedema,  and/or cellulitis. Correlate with physical exam findings.  4. Diffuse circumferential skin ulceration involving the left mid to  distal calf as well as the foot. No drainable collections.      ABDOMEN/PELVIS:  1. Severe distention of the urinary bladder with the dome at the  level of the midabdomen. Consider decompression with a Malone catheter.  2. Mild edematous appearance of the left renal parenchyma with  minimal perinephric stranding is nonspecific and may be related to  reflux due to bladder distention although pyelonephritis is not  excluded. Correlate with urinalysis.  3. Right adnexal cystic lesion measuring 5.4 x 5.1 x 7.3 cm  which  was reported on a prior CTA dated 05/11/2023 in [facility], however images  are not available for direct comparison. The mass was reported to  measure 4.5 cm at that time. Findings are nonspecific with  differential diagnosis including cystic  ovarian neoplasm. Recommend  gynecology consultation and consideration for outpatient evaluation  with MRI or ultrasound.  4. Mild circumferential wall thickening of the distal esophagus  likely representing esophagitis.  5. Pancreatic head cystic lesion measuring 1.2 cm may represent a  side branch IPMN. Consider follow-up MRCP in 2 years to ensure  stability.  6. Mild thickening of the rectal wall is nonspecific but can be seen  with proctitis.  7. Additional findings as discussed above.    VASC US LOWER EXTREMITY VEIN MAPPING BILATERAL 3/03/25  CONCLUSIONS:  Right Lower Vein Mapping: The right great saphenous vein and small saphenous vein appear widely patent with no evidence of thrombosis or fibrosis. Right lower extremity vein: Technically difficult due to patient position and unable to tolerate due to pain. The mid distal calf GSV splits and becomes small.  Left Lower Vein Mapping: The left great saphenous vein and small saphenous vein appear widely patent with no evidence of thrombosis or fibrosis. Left lower extremity vein: Technically difficult due to patient position and unable to tolerate due to pain. The distal thigh GSV is not visualized.    Assessment/Plan   Myrna Khan is a 65 y.o. female with a PMHx of HTN, HLD, T2DM (A1C 14.6 1/2025), PAD s/p L SFA stent, HFimpEF (55%), CAD s/p CABG in 2023 on Plavix, carotid artery stenosis (August 2023 US), renal artery stenosis (September 2023 US) who presented to the ED on 2/28 with dry gangrene of LLE due to chronic venous stasis ulceration w/  poor wound healing due to arterial insufficiency. Started on cefepime and vancomycin on 2/28 but cefepime discontinued due to concern for neurotoxicity on 3/03. Currently on vancomycin and ertapenem.     Unable to see patient today as patient was in the OR.  Will see tomorrow. Currently unable to comment on antibiotic duration until determine if podiatry ends up debriding wounds following vascular intervention.      3/07/25 update:  LLE now revascularized. Will wait to see what if any intervention podiatry plans. For now, continue vancomycin and ertapenem.         Recommendations:  1) Continue vancomycin, dosed with pharmacy support  2) Continue ertapenem 1g IV q24h      Pt and case discussed with the attendings, Dr. Geller and Dr. Perkins.    I spent 60 minutes in the professional and overall care of this patient.      Josee Burdick PA-C  Infectious Disease Team B  Epic chat preferred

## 2025-03-06 NOTE — ANESTHESIA PREPROCEDURE EVALUATION
Patient: Myrna Khan    Procedure Information       Date/Time: 03/06/25 0570    Procedures:       ENDARTERECTOMY, LOWER EXTREMITY (Left)      ANGIOGRAM, LOWER EXTREMITY (Left)      CREATION, BYPASS, ARTERIAL, TIBIAL, USING GRAFT (Left)    Location: [facility] OR 16 / [facility] OR    Surgeons: Bobby Pederson MD            Relevant Problems   Cardiac   (+) CAD (coronary artery disease)   (+) CHF (congestive heart failure)   (+) HTN (hypertension)   (+) Hypertensive urgency   (+) PAD (peripheral artery disease) (CMS-HCC)   (+) Stented coronary artery      Pulmonary   (+) Chronic obstructive pulmonary disease with (acute) lower respiratory infection (Multi)      Neuro   (+) Bilateral carotid artery stenosis      Hematology   (+) Thrombocytopenia, unspecified (CMS-[facility])      ID   (+) Chronic obstructive pulmonary disease with (acute) lower respiratory infection (Multi)       Clinical information reviewed:   Tobacco  Allergies  Meds   Med Hx  Surg Hx   Fam Hx  Soc Hx        NPO Detail:  No data recorded     Physical Exam    Airway  Mallampati: III  TM distance: >3 FB  Neck ROM: full     Cardiovascular - normal exam     Dental   Comments: No teeth   Pulmonary - normal exam     Abdominal            Anesthesia Plan    History of general anesthesia?: yes  History of complications of general anesthesia?: no    ASA 3     general     intravenous induction   Postoperative administration of opioids is intended.  Trial extubation is planned.  Anesthetic plan and risks discussed with patient.  Use of blood products discussed with patient who.

## 2025-03-06 NOTE — ANESTHESIA PROCEDURE NOTES
Arterial Line:    Date/Time: 3/6/2025 12:18 PM    Staffing  Performed: resident   Authorized by: Daniele Rojas MD    Performed by: Sandra Laird DO    An arterial line was placed. Procedure performed using surface landmarks.in the OR for the following indication(s): continuous blood pressure monitoring and blood sampling needed.    A 20 gauge (size), 1 and 3/4 inch (length), Angiocath (type) catheter was placed into the Right radial artery, secured by Tegaderm,   Seldinger technique not used.  Events:  patient tolerated procedure well with no complications.

## 2025-03-06 NOTE — CARE PLAN
The patient's goals for the shift include patient pain will be controled during shift    The clinical goals for the shift include Patient will remain HDS during shift    Over the shift, the patient did not make progress toward the following goals. Barriers to progression include   Problem: Pain - Adult  Goal: Verbalizes/displays adequate comfort level or baseline comfort level  Outcome: Progressing     Problem: Safety - Adult  Goal: Free from fall injury  Outcome: Progressing     Problem: Discharge Planning  Goal: Discharge to home or other facility with appropriate resources  Outcome: Progressing     Problem: Chronic Conditions and Co-morbidities  Goal: Patient's chronic conditions and co-morbidity symptoms are monitored and maintained or improved  Outcome: Progressing     Problem: Nutrition  Goal: Nutrient intake appropriate for maintaining nutritional needs  Outcome: Progressing     Problem: Skin  Goal: Decreased wound size/increased tissue granulation at next dressing change  Outcome: Progressing  Goal: Participates in plan/prevention/treatment measures  Outcome: Progressing  Goal: Prevent/manage excess moisture  Outcome: Progressing  Goal: Prevent/minimize sheer/friction injuries  Outcome: Progressing  Goal: Promote/optimize nutrition  Outcome: Progressing  Goal: Promote skin healing  Outcome: Progressing     Problem: Diabetes  Goal: Achieve decreasing blood glucose levels by end of shift  Outcome: Progressing  Goal: Increase stability of blood glucose readings by end of shift  Outcome: Progressing  Goal: Decrease in ketones present in urine by end of shift  Outcome: Progressing  Goal: Maintain electrolyte levels within acceptable range throughout shift  Outcome: Progressing  Goal: Maintain glucose levels >70mg/dl to <250mg/dl throughout shift  Outcome: Progressing  Goal: No changes in neurological exam by end of shift  Outcome: Progressing  Goal: Learn about and adhere to nutrition recommendations by end of  shift  Outcome: Progressing  Goal: Vital signs within normal range for age by end of shift  Outcome: Progressing  Goal: Increase self care and/or family involovement by end of shift  Outcome: Progressing  Goal: Receive DSME education by end of shift  Outcome: Progressing     Problem: Pain  Goal: Takes deep breaths with improved pain control throughout the shift  Outcome: Progressing  Goal: Turns in bed with improved pain control throughout the shift  Outcome: Progressing  Goal: Walks with improved pain control throughout the shift  Outcome: Progressing  Goal: Performs ADL's with improved pain control throughout shift  Outcome: Progressing  Goal: Participates in PT with improved pain control throughout the shift  Outcome: Progressing  Goal: Free from opioid side effects throughout the shift  Outcome: Progressing  Goal: Free from acute confusion related to pain meds throughout the shift  Outcome: Progressing     Problem: Heart Failure  Goal: Improved gas exchange this shift  Outcome: Progressing  Goal: Improved urinary output this shift  Outcome: Progressing  Goal: Reduction in peripheral edema within 24 hours  Outcome: Progressing  Goal: Report improvement of dyspnea/breathlessness this shift  Outcome: Progressing  Goal: Weight from fluid excess reduced over 2-3 days, then stabilize  Outcome: Progressing  Goal: Increase self care and/or family involvement in 24 hours  Outcome: Progressing     Problem: Fall/Injury  Goal: Not fall by end of shift  Outcome: Progressing  Goal: Be free from injury by end of the shift  Outcome: Progressing  Goal: Verbalize understanding of personal risk factors for fall in the hospital  Outcome: Progressing  Goal: Verbalize understanding of risk factor reduction measures to prevent injury from fall in the home  Outcome: Progressing  Goal: Use assistive devices by end of the shift  Outcome: Progressing  Goal: Pace activities to prevent fatigue by end of the shift  Outcome: Progressing   .  Recommendations to address these barriers include .

## 2025-03-06 NOTE — PROGRESS NOTES
Assessment/Plan     Myrna Khan is a 65 y.o. 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 Pt noting skin sloughing, edema, clear drainage. At op visit, difficulty palpating pulse, had a monophasic AT and PT signals on the LLE and no palpable femoral pulse. CTA showed occlusion of common and external iliac arteries in addition to occluded SFA stent with distal reconstruction. Podiatry consulted. Pt is started on broad spectrum abx and ordered pvr and vein mapping..Ordered angiogram, echo for preop risk strafication, appreciate cardiology recs. Pt seeing gyne for cystic ovarian mass suspicious for malignancy, cr is stable but after day placement nursing reporting 2.5 liters of output.      3/6  -heading to OR in stable conditions  -Pt was seen  and examined this am. Pt has no acute event overnight.   -Will monitor s/p OR     3/5  -Pt was seen  and examined this am, symptoms have improved since yesterday. Pt has no acute event overnight. Addressed all of the patient concerns and explained the treatment plan. Mentation wise is more awake , alert and oriented.   -stopped Amlodipine and started Nifedipine 90 mg daily instead, trying to get better BP control. next thoughts will be to increase hydralazine to 75 mg TID and eventually increase Imdur to 120 if needed.  -BG is 320, added lispro 5 units, keep Lantus to 7 U, giving he is NPO tonight   -The ethics committee met this morning and support revascularization    -Plan for vascular interventions Thursday OR with Vascular. Plavix held 3/4. NPO midnight.       3/4  -Mentation starting to improve after decompression but appears to be a continued issue.     -Reviewed echo and angiogram.   -Changed from cefepime to ertapenem. Will reassess mentation  -Urine retention, day 2.5 liters out. -2 liters yesterday. Need to watch and possibly give some fluid back if post-obstructive diuresis. Do not remove day.   -Pt need reeval from gyne for the ovarian mass concerning for cancer, mentation had been off since the first overnight of her admit.  They were waiting for mentation to be better.   -Held on ordering regadenoson stress test for to risk stratify her for surgery per cardiology recommendations.     -Plan for vascular interventions Thursday OR with Vascular. Plavix held 3/4.   -Increased Hydralazine. She was quite hypertensive.    -wound care applied   -More awake and alert and oriented   -ID consulted   -Hypokalemia replaced   -Ethics [id number] consulted. Planning for a meeting before Thursday    -Podiatry, no plan to intervene until after vascular surgery does bypass          40 min     -----------------------------------------      #LLE wounds with CLI  #PAOD  #Dry gangrene  - CT showing common and external iliac arteries occlusion + SFA occlusion with reconstitution of her L popliteal artery and intact tibial runoff   - no gas on imaging   - PVR/vein mapping ordered.    - appreciate podiatry recs  - mulitmodal analgesia with bowel regimen        #Cystic ovarian mass  #urinary retention  - noted on imaging 2023 per rads but unable to compare given no image and just a report.   - pt today reports she was aware of the mass previously.   -  gyne evaluating, appreciate recs.   - with significant urinary retention leading to suspected delirum, s/p day with some improved mentation. 2.5L out on placement. Monitor for signs of injury from rapid decompression v. Post obstructive diuresis  - Net neg 2 L per I/o last 24hrs. Will monitor and likely replace if continued losses after procedure.        #Delirium, resolving   - likely 2/2 urine retnetion. Potentially related to  "opioid started yesterday v. Hospital related sundowning. May be related to infection/pain v. Related to cefepime neurotox.   - held opioid and reeval needs  - dc cefepime, change to ertapenem.     #HTN  #HFimpEF (EF 55% July/2024, 35-40% in Aug/2023)  - MRI and NM PYP negative for amyloid,  SPEP and UPEP negative  - on home amlodipine 10mg, carvedilol 25mg, isosorvide 60mg        #CAD s/p CABG in 2023 (LIMA-LAD, APRIL-OM)  - EKG showing T wave inversions on anterior leads  -  patient asymptomatic, no cp or equivalents  - tele monitoring  - Appreciate cardiology recs.        #T2DM (A1c 14.6% Jan/2025)  - on home metformin, dapagliflozin, insulin lantus 10u PM and SSI  - given prior hyperglycemia in Jan/2025, will be more aggressive with insulin regimen  - hold metformin and dapa  - lantus 8u + SSI. Will adjust as needed       Scheduled outpatient appointments in system:   No future appointments.    ---------------------------------------------------------------------------------------------------  Subjective   No events.    ---------------------------------------------------------------------------------------------------  Objective   Last Recorded Vitals  Blood pressure 141/68, pulse 76, temperature 36.6 °C (97.9 °F), temperature source Temporal, resp. rate 15, height 1.6 m (5' 3\"), weight (!) 42.6 kg (94 lb), SpO2 100%.  Intake/Output last 3 Shifts:  I/O last 3 completed shifts:  In: 1090 (25.6 mL/kg) [P.O.:1090]  Out: 1225 (28.7 mL/kg) [Urine:1225 (0.8 mL/kg/hr)]  Weight: 42.6 kg     Physical Exam  Vitals and nursing note reviewed.   Constitutional:       General: She is not in acute distress.     Appearance: Normal appearance. She is not ill-appearing or toxic-appearing.   HENT:      Head: Normocephalic and atraumatic.      Mouth/Throat:      Mouth: Mucous membranes are moist.   Eyes:      General: No scleral icterus.     Extraocular Movements: Extraocular movements intact.      Conjunctiva/sclera: Conjunctivae " normal.   Cardiovascular:      Rate and Rhythm: Normal rate and regular rhythm.      Heart sounds: S1 normal and S2 normal. No murmur heard.     Comments: Difficult to palpate distal pulses  Pulmonary:      Effort: Pulmonary effort is normal. No respiratory distress.      Breath sounds: No wheezing, rhonchi or rales.   Abdominal:      General: Bowel sounds are normal. There is no distension.      Palpations: Abdomen is soft.      Tenderness: There is no abdominal tenderness. There is no guarding or rebound.   Musculoskeletal:         General: No swelling or deformity.      Cervical back: Neck supple.   Skin:     Findings: No rash.      Comments: Left foot edema, denuded skin on doral surface, erythema and ttp    Neurological:      General: No focal deficit present.      Mental Status: She is alert. Mental status is at baseline.   Psychiatric:         Mood and Affect: Mood normal.         Relevant Results  Lab Results   Component Value Date    WBC 11.3 03/06/2025    HGB 10.2 (L) 03/06/2025    HCT 30.0 (L) 03/06/2025    MCV 95 03/06/2025     (H) 03/06/2025      Lab Results   Component Value Date    GLUCOSE 139 (H) 03/06/2025    CALCIUM 8.6 03/06/2025     (L) 03/06/2025    K 4.2 03/06/2025    CO2 22 03/06/2025     03/06/2025    BUN 12 03/06/2025    CREATININE 0.85 03/06/2025     Scheduled medications  [Transfer Hold] acetaminophen, 650 mg, oral, q8h  [Transfer Hold] aspirin, 81 mg, oral, Daily  [Transfer Hold] atorvastatin, 80 mg, oral, Nightly  [Transfer Hold] carvedilol, 25 mg, oral, BID  [Held by provider] clopidogrel, 75 mg, oral, Daily  [Transfer Hold] ertapenem, 1 g, intravenous, q24h  [Transfer Hold] heparin (porcine), 5,000 Units, subcutaneous, q8h BOLIVAR  [Transfer Hold] hydrALAZINE, 50 mg, oral, TID  [Transfer Hold] insulin glargine, 7 Units, subcutaneous, Nightly  [Transfer Hold] insulin lispro, 0-5 Units, subcutaneous, TID AC  [Held by provider] insulin lispro, 5 Units, subcutaneous, TID  AC  [Transfer Hold] isosorbide mononitrate ER, 60 mg, oral, Daily  [Transfer Hold] melatonin, 10 mg, oral, Nightly  [Transfer Hold] NIFEdipine ER, 90 mg, oral, Daily before breakfast  [Transfer Hold] pantoprazole, 40 mg, intravenous, Daily  [Transfer Hold] ranolazine, 500 mg, oral, BID  [Transfer Hold] vancomycin, 1,250 mg, intravenous, q24h      Continuous medications     PRN medications  PRN medications: [Transfer Hold] dextrose, [Transfer Hold] glucagon, [Transfer Hold] hydrALAZINE, [Transfer Hold] OLANZapine, [Transfer Hold] oxyCODONE, [Transfer Hold] polyethylene glycol, [Transfer Hold] QUEtiapine, [Transfer Hold] vancomycin    Savanna Moulton MD

## 2025-03-06 NOTE — INTERVAL H&P NOTE
H&P reviewed. The patient was examined and there are no changes to the H&P.  Marked per OR Policy  Surgery still indicated  Consent obtained by Ethics Patient without Proxy Committee due to patients persistent fluctuating capacity and no identified decision maker.

## 2025-03-07 DIAGNOSIS — I73.9 PERIPHERAL ARTERIAL DISEASE (CMS-HCC): Primary | ICD-10-CM

## 2025-03-07 LAB
ALBUMIN SERPL BCP-MCNC: 2 G/DL (ref 3.4–5)
ANION GAP SERPL CALC-SCNC: 12 MMOL/L (ref 10–20)
BUN SERPL-MCNC: 16 MG/DL (ref 6–23)
CALCIUM SERPL-MCNC: 7.2 MG/DL (ref 8.6–10.6)
CHLORIDE SERPL-SCNC: 105 MMOL/L (ref 98–107)
CO2 SERPL-SCNC: 21 MMOL/L (ref 21–32)
CREAT SERPL-MCNC: 0.79 MG/DL (ref 0.5–1.05)
EGFRCR SERPLBLD CKD-EPI 2021: 83 ML/MIN/1.73M*2
ERYTHROCYTE [DISTWIDTH] IN BLOOD BY AUTOMATED COUNT: 15.7 % (ref 11.5–14.5)
GLUCOSE BLD MANUAL STRIP-MCNC: 131 MG/DL (ref 74–99)
GLUCOSE BLD MANUAL STRIP-MCNC: 250 MG/DL (ref 74–99)
GLUCOSE BLD MANUAL STRIP-MCNC: 276 MG/DL (ref 74–99)
GLUCOSE BLD MANUAL STRIP-MCNC: 288 MG/DL (ref 74–99)
GLUCOSE SERPL-MCNC: 259 MG/DL (ref 74–99)
HCT VFR BLD AUTO: 26.9 % (ref 36–46)
HGB BLD-MCNC: 9.2 G/DL (ref 12–16)
MAGNESIUM SERPL-MCNC: 1.8 MG/DL (ref 1.6–2.4)
MCH RBC QN AUTO: 30.7 PG (ref 26–34)
MCHC RBC AUTO-ENTMCNC: 34.2 G/DL (ref 32–36)
MCV RBC AUTO: 90 FL (ref 80–100)
NRBC BLD-RTO: 0 /100 WBCS (ref 0–0)
PHOSPHATE SERPL-MCNC: 4.9 MG/DL (ref 2.5–4.9)
PLATELET # BLD AUTO: 311 X10*3/UL (ref 150–450)
POTASSIUM SERPL-SCNC: 4.9 MMOL/L (ref 3.5–5.3)
RBC # BLD AUTO: 3 X10*6/UL (ref 4–5.2)
SODIUM SERPL-SCNC: 133 MMOL/L (ref 136–145)
WBC # BLD AUTO: 13.7 X10*3/UL (ref 4.4–11.3)

## 2025-03-07 PROCEDURE — 2500000005 HC RX 250 GENERAL PHARMACY W/O HCPCS: Performed by: STUDENT IN AN ORGANIZED HEALTH CARE EDUCATION/TRAINING PROGRAM

## 2025-03-07 PROCEDURE — 36415 COLL VENOUS BLD VENIPUNCTURE: CPT

## 2025-03-07 PROCEDURE — 2500000001 HC RX 250 WO HCPCS SELF ADMINISTERED DRUGS (ALT 637 FOR MEDICARE OP): Performed by: STUDENT IN AN ORGANIZED HEALTH CARE EDUCATION/TRAINING PROGRAM

## 2025-03-07 PROCEDURE — 2500000002 HC RX 250 W HCPCS SELF ADMINISTERED DRUGS (ALT 637 FOR MEDICARE OP, ALT 636 FOR OP/ED): Performed by: STUDENT IN AN ORGANIZED HEALTH CARE EDUCATION/TRAINING PROGRAM

## 2025-03-07 PROCEDURE — 2500000004 HC RX 250 GENERAL PHARMACY W/ HCPCS (ALT 636 FOR OP/ED): Performed by: STUDENT IN AN ORGANIZED HEALTH CARE EDUCATION/TRAINING PROGRAM

## 2025-03-07 PROCEDURE — 99233 SBSQ HOSP IP/OBS HIGH 50: CPT | Performed by: STUDENT IN AN ORGANIZED HEALTH CARE EDUCATION/TRAINING PROGRAM

## 2025-03-07 PROCEDURE — 1200000002 HC GENERAL ROOM WITH TELEMETRY DAILY

## 2025-03-07 PROCEDURE — 85027 COMPLETE CBC AUTOMATED: CPT

## 2025-03-07 PROCEDURE — 99233 SBSQ HOSP IP/OBS HIGH 50: CPT | Performed by: NURSE PRACTITIONER

## 2025-03-07 PROCEDURE — 97530 THERAPEUTIC ACTIVITIES: CPT | Mod: GP

## 2025-03-07 PROCEDURE — 80069 RENAL FUNCTION PANEL: CPT

## 2025-03-07 PROCEDURE — 82947 ASSAY GLUCOSE BLOOD QUANT: CPT

## 2025-03-07 PROCEDURE — 97161 PT EVAL LOW COMPLEX 20 MIN: CPT | Mod: GP

## 2025-03-07 PROCEDURE — 83735 ASSAY OF MAGNESIUM: CPT

## 2025-03-07 RX ORDER — INSULIN GLARGINE 100 [IU]/ML
10 INJECTION, SOLUTION SUBCUTANEOUS NIGHTLY
Status: DISCONTINUED | OUTPATIENT
Start: 2025-03-07 | End: 2025-03-12 | Stop reason: HOSPADM

## 2025-03-07 RX ADMIN — ACETAMINOPHEN 650 MG: 325 TABLET ORAL at 11:46

## 2025-03-07 RX ADMIN — HYDRALAZINE HYDROCHLORIDE 50 MG: 50 TABLET ORAL at 01:18

## 2025-03-07 RX ADMIN — SODIUM CHLORIDE 1 G: 900 INJECTION INTRAVENOUS at 14:14

## 2025-03-07 RX ADMIN — HYDRALAZINE HYDROCHLORIDE 50 MG: 50 TABLET ORAL at 21:07

## 2025-03-07 RX ADMIN — INSULIN LISPRO 3 UNITS: 100 INJECTION, SOLUTION INTRAVENOUS; SUBCUTANEOUS at 11:46

## 2025-03-07 RX ADMIN — INSULIN LISPRO 3 UNITS: 100 INJECTION, SOLUTION INTRAVENOUS; SUBCUTANEOUS at 18:01

## 2025-03-07 RX ADMIN — RANOLAZINE 500 MG: 500 TABLET, EXTENDED RELEASE ORAL at 08:29

## 2025-03-07 RX ADMIN — CLOPIDOGREL BISULFATE 75 MG: 75 TABLET ORAL at 01:18

## 2025-03-07 RX ADMIN — ACETAMINOPHEN 650 MG: 325 TABLET ORAL at 18:01

## 2025-03-07 RX ADMIN — CEFAZOLIN SODIUM 2 G: 2 INJECTION, SOLUTION INTRAVENOUS at 02:41

## 2025-03-07 RX ADMIN — INSULIN GLARGINE 7 UNITS: 100 INJECTION, SOLUTION SUBCUTANEOUS at 01:19

## 2025-03-07 RX ADMIN — CEFAZOLIN SODIUM 2 G: 2 INJECTION, SOLUTION INTRAVENOUS at 10:18

## 2025-03-07 RX ADMIN — ACETAMINOPHEN 650 MG: 325 TABLET ORAL at 06:33

## 2025-03-07 RX ADMIN — RANOLAZINE 500 MG: 500 TABLET, EXTENDED RELEASE ORAL at 22:01

## 2025-03-07 RX ADMIN — NIFEDIPINE 90 MG: 90 TABLET, FILM COATED, EXTENDED RELEASE ORAL at 06:33

## 2025-03-07 RX ADMIN — ACETAMINOPHEN 650 MG: 325 TABLET ORAL at 01:17

## 2025-03-07 RX ADMIN — ATORVASTATIN CALCIUM 80 MG: 80 TABLET, FILM COATED ORAL at 01:22

## 2025-03-07 RX ADMIN — HEPARIN SODIUM 5000 UNITS: 5000 INJECTION, SOLUTION INTRAVENOUS; SUBCUTANEOUS at 18:01

## 2025-03-07 RX ADMIN — RANOLAZINE 500 MG: 500 TABLET, EXTENDED RELEASE ORAL at 01:21

## 2025-03-07 RX ADMIN — Medication 10 MG: at 21:07

## 2025-03-07 RX ADMIN — ASPIRIN 81 MG: 81 TABLET, COATED ORAL at 08:29

## 2025-03-07 RX ADMIN — HEPARIN SODIUM 5000 UNITS: 5000 INJECTION, SOLUTION INTRAVENOUS; SUBCUTANEOUS at 23:59

## 2025-03-07 RX ADMIN — VANCOMYCIN HYDROCHLORIDE 1250 MG: 5 INJECTION, POWDER, LYOPHILIZED, FOR SOLUTION INTRAVENOUS at 02:41

## 2025-03-07 RX ADMIN — ACETAMINOPHEN 650 MG: 325 TABLET ORAL at 23:59

## 2025-03-07 RX ADMIN — HEPARIN SODIUM 5000 UNITS: 5000 INJECTION, SOLUTION INTRAVENOUS; SUBCUTANEOUS at 01:17

## 2025-03-07 RX ADMIN — HYDRALAZINE HYDROCHLORIDE 50 MG: 50 TABLET ORAL at 14:14

## 2025-03-07 RX ADMIN — CARVEDILOL 25 MG: 12.5 TABLET, FILM COATED ORAL at 21:07

## 2025-03-07 RX ADMIN — PANTOPRAZOLE SODIUM 40 MG: 40 INJECTION, POWDER, FOR SOLUTION INTRAVENOUS at 08:29

## 2025-03-07 RX ADMIN — CARVEDILOL 25 MG: 12.5 TABLET, FILM COATED ORAL at 01:22

## 2025-03-07 RX ADMIN — ATORVASTATIN CALCIUM 80 MG: 80 TABLET, FILM COATED ORAL at 21:07

## 2025-03-07 RX ADMIN — HEPARIN SODIUM 5000 UNITS: 5000 INJECTION, SOLUTION INTRAVENOUS; SUBCUTANEOUS at 08:29

## 2025-03-07 RX ADMIN — INSULIN GLARGINE 10 UNITS: 100 INJECTION, SOLUTION SUBCUTANEOUS at 21:07

## 2025-03-07 RX ADMIN — Medication 10 MG: at 01:20

## 2025-03-07 RX ADMIN — OXYCODONE 5 MG: 5 TABLET ORAL at 21:18

## 2025-03-07 ASSESSMENT — COGNITIVE AND FUNCTIONAL STATUS - GENERAL
WALKING IN HOSPITAL ROOM: A LOT
DAILY ACTIVITIY SCORE: 18
TURNING FROM BACK TO SIDE WHILE IN FLAT BAD: A LITTLE
TOILETING: A LITTLE
MOVING TO AND FROM BED TO CHAIR: A LOT
DRESSING REGULAR UPPER BODY CLOTHING: A LITTLE
HELP NEEDED FOR BATHING: A LOT
WALKING IN HOSPITAL ROOM: A LOT
STANDING UP FROM CHAIR USING ARMS: A LITTLE
DAILY ACTIVITIY SCORE: 18
MOVING TO AND FROM BED TO CHAIR: A LITTLE
MOVING FROM LYING ON BACK TO SITTING ON SIDE OF FLAT BED WITH BEDRAILS: A LITTLE
CLIMB 3 TO 5 STEPS WITH RAILING: TOTAL
DRESSING REGULAR LOWER BODY CLOTHING: A LOT
TOILETING: A LITTLE
DRESSING REGULAR LOWER BODY CLOTHING: A LOT
MOBILITY SCORE: 14
CLIMB 3 TO 5 STEPS WITH RAILING: A LOT
DRESSING REGULAR UPPER BODY CLOTHING: A LITTLE
MOBILITY SCORE: 16
MOBILITY SCORE: 15
STANDING UP FROM CHAIR USING ARMS: A LOT
CLIMB 3 TO 5 STEPS WITH RAILING: TOTAL
HELP NEEDED FOR BATHING: A LOT
STANDING UP FROM CHAIR USING ARMS: A LOT
WALKING IN HOSPITAL ROOM: A LOT
MOVING TO AND FROM BED TO CHAIR: A LOT
TURNING FROM BACK TO SIDE WHILE IN FLAT BAD: A LITTLE

## 2025-03-07 ASSESSMENT — ENCOUNTER SYMPTOMS
VOMITING: 0
FEVER: 0
SHORTNESS OF BREATH: 0
NAUSEA: 0
ABDOMINAL PAIN: 0
DIARRHEA: 0
CHILLS: 0
COUGH: 0

## 2025-03-07 ASSESSMENT — PAIN SCALES - GENERAL
PAINLEVEL_OUTOF10: 0 - NO PAIN
PAINLEVEL_OUTOF10: 7

## 2025-03-07 ASSESSMENT — ACTIVITIES OF DAILY LIVING (ADL): ADL_ASSISTANCE: INDEPENDENT

## 2025-03-07 ASSESSMENT — PAIN DESCRIPTION - DESCRIPTORS: DESCRIPTORS: ACHING;DISCOMFORT;BURNING

## 2025-03-07 ASSESSMENT — PAIN - FUNCTIONAL ASSESSMENT
PAIN_FUNCTIONAL_ASSESSMENT: 0-10
PAIN_FUNCTIONAL_ASSESSMENT: 0-10

## 2025-03-07 NOTE — SIGNIFICANT EVENT
S:   POD 0 from L femoral endarterectomy w/ external iliac stent placement. Patient denies chest pain, shortness of breath, nausea, vomiting. Does endorse LLE and groin pain.     O:   Vital signs are stable, normotensive, afebrile, no new or worsening oxygen requirement, not tachycardic  Visit Vitals  /81   Pulse 75   Temp 36 °C (96.8 °F) (Axillary)   Resp 12      Constitutional: no acute distress  Skin: warm and dry overall   Neuro: At baseline. Can wiggle R toes, not L. Can lift b/l legs to gravity. Sensation at mid calf in LLE.   HEENT: Atraumatic  Cardiac: RR  Pulmonary: Unlabored respirations on room air  Abdomen: Non distended  : Malone in place with yellow urine  MSK: R groin access site w/out swelling or erythema, TTP. L groin site w/ wound vac, holding suction, serosanguinous output. Extensive gangrene from L foot to mid shin, without sensation  Vascular: Monophasic R DP/PT, monophasic L DP/PT    A/P:  65F s/p L common femoral and profunda endarterectomy w/ external iliac stent placement, angiogram and IVUS. Doing well postoperatively.    - Obtain repeat baseline labs at 4am  - Multimodal pain control  - q2h NV checks  - Plavix 75mg tonight  - 500U heparin starting tomorrow morning  - Diet as tolerated  - 24h Ancef

## 2025-03-07 NOTE — BRIEF OP NOTE
Date: 2025 - 3/6/2025  OR Location: [facility] OR    Name: Myrna Khan, : 1960, Age: 65 y.o., MRN: [id number], Sex: female    Diagnosis  Pre-op Diagnosis      * Dry gangrene (Multi) [I96]     * PAD (peripheral artery disease) (CMS-[facility]) [I73.9] Post-op Diagnosis     * Dry gangrene (Multi) [I96]     * PAD (peripheral artery disease) (CMS-HCC) [I73.9]     Procedures  ENDARTERECTOMY, LOWER EXTREMITY  20017 - ID TEAEC W/WO PATCH GRAFT COMMON FEMORAL    ENDARTERECTOMY, LOWER EXTREMITY  34952 - ID TEAEC W/GRAFT SUPERFICIAL FEMORAL ARTERY    ENDARTERECTOMY, LOWER EXTREMITY  [id number] - ID REVSC OPN/PRQ FEM/POP W/ATHRC/ANGIOP SM VSL    ANGIOGRAM, LOWER EXTREMITY  23088 - CHG ANGIOGRAPHY EXTREMITY UNILATERAL RS&I      Surgeons      * Mrinalini Pederson - Primary    Resident/Fellow/Other Assistant:  Surgeons and Role:     * Raghavendra Ramsay MD - Assisting     * Lorenzo Bello MD - Fellow    Staff:   Circulator: Tyler  Scrub Person: Petra  Circulator: Doug  Scrub Person: Josee Bosch Circulator: Vito Bosch Scrub: Vito  Relief Circulator: Marisela  Relief Circulator: Dhara  Relief Scrub: Yevgeniy    Anesthesia Staff: Anesthesiologist: Daniele Rojas MD; Arlene Juarez MD; Zay Joya MD; Holly Li MD  C-AA: CAMILA Hendrickson  Anesthesia Resident: Sandra Laird DO; Soraya Remy DO; January Mon MD    Procedure Summary  Anesthesia: General  ASA: III  Estimated Blood Loss: 600 mL  Intra-op Medications:   Administrations occurring from 1130 to 1735 on 25:   Medication Name Total Dose   ceFAZolin (Ancef) 1 g 4 g   dexAMETHasone (Decadron) injection 4 mg/mL 4 mg   ertapenem (INVanz) 1 g in sodium chloride 0.9% IV 50 mL Cannot be calculated   fentaNYL (Sublimaze) injection 50 mcg/mL 25 mcg   heparin (porcine) injection 5,000 Units Cannot be calculated   heparin injection 1,000 units/mL 18,000 Units   hydrALAZINE (Apresoline) tablet 50 mg Cannot be calculated   HYDROmorphone  (Dilaudid) injection 1 mg/mL 0.2 mg   insulin lispro injection 0-5 Units Cannot be calculated   insulin lispro injection 5 Units Cannot be calculated   LR infusion Cannot be calculated   lidocaine (Xylocaine) injection 1 % 80 mg   magnesium sulfate IV 2 g/50 mL water (premix) 2 g   metoprolol 5 mg/5 mL 5 mg   propofol (Diprivan) injection 10 mg/mL 50 mg   rocuronium (ZeMuron) 50 mg/5 mL injection 90 mg              Anesthesia Record               Intraprocedure I/O Totals          Intake    Magnesium Sulfate 0.00 mL    The total shown is the total volume documented since Anesthesia Start was filed.    PRBC 350.00 mL    Total Intake 350 mL       Output    Urine 700 mL    Total Output 700 mL       Net    Net Volume -350 mL          Specimen:   ID Type Source Tests Collected by Time   1 : LEFT FEMORAL PLAQUE Tissue PLAQUE SURGICAL PATHOLOGY EXAM Bobby Pederson MD 3/6/2025 0368                  Findings: left common femoral and profunda endarterectomy, external iliac stent placement, angiogram, IVUS     Complications:  None; patient tolerated the procedure well.     Disposition: PACU - hemodynamically stable.  Condition: stable  Specimens Collected:   ID Type Source Tests Collected by Time   1 : LEFT FEMORAL PLAQUE Tissue PLAQUE SURGICAL PATHOLOGY EXAM Bobby Pederson MD 3/6/2025 1301

## 2025-03-07 NOTE — DOCUMENTATION CLARIFICATION NOTE
"    PATIENT:               TANNER PIKE  ACCT #:                  [id number]  MRN:                       [id number]  :                       1960  ADMIT DATE:       2025 4:37 PM  DISCH DATE:  RESPONDING PROVIDER #:        [id number]          PROVIDER RESPONSE TEXT:    Metabolic encephalopathy 2/2 urine retention    CDI QUERY TEXT:    Clarification    Instruction:    Based on your assessment of the patient and the clinical information, please provide the requested documentation by clicking on the appropriate radio button and enter any additional information if prompted.    Question: Please further clarify the most likely etiology of the altered mental status as    When answering this query, please exercise your independent professional judgment. The fact that a question is being asked, does not imply that any particular answer is desired or expected.    The patient's clinical indicators include:  Clinical Information: 64 y/o presented for dry gangrene of LLE.    Clinical Indicators:3/2 Internal Medicine Progress Note revealed \"She was delirious overnight 2/2 urinary retention, cr is stable but after day placement nursing reporting 2.5 liters of output, mentation starting to improve after decompression...Subjective  No events. Pt still somewhat confused, not able to answer all orientation questions but now able to report her leg issues and ovarian mass  and understand her urinary retention.\"    3/4 Progress Note at 1514 revealed \"3/4-Mentation starting to improve after decompression but appears to be a continued issue. -Reviewed echo and angiogram. -Changed from cefepime to ertapenem. Will reassess mentation  -Urine retention, day 2.5 liters out. -2 liters yesterday. Need to watch and possibly give some fluid back if post-obstructive diuresis. Do not remove day.  -Pt need reeval from gyne for the ovarian mass concerning for cancer, mentation had been off since the first overnight of her admit.  They " "were waiting for mentation to be better...- mulitmodal analgesia with bowel regimen, stop oxy for now and reeval pain needs given encephalopathy...  Delirium, resolving - likely 2/2 urine retnetion. Potentially related to opioid started yesterday v. Hospital related sundowning. May be related to infection/pain v. Related to cefepime neurotox.  - held opioid and reeval needs  - dc cefepime, change to ertapenem.\"    Cefepime 2g IV given 2/28-3/3.    oxycodone 2.5 mg tablet given 3/5 at 0847 and 3/5 1626.  oxycodone 5mg tablet given 3/1 at 1132, 3/2 at 0842 and 1827, 3/3 at 0028, 3/4 at 1017, and 3/6 at 0029 and 0921.    Treatment: Malone catheter placed. oxycodone held. Cefepime discontinued.    Risk Factors: Urine retention. Oxycodone and Cefepime administration.  Options provided:  -- Metabolic encephalopathy 2/2 urine retention  -- Toxic encephalopathy 2/2 cefepime  -- Toxic encephalopathy 2/2 oxycodone  -- Multifactorial encephalopathy 2/2 urine retention, oxycodone, and cefepime  -- Other - I will add my own diagnosis  -- Refer to Clinical Documentation Reviewer    Query created by: Kashmir Callahan Jr on 3/7/2025 1:53 PM      Electronically signed by:  JASWINDER COWART MD 3/7/2025 3:35 PM          "

## 2025-03-07 NOTE — PROGRESS NOTES
"  VASCULAR SURGERY PROGRESS NOTE  Assessment/Plan   Myrna Khan is 65 y.o. female with history of carotid stenosis, diabetes, CAD, HTN, PAD and renal artery stenosis who presents with extensive LLE wounds and intermittent rest pain.     S/P LLE Angiogram with left EIA occlusion, reconstitutes at distal EIA with CFA short-segment occlusion vs severe stenosis, L SFA stent occlusion, distal SFA reconstitution, patent popliteal and AT runoff to the foot on 3/2/25. Planning for Endart/Angio/Bypasss pending OR timing and clearance. Patient has had worsened mental status and currently lacks capacity and a appropriate surrogate decision maker.     3/6 s/p left femoral and profunda endarterectomy, left external iliac artery stent     Plan:  Ethics Consult for Proxy Decision Making  Consented via Ethics Committee for Left Fem Endart, Angio, Possible Intervention, Possible Bypass   Podiatry consulted - no urgent intervention  Continue antibiotics  Plavix 75mg daily, discontinue aspirin  Plan to start xarelto 2.5mg BID on 3/8     D/w attending, Dr. Bg Bello MD  Vascular Surgery Fellow  Team Pager [phone]  03/07/25  9:18 AM      Subjective   Feels well this morning. Pain well controlled.     Objective   Vitals:  Heart Rate:  [72-86]   Temp:  [35.8 °C (96.4 °F)-37 °C (98.6 °F)]   Resp:  [10-17]   BP: (103-158)/(63-89)   Height:  [160 cm (5' 3\")]   Weight:  [42.6 kg (94 lb)]   SpO2:  [94 %-100 %]     Exam:  Constitutional: no acute distress  Skin: warm and dry overall   Cardiac: Regular rate and rhythm  Pulmonary: Unlabored respirations on room air  Abdomen: Non distended, non tender  Extremities: PAINTING. RLE dry. LLE with dry gangrene on the dorsal aspect of her foot to her mid shin, no sensation at that site.  Surgical wounds: right groin puncture site no hematoma or swelling; left groin surgical site with Prevena in place with good seal   Vascular: bilateral DP/PT signals     Labs:  Results from last 7 days "   Lab Units 03/07/25 0529 03/06/25 2124 03/06/25 2108   WBC AUTO x10*3/uL 13.7* 14.8* 14.3*   HEMOGLOBIN g/dL 9.2* 10.8* 10.9*   PLATELETS AUTO x10*3/uL 311 393 391      Results from last 7 days   Lab Units 03/07/25 0529 03/06/25 2124 03/06/25 2108 03/06/25 0618   SODIUM mmol/L 133* 132*  --  133*   POTASSIUM mmol/L 4.9 4.8   < > 4.2   CHLORIDE mmol/L 105 105  --  102   CO2 mmol/L 21 20*  --  22   BUN mg/dL 16 13  --  12   CREATININE mg/dL 0.79 0.78  --  0.85   GLUCOSE mg/dL 259* 231*  --  139*   MAGNESIUM mg/dL 1.80 1.89   < >  --    PHOSPHORUS mg/dL 4.9 4.4  --  3.0    < > = values in this interval not displayed.      Results from last 7 days   Lab Units 03/06/25 2124 03/01/25  0633   INR  1.3* 1.2*   PROTIME seconds 14.4* 13.1*   APTT seconds 37* 35

## 2025-03-07 NOTE — CARE PLAN
The patient's goals for the shift include patient pain will be controled during shift    The clinical goals for the shift include Patient will remain HDS durng shift      Problem: Pain - Adult  Goal: Verbalizes/displays adequate comfort level or baseline comfort level  Outcome: Progressing     Problem: Safety - Adult  Goal: Free from fall injury  Outcome: Progressing     Problem: Discharge Planning  Goal: Discharge to home or other facility with appropriate resources  Outcome: Progressing     Problem: Chronic Conditions and Co-morbidities  Goal: Patient's chronic conditions and co-morbidity symptoms are monitored and maintained or improved  Outcome: Progressing     Problem: Nutrition  Goal: Nutrient intake appropriate for maintaining nutritional needs  Outcome: Progressing     Problem: Skin  Goal: Decreased wound size/increased tissue granulation at next dressing change  Outcome: Progressing  Goal: Participates in plan/prevention/treatment measures  Outcome: Progressing  Goal: Prevent/manage excess moisture  Outcome: Progressing  Goal: Prevent/minimize sheer/friction injuries  Outcome: Progressing  Goal: Promote/optimize nutrition  Outcome: Progressing  Goal: Promote skin healing  Outcome: Progressing     Problem: Diabetes  Goal: Achieve decreasing blood glucose levels by end of shift  Outcome: Progressing  Goal: Increase stability of blood glucose readings by end of shift  Outcome: Progressing  Goal: Decrease in ketones present in urine by end of shift  Outcome: Progressing  Goal: Maintain electrolyte levels within acceptable range throughout shift  Outcome: Progressing  Goal: Maintain glucose levels >70mg/dl to <250mg/dl throughout shift  Outcome: Progressing  Goal: No changes in neurological exam by end of shift  Outcome: Progressing  Goal: Learn about and adhere to nutrition recommendations by end of shift  Outcome: Progressing  Goal: Vital signs within normal range for age by end of shift  Outcome:  Progressing  Goal: Increase self care and/or family involovement by end of shift  Outcome: Progressing  Goal: Receive DSME education by end of shift  Outcome: Progressing     Problem: Pain  Goal: Takes deep breaths with improved pain control throughout the shift  Outcome: Progressing  Goal: Turns in bed with improved pain control throughout the shift  Outcome: Progressing  Goal: Walks with improved pain control throughout the shift  Outcome: Progressing  Goal: Performs ADL's with improved pain control throughout shift  Outcome: Progressing  Goal: Participates in PT with improved pain control throughout the shift  Outcome: Progressing  Goal: Free from opioid side effects throughout the shift  Outcome: Progressing  Goal: Free from acute confusion related to pain meds throughout the shift  Outcome: Progressing     Problem: Heart Failure  Goal: Improved gas exchange this shift  Outcome: Progressing  Goal: Improved urinary output this shift  Outcome: Progressing  Goal: Reduction in peripheral edema within 24 hours  Outcome: Progressing  Goal: Report improvement of dyspnea/breathlessness this shift  Outcome: Progressing  Goal: Weight from fluid excess reduced over 2-3 days, then stabilize  Outcome: Progressing  Goal: Increase self care and/or family involvement in 24 hours  Outcome: Progressing     Problem: Fall/Injury  Goal: Not fall by end of shift  Outcome: Progressing  Goal: Be free from injury by end of the shift  Outcome: Progressing  Goal: Verbalize understanding of personal risk factors for fall in the hospital  Outcome: Progressing  Goal: Verbalize understanding of risk factor reduction measures to prevent injury from fall in the home  Outcome: Progressing  Goal: Use assistive devices by end of the shift  Outcome: Progressing  Goal: Pace activities to prevent fatigue by end of the shift  Outcome: Progressing

## 2025-03-07 NOTE — ANESTHESIA POSTPROCEDURE EVALUATION
Patient: Myrna Khan    Procedure Summary       Date: 03/06/25 Room / Location: [facility] OR 16 / [facility] OR    Anesthesia Start: 1203 Anesthesia Stop:     Procedures:       ENDARTERECTOMY, LOWER EXTREMITY, EXTERNAL ILIAC STNT, IVUS AND WOUND VAC PLACEMENT (Left)      ANGIOGRAM, LOWER EXTREMITY (Left) Diagnosis:       Dry gangrene (Multi)      PAD (peripheral artery disease) (CMS-HCC)      (Dry gangrene (Multi) [I96])      (PAD (peripheral artery disease) (CMS-HCC) [I73.9])    Surgeons: Bobby Pederson MD Responsible Provider: Holly Li MD    Anesthesia Type: general ASA Status: 3            Anesthesia Type: general    Vitals Value Taken Time   /74 03/06/25 2057   Temp 35.8 03/06/25 2102   Pulse 75 03/06/25 2057   Resp 11 03/06/25 2057   SpO2 100 % 03/06/25 2057   Vitals shown include unfiled device data.    Anesthesia Post Evaluation    Patient location during evaluation: PACU  Patient participation: complete - patient cannot participate  Level of consciousness: sleepy but conscious  Pain management: adequate  Airway patency: patent  Cardiovascular status: acceptable  Respiratory status: acceptable  Hydration status: acceptable  Postoperative Nausea and Vomiting: none        No notable events documented.

## 2025-03-07 NOTE — PROGRESS NOTES
Physical Therapy    Physical Therapy Evaluation & Treatment    Patient Name: Myrna Khan  MRN: [id number]  Department: [place] [address]  Room: 7029/7029-A  Today's Date: 3/7/2025   Time Calculation  Start Time: 0903  Stop Time: 0929  Time Calculation (min): 26 min    Assessment/Plan   PT Assessment  PT Assessment Results: Decreased strength, Decreased endurance, Impaired balance, Decreased mobility, Impaired sensation, Pain, Decreased skin integrity  Rehab Prognosis: Good  Barriers to Discharge Home: Caregiver assistance, Physical needs  Caregiver Assistance: Patient lives alone and/or does not have reliable caregiver assistance  Physical Needs: In-home setup navigation limited by function/safety, Ambulating household distances limited by function/safety, Intermittent mobility assistance needed, Intermittent ADL assistance needed, High falls risk due to function or environment  Evaluation/Treatment Tolerance: Patient limited by pain  Strengths: Ability to acquire knowledge  End of Session Communication: Bedside nurse  Assessment Comment: 66 y/o F who is s/p L common femoral and profunda endarterectomy with external iliac stent placement and angiogram; patient demo's reduced functional mobility, strength and balance. Patient with decrease ability to weightbear through L foot d/t patient reported pain; patient not at functional baseline, lives alone and is a high fall risk with history of falls. Patient would benefit from continued skilled PT services throughout current hospital stay and upon DC  End of Session Patient Position: Bed, 3 rail up, Alarm off, caregiver present   IP OR SWING BED PT PLAN  Inpatient or Swing Bed: Inpatient  PT Plan  Treatment/Interventions: Bed mobility, Transfer training, Gait training, Balance training, Strengthening, Endurance training, Therapeutic activity, Therapeutic exercise, Positioning, Postural re-education  PT Plan: Ongoing PT  PT Frequency: 3 times per week  PT Discharge  Recommendations: Moderate intensity level of continued care  Equipment Recommended upon Discharge:  (n/a)  PT Recommended Transfer Status: Assist x1, Assistive device (2ww,stand)  PT - OK to Discharge: Yes (DC rec made)      Subjective     General Visit Information:  General  Reason for Referral: L common femoral and profunda endarterectomy with external iliac stent placement, angiogram, IVUS; initially presenting with dry gangrene of LLE  Past Medical History Relevant to Rehab: HFimpEF (EF 55% July/2024, 35-40% in Aug/2023), CAD s/p CABG in 2023 (LIMA-LAD, APRIL-OM), PAD s/p L SFA stent (occluded in Nov/2023 w/ distal SFA reconstitution), renal artery stenosis (Sep/2023 US), carotid artery stenosis (Aug/2023 US), HTN, HLD, T2DM (A1c 14.6% Jan/2025); reports hx falls.  Family/Caregiver Present: No  Prior to Session Communication: Bedside nurse  Patient Position Received: Bed, 3 rail up, Alarm on  General Comment: patient received supine, HOB elevated, agreeable to participate in therapy; day, wound vac, tele; patient incontinent of bowels during session; PT completed jatin-care  Home Living:  Home Living  Type of Home: Apartment  Lives With: Alone (however reported having a , but reports that they don't live together)  Home Adaptive Equipment: Wheelchair-manual, Scooter (rollator)  Home Layout: One level  Home Access: Elevator (6th floor apartment)  Bathroom Shower/Tub: Tub/shower unit  Bathroom Toilet: Handicapped height  Bathroom Equipment: Grab bars in shower (+shower chair)  Prior Level of Function:  Prior Function Per Pt/Caregiver Report  Level of [place]: Independent with ADLs and functional transfers, Needs assistance with homemaking  Receives Help From: Family, Friends  ADL Assistance: Independent  Homemaking Assistance: Needs assistance (reports  completes grocery shopping)  Driving/Transportation:  ((-) drive)  Ambulatory Assistance: Independent (rollator for household distances; reports  using power scooter for community distances, but then reported not going out into the community)  Vocational: Retired, On disability  Hand Dominance: Right  Precautions:  Precautions  Hearing/Visual Limitations: hearing appears WFL, denied wearing glasses  Medical Precautions: Fall precautions         Objective   Pain:  Pain Assessment  Pain Assessment: 0-10  0-10 (Numeric) Pain Score:  (did not rate numerically)  Pain Type:  (acute on chronic/)  Pain Location: Leg  Pain Orientation: Left, Distal  Cognition:  Cognition  Overall Cognitive Status: Impaired  Arousal/Alertness: Appropriate responses to stimuli  Orientation Level:  (oriented to self, location, year; stated Feb but with cues, stated March)  Following Commands: Follows one step commands with repetition (90%)    General Assessments:       Activity Tolerance  Early Mobility/Exercise Safety Screen: Proceed with mobilization - No exclusion criteria met    Sensation  Light Touch:  (denied N/T in BUEs/BLEs)    Strength  Strength Comments: BUEs:  4-/5, elbow flexion/ext 4-/5, shoulder flexion 3/5; RLE: PF/DF 4-/5, remainder >/=3+/5, LLE: absent PF/DF, only able to move toes, knee flexion/ext 3/5  Postural Control  Postural Control: Impaired  Posture Comment: increase forward flexed posture in standing    Static Sitting Balance  Static Sitting-Balance Support: Bilateral upper extremity supported, Feet supported  Static Sitting-Level of Assistance: Distant supervision  Static Sitting-Comment/Number of Minutes: x1  Dynamic Sitting Balance  Dynamic Sitting-Balance Support: Feet supported, Bilateral upper extremity supported  Dynamic Sitting-Level of Assistance: Close supervision  Dynamic Sitting-Comments: x1    Static Standing Balance  Static Standing-Balance Support: Bilateral upper extremity supported  Static Standing-Level of Assistance: Contact guard, Minimum assistance  Static Standing-Comment/Number of Minutes: x1, increase forward flexed posture; reduced  standing tolerance d/t fatigue; patient self adheres to NWB LLE d/t increase distal L foot pain despite PT explaining that she can WB through LLE as tolerated., 2ww  Functional Assessments:  Bed Mobility  Bed Mobility: Yes  Bed Mobility 1  Bed Mobility 1: Supine to sitting  Level of Assistance 1: Minimum assistance, Minimal verbal cues  Bed Mobility Comments 1: HOB elevated, bedrail, draw sheet  Bed Mobility 2  Bed Mobility  2: Sitting to supine  Level of Assistance 2: Minimum assistance, Minimal verbal cues, Minimal tactile cues  Bed Mobility Comments 2: increase assist for LLE management    Transfers  Transfer: Yes  Transfer 1  Transfer From 1: Sit to, Stand to  Transfer to 1: Sit, Stand  Technique 1: Sit to stand, Stand to sit  Transfer Device 1: Walker  Transfer Level of Assistance 1: Moderate assistance, Minimal verbal cues, Minimal tactile cues  Trials/Comments 1: cues for BLE WB'ing, patient favored to not put weight through LLE    Ambulation/Gait Training  Ambulation/Gait Training Performed: No (unable to progress d/t reduced static standing tolerance)  Extremity/Trunk Assessments:  RUE   RUE :  (A/AAROM WFL)  LUE   LUE:  (A/AAROM grossly WFL)  RLE   RLE :  (AAROM WFL)  LLE   LLE :  (absent PF/DF, knee ext to neutral, knee flexion to 90 degrees, hip flexion to 90 degrees)  Treatments:  Therapeutic Activity  Therapeutic Activity Performed: Yes  Therapeutic Activity 1: increase time spent with patient completing functional mobility tasks; patient sat EOB x10 mins cumulative with mostly SBAx1; patient completed additional STS with MINx1 using 2ww; patient with decrease WB through L foot d/t pain; stood statically additional 3 mins to allow for PT to complete jatin-care d/t patient being incontinent of bowels prior to PT arrival.  Outcome Measures:  [facility] Basic Mobility  Turning from your back to your side while in a flat bed without using bedrails: A little  Moving from lying on your back to sitting on the side  of a flat bed without using bedrails: A little  Moving to and from bed to chair (including a wheelchair): A lot  Standing up from a chair using your arms (e.g. wheelchair or bedside chair): A little  To walk in hospital room: A lot  Climbing 3-5 steps with railing: Total  Basic Mobility - Total Score: 14    Encounter Problems       Encounter Problems (Active)       PT Problem       Patient will complete bed mobility with close supervision        Start:  03/07/25    Expected End:  03/21/25            Patient will complete STS with CGx1 using LRAD without acute LOB         Start:  03/07/25    Expected End:  03/21/25            Patient will ambulate >/=30' with LRAD with CGx1 without acute LOB        Start:  03/07/25    Expected End:  03/21/25            Patient will complete static (close supervision) and dynamic (CGx1) standing balance activities using LRAD without acute LOB, while maintaining midline posture.        Start:  03/07/25    Expected End:  03/21/25            Patient will participate in BLE there-ex program in order to assist in improving strength and to assist with the completion of functional mobility tasks.        Start:  03/07/25    Expected End:  03/21/25               Pain - Adult              Education Documentation  Body Mechanics, taught by Laura Cool PT at 3/7/2025 12:16 PM.  Learner: Patient  Readiness: Acceptance  Method: Explanation  Response: Needs Reinforcement  Comment: mobility progression    Mobility Training, taught by Laura Cool PT at 3/7/2025 12:16 PM.  Learner: Patient  Readiness: Acceptance  Method: Explanation  Response: Needs Reinforcement  Comment: mobility progression    Education Comments  No comments found.    Laura Cool PT, DPT

## 2025-03-07 NOTE — PROGRESS NOTES
Vancomycin Dosing by Pharmacy- FOLLOW UP    Myrna Khan is a 65 y.o. year old female who Pharmacy has been consulted for vancomycin dosing for diabetic foot infection. Based on the patient's indication and renal status this patient is being dosed based on a goal AUC of 400-600.     Renal function remains stable.    Current vancomycin dose: 1250 mg given every 24 hours    Estimated vancomycin AUC on current dose: 391 mg/L.hr     Visit Vitals  /65 (BP Location: Left arm, Patient Position: Lying)   Pulse 83   Temp 36 °C (96.8 °F) (Temporal)   Resp 16        Lab Results   Component Value Date    CREATININE 0.79 2025    CREATININE 0.78 2025    CREATININE 0.85 2025    CREATININE 0.61 2025        Patient weight is as follows:   Vitals:    25 1128   Weight: (!) 42.6 kg (94 lb)       Cultures:  No results found for the encounter in last 14 days.       I/O last 3 completed shifts:  In: 3140 (73.6 mL/kg) [P.O.:1340; Blood:700; IV Piggyback:1100]  Out: 3575 (83.8 mL/kg) [Urine:2975 (1.9 mL/kg/hr); Blood:600]  Weight: 42.6 kg   I/O during current shift:  I/O this shift:  In: 7805 [I.V.:7805]  Out: 1541 [Urine:941; Blood:600]    Temp (24hrs), Av.3 °C (97.3 °F), Min:35.8 °C (96.4 °F), Max:37 °C (98.6 °F)      Assessment/Plan    Below goal AUC. Orders placed for new vancomcyin regimen of 1500 mg every 24 hours to begin at 0200.     This dosing regimen is predicted by [employer] to result in the following pharmacokinetic parameters:  Loading dose: N/A  Regimen: 1500 mg IV every 24 hours.  Start time: 02:41 on 2025  Exposure target: AUC24 (range)400-600 mg/L.hr   [id number]: 458 mg/L.hr  AUC24,ss: 467 mg/L.hr  Probability of AUC24 > 400: 90 %  Ctrough,ss: 10.8 mg/L  Probability of Ctrough,ss > 20: 0 %    The next level will be obtained on 03/8 am labs. May be obtained sooner if clinically indicated.   Will continue to monitor renal function daily while on vancomycin and order serum  creatinine at least every 48 hours if not already ordered.  Follow for continued vancomycin needs, clinical response, and signs/symptoms of toxicity.       Criselda Matute, YanciD

## 2025-03-07 NOTE — SIGNIFICANT EVENT
Vascular Surgery Post-Operative Plan    S/p left common femoral and profunda endarterectomy, external iliac stent placement, angiogram, IVUS    Pre-op Vascular Exam: left AT signal, right PT signals  Post-op Vascular Exam: monophasic left DP and right PT doppler signals     Plan:  2 hours PACU stay for NV checks  Baseline labs in PACU then repeat at 4am  To stepdown  Multimodal pain control  Clopidogrel (Plavix) 75mg tonight  IV heparin 500 units/hr to start tomorrow morning  diet as tolerated after 4 hours  Remove day at midnight  24 hours Ancef for periop antibiotics  PT tomorrow morning  Wound: left groin wound buried suture and staple, prevena; right groin puncture site dermabond    Lorenzo Bello MD  Vascular Surgery Fellow  Team Pager [phone]  03/06/25  8:08 PM

## 2025-03-07 NOTE — PROGRESS NOTES
Assessment/Plan     Myrna Khan is a 65 y.o. 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 Pt noting skin sloughing, edema, clear drainage. At op visit, difficulty palpating pulse, had a monophasic AT and PT signals on the LLE and no palpable femoral pulse. CTA showed occlusion of common and external iliac arteries in addition to occluded SFA stent with distal reconstruction. Podiatry consulted. Pt is started on broad spectrum abx and ordered pvr and vein mapping..Ordered angiogram, echo for preop risk strafication, appreciate cardiology recs. Pt seeing gyne for cystic ovarian mass suspicious for malignancy, cr is stable but after day placement nursing reporting 2.5 liters of output.      3/7  -A/Ox2-3. She knows she is in the hospital and knows who she is, she sometimes just gets confused about what time it is.     -Diet advanced  -GYN asked to evaluate  [id number], recommend OP asiht OP clinic appt   -ID consulted  -S/p inflow to LLE with L EIA stent 3/6, L common fem and profunda endarterectomy. Q2 nv checks overnight.   -No plan for static heparin   -Potentially PAD xarelto tomorrow.     -Started aspirin and plavix.  #Urinary retention, reinserted Day in   -Hyperglycemic. Resumed insulin regimen before OR.        3/5  -stopped Amlodipine and started Nifedipine 90 mg daily instead, trying to get better BP control. next thoughts will be to increase hydralazine to 75 mg TID and eventually increase Imdur to 120 if needed.  -The ethics committee met this morning and support revascularization          3/4  -Mentation starting to improve after decompression but appears to be a continued issue.    -Reviewed echo  and angiogram.   -Changed from cefepime to ertapenem. Will reassess mentation  -Urine retention, day 2.5 liters out. -2 liters yesterday. Need to watch and possibly give some fluid back if post-obstructive diuresis. Do not remove day.   -Held on ordering regadenoson stress test for to risk stratify her for surgery per cardiology recommendations.     -Plan for vascular interventions Thursday OR with Vascular. Plavix held 3/4.   -wound care applied   -Podiatry, no plan to intervene until after vascular surgery does bypass          40 min     -----------------------------------------      #LLE wounds with CLI  #PAOD  #Dry gangrene  - CT showing common and external iliac arteries occlusion + SFA occlusion with reconstitution of her L popliteal artery and intact tibial runoff   - no gas on imaging   - PVR/vein mapping ordered.    - appreciate podiatry recs  - mulitmodal analgesia with bowel regimen        #Cystic ovarian mass  #urinary retention      #Delirium, resolving   - likely 2/2 urine retnetion. Potentially related to opioid started yesterday v. Hospital related sundowning. May be related to infection/pain v. Related to cefepime neurotox.   - held opioid and reeval needs  - dc cefepime, change to ertapenem.     #HTN  #HFimpEF (EF 55% July/2024, 35-40% in Aug/2023)  - MRI and NM PYP negative for amyloid,  SPEP and UPEP negative  - on home amlodipine 10mg, carvedilol 25mg, isosorvide 60mg        #CAD s/p CABG in 2023 (LIMA-LAD, APRIL-OM)  - EKG showing T wave inversions on anterior leads  -  patient asymptomatic, no cp or equivalents  - tele monitoring  - Appreciate cardiology recs.        #T2DM (A1c 14.6% Jan/2025)  - on home metformin, dapagliflozin, insulin lantus 10u PM and SSI  - given prior hyperglycemia in Jan/2025, will be more aggressive with insulin regimen  - hold metformin and dapa  - lantus 8u + SSI. Will adjust as needed       Scheduled outpatient appointments in system:   Future Appointments   Date  "Time Provider Department [place]   4/11/2025 11:00 AM [facility] [id number] [facility]   4/11/2025  1:00 PM Bobby Pederson MD [id number] Academic       ---------------------------------------------------------------------------------------------------  Subjective   No events.    ---------------------------------------------------------------------------------------------------  Objective   Last Recorded Vitals  Blood pressure 148/76, pulse 77, temperature 36.4 °C (97.5 °F), resp. rate 18, height 1.6 m (5' 3\"), weight (!) 42.6 kg (94 lb), SpO2 100%.  Intake/Output last 3 Shifts:  I/O last 3 completed shifts:  In: 26855 (236.8 mL/kg) [P.O.:490; I.V.:7805 (183.1 mL/kg); Blood:700; IV Piggyback:1100]  Out: 3891 (91.3 mL/kg) [Urine:2691 (1.8 mL/kg/hr); Blood:1200]  Weight: 42.6 kg     Physical Exam  Vitals and nursing note reviewed.   Constitutional:       General: She is not in acute distress.     Appearance: Normal appearance. She is not ill-appearing or toxic-appearing.   HENT:      Head: Normocephalic and atraumatic.      Mouth/Throat:      Mouth: Mucous membranes are moist.   Eyes:      General: No scleral icterus.     Extraocular Movements: Extraocular movements intact.      Conjunctiva/sclera: Conjunctivae normal.   Cardiovascular:      Rate and Rhythm: Normal rate and regular rhythm.      Heart sounds: S1 normal and S2 normal. No murmur heard.     Comments: Difficult to palpate distal pulses  Pulmonary:      Effort: Pulmonary effort is normal. No respiratory distress.      Breath sounds: No wheezing, rhonchi or rales.   Abdominal:      General: Bowel sounds are normal. There is no distension.      Palpations: Abdomen is soft.      Tenderness: There is no abdominal tenderness. There is no guarding or rebound.   Musculoskeletal:         General: No swelling or deformity.      Cervical back: Neck supple.   Skin:     Findings: No rash.      Comments: Left foot edema, denuded skin on doral surface, erythema and " ttp    Neurological:      General: No focal deficit present.      Mental Status: She is alert. Mental status is at baseline.   Psychiatric:         Mood and Affect: Mood normal.         Relevant Results  Lab Results   Component Value Date    WBC 13.7 (H) 03/07/2025    HGB 9.2 (L) 03/07/2025    HCT 26.9 (L) 03/07/2025    MCV 90 03/07/2025     03/07/2025      Lab Results   Component Value Date    GLUCOSE 259 (H) 03/07/2025    CALCIUM 7.2 (L) 03/07/2025     (L) 03/07/2025    K 4.9 03/07/2025    CO2 21 03/07/2025     03/07/2025    BUN 16 03/07/2025    CREATININE 0.79 03/07/2025     Scheduled medications  acetaminophen, 650 mg, oral, q6h BOLIVAR  aspirin, 81 mg, oral, Daily  atorvastatin, 80 mg, oral, Nightly  carvedilol, 25 mg, oral, BID  clopidogrel, 75 mg, oral, Daily  ertapenem, 1 g, intravenous, q24h  heparin (porcine), 5,000 Units, subcutaneous, q8h  hydrALAZINE, 50 mg, oral, TID  insulin glargine, 7 Units, subcutaneous, Nightly  insulin lispro, 0-5 Units, subcutaneous, TID AC  [Held by provider] insulin lispro, 5 Units, subcutaneous, TID AC  isosorbide mononitrate ER, 60 mg, oral, Daily  melatonin, 10 mg, oral, Nightly  NIFEdipine ER, 90 mg, oral, Daily before breakfast  pantoprazole, 40 mg, intravenous, Daily  ranolazine, 500 mg, oral, BID  [START ON 3/8/2025] vancomycin, 1,500 mg, intravenous, q24h      Continuous medications     PRN medications  PRN medications: dextrose, glucagon, hydrALAZINE, naloxone, OLANZapine, ondansetron ODT **OR** ondansetron, oxyCODONE, oxygen, polyethylene glycol, QUEtiapine, vancomycin    Savanna Moulton MD

## 2025-03-07 NOTE — CARE PLAN
The patient's goals for the shift include pt will stand with physical therapy    The clinical goals for the shift include pt will remain free of pain throughout shift

## 2025-03-07 NOTE — PROGRESS NOTES
Subjective:  3/6 s/p left femoral and profunda endarterectomy, left external iliac artery stent     SR 70s  Denies CP/ SOB/dizziness, palpitations      Last Recorded Vitals:  Vitals:    03/07/25 0800 03/07/25 1000 03/07/25 1200 03/07/25 1400   BP: 114/71 (!) 141/103 157/76 148/76   BP Location: Left arm      Patient Position: Lying      Pulse: 72 73 78 77   Resp: 12 12 12 16   Temp:       TempSrc:       SpO2: 100% 100% 100% 100%   Weight:       Height:           Last Labs:  CBC - 3/7/2025:  5:29 AM  13.7 9.2 311    26.9      CMP - 3/7/2025:  5:29 AM  7.2 7.8 19 --- 0.3   4.9 2.0 17 176      PTT - 3/6/2025:  9:24 PM  1.3   14.4 37     Troponin I, High Sensitivity   Date/Time Value Ref Range Status   01/11/2024 03:37 PM 22 0 - 34 ng/L Final     Troponin I, High Sensitivity ([facility])   Date/Time Value Ref Range Status   02/28/2025 06:01 PM 5 0 - 34 ng/L Final   01/18/2025 06:12 AM 14 0 - 34 ng/L Final   01/18/2025 04:50 AM 14 0 - 34 ng/L Final     BNP   Date/Time Value Ref Range Status   02/28/2025 06:01  (H) 0 - 99 pg/mL Final   07/15/2024 11:55  (H) 0 - 99 pg/mL Final     Hemoglobin A1C   Date/Time Value Ref Range Status   01/09/2025 06:34 AM 14.6 (H) See comment % Final   07/15/2024 11:55 AM 15.2 (H) see below % Final     Comment:     Hemoglobin A1c is >15%. Marked elevations in HbA1c are commonly due to poor glycemic control in diabetic patients. Rarely, hemoglobin variants may cause false elevation of HbA1c that is discordant with glycemic control status.      LDL Calculated   Date/Time Value Ref Range Status   07/16/2024 04:58 AM 56 <=99 mg/dL Final     Comment:                                 Near   Borderline      AGE      Desirable  Optimal    High     High     Very High     0-19 Y     0 - 109     ---    110-129   >/= 130     ----    20-24 Y     0 - 119     ---    120-159   >/= 160     ----      >24 Y     0 -  99   100-129  130-159   160-189     >/=190       VLDL   Date/Time Value Ref Range Status    07/16/2024 04:58 AM 15 0 - 40 mg/dL Final        EKG showing normal sinus rhythm with no acute ischemic changes.      3/3/25 Transthoracic Echo (TTE) Limited With Doppler And Color   CONCLUSIONS:   1. Left ventricular ejection fraction is mildly decreased, calculated by Messina's biplane at 50%.   2. Spectral Doppler shows a Grade II (pseudonormal pattern) of left ventricular diastolic filling.   3. Abnormal septal motion consistent with post-operative status.   4. There is moderately increased septal and moderately increased posterior left ventricular wall thickness.   5. There is severely increased concentric left ventricular hypertrophy.   6. There is normal right ventricular global systolic function.   7. The left atrial size is severely dilated.   8. There is no evidence of a patent foramen ovale.    Previous Cardiac imaging/work-up:  Echocardiogram 7/16/2024:   1. Left ventricular ejection fraction is low normal, by visual estimate at 55%.   2. Spectral Doppler shows an impaired relaxation pattern of left ventricular diastolic filling.   3. Abnormal septal motion consistent with post-operative status.   4. GLS is reduced at -11.9% with an apical sparing pattern suggestive of possible cardiac amyloid. LV systolic function is difficult to assess given that with swinging of the heart many images become foreshortened and LV appears better in some views than others. Overall does appear to be normal or low normal without regional wall motion abnormalities. Given the combination of thickened LV and RV walls with somewhat thickened valves and pericardial effusion with reduced GLS with the pattern of apical sparing, would consider cardiac MRI to further assess for possible cardiac amyloid.   5. There is reduced right ventricular systolic function.   6. The left atrium is mildly dilated.   7. Mobile atrial septal aneurysm wtih an agitated saline contrast study that was negative for intracardiac shunt.   8. Mildly  thickened MV leaflet tips with doming of the AML with only trace MR and mean MV gradient only 1.5mmHg.   9. Primary service notified of the findings at the time of reporting.  10. Compared with the prior exam from 8/26/2023 there has been improvement of the LV systolic function Previously there was moderatley reduced LV systolic function with apex having worst function. There has been interval surgical revascularization between the two studies. Today's exam was the [facility] echocadiogram to include GLS asessment and raised the possibility of cardiac amyloid.      Cardiac MRI 7/2024:  IMPRESSION:  1. The left ventricle is normal in size with low-normal systolic  function. LVEF 51%. There are no segmental wall motion abnormalities.  Quantitative values are as noted above.  2. Diffuse left ventricle hypertrophy measuring up to 1.8 cm. There  is extensive sub endocardial delayed enhancement with more prominent  involvement of the lateral wall. Findings are nonspecific but can be  seen in cardiac amyloidosis. Cannot exclude component of infarction  in the lateral wall.  3. Nonspecific mild mid myocardial delayed enhancement  interventricular septum at the RV insertion sites which can be due to  right heart volume or pressure overload.  4. Normal aortic, mitral, and tricuspid valve function. Aortic  regurgitant volume 1 ml. Aortic regurgitant fraction 3 %. Peak aortic  valve velocity 95 cm/sec.    NM PYP scan 7/2024:  IMPRESSION:  1. Negative amyloid SPECT heart study without evidence of TTR  amyloidosis.      Coronary angiogram 9/4/2023:  ____________________________________________________________________________________  CONCLUSIONS:  1. S/p uncomplicated R/L heart catheterization.  2. RHC access initially through right brachial vein with 6f sheath, however unable to advance Equipment. Switched to RIJ 6f sheath. LHC through right radial artery 6f sheath.  3. Severe 2 vessel disease involving mid LAD and mid LCx, FFR was  "done and positive in LAD (0.74) and LCx (0.69).  4. Right dominant circulation.  5. Heart team discussion regarding revascularization options.    Now status post CABG (LIMA-LAD, APRIL-OM)      Last I/O:  I/O last 3 completed shifts:  In: 74896 (236.8 mL/kg) [P.O.:490; I.V.:7805 (183.1 mL/kg); Blood:700; IV Piggyback:1100]  Out: 3891 (91.3 mL/kg) [Urine:2691 (1.8 mL/kg/hr); Blood:1200]  Weight: 42.6 kg        Allergies:  Lisinopril, Amoxicillin, and Losartan    Inpatient Medications:  Scheduled medications   Medication Dose Route Frequency    acetaminophen  650 mg oral q6h BOLIVAR    aspirin  81 mg oral Daily    atorvastatin  80 mg oral Nightly    carvedilol  25 mg oral BID    clopidogrel  75 mg oral Daily    ertapenem  1 g intravenous q24h    heparin (porcine)  5,000 Units subcutaneous q8h    hydrALAZINE  50 mg oral TID    insulin glargine  7 Units subcutaneous Nightly    insulin lispro  0-5 Units subcutaneous TID AC    [Held by provider] insulin lispro  5 Units subcutaneous TID AC    isosorbide mononitrate ER  60 mg oral Daily    melatonin  10 mg oral Nightly    NIFEdipine ER  90 mg oral Daily before breakfast    pantoprazole  40 mg intravenous Daily    ranolazine  500 mg oral BID    [START ON 3/8/2025] vancomycin  1,500 mg intravenous q24h     PRN medications   Medication    dextrose    glucagon    hydrALAZINE    naloxone    OLANZapine    ondansetron ODT    Or    ondansetron    oxyCODONE    oxygen    polyethylene glycol    QUEtiapine    vancomycin     Continuous Medications   Medication Dose Last Rate     Outpatient Medications:  Current Outpatient Medications   Medication Instructions    acetaminophen (TYLENOL) 500 mg, oral, Every 6 hours PRN, Take per directed    amLODIPine (Norvasc) 10 mg tablet TAKE 1 TABLET BY MOUTH ONCE DAILY    aspirin 81 mg, oral, Daily    atorvastatin (Lipitor) 80 mg tablet TAKE 1 TABLET BY MOUTH ONCE DAILY    BD Ultra-Fine Micro Pen Needle 32 gauge x 1/4\" needle     blood sugar diagnostic " strip Use 1 strip to check blood sugar 3 times a day with meals.    CALCIUM CITRATE ORAL 1 tablet, oral, Daily    carvedilol (Coreg) 25 mg tablet TAKE 1 TABLET BY MOUTH TWO TIMES A DAY    clopidogrel (PLAVIX) 75 mg, oral, Daily RT    diclofenac sodium (VOLTAREN) 4 g, Topical, 4 times daily PRN    Easy Touch Alcohol Prep Pads pads, medicated     Farxiga 10 mg, oral, Daily with breakfast    gabapentin (NEURONTIN) 300 mg, oral, Daily    hydrALAZINE (APRESOLINE) 10 mg, oral, 2 times daily    insulin glargine (LANTUS) 10 Units, subcutaneous, Nightly, Take as directed per insulin instructions.    insulin lispro 0-5 Units, subcutaneous, 3 times daily (morning, midday, late afternoon), 0 unit(s) if BG ; 1 unit(s) if -200; 2 unit(s) if -250; 3 unit(s) if -300; 4 unit(s) if -350; 5 unit(s) if -400    isosorbide mononitrate ER (Imdur) 60 mg 24 hr tablet TAKE 1 TABLET BY MOUTH ONCE DAILY    lancets misc Use three times a day to test blood sugar w/ meals    metFORMIN XR (Glucophage-XR) 500 mg 24 hr tablet TAKE 2 TABLETS BY MOUTH ONCE DAILY    multivitamin with minerals tablet 1 tablet, oral, Daily    oxyCODONE (OXY-IR) 5 mg, oral, Every 6 hours PRN    ranolazine (RANEXA) 500 mg, oral, 2 times daily, Do not crush, chew, or split.       Physical Exam:  General: NAD, AOx1  HEENT: EOMI, MMM  Neck:  No JVD at 45 degrees   Cardiac: RRR, systolic murmur  Lungs:  CTAB  Abdomen: Soft, non-tender, Non-distended   : Malone with yellow urine  Extremities: RLE 1+ edema. LLE dry gangrene        Assessment/Plan   Myrna Khan is a 65 y.o. female with history of HFimpEF (EF 55% July/2024, 35-40% in Aug/2023), CAD s/p CABG in 2023 (LIMA-LAD, APRIL-OM), PAD s/p L SFA stent (occluded in Nov/2023 w/ distal SFA reconstitution), renal artery stenosis (Sep/2023 US), carotid artery stenosis (Aug/2023 US), HTN, HLD, T2DM (A1c 14.6% Jan/2025) presenting with dry gangrene of LLE currently planned for LLE angiogram  and possible intervention (possible extensive debridement vs amputation or revascularization via bypass) with vascular surgery.     Cardiology consulted for pre-op risk stratification.     Most recent echocardiogram in July 2024 showing features suggestive of cardiac amyloidosis (apical sparring, LVH) prompting further work-up with cardiac MRI that showed extensive sub endocardial delayed enhancement with more prominent involvement of the lateral wall -->Findings that are nonspecific but can be seen in cardiac amyloidosis.   A technetium PYP scan was done as well and did not show any uptake -->negative for TTR amyloidosis.   A urine immunofixation electrophoresis was done but does not seem that a serum immunofixation and serum free light chains were collected and analysed at the time. --> recommend outpatient follow up and completion of her evaluation.       #Risk stratification  3/6 s/p left femoral and profunda endarterectomy, left external iliac artery stent     #HTN  - continue Carvedilol, Imdur  - Increase Hydralazine to 75 mg TID. Further increase for goal SBP < 140 mmHg   - continue Nifedipine 90 mg daily    -prior to discharge, please arrange for follow up with general cardiology, call  to schedule     Cardiology will sign off    LEONIDES Huerta-CNP  Cardiology Consults    Please call with any questions  General Cardiology Consult Pager: [phone] (weekday 7AM-6PM and weekend 7AM-2PM) and other: [phone]  EP Consult Pager: [phone] (weekday 7AM-6PM and weekend 7AM-2PM) and other: [phone]  CICU Fellow Pager: [phone] anytime  EP Device Nurse Pager: [phone] (weekday 7AM-4PM)  Advanced Heart Failure Consult Pager: [phone] anytime       Code Status:  Full Code

## 2025-03-08 LAB
ALBUMIN SERPL BCP-MCNC: 2.2 G/DL (ref 3.4–5)
ANION GAP SERPL CALC-SCNC: 8 MMOL/L (ref 10–20)
BASOPHILS # BLD AUTO: 0.04 X10*3/UL (ref 0–0.1)
BASOPHILS NFR BLD AUTO: 0.2 %
BLOOD EXPIRATION DATE: NORMAL
BLOOD EXPIRATION DATE: NORMAL
BUN SERPL-MCNC: 14 MG/DL (ref 6–23)
CALCIUM SERPL-MCNC: 7.5 MG/DL (ref 8.6–10.6)
CHLORIDE SERPL-SCNC: 103 MMOL/L (ref 98–107)
CO2 SERPL-SCNC: 25 MMOL/L (ref 21–32)
CREAT SERPL-MCNC: 0.63 MG/DL (ref 0.5–1.05)
DISPENSE STATUS: NORMAL
DISPENSE STATUS: NORMAL
EGFRCR SERPLBLD CKD-EPI 2021: >90 ML/MIN/1.73M*2
EOSINOPHIL # BLD AUTO: 0.11 X10*3/UL (ref 0–0.7)
EOSINOPHIL NFR BLD AUTO: 0.7 %
ERYTHROCYTE [DISTWIDTH] IN BLOOD BY AUTOMATED COUNT: 15.4 % (ref 11.5–14.5)
GLUCOSE BLD MANUAL STRIP-MCNC: 111 MG/DL (ref 74–99)
GLUCOSE BLD MANUAL STRIP-MCNC: 145 MG/DL (ref 74–99)
GLUCOSE BLD MANUAL STRIP-MCNC: 206 MG/DL (ref 74–99)
GLUCOSE BLD MANUAL STRIP-MCNC: 60 MG/DL (ref 74–99)
GLUCOSE BLD MANUAL STRIP-MCNC: 72 MG/DL (ref 74–99)
GLUCOSE SERPL-MCNC: 198 MG/DL (ref 74–99)
HCT VFR BLD AUTO: 27.4 % (ref 36–46)
HGB BLD-MCNC: 9.2 G/DL (ref 12–16)
IMM GRANULOCYTES # BLD AUTO: 0.12 X10*3/UL (ref 0–0.7)
IMM GRANULOCYTES NFR BLD AUTO: 0.7 % (ref 0–0.9)
LYMPHOCYTES # BLD AUTO: 3.05 X10*3/UL (ref 1.2–4.8)
LYMPHOCYTES NFR BLD AUTO: 18.2 %
MCH RBC QN AUTO: 30.3 PG (ref 26–34)
MCHC RBC AUTO-ENTMCNC: 33.6 G/DL (ref 32–36)
MCV RBC AUTO: 90 FL (ref 80–100)
MONOCYTES # BLD AUTO: 1.06 X10*3/UL (ref 0.1–1)
MONOCYTES NFR BLD AUTO: 6.3 %
NEUTROPHILS # BLD AUTO: 12.41 X10*3/UL (ref 1.2–7.7)
NEUTROPHILS NFR BLD AUTO: 73.9 %
NRBC BLD-RTO: 0 /100 WBCS (ref 0–0)
PHOSPHATE SERPL-MCNC: 2.4 MG/DL (ref 2.5–4.9)
PLATELET # BLD AUTO: 327 X10*3/UL (ref 150–450)
POTASSIUM SERPL-SCNC: 3.7 MMOL/L (ref 3.5–5.3)
PRODUCT BLOOD TYPE: 5100
PRODUCT BLOOD TYPE: 5100
PRODUCT CODE: NORMAL
PRODUCT CODE: NORMAL
RBC # BLD AUTO: 3.04 X10*6/UL (ref 4–5.2)
SODIUM SERPL-SCNC: 132 MMOL/L (ref 136–145)
UNIT ABO: NORMAL
UNIT ABO: NORMAL
UNIT NUMBER: NORMAL
UNIT NUMBER: NORMAL
UNIT RH: NORMAL
UNIT RH: NORMAL
UNIT VOLUME: 283
UNIT VOLUME: 350
VANCOMYCIN SERPL-MCNC: 26.4 UG/ML (ref 5–20)
WBC # BLD AUTO: 16.8 X10*3/UL (ref 4.4–11.3)
XM INTEP: NORMAL
XM INTEP: NORMAL

## 2025-03-08 PROCEDURE — 2500000002 HC RX 250 W HCPCS SELF ADMINISTERED DRUGS (ALT 637 FOR MEDICARE OP, ALT 636 FOR OP/ED): Performed by: STUDENT IN AN ORGANIZED HEALTH CARE EDUCATION/TRAINING PROGRAM

## 2025-03-08 PROCEDURE — 87081 CULTURE SCREEN ONLY: CPT | Performed by: INTERNAL MEDICINE

## 2025-03-08 PROCEDURE — 2500000004 HC RX 250 GENERAL PHARMACY W/ HCPCS (ALT 636 FOR OP/ED)

## 2025-03-08 PROCEDURE — 80069 RENAL FUNCTION PANEL: CPT | Performed by: STUDENT IN AN ORGANIZED HEALTH CARE EDUCATION/TRAINING PROGRAM

## 2025-03-08 PROCEDURE — 2500000001 HC RX 250 WO HCPCS SELF ADMINISTERED DRUGS (ALT 637 FOR MEDICARE OP): Performed by: STUDENT IN AN ORGANIZED HEALTH CARE EDUCATION/TRAINING PROGRAM

## 2025-03-08 PROCEDURE — 99233 SBSQ HOSP IP/OBS HIGH 50: CPT | Performed by: STUDENT IN AN ORGANIZED HEALTH CARE EDUCATION/TRAINING PROGRAM

## 2025-03-08 PROCEDURE — 2500000005 HC RX 250 GENERAL PHARMACY W/O HCPCS: Performed by: STUDENT IN AN ORGANIZED HEALTH CARE EDUCATION/TRAINING PROGRAM

## 2025-03-08 PROCEDURE — 82947 ASSAY GLUCOSE BLOOD QUANT: CPT

## 2025-03-08 PROCEDURE — 2500000004 HC RX 250 GENERAL PHARMACY W/ HCPCS (ALT 636 FOR OP/ED): Performed by: STUDENT IN AN ORGANIZED HEALTH CARE EDUCATION/TRAINING PROGRAM

## 2025-03-08 PROCEDURE — 80202 ASSAY OF VANCOMYCIN: CPT | Performed by: STUDENT IN AN ORGANIZED HEALTH CARE EDUCATION/TRAINING PROGRAM

## 2025-03-08 PROCEDURE — 36415 COLL VENOUS BLD VENIPUNCTURE: CPT | Performed by: STUDENT IN AN ORGANIZED HEALTH CARE EDUCATION/TRAINING PROGRAM

## 2025-03-08 PROCEDURE — 85025 COMPLETE CBC W/AUTO DIFF WBC: CPT | Performed by: STUDENT IN AN ORGANIZED HEALTH CARE EDUCATION/TRAINING PROGRAM

## 2025-03-08 PROCEDURE — 1200000002 HC GENERAL ROOM WITH TELEMETRY DAILY

## 2025-03-08 RX ORDER — RIVAROXABAN 2.5 MG/1
2.5 TABLET, FILM COATED ORAL
Status: DISCONTINUED | OUTPATIENT
Start: 2025-03-08 | End: 2025-03-12 | Stop reason: HOSPADM

## 2025-03-08 RX ORDER — INSULIN LISPRO 100 [IU]/ML
2 INJECTION, SOLUTION INTRAVENOUS; SUBCUTANEOUS
Status: DISCONTINUED | OUTPATIENT
Start: 2025-03-09 | End: 2025-03-12 | Stop reason: HOSPADM

## 2025-03-08 RX ORDER — HYDROXYZINE HYDROCHLORIDE 10 MG/1
10 TABLET, FILM COATED ORAL EVERY 8 HOURS PRN
Status: DISCONTINUED | OUTPATIENT
Start: 2025-03-08 | End: 2025-03-12 | Stop reason: HOSPADM

## 2025-03-08 RX ADMIN — HYDRALAZINE HYDROCHLORIDE 75 MG: 25 TABLET ORAL at 15:24

## 2025-03-08 RX ADMIN — OXYCODONE 5 MG: 5 TABLET ORAL at 20:06

## 2025-03-08 RX ADMIN — ISOSORBIDE MONONITRATE 60 MG: 30 TABLET, EXTENDED RELEASE ORAL at 08:31

## 2025-03-08 RX ADMIN — ATORVASTATIN CALCIUM 80 MG: 80 TABLET, FILM COATED ORAL at 20:07

## 2025-03-08 RX ADMIN — CARVEDILOL 25 MG: 12.5 TABLET, FILM COATED ORAL at 08:31

## 2025-03-08 RX ADMIN — ACETAMINOPHEN 650 MG: 325 TABLET ORAL at 06:32

## 2025-03-08 RX ADMIN — OXYCODONE 5 MG: 5 TABLET ORAL at 15:24

## 2025-03-08 RX ADMIN — HYDRALAZINE HYDROCHLORIDE 75 MG: 25 TABLET ORAL at 08:30

## 2025-03-08 RX ADMIN — INSULIN LISPRO 5 UNITS: 100 INJECTION, SOLUTION INTRAVENOUS; SUBCUTANEOUS at 18:10

## 2025-03-08 RX ADMIN — Medication 10 MG: at 20:07

## 2025-03-08 RX ADMIN — CARVEDILOL 25 MG: 12.5 TABLET, FILM COATED ORAL at 20:07

## 2025-03-08 RX ADMIN — INSULIN LISPRO 2 UNITS: 100 INJECTION, SOLUTION INTRAVENOUS; SUBCUTANEOUS at 08:27

## 2025-03-08 RX ADMIN — ACETAMINOPHEN 650 MG: 325 TABLET ORAL at 12:50

## 2025-03-08 RX ADMIN — RANOLAZINE 500 MG: 500 TABLET, EXTENDED RELEASE ORAL at 08:32

## 2025-03-08 RX ADMIN — PANTOPRAZOLE SODIUM 40 MG: 40 INJECTION, POWDER, FOR SOLUTION INTRAVENOUS at 08:32

## 2025-03-08 RX ADMIN — RANOLAZINE 500 MG: 500 TABLET, EXTENDED RELEASE ORAL at 20:07

## 2025-03-08 RX ADMIN — CLOPIDOGREL BISULFATE 75 MG: 75 TABLET ORAL at 08:32

## 2025-03-08 RX ADMIN — RIVAROXABAN 2.5 MG: 2.5 TABLET, FILM COATED ORAL at 08:35

## 2025-03-08 RX ADMIN — ACETAMINOPHEN 650 MG: 325 TABLET ORAL at 18:09

## 2025-03-08 RX ADMIN — NIFEDIPINE 90 MG: 90 TABLET, FILM COATED, EXTENDED RELEASE ORAL at 08:29

## 2025-03-08 RX ADMIN — RIVAROXABAN 2.5 MG: 2.5 TABLET, FILM COATED ORAL at 18:10

## 2025-03-08 RX ADMIN — VANCOMYCIN HYDROCHLORIDE 1250 MG: 5 INJECTION, POWDER, LYOPHILIZED, FOR SOLUTION INTRAVENOUS at 14:00

## 2025-03-08 RX ADMIN — OXYCODONE 5 MG: 5 TABLET ORAL at 08:54

## 2025-03-08 RX ADMIN — INSULIN LISPRO 5 UNITS: 100 INJECTION, SOLUTION INTRAVENOUS; SUBCUTANEOUS at 08:29

## 2025-03-08 RX ADMIN — VANCOMYCIN HYDROCHLORIDE 1500 MG: 5 INJECTION, POWDER, LYOPHILIZED, FOR SOLUTION INTRAVENOUS at 02:37

## 2025-03-08 RX ADMIN — HYDROXYZINE HYDROCHLORIDE 10 MG: 10 TABLET ORAL at 14:00

## 2025-03-08 RX ADMIN — SODIUM CHLORIDE 1 G: 900 INJECTION INTRAVENOUS at 12:50

## 2025-03-08 RX ADMIN — HYDRALAZINE HYDROCHLORIDE 75 MG: 25 TABLET ORAL at 20:07

## 2025-03-08 RX ADMIN — INSULIN LISPRO 5 UNITS: 100 INJECTION, SOLUTION INTRAVENOUS; SUBCUTANEOUS at 11:55

## 2025-03-08 ASSESSMENT — COGNITIVE AND FUNCTIONAL STATUS - GENERAL
TOILETING: A LITTLE
STANDING UP FROM CHAIR USING ARMS: A LOT
DAILY ACTIVITIY SCORE: 18
HELP NEEDED FOR BATHING: A LOT
MOBILITY SCORE: 16
CLIMB 3 TO 5 STEPS WITH RAILING: TOTAL
MOVING TO AND FROM BED TO CHAIR: A LITTLE
WALKING IN HOSPITAL ROOM: A LOT
DRESSING REGULAR LOWER BODY CLOTHING: A LOT
DRESSING REGULAR UPPER BODY CLOTHING: A LITTLE

## 2025-03-08 ASSESSMENT — PAIN SCALES - GENERAL
PAINLEVEL_OUTOF10: 6
PAINLEVEL_OUTOF10: 8
PAINLEVEL_OUTOF10: 7
PAINLEVEL_OUTOF10: 3
PAINLEVEL_OUTOF10: 9
PAINLEVEL_OUTOF10: 6

## 2025-03-08 ASSESSMENT — PAIN - FUNCTIONAL ASSESSMENT
PAIN_FUNCTIONAL_ASSESSMENT: 0-10
PAIN_FUNCTIONAL_ASSESSMENT: 0-10

## 2025-03-08 ASSESSMENT — PAIN DESCRIPTION - DESCRIPTORS: DESCRIPTORS: ACHING

## 2025-03-08 ASSESSMENT — PAIN DESCRIPTION - LOCATION: LOCATION: LEG

## 2025-03-08 NOTE — PROGRESS NOTES
Pharmacy to Dose Vancomycin:  Myrna Khan is a 65 y.o. female ordered pharmacy to dose vancomycin for  diabetic foot infection . Today is day 9 of therapy.  Goal: -600mg/L*hr  Results from last 7 days   Lab Units 03/08/25  0722 03/07/25  0529 03/06/25  2124   BUN mg/dL 14 16 13   CREATININE mg/dL 0.63 0.79 0.78   WBC AUTO x10*3/uL 16.8* 13.7* 14.8*   VANCOMYCIN RM ug/mL 26.4*  --   --       Staph/MRSA Screen Culture   Date/Time Value Ref Range Status   08/30/2023 07:32 PM NO Staphylococcus aureus ISOLATED.  Final     Urine Culture   Date/Time Value Ref Range Status   01/18/2025 06:41 AM >=100,000 CFU/mL Enterococcus faecalis (A)  Final     Blood Culture   Date/Time Value Ref Range Status   08/26/2023 01:50 AM   Final    No Growth at 1 days~No Growth at 2 days~No Growth at 3 days~NO GROWTH at 4 days - FINAL REPORT     Gram Stain   Date/Time Value Ref Range Status   08/31/2023 04:18 AM CANCELED       Comment:     Result canceled by the ancillary.     C. difficile Toxin, PCR   Date/Time Value Ref Range Status   08/28/2023 11:11 AM NOT DETECTED Not Detected Final     Comment:      This assay detects the presence of the tcdB (toxin B)   gene via DNA amplification, and results should be   interpreted in the context of the patients history   and clinical findings. This test cannot be performed   on formed stools or used as a test of cure, and should   not be performed more than once per 7 days.        Susceptibility data from last 90 days.  Collected Specimen Info Organism Ampicillin Ciprofloxacin Levofloxacin Nitrofurantoin Penicillin Trimethoprim/Sulfamethoxazole Vancomycin   01/18/25 Urine from Clean Catch/Voided Enterococcus faecalis  S  S  S  S  S  R  S     Antibiotics: Invanz (Day 6).  Pertinent Medications: none.  Renal function remains stable.  Patient's current regimen is predicted to achieve AUC24,ss of 346.    Plan:  Increase to vanco [id number].  The above dosing regimen is predicted by [employer] to  result in the following pharmacokinetic parameters:  Loading dose: N/A  Regimen: 1250 mg IV every 12 hours.  Start time: 03:37 on 03/09/2025  Exposure target: AUC24 (range)400-600 mg/L.hr   JQU97-13: 529 mg/L.hr  AUC24,ss: 571 mg/L.hr  Probability of AUC24 > 400: 100 %  Ctrough,ss: 16.2 mg/L  Probability of Ctrough,ss > 20: 12 %  Follow-up level ordered for 3/10 AM unless clinically indicated sooner.  Pharmacy will continue daily vancomycin monitoring. Please contact the pharmacy with any questions.    Thank you,  Art Flowers formerly [facility]

## 2025-03-08 NOTE — PROGRESS NOTES
VASCULAR SURGERY PROGRESS NOTE  Assessment/Plan   Myrna Khan is 65 y.o. female with history of carotid stenosis, diabetes, CAD, HTN, PAD and renal artery stenosis who presents with extensive LLE wounds and intermittent rest pain.     S/P LLE Angiogram with left EIA occlusion, reconstitutes at distal EIA with CFA short-segment occlusion vs severe stenosis, L SFA stent occlusion, distal SFA reconstitution, patent popliteal and AT runoff to the foot on 3/2/25. Planning for Endart/Angio/Bypasss pending OR timing and clearance. Patient has had worsened mental status and lacks consistent capacity or an appropriate surrogate decision maker. Medical ethics involved for decision making.    3/6 s/p left femoral and profunda endarterectomy, left external iliac artery stent     Plan:  Start Xarelto 2.5mg BID today 3/8  Ethics Consulted for Proxy Decision Making; no next of kin or others suitable for medical decision-making identified yet  Podiatry consulted - no urgent intervention  Continue antibiotics  Plavix 75mg daily, discontinued aspirin     D/w attending, Dr. Taya Sommer MD  General Surgery PGY1  Vascular Surgery [id number]  03/08/25  10:28 AM      Subjective   No events overnight. No complaints this morning, feeling okay overall.     Objective   Vitals:  Heart Rate:  [74-85]   Temp:  [36.3 °C (97.3 °F)-36.7 °C (98.1 °F)]   Resp:  [12-20]   BP: (139-186)/(74-87)   Weight:  [61.6 kg (135 lb 12.9 oz)]   SpO2:  [97 %-100 %]     Exam:  Constitutional: no acute distress  Skin: warm and dry overall   Cardiac: Regular rate and rhythm  Pulmonary: Unlabored respirations on room air  Abdomen: Non distended, non tender  Extremities: PAINTING. RLE dry. LLE with dry gangrene on the dorsal aspect of her foot to her mid shin, no sensation at that site.  Surgical wounds: right groin puncture site no hematoma or swelling; left groin surgical site with Prevena in place with good seal   Vascular: bilateral DP/PT signals      Labs:  Results from last 7 days   Lab Units 03/08/25 0722 03/07/25  0529 03/06/25 2124   WBC AUTO x10*3/uL 16.8* 13.7* 14.8*   HEMOGLOBIN g/dL 9.2* 9.2* 10.8*   PLATELETS AUTO x10*3/uL 327 311 393      Results from last 7 days   Lab Units 03/08/25 0722 03/07/25 0529 03/06/25 2124   SODIUM mmol/L 132* 133* 132*   POTASSIUM mmol/L 3.7 4.9 4.8   CHLORIDE mmol/L 103 105 105   CO2 mmol/L 25 21 20*   BUN mg/dL 14 16 13   CREATININE mg/dL 0.63 0.79 0.78   GLUCOSE mg/dL 198* 259* 231*   MAGNESIUM mg/dL  --  1.80 1.89   PHOSPHORUS mg/dL 2.4* 4.9 4.4      Results from last 7 days   Lab Units 03/06/25 2124   INR  1.3*   PROTIME seconds 14.4*   APTT seconds 37*

## 2025-03-08 NOTE — PROGRESS NOTES
Transitional Care Coordination Progress Note:  Updated OT note needed for auth submission, OT note requested.     Palmira Lugo, RN, BSN  Transitional Care Coordinator  Office: [phone]  Secure chat via [employer]

## 2025-03-08 NOTE — CARE PLAN
The patient's goals for the shift include patient pain will be controled during shift    The clinical goals for the shift include pt will remain free of pain throughout shift      Problem: Pain - Adult  Goal: Verbalizes/displays adequate comfort level or baseline comfort level  Outcome: Progressing     Problem: Safety - Adult  Goal: Free from fall injury  Outcome: Progressing     Problem: Discharge Planning  Goal: Discharge to home or other facility with appropriate resources  Outcome: Progressing     Problem: Chronic Conditions and Co-morbidities  Goal: Patient's chronic conditions and co-morbidity symptoms are monitored and maintained or improved  Outcome: Progressing     Problem: Nutrition  Goal: Nutrient intake appropriate for maintaining nutritional needs  Outcome: Progressing     Problem: Skin  Goal: Decreased wound size/increased tissue granulation at next dressing change  Outcome: Progressing  Flowsheets (Taken 3/8/2025 0118)  Decreased wound size/increased tissue granulation at next dressing change: Promote sleep for wound healing  Goal: Participates in plan/prevention/treatment measures  Outcome: Progressing  Flowsheets (Taken 3/8/2025 0118)  Participates in plan/prevention/treatment measures:   Discuss with provider PT/OT consult   Elevate heels  Goal: Prevent/manage excess moisture  Outcome: Progressing  Flowsheets (Taken 3/8/2025 0118)  Prevent/manage excess moisture:   Cleanse incontinence/protect with barrier cream   Monitor for/manage infection if present  Goal: Prevent/minimize sheer/friction injuries  Outcome: Progressing  Flowsheets (Taken 3/8/2025 0118)  Prevent/minimize sheer/friction injuries:   Use pull sheet   Increase activity/out of bed for meals  Goal: Promote/optimize nutrition  Outcome: Progressing  Flowsheets (Taken 3/8/2025 0118)  Promote/optimize nutrition: Monitor/record intake including meals  Goal: Promote skin healing  Outcome: Progressing  Flowsheets (Taken 3/8/2025  0118)  Promote skin healing: Assess skin/pad under line(s)/device(s)     Problem: Diabetes  Goal: Achieve decreasing blood glucose levels by end of shift  Outcome: Progressing  Goal: Increase stability of blood glucose readings by end of shift  Outcome: Progressing  Goal: Decrease in ketones present in urine by end of shift  Outcome: Progressing  Goal: Maintain electrolyte levels within acceptable range throughout shift  Outcome: Progressing  Goal: Maintain glucose levels >70mg/dl to <250mg/dl throughout shift  Outcome: Progressing  Goal: No changes in neurological exam by end of shift  Outcome: Progressing  Goal: Learn about and adhere to nutrition recommendations by end of shift  Outcome: Progressing  Goal: Vital signs within normal range for age by end of shift  Outcome: Progressing  Goal: Increase self care and/or family involovement by end of shift  Outcome: Progressing  Goal: Receive DSME education by end of shift  Outcome: Progressing     Problem: Pain  Goal: Takes deep breaths with improved pain control throughout the shift  Outcome: Progressing  Goal: Turns in bed with improved pain control throughout the shift  Outcome: Progressing  Goal: Walks with improved pain control throughout the shift  Outcome: Progressing  Goal: Performs ADL's with improved pain control throughout shift  Outcome: Progressing  Goal: Participates in PT with improved pain control throughout the shift  Outcome: Progressing  Goal: Free from opioid side effects throughout the shift  Outcome: Progressing  Goal: Free from acute confusion related to pain meds throughout the shift  Outcome: Progressing     Problem: Heart Failure  Goal: Improved gas exchange this shift  Outcome: Progressing  Goal: Improved urinary output this shift  Outcome: Progressing  Goal: Reduction in peripheral edema within 24 hours  Outcome: Progressing  Goal: Report improvement of dyspnea/breathlessness this shift  Outcome: Progressing  Goal: Weight from fluid excess  reduced over 2-3 days, then stabilize  Outcome: Progressing  Goal: Increase self care and/or family involvement in 24 hours  Outcome: Progressing     Problem: Fall/Injury  Goal: Not fall by end of shift  Outcome: Progressing  Goal: Be free from injury by end of the shift  Outcome: Progressing  Goal: Verbalize understanding of personal risk factors for fall in the hospital  Outcome: Progressing  Goal: Verbalize understanding of risk factor reduction measures to prevent injury from fall in the home  Outcome: Progressing  Goal: Use assistive devices by end of the shift  Outcome: Progressing  Goal: Pace activities to prevent fatigue by end of the shift  Outcome: Progressing

## 2025-03-08 NOTE — CARE PLAN
The patient's goals for the shift include patient pain will be controled during shift    The clinical goals for the shift include Pt will remain safe and free of harm or injury throughout the shift

## 2025-03-09 VITALS
RESPIRATION RATE: 19 BRPM | WEIGHT: 132.06 LBS | HEART RATE: 84 BPM | OXYGEN SATURATION: 95 % | DIASTOLIC BLOOD PRESSURE: 76 MMHG | HEIGHT: 63 IN | SYSTOLIC BLOOD PRESSURE: 141 MMHG | TEMPERATURE: 98.1 F | BODY MASS INDEX: 23.4 KG/M2

## 2025-03-09 LAB
ALBUMIN SERPL BCP-MCNC: 2.4 G/DL (ref 3.4–5)
ANION GAP SERPL CALC-SCNC: 9 MMOL/L (ref 10–20)
BASOPHILS # BLD AUTO: 0.05 X10*3/UL (ref 0–0.1)
BASOPHILS NFR BLD AUTO: 0.3 %
BUN SERPL-MCNC: 16 MG/DL (ref 6–23)
CALCIUM SERPL-MCNC: 7.7 MG/DL (ref 8.6–10.6)
CHLORIDE SERPL-SCNC: 104 MMOL/L (ref 98–107)
CO2 SERPL-SCNC: 22 MMOL/L (ref 21–32)
CREAT SERPL-MCNC: 0.79 MG/DL (ref 0.5–1.05)
EGFRCR SERPLBLD CKD-EPI 2021: 83 ML/MIN/1.73M*2
EOSINOPHIL # BLD AUTO: 0.11 X10*3/UL (ref 0–0.7)
EOSINOPHIL NFR BLD AUTO: 0.8 %
ERYTHROCYTE [DISTWIDTH] IN BLOOD BY AUTOMATED COUNT: 15.1 % (ref 11.5–14.5)
GLUCOSE BLD MANUAL STRIP-MCNC: 146 MG/DL (ref 74–99)
GLUCOSE BLD MANUAL STRIP-MCNC: 171 MG/DL (ref 74–99)
GLUCOSE BLD MANUAL STRIP-MCNC: 173 MG/DL (ref 74–99)
GLUCOSE BLD MANUAL STRIP-MCNC: 174 MG/DL (ref 74–99)
GLUCOSE BLD MANUAL STRIP-MCNC: 209 MG/DL (ref 74–99)
GLUCOSE SERPL-MCNC: 185 MG/DL (ref 74–99)
HCT VFR BLD AUTO: 24.4 % (ref 36–46)
HGB BLD-MCNC: 7.8 G/DL (ref 12–16)
IMM GRANULOCYTES # BLD AUTO: 0.13 X10*3/UL (ref 0–0.7)
IMM GRANULOCYTES NFR BLD AUTO: 0.9 % (ref 0–0.9)
LYMPHOCYTES # BLD AUTO: 3.22 X10*3/UL (ref 1.2–4.8)
LYMPHOCYTES NFR BLD AUTO: 22.3 %
MCH RBC QN AUTO: 29.9 PG (ref 26–34)
MCHC RBC AUTO-ENTMCNC: 32 G/DL (ref 32–36)
MCV RBC AUTO: 94 FL (ref 80–100)
MONOCYTES # BLD AUTO: 0.81 X10*3/UL (ref 0.1–1)
MONOCYTES NFR BLD AUTO: 5.6 %
NEUTROPHILS # BLD AUTO: 10.09 X10*3/UL (ref 1.2–7.7)
NEUTROPHILS NFR BLD AUTO: 70.1 %
NRBC BLD-RTO: 0 /100 WBCS (ref 0–0)
PHOSPHATE SERPL-MCNC: 2.8 MG/DL (ref 2.5–4.9)
PLATELET # BLD AUTO: 323 X10*3/UL (ref 150–450)
POTASSIUM SERPL-SCNC: 4 MMOL/L (ref 3.5–5.3)
RBC # BLD AUTO: 2.61 X10*6/UL (ref 4–5.2)
SODIUM SERPL-SCNC: 131 MMOL/L (ref 136–145)
WBC # BLD AUTO: 14.4 X10*3/UL (ref 4.4–11.3)

## 2025-03-09 PROCEDURE — 2500000001 HC RX 250 WO HCPCS SELF ADMINISTERED DRUGS (ALT 637 FOR MEDICARE OP): Performed by: STUDENT IN AN ORGANIZED HEALTH CARE EDUCATION/TRAINING PROGRAM

## 2025-03-09 PROCEDURE — 36415 COLL VENOUS BLD VENIPUNCTURE: CPT | Performed by: STUDENT IN AN ORGANIZED HEALTH CARE EDUCATION/TRAINING PROGRAM

## 2025-03-09 PROCEDURE — 2500000002 HC RX 250 W HCPCS SELF ADMINISTERED DRUGS (ALT 637 FOR MEDICARE OP, ALT 636 FOR OP/ED): Performed by: STUDENT IN AN ORGANIZED HEALTH CARE EDUCATION/TRAINING PROGRAM

## 2025-03-09 PROCEDURE — 99233 SBSQ HOSP IP/OBS HIGH 50: CPT | Performed by: INTERNAL MEDICINE

## 2025-03-09 PROCEDURE — 82947 ASSAY GLUCOSE BLOOD QUANT: CPT

## 2025-03-09 PROCEDURE — 99232 SBSQ HOSP IP/OBS MODERATE 35: CPT | Performed by: STUDENT IN AN ORGANIZED HEALTH CARE EDUCATION/TRAINING PROGRAM

## 2025-03-09 PROCEDURE — 1200000002 HC GENERAL ROOM WITH TELEMETRY DAILY

## 2025-03-09 PROCEDURE — 85025 COMPLETE CBC W/AUTO DIFF WBC: CPT | Performed by: STUDENT IN AN ORGANIZED HEALTH CARE EDUCATION/TRAINING PROGRAM

## 2025-03-09 PROCEDURE — 2500000004 HC RX 250 GENERAL PHARMACY W/ HCPCS (ALT 636 FOR OP/ED)

## 2025-03-09 PROCEDURE — 2500000005 HC RX 250 GENERAL PHARMACY W/O HCPCS

## 2025-03-09 PROCEDURE — 2500000004 HC RX 250 GENERAL PHARMACY W/ HCPCS (ALT 636 FOR OP/ED): Performed by: STUDENT IN AN ORGANIZED HEALTH CARE EDUCATION/TRAINING PROGRAM

## 2025-03-09 PROCEDURE — 97165 OT EVAL LOW COMPLEX 30 MIN: CPT | Mod: GO

## 2025-03-09 PROCEDURE — 97535 SELF CARE MNGMENT TRAINING: CPT | Mod: GO

## 2025-03-09 PROCEDURE — 80069 RENAL FUNCTION PANEL: CPT | Performed by: STUDENT IN AN ORGANIZED HEALTH CARE EDUCATION/TRAINING PROGRAM

## 2025-03-09 RX ORDER — OXYCODONE HYDROCHLORIDE 5 MG/1
2.5 TABLET ORAL EVERY 6 HOURS PRN
Status: DISCONTINUED | OUTPATIENT
Start: 2025-03-09 | End: 2025-03-12 | Stop reason: HOSPADM

## 2025-03-09 RX ORDER — HYDRALAZINE HYDROCHLORIDE 50 MG/1
50 TABLET, FILM COATED ORAL 3 TIMES DAILY
Status: DISCONTINUED | OUTPATIENT
Start: 2025-03-09 | End: 2025-03-12 | Stop reason: HOSPADM

## 2025-03-09 RX ADMIN — INSULIN LISPRO 2 UNITS: 100 INJECTION, SOLUTION INTRAVENOUS; SUBCUTANEOUS at 13:15

## 2025-03-09 RX ADMIN — OXYCODONE 5 MG: 5 TABLET ORAL at 14:17

## 2025-03-09 RX ADMIN — INSULIN LISPRO 2 UNITS: 100 INJECTION, SOLUTION INTRAVENOUS; SUBCUTANEOUS at 09:47

## 2025-03-09 RX ADMIN — CARVEDILOL 25 MG: 12.5 TABLET, FILM COATED ORAL at 20:36

## 2025-03-09 RX ADMIN — SODIUM CHLORIDE 1 G: 900 INJECTION INTRAVENOUS at 13:13

## 2025-03-09 RX ADMIN — INSULIN LISPRO 1 UNITS: 100 INJECTION, SOLUTION INTRAVENOUS; SUBCUTANEOUS at 09:46

## 2025-03-09 RX ADMIN — RIVAROXABAN 2.5 MG: 2.5 TABLET, FILM COATED ORAL at 17:24

## 2025-03-09 RX ADMIN — ACETAMINOPHEN 650 MG: 325 TABLET ORAL at 17:24

## 2025-03-09 RX ADMIN — RIVAROXABAN 2.5 MG: 2.5 TABLET, FILM COATED ORAL at 09:46

## 2025-03-09 RX ADMIN — INSULIN LISPRO 2 UNITS: 100 INJECTION, SOLUTION INTRAVENOUS; SUBCUTANEOUS at 17:28

## 2025-03-09 RX ADMIN — INSULIN LISPRO 1 UNITS: 100 INJECTION, SOLUTION INTRAVENOUS; SUBCUTANEOUS at 17:24

## 2025-03-09 RX ADMIN — CLOPIDOGREL BISULFATE 75 MG: 75 TABLET ORAL at 09:46

## 2025-03-09 RX ADMIN — PANTOPRAZOLE SODIUM 40 MG: 40 INJECTION, POWDER, FOR SOLUTION INTRAVENOUS at 09:48

## 2025-03-09 RX ADMIN — ATORVASTATIN CALCIUM 80 MG: 80 TABLET, FILM COATED ORAL at 20:36

## 2025-03-09 RX ADMIN — RANOLAZINE 500 MG: 500 TABLET, EXTENDED RELEASE ORAL at 20:36

## 2025-03-09 RX ADMIN — ISOSORBIDE MONONITRATE 60 MG: 30 TABLET, EXTENDED RELEASE ORAL at 09:46

## 2025-03-09 RX ADMIN — RANOLAZINE 500 MG: 500 TABLET, EXTENDED RELEASE ORAL at 09:46

## 2025-03-09 RX ADMIN — INSULIN GLARGINE 10 UNITS: 100 INJECTION, SOLUTION SUBCUTANEOUS at 20:46

## 2025-03-09 RX ADMIN — VANCOMYCIN HYDROCHLORIDE 1250 MG: 5 INJECTION, POWDER, LYOPHILIZED, FOR SOLUTION INTRAVENOUS at 14:17

## 2025-03-09 RX ADMIN — INSULIN LISPRO 1 UNITS: 100 INJECTION, SOLUTION INTRAVENOUS; SUBCUTANEOUS at 13:13

## 2025-03-09 RX ADMIN — VANCOMYCIN HYDROCHLORIDE 1250 MG: 5 INJECTION, POWDER, LYOPHILIZED, FOR SOLUTION INTRAVENOUS at 03:10

## 2025-03-09 RX ADMIN — OXYCODONE 2.5 MG: 5 TABLET ORAL at 20:42

## 2025-03-09 RX ADMIN — NIFEDIPINE 90 MG: 90 TABLET, FILM COATED, EXTENDED RELEASE ORAL at 09:48

## 2025-03-09 RX ADMIN — HYDRALAZINE HYDROCHLORIDE 50 MG: 50 TABLET ORAL at 20:36

## 2025-03-09 RX ADMIN — HYDROXYZINE HYDROCHLORIDE 10 MG: 10 TABLET ORAL at 11:28

## 2025-03-09 RX ADMIN — ACETAMINOPHEN 650 MG: 325 TABLET ORAL at 23:57

## 2025-03-09 RX ADMIN — ACETAMINOPHEN 650 MG: 325 TABLET ORAL at 11:29

## 2025-03-09 RX ADMIN — HYDRALAZINE HYDROCHLORIDE 75 MG: 25 TABLET ORAL at 09:46

## 2025-03-09 RX ADMIN — Medication 10 MG: at 20:36

## 2025-03-09 RX ADMIN — CARVEDILOL 25 MG: 12.5 TABLET, FILM COATED ORAL at 09:46

## 2025-03-09 RX ADMIN — ACETAMINOPHEN 650 MG: 325 TABLET ORAL at 06:34

## 2025-03-09 ASSESSMENT — PAIN SCALES - GENERAL
PAINLEVEL_OUTOF10: 3
PAINLEVEL_OUTOF10: 6
PAINLEVEL_OUTOF10: 8
PAINLEVEL_OUTOF10: 8
PAINLEVEL_OUTOF10: 6
PAINLEVEL_OUTOF10: 8

## 2025-03-09 ASSESSMENT — COGNITIVE AND FUNCTIONAL STATUS - GENERAL
DRESSING REGULAR UPPER BODY CLOTHING: A LITTLE
DAILY ACTIVITIY SCORE: 18
DRESSING REGULAR UPPER BODY CLOTHING: A LITTLE
CLIMB 3 TO 5 STEPS WITH RAILING: TOTAL
MOBILITY SCORE: 16
DAILY ACTIVITIY SCORE: 18
STANDING UP FROM CHAIR USING ARMS: A LOT
HELP NEEDED FOR BATHING: A LOT
MOBILITY SCORE: 16
DRESSING REGULAR LOWER BODY CLOTHING: A LOT
DRESSING REGULAR UPPER BODY CLOTHING: A LITTLE
HELP NEEDED FOR BATHING: A LOT
WALKING IN HOSPITAL ROOM: A LOT
MOVING TO AND FROM BED TO CHAIR: A LITTLE
DRESSING REGULAR LOWER BODY CLOTHING: A LOT
WALKING IN HOSPITAL ROOM: A LOT
MOVING TO AND FROM BED TO CHAIR: A LITTLE
DAILY ACTIVITIY SCORE: 16
DRESSING REGULAR LOWER BODY CLOTHING: A LOT
PERSONAL GROOMING: A LITTLE
STANDING UP FROM CHAIR USING ARMS: A LOT
TOILETING: A LOT
HELP NEEDED FOR BATHING: A LOT
TOILETING: A LITTLE
CLIMB 3 TO 5 STEPS WITH RAILING: TOTAL
TOILETING: A LITTLE

## 2025-03-09 ASSESSMENT — PAIN - FUNCTIONAL ASSESSMENT
PAIN_FUNCTIONAL_ASSESSMENT: 0-10

## 2025-03-09 ASSESSMENT — ACTIVITIES OF DAILY LIVING (ADL)
ADL_ASSISTANCE: INDEPENDENT
BATHING_ASSISTANCE: MODERATE
HOME_MANAGEMENT_TIME_ENTRY: 16

## 2025-03-09 ASSESSMENT — PAIN DESCRIPTION - DESCRIPTORS: DESCRIPTORS: ACHING

## 2025-03-09 NOTE — PROGRESS NOTES
Occupational Therapy    Evaluation and Treatment    Patient Name: Myrna Khan  MRN: [id number]  Today's Date: 3/9/2025  Room: [facility]  Time Calculation  Start Time: 1144  Stop Time: 1215  Time Calculation (min): 31 min    Assessment  IP OT Assessment  OT Assessment: pt motivated to engage in therapy session. Pt demo good tolerance to engage in ADL tasks and functional transfers/mobility w Mod A. Pt limited by pain, fatigue, decreased endurance, strength, activity tolerance, and balance. Pt would benefit from continued OT to address these deficits.  Prognosis: Good  Barriers to Discharge Home: Caregiver assistance, Physical needs  Caregiver Assistance: Patient lives alone and/or does not have reliable caregiver assistance  Physical Needs: In-home setup navigation limited by function/safety, Ambulating household distances limited by function/safety, 24hr mobility assistance needed, 24hr ADL assistance needed, High falls risk due to function or environment  Evaluation/Treatment Tolerance: Patient limited by pain  Medical Staff Made Aware: Yes  End of Session Communication: Bedside nurse  End of Session Patient Position: Bed, 3 rail up, Alarm on  Plan:  Inpatient Plan  Treatment Interventions: ADL retraining, Functional transfer training, Endurance training, Cognitive reorientation, Patient/family training, Equipment evaluation/education, Compensatory technique education  OT Frequency: 3 times per week  OT Discharge Recommendations: Moderate intensity level of continued care  Equipment Recommended upon Discharge:  (TBD)  OT Recommended Transfer Status: Moderate assist, Assist of 1  OT - OK to Discharge: Yes  OT Assessment  OT Assessment Results: Decreased ADL status, Decreased safe judgment during ADL, Decreased cognition, Decreased endurance, Decreased functional mobility, Decreased IADLs, Decreased trunk control for functional activities  Prognosis: Good  Evaluation/Treatment Tolerance: Patient limited by  pain  Medical Staff Made Aware: Yes  Strengths: Attitude of self  Barriers to Participation: Comorbidities    Subjective   Current Problem:  1. Dry gangrene (Multi)  Lower extremity vein mapping bilateral    Lower extremity vein mapping bilateral    Case Request Operating Room: ENDARTERECTOMY, LOWER EXTREMITY, ANGIOGRAM, LOWER EXTREMITY, CREATION, BYPASS, ARTERIAL, TIBIAL, USING GRAFT    Case Request Operating Room: ENDARTERECTOMY, LOWER EXTREMITY, ANGIOGRAM, LOWER EXTREMITY, CREATION, BYPASS, ARTERIAL, TIBIAL, USING GRAFT    Surgical Pathology Exam    Surgical Pathology Exam    CANCELED: Vascular US PVR without exercise    CANCELED: Vascular US PVR without exercise      2. Cellulitis of left foot        3. PAD (peripheral artery disease) ([facility])  Case Request Operating Room: ANGIOGRAM, LOWER EXTREMITY    Case Request Operating Room: ANGIOGRAM, LOWER EXTREMITY    Case Request Operating Room: ENDARTERECTOMY, LOWER EXTREMITY, ANGIOGRAM, LOWER EXTREMITY, CREATION, BYPASS, ARTERIAL, TIBIAL, USING GRAFT    Case Request Operating Room: ENDARTERECTOMY, LOWER EXTREMITY, ANGIOGRAM, LOWER EXTREMITY, CREATION, BYPASS, ARTERIAL, TIBIAL, USING GRAFT    Surgical Pathology Exam    Surgical Pathology Exam      4. Coronary artery disease involving native coronary artery of native heart without angina pectoris  Transthoracic Echo (TTE) Limited    Transthoracic Echo (TTE) Limited      5. Encounter for other preprocedural examination  Lower extremity vein mapping bilateral        General:  Reason for Referral: L common femoral and profunda endarterectomy with external iliac stent placement, angiogram, IVUS; initially presenting with dry gangrene of LLE  Past Medical History Relevant to Rehab: HFimpEF (EF 55% July/2024, 35-40% in Aug/2023), CAD s/p CABG in 2023 (LIMA-LAD, APRIL-OM), PAD s/p L SFA stent (occluded in Nov/2023 w/ distal SFA reconstitution), renal artery stenosis (Sep/2023 US), carotid artery stenosis (Aug/2023 US), HTN,  HLD, T2DM (A1c 14.6% Jan/2025); reports hx falls.  Prior to Session Communication: Bedside nurse  Patient Position Received: Bed, 3 rail up, Alarm on  Family/Caregiver Present: No  General Comment: Pt supine in bed upon OT arrival. Pt was pleasant and agreeable to participate in therapy session.   Precautions:  Hearing/Visual Limitations: WFL  LE Weight Bearing Status: Weight Bearing as Tolerated (LLE)  Medical Precautions: Fall precautions    Pain:  Pain Assessment  Pain Assessment: 0-10  0-10 (Numeric) Pain Score: 8  Pain Type: Acute pain  Pain Location: Leg  Pain Orientation: Left  Pain Interventions: Repositioned, Ambulation/increased activity, Distraction  Response to Interventions:  (best at rest)  Lines/Tubes/Drains:  Urethral Catheter 18 Fr. (Active)   Number of days: 1         Objective   Cognition:  Overall Cognitive Status: Impaired  Arousal/Alertness: Appropriate responses to stimuli  Orientation Level: Disoriented to time  Following Commands: Follows one step commands with repetition  Impulsive: Mildly  Processing Speed: Within funtional limits        Home Living:  Type of Home: Apartment (5th floor apt)  Lives With: Alone  Home Adaptive Equipment: Wheelchair-manual, Scooter (rollator)  Home Layout: One level  Home Access: Elevator  Bathroom Shower/Tub: Tub/shower unit  Bathroom Toilet: Handicapped height  Bathroom Equipment: Grab bars in shower, Shower chair with back (pt reports has shower chair but has not set it up yet)  Home Living Comments: pt reports laundry is on the same floor; pt reports was IND for all ADLs/IADLs, alothough pt reports  would help occasionally w laundry. Pt reports 4 falls within the last 6 months, -drives   Prior Function:  Level of [place]: Independent with ADLs and functional transfers, Needs assistance with homemaking  Receives Help From: Family ( PRN)  ADL Assistance: Independent  Homemaking Assistance: Needs assistance (pt reports  assists as  needed w laundry)  Driving/Transportation:  (uses public transportation)  Ambulatory Assistance: Independent (pt reports using rollator within apt and electric scooter in community)  Vocational: Retired, On disability  Leisure: activities at [facility]  Hand Dominance: Right  IADL History:  Homemaking Responsibilities: Yes  Meal Prep Responsibility: Primary  Laundry Responsibility: Primary ( assists as needed)  Cleaning Responsibility: Primary  Bill Paying/Finance Responsibility: Primary  Shopping Responsibility: Secondary ( assists as needed)  Current License: No  Mode of Transportation:  (insurance provides transportation)  Occupation: Retired, On disability  Leisure and Hobbies: activities at the [facility]  ADL:  Eating Assistance: Independent (anticipated)  Grooming Assistance: Stand by (anticipated)  Grooming Deficit: Steadying, Supervision/safety, Increased time to complete  Bathing Assistance: Moderate (anticipated)  Bathing Deficit: Steadying, Supervision/safety, Increased time to complete , Right lower leg including foot, Left lower leg including foot  UE Dressing Assistance: Minimal  UE Dressing Deficit: Supervision/safety, Increased time to complete, Pull around back  LE Dressing Assistance: Maximal  LE Dressing Deficit: Don/doff R sock, Don/doff L sock, Supervision/safety, Increased time to complete  Toileting Assistance with Device: Maximal  Toileting Deficit: Supervison/safety, Increased time to complete  Activity Tolerance:  Endurance: Tolerates 10 - 20 min exercise with multiple rests  Balance:  Dynamic Sitting Balance  Dynamic Sitting-Balance Support: Feet supported, No upper extremity supported  Dynamic Sitting-Level of Assistance: Contact guard  Dynamic Sitting-Balance:  (posterior lean)  Dynamic Sitting-Comments: pt tolerated sitting at EOB~8mins w CGA-Min A for balance and safety. Pt intermittently w posterior lean, required Mod verbal cues to fix lean  Bed  Mobility/Transfers: Bed Mobility/Transfers: Bed Mobility  Bed Mobility: Yes  Bed Mobility 1  Bed Mobility 1: Supine to sitting, Sitting to supine  Level of Assistance 1: Moderate assistance, Minimal verbal cues  Bed Mobility Comments 1: HOB elevated; Mod A to sit upright at EOB and swing BLEs over EOB  Bed Mobility 2  Bed Mobility  2: Scooting  Level of Assistance 2: Moderate assistance  Bed Mobility Comments 2: Mod A to scoot towards EOB w use of draw sheet   and Transfers  Transfer: Yes  Transfer 1  Transfer From 1: Sit to, Stand to  Transfer to 1: Stand, Sit  Technique 1: Sit to stand, Stand to sit  Transfer Device 1: Walker  Transfer Level of Assistance 1: Moderate assistance, Minimal verbal cues  Trials/Comments 1: x3 trials; 1st trial w Mod A- pt able to acheive 50% standing position. 2nd and 3rd trial w Mod A for balance and safety; Min verbal cues for sequence and technique  IADL's:   Homemaking Responsibilities: Yes  Meal Prep Responsibility: Primary  Laundry Responsibility: Primary ( assists as needed)  Cleaning Responsibility: Primary  Bill Paying/Finance Responsibility: Primary  Shopping Responsibility: Secondary ( assists as needed)  Current License: No  Mode of Transportation:  (insurance provides transportation)  Occupation: Retired, On disability  Leisure and Hobbies: activities at the [facility]  Vision: Vision - Basic Assessment  Current Vision: No visual deficits    Sensation:  Light Touch: No apparent deficits (denies N/T in BUEs)  Strength:  Strength Comments: BUEs >/=3+/5 when grossly assessed  Perception:  Inattention/Neglect: Appears intact  Coordination:  Movements are Fluid and Coordinated: Yes   Hand Function:  Hand Function  Gross Grasp: Functional  Coordination: Functional  Extremities:   RUE   RUE : Within Functional Limits (>/=3+/5 when grossly assessed), LUE   LUE: Within Functional Limits (>/=3+/5 when grossly assessed)    Outcome Measures: [facility] Daily  Activity  Putting on and taking off regular lower body clothing: A lot  Bathing (including washing, rinsing, drying): A lot  Putting on and taking off regular upper body clothing: A little  Toileting, which includes using toilet, bedpan or urinal: A lot  Taking care of personal grooming such as brushing teeth: A little  Eating Meals: None  Daily Activity - Total Score: 16   OT Adult Other Outcome Measures  4AT: 2-possible cognitive impairment    Education Documentation  Body Mechanics, taught by Agnieszka Blakely OT at 3/9/2025  1:07 PM.  Learner: Patient  Readiness: Acceptance  Method: Explanation  Response: Verbalizes Understanding, Needs Reinforcement    Precautions, taught by Agnieszka Blakely OT at 3/9/2025  1:07 PM.  Learner: Patient  Readiness: Acceptance  Method: Explanation  Response: Verbalizes Understanding, Needs Reinforcement    ADL Training, taught by Agnieszka Blakely OT at 3/9/2025  1:07 PM.  Learner: Patient  Readiness: Acceptance  Method: Explanation  Response: Verbalizes Understanding, Needs Reinforcement    Education Comments  No comments found.        Goals:   Encounter Problems       Encounter Problems (Active)       ADLs       Patient with complete upper body dressing with independent level of assistance donning and doffing all UE clothes with no adaptive equipment while edge of bed. (Progressing)       Start:  03/09/25    Expected End:  03/30/25            Patient with complete lower body dressing with minimal assist  level of assistance donning and doffing all LE clothes  with PRN adaptive equipment while edge of bed. (Progressing)       Start:  03/09/25    Expected End:  03/30/25            Patient will complete daily grooming tasks brushing teeth and washing face/hair with supervision level of assistance while edge of bed  and standing at sink. (Progressing)       Start:  03/09/25    Expected End:  03/30/25            Patient will complete toileting including hygiene clothing management/hygiene with  minimal assist  level of assistance and grab bars. (Progressing)       Start:  03/09/25    Expected End:  03/30/25               BALANCE       Patient will tolerate standing for 8 minutes to minimal assist  level of assistance with least restrictive device in order to improve functional activity tolerance for ADL tasks. (Progressing)       Start:  03/09/25    Expected End:  03/30/25               COGNITION/SAFETY       Patient will score WFL on standardized cognitive assessment  within reasonable time frame (Progressing)       Start:  03/09/25    Expected End:  03/30/25               MOBILITY       Patient will perform Functional mobility mod  Household distances/Community Distances with minimal assist  level of assistance and least restrictive device in order to improve safety and functional mobility. (Progressing)       Start:  03/09/25    Expected End:  03/30/25                   Treatment Completed on Evaluation      Activities of Daily Living:       UE Dressing  UE Dressing Level of Assistance: Minimum assistance  UE Dressing Where Assessed: Edge of bed  UE Dressing Comments: Min A gown mgmt seated at EOB  LE Dressing  LE Dressing: Yes  LE Dressing Where Assessed: Edge of bed  LE Dressing Comments: Max A don/doff socks seated at EOB  Toileting  Toileting Level of Assistance: Maximum assistance  Where Assessed:  (standing at bedside)  Toileting Comments: Max A to complete toilet hygiene while standing at bedside w FWW      Therapy/Activity:     Therapeutic Activity  Therapeutic Activity Performed: Yes  Therapeutic Activity 1: pt completed supine<>EOB w Mod A. Pt completed 3 sit<>stand trials w Mod A for balance and safety. 1st trial w Mod A- pt able to acheive 50% standing position. 2nd and 3rd trial w Mod A for balance and safety; Min verbal cues for sequence and technique         03/09/25 at 1:08 PM   NUZHAT LUGO, OT   Rehab Office: [phone]

## 2025-03-09 NOTE — CARE PLAN
The patient's goals for the shift include patient pain will be controled during shift    The clinical goals for the shift include safety, pain control      Problem: Pain - Adult  Goal: Verbalizes/displays adequate comfort level or baseline comfort level  Outcome: Progressing     Problem: Safety - Adult  Goal: Free from fall injury  Outcome: Progressing     Problem: Skin  Goal: Decreased wound size/increased tissue granulation at next dressing change  Outcome: Progressing     Problem: Skin  Goal: Prevent/manage excess moisture  Outcome: Progressing     Problem: Skin  Goal: Promote/optimize nutrition  Outcome: Progressing

## 2025-03-09 NOTE — PROGRESS NOTES
Assessment/Plan     Myrna Khan is a 65 y.o. 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 Pt noting skin sloughing, edema, clear drainage. At op visit, difficulty palpating pulse, had a monophasic AT and PT signals on the LLE and no palpable femoral pulse. CTA showed occlusion of common and external iliac arteries in addition to occluded SFA stent with distal reconstruction. Podiatry consulted, recommended no interventions. Vascular surgery consulted. Pt is started on broad spectrum abx and ordered pvr and vein mapping. S/p left femoral and profunda endarterectomy, left external iliac artery stent 3/6 with vascular surgery.  OF note, Pt developed urinary retention and Malone was placed. failed TOV.   Discharge barriers:  -Needs ID final recs for abx duration. MRSA Swab from Anterior Nares is pending.    -Her infection is superficial so maybe an oral regimen is possible. She is allergic to amoxicillin. Staph aureus anterior nares culture will come back by Monday.  -Podiatry updated wound care to Cleans with saline, apply adaptic, betadine paint, ABD, kerlix, tape but avoid tape to skin.    Pending final ID team recs.   Dispo [facility]          #Superimposed cellulitis ankle  #Dry gangrene left foot  #LLE wounds with CLI  -CT showing common and external iliac arteries occlusion + SFA occlusion with reconstitution of her L popliteal artery and intact tibial runoff   -S/P LLE Angiogram with left EIA occlusion, L SFA stent occlusion,   -Medical ethics involved for decision making and supported revascularization   -S/p left femoral and profunda endarterectomy, left external iliac artery stent 3/6 with  vascular surgery.    -Pain is controlled  -Continue Xarelto 2.5mg BID   -Podiatry consulted - no urgent intervention  -Continue antibiotics  -Plavix 75mg daily, discontinued aspirin per vascular surgery recs   -wound care instruction per podiatry  -Needs ID final recs for abx duration. MRSA Swab from Anterior Nares is pending.    -Dr. young's podiatry follow-up requested   -Podiatry updated wound care to Cleans with saline, apply adaptic, betadine paint, ABD, kerlix, tape but avoid tape to skin.       #Chronic urinary retention, failed tov    -Malone placed         #Cystic ovarian mass  -GYN asked to evaluate  [id number], recommend OP clinic appt         #Delirium, resolved, 2/2 urine retnetion.     #HTN, controlled   #HFimpEF (EF 55% July/2024, 35-40% in Aug/2023)  - MRI and NM PYP negative for amyloid,  SPEP and UPEP negative  - continue Carvedilol, Imdur, increased Hydralazine to 75 mg TID, continue Nifedipine 90 mg daily  - will need OP cardiology FU      #CAD s/p CABG in 2023 (LIMA-LAD, APRIL-OM)  - EKG showing T wave inversions on anterior leads  - patient asymptomatic, no cp or equivalents  - tele monitoring        #DM2 with neuropathy (A1c 14.6% in 1/2025)   - on home metformin, dapagliflozin, insulin lantus 10u PM and SSI  - hold metformin and dapa  - lantus 10u + lispto 2 units + SSI. Will adjust as needed    40 min          Scheduled outpatient appointments in system:   Future Appointments   Date Time Provider Department Center   4/11/2025 11:00 AM [id number]   4/11/2025  1:00 PM Bobyb Pederson MD [id number]       ---------------------------------------------------------------------------------------------------  Subjective   Pt was seen  and examined this am. Pt has no acute event overnight. Addressed all of the patient concerns and explained the treatment plan. Denies new symptoms  "    ---------------------------------------------------------------------------------------------------  Objective   Last Recorded Vitals  Blood pressure 96/58, pulse 84, temperature 36.9 °C (98.4 °F), resp. rate 16, height 1.6 m (5' 3\"), weight 59.9 kg (132 lb 0.9 oz), SpO2 94%.  Intake/Output last 3 Shifts:  I/O last 3 completed shifts:  In: 1560 (26 mL/kg) [P.O.:960; I.V.:50 (0.8 mL/kg); IV Piggyback:550]  Out: 925 (15.4 mL/kg) [Urine:925 (0.4 mL/kg/hr)]  Weight: 59.9 kg     Physical Exam  Vitals and nursing note reviewed.   Constitutional:       General: She is not in acute distress.     Appearance: Normal appearance. She is not ill-appearing or toxic-appearing.   HENT:      Head: Normocephalic and atraumatic.      Mouth/Throat:      Mouth: Mucous membranes are moist.   Eyes:      General: No scleral icterus.     Extraocular Movements: Extraocular movements intact.      Conjunctiva/sclera: Conjunctivae normal.   Cardiovascular:      Rate and Rhythm: Normal rate and regular rhythm.      Heart sounds: S1 normal and S2 normal. No murmur heard.     Comments: Difficult to palpate distal pulses  Pulmonary:      Effort: Pulmonary effort is normal. No respiratory distress.      Breath sounds: No wheezing, rhonchi or rales.   Abdominal:      General: Bowel sounds are normal. There is no distension.      Palpations: Abdomen is soft.      Tenderness: There is no abdominal tenderness. There is no guarding or rebound.   Musculoskeletal:         General: No swelling or deformity.      Cervical back: Neck supple.   Skin:     Findings: No rash.      Comments: Left foot edema, denuded skin on doral surface, erythema and ttp    Neurological:      General: No focal deficit present.      Mental Status: She is alert. Mental status is at baseline.   Psychiatric:         Mood and Affect: Mood normal.         Relevant Results  Lab Results   Component Value Date    WBC 14.4 (H) 03/09/2025    HGB 7.8 (L) 03/09/2025    HCT 24.4 (L) " 03/09/2025    MCV 94 03/09/2025     03/09/2025      Lab Results   Component Value Date    GLUCOSE 185 (H) 03/09/2025    CALCIUM 7.7 (L) 03/09/2025     (L) 03/09/2025    K 4.0 03/09/2025    CO2 22 03/09/2025     03/09/2025    BUN 16 03/09/2025    CREATININE 0.79 03/09/2025     Scheduled medications  acetaminophen, 650 mg, oral, q6h BOLIVAR  atorvastatin, 80 mg, oral, Nightly  carvedilol, 25 mg, oral, BID  clopidogrel, 75 mg, oral, Daily  ertapenem, 1 g, intravenous, q24h  hydrALAZINE, 75 mg, oral, TID  insulin glargine, 10 Units, subcutaneous, Nightly  insulin lispro, 0-5 Units, subcutaneous, TID AC  insulin lispro, 2 Units, subcutaneous, TID AC  isosorbide mononitrate ER, 60 mg, oral, Daily  melatonin, 10 mg, oral, Nightly  NIFEdipine ER, 90 mg, oral, Daily before breakfast  pantoprazole, 40 mg, intravenous, Daily  ranolazine, 500 mg, oral, BID  rivaroxaban, 2.5 mg, oral, BID  vancomycin, 1,250 mg, intravenous, q12h      Continuous medications     PRN medications  PRN medications: dextrose, glucagon, hydrALAZINE, hydrOXYzine HCL, naloxone, OLANZapine, ondansetron ODT **OR** ondansetron, oxyCODONE, oxygen, polyethylene glycol, QUEtiapine, vancomycin    Savanna Moulton MD

## 2025-03-09 NOTE — PROGRESS NOTES
VASCULAR SURGERY PROGRESS NOTE  Assessment/Plan   Myrna Khan is 65 y.o. female with history of carotid stenosis, diabetes, CAD, HTN, PAD and renal artery stenosis who presents with extensive LLE wounds and intermittent rest pain.     S/P LLE Angiogram with left EIA occlusion, reconstitutes at distal EIA with CFA short-segment occlusion vs severe stenosis, L SFA stent occlusion, distal SFA reconstitution, patent popliteal and AT runoff to the foot on 3/2/25. Planning for Endart/Angio/Bypasss pending OR timing and clearance. Patient has had worsened mental status and lacks consistent capacity or an appropriate surrogate decision maker. Medical ethics involved for decision making.    3/6 s/p left femoral and profunda endarterectomy, left external iliac artery stent    3/9: Pending discharge to facility Monday 3/10, transport arranged     Plan:  Continue Xarelto 2.5mg BID   Podiatry consulted - no urgent intervention  Continue antibiotics  Plavix 75mg daily, discontinued aspirin  Additional care per primary team     D/w attending, Dr. Taya Sommer MD  General Surgery PGY1  Vascular Surgery [id number]    Subjective   No events overnight. No acute complaints. Tolerating diet. Receiving daily dressings changes. No fever/chills. No new/worsening pain.     Objective   Vitals:  Heart Rate:  [82-88]   Temp:  [36.2 °C (97.2 °F)-36.9 °C (98.4 °F)]   Resp:  [16-19]   BP: ()/(58-82)   Weight:  [59.9 kg (132 lb 0.9 oz)]   SpO2:  [90 %-94 %]     Exam:  Constitutional: no acute distress  Skin: warm and dry overall   Cardiac: Regular rate and rhythm  Pulmonary: Unlabored respirations on room air  Abdomen: Non distended, non tender  Extremities: PAINTING. RLE dry. LLE with dry gangrene on the dorsal aspect of her foot to her mid shin, no sensation at that site. Dressing in place.  Surgical wounds: right groin puncture site no hematoma or swelling; left groin surgical site with Prevena in place with good seal   Vascular:  bilateral DP/PT signals     Labs:  Results from last 7 days   Lab Units 03/09/25  1043 03/08/25  0722 03/07/25  0529   WBC AUTO x10*3/uL 14.4* 16.8* 13.7*   HEMOGLOBIN g/dL 7.8* 9.2* 9.2*   PLATELETS AUTO x10*3/uL 323 327 311      Results from last 7 days   Lab Units 03/09/25  1043 03/08/25  0722 03/07/25  0529   SODIUM mmol/L 131* 132* 133*   POTASSIUM mmol/L 4.0 3.7 4.9   CHLORIDE mmol/L 104 103 105   CO2 mmol/L 22 25 21   BUN mg/dL 16 14 16   CREATININE mg/dL 0.79 0.63 0.79   GLUCOSE mg/dL 185* 198* 259*   MAGNESIUM mg/dL  --   --  1.80   PHOSPHORUS mg/dL 2.8 2.4* 4.9      Results from last 7 days   Lab Units 03/06/25 2124   INR  1.3*   PROTIME seconds 14.4*   APTT seconds 37*

## 2025-03-09 NOTE — PROGRESS NOTES
Assessment/Plan     Myrna Khan is a 65 y.o. 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 Pt noting skin sloughing, edema, clear drainage. At op visit, difficulty palpating pulse, had a monophasic AT and PT signals on the LLE and no palpable femoral pulse. CTA showed occlusion of common and external iliac arteries in addition to occluded SFA stent with distal reconstruction. Podiatry consulted. Pt is started on broad spectrum abx and ordered pvr and vein mapping..Ordered angiogram, echo for preop risk strafication, appreciate cardiology recs. Pt seeing gyne for cystic ovarian mass suspicious for malignancy, cr is stable but after day placement nursing reporting 2.5 liters of output.    3/8  -Pt was seen  and examined this am, Pt has no acute event overnight. Addressed all of the patient concerns and explained the treatment plan.   -OT is needed for auth submission  -Can be discharged from vascular standpoint.   -[facility]  -Lab reviewed   -Lispro 2 units tidac   will reassess tomorrow          3/7  -A/Ox2-3. She knows she is in the hospital and knows who she is, she sometimes just gets confused about what time it is.     -Diet advanced  -GYN asked to evaluate  [id number], recommend OP asiht OP clinic appt   -ID consulted  -S/p inflow to LLE with L EIA stent 3/6, L common fem and profunda endarterectomy. Q2 nv checks overnight.   -No plan for static heparin   -Potentially PAD xarelto tomorrow.     -Started aspirin and plavix.  #Urinary retention, reinserted Day in   -Hyperglycemic. Resumed insulin regimen before OR.        3/5  -stopped Amlodipine and started Nifedipine 90 mg daily instead, trying  to get better BP control. next thoughts will be to increase hydralazine to 75 mg TID and eventually increase Imdur to 120 if needed.  -The ethics committee met this morning and support revascularization          3/4  -Mentation starting to improve after decompression but appears to be a continued issue.    -Reviewed echo and angiogram.   -Changed from cefepime to ertapenem. Will reassess mentation  -Urine retention, day 2.5 liters out. -2 liters yesterday. Need to watch and possibly give some fluid back if post-obstructive diuresis. Do not remove day.   -Held on ordering regadenoson stress test for to risk stratify her for surgery per cardiology recommendations.     -Plan for vascular interventions Thursday OR with Vascular. Plavix held 3/4.   -wound care applied   -Podiatry, no plan to intervene until after vascular surgery does bypass          40 min     -----------------------------------------      #LLE wounds with CLI  #PAOD  #Dry gangrene  - CT showing common and external iliac arteries occlusion + SFA occlusion with reconstitution of her L popliteal artery and intact tibial runoff   - no gas on imaging   - PVR/vein mapping ordered.    - appreciate podiatry recs  - mulitmodal analgesia with bowel regimen        #Cystic ovarian mass  #urinary retention      #Delirium, resolving   - likely 2/2 urine retnetion. Potentially related to opioid started yesterday v. Hospital related sundowning. May be related to infection/pain v. Related to cefepime neurotox.   - held opioid and reeval needs  - dc cefepime, change to ertapenem.     #HTN  #HFimpEF (EF 55% July/2024, 35-40% in Aug/2023)  - MRI and NM PYP negative for amyloid,  SPEP and UPEP negative  - on home amlodipine 10mg, carvedilol 25mg, isosorvide 60mg        #CAD s/p CABG in 2023 (LIMA-LAD, APRIL-OM)  - EKG showing T wave inversions on anterior leads  -  patient asymptomatic, no cp or equivalents  - tele monitoring  - Appreciate cardiology recs.        #T2DM  "(A1c 14.6% Jan/2025)  - on home metformin, dapagliflozin, insulin lantus 10u PM and SSI  - given prior hyperglycemia in Jan/2025, will be more aggressive with insulin regimen  - hold metformin and dapa  - lantus 8u + SSI. Will adjust as needed       Scheduled outpatient appointments in system:   Future Appointments   Date Time Provider Department Center   4/11/2025 11:00 AM [facility] VASC 4 [id number] [facility]   4/11/2025  1:00 PM Bobby Pederson MD [id number] [facility]       ---------------------------------------------------------------------------------------------------  Subjective   No events.    ---------------------------------------------------------------------------------------------------  Objective   Last Recorded Vitals  Blood pressure 128/82, pulse 88, temperature 36.5 °C (97.7 °F), resp. rate 17, height 1.6 m (5' 3\"), weight 61.6 kg (135 lb 12.9 oz), SpO2 94%.  Intake/Output last 3 Shifts:  I/O last 3 completed shifts:  In: 1200 (19.5 mL/kg) [P.O.:1200]  Out: 925 (15 mL/kg) [Urine:925 (0.4 mL/kg/hr)]  Weight: 61.6 kg     Physical Exam  Vitals and nursing note reviewed.   Constitutional:       General: She is not in acute distress.     Appearance: Normal appearance. She is not ill-appearing or toxic-appearing.   HENT:      Head: Normocephalic and atraumatic.      Mouth/Throat:      Mouth: Mucous membranes are moist.   Eyes:      General: No scleral icterus.     Extraocular Movements: Extraocular movements intact.      Conjunctiva/sclera: Conjunctivae normal.   Cardiovascular:      Rate and Rhythm: Normal rate and regular rhythm.      Heart sounds: S1 normal and S2 normal. No murmur heard.     Comments: Difficult to palpate distal pulses  Pulmonary:      Effort: Pulmonary effort is normal. No respiratory distress.      Breath sounds: No wheezing, rhonchi or rales.   Abdominal:      General: Bowel sounds are normal. There is no distension.      Palpations: Abdomen is soft.      Tenderness: There is no " abdominal tenderness. There is no guarding or rebound.   Musculoskeletal:         General: No swelling or deformity.      Cervical back: Neck supple.   Skin:     Findings: No rash.      Comments: Left foot edema, denuded skin on doral surface, erythema and ttp    Neurological:      General: No focal deficit present.      Mental Status: She is alert. Mental status is at baseline.   Psychiatric:         Mood and Affect: Mood normal.         Relevant Results  Lab Results   Component Value Date    WBC 16.8 (H) 03/08/2025    HGB 9.2 (L) 03/08/2025    HCT 27.4 (L) 03/08/2025    MCV 90 03/08/2025     03/08/2025      Lab Results   Component Value Date    GLUCOSE 198 (H) 03/08/2025    CALCIUM 7.5 (L) 03/08/2025     (L) 03/08/2025    K 3.7 03/08/2025    CO2 25 03/08/2025     03/08/2025    BUN 14 03/08/2025    CREATININE 0.63 03/08/2025     Scheduled medications  acetaminophen, 650 mg, oral, q6h BOLIVAR  atorvastatin, 80 mg, oral, Nightly  carvedilol, 25 mg, oral, BID  clopidogrel, 75 mg, oral, Daily  ertapenem, 1 g, intravenous, q24h  hydrALAZINE, 75 mg, oral, TID  insulin glargine, 10 Units, subcutaneous, Nightly  insulin lispro, 0-5 Units, subcutaneous, TID AC  insulin lispro, 5 Units, subcutaneous, TID AC  isosorbide mononitrate ER, 60 mg, oral, Daily  melatonin, 10 mg, oral, Nightly  NIFEdipine ER, 90 mg, oral, Daily before breakfast  pantoprazole, 40 mg, intravenous, Daily  ranolazine, 500 mg, oral, BID  rivaroxaban, 2.5 mg, oral, BID  vancomycin, 1,250 mg, intravenous, q12h      Continuous medications     PRN medications  PRN medications: dextrose, glucagon, hydrALAZINE, hydrOXYzine HCL, naloxone, OLANZapine, ondansetron ODT **OR** ondansetron, oxyCODONE, oxygen, polyethylene glycol, QUEtiapine, vancomycin    Savanna Moulton MD

## 2025-03-09 NOTE — CARE PLAN
The patient's goals for the shift include patient pain will be controled during shift    The clinical goals for the shift include safety, pain control

## 2025-03-09 NOTE — PROGRESS NOTES
Myrna Khan is a 65 y.o. female on day 9 of admission presenting with Dry gangrene (Multi).    TCC spoke with team today about discharge planning readiness. Patient will stay one more day due to ID final recommendations needed.  This TCC asked OT to see patient today, as facility asking for notes. Facility has accepted patient, and is willing to take patient. They have been updated.     Transportation accepted by [facility], it is in will call for tomorrow. Once discharge confirmed call [phone] and they will change the time to time of discharge.     Tommy Donahue RN     Addendum: Walter Whitfield had some questions about possibly becoming the POA.  SW has been asked to call him tomorrow to discuss Cell phone # [phone].

## 2025-03-10 ENCOUNTER — APPOINTMENT (OUTPATIENT)
Dept: VASCULAR MEDICINE | Facility: HOSPITAL | Age: 65
DRG: 270 | End: 2025-03-10
Payer: COMMERCIAL

## 2025-03-10 LAB
ALBUMIN SERPL BCP-MCNC: 2.5 G/DL (ref 3.4–5)
ANION GAP SERPL CALC-SCNC: 10 MMOL/L (ref 10–20)
BASOPHILS # BLD AUTO: 0.07 X10*3/UL (ref 0–0.1)
BASOPHILS NFR BLD AUTO: 0.5 %
BUN SERPL-MCNC: 14 MG/DL (ref 6–23)
CALCIUM SERPL-MCNC: 8.2 MG/DL (ref 8.6–10.6)
CHLORIDE SERPL-SCNC: 106 MMOL/L (ref 98–107)
CO2 SERPL-SCNC: 24 MMOL/L (ref 21–32)
CREAT SERPL-MCNC: 0.8 MG/DL (ref 0.5–1.05)
EGFRCR SERPLBLD CKD-EPI 2021: 82 ML/MIN/1.73M*2
EOSINOPHIL # BLD AUTO: 0.11 X10*3/UL (ref 0–0.7)
EOSINOPHIL NFR BLD AUTO: 0.7 %
ERYTHROCYTE [DISTWIDTH] IN BLOOD BY AUTOMATED COUNT: 15 % (ref 11.5–14.5)
GLUCOSE BLD MANUAL STRIP-MCNC: 120 MG/DL (ref 74–99)
GLUCOSE BLD MANUAL STRIP-MCNC: 134 MG/DL (ref 74–99)
GLUCOSE BLD MANUAL STRIP-MCNC: 176 MG/DL (ref 74–99)
GLUCOSE BLD MANUAL STRIP-MCNC: 97 MG/DL (ref 74–99)
GLUCOSE SERPL-MCNC: 86 MG/DL (ref 74–99)
HCT VFR BLD AUTO: 27.1 % (ref 36–46)
HGB BLD-MCNC: 9.1 G/DL (ref 12–16)
IMM GRANULOCYTES # BLD AUTO: 0.27 X10*3/UL (ref 0–0.7)
IMM GRANULOCYTES NFR BLD AUTO: 1.8 % (ref 0–0.9)
LYMPHOCYTES # BLD AUTO: 3.43 X10*3/UL (ref 1.2–4.8)
LYMPHOCYTES NFR BLD AUTO: 22.6 %
MCH RBC QN AUTO: 30 PG (ref 26–34)
MCHC RBC AUTO-ENTMCNC: 33.6 G/DL (ref 32–36)
MCV RBC AUTO: 89 FL (ref 80–100)
MONOCYTES # BLD AUTO: 0.99 X10*3/UL (ref 0.1–1)
MONOCYTES NFR BLD AUTO: 6.5 %
NEUTROPHILS # BLD AUTO: 10.31 X10*3/UL (ref 1.2–7.7)
NEUTROPHILS NFR BLD AUTO: 67.9 %
NRBC BLD-RTO: 0 /100 WBCS (ref 0–0)
PHOSPHATE SERPL-MCNC: 3 MG/DL (ref 2.5–4.9)
PLATELET # BLD AUTO: 382 X10*3/UL (ref 150–450)
POTASSIUM SERPL-SCNC: 3.6 MMOL/L (ref 3.5–5.3)
RBC # BLD AUTO: 3.03 X10*6/UL (ref 4–5.2)
SODIUM SERPL-SCNC: 136 MMOL/L (ref 136–145)
STAPHYLOCOCCUS SPEC CULT: ABNORMAL
VANCOMYCIN SERPL-MCNC: 44.3 UG/ML (ref 5–20)
WBC # BLD AUTO: 15.2 X10*3/UL (ref 4.4–11.3)

## 2025-03-10 PROCEDURE — 2500000002 HC RX 250 W HCPCS SELF ADMINISTERED DRUGS (ALT 637 FOR MEDICARE OP, ALT 636 FOR OP/ED): Performed by: STUDENT IN AN ORGANIZED HEALTH CARE EDUCATION/TRAINING PROGRAM

## 2025-03-10 PROCEDURE — 99231 SBSQ HOSP IP/OBS SF/LOW 25: CPT | Performed by: INTERNAL MEDICINE

## 2025-03-10 PROCEDURE — 1100000001 HC PRIVATE ROOM DAILY

## 2025-03-10 PROCEDURE — 80202 ASSAY OF VANCOMYCIN: CPT

## 2025-03-10 PROCEDURE — 82947 ASSAY GLUCOSE BLOOD QUANT: CPT

## 2025-03-10 PROCEDURE — 2500000001 HC RX 250 WO HCPCS SELF ADMINISTERED DRUGS (ALT 637 FOR MEDICARE OP): Performed by: STUDENT IN AN ORGANIZED HEALTH CARE EDUCATION/TRAINING PROGRAM

## 2025-03-10 PROCEDURE — 80069 RENAL FUNCTION PANEL: CPT | Performed by: STUDENT IN AN ORGANIZED HEALTH CARE EDUCATION/TRAINING PROGRAM

## 2025-03-10 PROCEDURE — 2500000005 HC RX 250 GENERAL PHARMACY W/O HCPCS

## 2025-03-10 PROCEDURE — 2500000004 HC RX 250 GENERAL PHARMACY W/ HCPCS (ALT 636 FOR OP/ED)

## 2025-03-10 PROCEDURE — 93922 UPR/L XTREMITY ART 2 LEVELS: CPT

## 2025-03-10 PROCEDURE — 2500000002 HC RX 250 W HCPCS SELF ADMINISTERED DRUGS (ALT 637 FOR MEDICARE OP, ALT 636 FOR OP/ED): Performed by: INTERNAL MEDICINE

## 2025-03-10 PROCEDURE — 93922 UPR/L XTREMITY ART 2 LEVELS: CPT | Performed by: STUDENT IN AN ORGANIZED HEALTH CARE EDUCATION/TRAINING PROGRAM

## 2025-03-10 PROCEDURE — 36415 COLL VENOUS BLD VENIPUNCTURE: CPT | Performed by: STUDENT IN AN ORGANIZED HEALTH CARE EDUCATION/TRAINING PROGRAM

## 2025-03-10 PROCEDURE — 85025 COMPLETE CBC W/AUTO DIFF WBC: CPT | Performed by: STUDENT IN AN ORGANIZED HEALTH CARE EDUCATION/TRAINING PROGRAM

## 2025-03-10 PROCEDURE — 99233 SBSQ HOSP IP/OBS HIGH 50: CPT | Performed by: INTERNAL MEDICINE

## 2025-03-10 PROCEDURE — 2500000004 HC RX 250 GENERAL PHARMACY W/ HCPCS (ALT 636 FOR OP/ED): Performed by: STUDENT IN AN ORGANIZED HEALTH CARE EDUCATION/TRAINING PROGRAM

## 2025-03-10 RX ORDER — QUETIAPINE FUMARATE 25 MG/1
25 TABLET, FILM COATED ORAL DAILY PRN
Status: DISCONTINUED | OUTPATIENT
Start: 2025-03-10 | End: 2025-03-12 | Stop reason: HOSPADM

## 2025-03-10 RX ORDER — QUETIAPINE FUMARATE 25 MG/1
25 TABLET, FILM COATED ORAL NIGHTLY
Status: DISCONTINUED | OUTPATIENT
Start: 2025-03-10 | End: 2025-03-12 | Stop reason: HOSPADM

## 2025-03-10 RX ADMIN — ACETAMINOPHEN 650 MG: 325 TABLET ORAL at 06:20

## 2025-03-10 RX ADMIN — CARVEDILOL 25 MG: 12.5 TABLET, FILM COATED ORAL at 20:05

## 2025-03-10 RX ADMIN — CLOPIDOGREL BISULFATE 75 MG: 75 TABLET ORAL at 09:01

## 2025-03-10 RX ADMIN — QUETIAPINE FUMARATE 25 MG: 25 TABLET ORAL at 11:34

## 2025-03-10 RX ADMIN — VANCOMYCIN HYDROCHLORIDE 1250 MG: 5 INJECTION, POWDER, LYOPHILIZED, FOR SOLUTION INTRAVENOUS at 02:24

## 2025-03-10 RX ADMIN — ATORVASTATIN CALCIUM 80 MG: 80 TABLET, FILM COATED ORAL at 20:13

## 2025-03-10 RX ADMIN — NIFEDIPINE 90 MG: 90 TABLET, FILM COATED, EXTENDED RELEASE ORAL at 09:02

## 2025-03-10 RX ADMIN — HYDRALAZINE HYDROCHLORIDE 50 MG: 50 TABLET ORAL at 20:05

## 2025-03-10 RX ADMIN — HYDRALAZINE HYDROCHLORIDE 50 MG: 50 TABLET ORAL at 09:01

## 2025-03-10 RX ADMIN — RIVAROXABAN 2.5 MG: 2.5 TABLET, FILM COATED ORAL at 18:20

## 2025-03-10 RX ADMIN — PANTOPRAZOLE SODIUM 40 MG: 40 INJECTION, POWDER, FOR SOLUTION INTRAVENOUS at 09:00

## 2025-03-10 RX ADMIN — ACETAMINOPHEN 650 MG: 325 TABLET ORAL at 18:20

## 2025-03-10 RX ADMIN — ISOSORBIDE MONONITRATE 60 MG: 30 TABLET, EXTENDED RELEASE ORAL at 09:01

## 2025-03-10 RX ADMIN — SODIUM CHLORIDE 1 G: 900 INJECTION INTRAVENOUS at 13:14

## 2025-03-10 RX ADMIN — RIVAROXABAN 2.5 MG: 2.5 TABLET, FILM COATED ORAL at 09:01

## 2025-03-10 RX ADMIN — OXYCODONE 2.5 MG: 5 TABLET ORAL at 02:52

## 2025-03-10 RX ADMIN — RANOLAZINE 500 MG: 500 TABLET, EXTENDED RELEASE ORAL at 09:00

## 2025-03-10 RX ADMIN — QUETIAPINE FUMARATE 25 MG: 25 TABLET ORAL at 02:52

## 2025-03-10 RX ADMIN — RANOLAZINE 500 MG: 500 TABLET, EXTENDED RELEASE ORAL at 20:05

## 2025-03-10 RX ADMIN — CARVEDILOL 25 MG: 12.5 TABLET, FILM COATED ORAL at 09:01

## 2025-03-10 RX ADMIN — INSULIN LISPRO 1 UNITS: 100 INJECTION, SOLUTION INTRAVENOUS; SUBCUTANEOUS at 18:22

## 2025-03-10 RX ADMIN — Medication 10 MG: at 20:05

## 2025-03-10 RX ADMIN — QUETIAPINE FUMARATE 25 MG: 25 TABLET ORAL at 20:04

## 2025-03-10 RX ADMIN — INSULIN GLARGINE 10 UNITS: 100 INJECTION, SOLUTION SUBCUTANEOUS at 20:08

## 2025-03-10 RX ADMIN — OXYCODONE 2.5 MG: 5 TABLET ORAL at 20:04

## 2025-03-10 ASSESSMENT — COGNITIVE AND FUNCTIONAL STATUS - GENERAL
HELP NEEDED FOR BATHING: A LOT
DRESSING REGULAR LOWER BODY CLOTHING: A LOT
DRESSING REGULAR UPPER BODY CLOTHING: A LITTLE
DRESSING REGULAR UPPER BODY CLOTHING: A LITTLE
DAILY ACTIVITIY SCORE: 18
WALKING IN HOSPITAL ROOM: A LOT
STANDING UP FROM CHAIR USING ARMS: A LOT
TOILETING: A LITTLE
DRESSING REGULAR LOWER BODY CLOTHING: A LOT
HELP NEEDED FOR BATHING: A LOT
DAILY ACTIVITIY SCORE: 18
MOVING TO AND FROM BED TO CHAIR: A LITTLE
CLIMB 3 TO 5 STEPS WITH RAILING: TOTAL
WALKING IN HOSPITAL ROOM: A LOT
STANDING UP FROM CHAIR USING ARMS: A LOT
CLIMB 3 TO 5 STEPS WITH RAILING: TOTAL
TOILETING: A LITTLE
MOBILITY SCORE: 16
MOVING TO AND FROM BED TO CHAIR: A LITTLE
MOBILITY SCORE: 16

## 2025-03-10 ASSESSMENT — PAIN SCALES - GENERAL
PAINLEVEL_OUTOF10: 4
PAINLEVEL_OUTOF10: 0 - NO PAIN
PAINLEVEL_OUTOF10: 7
PAINLEVEL_OUTOF10: 7
PAINLEVEL_OUTOF10: 4

## 2025-03-10 ASSESSMENT — PAIN DESCRIPTION - DESCRIPTORS: DESCRIPTORS: ACHING

## 2025-03-10 NOTE — PROGRESS NOTES
"JR RN Note:  Called to see pt for increase in agitation, refusal of care and change in mental status. Pt had been cooperative and largely pleasant per RN report. Became agitated, fixated on leaving and refusing care. PRN Seroquel given (see emar) Prior to my arrival. Appears to have had good effect as pt is more cooperative, less agitated and able to reality test with her primary RN. Mental status is fluctuating throughout the day.     Recommend clustering care to avoid frequent disruption, monitor bowel movements regularly, maintain on elopement precautions (Gown only, no street clothes) and treat pain. See additional delirium recommendations below.    Consider consult to psychiatry or geriatrics for management of agitation possible delirium.      Please consider the following general measures for minimizing delirium in a hospitalized patient:   - Bright lights during the day, keeps blinds up, switch all lights on   - Avoid disturbances at night. Encourage at least 6 hours uninterrupted sleep.   - Consider d/c 4am vitals check  - Frequent orientation to time and place by the staff   - Out of bed to chair few hours everyday  - Encourage stimulating activities during the day if possible  - Encourage family/friend at bedside and consider pictures of same to aid with reorientation    JR RN can be made available around the clock for bedside support. Contact us through [employer] secure chat under \"JR\"       "

## 2025-03-10 NOTE — OP NOTE
ENDARTERECTOMY, LOWER EXTREMITY, EXTERNAL ILIAC STNT, IVUS AND WOUND VAC PLACEMENT (L), ANGIOGRAM, LOWER EXTREMITY (L) Operative Note     Date: 3/6/2025  OR Location: [facility] OR    Name: Myrna Khan, : 1960, Age: 65 y.o., MRN: [id number], Sex: female    Diagnosis  Pre-op Diagnosis      * Dry gangrene (Multi) [I96]     * PAD (peripheral artery disease) (CMS-HCC) [I73.9] Post-op Diagnosis     * Dry gangrene (Multi) [I96]     * PAD (peripheral artery disease) (CMS-[facility]) [I73.9]     Procedures  ENDARTERECTOMY, LOWER EXTREMITY, EXTERNAL ILIAC STNT, IVUS AND WOUND VAC PLACEMENT  12711 - [place] TEAEC W/WO PATCH GRAFT COMMON FEMORAL    ENDARTERECTOMY, LOWER EXTREMITY, EXTERNAL ILIAC STNT, IVUS AND WOUND VAC PLACEMENT  12758 - [place] TEAEC W/GRAFT SUPERFICIAL FEMORAL ARTERY    ENDARTERECTOMY, LOWER EXTREMITY, EXTERNAL ILIAC STNT, IVUS AND WOUND VAC PLACEMENT  63899 - [place] REVSC OPN/PRQ FEM/POP W/ATHRC/ANGIOP SM VSL    ANGIOGRAM, LOWER EXTREMITY  97236 - CHG ANGIOGRAPHY EXTREMITY UNILATERAL RS&I      Surgeons      * Mrinalini Pederson - Primary    Resident/Fellow/Other Assistant:  Surgeons and Role:     * Raghavendra Ramsay MD - Assisting     * Lorenzo Bello MD - Fellow    Staff:   Circulator: Tyler  Scrub Person: Petra  Circulator: Doug  Scrub Person: Josee Bosch Circulator: Vito  Relief Scrub: Vito  Relief Circulator: Marisela  Relief Circulator: Dhara  Relief Scrub: Yevgeniy    Anesthesia Staff: Anesthesiologist: Daniele Rojas MD; Arlene Juarez MD; Zay Joya MD; Holly Li MD  C-AA: CAMILA Hendrickson  Anesthesia Resident: Sandra Laird DO; Soraya Remy DO; January Mon MD    Procedure Summary  Anesthesia: General  ASA: III  Estimated Blood Loss: 500 mL  Intra-op Medications:   Administrations occurring from 1123 to 2335 on 25:   Medication Name Total Dose   iodixanol (VISIPaque) 320 mg iodine/mL injection 80 mL   atorvastatin (Lipitor) tablet 80 mg Cannot be calculated    carvedilol (Coreg) tablet 25 mg Cannot be calculated   dextrose 50 % injection 25 g Cannot be calculated   ertapenem (INVanz) 1 g in sodium chloride 0.9% IV 50 mL Cannot be calculated   glucagon (Glucagen) injection 1 mg Cannot be calculated   hydrALAZINE (Apresoline) tablet 25 mg Cannot be calculated   insulin lispro injection 0-5 Units Cannot be calculated   isosorbide mononitrate ER (Imdur) 24 hr tablet 60 mg Cannot be calculated   melatonin tablet 10 mg Cannot be calculated   NIFEdipine ER (Adalat CC) 24 hr tablet 90 mg Cannot be calculated   pantoprazole (ProtoNix) injection 40 mg Cannot be calculated   polyethylene glycol (Glycolax, Miralax) packet 17 g Cannot be calculated   QUEtiapine (SEROquel) tablet 25 mg Cannot be calculated   ranolazine (Ranexa) 12 hr tablet 500 mg Cannot be calculated   vancomycin (Vancocin) pharmacy to dose - pharmacy monitoring Cannot be calculated   acetaminophen (Tylenol) tablet 650 mg Cannot be calculated   aspirin EC tablet 81 mg Cannot be calculated   ceFAZolin (Ancef) 1 g 6 g   clopidogrel (Plavix) tablet 75 mg Cannot be calculated   dexAMETHasone (Decadron) injection 4 mg/mL 4 mg   fentaNYL (Sublimaze) injection 50 mcg/mL 50 mcg   heparin (porcine) injection 5,000 Units Cannot be calculated   heparin injection 1,000 units/mL 21,000 Units   hydrALAZINE (Apresoline) tablet 50 mg Cannot be calculated   HYDROmorphone (Dilaudid) injection 0.4 mg 0.4 mg   HYDROmorphone (Dilaudid) injection 1 mg/mL 1 mg   HYDROmorphone PF (Dilaudid) injection 0.2 mg 0.4 mg   insulin glargine (Lantus) injection 7 Units Cannot be calculated   insulin lispro injection 5 Units Cannot be calculated   lactated Ringer's infusion Cannot be calculated   LR infusion Cannot be calculated   lidocaine (Xylocaine) injection 1 % 80 mg   magnesium sulfate IV 2 g/50 mL water (premix) 2 g   metoprolol 5 mg/5 mL 5 mg   OLANZapine (ZyPREXA) injection 5 mg Cannot be calculated   ondansetron (Zofran) 2 mg/mL injection 4  mg   oxyCODONE (Roxicodone) immediate release tablet 5 mg Cannot be calculated   oxygen (O2) therapy 138 L   phenylephrine (Gerardo-Synephrine) injection 240 mcg   propofol (Diprivan) injection 10 mg/mL 50 mg   protamine injection 40 mg   rocuronium (ZeMuron) 50 mg/5 mL injection 100 mg   sugammadex (Bridion) 200 mg/2 mL injection 200 mg   vancomycin (Vancocin) 1,250 mg in sodium chloride 0.9% 250 mL IV Cannot be calculated              Anesthesia Record               Intraprocedure I/O Totals          Intake    Magnesium Sulfate 0.00 mL    The total shown is the total volume documented since Anesthesia Start was filed.    PRBC 700.00 mL    LR infusion 3500.00 mL    Total Intake 4200 mL       Output    Urine 715 mL    Est. Blood Loss 600 mL    Total Output 1315 mL       Net    Net Volume 2885 mL          Specimen:   ID Type Source Tests Collected by Time   1 : LEFT FEMORAL PLAQUE Tissue PLAQUE SURGICAL PATHOLOGY EXAM Bobby Pederson MD 3/6/2025 1424                 Drains and/or Catheters:   Urethral Catheter 18 Fr. (Active)   Site Assessment Clean;Skin intact 03/10/25 0224   Collection Container Standard drainage bag 03/07/25 2107   Securement Method Securing device (Describe) 03/07/25 2107   Reason for Continuing Urinary Catheterization acute urinary retention 03/09/25 1217   Output (mL) 1800 mL 03/10/25 0600       Tourniquet Times:         Implants:  Implants       Type Name Action Serial No.      Implant GRAFT, XENOSURE, BIOLOGIC PATCH, 0.8CM X 8CM - [id number] Implanted      Implant PATCH, FELT, 1 X 6 IN, EPTFE - [id number] Used, Not Implanted      Stent STENT GRAFT, ENDOPROSTHESIS, VIABAHN, W/MARKERS,HEPARIN, 7 X 10 X 120 - [id number] Implanted [id number]     Stent STENT, ENDOPROSTHESIS, VIABAHN, W/HEPARIN, 8 X 7.5 X 120 - [id number] Implanted [id number]     Stent STENT GRAFT, ENDOPROSTHESIS, VIABAHN, W/MARKERS,HEPARIN, 8 X 5 X 120 - [id number] Wasted [id number]     Stent STENT SYSTEM, EVERFLEX, 8 X 40 X 80CM -  [id number] Implanted               Findings: Severe atherosclerotic occlusive disease of the distal left common femoral artery and proximal left deep femoral arteries.    Indications: Myrna Khan is an 65 y.o. female with hx of HTN, HLD, DM, PAD s/p L SFA stent now with suspected combination of chronic venous stasis ulceration with severe poor wound healing due to severe underlying arterial insufficiency. Patient's ankle is fixed and she is unable to move her toes but her sensation in intact. Unfortunately, she has circumferential areas of dry gangrene of her ankle and lower calf.  CTA showed occlusion of the left external iliac artery and left SFA stent.  Left lower extremity angiogram showed left external iliac artery occlusion with reconstitution of the left common femoral artery which had a short segment occlusion just above the bifurcation.  The left SFA stent was occluded.  As a result, a left femoral endarterectomy with iliac stent and possible SFA recanalization was recommended.  Given patient's delirium, ethics was consulted.  I had already discussed with the patient during her clinic visit regarding the need for left lower extremity revascularization with the possibility of even requiring a left above-knee amputation.  Patient was agreeable at that time.  However, given patient's worsening delirium prior to this procedure, attempts were made to contact her family members which were unsuccessful.  As a result met medical ethics was consulted who determined that it would be in the patient's best interest to proceed with revascularization for possible limb salvage, especially given that a discussion was had regarding the possibilities when patient was alert and oriented prior to the hospitalization.    The patient was seen in the preoperative area. The risks, benefits, complications, treatment options, non-operative alternatives, expected recovery and outcomes were discussed with the patient. The  possibilities of reaction to medication, pulmonary aspiration, injury to surrounding structures, bleeding, recurrent infection, the need for additional procedures, failure to diagnose a condition, and creating a complication requiring transfusion or operation were discussed with the patient. The patient concurred with the proposed plan, giving informed consent.  The site of surgery was properly noted/marked if necessary per policy. The patient has been actively warmed in preoperative area. Preoperative antibiotics have been ordered and given within 1 hours of incision. Venous thrombosis prophylaxis are not indicated.    Procedure Details:     The patient was taken to the operating room and placed in supine position position.  General anesthesia was then induced.  A timeout was then performed to verify the patient, procedure, site prior to beginning.  The patient was then prepped and draped in the usual sterile fashion.    A longitudinal incision was made over the left groin.  The inguinal ligament was identified and mobilized.  The underlying common femoral artery was dissected free and controlled with Vesseloops as well as the superficial femoral and deep femoral arteries.  The dissection was extended more proximally and the distal external iliac artery was dissected free and controlled with a vessel loop.  At this time.  The patient was systemically heparinized to an ACT of greater than 250.  This was maintained throughout the case.  A longitudinal arteriotomy was made over the left common femoral artery and this was extended down to the femoral bifurcation.  The patient had a large fungating cauliflower calcific lesion within the distal common femoral artery which was almost occlusive.  Using sono PET, this lesion was removed.  The patient had ostial disease within the left deep femoral artery; however, there was great backbleeding and an excepted a 4 mm dilator without any difficulty.  As a result, decision was  made to forego any aggressive deep femoral endarterectomy at this time.  A patch angioplasty was then performed using bit bovine pericardial patch and a 6-0 Prolene suture.  Prior to completing the patch angioplasty, all the vessels were backbled and the repair was flushed.  2 repair sutures were needed this adjacent to the proximal profunda.  At this time, using ultrasound guidance, the right common femoral artery was accessed using microwire and micro sheath.  A right femoral angiogram was performed after which a floppy Glidewire was advanced into the aorta.  The micro sheath was then exchanged for a 5 Bolivian sheath and a Omni Flush catheter was advanced into the infrarenal aorta.  An aortogram was performed which showed the occlusion of the left external iliac artery.  Using a micro access kit, the distal left common femoral patch was then accessed and a micro sheath was advanced into the proximal right common femoral artery.  A right femoral angiogram was performed which showed intraluminal access, and an occluded right SFA stent and patent deep femoral artery but with ostial stenosis.  At this time, a command wire was advanced through the micro sheath into the right common iliac artery.  The micro sheath was then upsized to a 5 Bolivian sheath taking care to advance the sheath only up to the proximal right common femoral artery.  Next, a glide cath was advanced into the distal right common iliac artery and an angiogram was performed which confirmed intraluminal access.  At this time, the command was advanced into the infrarenal aorta followed by the glide cath.  Next, a Amplatz wire was advanced into the infrarenal aorta via the left femoral artery and the 5 Bolivian sheath was upsized for a short 7 Bolivian sheath.  Next, serial balloon angioplasties were performed of the left external iliac artery with a 4 mm balloon followed by a 6 mm balloon with appropriate luminal gain.  At this time, the left external iliac  artery was lined with a 7 mm x 10 cm Viabahn stent followed by a 8 mm x 5 cm Viabahn stent distally.  An angiogram was performed at this time which showed a patent left common iliac artery, internal iliac artery, and external iliac artery stent; however, there was a high-grade stenosis of the very distal left external iliac artery.  As a result, a 8 mm by 8 mm self-expanding ever flex stent was deployed within the segment.  The 7 mm Viabahn was postdilated with a 6 mm balloon while the 8 mm Viabahn and ever flex were postdilated with a 7 mm balloon.  A completion angiogram was then performed which showed a widely patent left common iliac artery, left internal iliac artery, and a left external iliac artery stent and left common femoral artery.  There was that persistent stenosis of the left profunda ostium.  At this time, decision was made to reclamp the distal external iliac artery self-expanding stent to perform a endarterectomy of the left deep femoral artery given the high-grade stenosis noted on the angiogram.  The distal external iliac artery was clamped with a Lyric Hydro .  The deep femoral artery and its first-order branches were dissected free and controlled with Vesseloops.  At this time, the patch was opened distally until our access site within the patch for the sheath.  The patch was divided to this site.  The arteriotomy was then extended onto the profunda femoris artery to the level of the first bifurcation.  Next, a endarterectomy was performed of the deep femoral artery and the common femoral artery using a Bayard elevator.  Tacking sutures were placed along the distal aspect of the endarterectomy to prevent any possible dissection.  This was done so using 7-0 Prolene sutures.  There was excellent backbleeding from both of the profunda branches.  At this time, we began to perform a patch angioplasty starting at the apex within the deep femoral artery using a 6-0 Prolene suture.  This was  extended until the previously divided patch was encountered.  The suture from the profunda patch angioplasty was then tied with the Prolene suture from the previous proximal common femoral femoral patch on both sides.  Lastly, the patch angioplasty was completed by suturing the 2 segments of the patch together using a 6-0 Prolene suture.  Prior to completing the patch angioplasty all the vessels were flushed.  No repair sutures were needed.  At this time, a left iliac angiogram was performed through the Omni Flush catheter through the right groin.  This again showed a widely patent left common iliac artery, left internal iliac artery, and left external iliac artery stent with now a patent left common femoral artery and deep femoral artery without any ostial stenosis.  However, there was a short segment of stenosis within the distal noncovered self-expanding stent.  Given that this segment was clamped for the deep femoral endarterectomy, I had concerns that the stent may have been partially crushed.  As a result, a micro access sheath was advanced into the distal left common femoral patch and balloon angioplasty was performed of the stent with a 7 mm balloon.  However, there was persistent stenosis in the segment.  As a result, a 8 Albanian sheath was advanced into the left common femoral artery and IVUS evaluation of the left external iliac artery stent was performed over a command wire.  This showed that there was 50% stenosis of the distal external iliac artery stent due to calcific atherosclerotic burden and recent clamp.  As a result, a prolonged balloon angioplasty was performed with an 8 mm balloon for 2 minutes within this segment.  An IVUS evaluation at this time showed significant improvement in the stenosis within the segment with likely a 15 to 20% stenosis remaining.  The remaining external iliac artery stents were widely patent as was the left common femoral artery.  At this time, the 8 Albanian sheath was  removed and the arteriotomy was repaired with a 6-0 Prolene suture.  Care was taken not to reclamped the external iliac artery during this repair.  An antegrade angiogram through the right groin and Omni Flush catheter showed patency of the left iliac vessels without any hemodynamic significant stenosis noted within the distal left external iliac artery.  At this time, protamine was administered after which the right common femoral 5 Polish sheath was removed and pressure was held for 20 minutes with excellent hemostasis.  Hemostasis was also achieved within the left groin.  Fibrillar and Floseal were placed for additional hemostasis and the left groin was closed in multiple layers using 2-0 and 3-0 Vicryl suture.  The skin was closed with Monocryl after which a Prevena wound VAC was applied.  The patient tolerated the procedure well and was extubated without any difficulties.  She had dopplerable DP/PT signals on the left and PT signals on the right.  She was taken to PACU in stable condition.  I was present for the entirety of the case.      Complications:  None; patient tolerated the procedure well.    Disposition: PACU - hemodynamically stable.  Condition: stable       Attending Attestation: I was present and scrubbed for the entire procedure.    Bobby Pederson  Phone Number: [phone]

## 2025-03-10 NOTE — NURSING NOTE
Pt calling out multiple times, when nurse enters pt's room to answer call light, the pt appears agitated, restless, pulling off telemetry leads, stated she was at home, attempts to reorient pt met with increased agitation pt refused dressing change.  Medicated with prn seroquel per order.  Pt has had multiple small hard stools.  Incontinent care provided.

## 2025-03-10 NOTE — PROGRESS NOTES
Social Work Note  Treatment Plan :  SW discussed with medical team. Per vascular surgery no barriers from their standpoint, but still needs a final recommendation for pt's antibiotics from ID.   - Additional information : Pt is from [facility], long-term care resident, and bed-hold. SW contacted the facility, and they can accept pt back when she is ready. No need of precert.   - Payor : [employer]   - Planned Disposition : [facility]  - Barrier to discharge : None noted at this time.     [facility] requests updated PT/OT notes. OT note is attached, and PT updates is requested on white board. SW will continue to follow and assist.     Lindsay Cummings, PERCYA, LSW

## 2025-03-10 NOTE — CARE PLAN
The patient's goals for the shift include pain relief    The clinical goals for the shift include safety, hds, pain control      Problem: Pain - Adult  Goal: Verbalizes/displays adequate comfort level or baseline comfort level  Outcome: Progressing     Problem: Safety - Adult  Goal: Free from fall injury  Outcome: Progressing     Problem: Discharge Planning  Goal: Discharge to home or other facility with appropriate resources  Outcome: Progressing     Problem: Chronic Conditions and Co-morbidities  Goal: Patient's chronic conditions and co-morbidity symptoms are monitored and maintained or improved  Outcome: Progressing     Problem: Nutrition  Goal: Nutrient intake appropriate for maintaining nutritional needs  Outcome: Progressing     Problem: Skin  Goal: Decreased wound size/increased tissue granulation at next dressing change  Outcome: Progressing  Goal: Participates in plan/prevention/treatment measures  Outcome: Progressing  Goal: Prevent/manage excess moisture  Outcome: Progressing  Goal: Prevent/minimize sheer/friction injuries  Outcome: Progressing  Goal: Promote/optimize nutrition  Outcome: Progressing  Flowsheets (Taken 3/10/2025 0019)  Promote/optimize nutrition:   Offer water/supplements/favorite foods   Consume > 50% meals/supplements   Monitor/record intake including meals  Goal: Promote skin healing  Outcome: Progressing     Problem: Diabetes  Goal: Achieve decreasing blood glucose levels by end of shift  Outcome: Progressing  Goal: Increase stability of blood glucose readings by end of shift  Outcome: Progressing  Goal: Decrease in ketones present in urine by end of shift  Outcome: Progressing  Goal: Maintain electrolyte levels within acceptable range throughout shift  Outcome: Progressing  Goal: Maintain glucose levels >70mg/dl to <250mg/dl throughout shift  Outcome: Progressing  Goal: No changes in neurological exam by end of shift  Outcome: Progressing  Goal: Learn about and adhere to nutrition  recommendations by end of shift  Outcome: Progressing  Goal: Vital signs within normal range for age by end of shift  Outcome: Progressing  Goal: Increase self care and/or family involovement by end of shift  Outcome: Progressing  Goal: Receive DSME education by end of shift  Outcome: Progressing     Problem: Pain  Goal: Takes deep breaths with improved pain control throughout the shift  Outcome: Progressing  Goal: Turns in bed with improved pain control throughout the shift  Outcome: Progressing  Goal: Walks with improved pain control throughout the shift  Outcome: Progressing  Goal: Performs ADL's with improved pain control throughout shift  Outcome: Progressing  Goal: Participates in PT with improved pain control throughout the shift  Outcome: Progressing  Goal: Free from opioid side effects throughout the shift  Outcome: Progressing  Goal: Free from acute confusion related to pain meds throughout the shift  Outcome: Progressing     Problem: Heart Failure  Goal: Improved gas exchange this shift  Outcome: Progressing  Goal: Improved urinary output this shift  Outcome: Progressing  Goal: Reduction in peripheral edema within 24 hours  Outcome: Progressing  Goal: Report improvement of dyspnea/breathlessness this shift  Outcome: Progressing  Goal: Weight from fluid excess reduced over 2-3 days, then stabilize  Outcome: Progressing  Goal: Increase self care and/or family involvement in 24 hours  Outcome: Progressing     Problem: Fall/Injury  Goal: Not fall by end of shift  Outcome: Progressing  Goal: Be free from injury by end of the shift  Outcome: Progressing  Goal: Verbalize understanding of personal risk factors for fall in the hospital  Outcome: Progressing  Goal: Verbalize understanding of risk factor reduction measures to prevent injury from fall in the home  Outcome: Progressing  Goal: Use assistive devices by end of the shift  Outcome: Progressing  Goal: Pace activities to prevent fatigue by end of the  shift  Outcome: Progressing

## 2025-03-10 NOTE — CARE PLAN
The patient's goals for the shift include patient pain will be controled during shift    The clinical goals for the shift include safety, hds, pain control

## 2025-03-10 NOTE — PROGRESS NOTES
Myrna Khan is a 65 y.o. female on day 9 of admission presenting with Dry gangrene (Multi).    Progress Note:       Patient still complaining of poor pain control.  Asking for pain medications frequently.  Easily redirected.  Had a meeting at the bedside along with a packet of skittles candy and 2 bags of potato chips (which will help her diabetic control).       Patient seen during early afternoon rounds and foot unwrapped and examined.  Vaseline gauze has been previously applied.  Shower dark well-demarcated ulcerative areas.  No foul odor, no obvious purulence.   to palpation.  Mild adjacent erythema particularly on the medial aspect of the right foot.       3/6/2025 revascularization with left iliac stent and common femoral artery endarterectomy (and some patch grafting-but not sure of material used; ? bovine  ? Or PTFE).  Will need to check with vascular 3/10/25    Assessment/Plan   Myrna Khan is a 65 y.o. female with a PMHx of HTN, HLD, T2DM (A1C 14.6 1/2025), PAD s/p L SFA stent, HFimpEF (55%), CAD s/p CABG in 2023 on Plavix, carotid artery stenosis (August 2023 US), renal artery stenosis (September 2023 US) who presented to the ED on 2/28 with dry gangrene of LLE due to chronic venous stasis ulceration w/  poor wound healing due to arterial insufficiency. Started on cefepime and vancomycin on 2/28 but cefepime discontinued due to concern for neurotoxicity on 3/03. Currently on vancomycin and ertapenem.      Unable to see patient today as patient was in the OR.  Will see tomorrow. Currently unable to comment on antibiotic duration until determine if podiatry ends up debriding wounds following vascular intervention.      3/07/25 update:  LLE now revascularized wit left iliac stent and left CFA endarterectomy. Will wait to see what if any intervention podiatry plans. For now, continue vancomycin and ertapenem.      03/09/2025 update:  Minimally vascularized.  Stable appearing superficial  extensive wounds.  Awaiting MRSA culture from anterior nares.   no specific pathogen at this time.  Would have to treat with broad-spectrum antibiotics.  Patient has amoxicillin allergy which limits the use of Augmentin.  Can consider oral agents but will advise once negative MRSA screen completed.       WILL ADVISE ON ORAL REGIMEN        Discharge planning to rehab underway      Recommendations:  1) Continue vancomycin, dosed with pharmacy support  2) Continue ertapenem 1g IV q24h  3) soft tissue infection seems rather superficial.  No concern for deep bony involvement/osteomyelitis at this time.    4) consider considering oral regimen but awaiting MRSA screen  5) discharge planning underway     Regarding discharge:  -  ID team will request 6-week follow-up appointment with Josee Burdick PA-C and  [facility]    -After discharge patient will need weekly CBC plus differential, CRP and BMP with results faxed to Josee Buridck PA-C at [phone]      Kenny Perkins MD  ID STAFF  I spent 60 minutes in the professional and overall care of this patient.

## 2025-03-10 NOTE — PROGRESS NOTES
"Myrna Khan is a 65 y.o. female on day 10 of admission presenting with Dry gangrene (Multi).    Subjective   Lying in bed. No distress.  Patient reports still has pain with antibiotic touching her left leg.  Does not recall having a Malone catheter while at the nursing home prior to this hospitalization.  Patient has no other complaints.    Objective     General: Lying in bed without distress.  Cooperative.  Skin: Left lower extremity currently wrapped in Kerlix.  Dry gangrene underneath the dressing.  HEENT: Sclera is white.  Mucous membranes moist.  Cardiac: Regular rate and rhythm, S1/S2 normal.  Lungs: Mildly diminished airflow to auscultation bilaterally, no wheezing or crackles, no accessory muscle use at rest.  Abdomen: Soft, nontender, nondistended, BS +  Extremities: Currently has Kerlix dressing on left leg.  Mild lower extremity edema of left leg.  Denuded skin on dorsal surface and erythema and dry gangrene.  Neurologic: Alert and oriented to self, to place, to time, not to situation.  Psychiatric: Pleasant currently, no agitation.    Last Recorded Vitals  Blood pressure 102/68, pulse 94, temperature 36.2 °C (97.2 °F), temperature source Temporal, resp. rate 16, height 1.6 m (5' 3\"), weight 58.7 kg (129 lb 6.6 oz), SpO2 93%.  On room air.    Intake/Output last 3 Shifts:  I/O last 3 completed shifts:  In: 1080 (18.4 mL/kg) [P.O.:480; I.V.:50 (0.9 mL/kg); IV Piggyback:550]  Out: 2350 (40 mL/kg) [Urine:2350 (1.1 mL/kg/hr)]  Weight: 58.7 kg     Relevant Results  acetaminophen, 650 mg, oral, q6h BOLIVAR  atorvastatin, 80 mg, oral, Nightly  carvedilol, 25 mg, oral, BID  clopidogrel, 75 mg, oral, Daily  ertapenem, 1 g, intravenous, q24h  hydrALAZINE, 50 mg, oral, TID  insulin glargine, 10 Units, subcutaneous, Nightly  insulin lispro, 0-5 Units, subcutaneous, TID AC  insulin lispro, 2 Units, subcutaneous, TID AC  isosorbide mononitrate ER, 60 mg, oral, Daily  melatonin, 10 mg, oral, Nightly  NIFEdipine ER, 90 " mg, oral, Daily before breakfast  pantoprazole, 40 mg, intravenous, Daily  ranolazine, 500 mg, oral, BID  rivaroxaban, 2.5 mg, oral, BID  [START ON 3/11/2025] vancomycin, 1,250 mg, intravenous, q24h           PRN medications: dextrose, glucagon, hydrALAZINE, hydrOXYzine HCL, naloxone, ondansetron ODT **OR** ondansetron, oxyCODONE, polyethylene glycol, QUEtiapine, vancomycin     Results for orders placed or performed during the hospital encounter of 02/28/25 (from the past 24 hours)   POCT GLUCOSE   Result Value Ref Range    POCT Glucose 209 (H) 74 - 99 mg/dL   POCT GLUCOSE   Result Value Ref Range    POCT Glucose 97 74 - 99 mg/dL   Vancomycin   Result Value Ref Range    Vancomycin 44.3 (H) 5.0 - 20.0 ug/mL   CBC and Auto Differential   Result Value Ref Range    WBC 15.2 (H) 4.4 - 11.3 x10*3/uL    nRBC 0.0 0.0 - 0.0 /100 WBCs    RBC 3.03 (L) 4.00 - 5.20 x10*6/uL    Hemoglobin 9.1 (L) 12.0 - 16.0 g/dL    Hematocrit 27.1 (L) 36.0 - 46.0 %    MCV 89 80 - 100 fL    MCH 30.0 26.0 - 34.0 pg    MCHC 33.6 32.0 - 36.0 g/dL    RDW 15.0 (H) 11.5 - 14.5 %    Platelets 382 150 - 450 x10*3/uL    Neutrophils % 67.9 40.0 - 80.0 %    Immature Granulocytes %, Automated 1.8 (H) 0.0 - 0.9 %    Lymphocytes % 22.6 13.0 - 44.0 %    Monocytes % 6.5 2.0 - 10.0 %    Eosinophils % 0.7 0.0 - 6.0 %    Basophils % 0.5 0.0 - 2.0 %    Neutrophils Absolute 10.31 (H) 1.20 - 7.70 x10*3/uL    Immature Granulocytes Absolute, Automated 0.27 0.00 - 0.70 x10*3/uL    Lymphocytes Absolute 3.43 1.20 - 4.80 x10*3/uL    Monocytes Absolute 0.99 0.10 - 1.00 x10*3/uL    Eosinophils Absolute 0.11 0.00 - 0.70 x10*3/uL    Basophils Absolute 0.07 0.00 - 0.10 x10*3/uL   Renal Function Panel   Result Value Ref Range    Glucose 86 74 - 99 mg/dL    Sodium 136 136 - 145 mmol/L    Potassium 3.6 3.5 - 5.3 mmol/L    Chloride 106 98 - 107 mmol/L    Bicarbonate 24 21 - 32 mmol/L    Anion Gap 10 10 - 20 mmol/L    Urea Nitrogen 14 6 - 23 mg/dL    Creatinine 0.80 0.50 - 1.05 mg/dL     eGFR 82 >60 mL/min/1.73m*2    Calcium 8.2 (L) 8.6 - 10.6 mg/dL    Phosphorus 3.0 2.5 - 4.9 mg/dL    Albumin 2.5 (L) 3.4 - 5.0 g/dL   POCT GLUCOSE   Result Value Ref Range    POCT Glucose 120 (H) 74 - 99 mg/dL   Vascular US ankle brachial index (DENISE) without exercise   Result Value Ref Range    BSA 1.62 m2   POCT GLUCOSE   Result Value Ref Range    POCT Glucose 176 (H) 74 - 99 mg/dL      Vascular US ankle brachial index (DENISE) without exercise    Result Date: 3/10/2025  Preliminary Cardiology Report           [place] [address]   Tel [phone] and Fax [phone]            Preliminary Vascular Lab Report  VASC US ANKLE BRACHIAL INDEX (DENISE) WITHOUT EXERCISE  Patient Name:      TANNER LANDON Rodriguez Physician:  [id number] Inna Ureña MD Study Date:        3/10/2025        Ordering Physician: [id number] KHANG JULIO MRN/PID:           [id number]         Technologist:       Danisha Mansfield T Accession#:        [id number]     Technologist 2: Date of Birth/Age: 1960        Encounter#:         [id number] Gender:            F Admission Status:  Inpatient        Location Performed: [facility]  Diagnosis/ICD: Peripheral vascular disease, unspecified-I73.9 Procedure/CPT: 05650 Peripheral artery DENISE Only  PRELIMINARY CONCLUSIONS: Right Lower PVR: No evidence of arterial occlusive disease in the right lower extremity at rest. Normal digital perfusion noted. Multiphasic flow is noted in the right common femoral artery, right posterior tibial artery and right dorsalis pedis artery. Left Lower PVR: Evidence of moderate arterial occlusive disease in the left lower extremity at rest. Decreased digital perfusion noted. Monophasic flow is noted in the left common femoral artery, left posterior tibial artery and left dorsalis pedis artery. Partially non-compressible vessels. Severity determined by PVR  tracings and doppler waveforms.  Imaging & Doppler Findings:  RIGHT Lower PVR                Pressures Ratios Right Posterior Tibial (Ankle) 149 mmHg  1.59 Right Dorsalis Pedis (Ankle)   169 mmHg  1.80 Right Digit (Great Toe)        59 mmHg   0.63   LEFT Lower PVR                Pressures Ratios Left Posterior Tibial (Ankle) 67 mmHg   0.71 Left Dorsalis Pedis (Ankle)   152 mmHg  1.62 Left Digit (Great Toe)        0 mmHg    0.00                      Right   Left Brachial Pressure 94 mmHg 94 mmHg   VASCULAR PRELIMINARY REPORT completed by Danisha Mansfield RVT on 3/10/2025 at 2:35:42 PM  ** Final **     FL fluoro images no charge    Result Date: 3/6/2025  These images are not reportable by radiology and will not be interpreted by  Radiologists.    Hospital Course:  Myrna Khan is a 65 y.o. 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 Pt noting skin sloughing, edema, clear drainage. At op visit, difficulty palpating pulse, had a monophasic AT and PT signals on the LLE and no palpable femoral pulse. CTA showed occlusion of common and external iliac arteries in addition to occluded SFA stent with distal reconstruction. Podiatry consulted, recommended no interventions. Vascular surgery consulted. Pt is started on broad spectrum abx and ordered pvr and vein mapping. S/p left femoral and profunda endarterectomy, left external iliac artery stent 3/6 with vascular surgery.  Pt developed urinary retention and Malone was placed.  Voiding trial done on 3/7 and patient failed and Malone had to be replaced.  MRSA nares swab done on 3/8.     Assessment and plan:     Superimposed cellulitis left lower extremity  Dry gangrene  left foot  LLE wounds with CLI  -CT showing common and external iliac arteries occlusion + SFA occlusion with reconstitution of her L popliteal artery and intact tibial runoff   -S/P LLE Angiogram with left EIA occlusion, L SFA stent occlusion,   -Medical ethics involved for decision making and supported revascularization   -S/p left femoral and profunda endarterectomy, left external iliac artery stent 3/6 with vascular surgery.  Per vascular surgery patient is okay for discharge from their standpoint, prior to discharge vascular surgery should be notified so they can remove wound VAC.  -Pain is improved as long as no one touches her leg, but patient appears to have significant reaction to anyone touching her leg.  -Continue Xarelto 2.5mg BID   -Podiatry consulted - no urgent intervention  -Currently on IV ertapenem and pharmacy to dose vancomycin.  -Plavix 75mg daily, discontinued aspirin per vascular surgery recs   -wound care instruction per podiatry  -MRSA Swab from Anterior Nares shows MRSA.  -Dr. Khanna's podiatry follow-up requested   -Podiatry updated wound care to Cleans with saline, apply adaptic, betadine paint, ABD, kerlix, tape but avoid tape to skin.  -Per [facility] patient's facility is ready to receive the patient back and no pre-CERT needed.  -Currently waiting on infectious disease recommendation for antibiotics.     Acute urinary retention  -No Malone catheter prior to this admission.  -Placed initially during surgery.  -Failed voiding trial on 3/7 with significant reported retention and Malone catheter had to be replaced.  -Recommend patient needs to have improvement in mobility prior to doing another voiding trial to help improve chances of success.     Cystic ovarian mass  -GYN recommend OP clinic appt      Delirium  -Have been reported initially thought to be related to urine retention.  Had previously been reported to be resolved.  -Appeared to happen again this morning per bedside nursing.  Improved  with significant agitation that appeared to improve with Seroquel.  -During my conversation with patient today appears to be calm, oriented to self, place, time but not to situation.  -For now Seroquel 25 mg nightly with hold parameters for lethargy, with another 25 mg daily as needed if agitated during the daytime.     HTN  Chronic HFimpEF (EF 55% July/2024, 35-40% in Aug/2023)  -Blood pressure appears to fluctuate, a little bit more uncontrolled this morning but this might have been during the agitation.  Now that she appears calm last blood pressures 102/68, so we will just continue to monitor for now.  -MRI and NM PYP negative for amyloid,  SPEP and UPEP negative  -continue Carvedilol, Imdur, increased Hydralazine to 75 mg TID, continue Nifedipine 90 mg daily  -OP cardiology FU      CAD s/p CABG in 2023 (LIMA-LAD, APRIL-OM)  -Continue Plavix and statin.  Continue beta-blocker.  Continue Ranexa.  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  -Currently fasting and postprandial glucose appears to be controlled today.    Disposition: Medically ready from vascular surgery standpoint, long-term facility can take patient back, waiting on infectious disease recommendations for antibiotic management.      Brunilda Wang MD

## 2025-03-10 NOTE — PROGRESS NOTES
VASCULAR SURGERY PROGRESS NOTE  Assessment/Plan   Myrna Khan is 65 y.o. female with history of carotid stenosis, diabetes, CAD, HTN, PAD and renal artery stenosis who presents with extensive LLE wounds and intermittent rest pain.     S/P LLE Angiogram with left EIA occlusion, reconstitutes at distal EIA with CFA short-segment occlusion vs severe stenosis, L SFA stent occlusion, distal SFA reconstitution, patent popliteal and AT runoff to the foot on 3/2/25. Medical ethics involved for decision making due to poor mental status. 3/6 s/p left femoral and profunda endarterectomy, left external iliac artery stent    Has remained stable pending discharge.     Plan:  Continue Xarelto 2.5mg BID   Podiatry consulted - no urgent intervention  Antibiotics per ID/primary - no need for long term suppressive antibiotics from vascular standpoint  Plavix 75mg daily, discontinued aspirin  Okay for discharge from vascular perspective, will schedule for 2 week follow up with Dr. Pederson  Additional care per primary team    D/w attending, Dr. Bg Momin MD  Vascular Surgery PGY6  Team Pager: [phone]      Subjective   No overnight issues reported. Denies chest pain, SOB. Reports comfortable in bed but does not want to be moved to examine.    Objective   Vitals:  Heart Rate:  [79-89]   Temp:  [36.5 °C (97.7 °F)-37.2 °C (99 °F)]   Resp:  [16-22]   BP: ()/(58-85)   Weight:  [58.7 kg (129 lb 6.6 oz)]   SpO2:  [91 %-96 %]     Exam:  Constitutional: no acute distress  Skin: warm and dry overall   Cardiac: Regular rate and rhythm  Pulmonary: Unlabored respirations on room air  Abdomen: Non distended, non tender  Extremities: PAINTING. RLE dry. LLE with dry gangrene on the dorsal aspect of her foot to her mid shin, no sensation at that site. Dressing in place.  Surgical wounds: right groin puncture site no hematoma or swelling; left groin surgical site with Prevena in place with good seal   Vascular: bilateral DP/PT  signals    Labs:  Results from last 7 days   Lab Units 03/09/25  1043 03/08/25  0722 03/07/25  0529   WBC AUTO x10*3/uL 14.4* 16.8* 13.7*   HEMOGLOBIN g/dL 7.8* 9.2* 9.2*   PLATELETS AUTO x10*3/uL 323 327 311      Results from last 7 days   Lab Units 03/09/25  1043 03/08/25  0722 03/07/25  0529   SODIUM mmol/L 131* 132* 133*   POTASSIUM mmol/L 4.0 3.7 4.9   CHLORIDE mmol/L 104 103 105   CO2 mmol/L 22 25 21   BUN mg/dL 16 14 16   CREATININE mg/dL 0.79 0.63 0.79   GLUCOSE mg/dL 185* 198* 259*   MAGNESIUM mg/dL  --   --  1.80   PHOSPHORUS mg/dL 2.8 2.4* 4.9      Results from last 7 days   Lab Units 03/06/25 2124   INR  1.3*   PROTIME seconds 14.4*   APTT seconds 37*

## 2025-03-10 NOTE — PROGRESS NOTES
Pharmacy to Dose Vancomycin:  Myrna Khan is a 65 y.o. female ordered pharmacy to dose vancomycin for  diabetic foot infection . Today is day 11 of therapy.  Goal: -600mg/L*hr  Results from last 7 days   Lab Units 03/10/25  0812 03/09/25  1043 03/08/25  0722   BUN mg/dL 14 16 14   CREATININE mg/dL 0.80 0.79 0.63   WBC AUTO x10*3/uL 15.2* 14.4* 16.8*   VANCOMYCIN RM ug/mL 44.3*  --  26.4*      Staph/MRSA Screen Culture   Date/Time Value Ref Range Status   03/08/2025 10:45 AM (A)  Final    Isolated: Methicillin Resistant Staphylococcus aureus (MRSA)     Urine Culture   Date/Time Value Ref Range Status   01/18/2025 06:41 AM >=100,000 CFU/mL Enterococcus faecalis (A)  Final     Blood Culture   Date/Time Value Ref Range Status   08/26/2023 01:50 AM   Final    No Growth at 1 days~No Growth at 2 days~No Growth at 3 days~NO GROWTH at 4 days - FINAL REPORT     Gram Stain   Date/Time Value Ref Range Status   08/31/2023 04:18 AM CANCELED       Comment:     Result canceled by the ancillary.     C. difficile Toxin, PCR   Date/Time Value Ref Range Status   08/28/2023 11:11 AM NOT DETECTED Not Detected Final     Comment:      This assay detects the presence of the tcdB (toxin B)   gene via DNA amplification, and results should be   interpreted in the context of the patients history   and clinical findings. This test cannot be performed   on formed stools or used as a test of cure, and should   not be performed more than once per 7 days.        Susceptibility data from last 90 days.  Collected Specimen Info Organism Ampicillin Ciprofloxacin Levofloxacin Nitrofurantoin Penicillin Trimethoprim/Sulfamethoxazole Vancomycin   03/08/25 Swab from Anterior Nares Methicillin Resistant Staphylococcus aureus (MRSA)          01/18/25 Urine from Clean Catch/Voided Enterococcus faecalis  S  S  S  S  S  R  S     Antibiotics: Invanz (Day 8).  Pertinent Medications: none.  Renal function remains stable.  Patient's current regimen is  predicted to achieve AUC24,ss of 930.    Plan:  Reduce dose to vanco 1.96CG75N.  The above dosing regimen is predicted by [employer] to result in the following pharmacokinetic parameters:  Loading dose: N/A  Regimen: 1250 mg IV every 24 hours.  Start time: 02:24 on 03/11/2025  Exposure target: AUC24 (range)400-600 mg/L.hr   [id number]: 594 mg/L.hr  AUC24,ss: 499 mg/L.hr  Probability of AUC24 > 400: 93 %  Ctrough,ss: 13.5 mg/L  Probability of Ctrough,ss > 20: 7 %  Follow-up level ordered for 3/13 AM unless clinically indicated sooner.  Pharmacy will continue daily vancomycin monitoring. Please contact the pharmacy with any questions.    Thank you,  Art Flowers [facility]

## 2025-03-11 LAB
GLUCOSE BLD MANUAL STRIP-MCNC: 194 MG/DL (ref 74–99)
GLUCOSE BLD MANUAL STRIP-MCNC: 209 MG/DL (ref 74–99)
GLUCOSE BLD MANUAL STRIP-MCNC: 318 MG/DL (ref 74–99)
GLUCOSE BLD MANUAL STRIP-MCNC: 358 MG/DL (ref 74–99)
GLUCOSE BLD MANUAL STRIP-MCNC: 70 MG/DL (ref 74–99)
GLUCOSE BLD MANUAL STRIP-MCNC: 75 MG/DL (ref 74–99)
GLUCOSE BLD MANUAL STRIP-MCNC: 89 MG/DL (ref 74–99)

## 2025-03-11 PROCEDURE — 2500000002 HC RX 250 W HCPCS SELF ADMINISTERED DRUGS (ALT 637 FOR MEDICARE OP, ALT 636 FOR OP/ED): Performed by: STUDENT IN AN ORGANIZED HEALTH CARE EDUCATION/TRAINING PROGRAM

## 2025-03-11 PROCEDURE — 1100000001 HC PRIVATE ROOM DAILY

## 2025-03-11 PROCEDURE — 99233 SBSQ HOSP IP/OBS HIGH 50: CPT | Performed by: STUDENT IN AN ORGANIZED HEALTH CARE EDUCATION/TRAINING PROGRAM

## 2025-03-11 PROCEDURE — 97535 SELF CARE MNGMENT TRAINING: CPT | Mod: GO

## 2025-03-11 PROCEDURE — 2500000001 HC RX 250 WO HCPCS SELF ADMINISTERED DRUGS (ALT 637 FOR MEDICARE OP): Performed by: STUDENT IN AN ORGANIZED HEALTH CARE EDUCATION/TRAINING PROGRAM

## 2025-03-11 PROCEDURE — 2500000005 HC RX 250 GENERAL PHARMACY W/O HCPCS

## 2025-03-11 PROCEDURE — 2500000002 HC RX 250 W HCPCS SELF ADMINISTERED DRUGS (ALT 637 FOR MEDICARE OP, ALT 636 FOR OP/ED): Performed by: INTERNAL MEDICINE

## 2025-03-11 PROCEDURE — 82947 ASSAY GLUCOSE BLOOD QUANT: CPT

## 2025-03-11 PROCEDURE — 2500000004 HC RX 250 GENERAL PHARMACY W/ HCPCS (ALT 636 FOR OP/ED)

## 2025-03-11 PROCEDURE — 2500000004 HC RX 250 GENERAL PHARMACY W/ HCPCS (ALT 636 FOR OP/ED): Performed by: STUDENT IN AN ORGANIZED HEALTH CARE EDUCATION/TRAINING PROGRAM

## 2025-03-11 PROCEDURE — 97530 THERAPEUTIC ACTIVITIES: CPT | Mod: GP

## 2025-03-11 RX ORDER — LINEZOLID 600 MG/1
600 TABLET, FILM COATED ORAL EVERY 12 HOURS SCHEDULED
Status: DISCONTINUED | OUTPATIENT
Start: 2025-03-11 | End: 2025-03-12 | Stop reason: HOSPADM

## 2025-03-11 RX ORDER — CIPROFLOXACIN 500 MG/1
500 TABLET ORAL EVERY 12 HOURS SCHEDULED
Status: DISCONTINUED | OUTPATIENT
Start: 2025-03-11 | End: 2025-03-12 | Stop reason: HOSPADM

## 2025-03-11 RX ORDER — INSULIN LISPRO 100 [IU]/ML
9 INJECTION, SOLUTION INTRAVENOUS; SUBCUTANEOUS ONCE
Status: COMPLETED | OUTPATIENT
Start: 2025-03-11 | End: 2025-03-11

## 2025-03-11 RX ORDER — PANTOPRAZOLE SODIUM 40 MG/1
40 TABLET, DELAYED RELEASE ORAL
Status: DISCONTINUED | OUTPATIENT
Start: 2025-03-11 | End: 2025-03-12 | Stop reason: HOSPADM

## 2025-03-11 RX ADMIN — LINEZOLID 600 MG: 600 TABLET, FILM COATED ORAL at 11:52

## 2025-03-11 RX ADMIN — CARVEDILOL 25 MG: 12.5 TABLET, FILM COATED ORAL at 09:04

## 2025-03-11 RX ADMIN — HYDRALAZINE HYDROCHLORIDE 50 MG: 50 TABLET ORAL at 09:04

## 2025-03-11 RX ADMIN — CIPROFLOXACIN HYDROCHLORIDE 500 MG: 500 TABLET, FILM COATED ORAL at 11:52

## 2025-03-11 RX ADMIN — QUETIAPINE FUMARATE 25 MG: 25 TABLET ORAL at 22:27

## 2025-03-11 RX ADMIN — INSULIN GLARGINE 10 UNITS: 100 INJECTION, SOLUTION SUBCUTANEOUS at 22:28

## 2025-03-11 RX ADMIN — CARVEDILOL 25 MG: 12.5 TABLET, FILM COATED ORAL at 22:28

## 2025-03-11 RX ADMIN — HYDRALAZINE HYDROCHLORIDE 50 MG: 50 TABLET ORAL at 15:14

## 2025-03-11 RX ADMIN — RIVAROXABAN 2.5 MG: 2.5 TABLET, FILM COATED ORAL at 09:04

## 2025-03-11 RX ADMIN — ACETAMINOPHEN 650 MG: 325 TABLET ORAL at 18:03

## 2025-03-11 RX ADMIN — INSULIN LISPRO 1 UNITS: 100 INJECTION, SOLUTION INTRAVENOUS; SUBCUTANEOUS at 15:13

## 2025-03-11 RX ADMIN — RIVAROXABAN 2.5 MG: 2.5 TABLET, FILM COATED ORAL at 18:03

## 2025-03-11 RX ADMIN — RANOLAZINE 500 MG: 500 TABLET, EXTENDED RELEASE ORAL at 22:35

## 2025-03-11 RX ADMIN — ATORVASTATIN CALCIUM 80 MG: 80 TABLET, FILM COATED ORAL at 22:28

## 2025-03-11 RX ADMIN — INSULIN LISPRO 9 UNITS: 100 INJECTION, SOLUTION INTRAVENOUS; SUBCUTANEOUS at 19:30

## 2025-03-11 RX ADMIN — VANCOMYCIN HYDROCHLORIDE 1250 MG: 5 INJECTION, POWDER, LYOPHILIZED, FOR SOLUTION INTRAVENOUS at 01:46

## 2025-03-11 RX ADMIN — NIFEDIPINE 90 MG: 90 TABLET, FILM COATED, EXTENDED RELEASE ORAL at 09:03

## 2025-03-11 RX ADMIN — OXYCODONE 2.5 MG: 5 TABLET ORAL at 09:07

## 2025-03-11 RX ADMIN — LINEZOLID 600 MG: 600 TABLET, FILM COATED ORAL at 22:28

## 2025-03-11 RX ADMIN — RANOLAZINE 500 MG: 500 TABLET, EXTENDED RELEASE ORAL at 09:04

## 2025-03-11 RX ADMIN — QUETIAPINE FUMARATE 25 MG: 25 TABLET ORAL at 09:04

## 2025-03-11 RX ADMIN — CIPROFLOXACIN HYDROCHLORIDE 500 MG: 500 TABLET, FILM COATED ORAL at 22:28

## 2025-03-11 RX ADMIN — PANTOPRAZOLE SODIUM 40 MG: 40 TABLET, DELAYED RELEASE ORAL at 09:53

## 2025-03-11 RX ADMIN — Medication 10 MG: at 22:27

## 2025-03-11 RX ADMIN — ACETAMINOPHEN 650 MG: 325 TABLET ORAL at 11:52

## 2025-03-11 RX ADMIN — CLOPIDOGREL BISULFATE 75 MG: 75 TABLET ORAL at 09:04

## 2025-03-11 RX ADMIN — HYDRALAZINE HYDROCHLORIDE 50 MG: 50 TABLET ORAL at 22:27

## 2025-03-11 RX ADMIN — ISOSORBIDE MONONITRATE 60 MG: 30 TABLET, EXTENDED RELEASE ORAL at 09:03

## 2025-03-11 RX ADMIN — INSULIN LISPRO 2 UNITS: 100 INJECTION, SOLUTION INTRAVENOUS; SUBCUTANEOUS at 15:13

## 2025-03-11 ASSESSMENT — COGNITIVE AND FUNCTIONAL STATUS - GENERAL
PERSONAL GROOMING: A LITTLE
TURNING FROM BACK TO SIDE WHILE IN FLAT BAD: A LITTLE
HELP NEEDED FOR BATHING: A LITTLE
HELP NEEDED FOR BATHING: A LITTLE
STANDING UP FROM CHAIR USING ARMS: A LOT
MOBILITY SCORE: 16
MOVING TO AND FROM BED TO CHAIR: A LOT
DRESSING REGULAR UPPER BODY CLOTHING: A LITTLE
DRESSING REGULAR UPPER BODY CLOTHING: A LITTLE
CLIMB 3 TO 5 STEPS WITH RAILING: A LOT
MOVING TO AND FROM BED TO CHAIR: A LOT
DRESSING REGULAR LOWER BODY CLOTHING: A LOT
EATING MEALS: A LITTLE
DAILY ACTIVITIY SCORE: 19
DRESSING REGULAR UPPER BODY CLOTHING: A LITTLE
WALKING IN HOSPITAL ROOM: A LOT
HELP NEEDED FOR BATHING: A LITTLE
CLIMB 3 TO 5 STEPS WITH RAILING: A LOT
STANDING UP FROM CHAIR USING ARMS: A LITTLE
CLIMB 3 TO 5 STEPS WITH RAILING: TOTAL
MOBILITY SCORE: 16
STANDING UP FROM CHAIR USING ARMS: A LOT
DAILY ACTIVITIY SCORE: 19
WALKING IN HOSPITAL ROOM: A LOT
DAILY ACTIVITIY SCORE: 15
MOBILITY SCORE: 14
HELP NEEDED FOR BATHING: A LOT
WALKING IN HOSPITAL ROOM: A LOT
MOVING TO AND FROM BED TO CHAIR: A LOT
TOILETING: A LITTLE
MOVING FROM LYING ON BACK TO SITTING ON SIDE OF FLAT BED WITH BEDRAILS: A LITTLE
WALKING IN HOSPITAL ROOM: TOTAL
TOILETING: A LOT
DAILY ACTIVITIY SCORE: 19
DRESSING REGULAR LOWER BODY CLOTHING: A LITTLE
DRESSING REGULAR UPPER BODY CLOTHING: A LITTLE
MOVING TO AND FROM BED TO CHAIR: A LOT
PERSONAL GROOMING: A LITTLE
STANDING UP FROM CHAIR USING ARMS: A LOT
TOILETING: A LITTLE
MOBILITY SCORE: 14
TOILETING: A LITTLE
DRESSING REGULAR LOWER BODY CLOTHING: A LOT
DRESSING REGULAR LOWER BODY CLOTHING: A LOT
CLIMB 3 TO 5 STEPS WITH RAILING: TOTAL

## 2025-03-11 ASSESSMENT — PAIN SCALES - GENERAL
PAINLEVEL_OUTOF10: 0 - NO PAIN
PAINLEVEL_OUTOF10: 6
PAINLEVEL_OUTOF10: 7

## 2025-03-11 ASSESSMENT — PAIN SCALES - PAIN ASSESSMENT IN ADVANCED DEMENTIA (PAINAD)
TOTALSCORE: 0
CONSOLABILITY: NO NEED TO CONSOLE
FACIALEXPRESSION: SMILING OR INEXPRESSIVE
BODYLANGUAGE: RELAXED
BREATHING: NORMAL

## 2025-03-11 ASSESSMENT — ACTIVITIES OF DAILY LIVING (ADL): HOME_MANAGEMENT_TIME_ENTRY: 10

## 2025-03-11 ASSESSMENT — PAIN - FUNCTIONAL ASSESSMENT
PAIN_FUNCTIONAL_ASSESSMENT: 0-10

## 2025-03-11 NOTE — PROGRESS NOTES
Subjective   Interval History: Patient not seen today.   Discharge planning discussed with team 3/9/2025  Discharge planning discussed with patient on 3/9/2025      Objective   Range of Vitals (last 24 hours)  Heart Rate:  [74-94]   Temp:  [36.2 °C (97.2 °F)-37.2 °C (99 °F)]   Resp:  [16-22]   BP: (102-168)/(68-92)   Weight:  [58.7 kg (129 lb 6.6 oz)]   SpO2:  [93 %-97 %]   Daily Weight  03/10/25 : 58.7 kg (129 lb 6.6 oz)    Body mass index is 22.92 kg/m².        Antibiotics  ertapenem - 1 gram/50 mL  vancomycin  vancomycin (Vancocin) - 1250 mg/250 mL    Relevant Results  Labs  Results from last 72 hours   Lab Units 03/10/25  0812 03/09/25  1043 03/08/25  0722   WBC AUTO x10*3/uL 15.2* 14.4* 16.8*   HEMOGLOBIN g/dL 9.1* 7.8* 9.2*   HEMATOCRIT % 27.1* 24.4* 27.4*   PLATELETS AUTO x10*3/uL 382 323 327   NEUTROS PCT AUTO % 67.9 70.1 73.9   LYMPHS PCT AUTO % 22.6 22.3 18.2   MONOS PCT AUTO % 6.5 5.6 6.3   EOS PCT AUTO % 0.7 0.8 0.7     Results from last 72 hours   Lab Units 03/10/25  0812 03/09/25  1043 03/08/25  0722   SODIUM mmol/L 136 131* 132*   POTASSIUM mmol/L 3.6 4.0 3.7   CHLORIDE mmol/L 106 104 103   CO2 mmol/L 24 22 25   BUN mg/dL 14 16 14   CREATININE mg/dL 0.80 0.79 0.63   GLUCOSE mg/dL 86 185* 198*   CALCIUM mg/dL 8.2* 7.7* 7.5*   ANION GAP mmol/L 10 9* 8*   EGFR mL/min/1.73m*2 82 83 >90   PHOSPHORUS mg/dL 3.0 2.8 2.4*     Results from last 72 hours   Lab Units 03/10/25  0812 03/09/25  1043 03/08/25  0722   ALBUMIN g/dL 2.5* 2.4* 2.2*     Estimated Creatinine Clearance: 58 mL/min (by C-G formula based on SCr of 0.8 mg/dL).  CRP   Date Value Ref Range Status   08/26/2023 0.79 mg/dL Final     Comment:     REF VALUE  < 1.00       Microbiology  Susceptibility data from last 14 days.  Collected Specimen Info Organism   03/08/25 Swab from Anterior Nares Methicillin Resistant Staphylococcus aureus (MRSA)       Assessment/Plan      Myrna LANDON Khan is a 65 y.o. female with a PMHx of HTN, HLD, T2DM (A1C 14.6  1/2025), PAD s/p L SFA stent, HFimpEF (55%), CAD s/p CABG in 2023 on Plavix, carotid artery stenosis (August 2023 US), renal artery stenosis (September 2023 US) who presented to the ED on 2/28 with dry gangrene of LLE due to chronic venous stasis ulceration w/  poor wound healing due to arterial insufficiency. Started on cefepime and vancomycin on 2/28 but cefepime discontinued due to concern for neurotoxicity on 3/03. Currently on vancomycin and ertapenem.     # Peripheral vascular disease       Status post left iliac artery stent and left common femoral endarterectomy on 3/6/2025    # Left lower extremity dry gangrene with areas of adjacent cellulitis and mild soft tissue infection    # No deep soft tissue or bone infection suspected at this time     3/07/25 update:  LLE now revascularized wit left iliac stent and left CFA endarterectomy. Will wait to see what if any intervention podiatry plans. For now, continue vancomycin and ertapenem.      03/09/2025 update:  Minimally vascularized.  Stable appearing superficial extensive wounds.  Awaiting MRSA culture from anterior nares.   no specific pathogen at this time.  Would have to treat with broad-spectrum antibiotics.  Patient has amoxicillin allergy which limits the use of Augmentin.  Can consider oral agents but will advise once negative MRSA screen completed.     WILL ADVISE ON ORAL REGIMEN       Discharge planning to rehab underway     3/10/2025 update:     3/8/2025 anterior nares positive for MRSA.  Thus would empirically cover for potential MRSA involvement current superficial dry gangrenous skin changes.  Would also cover for general gram-negative organisms.  Option of linezolid which also would cover corynebacteria organisms that may play a role in this skin infection.  Would plan 6-week course of antibiotics to be conservative.  Counting from 3/6/2025, 6-week course would end 4/17/2025.    Recommendations:  1) discontinue vancomycin  2) discontinue  ertapenem  3) begin oral linezolid 600 mg p.o. twice daily  4) begin oral ciprofloxacin 500 mg p.o. twice daily  5) Would give oral antibiotics for 6 weeks ending 4/17/2025    Regarding discharge:  -  Patient has ID clinic appointment on 4/14/2025 at 1:40 PM with Josee Burdick PA-C and  ID CLINIC at [facility], [address] [place] [address] [phone]     -  After discharge patient will need weekly CBC plus differential, CRP and BMP with results faxed to Josee Burdick PA-C at [phone]    ID team will sign off   Kenny Perkins MD  ID Staff  30 minutes of discharge planning

## 2025-03-11 NOTE — PROGRESS NOTES
Physical Therapy    Physical Therapy Treatment    Patient Name: Myrna Khan  MRN: [id number]  Department: [place] [address]  Room: [facility]  Today's Date: 3/11/2025  Time Calculation  Start Time: 1017  Stop Time: 1055  Time Calculation (min): 38 min       Assessment/Plan   PT Assessment  Rehab Prognosis: Good  Barriers to Discharge Home: Caregiver assistance, Physical needs  Caregiver Assistance: Patient lives alone and/or does not have reliable caregiver assistance  Physical Needs: In-home setup navigation limited by function/safety, Ambulating household distances limited by function/safety, Intermittent mobility assistance needed, Intermittent ADL assistance needed, High falls risk due to function or environment  End of Session Communication: Bedside nurse  Assessment Comment: patient able to complete rolling B for jatin-care d/t being incontinent of bowels. Patient continues to require assist for all mobility tasks, and demo's limited static standing tolerance d/t inablity to weighbear through L foot d/t increase reported pain. Patient remains appropriate for MOD intensity skilled PT services upon DC  End of Session Patient Position: Bed, 3 rail up, Alarm on     PT Plan  Treatment/Interventions: Bed mobility, Transfer training, Gait training, Balance training, Strengthening, Endurance training, Therapeutic activity, Therapeutic exercise, Positioning, Postural re-education  PT Plan: Ongoing PT  PT Frequency: 3 times per week  PT Discharge Recommendations: Moderate intensity level of continued care  Equipment Recommended upon Discharge:  (n/a)  PT Recommended Transfer Status: Assist x1, Assistive device (2ww, stand)  PT - OK to Discharge: Yes    General Visit Information:   PT  Visit  PT Received On: 03/11/25  General  Family/Caregiver Present: No  Prior to Session Communication: Bedside nurse  Patient Position Received: Bed, 3 rail up, Alarm on  General Comment: patient received supine, CNA in room; patient  incontinent of large BM; PT assisted CNA in patient jatin-care. RN aware of large BM and incontinence. Patient continues to remain hypersensitive to touch with distal LLE and still does not weightbear through L foot during standing trials.    Subjective   Precautions:  Precautions  LE Weight Bearing Status: Weight Bearing as Tolerated (LLE)  Medical Precautions: Fall precautions            Objective   Pain:  Pain Assessment  Pain Assessment: 0-10  0-10 (Numeric) Pain Score:  (did not rate pain numerically)  Pain Type:  (acute on chronic?)  Pain Location: Leg  Pain Orientation: Left, Lower  Cognition:  Cognition  Overall Cognitive Status: Impaired  Arousal/Alertness: Appropriate responses to stimuli  Orientation Level: Disoriented to situation  Following Commands: Follows one step commands with repetition       Postural Control:  Postural Control  Postural Control: Impaired  Posture Comment: increase forward flexed posture in standing  Static Sitting Balance  Static Sitting-Balance Support: Bilateral upper extremity supported, Feet supported  Static Sitting-Level of Assistance: Close supervision  Static Sitting-Comment/Number of Minutes: x1  Dynamic Sitting Balance  Dynamic Sitting-Balance Support: Feet supported, Bilateral upper extremity supported  Dynamic Sitting-Level of Assistance: Contact guard  Dynamic Sitting-Comments: x1  Static Standing Balance  Static Standing-Balance Support: Bilateral upper extremity supported  Static Standing-Level of Assistance: Contact guard, Minimum assistance  Static Standing-Comment/Number of Minutes: x1, 75% full stand, increase forward flexed posture; stood x2 trials, each 15-20 seconds    Activity Tolerance:  Activity Tolerance  Endurance: Other (Comment) (limited standing tolerance)  Early Mobility/Exercise Safety Screen: Proceed with mobilization - No exclusion criteria met  Treatments:                 Bed Mobility  Bed Mobility: Yes  Bed Mobility 1  Bed Mobility 1: Supine to  sitting, Sitting to supine  Level of Assistance 1: Moderate assistance, Minimal verbal cues, Minimal tactile cues  Bed Mobility Comments 1: HOB elevated, draw sheet for supine->sit task; bedrail  Bed Mobility 2  Bed Mobility  2: Rolling right, Rolling left  Level of Assistance 2: Minimum assistance, Minimal verbal cues, Minimal tactile cues  Bed Mobility Comments 2: roll B for jatin-care; able to use bedrail to assist with task; decrease ability to push through L foot d/t increase reported pain    Ambulation/Gait Training  Ambulation/Gait Training Performed: No  Transfers  Transfer: Yes  Transfer 1  Transfer From 1: Sit to, Stand to  Transfer to 1: Stand, Sit  Technique 1: Sit to stand, Stand to sit  Transfer Device 1: Walker  Transfer Level of Assistance 1: Minimum assistance, Minimal verbal cues, Minimal tactile cues  Trials/Comments 1: 2x STS; patient favors NOT putting BW through L foot for each STS trial    Outcome Measures:  [facility] Basic Mobility  Turning from your back to your side while in a flat bed without using bedrails: A little  Moving from lying on your back to sitting on the side of a flat bed without using bedrails: A little  Moving to and from bed to chair (including a wheelchair): A lot  Standing up from a chair using your arms (e.g. wheelchair or bedside chair): A little  To walk in hospital room: A lot  Climbing 3-5 steps with railing: Total  Basic Mobility - Total Score: 14    Education Documentation  Body Mechanics, taught by Laura Cool PT at 3/11/2025 11:39 AM.  Learner: Patient  Readiness: Acceptance  Method: Explanation  Response: Needs Reinforcement  Comment: WB status of LLE; mobility progression    Mobility Training, taught by Laura Cool PT at 3/11/2025 11:39 AM.  Learner: Patient  Readiness: Acceptance  Method: Explanation  Response: Needs Reinforcement  Comment: WB status of LLE; mobility progression    Education Comments  No comments found.        Encounter Problems        Encounter Problems (Active)       PT Problem       Patient will complete bed mobility with close supervision  (Progressing)       Start:  03/07/25    Expected End:  03/21/25            Patient will complete STS with CGx1 using LRAD without acute LOB   (Progressing)       Start:  03/07/25    Expected End:  03/21/25            Patient will ambulate >/=30' with LRAD with CGx1 without acute LOB  (Progressing)       Start:  03/07/25    Expected End:  03/21/25            Patient will complete static (close supervision) and dynamic (CGx1) standing balance activities using LRAD without acute LOB, while maintaining midline posture.  (Progressing)       Start:  03/07/25    Expected End:  03/21/25            Patient will participate in BLE there-ex program in order to assist in improving strength and to assist with the completion of functional mobility tasks.  (Progressing)       Start:  03/07/25    Expected End:  03/21/25               Pain - Adult          Laura Cool, PT, DPT

## 2025-03-11 NOTE — PROGRESS NOTES
Vancomycin Dosing by Pharmacy- Cessation of Therapy    Consult to pharmacy for vancomycin dosing has been discontinued by the prescriber, pharmacy will sign off at this time.    Please call pharmacy if there are further questions or re-enter a consult if vancomycin is resumed.     Puneet Coffman, [facility]

## 2025-03-11 NOTE — NURSING NOTE
"JR Progress Note:     JR services following patient due to agitation, AMS, and refusal of care.     Per bedside nurse, patient has been mostly appropriate this morning. Patient does exhibit impulsive behaviors and is intermittently irritable regarding foot pain and incontinence care. Patient observer discontinued. PRN medications ordered by primary team for anxiety and agitation. Hydroxyzine 10 mg q8 hr (last given 3/9 1128) and quetiapine 25 mg daily (last given 3/11 0904).     Per [facility] note, patient will return to [facility] when medically clear for discharge.     On assessment, patient is resting in bed quietly. Patient did not engage with JR RN at this time.     Please consider the following general measures for minimizing delirium in a hospitalized patient:   Bright lights during the day, keeps blinds up, switch all lights on. Minimal daytime naps.  Provide glasses, hearing aids and/or communication boards as needed for impairments.  Avoid disturbances at night. Dark/quiet room at night. Encourage at least 6 hours uninterrupted sleep.   Avoid restraints.   Daily/frequent orientation to time and place by the staff. Minimize room and staff changes.   Encourage stimulating activities during the day, if possible.  Early evaluation and intervention by PT, out of bed as tolerated. Out of bed to chair few hours everyday.  Ensure regular bowel and bladder function (as able)  Therapeutic Communication:   Calming and reassurance   Decrease stimulation   Reorientation     JR services available to patient and staff 24/7 throughout hospitalization. JR will continue to perform q12 hr rounding to ensure safety of patient and staff. Please secure chat \"JR\" with any questions or concerns.   "

## 2025-03-11 NOTE — H&P (VIEW-ONLY)
VASCULAR SURGERY PROGRESS NOTE  Assessment/Plan   Myrna Khan is 65 y.o. female with history of carotid stenosis, diabetes, CAD, HTN, PAD and renal artery stenosis who presents with extensive LLE wounds and intermittent rest pain.     S/P LLE Angiogram with left EIA occlusion, reconstitutes at distal EIA with CFA short-segment occlusion vs severe stenosis, L SFA stent occlusion, distal SFA reconstitution, patent popliteal and AT runoff to the foot on 3/2/25. Medical ethics involved for decision making due to poor mental status. 3/6 s/p left femoral and profunda endarterectomy, left external iliac artery stent    Has remained stable pending discharge.     Plan:  Continue Xarelto 2.5mg BID   Podiatry consulted - no urgent intervention  Antibiotics per ID/primary - no need for long term suppressive antibiotics from vascular standpoint  Plavix 75mg daily, discontinued aspirin  Okay for discharge from vascular perspective, will schedule for 2 week follow up with Dr. Pederson  Additional care per primary team    D/w attending, Dr. Bg Baker MD  Vascular Surgery p. [phone]      Subjective   No overnight issues reported. Denies chest pain, SOB. Reports comfortable in bed but does not want to be moved to examine.    Objective   Vitals:  Heart Rate:  [72-79]   Temp:  [36.2 °C (97.2 °F)-36.6 °C (97.9 °F)]   Resp:  [12-16]   BP: (102-163)/(68-89)   SpO2:  [93 %-100 %]     Exam:  Constitutional: no acute distress  Skin: warm and dry overall   Cardiac: Regular rate and rhythm  Pulmonary: Unlabored respirations on room air  Abdomen: Non distended, non tender  Extremities: PAINTING. RLE dry. LLE with dry gangrene on the dorsal aspect of her foot to her mid shin, no sensation at that site. Dressing in place.  Surgical wounds: right groin puncture site no hematoma or swelling; left groin surgical site with Prevena in place with good seal   Vascular: bilateral DP/PT signals    Labs:  Results from last 7 days   Lab  Units 03/10/25  0812 03/09/25  1043 03/08/25  0722   WBC AUTO x10*3/uL 15.2* 14.4* 16.8*   HEMOGLOBIN g/dL 9.1* 7.8* 9.2*   PLATELETS AUTO x10*3/uL 382 323 327      Results from last 7 days   Lab Units 03/10/25  0812 03/09/25  1043 03/08/25  0722 03/07/25  0529   SODIUM mmol/L 136 131* 132* 133*   POTASSIUM mmol/L 3.6 4.0 3.7 4.9   CHLORIDE mmol/L 106 104 103 105   CO2 mmol/L 24 22 25 21   BUN mg/dL 14 16 14 16   CREATININE mg/dL 0.80 0.79 0.63 0.79   GLUCOSE mg/dL 86 185* 198* 259*   MAGNESIUM mg/dL  --   --   --  1.80   PHOSPHORUS mg/dL 3.0 2.8 2.4* 4.9      Results from last 7 days   Lab Units 03/06/25  2124   INR  1.3*   PROTIME seconds 14.4*   APTT seconds 37*

## 2025-03-11 NOTE — PROGRESS NOTES
Social Work Note  Treatment Plan : Pt is from [facility], and she is a LTC resident at the facility. Pt was with a sitter for a couple of hours over night and the facility requests 24 hours without a sitter. MD put an dc order for restraints this morning, and [facility] can accept back pt tomorrow.   - Additional information : Transportation is set up for 11:00am tomorrow, no need of precert. Pickup time and nurse reported number, [phone] were messaged to pt's RN.   - Payor : [facility]  - Planned Disposition : [facility]  - Barrier to discharge : None noted at this time.     PERCY TothA, LSW

## 2025-03-11 NOTE — PROGRESS NOTES
VASCULAR SURGERY PROGRESS NOTE  Assessment/Plan   Myrna Khan is 65 y.o. female with history of carotid stenosis, diabetes, CAD, HTN, PAD and renal artery stenosis who presents with extensive LLE wounds and intermittent rest pain.     S/P LLE Angiogram with left EIA occlusion, reconstitutes at distal EIA with CFA short-segment occlusion vs severe stenosis, L SFA stent occlusion, distal SFA reconstitution, patent popliteal and AT runoff to the foot on 3/2/25. Medical ethics involved for decision making due to poor mental status. 3/6 s/p left femoral and profunda endarterectomy, left external iliac artery stent    Has remained stable pending discharge.     Plan:  Continue Xarelto 2.5mg BID   Podiatry consulted - no urgent intervention  Antibiotics per ID/primary - no need for long term suppressive antibiotics from vascular standpoint  Plavix 75mg daily, discontinued aspirin  Okay for discharge from vascular perspective, will schedule for 2 week follow up with Dr. Pederson  Additional care per primary team    D/w attending, Dr. Bg Baker MD  Vascular Surgery p. [phone]      Subjective   No overnight issues reported. Denies chest pain, SOB. Reports comfortable in bed but does not want to be moved to examine.    Objective   Vitals:  Heart Rate:  [72-79]   Temp:  [36.2 °C (97.2 °F)-36.6 °C (97.9 °F)]   Resp:  [12-16]   BP: (102-163)/(68-89)   SpO2:  [93 %-100 %]     Exam:  Constitutional: no acute distress  Skin: warm and dry overall   Cardiac: Regular rate and rhythm  Pulmonary: Unlabored respirations on room air  Abdomen: Non distended, non tender  Extremities: PAINTING. RLE dry. LLE with dry gangrene on the dorsal aspect of her foot to her mid shin, no sensation at that site. Dressing in place.  Surgical wounds: right groin puncture site no hematoma or swelling; left groin surgical site with Prevena in place with good seal   Vascular: bilateral DP/PT signals    Labs:  Results from last 7 days   Lab  Units 03/10/25  0812 03/09/25  1043 03/08/25  0722   WBC AUTO x10*3/uL 15.2* 14.4* 16.8*   HEMOGLOBIN g/dL 9.1* 7.8* 9.2*   PLATELETS AUTO x10*3/uL 382 323 327      Results from last 7 days   Lab Units 03/10/25  0812 03/09/25  1043 03/08/25  0722 03/07/25  0529   SODIUM mmol/L 136 131* 132* 133*   POTASSIUM mmol/L 3.6 4.0 3.7 4.9   CHLORIDE mmol/L 106 104 103 105   CO2 mmol/L 24 22 25 21   BUN mg/dL 14 16 14 16   CREATININE mg/dL 0.80 0.79 0.63 0.79   GLUCOSE mg/dL 86 185* 198* 259*   MAGNESIUM mg/dL  --   --   --  1.80   PHOSPHORUS mg/dL 3.0 2.8 2.4* 4.9      Results from last 7 days   Lab Units 03/06/25  2124   INR  1.3*   PROTIME seconds 14.4*   APTT seconds 37*

## 2025-03-11 NOTE — PROGRESS NOTES
Assessment/Plan   Myrna Khan is a 65 y.o. 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 Pt noting skin sloughing, edema, clear drainage. At op visit, difficulty palpating pulse, had a monophasic AT and PT signals on the LLE and no palpable femoral pulse. CTA showed occlusion of common and external iliac arteries in addition to occluded SFA stent with distal reconstruction. Podiatry consulted, recommended no interventions. Vascular surgery consulted. Pt is started on broad spectrum abx and ordered pvr and vein mapping. S/p left femoral and profunda endarterectomy, left external iliac artery stent 3/6 with vascular surgery.  Pt developed urinary retention and Malone was placed.  Voiding trial done on 3/7 and patient failed and Malone had to be replaced.  MRSA nares swab done on 3/8. Final abx plan for linezolid/cipro for 6 weeks, pending return to facility.      Superimposed cellulitis left lower extremity  Dry gangrene left foot  LLE wounds with CLI  -CT showing common and external iliac arteries occlusion + SFA occlusion with reconstitution of her L popliteal artery and intact tibial runoff   -S/P LLE Angiogram with left EIA occlusion, L SFA stent occlusion,   -Medical ethics involved for decision making and supported revascularization   -S/p left femoral and profunda endarterectomy, left external iliac artery stent 3/6 with vascular surgery.  Per vascular surgery patient is okay for discharge from their standpoint, prior to discharge vascular surgery should be notified so they can remove wound VAC.  -Pain is improved as long as no one touches her leg, but patient appears to have significant reaction to  anyone touching her leg.  -Continue Xarelto 2.5mg BID   -Podiatry consulted - no urgent intervention  -Currently on IV ertapenem and pharmacy to dose vancomycin.  -Plavix 75mg daily, discontinued aspirin per vascular surgery recs   -wound care instruction per podiatry  -MRSA Swab from Anterior Nares shows MRSA.  -Dr. Khanna's podiatry follow-up requested   -Podiatry updated wound care to Cleans with saline, apply adaptic, betadine paint, ABD, kerlix, tape but avoid tape to skin.  -Per [facility] patient's facility is ready to receive the patient back and no pre-CERT needed.  -Per ID plan for cipro/linezolid x6 weeks with end date 4/17/25. Pt will need weekly CBC plus differential, CRP and BMP with results faxed to Josee Burdick PA-C at [phone]. ID clinic appointment on 4/14/2025 at 1:40 PM with Josee Burdick PA-C at  ID [facility] at [facility], [address]      Acute urinary retention  -No Malone catheter prior to this admission.  -Placed initially during surgery.  -Failed voiding trial on 3/7 with significant reported retention and Malone catheter had to be replaced.  -Recommend patient needs to have improvement in mobility prior to doing another voiding trial to help improve chances of success. Pt will also need to follow up with gyne and have ovarian mass evaluated which may be contributing.      Cystic ovarian mass  -GYN recommend OP clinic appt, concern for malignancy. Pt is aware.      Delirium  -Have been reported initially thought to be related to urine retention.  Had previously been reported to be resolved.  -Appeared to happen again this morning per bedside nursing.  Improved with significant agitation that appeared to improve with Seroquel.  -During my conversation with patient today appears to be calm, oriented to self, place, time but not to situation.  -For now Seroquel 25 mg nightly with hold parameters for lethargy, with another 25 mg daily as needed if agitated during  the daytime.     HTN  Chronic HFimpEF (EF 55% July/2024, 35-40% in Aug/2023)  -Blood pressure appears to fluctuate, a little bit more uncontrolled this morning but this might have been during the agitation.  Now that she appears calm last blood pressures 102/68, so we will just continue to monitor for now.  -MRI and NM PYP negative for amyloid,  SPEP and UPEP negative  -continue Carvedilol, Imdur, increased Hydralazine to 75 mg TID, continue Nifedipine 90 mg daily  -OP cardiology FU      CAD s/p CABG in 2023 (LIMA-LAD, APRIL-OM)  -Continue Plavix and statin.  Continue beta-blocker.  Continue Ranexa.  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  -Currently fasting and postprandial glucose appears to be controlled today.        Scheduled outpatient appointments in system:   Future Appointments   Date Time Provider Department Center   3/28/2025  2:20 PM Bobby Pederson MD [id number] [facility]   3/31/2025  2:30 PM Rachana Oh DPM [id number] [facility]   4/4/2025 11:40 AM [facility]   4/11/2025 11:00 AM [facility] [address] [place], [place] [id number] [facility]   4/11/2025  1:00 PM Bobby Pederson MD [id number] [facility]   4/14/2025  1:40 PM Josee Burdick PA-C [id number] [facility]     ---------------------------------------------------------------------------------------------------  Subjective   No new events. Pt tried getting out of bed while non-weight bearing and was placed on sitter for couple hours overnight. No longer needed and cancelled. Pt is medically ready. Pain is controlled. Final abx plan discussed with patient. Mentation at baseline.      ---------------------------------------------------------------------------------------------------  Objective   Last Recorded Vitals  Blood pressure 138/89,  "pulse 79, temperature 36.5 °C (97.7 °F), resp. rate 12, height 1.6 m (5' 3\"), weight 58.7 kg (129 lb 6.6 oz), SpO2 100%.  Intake/Output last 3 Shifts:  I/O last 3 completed shifts:  In: 480 (8.2 mL/kg) [P.O.:480]  Out: 3825 (65.2 mL/kg) [Urine:3825 (1.8 mL/kg/hr)]  Weight: 58.7 kg     Physical Exam  Vitals and nursing note reviewed.   Constitutional:       General: She is not in acute distress.     Appearance: Normal appearance. She is not ill-appearing or toxic-appearing.   HENT:      Head: Normocephalic and atraumatic.      Mouth/Throat:      Mouth: Mucous membranes are moist.   Eyes:      General: No scleral icterus.     Extraocular Movements: Extraocular movements intact.      Conjunctiva/sclera: Conjunctivae normal.   Cardiovascular:      Rate and Rhythm: Normal rate and regular rhythm.      Heart sounds: S1 normal and S2 normal. No murmur heard.     Comments: Difficult to palpate distal pulses  Pulmonary:      Effort: Pulmonary effort is normal. No respiratory distress.      Breath sounds: No wheezing, rhonchi or rales.   Abdominal:      General: Bowel sounds are normal. There is no distension.      Palpations: Abdomen is soft.      Tenderness: There is no abdominal tenderness. There is no guarding or rebound.   Musculoskeletal:         General: No swelling or deformity.      Cervical back: Neck supple.   Skin:     Findings: No rash.      Comments: Left foot edema, denuded skin on doral surface, erythema and ttp    Neurological:      General: No focal deficit present.      Mental Status: She is alert. Mental status is at baseline.   Psychiatric:         Mood and Affect: Mood normal.         Relevant Results  Lab Results   Component Value Date    WBC 15.2 (H) 03/10/2025    HGB 9.1 (L) 03/10/2025    HCT 27.1 (L) 03/10/2025    MCV 89 03/10/2025     03/10/2025      Lab Results   Component Value Date    GLUCOSE 86 03/10/2025    CALCIUM 8.2 (L) 03/10/2025     03/10/2025    K 3.6 03/10/2025    CO2 24 " 03/10/2025     03/10/2025    BUN 14 03/10/2025    CREATININE 0.80 03/10/2025     Scheduled medications  acetaminophen, 650 mg, oral, q6h BOLIVAR  atorvastatin, 80 mg, oral, Nightly  carvedilol, 25 mg, oral, BID  ciprofloxacin, 500 mg, oral, q12h BOLIVAR  clopidogrel, 75 mg, oral, Daily  hydrALAZINE, 50 mg, oral, TID  insulin glargine, 10 Units, subcutaneous, Nightly  insulin lispro, 0-5 Units, subcutaneous, TID AC  insulin lispro, 2 Units, subcutaneous, TID AC  isosorbide mononitrate ER, 60 mg, oral, Daily  linezolid, 600 mg, oral, q12h BOLIVAR  melatonin, 10 mg, oral, Nightly  NIFEdipine ER, 90 mg, oral, Daily before breakfast  pantoprazole, 40 mg, oral, Daily before breakfast  QUEtiapine, 25 mg, oral, Nightly  ranolazine, 500 mg, oral, BID  rivaroxaban, 2.5 mg, oral, BID      Continuous medications     PRN medications  PRN medications: dextrose, glucagon, hydrALAZINE, hydrOXYzine HCL, naloxone, ondansetron ODT **OR** ondansetron, oxyCODONE, polyethylene glycol, QUEtiapine    Odilon Khalil MD

## 2025-03-11 NOTE — PROGRESS NOTES
"Nutrition Follow Up Assessment  Nutrition Assessment         Patient is a 65 y.o. female who is day 11 of admission for Dry gangrene (Multi).    PMH of heart failure, CAD s/p CABG in 2023, PAD, renal artery stenosis,carotid artery stenosis, HTN, HLD, T2DM.      3/6 s/p endarterectomy, lower extremity, external iliac stnt, ivus and wound vac placement (left); angiogram, lower extremity (left).    Nutrition History:  Energy Intake: Fair 50-75 %  Food and Nutrient History: Pt reports PTA she had a good appetite. Reports at baseline usually has 3 meals daily. Pt reports since hospital admission has been cosnuming smaller portion sizes. Per flowsheets varied intake of meals ranging from 0% to 100%. Pt agreeable to having oral nutrition supplements during admission.    Anthropometrics:  Height: 160 cm (5' 3\")   Weight: 58.7 kg (129 lb 6.6 oz)   BMI (Calculated): 22.93  IBW/kg (Dietitian Calculated): 52.3 kg  Percent of IBW: 112 %       Admission Weight Trend:  Date/Time Weight   03/10/25 0600 58.7 kg (129 lb 6.6 oz)   03/09/25 0600 59.9 kg (132 lb 0.9 oz)   03/08/25 0517 61.6 kg (135 lb 12.9 oz)   03/06/25 1128 42.6 kg (94 lb) Abnormal    02/28/25 1456 42.6 kg (94 lb) Abnormal - admit wt     Weight Change %:  Weight History / % Weight Change: Difficult to determine pts baseline wt at this time due to wt discrepancies since admission - suspect scale inaccuracies.    Nutrition Focused Physical Exam Findings:    Subcutaneous Fat Loss:   Orbital Fat Pads: Mild-Moderate (slight dark circles and slight hollowing)  Buccal Fat Pads: Severe (hollow, sunken and narrow face)  Muscle Wasting:  Temporalis: Mild-Moderate (slight depression)  Pectoralis (Clavicular Region): Severe (protruding prominent clavicle)  Deltoid/Trapezius: Mild-Moderate (slight protrusion of acromion process)  Quadriceps: Severe (depressions on inner and outer thigh)  Gastrocnemius: Mild-Moderate (not well developed muscle)  Edema:  Edema: +1 trace  Edema " Location: Generalized  Physical Findings:  Skin: Positive (Pretibial L wound, incision leg L, puncture wound groin R - wound vac)    Nutrition Significant Labs:  CBC Trend:   Results from last 7 days   Lab Units 03/10/25  0812 03/09/25  1043 03/08/25  0722 03/07/25  0529   WBC AUTO x10*3/uL 15.2* 14.4* 16.8* 13.7*   RBC AUTO x10*6/uL 3.03* 2.61* 3.04* 3.00*   HEMOGLOBIN g/dL 9.1* 7.8* 9.2* 9.2*   HEMATOCRIT % 27.1* 24.4* 27.4* 26.9*   MCV fL 89 94 90 90   PLATELETS AUTO x10*3/uL 382 323 327 311    , BMP Trend:   Results from last 7 days   Lab Units 03/10/25  0812 03/09/25  1043 03/08/25  0722 03/07/25  0529   GLUCOSE mg/dL 86 185* 198* 259*   CALCIUM mg/dL 8.2* 7.7* 7.5* 7.2*   SODIUM mmol/L 136 131* 132* 133*   POTASSIUM mmol/L 3.6 4.0 3.7 4.9   CO2 mmol/L 24 22 25 21   CHLORIDE mmol/L 106 104 103 105   BUN mg/dL 14 16 14 16   CREATININE mg/dL 0.80 0.79 0.63 0.79    , BG POCT trend:   Results from last 7 days   Lab Units 03/11/25  1017 03/11/25  0945 03/11/25  0759 03/10/25  2010 03/10/25  1703   POCT GLUCOSE mg/dL 89 75 70* 134* 176*    , Renal Lab Trend:   Results from last 7 days   Lab Units 03/10/25  0812 03/09/25  1043 03/08/25  0722 03/07/25  0529   POTASSIUM mmol/L 3.6 4.0 3.7 4.9   PHOSPHORUS mg/dL 3.0 2.8 2.4* 4.9   SODIUM mmol/L 136 131* 132* 133*   MAGNESIUM mg/dL  --   --   --  1.80   EGFR mL/min/1.73m*2 82 83 >90 83   BUN mg/dL 14 16 14 16   CREATININE mg/dL 0.80 0.79 0.63 0.79      Lab Results   Component Value Date    HGBA1C 14.6 (H) 01/09/2025      Nutrition Specific Medications:  Scheduled medications  acetaminophen, 650 mg, oral, q6h BOLIVAR  atorvastatin, 80 mg, oral, Nightly  carvedilol, 25 mg, oral, BID  ciprofloxacin, 500 mg, oral, q12h BOLIVAR  clopidogrel, 75 mg, oral, Daily  hydrALAZINE, 50 mg, oral, TID  insulin glargine, 10 Units, subcutaneous, Nightly  insulin lispro, 0-5 Units, subcutaneous, TID AC  insulin lispro, 2 Units, subcutaneous, TID AC  isosorbide mononitrate ER, 60 mg, oral,  Daily  linezolid, 600 mg, oral, q12h BOLIVAR  melatonin, 10 mg, oral, Nightly  NIFEdipine ER, 90 mg, oral, Daily before breakfast  pantoprazole, 40 mg, oral, Daily before breakfast  QUEtiapine, 25 mg, oral, Nightly  ranolazine, 500 mg, oral, BID  rivaroxaban, 2.5 mg, oral, BID      Continuous medications     PRN medications  PRN medications: dextrose, glucagon, hydrALAZINE, hydrOXYzine HCL, naloxone, ondansetron ODT **OR** ondansetron, oxyCODONE, polyethylene glycol, QUEtiapine     I/O:   Last BM Date: 03/11/25; Stool Appearance: Loose, Watery, Seedy (03/11/25 1229)    Dietary Orders (From admission, onward)       Start     Ordered    03/08/25 0726  Adult diet Consistent Carb; CCD 45 gm/meal  Diet effective now        Question Answer Comment   Diet type Consistent Carb    Carb diet selection: CCD 45 gm/meal    Special Instructions: Start 6 hours postoperative        03/08/25 0725    03/02/25 1052  May Participate in Room Service  ( ROOM SERVICE MAY PARTICIPATE)  Once        Question:  .  Answer:  Yes    03/02/25 1051                     Estimated Needs:   Total Energy Estimated Needs in 24 hours (kCal): 1600 kCal  Method for Estimating Needs: 30 kcal/kg IBW  Total Protein Estimated Needs in 24 Hours (g):  (70+)  Method for Estimating 24 Hour Protein Needs: 1.3+ g/kg IBW  Total Fluid Estimated Needs in 24 Hours (mL):  (1mL/kcal or per team)          Nutrition Diagnosis   Malnutrition Diagnosis  Patient has Malnutrition Diagnosis: Yes  Diagnosis Status: New  Malnutrition Diagnosis: Severe malnutrition related to chronic disease or condition  Related to: inadequate protein-energy intake  As Evidenced by: suspect patient meeting </=75% of estimated energy needs for >/=1 month; moderate-severe muscle and subcutaneous fat loss.         Nutrition Interventions/Recommendations         Nutrition Prescription:  Consider liberalizing diet to regular to promote PO intake.  Diet texture and consistency per SLP.  Glucerna Shake TID  (220 kcal, 10 g protein each).        Nutrition Interventions:   Food and/or Nutrient Delivery Interventions  Interventions: Meals and snacks, Medical food supplement  Goal: meet >75% of estimated energy needs through meals and ONS       Nutrition Education:   Encouraged PO intake of oral nutrition supplements. Educated that protein content in oral nutrition supplements can help with wound healing.       Nutrition Monitoring and Evaluation   Food/Nutrient Related History Monitoring  Monitoring and Evaluation Plan: Estimated Energy Intake, Intake / amount of food  Estimated Energy Intake: Energy intake greater or equal to 75% of estimated energy needs  Intake / Amount of food: Consumes at least 50% or more of meals/snacks/supplements    Anthropometric Measurements  Monitoring and Evaluation Plan: Body weight  Body Weight: Body weight - Maintain stable weight    Biochemical Data, Medical Tests and Procedures  Monitoring and Evaluation Plan: Electrolyte/renal panel, Glucose/endocrine profile  Electrolyte and Renal Panel: Electrolytes within normal limits  Glucose/Endocrine Profile: Glucose within normal limits ( mg/dL)    Physical Exam Findings  Monitoring and Evaluation Plan: Skin  Skin Finding: Impaired wound healing - Improved wound healing

## 2025-03-11 NOTE — PROGRESS NOTES
Occupational Therapy    Occupational Therapy Treatment    Name: Myrna Khan  MRN: [id number]  Department: [place] [address]  Room: [facility]70[address]  Date: 03/11/25  Time Calculation  Start Time: 1424  Stop Time: 1434  Time Calculation (min): 10 min    Assessment:  OT Assessment: difficulty I/ADLS, safety, fxnl mob  Prognosis: Good  Barriers to Discharge Home: Caregiver assistance, Physical needs  Caregiver Assistance: Patient lives alone and/or does not have reliable caregiver assistance  Physical Needs: In-home setup navigation limited by function/safety, Ambulating household distances limited by function/safety, 24hr mobility assistance needed, 24hr ADL assistance needed, High falls risk due to function or environment  Evaluation/Treatment Tolerance: Patient limited by pain  Medical Staff Made Aware: Yes  End of Session Communication: Bedside nurse  End of Session Patient Position: Bed, 3 rail up, Alarm on  Plan:  Treatment Interventions: ADL retraining, Functional transfer training, UE strengthening/ROM, Endurance training, Cognitive reorientation, Patient/family training, Equipment evaluation/education, Compensatory technique education  OT Frequency: 3 times per week  OT Discharge Recommendations: Moderate intensity level of continued care  Equipment Recommended upon Discharge:  (TBD)  OT Recommended Transfer Status: Assist of 1  OT - OK to Discharge: Yes    Subjective   Previous Visit Info:  OT Last Visit  OT Received On: 03/11/25  General:  General  Reason for Referral: L common femoral and profunda endarterectomy with external iliac stent placement, angiogram, IVUS; initially presenting with dry gangrene of LLE  Past Medical History Relevant to Rehab: HFimpEF (EF 55% July/2024, 35-40% in Aug/2023), CAD s/p CABG in 2023 (LIMA-LAD, APRIL-OM), PAD s/p L SFA stent (occluded in Nov/2023 w/ distal SFA reconstitution), renal artery stenosis (Sep/2023 US), carotid artery stenosis (Aug/2023 US), HTN, HLD, T2DM (A1c 14.6%  Jan/2025); reports hx falls.  Family/Caregiver Present: No  Prior to Session Communication: Bedside nurse  Patient Position Received: Bed, 3 rail up, Alarm on  Precautions:  LE Weight Bearing Status:  (LLE WBAT)  Medical Precautions: Fall precautions           Pain Assessment:  Pain Assessment  Pain Assessment: 0-10  0-10 (Numeric) Pain Score: 0 - No pain    Objective   Cognition:  Overall Cognitive Status: Impaired  Arousal/Alertness: Appropriate responses to stimuli  Orientation Level: Disoriented to place, Disoriented to time, Disoriented to situation  Following Commands: Follows one step commands with increased time  Memory: Exceptions to WFL  Short-Term Memory:  (pt asking where she can buy a cigarette, IMP STM, talking about family members not present, reported at her address for location. pleasantly confused, redirectable to task with cues)  Insight: Moderate  Impulsive: Mildly  Activities of Daily Living: Feeding  Feeding Level of Assistance:  (mod A open packets and setup for food, pt able to self feed I setup, mod A open creamers and sugar coffee)          Bed Mobility/Transfers: Bed Mobility  Bed Mobility:  (boost in bed max A x2 re positioned to neutral with HOB elevated to increase I feeding. pt performed long sitting 2x SBA cues for wound vac)       Cognitive Skill Development:  Cognitive Skill Development  Cognitive Skill Development Activity 1: OT re oriented pt x3, education on setup fodo    Outcome Measures:  [facility] Daily Activity  Putting on and taking off regular lower body clothing: A lot  Bathing (including washing, rinsing, drying): A lot  Putting on and taking off regular upper body clothing: A little  Toileting, which includes using toilet, bedpan or urinal: A lot  Taking care of personal grooming such as brushing teeth: A little  Eating Meals: A little  Daily Activity - Total Score: 15        Education Documentation  Body Mechanics, taught by Cynthia Del Castillo, OT at 3/11/2025  3:04  PM.  Learner: Patient  Readiness: Acceptance  Method: Explanation, Demonstration  Response: Verbalizes Understanding, Needs Reinforcement  Comment: ADLs    Precautions, taught by Cynthia Del Castillo OT at 3/11/2025  3:04 PM.  Learner: Patient  Readiness: Acceptance  Method: Explanation, Demonstration  Response: Verbalizes Understanding, Needs Reinforcement  Comment: ADLs    ADL Training, taught by Cynthia Del Castillo OT at 3/11/2025  3:04 PM.  Learner: Patient  Readiness: Acceptance  Method: Explanation, Demonstration  Response: Verbalizes Understanding, Needs Reinforcement  Comment: ADLs    Education Comments  No comments found.      Goals:  Encounter Problems       Encounter Problems (Active)       ADLs       Patient with complete upper body dressing with independent level of assistance donning and doffing all UE clothes with no adaptive equipment while edge of bed. (Progressing)       Start:  03/09/25    Expected End:  03/30/25            Patient with complete lower body dressing with minimal assist  level of assistance donning and doffing all LE clothes  with PRN adaptive equipment while edge of bed. (Progressing)       Start:  03/09/25    Expected End:  03/30/25            Patient will complete daily grooming tasks brushing teeth and washing face/hair with supervision level of assistance while edge of bed  and standing at sink. (Progressing)       Start:  03/09/25    Expected End:  03/30/25            Patient will complete toileting including hygiene clothing management/hygiene with minimal assist  level of assistance and grab bars. (Progressing)       Start:  03/09/25    Expected End:  03/30/25               ADLs       Patient will perform UB and LB bathing  with stand by assist level of assistance  (Progressing)       Start:  03/11/25    Expected End:  04/01/25            Patient will feed self with independent level of assistance  (Progressing)       Start:  03/11/25    Expected End:  04/01/25                BALANCE       Patient will tolerate standing for 8 minutes to minimal assist  level of assistance with least restrictive device in order to improve functional activity tolerance for ADL tasks. (Progressing)       Start:  03/09/25    Expected End:  03/30/25               COGNITION/SAFETY       Patient will score WFL on standardized cognitive assessment  within reasonable time frame (Progressing)       Start:  03/09/25    Expected End:  03/30/25               MOBILITY       Patient will perform Functional mobility mod  Household distances/Community Distances with minimal assist  level of assistance and least restrictive device in order to improve safety and functional mobility. (Progressing)       Start:  03/09/25    Expected End:  03/30/25

## 2025-03-12 VITALS
DIASTOLIC BLOOD PRESSURE: 81 MMHG | SYSTOLIC BLOOD PRESSURE: 160 MMHG | WEIGHT: 131.17 LBS | TEMPERATURE: 98.8 F | RESPIRATION RATE: 18 BRPM | HEIGHT: 63 IN | OXYGEN SATURATION: 100 % | BODY MASS INDEX: 23.24 KG/M2 | HEART RATE: 81 BPM

## 2025-03-12 PROBLEM — I50.9 CHF (CONGESTIVE HEART FAILURE): Status: RESOLVED | Noted: 2025-03-03 | Resolved: 2025-03-12

## 2025-03-12 LAB — GLUCOSE BLD MANUAL STRIP-MCNC: 126 MG/DL (ref 74–99)

## 2025-03-12 PROCEDURE — 2500000001 HC RX 250 WO HCPCS SELF ADMINISTERED DRUGS (ALT 637 FOR MEDICARE OP): Performed by: STUDENT IN AN ORGANIZED HEALTH CARE EDUCATION/TRAINING PROGRAM

## 2025-03-12 PROCEDURE — 99239 HOSP IP/OBS DSCHRG MGMT >30: CPT | Performed by: STUDENT IN AN ORGANIZED HEALTH CARE EDUCATION/TRAINING PROGRAM

## 2025-03-12 PROCEDURE — 82947 ASSAY GLUCOSE BLOOD QUANT: CPT

## 2025-03-12 PROCEDURE — 2500000002 HC RX 250 W HCPCS SELF ADMINISTERED DRUGS (ALT 637 FOR MEDICARE OP, ALT 636 FOR OP/ED): Performed by: STUDENT IN AN ORGANIZED HEALTH CARE EDUCATION/TRAINING PROGRAM

## 2025-03-12 RX ORDER — OXYCODONE HYDROCHLORIDE 5 MG/1
2.5 TABLET ORAL EVERY 6 HOURS PRN
Qty: 6 TABLET | Refills: 0 | Status: SHIPPED | OUTPATIENT
Start: 2025-03-12 | End: 2025-03-15

## 2025-03-12 RX ORDER — LINEZOLID 600 MG/1
600 TABLET, FILM COATED ORAL EVERY 12 HOURS SCHEDULED
Start: 2025-03-12 | End: 2025-04-17

## 2025-03-12 RX ORDER — VIT C/E/ZN/COPPR/LUTEIN/ZEAXAN 250MG-90MG
50 CAPSULE ORAL DAILY
Start: 2025-03-12 | End: 2025-04-11

## 2025-03-12 RX ORDER — QUETIAPINE FUMARATE 25 MG/1
25 TABLET, FILM COATED ORAL NIGHTLY
Start: 2025-03-12

## 2025-03-12 RX ORDER — NIFEDIPINE 90 MG/1
90 TABLET, EXTENDED RELEASE ORAL
Qty: 30 TABLET | Refills: 0 | Status: SHIPPED | OUTPATIENT
Start: 2025-03-12 | End: 2025-04-11

## 2025-03-12 RX ORDER — CIPROFLOXACIN 500 MG/1
500 TABLET ORAL EVERY 12 HOURS SCHEDULED
Start: 2025-03-12 | End: 2025-04-17

## 2025-03-12 RX ORDER — RIVAROXABAN 2.5 MG/1
2.5 TABLET, FILM COATED ORAL
Start: 2025-03-12

## 2025-03-12 RX ORDER — INSULIN LISPRO 100 [IU]/ML
2 INJECTION, SOLUTION INTRAVENOUS; SUBCUTANEOUS
Start: 2025-03-12 | End: 2025-04-11

## 2025-03-12 RX ORDER — INSULIN GLARGINE 100 [IU]/ML
16 INJECTION, SOLUTION SUBCUTANEOUS NIGHTLY
Start: 2025-03-12

## 2025-03-12 RX ORDER — HYDRALAZINE HYDROCHLORIDE 50 MG/1
50 TABLET, FILM COATED ORAL 3 TIMES DAILY
Start: 2025-03-12

## 2025-03-12 RX ADMIN — NIFEDIPINE 90 MG: 90 TABLET, FILM COATED, EXTENDED RELEASE ORAL at 09:54

## 2025-03-12 RX ADMIN — LINEZOLID 600 MG: 600 TABLET, FILM COATED ORAL at 09:54

## 2025-03-12 RX ADMIN — RANOLAZINE 500 MG: 500 TABLET, EXTENDED RELEASE ORAL at 10:16

## 2025-03-12 RX ADMIN — RIVAROXABAN 2.5 MG: 2.5 TABLET, FILM COATED ORAL at 09:54

## 2025-03-12 RX ADMIN — ACETAMINOPHEN 650 MG: 325 TABLET ORAL at 06:11

## 2025-03-12 RX ADMIN — CIPROFLOXACIN HYDROCHLORIDE 500 MG: 500 TABLET, FILM COATED ORAL at 09:55

## 2025-03-12 RX ADMIN — INSULIN LISPRO 2 UNITS: 100 INJECTION, SOLUTION INTRAVENOUS; SUBCUTANEOUS at 09:55

## 2025-03-12 RX ADMIN — ISOSORBIDE MONONITRATE 60 MG: 30 TABLET, EXTENDED RELEASE ORAL at 09:55

## 2025-03-12 RX ADMIN — CLOPIDOGREL BISULFATE 75 MG: 75 TABLET ORAL at 09:54

## 2025-03-12 RX ADMIN — PANTOPRAZOLE SODIUM 40 MG: 40 TABLET, DELAYED RELEASE ORAL at 06:11

## 2025-03-12 RX ADMIN — HYDRALAZINE HYDROCHLORIDE 50 MG: 50 TABLET ORAL at 09:55

## 2025-03-12 RX ADMIN — CARVEDILOL 25 MG: 12.5 TABLET, FILM COATED ORAL at 09:54

## 2025-03-12 ASSESSMENT — PAIN SCALES - GENERAL: PAINLEVEL_OUTOF10: 2

## 2025-03-12 NOTE — DISCHARGE SUMMARY
Date of Admission: 2/28/2025    Date of Discharge: 3/12/2025    Discharge Diagnosis  Critical limb ishcemia  Dry gangrene (Multi)  Skin and soft tissue infection  Diabetes with hyperglycemia  Acute urinary retention  Cystic ovarian mass  Delirium        Discharge Meds     Your medication list        START taking these medications        Instructions Last Dose Given Next Dose Due   ciprofloxacin 500 mg tablet  Commonly known as: Cipro      Take 1 tablet (500 mg) by mouth every 12 hours. Tentative end date is 4/17/25       glucagon 1 mg injection  Commonly known as: Glucagen      Inject 1 mg into the muscle every 15 minutes if needed for other (<70 and cannot take oral due to altered mentation).       linezolid 600 mg tablet  Commonly known as: Zyvox      Take 1 tablet (600 mg) by mouth every 12 hours. Tentative end date is 4/17/25       NIFEdipine ER 90 mg 24 hr tablet  Commonly known as: Adalat CC      Take 1 tablet (90 mg) by mouth once daily in the morning. Take before meals. Do not crush, chew, or split.       oxyCODONE 5 mg immediate release tablet  Commonly known as: Roxicodone  Replaces: oxyCODONE 5 mg immediate release capsule      Take 0.5 tablets (2.5 mg) by mouth every 6 hours if needed for severe pain (7 - 10) or moderate pain (4 - 6) for up to 3 days.       QUEtiapine 25 mg tablet  Commonly known as: SEROquel      Take 1 tablet (25 mg) by mouth once daily at bedtime. Eval for GDR within 2 weeks of discharge       rivaroxaban 2.5 mg tablet  Commonly known as: Xarelto      Take 1 tablet (2.5 mg) by mouth 2 times daily (morning and late afternoon).              CHANGE how you take these medications        Instructions Last Dose Given Next Dose Due   hydrALAZINE 50 mg tablet  Commonly known as: Apresoline  What changed:   medication strength  how much to take  when to take this      Take 1 tablet (50 mg) by mouth 3 times a day.       insulin lispro 100 unit/mL injection  What changed: Another medication with  I called patient today for tobacco cessation follow up, she had missed her intake appointment so I called to offer her an opportunity to reschedule. She stated that she does want to reschedule but her sister had just  this morning and she is out of town. She will call me back to reschedule.      "the same name was added. Make sure you understand how and when to take each.      Inject 0-5 Units under the skin 3 times daily (morning, midday, late afternoon). 0 unit(s) if BG ; 1 unit(s) if -200; 2 unit(s) if -250; 3 unit(s) if -300; 4 unit(s) if -350; 5 unit(s) if -400       insulin lispro 100 unit/mL injection  What changed: You were already taking a medication with the same name, and this prescription was added. Make sure you understand how and when to take each.      Inject 2 Units under the skin 3 times a day before meals. Take as directed per insulin instructions.              CONTINUE taking these medications        Instructions Last Dose Given Next Dose Due   Accu-Chek Jennifer Plus test strp strip  Generic drug: blood sugar diagnostic      Use 1 strip to check blood sugar 3 times a day with meals.       Accu-Chek Softclix Lancets misc  Generic drug: lancets      Use three times a day to test blood sugar w/ meals       acetaminophen 500 mg tablet  Commonly known as: Tylenol      Take 1 tablet (500 mg) by mouth every 6 hours if needed for mild pain (1 - 3) or moderate pain (4 - 6). Take per directed       atorvastatin 80 mg tablet  Commonly known as: Lipitor      TAKE 1 TABLET BY MOUTH ONCE DAILY       BD Ultra-Fine Micro Pen Needle 32 gauge x 1/4\" needle  Generic drug: pen needle, diabetic           CALCIUM CITRATE ORAL           carvedilol 25 mg tablet  Commonly known as: Coreg      TAKE 1 TABLET BY MOUTH TWO TIMES A DAY       cholecalciferol 25 MCG (1000 UT) capsule  Commonly known as: Vitamin D-3      Take 2 capsules (50 mcg) by mouth once daily.       clopidogrel 75 mg tablet  Commonly known as: Plavix           diclofenac sodium 1 % gel  Commonly known as: Voltaren      Apply 4.5 inches (4 g) topically 4 times a day as needed (pain).       Easy Touch Alcohol Prep Pads pads, medicated  Generic drug: alcohol swabs           Farxiga 10 mg  Generic drug: dapagliflozin " propanediol      Take 1 tablet (10 mg) by mouth once daily with breakfast.       gabapentin 300 mg capsule  Commonly known as: Neurontin           insulin glargine 100 unit/mL injection  Commonly known as: Lantus      Inject 16 Units under the skin once daily at bedtime. Take as directed per insulin instructions.       isosorbide mononitrate ER 60 mg 24 hr tablet  Commonly known as: Imdur      TAKE 1 TABLET BY MOUTH ONCE DAILY       metFORMIN  mg 24 hr tablet  Commonly known as: Glucophage-XR      TAKE 2 TABLETS BY MOUTH ONCE DAILY       multivitamin with minerals tablet           ranolazine 500 mg 12 hr tablet  Commonly known as: Ranexa      Take 1 tablet (500 mg) by mouth 2 times a day. Do not crush, chew, or split.              STOP taking these medications      amLODIPine 10 mg tablet  Commonly known as: Norvasc        aspirin 81 mg EC tablet        oxyCODONE 5 mg immediate release capsule  Commonly known as: Oxy-IR  Replaced by: oxyCODONE 5 mg immediate release tablet                  Where to Get Your Medications        These medications were sent to [facility]  [address], [address] [address]      Hours: 8AM to 6PM Mon-Fri, 8AM to 4PM Sat, 9AM to 1PM Sun Phone: [phone]   glucagon 1 mg injection  NIFEdipine ER 90 mg 24 hr tablet       You can get these medications from any pharmacy    Bring a paper prescription for each of these medications  oxyCODONE 5 mg immediate release tablet       Information about where to get these medications is not yet available    Ask your nurse or doctor about these medications  cholecalciferol 25 MCG (1000 UT) capsule  ciprofloxacin 500 mg tablet  hydrALAZINE 50 mg tablet  insulin glargine 100 unit/mL injection  insulin lispro 100 unit/mL injection  linezolid 600 mg tablet  QUEtiapine 25 mg tablet  rivaroxaban 2.5 mg tablet         Test Results Pending At Discharge  Pending Labs       Order Current Status    Surgical Pathology Exam  In process            Hospital Course  Myrna Khan is a 65 y.o. 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 Pt noting skin sloughing, edema, clear drainage. At op visit, difficulty palpating pulse, had a monophasic AT and PT signals on the LLE and no palpable femoral pulse. CTA showed occlusion of common and external iliac arteries in addition to occluded SFA stent with distal reconstruction. Podiatry consulted, recommended no interventions. Vascular surgery consulted. Pt is started on broad spectrum abx and ordered pvr and vein mapping. S/p left femoral and profunda endarterectomy, left external iliac artery stent 3/6 with vascular surgery.  Pt developed urinary retention and Malone was placed.  Voiding trial done on 3/7 and patient failed and Malone had to be replaced.  MRSA nares swab done on 3/8. Final abx plan for linezolid/cipro for 6 week with weekly labs faxed to ID and ID follow up. Pt to continue prevena wound vac until 3/16 per [facility] and has 2 week follow up. . Discharge plan of care discussed, education and counseling provided involving active problem care plan, warning signs,  risks/benefits of new medications or medication changes, follow-up care and testing.  Patient advised to follow-up with her primary care within 1 week of discharge or the next available for posthospitalization transition of care.  There was verbalized understanding and agreement with discharge care plan.    Pertinent Physical Exam At Time of Discharge  On the day of discharge, No acute distress, interactive, appears thin, no increased work of breathing, regular rate and rhythm, abdomen soft/nontender, denuded skin le,  erythema wrapped in ace, wound vac in groin.     Outpatient Follow-Up  Future Appointments   Date Time Provider Department Center   3/28/2025  2:20 PM Bobby Pederson MD [id number] [facility]   3/31/2025  2:30 PM Rachana Oh DPM [id number] [facility]   [facility]   4/11/2025 11:00 AM [facility] [address] [place], [place] [id number] [facility]   4/11/2025  1:00 PM Bobby Pederson MD [id number] [facility]   4/14/2025  1:40 PM Josee Burdick PA-C [id number] [facility] instructed to take all medications as prescribed.  Keep all follow-up appointments.  Contact their primary care physician with any questions or concerns that arise.  Come to the emergency department with worsening of your symptoms or any other medical emergency.     Time spent >30 minutes on discharge management.    Odilon Khalil MD

## 2025-03-12 NOTE — CARE PLAN
The patient's goals for the shift include patient pain will be controled during shift    The clinical goals for the shift include pt will be free of injury by end of shift    Over the shift, the patient did make progress toward the following goals.

## 2025-03-12 NOTE — DISCHARGE INSTRUCTIONS
"You were hospitalized for vascular disease and wounds. You underwent revascularization. You were treated with antibiotics. You will continue long term antibiotics, have weekly labs draws and follow up with the infectious disease service. You will continue a wound vac until 3/16. You will continue eliquis and plavix. You have follow up with vascular surgery on      After discharge patient will need weekly CBC plus differential, CRP and BMP with results faxed to Josee Burdick PA-C at [phone]    Follow-up with primary care at the facility.     You will need ongoing wound care as directed by podiatry. Please schedule follow up with podiatry. Due to blood flow loss, you could develop worsening of your wounds or areas that turn black due to non viable areas from lack of blood flow leading to \"dead areas\".  You will need to monitor closely for signs of infection including swelling, warmth, pain, fevers, drainage among others. You may require further operative intervention depending on follow up evaluations     ***You have a cystic ovarian mass that has been seen on imaging per radiology as well in 2023. You reported you are aware of this. It is strongly needed that you follow up for evaluation with gynecology as this needs to be evaluated for a potential cancer. Please follow up with gynecology/oncology speciality as scheduled on 4/4/25.    You had urinary retention, this may be contributed by your gynecological issues. You were referred to urology to further evaluate if the day cath can be removed.    -  You can call [phone] to schedule your appointment or with any questions urology questions.    Please take all medications as prescribed and keep all follow-up appointments.  Please contact your primary care physician with any questions or concerns that arise.  You may contact your outpatient specialists office for any questions regarding specifics relating to their recommendations. Please monitor your " symptoms and come to the emergency department with worsening of your symptoms, severe chest pain, shortness of breath, or any other medical emergency or concerns for your health.

## 2025-03-12 NOTE — CARE PLAN
The patient's goals for the shift include that the patient will remain free of falls and injury.

## 2025-03-12 NOTE — PROGRESS NOTES
VASCULAR SURGERY PROGRESS NOTE  Assessment/Plan   Myrna Khan is 65 y.o. female with history of carotid stenosis, diabetes, CAD, HTN, PAD and renal artery stenosis who presents with extensive LLE wounds and intermittent rest pain.     Underwent diagnostic LLE angiogram 3/2 that demonstrated left EIA occlusion, with CFA short-segment occlusion, L SFA stent occlusion, distal SFA reconstitution, patent popliteal and AT runoff to the foot.    Medical ethics involved for decision making due to poor mental status.     Underwent  left femoral and profunda endarterectomy, left external iliac artery stent on 3/6.     Has remained stable pending discharge.     Plan:  Continue Xarelto 2.5mg BID   Podiatry consulted - no urgent intervention  Antibiotics per ID/primary  Plavix 75mg daily, discontinued aspirin  Okay for discharge from vascular perspective, will schedule for 2 week follow up with Dr. Pederson - appointment made for 3/28  Prevena to groin to stay in place until 3/16/25 - may be discharged with it and have it removed at facility, continue with dry gauze dressing after prevena removal  Additional care per primary team    D/w attending, Dr. Bg Momin MD  Vascular Surgery PGY6  Team Pager: [phone]      Subjective   No overnight issues. Offers no new complaints.    Objective   Vitals:  Heart Rate:  [71-88]   Temp:  [35.9 °C (96.6 °F)-37.1 °C (98.8 °F)]   Resp:  [12-20]   BP: (133-160)/(71-89)   Weight:  [59.5 kg (131 lb 2.8 oz)]   SpO2:  [100 %]     Exam:  Constitutional: no acute distress  Skin: warm and dry overall   Cardiac: Regular rate and rhythm  Pulmonary: Unlabored respirations on room air  Abdomen: Non distended, non tender  Extremities: PAINTING. RLE dry. LLE with dry gangrene on the dorsal aspect of her foot to her mid shin, no sensation at that site. Dressing in place.  Surgical wounds: right groin puncture site no hematoma or swelling; left groin surgical site with Prevena in place with good seal    Vascular: bilateral DP/PT signals    Labs:  Results from last 7 days   Lab Units 03/10/25  0812 03/09/25  1043 03/08/25  0722   WBC AUTO x10*3/uL 15.2* 14.4* 16.8*   HEMOGLOBIN g/dL 9.1* 7.8* 9.2*   PLATELETS AUTO x10*3/uL 382 323 327      Results from last 7 days   Lab Units 03/10/25  0812 03/09/25  1043 03/08/25  0722 03/07/25  0529   SODIUM mmol/L 136 131* 132* 133*   POTASSIUM mmol/L 3.6 4.0 3.7 4.9   CHLORIDE mmol/L 106 104 103 105   CO2 mmol/L 24 22 25 21   BUN mg/dL 14 16 14 16   CREATININE mg/dL 0.80 0.79 0.63 0.79   GLUCOSE mg/dL 86 185* 198* 259*   MAGNESIUM mg/dL  --   --   --  1.80   PHOSPHORUS mg/dL 3.0 2.8 2.4* 4.9      Results from last 7 days   Lab Units 03/06/25 2124   INR  1.3*   PROTIME seconds 14.4*   APTT seconds 37*

## 2025-03-12 NOTE — H&P (VIEW-ONLY)
VASCULAR SURGERY PROGRESS NOTE  Assessment/Plan   Myrna Khan is 65 y.o. female with history of carotid stenosis, diabetes, CAD, HTN, PAD and renal artery stenosis who presents with extensive LLE wounds and intermittent rest pain.     Underwent diagnostic LLE angiogram 3/2 that demonstrated left EIA occlusion, with CFA short-segment occlusion, L SFA stent occlusion, distal SFA reconstitution, patent popliteal and AT runoff to the foot.    Medical ethics involved for decision making due to poor mental status.     Underwent  left femoral and profunda endarterectomy, left external iliac artery stent on 3/6.     Has remained stable pending discharge.     Plan:  Continue Xarelto 2.5mg BID   Podiatry consulted - no urgent intervention  Antibiotics per ID/primary  Plavix 75mg daily, discontinued aspirin  Okay for discharge from vascular perspective, will schedule for 2 week follow up with Dr. Pederson - appointment made for 3/28  Prevena to groin to stay in place until 3/16/25 - may be discharged with it and have it removed at facility, continue with dry gauze dressing after prevena removal  Additional care per primary team    D/w attending, Dr. Bg Momin MD  Vascular Surgery PGY6  Team Pager: [phone]      Subjective   No overnight issues. Offers no new complaints.    Objective   Vitals:  Heart Rate:  [71-88]   Temp:  [35.9 °C (96.6 °F)-37.1 °C (98.8 °F)]   Resp:  [12-20]   BP: (133-160)/(71-89)   Weight:  [59.5 kg (131 lb 2.8 oz)]   SpO2:  [100 %]     Exam:  Constitutional: no acute distress  Skin: warm and dry overall   Cardiac: Regular rate and rhythm  Pulmonary: Unlabored respirations on room air  Abdomen: Non distended, non tender  Extremities: PAINTING. RLE dry. LLE with dry gangrene on the dorsal aspect of her foot to her mid shin, no sensation at that site. Dressing in place.  Surgical wounds: right groin puncture site no hematoma or swelling; left groin surgical site with Prevena in place with good seal    Vascular: bilateral DP/PT signals    Labs:  Results from last 7 days   Lab Units 03/10/25  0812 03/09/25  1043 03/08/25  0722   WBC AUTO x10*3/uL 15.2* 14.4* 16.8*   HEMOGLOBIN g/dL 9.1* 7.8* 9.2*   PLATELETS AUTO x10*3/uL 382 323 327      Results from last 7 days   Lab Units 03/10/25  0812 03/09/25  1043 03/08/25  0722 03/07/25  0529   SODIUM mmol/L 136 131* 132* 133*   POTASSIUM mmol/L 3.6 4.0 3.7 4.9   CHLORIDE mmol/L 106 104 103 105   CO2 mmol/L 24 22 25 21   BUN mg/dL 14 16 14 16   CREATININE mg/dL 0.80 0.79 0.63 0.79   GLUCOSE mg/dL 86 185* 198* 259*   MAGNESIUM mg/dL  --   --   --  1.80   PHOSPHORUS mg/dL 3.0 2.8 2.4* 4.9      Results from last 7 days   Lab Units 03/06/25 2124   INR  1.3*   PROTIME seconds 14.4*   APTT seconds 37*

## 2025-03-12 NOTE — CARE PLAN
The patient's goals for the shift include patient pain will be controled during shift    The clinical goals for the shift include pt will remain free of falls and injury.

## 2025-03-14 NOTE — DOCUMENTATION CLARIFICATION NOTE
"    PATIENT:               TANNER PIKE  ACCT #:                  [id number]  MRN:                       [id number]  :                       1960  ADMIT DATE:       2025 4:37 PM  DISCH DATE:        3/12/2025 11:26 AM  RESPONDING PROVIDER #:        [id number]          PROVIDER RESPONSE TEXT:    I agree with dietician diagnosis of severe malnutrition related to chronic disease or condition on 3/11/25    CDI QUERY TEXT:    Clarification    Instruction:    Based on your assessment of the patient and the clinical information, please provide the requested documentation by clicking on the appropriate radio button and enter any additional information if prompted.    Question: Please further clarify this patient nutritional status as    When answering this query, please exercise your independent professional judgment. The fact that a question is being asked, does not imply that any particular answer is desired or expected.    The patient's clinical indicators include:  Clinical Information: 66 y/o female presented with dry gangrene of LLE.    Clinical Indicators: 3/11 Nutrition Consult Note revealed \"PMH of heart failure, CAD s/p CABG in , PAD, renal artery stenosis,carotid artery stenosis, HTN, HLD, T2DM...Nutrition Focused Physical Exam Findings:  Subcutaneous Fat Loss:  -Orbital Fat Pads: Mild-Moderate (slight dark circles and slight hollowing)  -Buccal Fat Pads: Severe (hollow, sunken and narrow face)  Muscle Wasting:  -Temporalis: Mild-Moderate (slight depression)  -Pectoralis (Clavicular Region): Severe (protruding prominent clavicle)  -Deltoid/Trapezius: Mild-Moderate (slight protrusion of acromion process)  -Quadriceps: Severe (depressions on inner and outer thigh)  -Gastrocnemius: Mild-Moderate (not well developed muscle)  Edema:  -Edema: +1 trace  -Edema Location: Generalized  Physical Findings:  -Skin: Positive (Pretibial L wound, incision leg L, puncture wound groin R - wound vac)...  Malnutrition " "Diagnosis: Severe malnutrition related to chronic disease or condition  Related to: inadequate protein-energy intake  As Evidenced by: suspect patient meeting </=75% of estimated energy needs for >/=1 month; moderate-severe muscle and subcutaneous fat loss...Nutrition Prescription:  -Consider liberalizing diet to regular to promote PO intake.  -Diet texture and consistency per SLP.  -Glucerna Shake TID (220 kcal, 10 g protein each).\"    Treatment: Nutrition Consult. Glucerna Shake TID (220 kcal, 10 g protein each)    Risk Factors: Multiple chronic medical conditions with moderate-severe muscle and subcutaneous fat loss.  Options provided:  -- I agree with dietician diagnosis of severe malnutrition related to chronic disease or condition on 3/11/25  -- Other - I will add my own diagnosis  -- Refer to Clinical Documentation Reviewer    Query created by: Kashmir Callahan Jr on 3/12/2025 11:11 AM      Electronically signed by:  DAMI EVERETT MD 3/14/2025 8:37 AM          "

## 2025-03-19 LAB
LABORATORY COMMENT REPORT: NORMAL
PATH REPORT.FINAL DX SPEC: NORMAL
PATH REPORT.GROSS SPEC: NORMAL
PATH REPORT.RELEVANT HX SPEC: NORMAL
PATH REPORT.TOTAL CANCER: NORMAL

## 2025-03-25 ENCOUNTER — HOSPITAL ENCOUNTER (EMERGENCY)
Facility: HOSPITAL | Age: 65
Discharge: HOME | End: 2025-03-26
Attending: EMERGENCY MEDICINE
Payer: COMMERCIAL

## 2025-03-25 ENCOUNTER — TELEPHONE (OUTPATIENT)
Dept: VASCULAR SURGERY | Facility: HOSPITAL | Age: 65
End: 2025-03-25

## 2025-03-25 VITALS
BODY MASS INDEX: 19.49 KG/M2 | DIASTOLIC BLOOD PRESSURE: 70 MMHG | RESPIRATION RATE: 16 BRPM | WEIGHT: 110 LBS | SYSTOLIC BLOOD PRESSURE: 127 MMHG | HEART RATE: 86 BPM | HEIGHT: 63 IN | OXYGEN SATURATION: 100 % | TEMPERATURE: 98.4 F

## 2025-03-25 DIAGNOSIS — K52.1 DIARRHEA DUE TO DRUG: Primary | ICD-10-CM

## 2025-03-25 DIAGNOSIS — M79.605 LEG PAIN, DIFFUSE, LEFT: ICD-10-CM

## 2025-03-25 LAB
ALBUMIN SERPL BCP-MCNC: 2.6 G/DL (ref 3.4–5)
ANION GAP SERPL CALC-SCNC: 16 MMOL/L (ref 10–20)
BASOPHILS # BLD AUTO: 0.04 X10*3/UL (ref 0–0.1)
BASOPHILS NFR BLD AUTO: 0.4 %
BUN SERPL-MCNC: 14 MG/DL (ref 6–23)
CALCIUM SERPL-MCNC: 8 MG/DL (ref 8.6–10.6)
CHLORIDE SERPL-SCNC: 105 MMOL/L (ref 98–107)
CO2 SERPL-SCNC: 20 MMOL/L (ref 21–32)
CREAT SERPL-MCNC: 0.84 MG/DL (ref 0.5–1.05)
EGFRCR SERPLBLD CKD-EPI 2021: 77 ML/MIN/1.73M*2
EOSINOPHIL # BLD AUTO: 0.07 X10*3/UL (ref 0–0.7)
EOSINOPHIL NFR BLD AUTO: 0.6 %
ERYTHROCYTE [DISTWIDTH] IN BLOOD BY AUTOMATED COUNT: 14.6 % (ref 11.5–14.5)
GLUCOSE SERPL-MCNC: 144 MG/DL (ref 74–99)
HCT VFR BLD AUTO: 25.1 % (ref 36–46)
HGB BLD-MCNC: 8.6 G/DL (ref 12–16)
IMM GRANULOCYTES # BLD AUTO: 0.09 X10*3/UL (ref 0–0.7)
IMM GRANULOCYTES NFR BLD AUTO: 0.8 % (ref 0–0.9)
LYMPHOCYTES # BLD AUTO: 2.55 X10*3/UL (ref 1.2–4.8)
LYMPHOCYTES NFR BLD AUTO: 23.6 %
MCH RBC QN AUTO: 29.7 PG (ref 26–34)
MCHC RBC AUTO-ENTMCNC: 34.3 G/DL (ref 32–36)
MCV RBC AUTO: 87 FL (ref 80–100)
MONOCYTES # BLD AUTO: 0.3 X10*3/UL (ref 0.1–1)
MONOCYTES NFR BLD AUTO: 2.8 %
NEUTROPHILS # BLD AUTO: 7.77 X10*3/UL (ref 1.2–7.7)
NEUTROPHILS NFR BLD AUTO: 71.8 %
NRBC BLD-RTO: 0.2 /100 WBCS (ref 0–0)
PHOSPHATE SERPL-MCNC: 3.5 MG/DL (ref 2.5–4.9)
PLATELET # BLD AUTO: 194 X10*3/UL (ref 150–450)
POTASSIUM SERPL-SCNC: 3.8 MMOL/L (ref 3.5–5.3)
RBC # BLD AUTO: 2.9 X10*6/UL (ref 4–5.2)
SODIUM SERPL-SCNC: 137 MMOL/L (ref 136–145)
WBC # BLD AUTO: 10.8 X10*3/UL (ref 4.4–11.3)

## 2025-03-25 PROCEDURE — 85025 COMPLETE CBC W/AUTO DIFF WBC: CPT

## 2025-03-25 PROCEDURE — 96360 HYDRATION IV INFUSION INIT: CPT

## 2025-03-25 PROCEDURE — 2500000001 HC RX 250 WO HCPCS SELF ADMINISTERED DRUGS (ALT 637 FOR MEDICARE OP)

## 2025-03-25 PROCEDURE — 99284 EMERGENCY DEPT VISIT MOD MDM: CPT | Mod: 25 | Performed by: EMERGENCY MEDICINE

## 2025-03-25 PROCEDURE — 36415 COLL VENOUS BLD VENIPUNCTURE: CPT

## 2025-03-25 PROCEDURE — 2500000004 HC RX 250 GENERAL PHARMACY W/ HCPCS (ALT 636 FOR OP/ED)

## 2025-03-25 PROCEDURE — 80069 RENAL FUNCTION PANEL: CPT

## 2025-03-25 RX ORDER — OXYCODONE HYDROCHLORIDE 5 MG/1
5 TABLET ORAL ONCE
Status: COMPLETED | OUTPATIENT
Start: 2025-03-25 | End: 2025-03-25

## 2025-03-25 RX ADMIN — SODIUM CHLORIDE, SODIUM LACTATE, POTASSIUM CHLORIDE, AND CALCIUM CHLORIDE 500 ML: .6; .31; .03; .02 INJECTION, SOLUTION INTRAVENOUS at 20:05

## 2025-03-25 RX ADMIN — OXYCODONE 5 MG: 5 TABLET ORAL at 20:05

## 2025-03-25 ASSESSMENT — LIFESTYLE VARIABLES
HAVE YOU EVER FELT YOU SHOULD CUT DOWN ON YOUR DRINKING: NO
TOTAL SCORE: 0
EVER HAD A DRINK FIRST THING IN THE MORNING TO STEADY YOUR NERVES TO GET RID OF A HANGOVER: NO
HAVE PEOPLE ANNOYED YOU BY CRITICIZING YOUR DRINKING: NO
EVER FELT BAD OR GUILTY ABOUT YOUR DRINKING: NO

## 2025-03-25 ASSESSMENT — PAIN DESCRIPTION - PAIN TYPE: TYPE: SURGICAL PAIN

## 2025-03-25 ASSESSMENT — PAIN DESCRIPTION - LOCATION: LOCATION: LEG

## 2025-03-25 ASSESSMENT — PAIN DESCRIPTION - ORIENTATION: ORIENTATION: LEFT

## 2025-03-25 ASSESSMENT — PAIN SCALES - GENERAL
PAINLEVEL_OUTOF10: 9
PAINLEVEL_OUTOF10: 0 - NO PAIN

## 2025-03-25 ASSESSMENT — PAIN - FUNCTIONAL ASSESSMENT
PAIN_FUNCTIONAL_ASSESSMENT: 0-10
PAIN_FUNCTIONAL_ASSESSMENT: 0-10

## 2025-03-25 NOTE — ED PROVIDER NOTES
"Emergency Department Provider Note        History of Present Illness     History provided by: Patient  Limitations to History: None  External Records Reviewed with Brief Summary: Discharge Summary from [facility] on 3/12/25  which showed that she was initially admitted for dry gangrene. Started on antibiotics and vascular surgery was consutled, whom performed an endarterectomy of the L femoral and profunda arteries with L external iliac stent on 3/6. MRSA swab showed colonization so 6-week course of cipro/linezolid was started. Discharged on the regimen and instructed to follow up with primary care doctor.      HPI:  Myrna Khan is a 65 y.o. female with past medical history of HFpEF, CAD status post CABG in 2023, PAD status post left SFA stent, renal artery stenosis, carotid artery stenosis, hypertension, hyperlipidemia, diabetes, dry gangrene who presents to the emergency department with complaints of pain in her left leg.  Reports that her pain has gradually become worse since being discharged from the hospital on 3/12/2025.  Endorses some \"white\" drainage from her lower leg wound, but is unclear if it is pus.  Denies fever/chills, nausea/vomiting, constipation.  Endorses diarrhea, which she later endorsed she had 5 episodes of it over the past 5 days and would consider this a secondary chief complaint as well.    Physical Exam   Triage vitals:  T 36.4 °C (97.6 °F)  HR 79  /72  RR 16  O2 100 % None (Room air)    General: Awake, alert, in no acute distress  Eyes: Gaze conjugate.  No scleral icterus or injection  HENT: Normo-cephalic, atraumatic. No stridor  CV: Regular rate, regular rhythm. Radial pulses 2+ bilaterally  Resp: Breathing non-labored, speaking in full sentences.  Clear to auscultation bilaterally  GI: Soft, non-distended, non-tender. No rebound or guarding.  : Left endarterectomy surgical site closed with staples.  Clean dry and intact.  No surrounding erythema or edema.  " Some granulation tissue noted in the superior portion of the wound.  Notably there is a globular region of swelling medial to the wound where there is some fluctuance.  MSK/Extremities: No gross bony deformities. Moving all extremities except for L leg.  Skin: Warm. Appropriate color. Dry gangrene present over anterior left leg.  Neuro: Alert. Oriented. Face symmetric. Speech is fluent.  Gross strength and sensation intact in b/l UE and R LE.  Lacks sensation below the left tibial tuberosity.  Psych: Appropriate mood and affect    Medical Decision Making & ED Course   Medical Decision Makin y.o. female with a complex cardiac medical history who presents with left leg pain and diarrhea concerning for claudication versus reinfection versus possible C. difficile infection.  CBC with differential, RFP, and stool C. difficile ordered to assess for possibility of infection despite lack of constitutional symptoms.  500 cc bolus of LR ordered given clinical assessment of decreased volume.  Oxycodone 5 mg ordered for pain control.  Labs not indicative of infection.  Report by nursing staff revealed that diarrhea was runny but did not have a smell characteristic of C. difficile infection.  Patient remained afebrile with stable vital signs.  Decision was made to discharge patient.  ----  Differential diagnoses considered include but are not limited to: Claudication, reinfection, C. difficile, femoral aneurysm, dry gangrene with possible conversion to wet gangrene       Independent Result Review and Interpretation: Relevant laboratory and radiographic results were reviewed and independently interpreted by myself.  As necessary, they are commented on in the ED Course.    Chronic conditions affecting the patient's care: As documented above in [facility]    Care Considerations: As documented above in [facility]    ED Course:  ED Course as of 03/26/25 0125   Tue Mar 25, 2025   1916 ATTENDING ATTESTATION  65 y.o. female w/ PMHx of [facility]  (EF 55% July/2024, 35-40% in Aug/2023), CAD s/p CABG in 2023 (LIMA-LAD, APRLI-OM), PAD s/p L SFA stent (occluded in Nov/2023 w/ distal SFA reconstitution), renal artery stenosis (Sep/2023 US), carotid artery stenosis (Aug/2023 US), HTN, HLD, T2DM (A1c 14.6% Jan/2025) who was adm for critical limb ischemia found to have occluded SFA stent with endarterectomy placed on 6-week course of home antibiotics on linezolid and ciprofloxacin with outpatient follow-up with vascular surgery and infectious disease presenting to the emergency department via EMS with a complaint of worsened leg wounds.   On examination the patient's wound dressing was taken down and she has obvious dry gangrene with desiccation of the left lower extremity principally to the shin region, there are some preservation of the foot tissues distally I do palpate a pulse, and the foot is not acutely cold.  There is no sign of any wound weepage or breakdown no cellulitis.  Patient's main point of complaint is actually 5 episodes of loose stool.  She is currently on the antibiotics that her skilled facility tolerating them well she has no abdominal pain benign examination soft no guarding or rigidity low suspicion for C. difficile certainly no evidence of infection we will send C. difficile toxin if the patient produces stool.  She has no constitutional symptoms no fever.  She appears slightly dehydrated with dry tongue but skin turgor is normal we will hydrate with LR and reassess.  Patient's other main complaint is mainly pain in the lower extremity we will give oxycodone and reassess.  [facility] [RH]   2020 CBC has resulted, no leukocytosis to suggest infection. Slightly anemic with Hgb at 8.6, which is down from her normal range in 2024 of between 9-10.   [KW]   2025 Patient resting more comfortably since oxycodone administration. [KW]   2214 Labs not indicative of infection. Remains afebrile and VSS. Nursing staff reports that diarrhea was runny, but not smell  that would otherwise be characteristic of Cdiff. PO challenged, will reassess soon for discharge. [KW]   2216 Patient tolerating PO. [KW]      ED Course User Index  [KW] Markel Gonzalez MD  [RH] Reggie Banks, DO         Diagnoses as of 03/26/25 0125   Diarrhea due to drug   Leg pain, diffuse, left     Disposition   As a result of the work-up, the patient was discharged home.  she was informed of her diagnosis and instructed to come back with any concerns or worsening of condition.  she and was agreeable to the plan as discussed above.  she was given the opportunity to ask questions.  All of the patient's questions were answered.    Patient seen and discussed with ED attending physician.    Markel Gonzalez MD  PGY-1       Markel Gonzalez MD  Resident  03/26/25 0128

## 2025-03-25 NOTE — ED TRIAGE NOTES
Pt to ED via EMS from [facility] for possible infection of arthroplasty of L hip. Pt had recent surgery with wound vac but it was recently removed. Pt has staples closing the wound, scheduled to come out on the 28th of march. Denies SOB, fevers, chills, chest pain. Pt endorses experiencing diarrhea x5 times over the past 5 days. Denies drainage or foul odor from surgical site. Pt vital signs stable on arrival.

## 2025-03-26 NOTE — DISCHARGE INSTRUCTIONS
Thank you for letting us take care of you, Myrna! You came into the emergency department for diarrhea due to your antibiotics and leg pain from your surgery and healing dry gangrene. In the ED,  you were given oxycodone for pain and had some blood drawn for labs because your emergency room doctors were worried about infection of your leg.  You had diarrhea a few more times in the emergency room that we did not have a chance to collect a sample to look for infection in your poop before cleaning your up.  Her labs came back and did not show that you had an infection.  Your doctors made sure that you could still eat and drink on your own before discharging you back to your facility.     If you have any fevers or chills, pus draining from anywhere on your leg, nausea/vomiting, constipation, diarrhea that smells different (i.e. way worse), please go to the ED as these may be signs that you have a new infection.    Please follow-up with your primary care doctor regarding your leg as soon as possible.  Please continue to take all your medications as prescribed previously.  Thank you for allowing us to take care of you.

## 2025-03-28 ENCOUNTER — APPOINTMENT (OUTPATIENT)
Dept: VASCULAR SURGERY | Facility: HOSPITAL | Age: 65
End: 2025-03-28
Payer: COMMERCIAL

## 2025-03-28 ENCOUNTER — OFFICE VISIT (OUTPATIENT)
Dept: VASCULAR SURGERY | Facility: HOSPITAL | Age: 65
End: 2025-03-28
Payer: COMMERCIAL

## 2025-03-28 VITALS
HEART RATE: 103 BPM | HEIGHT: 63 IN | SYSTOLIC BLOOD PRESSURE: 175 MMHG | WEIGHT: 110 LBS | DIASTOLIC BLOOD PRESSURE: 76 MMHG | BODY MASS INDEX: 19.49 KG/M2 | OXYGEN SATURATION: 100 %

## 2025-03-28 DIAGNOSIS — I73.9 PAD (PERIPHERAL ARTERY DISEASE) (CMS-HCC): Primary | ICD-10-CM

## 2025-03-28 PROCEDURE — 3078F DIAST BP <80 MM HG: CPT | Performed by: STUDENT IN AN ORGANIZED HEALTH CARE EDUCATION/TRAINING PROGRAM

## 2025-03-28 PROCEDURE — 99211 OFF/OP EST MAY X REQ PHY/QHP: CPT | Performed by: STUDENT IN AN ORGANIZED HEALTH CARE EDUCATION/TRAINING PROGRAM

## 2025-03-28 PROCEDURE — 1111F DSCHRG MED/CURRENT MED MERGE: CPT | Performed by: STUDENT IN AN ORGANIZED HEALTH CARE EDUCATION/TRAINING PROGRAM

## 2025-03-28 PROCEDURE — 3008F BODY MASS INDEX DOCD: CPT | Performed by: STUDENT IN AN ORGANIZED HEALTH CARE EDUCATION/TRAINING PROGRAM

## 2025-03-28 PROCEDURE — 1125F AMNT PAIN NOTED PAIN PRSNT: CPT | Performed by: STUDENT IN AN ORGANIZED HEALTH CARE EDUCATION/TRAINING PROGRAM

## 2025-03-28 PROCEDURE — 1159F MED LIST DOCD IN RCRD: CPT | Performed by: STUDENT IN AN ORGANIZED HEALTH CARE EDUCATION/TRAINING PROGRAM

## 2025-03-28 PROCEDURE — 3046F HEMOGLOBIN A1C LEVEL >9.0%: CPT | Performed by: STUDENT IN AN ORGANIZED HEALTH CARE EDUCATION/TRAINING PROGRAM

## 2025-03-28 PROCEDURE — 3077F SYST BP >= 140 MM HG: CPT | Performed by: STUDENT IN AN ORGANIZED HEALTH CARE EDUCATION/TRAINING PROGRAM

## 2025-03-28 ASSESSMENT — PAIN SCALES - GENERAL: PAINLEVEL_OUTOF10: 8

## 2025-03-31 ENCOUNTER — ANESTHESIA EVENT (OUTPATIENT)
Dept: OPERATING ROOM | Facility: HOSPITAL | Age: 65
End: 2025-03-31
Payer: MEDICARE

## 2025-03-31 NOTE — PROGRESS NOTES
"Pharmacy Medication History Review    Myrna Khan is a 65 y.o. female who is planned to be admitted for PAD (peripheral artery disease) (CMS-[facility]).    Per chart review, appears patient is currently living in a skilled nursing facility, plan for medication list to be sent from facility the day of procedure. Home medications can be updated day of procedure. For a medication history on the day of admission, please contact the [facility] pharmacy by calling [phone] or dough \"[facility]\".    Preferred pharmacy and allergies to be confirmed with patient by nursing the day of procedure.     Marisela Rivas  CPhT  "

## 2025-04-01 ENCOUNTER — ANESTHESIA (OUTPATIENT)
Dept: OPERATING ROOM | Facility: HOSPITAL | Age: 65
End: 2025-04-01
Payer: MEDICARE

## 2025-04-01 ENCOUNTER — HOSPITAL ENCOUNTER (INPATIENT)
Facility: HOSPITAL | Age: 65
End: 2025-04-01
Attending: STUDENT IN AN ORGANIZED HEALTH CARE EDUCATION/TRAINING PROGRAM | Admitting: STUDENT IN AN ORGANIZED HEALTH CARE EDUCATION/TRAINING PROGRAM
Payer: MEDICARE

## 2025-04-01 ENCOUNTER — APPOINTMENT (OUTPATIENT)
Dept: RADIOLOGY | Facility: HOSPITAL | Age: 65
End: 2025-04-01
Payer: COMMERCIAL

## 2025-04-01 DIAGNOSIS — Z79.4 TYPE 2 DIABETES MELLITUS WITHOUT COMPLICATION, WITH LONG-TERM CURRENT USE OF INSULIN: ICD-10-CM

## 2025-04-01 DIAGNOSIS — G54.6 PHANTOM LIMB PAIN: ICD-10-CM

## 2025-04-01 DIAGNOSIS — E11.65 HYPERGLYCEMIA DUE TO DIABETES MELLITUS (MULTI): ICD-10-CM

## 2025-04-01 DIAGNOSIS — I73.9 PAD (PERIPHERAL ARTERY DISEASE) (CMS-HCC): ICD-10-CM

## 2025-04-01 DIAGNOSIS — E11.9 TYPE 2 DIABETES MELLITUS WITHOUT COMPLICATION, WITH LONG-TERM CURRENT USE OF INSULIN: ICD-10-CM

## 2025-04-01 DIAGNOSIS — S88.112A: ICD-10-CM

## 2025-04-01 DIAGNOSIS — N89.8 VAGINAL DISCHARGE: Primary | ICD-10-CM

## 2025-04-01 DIAGNOSIS — I96 DRY GANGRENE (MULTI): ICD-10-CM

## 2025-04-01 LAB
ABO GROUP (TYPE) IN BLOOD: NORMAL
ANTIBODY SCREEN: NORMAL
GLUCOSE BLD MANUAL STRIP-MCNC: 142 MG/DL (ref 74–99)
GLUCOSE BLD MANUAL STRIP-MCNC: 201 MG/DL (ref 74–99)
GLUCOSE BLD MANUAL STRIP-MCNC: 210 MG/DL (ref 74–99)
INR PPP: 1.3 (ref 0.9–1.1)
PROTHROMBIN TIME: 14.9 SECONDS (ref 9.8–12.4)
RH FACTOR (ANTIGEN D): NORMAL

## 2025-04-01 PROCEDURE — 88311 DECALCIFY TISSUE: CPT | Performed by: PATHOLOGY

## 2025-04-01 PROCEDURE — 2720000007 HC OR 272 NO HCPCS: Performed by: STUDENT IN AN ORGANIZED HEALTH CARE EDUCATION/TRAINING PROGRAM

## 2025-04-01 PROCEDURE — 2500000001 HC RX 250 WO HCPCS SELF ADMINISTERED DRUGS (ALT 637 FOR MEDICARE OP)

## 2025-04-01 PROCEDURE — 0Y6J0Z1 DETACHMENT AT LEFT LOWER LEG, HIGH, OPEN APPROACH: ICD-10-PCS | Performed by: STUDENT IN AN ORGANIZED HEALTH CARE EDUCATION/TRAINING PROGRAM

## 2025-04-01 PROCEDURE — 36415 COLL VENOUS BLD VENIPUNCTURE: CPT | Performed by: STUDENT IN AN ORGANIZED HEALTH CARE EDUCATION/TRAINING PROGRAM

## 2025-04-01 PROCEDURE — 86923 COMPATIBILITY TEST ELECTRIC: CPT

## 2025-04-01 PROCEDURE — 7100000002 HC RECOVERY ROOM TIME - EACH INCREMENTAL 1 MINUTE: Performed by: STUDENT IN AN ORGANIZED HEALTH CARE EDUCATION/TRAINING PROGRAM

## 2025-04-01 PROCEDURE — 86901 BLOOD TYPING SEROLOGIC RH(D): CPT | Performed by: STUDENT IN AN ORGANIZED HEALTH CARE EDUCATION/TRAINING PROGRAM

## 2025-04-01 PROCEDURE — 3700000001 HC GENERAL ANESTHESIA TIME - INITIAL BASE CHARGE: Performed by: STUDENT IN AN ORGANIZED HEALTH CARE EDUCATION/TRAINING PROGRAM

## 2025-04-01 PROCEDURE — 88307 TISSUE EXAM BY PATHOLOGIST: CPT | Performed by: PATHOLOGY

## 2025-04-01 PROCEDURE — 2500000004 HC RX 250 GENERAL PHARMACY W/ HCPCS (ALT 636 FOR OP/ED): Performed by: STUDENT IN AN ORGANIZED HEALTH CARE EDUCATION/TRAINING PROGRAM

## 2025-04-01 PROCEDURE — 3700000002 HC GENERAL ANESTHESIA TIME - EACH INCREMENTAL 1 MINUTE: Performed by: STUDENT IN AN ORGANIZED HEALTH CARE EDUCATION/TRAINING PROGRAM

## 2025-04-01 PROCEDURE — 82947 ASSAY GLUCOSE BLOOD QUANT: CPT

## 2025-04-01 PROCEDURE — 2500000005 HC RX 250 GENERAL PHARMACY W/O HCPCS: Performed by: STUDENT IN AN ORGANIZED HEALTH CARE EDUCATION/TRAINING PROGRAM

## 2025-04-01 PROCEDURE — 88307 TISSUE EXAM BY PATHOLOGIST: CPT | Mod: TC,SUR | Performed by: STUDENT IN AN ORGANIZED HEALTH CARE EDUCATION/TRAINING PROGRAM

## 2025-04-01 PROCEDURE — 86850 RBC ANTIBODY SCREEN: CPT | Performed by: STUDENT IN AN ORGANIZED HEALTH CARE EDUCATION/TRAINING PROGRAM

## 2025-04-01 PROCEDURE — 2500000002 HC RX 250 W HCPCS SELF ADMINISTERED DRUGS (ALT 637 FOR MEDICARE OP, ALT 636 FOR OP/ED)

## 2025-04-01 PROCEDURE — 27882 AMPUTATION OF LOWER LEG: CPT | Performed by: STUDENT IN AN ORGANIZED HEALTH CARE EDUCATION/TRAINING PROGRAM

## 2025-04-01 PROCEDURE — 85610 PROTHROMBIN TIME: CPT | Performed by: STUDENT IN AN ORGANIZED HEALTH CARE EDUCATION/TRAINING PROGRAM

## 2025-04-01 PROCEDURE — 64905 NERVE PEDICLE TRANSFER: CPT | Performed by: STUDENT IN AN ORGANIZED HEALTH CARE EDUCATION/TRAINING PROGRAM

## 2025-04-01 PROCEDURE — 2500000004 HC RX 250 GENERAL PHARMACY W/ HCPCS (ALT 636 FOR OP/ED): Mod: JZ,TB | Performed by: STUDENT IN AN ORGANIZED HEALTH CARE EDUCATION/TRAINING PROGRAM

## 2025-04-01 PROCEDURE — 1100000001 HC PRIVATE ROOM DAILY

## 2025-04-01 PROCEDURE — 7100000001 HC RECOVERY ROOM TIME - INITIAL BASE CHARGE: Performed by: STUDENT IN AN ORGANIZED HEALTH CARE EDUCATION/TRAINING PROGRAM

## 2025-04-01 PROCEDURE — 3600000008 HC OR TIME - EACH INCREMENTAL 1 MINUTE - PROCEDURE LEVEL THREE: Performed by: STUDENT IN AN ORGANIZED HEALTH CARE EDUCATION/TRAINING PROGRAM

## 2025-04-01 PROCEDURE — 3600000003 HC OR TIME - INITIAL BASE CHARGE - PROCEDURE LEVEL THREE: Performed by: STUDENT IN AN ORGANIZED HEALTH CARE EDUCATION/TRAINING PROGRAM

## 2025-04-01 RX ORDER — CIPROFLOXACIN 500 MG/1
500 TABLET ORAL EVERY 12 HOURS SCHEDULED
Status: DISCONTINUED | OUTPATIENT
Start: 2025-04-01 | End: 2025-04-03

## 2025-04-01 RX ORDER — OXYCODONE HYDROCHLORIDE 5 MG/1
5 TABLET ORAL EVERY 6 HOURS PRN
Status: DISCONTINUED | OUTPATIENT
Start: 2025-04-01 | End: 2025-04-02

## 2025-04-01 RX ORDER — ATORVASTATIN CALCIUM 80 MG/1
80 TABLET, FILM COATED ORAL NIGHTLY
Status: DISPENSED | OUTPATIENT
Start: 2025-04-01

## 2025-04-01 RX ORDER — DAPAGLIFLOZIN 10 MG/1
10 TABLET, FILM COATED ORAL
Status: DISPENSED | OUTPATIENT
Start: 2025-04-02

## 2025-04-01 RX ORDER — CLINDAMYCIN PHOSPHATE 150 MG/ML
INJECTION, SOLUTION INTRAVENOUS AS NEEDED
Status: DISCONTINUED | OUTPATIENT
Start: 2025-04-01 | End: 2025-04-01

## 2025-04-01 RX ORDER — CLOPIDOGREL BISULFATE 75 MG/1
75 TABLET ORAL
Status: DISPENSED | OUTPATIENT
Start: 2025-04-01

## 2025-04-01 RX ORDER — INSULIN ASPART 100 [IU]/ML
INJECTION, SOLUTION INTRAVENOUS; SUBCUTANEOUS
Status: ON HOLD | COMMUNITY

## 2025-04-01 RX ORDER — GABAPENTIN 300 MG/1
300 CAPSULE ORAL DAILY
Status: DISPENSED | OUTPATIENT
Start: 2025-04-01

## 2025-04-01 RX ORDER — VANCOMYCIN HYDROCHLORIDE 1 G/20ML
INJECTION, POWDER, LYOPHILIZED, FOR SOLUTION INTRAVENOUS AS NEEDED
Status: DISCONTINUED | OUTPATIENT
Start: 2025-04-01 | End: 2025-04-01 | Stop reason: HOSPADM

## 2025-04-01 RX ORDER — FENTANYL CITRATE 50 UG/ML
INJECTION, SOLUTION INTRAMUSCULAR; INTRAVENOUS AS NEEDED
Status: DISCONTINUED | OUTPATIENT
Start: 2025-04-01 | End: 2025-04-01

## 2025-04-01 RX ORDER — LABETALOL HYDROCHLORIDE 5 MG/ML
5 INJECTION, SOLUTION INTRAVENOUS ONCE AS NEEDED
Status: DISCONTINUED | OUTPATIENT
Start: 2025-04-01 | End: 2025-04-01 | Stop reason: HOSPADM

## 2025-04-01 RX ORDER — ONDANSETRON HYDROCHLORIDE 2 MG/ML
INJECTION, SOLUTION INTRAVENOUS AS NEEDED
Status: DISCONTINUED | OUTPATIENT
Start: 2025-04-01 | End: 2025-04-01

## 2025-04-01 RX ORDER — PHENYLEPHRINE HYDROCHLORIDE 10 MG/ML
INJECTION INTRAVENOUS AS NEEDED
Status: DISCONTINUED | OUTPATIENT
Start: 2025-04-01 | End: 2025-04-01

## 2025-04-01 RX ORDER — HYDROMORPHONE HYDROCHLORIDE 0.2 MG/ML
0.2 INJECTION INTRAMUSCULAR; INTRAVENOUS; SUBCUTANEOUS EVERY 5 MIN PRN
Status: DISCONTINUED | OUTPATIENT
Start: 2025-04-01 | End: 2025-04-01 | Stop reason: HOSPADM

## 2025-04-01 RX ORDER — INSULIN GLARGINE 100 [IU]/ML
10 INJECTION, SOLUTION SUBCUTANEOUS NIGHTLY
Status: DISPENSED | OUTPATIENT
Start: 2025-04-01

## 2025-04-01 RX ORDER — DEXTROSE 50 % IN WATER (D50W) INTRAVENOUS SYRINGE
12.5
Status: ACTIVE | OUTPATIENT
Start: 2025-04-01

## 2025-04-01 RX ORDER — OXYCODONE HYDROCHLORIDE 5 MG/1
5 TABLET ORAL EVERY 4 HOURS PRN
Status: DISCONTINUED | OUTPATIENT
Start: 2025-04-01 | End: 2025-04-01 | Stop reason: HOSPADM

## 2025-04-01 RX ORDER — OXYCODONE HYDROCHLORIDE 5 MG/1
10 TABLET ORAL EVERY 6 HOURS PRN
Status: DISCONTINUED | OUTPATIENT
Start: 2025-04-01 | End: 2025-04-02

## 2025-04-01 RX ORDER — LIDOCAINE HYDROCHLORIDE 10 MG/ML
INJECTION, SOLUTION INFILTRATION; PERINEURAL AS NEEDED
Status: DISCONTINUED | OUTPATIENT
Start: 2025-04-01 | End: 2025-04-01

## 2025-04-01 RX ORDER — OXYCODONE HYDROCHLORIDE 5 MG/1
10 TABLET ORAL EVERY 4 HOURS PRN
Status: DISCONTINUED | OUTPATIENT
Start: 2025-04-01 | End: 2025-04-01 | Stop reason: HOSPADM

## 2025-04-01 RX ORDER — ROCURONIUM BROMIDE 10 MG/ML
INJECTION, SOLUTION INTRAVENOUS AS NEEDED
Status: DISCONTINUED | OUTPATIENT
Start: 2025-04-01 | End: 2025-04-01

## 2025-04-01 RX ORDER — SODIUM CHLORIDE, SODIUM LACTATE, POTASSIUM CHLORIDE, CALCIUM CHLORIDE 600; 310; 30; 20 MG/100ML; MG/100ML; MG/100ML; MG/100ML
75 INJECTION, SOLUTION INTRAVENOUS CONTINUOUS
Status: ACTIVE | OUTPATIENT
Start: 2025-04-01 | End: 2025-04-01

## 2025-04-01 RX ORDER — HYDROMORPHONE HYDROCHLORIDE 1 MG/ML
INJECTION, SOLUTION INTRAMUSCULAR; INTRAVENOUS; SUBCUTANEOUS CONTINUOUS PRN
Status: DISCONTINUED | OUTPATIENT
Start: 2025-04-01 | End: 2025-04-01

## 2025-04-01 RX ORDER — HYDRALAZINE HYDROCHLORIDE 10 MG/1
75 TABLET, FILM COATED ORAL 3 TIMES DAILY
Status: ACTIVE | OUTPATIENT
Start: 2025-04-01

## 2025-04-01 RX ORDER — DEXTROSE 50 % IN WATER (D50W) INTRAVENOUS SYRINGE
25
Status: ACTIVE | OUTPATIENT
Start: 2025-04-01

## 2025-04-01 RX ORDER — CARVEDILOL 25 MG/1
25 TABLET ORAL 2 TIMES DAILY
Status: DISPENSED | OUTPATIENT
Start: 2025-04-01

## 2025-04-01 RX ORDER — OXYCODONE HYDROCHLORIDE 5 MG/1
5 TABLET, COATED ORAL EVERY 4 HOURS PRN
Status: ON HOLD | COMMUNITY

## 2025-04-01 RX ORDER — ACETAMINOPHEN 325 MG/1
650 TABLET ORAL EVERY 6 HOURS
Status: DISPENSED | OUTPATIENT
Start: 2025-04-01

## 2025-04-01 RX ORDER — PROPOFOL 10 MG/ML
INJECTION, EMULSION INTRAVENOUS AS NEEDED
Status: DISCONTINUED | OUTPATIENT
Start: 2025-04-01 | End: 2025-04-01

## 2025-04-01 RX ORDER — GLYCOPYRROLATE 0.2 MG/ML
INJECTION INTRAMUSCULAR; INTRAVENOUS AS NEEDED
Status: DISCONTINUED | OUTPATIENT
Start: 2025-04-01 | End: 2025-04-01

## 2025-04-01 RX ORDER — SODIUM CHLORIDE 0.9 G/100ML
INJECTION, SOLUTION IRRIGATION AS NEEDED
Status: DISCONTINUED | OUTPATIENT
Start: 2025-04-01 | End: 2025-04-01 | Stop reason: HOSPADM

## 2025-04-01 RX ORDER — ONDANSETRON HYDROCHLORIDE 2 MG/ML
4 INJECTION, SOLUTION INTRAVENOUS ONCE AS NEEDED
Status: DISCONTINUED | OUTPATIENT
Start: 2025-04-01 | End: 2025-04-01 | Stop reason: HOSPADM

## 2025-04-01 RX ORDER — INSULIN LISPRO 100 [IU]/ML
0-5 INJECTION, SOLUTION INTRAVENOUS; SUBCUTANEOUS
Status: DISPENSED | OUTPATIENT
Start: 2025-04-01

## 2025-04-01 RX ADMIN — CARVEDILOL 25 MG: 25 TABLET, FILM COATED ORAL at 20:15

## 2025-04-01 RX ADMIN — ONDANSETRON 4 MG: 2 INJECTION, SOLUTION INTRAMUSCULAR; INTRAVENOUS at 12:28

## 2025-04-01 RX ADMIN — Medication 6 L/MIN: at 12:35

## 2025-04-01 RX ADMIN — SUGAMMADEX 100 MG: 100 INJECTION, SOLUTION INTRAVENOUS at 12:28

## 2025-04-01 RX ADMIN — GLYCOPYRROLATE 0.2 MG: 0.2 INJECTION INTRAMUSCULAR; INTRAVENOUS at 11:20

## 2025-04-01 RX ADMIN — GABAPENTIN 300 MG: 300 CAPSULE ORAL at 18:04

## 2025-04-01 RX ADMIN — SODIUM CHLORIDE, SODIUM LACTATE, POTASSIUM CHLORIDE, AND CALCIUM CHLORIDE: 600; 310; 30; 20 INJECTION, SOLUTION INTRAVENOUS at 11:13

## 2025-04-01 RX ADMIN — INSULIN GLARGINE 10 UNITS: 100 INJECTION, SOLUTION SUBCUTANEOUS at 20:13

## 2025-04-01 RX ADMIN — HYDROMORPHONE HYDROCHLORIDE 0.5 MG: 0.5 INJECTION, SOLUTION INTRAMUSCULAR; INTRAVENOUS; SUBCUTANEOUS at 12:45

## 2025-04-01 RX ADMIN — ATORVASTATIN CALCIUM 80 MG: 80 TABLET, FILM COATED ORAL at 20:15

## 2025-04-01 RX ADMIN — HYDROMORPHONE HYDROCHLORIDE 0.2 MG: 0.2 INJECTION, SOLUTION INTRAMUSCULAR; INTRAVENOUS; SUBCUTANEOUS at 15:53

## 2025-04-01 RX ADMIN — FENTANYL CITRATE 25 MCG: 50 INJECTION, SOLUTION INTRAMUSCULAR; INTRAVENOUS at 11:20

## 2025-04-01 RX ADMIN — PROPOFOL 70 MG: 10 INJECTION, EMULSION INTRAVENOUS at 11:20

## 2025-04-01 RX ADMIN — CIPROFLOXACIN 500 MG: 500 TABLET ORAL at 20:16

## 2025-04-01 RX ADMIN — CLOPIDOGREL BISULFATE 75 MG: 75 TABLET, FILM COATED ORAL at 18:04

## 2025-04-01 RX ADMIN — ACETAMINOPHEN 650 MG: 325 TABLET, FILM COATED ORAL at 18:15

## 2025-04-01 RX ADMIN — LIDOCAINE HYDROCHLORIDE 50 ML: 10 INJECTION, SOLUTION INFILTRATION; PERINEURAL at 11:20

## 2025-04-01 RX ADMIN — CLINDAMYCIN PHOSPHATE 900 MG: 150 INJECTION, SOLUTION INTRAMUSCULAR; INTRAVENOUS at 11:34

## 2025-04-01 RX ADMIN — HYDROMORPHONE HYDROCHLORIDE 0.2 MG: 0.2 INJECTION, SOLUTION INTRAMUSCULAR; INTRAVENOUS; SUBCUTANEOUS at 13:20

## 2025-04-01 RX ADMIN — HYDROMORPHONE HYDROCHLORIDE 0.2 MG: 0.2 INJECTION, SOLUTION INTRAMUSCULAR; INTRAVENOUS; SUBCUTANEOUS at 13:05

## 2025-04-01 RX ADMIN — HYDROMORPHONE HYDROCHLORIDE 0.2 MG: 0.2 INJECTION, SOLUTION INTRAMUSCULAR; INTRAVENOUS; SUBCUTANEOUS at 12:57

## 2025-04-01 RX ADMIN — INSULIN LISPRO 2 UNITS: 100 INJECTION, SOLUTION INTRAVENOUS; SUBCUTANEOUS at 18:10

## 2025-04-01 RX ADMIN — ROCURONIUM 50 MG: 50 INJECTION, SOLUTION INTRAVENOUS at 11:20

## 2025-04-01 RX ADMIN — PHENYLEPHRINE HYDROCHLORIDE 40 MCG: 10 INJECTION INTRAVENOUS at 11:20

## 2025-04-01 RX ADMIN — OXYCODONE HYDROCHLORIDE 10 MG: 5 TABLET ORAL at 19:49

## 2025-04-01 RX ADMIN — DEXAMETHASONE SODIUM PHOSPHATE 4 MG: 4 INJECTION INTRA-ARTICULAR; INTRALESIONAL; INTRAMUSCULAR; INTRAVENOUS; SOFT TISSUE at 11:35

## 2025-04-01 SDOH — SOCIAL STABILITY: SOCIAL INSECURITY: DO YOU FEEL UNSAFE GOING BACK TO THE PLACE WHERE YOU ARE LIVING?: NO

## 2025-04-01 SDOH — SOCIAL STABILITY: SOCIAL INSECURITY: ABUSE: ADULT

## 2025-04-01 SDOH — SOCIAL STABILITY: SOCIAL INSECURITY: HAVE YOU HAD ANY THOUGHTS OF HARMING ANYONE ELSE?: NO

## 2025-04-01 SDOH — SOCIAL STABILITY: SOCIAL INSECURITY: WERE YOU ABLE TO COMPLETE ALL THE BEHAVIORAL HEALTH SCREENINGS?: YES

## 2025-04-01 SDOH — SOCIAL STABILITY: SOCIAL INSECURITY: HAS ANYONE EVER THREATENED TO HURT YOUR FAMILY OR YOUR PETS?: NO

## 2025-04-01 SDOH — SOCIAL STABILITY: SOCIAL INSECURITY: HAVE YOU HAD THOUGHTS OF HARMING ANYONE ELSE?: NO

## 2025-04-01 SDOH — SOCIAL STABILITY: SOCIAL INSECURITY: ARE YOU OR HAVE YOU BEEN THREATENED OR ABUSED PHYSICALLY, EMOTIONALLY, OR SEXUALLY BY ANYONE?: NO

## 2025-04-01 SDOH — SOCIAL STABILITY: SOCIAL INSECURITY: DOES ANYONE TRY TO KEEP YOU FROM HAVING/CONTACTING OTHER FRIENDS OR DOING THINGS OUTSIDE YOUR HOME?: NO

## 2025-04-01 SDOH — SOCIAL STABILITY: SOCIAL INSECURITY: ARE THERE ANY APPARENT SIGNS OF INJURIES/BEHAVIORS THAT COULD BE RELATED TO ABUSE/NEGLECT?: NO

## 2025-04-01 SDOH — SOCIAL STABILITY: SOCIAL INSECURITY: DO YOU FEEL ANYONE HAS EXPLOITED OR TAKEN ADVANTAGE OF YOU FINANCIALLY OR OF YOUR PERSONAL PROPERTY?: NO

## 2025-04-01 ASSESSMENT — PAIN - FUNCTIONAL ASSESSMENT
PAIN_FUNCTIONAL_ASSESSMENT: 0-10
PAIN_FUNCTIONAL_ASSESSMENT: UNABLE TO SELF-REPORT
PAIN_FUNCTIONAL_ASSESSMENT: 0-10

## 2025-04-01 ASSESSMENT — PAIN SCALES - GENERAL
PAINLEVEL_OUTOF10: 6
PAINLEVEL_OUTOF10: 6
PAIN_LEVEL: 0
PAINLEVEL_OUTOF10: 6
PAINLEVEL_OUTOF10: 3
PAINLEVEL_OUTOF10: 0 - NO PAIN
PAINLEVEL_OUTOF10: 3
PAINLEVEL_OUTOF10: 10 - WORST POSSIBLE PAIN
PAINLEVEL_OUTOF10: 5 - MODERATE PAIN
PAINLEVEL_OUTOF10: 3
PAINLEVEL_OUTOF10: 8
PAINLEVEL_OUTOF10: 3
PAINLEVEL_OUTOF10: 5 - MODERATE PAIN
PAINLEVEL_OUTOF10: 3

## 2025-04-01 ASSESSMENT — ACTIVITIES OF DAILY LIVING (ADL)
ASSISTIVE_DEVICE: WHEELCHAIR
WALKS IN HOME: DEPENDENT
HEARING - RIGHT EAR: FUNCTIONAL
GROOMING: INDEPENDENT
FEEDING YOURSELF: INDEPENDENT
LACK_OF_TRANSPORTATION: YES
BATHING: NEEDS ASSISTANCE
TOILETING: NEEDS ASSISTANCE
DRESSING YOURSELF: NEEDS ASSISTANCE
ADEQUATE_TO_COMPLETE_ADL: YES
HEARING - LEFT EAR: FUNCTIONAL
PATIENT'S MEMORY ADEQUATE TO SAFELY COMPLETE DAILY ACTIVITIES?: YES
JUDGMENT_ADEQUATE_SAFELY_COMPLETE_DAILY_ACTIVITIES: YES

## 2025-04-01 ASSESSMENT — COGNITIVE AND FUNCTIONAL STATUS - GENERAL
DRESSING REGULAR LOWER BODY CLOTHING: A LITTLE
DAILY ACTIVITIY SCORE: 20
MOBILITY SCORE: 13
TURNING FROM BACK TO SIDE WHILE IN FLAT BAD: A LITTLE
STANDING UP FROM CHAIR USING ARMS: A LOT
MOVING TO AND FROM BED TO CHAIR: A LOT
HELP NEEDED FOR BATHING: A LITTLE
TOILETING: A LITTLE
DRESSING REGULAR UPPER BODY CLOTHING: A LITTLE
CLIMB 3 TO 5 STEPS WITH RAILING: TOTAL
WALKING IN HOSPITAL ROOM: TOTAL
PATIENT BASELINE BEDBOUND: NO

## 2025-04-01 ASSESSMENT — LIFESTYLE VARIABLES
HOW MANY STANDARD DRINKS CONTAINING ALCOHOL DO YOU HAVE ON A TYPICAL DAY: 1 OR 2
AUDIT-C TOTAL SCORE: 2
SKIP TO QUESTIONS 9-10: 0
HOW OFTEN DO YOU HAVE A DRINK CONTAINING ALCOHOL: MONTHLY OR LESS
HOW OFTEN DO YOU HAVE 6 OR MORE DRINKS ON ONE OCCASION: LESS THAN MONTHLY
AUDIT-C TOTAL SCORE: 2

## 2025-04-01 ASSESSMENT — PAIN DESCRIPTION - ORIENTATION: ORIENTATION: LEFT

## 2025-04-01 ASSESSMENT — PAIN DESCRIPTION - LOCATION: LOCATION: LEG

## 2025-04-01 NOTE — PROGRESS NOTES
Reason for Visit: FUV (Post Op Visit).  Referring Clinician: No ref. provider found     History of Present Illness    Myrna Khan is a 65 y.o. female with history of DM, HTN, HLD, FROYLAN, PAD with extensive wounds of the left lower extremity and foot for which she underwent a left lower extremity angiogram with left external iliac artery stent placement and a left femoral endarterectomy on 3/6/2025.  She presents today for routine postoperative follow-up.  She reports that while her pain has improved in the left leg she continues to have pain from the mid calf down to the foot.  She is still unable to move her ankle.  She denies any pain in the thigh, knee or left groin.  No drainage from her incision.  She has been afebrile.  They have noted no new lumps above and below her incision over the past week.  Of note, patient had significant delirium and confusion throughout her prior hospitalization.  This has since completely resolved.    Past Medical History  She has a past medical history of CAD (coronary artery disease), Carotid artery stenosis, Diabetes mellitus (Multi), Heart disease, Hypertension, NSTEMI (non-ST elevated myocardial infarction) (Multi), PAD (peripheral artery disease) (CMS-HCC), and Renal artery stenosis (CMS-HCC).    Past Surgical History  She has a past surgical history that includes Coronary artery bypass graft and Femoral artery stent (Left).    Social History  She reports that she has been smoking cigarettes. She started smoking about 30 years ago. She has a 30.2 pack-year smoking history. She has never used smokeless tobacco. She reports current alcohol use. She reports that she does not use drugs.    Family History  Family History   Problem Relation Name Age of Onset    Peripheral vascular disease Mother      Hypertension Mother      Diabetes type II Mother      Hypertension Sister      Diabetes type II Sister    "      Allergies  Lisinopril, Amoxicillin, and Losartan    Outpatient Medications  Current Outpatient Medications   Medication Instructions    acetaminophen (TYLENOL) 500 mg, oral, Every 6 hours PRN, Take per directed    atorvastatin (Lipitor) 80 mg tablet TAKE 1 TABLET BY MOUTH ONCE DAILY    BD Ultra-Fine Micro Pen Needle 32 gauge x 1/4\" needle     blood sugar diagnostic strip Use 1 strip to check blood sugar 3 times a day with meals.    CALCIUM CITRATE ORAL 1 tablet, Daily    carvedilol (Coreg) 25 mg tablet TAKE 1 TABLET BY MOUTH TWO TIMES A DAY    cholecalciferol (VITAMIN D-3) 50 mcg, oral, Daily    ciprofloxacin (CIPRO) 500 mg, oral, Every 12 hours scheduled, Tentative end date is 4/17/25    clopidogrel (PLAVIX) 75 mg, Daily RT    diclofenac sodium (VOLTAREN) 4 g, Topical, 4 times daily PRN    Easy Touch Alcohol Prep Pads pads, medicated     Farxiga 10 mg, oral, Daily with breakfast    gabapentin (NEURONTIN) 300 mg, Daily    glucagon (GLUCAGEN) 1 mg, intramuscular, Every 15 min PRN    hydrALAZINE (APRESOLINE) 50 mg, oral, 3 times daily    insulin glargine (LANTUS) 16 Units, subcutaneous, Nightly, Take as directed per insulin instructions.    insulin lispro 0-5 Units, subcutaneous, 3 times daily (morning, midday, late afternoon), 0 unit(s) if BG ; 1 unit(s) if -200; 2 unit(s) if -250; 3 unit(s) if -300; 4 unit(s) if -350; 5 unit(s) if -400    insulin lispro 2 Units, subcutaneous, 3 times daily before meals, Take as directed per insulin instructions.    isosorbide mononitrate ER (Imdur) 60 mg 24 hr tablet TAKE 1 TABLET BY MOUTH ONCE DAILY    lancets misc Use three times a day to test blood sugar w/ meals    linezolid (ZYVOX) 600 mg, oral, Every 12 hours scheduled, Tentative end date is 4/17/25    metFORMIN XR (Glucophage-XR) 500 mg 24 hr tablet TAKE 2 TABLETS BY MOUTH ONCE DAILY    multivitamin with minerals tablet 1 tablet, Daily    NIFEdipine ER (ADALAT CC) 90 mg, oral, Daily " "before breakfast, Do not crush, chew, or split.    QUEtiapine (SEROQUEL) 25 mg, oral, Nightly, Eval for GDR within 2 weeks of discharge    ranolazine (RANEXA) 500 mg, oral, 2 times daily, Do not crush, chew, or split.    rivaroxaban (XARELTO) 2.5 mg, oral, 2 times daily (morning and late afternoon)       Review of Systems  ROS    Last Recorded Vitals  Vitals:    03/28/25 1529 03/28/25 1533   BP: 169/88 175/76   BP Location: Right arm Left arm   Patient Position: Sitting Sitting   Pulse: 103    SpO2: 100%    Weight: 49.9 kg (110 lb)    Height: 1.6 m (5' 3\")        Physical Examination  General: Well appearing, well-nourished, in no acute distress.  HEENT: Normocephalic atraumatic, pupils equal and reactive to light, extraocular muscles intact, no conjunctival injection, oropharynx clear without exudates.  Neck: Normal carotid arterial pulses, no arterial bruits, no thyromegaly.  Cardiac: Regular rhythm and normal heart rate.  Pulmonary: No increased work of breathing, no wheezes or crackles.  GI: Soft, ND, NT  Lower extremities: No wounds, ulcers, or discoloration.  Palpable left femoral pulse.  Left groin incision well-approximated.  There is slight fibrinous tissue within the incision along the superior aspect; however, the incision is intact.  No underlying fluctuance.  There is a nonpulsatile firm lump above the incision and a small 1 below the incision.  No drainage from the incision.  No surrounding erythema, and or induration.  Persistent circumferential dry gangrene of the distal third of the calf and the ankle and the dorsum of the foot.  Areas of nonpurulent drainage from the edges of the dry gangrene along the medial and lateral aspect of the dorsum of the foot.  No obvious fluctuance.  The left ankle continues to be fixed which is stable from before her left lower extremity revascularization.  Able to move the left toes minimally.  Skin: Skin intact. No significant rashes or lesions present.  Neuro: Alert " "and oriented x 3, normal attention and cognition, no focal motor or sensory neurologic deficits.  Psych: Normal affect and mood.  Musculoskeletal: Normal gait normal muscle tone.    Laboratory Studies  Lab Results   Component Value Date    GLUCOSE 144 (H) 03/25/2025    CALCIUM 8.0 (L) 03/25/2025     03/25/2025    K 3.8 03/25/2025    CO2 20 (L) 03/25/2025     03/25/2025    BUN 14 03/25/2025    CREATININE 0.84 03/25/2025     Lab Results   Component Value Date    ALT 17 02/28/2025    AST 19 02/28/2025    ALKPHOS 176 (H) 02/28/2025    BILITOT 0.3 02/28/2025         Lab Results   Component Value Date    CHOL 141 07/16/2024     Lab Results   Component Value Date    HDL 70.0 07/16/2024     Lab Results   Component Value Date    LDLCALC 56 07/16/2024     Lab Results   Component Value Date    TRIG 74 07/16/2024     No components found for: \"CHOLHDL\"  Lab Results   Component Value Date    HGBA1C 14.6 (H) 01/09/2025     No components found for: \"UACR\"      Assessment and Plan  Problem List Items Addressed This Visit    None    - Myrna LANDON Bill is a 65-year-old female with history of DM, HTN, HLD, FROYLAN, PAD with extensive wounds of the left lower extremity and foot for which she underwent a left lower extremity angiogram with left external iliac artery stent placement and a left femoral endarterectomy on 3/6/2025.  She continues to have extensive dry gangrenous wounds over the left calf, ankle and dorsum of the foot.  Her left foot is completely fixed without any passive and/or active movement.  She has got areas of drainage from the lateral medial aspect of the dorsum wound.  Unfortunately, her ankle and lower third of the calf is completely mummified which is consistent with her preoperative exam.  While her external iliac and common femoral and deep femoral arteries are now patent and there is significantly improved inflow to the left leg.  She continues to have occluded left SFA stents with proximal popliteal " artery disease.  Unfortunately, given the extensive amount of dry gangrene and mummification of the circumferential ankle, I do not think the left foot and ankle are salvageable.  This was discussed with the patient in significant detail.  I think that a below-knee amputation is likely possible however, it would likely be on a slightly shorter side.  I discussed the options of an amputation, possible below-knee versus an above-knee amputation, in great detail with the patient.  Patient was agreeable to proceed with the amputation.  Will plan to directly admit the patient in the upcoming days with plans for an amputation for infection control and pain control.  Will have to do this in a staged fashion.    Bobby Pederson MD

## 2025-04-01 NOTE — ANESTHESIA POSTPROCEDURE EVALUATION
Patient: Myrna Khan    Procedure Summary       Date: 04/01/25 Room / Location: [facility] OR 16 / Virtual [facility] OR    Anesthesia Start: 1113 Anesthesia Stop: 1237    Procedure: AMPUTATION, BELOW KNEE (Left) Diagnosis:       PAD (peripheral artery disease) (CMS-HCC)      (PAD (peripheral artery disease) (CMS-HCC) [I73.9])    Surgeons: Bobby Pederson MD Responsible Provider: Gian Gonzalez MD    Anesthesia Type: general ASA Status: 3            Anesthesia Type: general    Vitals Value Taken Time   /72 04/01/25 1237   Temp 36.2 04/01/25 1237   Pulse 92 04/01/25 1237   Resp 18 04/01/25 1237   SpO2 98 04/01/25 1237       Anesthesia Post Evaluation    Patient location during evaluation: PACU  Patient participation: complete - patient participated  Level of consciousness: awake and alert  Pain score: 0  Pain management: adequate  Airway patency: patent  Cardiovascular status: stable  Respiratory status: spontaneous ventilation  Hydration status: acceptable  Postoperative Nausea and Vomiting: none        No notable events documented.

## 2025-04-01 NOTE — OP NOTE
AMPUTATION, BELOW KNEE (L) Operative Note     Date: 2025  OR Location: [facility] OR    Name: Myrna Khan, : 1960, Age: 65 y.o., MRN: [id number], Sex: female    Diagnosis  Pre-op Diagnosis      * PAD (peripheral artery disease) (CMS-HCC) [I73.9] Post-op Diagnosis     * PAD (peripheral artery disease) (CMS-HCC) [I73.9]     Procedures  AMPUTATION, BELOW KNEE  [address] - [place] AMPUTATION LEG THRU TIBIA&FIBULA OPEN CIRCULAR      Surgeons      * Mrinalini Pederson - Primary    Resident/Fellow/Other Assistant:  Surgeons and Role:  * No surgeons found with a matching role *    Staff:   Circulator: Larissa  Scrub Person: Suzanna  Circulator: Doug  Scrub Person: Iban Bosch Circulator: Kayy  Relief Circulator: Kayy    Anesthesia Staff: Anesthesiologist: Gian Gonzalez MD  Anesthesia Resident: Filippo Holley MD    Procedure Summary  Anesthesia: General  ASA: III  Estimated Blood Loss: 50 mL  Intra-op Medications:   Administrations occurring from 1015 to 1255 on 25:   Medication Name Total Dose   vancomycin (Vancocin) vial for injection 1 g   sodium chloride 0.9 % irrigation solution 1,000 mL   thrombin (bovine) 5000 Unit vial + gelatin absorbable 100 sponge topical mixture 10 mL   hydrALAZINE (Apresoline) tablet 75 mg Cannot be calculated   HYDROmorphone (Dilaudid) injection 0.5 mg 0.5 mg   oxygen (O2) therapy 120 L   clindamycin (Cleocin) 150 mg/mL 900 mg   dexAMETHasone (Decadron) injection 4 mg/mL 4 mg   fentaNYL (Sublimaze) injection 50 mcg/mL 25 mcg   glycopyrrolate (Robinul) injection 0.2 mg   LR bolus Cannot be calculated   lidocaine (Xylocaine) injection 1 % 50 mL   ondansetron (Zofran) 2 mg/mL injection 4 mg   phenylephrine (Gerardo-Synephrine) injection 40 mcg   propofol (Diprivan) injection 10 mg/mL 70 mg   rocuronium (ZeMuron) 50 mg/5 mL injection 50 mg   sugammadex (Bridion) 200 mg/2 mL injection 100 mg              Anesthesia Record               Intraprocedure I/O Totals          Intake    LR bolus  500.00 mL    Total Intake 500 mL       Output    Est. Blood Loss 50 mL    Total Output 50 mL       Net    Net Volume 450 mL          Specimen:   ID Type Source Tests Collected by Time   1 : Left Leg Amputation Below The Knee Tissue LEG AMPUTATION BELOW THE KNEE LEFT SURGICAL PATHOLOGY EXAM Bobby Pederson MD 2025 1152                 Drains and/or Catheters:   Urethral Catheter 18 Fr. (Active)   Site Assessment Clean;Skin intact 25 1500   Collection Container Standard drainage bag 25 1500   Securement Method Securing device (Describe) 25 1500   Output (mL) 25 mL 25 1600       Tourniquet Times:   * Missing tourniquet times found for documented tourniquets in lo *     Implants:     Findings: No evidence of ascending infection    Indications: Myrna Khan is an 65 y.o. female with hx of DM, HTN, HLD, PAD with extensive wounds of the left lower extremity with a fixed ankle without any passive movement due to mummification of the distal calf, ankle, and dorsum of the foot. Given the extensive gangrenous wounds of the left calf, ankle, and foot and persistent pain, a proximal amputation of the left lower leg was recommended. The options of possible below knee amputation versus above knee amputation were discussed with the patient who was agreeable. It was discussed to do this in a staged fashion. She presents today for the guillotine amputation. The risks and benefits of this were discussed with the patient who elected to proceed.     The patient was seen in the preoperative area. The risks, benefits, complications, treatment options, non-operative alternatives, expected recovery and outcomes were discussed with the patient. The possibilities of reaction to medication, pulmonary aspiration, injury to surrounding structures, bleeding, recurrent infection, the need for additional procedures, failure to diagnose a condition, and creating a complication requiring transfusion or  operation were discussed with the patient. The patient concurred with the proposed plan, giving informed consent.  The site of surgery was properly noted/marked if necessary per policy. The patient has been actively warmed in preoperative area. Preoperative antibiotics have been ordered and given within 1 hours of incision. Venous thrombosis prophylaxis are not indicated.    Procedure Details:     Patient was taken to the operating room and placed in supine position.  General anesthesia was then induced.  The patient was then prepped and draped in the usual sterile fashion.  A timeout was then performed to verify the patient, procedure, site prior to beginning.    A circumferential incision was made above the area of the gangrenous wounds in the mid calf using a scalpel. A Gigli saw was then used to transect the muscle, soft tissue, and bone. The anterior tibial and posterior tibial vascular bundles were identified and suture ligated using 2-0 silk sutures. Hemostasis was then achieved and a 2-0 Nylon suture was placed in the horizontal m  mattress fashion to prevent any proximal retraction of the skin. A sterile dressing was then applied. The patient tolerated the procedure well and was taken to PACU in stable condition after extubation. I was present for the entirety of the procedure.      Complications:  None; patient tolerated the procedure well.    Disposition: PACU - hemodynamically stable.  Condition: stable     Attending Attestation: I was present and scrubbed for the entire procedure.    Bobby Pederson  Phone Number: [phone]

## 2025-04-01 NOTE — ANESTHESIA PROCEDURE NOTES
Airway  Date/Time: 4/1/2025 11:24 AM  Urgency: elective    Airway not difficult    Staffing  Performed: resident   Authorized by: Gian Gonzalez MD    Performed by: Filippo Holley MD  Patient location during procedure: OR    Indications and Patient Condition  Indications for airway management: anesthesia  Spontaneous Ventilation: absent  Sedation level: deep  Preoxygenated: yes  Patient position: sniffing  Mask difficulty assessment: 1 - vent by mask  Planned trial extubation    Final Airway Details  Final airway type: endotracheal airway      Successful airway: ETT  Cuffed: yes   Successful intubation technique: flexible bronchoscopy  Facilitating devices/methods: intubating stylet  Endotracheal tube insertion site: oral  ETT size (mm): 7.0  Placement verified by: chest auscultation and capnometry   Inital cuff pressure (cm H2O): 5  Measured from: lips  ETT to lips (cm): 22  Number of attempts at approach: 1    Additional Comments  7.0 Jama Flex tube with ovasappian oral airway

## 2025-04-01 NOTE — ANESTHESIA PREPROCEDURE EVALUATION
Patient: Myrna Khan    Procedure Information       Date/Time: 04/01/25 1015    Procedure: AMPUTATION, BELOW KNEE (Left)    Location: [facility] OR 16 / Virtual [facility] OR    Surgeons: Bobby Pederson MD            Relevant Problems   Cardiac   (+) CAD (coronary artery disease)   (+) HTN (hypertension)   (+) Hypertensive urgency   (+) PAD (peripheral artery disease) (CMS-[facility])   (+) Stented coronary artery      Pulmonary   (+) Chronic obstructive pulmonary disease with (acute) lower respiratory infection      Neuro   (+) Bilateral carotid artery stenosis      Hematology   (+) Thrombocytopenia, unspecified (CMS-[facility])      ID   (+) Chronic obstructive pulmonary disease with (acute) lower respiratory infection       Clinical information reviewed:   Tobacco  Allergies  Meds   Med Hx  Surg Hx   Fam Hx  Soc Hx        NPO Detail:  NPO/Void Status  Carbohydrate Drink Given Prior to Surgery? : N  Date of Last Liquid: 04/01/25  Time of Last Liquid: 0700 (water)  Date of Last Solid: 03/31/25  Time of Last Solid: 1600  Last Intake Type: Clear fluids         Physical Exam    Airway  Mallampati: II  TM distance: >3 FB     Cardiovascular    Dental    Pulmonary    Abdominal      Other findings: Poor dentition          Anesthesia Plan    History of general anesthesia?: yes  History of complications of general anesthesia?: no    ASA 3     general     intravenous induction   Postoperative administration of opioids is intended.  Trial extubation is planned.  Anesthetic plan and risks discussed with patient.  Use of blood products discussed with patient who.    Plan discussed with resident.

## 2025-04-01 NOTE — BRIEF OP NOTE
Date: 2025  OR Location: [facility] OR    Name: Myrna Khan, : 1960, Age: 65 y.o., MRN: [id number], Sex: female    Diagnosis  Pre-op Diagnosis      * PAD (peripheral artery disease) (CMS-HCC) [I73.9] Post-op Diagnosis     * PAD (peripheral artery disease) (CMS-HCC) [I73.9]     Procedures  AMPUTATION, BELOW KNEE  [address] - [place] AMPUTATION LEG THRU TIBIA&FIBULA OPEN CIRCULAR      Surgeons      * Bobby Pederson - Primary    Resident/Fellow/Other Assistant:  Surgeons and Role:  * No surgeons found with a matching role *    Staff:   Circulator: Larissa  Scrub Person: Suzanna  Circulator: Doug  Scrub Person: Iban Bosch Circulator: Kayy  Relief Circulator: Kayy    Anesthesia Staff: Anesthesiologist: Gian Gonzalez MD  Anesthesia Resident: Filippo Holley MD    Procedure Summary  Anesthesia: Anesthesia type not filed in the log.  ASA: III  Estimated Blood Loss: 50mL  Intra-op Medications:   Administrations occurring from 1015 to 1255 on 25:   Medication Name Total Dose   vancomycin (Vancocin) vial for injection 1 g   sodium chloride 0.9 % irrigation solution 1,000 mL   clindamycin (Cleocin) 150 mg/mL 900 mg   dexAMETHasone (Decadron) injection 4 mg/mL 4 mg   fentaNYL (Sublimaze) injection 50 mcg/mL 25 mcg   glycopyrrolate (Robinul) injection 0.2 mg   LR bolus Cannot be calculated   lidocaine (Xylocaine) injection 1 % 50 mL   ondansetron (Zofran) 2 mg/mL injection 4 mg   phenylephrine (Gerardo-Synephrine) injection 40 mcg   propofol (Diprivan) injection 10 mg/mL 70 mg   rocuronium (ZeMuron) 50 mg/5 mL injection 50 mg   sugammadex (Bridion) 200 mg/2 mL injection 100 mg              Anesthesia Record               Intraprocedure I/O Totals       None           Specimen:   ID Type Source Tests Collected by Time   1 : Left Leg Amputation Below The Knee Tissue LEG AMPUTATION BELOW THE KNEE LEFT SURGICAL PATHOLOGY EXAM Bobyb Pederson MD 2025 1152                  Findings: Dry gangrene to the tibial  spine    Complications:  None; patient tolerated the procedure well.     Disposition: PACU - hemodynamically stable.  Condition: stable  Specimens Collected:   ID Type Source Tests Collected by Time   1 : Left Leg Amputation Below The Knee Tissue LEG AMPUTATION BELOW THE KNEE LEFT SURGICAL PATHOLOGY EXAM Bobby Pederson MD 4/1/2025 5683     Attending Attestation:     Bobby Pederson  Phone Number: [phone]

## 2025-04-01 NOTE — ANESTHESIA PROCEDURE NOTES
Peripheral IV  Date/Time: 4/1/2025 11:43 AM      Placement  Needle size: 20 G  Laterality: right  Location: hand  Local anesthetic: none  Site prep: chlorhexidine  Technique: anatomical landmarks  Attempts: 1

## 2025-04-01 NOTE — PROGRESS NOTES
"  Pharmacy Medication History Review    Myrna Khan is a 65 y.o. female admitted for PAD (peripheral artery disease) (CMS-[facility]). Pharmacy reviewed the patient's [id number] medications and allergies for accuracy.    The list below reflects the updated PTA list.   Prior to Admission Medications   Prescriptions Last Dose Informant   BD Ultra-Fine Micro Pen Needle 32 gauge x 1/4\" needle  Other   Easy Touch Alcohol Prep Pads pads, medicated  Other   Farxiga 10 mg 3/31/2025 Other   Sig: Take 1 tablet (10 mg) by mouth once daily with breakfast.   NIFEdipine ER (Adalat CC) 90 mg 24 hr tablet 3/31/2025 Other   Sig: Take 1 tablet (90 mg) by mouth once daily in the morning. Take before meals. Do not crush, chew, or split.   QUEtiapine (SEROquel) 25 mg tablet Not Taking Other   Sig: Take 1 tablet (25 mg) by mouth once daily at bedtime. Eval for GDR within 2 weeks of discharge   Patient not taking: Reported on 4/1/2025  Not on the SNF list    acetaminophen (Tylenol) 500 mg tablet 3/31/2025 Other   Sig: Take 1 tablet (500 mg) by mouth every 6 hours if needed for mild pain (1 - 3) or moderate pain (4 - 6). Take per directed   atorvastatin (Lipitor) 80 mg tablet 3/31/2025 Other   Sig: TAKE 1 TABLET BY MOUTH ONCE DAILY   blood sugar diagnostic strip  Other   Sig: Use 1 strip to check blood sugar 3 times a day with meals.   calcium citrate 250 mg calcium tablet 3/31/2025 Other   Sig: Take 1 tablet (250 mg) by mouth once daily.   carvedilol (Coreg) 25 mg tablet 3/31/2025 Other   Sig: TAKE 1 TABLET BY MOUTH TWO TIMES A DAY   cholecalciferol (Vitamin D-3) 25 MCG (1000 UT) capsule 3/31/2025 Other   Sig: Take 2 capsules (50 mcg) by mouth once daily.   ciprofloxacin (Cipro) 500 mg tablet 3/31/2025 Other   Sig: Take 1 tablet (500 mg) by mouth every 12 hours. Tentative end date is 4/17/25   clopidogrel (Plavix) 75 mg tablet 3/31/2025 Other   Sig: Take 1 tablet (75 mg) by mouth once daily.   diclofenac sodium (Voltaren) 1 % gel  " Other   Sig: Apply 4.5 inches (4 g) topically 4 times a day as needed (pain).   gabapentin (Neurontin) 300 mg capsule 3/31/2025 Other   Sig: Take 1 capsule (300 mg) by mouth once daily.   glucagon (Glucagen) 1 mg injection  Other   Sig: Inject 1 mg into the muscle every 15 minutes if needed for other (<70 and cannot take oral due to altered mentation).   hydrALAZINE (Apresoline) 50 mg tablet 3/31/2025 Other   Sig: Take 1 tablet (50 mg) by mouth 3 times a day.   Patient taking differently: Take 1.5 tablets (75 mg) by mouth 3 times a day.   insulin aspart (NovoLOG U-100 Insulin aspart) 100 unit/mL injection  Other   Sig: Inject under the skin. Inject as per sliding scale: if   151-200=2 units  201-250=3 units  251-300= 4 units  301-350= 5 units  351-400= 6 units  If above 400 give 6 units and notify MD   insulin glargine (Lantus) 100 unit/mL injection 3/31/2025 Other   Sig: Inject 16 Units under the skin once daily at bedtime. Take as directed per insulin instructions.   Patient taking differently: Inject 10 Units under the skin once daily at bedtime. Take as directed per insulin instructions.   insulin lispro 100 unit/mL injection Not Taking Other   Sig: Inject 0-5 Units under the skin 3 times daily (morning, midday, late afternoon). 0 unit(s) if BG ; 1 unit(s) if -200; 2 unit(s) if -250; 3 unit(s) if -300; 4 unit(s) if -350; 5 unit(s) if -400   Patient not taking: Reported on 4/1/2025  Not on the SNF list    insulin lispro 100 unit/mL injection Not Taking Other   Sig: Inject 2 Units under the skin 3 times a day before meals. Take as directed per insulin instructions.   Patient not taking: Reported on 4/1/2025  Not on the SNF list    isosorbide mononitrate ER (Imdur) 60 mg 24 hr tablet 3/31/2025 Other   Sig: TAKE 1 TABLET BY MOUTH ONCE DAILY   lancets misc  Other   Sig: Use three times a day to test blood sugar w/ meals   linezolid (Zyvox) 600 mg tablet 3/31/2025 Other   Sig: Take  1 tablet (600 mg) by mouth every 12 hours. Tentative end date is 4/17/25   metFORMIN XR (Glucophage-XR) 500 mg 24 hr tablet 3/31/2025 Other   Sig: TAKE 2 TABLETS BY MOUTH ONCE DAILY   multivitamin with minerals tablet 3/31/2025 Other   Sig: Take 1 tablet by mouth once daily.   oxyCODONE (Oxaydo) 5 mg immediate release tablet  Other   Sig: Take 1 tablet (5 mg) by mouth every 4 hours if needed for severe pain (7 - 10).   ranolazine (Ranexa) 500 mg 12 hr tablet 3/31/2025 Other   Sig: Take 1 tablet (500 mg) by mouth 2 times a day. Do not crush, chew, or split.   rivaroxaban (Xarelto) 2.5 mg tablet 3/31/2025 Other   Sig: Take 1 tablet (2.5 mg) by mouth 2 times daily (morning and late afternoon).      Facility-Administered Medications: None        The list below reflects the updated allergy list. Please review each documented allergy for additional clarification and justification.  Allergies  Reviewed by Maritza Vincent RN on 4/1/2025        Severity Reactions Comments    Lisinopril Not Specified Itching     Amoxicillin Low Rash     Losartan Low Itching, Rash             Patient was unable to be assessed for M2B at discharge.     Sources used to complete the med history include:    [facility]  Pharmacy dispense history  Chart Review  Care Everywhere  [facility]      Below are additional concerns with the patient's PTA list.  The med-rec has been updated using the [facility] report summary provided by the facility, along with the patient's information.    Medications ADDED:  Oxycodone   Novolog (Insulin Aspart)   Medications CHANGED:  Hydralazine to :1.5 tablets 3 times daily  Basaglar to: 10 units at bedtime  Calcium citrate to: 250 mg daily  Medications REMOVED:   Quetiapine (not on SNF provided list)  Insulin Lispro (not on [facility] provided list)    Ken Cee PharmD  Transitions of Care Pharmacist  [facility]  Please reach out via Secure Chat for questions, or if no response call  [phone] or vocera MedRec

## 2025-04-02 ENCOUNTER — APPOINTMENT (OUTPATIENT)
Dept: RADIOLOGY | Facility: HOSPITAL | Age: 65
End: 2025-04-02
Payer: COMMERCIAL

## 2025-04-02 ENCOUNTER — APPOINTMENT (OUTPATIENT)
Dept: VASCULAR MEDICINE | Facility: HOSPITAL | Age: 65
End: 2025-04-02
Payer: MEDICARE

## 2025-04-02 LAB
ALBUMIN SERPL BCP-MCNC: 2.7 G/DL (ref 3.4–5)
ANION GAP SERPL CALC-SCNC: 14 MMOL/L (ref 10–20)
BUN SERPL-MCNC: 13 MG/DL (ref 6–23)
CALCIUM SERPL-MCNC: 8.1 MG/DL (ref 8.6–10.6)
CHLORIDE SERPL-SCNC: 109 MMOL/L (ref 98–107)
CO2 SERPL-SCNC: 21 MMOL/L (ref 21–32)
CREAT SERPL-MCNC: 0.8 MG/DL (ref 0.5–1.05)
EGFRCR SERPLBLD CKD-EPI 2021: 82 ML/MIN/1.73M*2
ERYTHROCYTE [DISTWIDTH] IN BLOOD BY AUTOMATED COUNT: 14.2 % (ref 11.5–14.5)
GLUCOSE BLD MANUAL STRIP-MCNC: 121 MG/DL (ref 74–99)
GLUCOSE BLD MANUAL STRIP-MCNC: 199 MG/DL (ref 74–99)
GLUCOSE BLD MANUAL STRIP-MCNC: 251 MG/DL (ref 74–99)
GLUCOSE BLD MANUAL STRIP-MCNC: 59 MG/DL (ref 74–99)
GLUCOSE SERPL-MCNC: 201 MG/DL (ref 74–99)
HCT VFR BLD AUTO: 23.3 % (ref 36–46)
HGB BLD-MCNC: 7.5 G/DL (ref 12–16)
MAGNESIUM SERPL-MCNC: 1.17 MG/DL (ref 1.6–2.4)
MCH RBC QN AUTO: 29.8 PG (ref 26–34)
MCHC RBC AUTO-ENTMCNC: 32.2 G/DL (ref 32–36)
MCV RBC AUTO: 93 FL (ref 80–100)
NRBC BLD-RTO: 0.2 /100 WBCS (ref 0–0)
PHOSPHATE SERPL-MCNC: 2.8 MG/DL (ref 2.5–4.9)
PLATELET # BLD AUTO: 150 X10*3/UL (ref 150–450)
POTASSIUM SERPL-SCNC: 3.6 MMOL/L (ref 3.5–5.3)
RBC # BLD AUTO: 2.52 X10*6/UL (ref 4–5.2)
SODIUM SERPL-SCNC: 140 MMOL/L (ref 136–145)
WBC # BLD AUTO: 14.3 X10*3/UL (ref 4.4–11.3)

## 2025-04-02 PROCEDURE — 36415 COLL VENOUS BLD VENIPUNCTURE: CPT

## 2025-04-02 PROCEDURE — 74174 CTA ABD&PLVS W/CONTRAST: CPT | Performed by: RADIOLOGY

## 2025-04-02 PROCEDURE — 93923 UPR/LXTR ART STDY 3+ LVLS: CPT | Performed by: SURGERY

## 2025-04-02 PROCEDURE — 2500000001 HC RX 250 WO HCPCS SELF ADMINISTERED DRUGS (ALT 637 FOR MEDICARE OP)

## 2025-04-02 PROCEDURE — 74174 CTA ABD&PLVS W/CONTRAST: CPT

## 2025-04-02 PROCEDURE — 2500000004 HC RX 250 GENERAL PHARMACY W/ HCPCS (ALT 636 FOR OP/ED): Mod: TB

## 2025-04-02 PROCEDURE — 85027 COMPLETE CBC AUTOMATED: CPT

## 2025-04-02 PROCEDURE — 2500000001 HC RX 250 WO HCPCS SELF ADMINISTERED DRUGS (ALT 637 FOR MEDICARE OP): Performed by: NURSE PRACTITIONER

## 2025-04-02 PROCEDURE — 2500000002 HC RX 250 W HCPCS SELF ADMINISTERED DRUGS (ALT 637 FOR MEDICARE OP, ALT 636 FOR OP/ED)

## 2025-04-02 PROCEDURE — 83735 ASSAY OF MAGNESIUM: CPT

## 2025-04-02 PROCEDURE — 80069 RENAL FUNCTION PANEL: CPT

## 2025-04-02 PROCEDURE — 2550000001 HC RX 255 CONTRASTS: Performed by: STUDENT IN AN ORGANIZED HEALTH CARE EDUCATION/TRAINING PROGRAM

## 2025-04-02 PROCEDURE — 1100000001 HC PRIVATE ROOM DAILY

## 2025-04-02 PROCEDURE — 93923 UPR/LXTR ART STDY 3+ LVLS: CPT

## 2025-04-02 PROCEDURE — 2500000004 HC RX 250 GENERAL PHARMACY W/ HCPCS (ALT 636 FOR OP/ED)

## 2025-04-02 PROCEDURE — 82947 ASSAY GLUCOSE BLOOD QUANT: CPT

## 2025-04-02 RX ORDER — OXYCODONE HYDROCHLORIDE 5 MG/1
5 TABLET ORAL EVERY 4 HOURS PRN
Status: ACTIVE | OUTPATIENT
Start: 2025-04-02

## 2025-04-02 RX ORDER — ACETAMINOPHEN 500 MG
5 TABLET ORAL NIGHTLY PRN
Status: DISPENSED | OUTPATIENT
Start: 2025-04-02

## 2025-04-02 RX ORDER — HYDRALAZINE HYDROCHLORIDE 20 MG/ML
5 INJECTION INTRAMUSCULAR; INTRAVENOUS EVERY 4 HOURS PRN
Status: DISPENSED | OUTPATIENT
Start: 2025-04-02

## 2025-04-02 RX ORDER — OXYCODONE HYDROCHLORIDE 5 MG/1
10 TABLET ORAL EVERY 4 HOURS PRN
Status: DISPENSED | OUTPATIENT
Start: 2025-04-02

## 2025-04-02 RX ADMIN — ATORVASTATIN CALCIUM 80 MG: 80 TABLET, FILM COATED ORAL at 20:32

## 2025-04-02 RX ADMIN — OXYCODONE HYDROCHLORIDE 10 MG: 5 TABLET ORAL at 09:16

## 2025-04-02 RX ADMIN — CARVEDILOL 25 MG: 25 TABLET, FILM COATED ORAL at 08:51

## 2025-04-02 RX ADMIN — CARVEDILOL 25 MG: 25 TABLET, FILM COATED ORAL at 20:32

## 2025-04-02 RX ADMIN — ACETAMINOPHEN 650 MG: 325 TABLET, FILM COATED ORAL at 20:32

## 2025-04-02 RX ADMIN — ACETAMINOPHEN 650 MG: 325 TABLET, FILM COATED ORAL at 14:18

## 2025-04-02 RX ADMIN — GABAPENTIN 300 MG: 300 CAPSULE ORAL at 17:10

## 2025-04-02 RX ADMIN — INSULIN GLARGINE 10 UNITS: 100 INJECTION, SOLUTION SUBCUTANEOUS at 20:32

## 2025-04-02 RX ADMIN — OXYCODONE HYDROCHLORIDE 10 MG: 5 TABLET ORAL at 14:18

## 2025-04-02 RX ADMIN — ACETAMINOPHEN 650 MG: 325 TABLET, FILM COATED ORAL at 08:51

## 2025-04-02 RX ADMIN — IOHEXOL 80 ML: 350 INJECTION, SOLUTION INTRAVENOUS at 03:08

## 2025-04-02 RX ADMIN — OXYCODONE HYDROCHLORIDE 10 MG: 5 TABLET ORAL at 23:54

## 2025-04-02 RX ADMIN — INSULIN LISPRO 1 UNITS: 100 INJECTION, SOLUTION INTRAVENOUS; SUBCUTANEOUS at 17:10

## 2025-04-02 RX ADMIN — CIPROFLOXACIN 500 MG: 500 TABLET ORAL at 08:51

## 2025-04-02 RX ADMIN — HYDROMORPHONE HYDROCHLORIDE 0.2 MG: 0.5 INJECTION, SOLUTION INTRAMUSCULAR; INTRAVENOUS; SUBCUTANEOUS at 12:17

## 2025-04-02 RX ADMIN — Medication 5 MG: at 04:03

## 2025-04-02 RX ADMIN — CIPROFLOXACIN 500 MG: 500 TABLET ORAL at 20:32

## 2025-04-02 RX ADMIN — HYDRALAZINE HYDROCHLORIDE 5 MG: 20 INJECTION INTRAMUSCULAR; INTRAVENOUS at 04:03

## 2025-04-02 RX ADMIN — DAPAGLIFLOZIN 10 MG: 10 TABLET, FILM COATED ORAL at 08:51

## 2025-04-02 RX ADMIN — OXYCODONE HYDROCHLORIDE 10 MG: 5 TABLET ORAL at 03:25

## 2025-04-02 RX ADMIN — ACETAMINOPHEN 650 MG: 325 TABLET, FILM COATED ORAL at 02:14

## 2025-04-02 ASSESSMENT — COGNITIVE AND FUNCTIONAL STATUS - GENERAL
CLIMB 3 TO 5 STEPS WITH RAILING: TOTAL
DRESSING REGULAR UPPER BODY CLOTHING: A LITTLE
TOILETING: A LITTLE
CLIMB 3 TO 5 STEPS WITH RAILING: TOTAL
TURNING FROM BACK TO SIDE WHILE IN FLAT BAD: A LITTLE
HELP NEEDED FOR BATHING: A LITTLE
MOVING TO AND FROM BED TO CHAIR: A LOT
TURNING FROM BACK TO SIDE WHILE IN FLAT BAD: A LITTLE
MOBILITY SCORE: 13
HELP NEEDED FOR BATHING: A LITTLE
MOVING TO AND FROM BED TO CHAIR: A LOT
DRESSING REGULAR LOWER BODY CLOTHING: A LITTLE
DRESSING REGULAR UPPER BODY CLOTHING: A LITTLE
MOBILITY SCORE: 13
DAILY ACTIVITIY SCORE: 20
DRESSING REGULAR LOWER BODY CLOTHING: A LITTLE
STANDING UP FROM CHAIR USING ARMS: A LOT
STANDING UP FROM CHAIR USING ARMS: A LOT
WALKING IN HOSPITAL ROOM: TOTAL
WALKING IN HOSPITAL ROOM: TOTAL
TOILETING: A LITTLE
DAILY ACTIVITIY SCORE: 20

## 2025-04-02 ASSESSMENT — PAIN DESCRIPTION - LOCATION
LOCATION: LEG

## 2025-04-02 ASSESSMENT — PAIN DESCRIPTION - ORIENTATION
ORIENTATION: LEFT

## 2025-04-02 ASSESSMENT — PAIN - FUNCTIONAL ASSESSMENT
PAIN_FUNCTIONAL_ASSESSMENT: 0-10

## 2025-04-02 ASSESSMENT — PAIN SCALES - GENERAL
PAINLEVEL_OUTOF10: 8
PAINLEVEL_OUTOF10: 0 - NO PAIN
PAINLEVEL_OUTOF10: 10 - WORST POSSIBLE PAIN
PAINLEVEL_OUTOF10: 5 - MODERATE PAIN
PAINLEVEL_OUTOF10: 0 - NO PAIN
PAINLEVEL_OUTOF10: 8

## 2025-04-02 NOTE — PROGRESS NOTES
VASCULAR SURGERY HISTORY AND PHYSICAL    Assessment/Plan   Myrna Khan is 65 y.o. 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 She is now s/p marleen THURSTON with plans for formalization Friday 4/4. She is recovering well since surgery and her pain is under control.    Plan:  Vascular:  - Continue aspirin, hold plavix & xeralto    Neuro:  - Pain control with tylenol, oxycodone    Cards:  - Continue carvedilol; hold farxiga    Resp:  - IS 10x/hr    GI:  - Regular Diet    :  - voiding via home chronic indwelling catheter    Endo:  - SSI #1; home lantus 10u qnight    Heme/ID:  - Continue prior ciprofloxacin to 4/17    Dispo: Okay for RNF today      D/w attending, Dr. Bg Baker MD  Vascular Surgery p. [phone]      Subjective   Overnight Events:  NAEO. Denies nausea, vomiting, shortness of breath, vision changes, headaches, chest pain, abdominal pain, dysuria, diarrhea, constipation, numbness and tingling in the extremities.         Home Meds:  No current facility-administered medications on file prior to encounter.     Current Outpatient Medications on File Prior to Encounter   Medication Sig Dispense Refill    acetaminophen (Tylenol) 500 mg tablet Take 1 tablet (500 mg) by mouth every 6 hours if needed for mild pain (1 - 3) or moderate pain (4 - 6). Take per directed 30 tablet 0    atorvastatin (Lipitor) 80 mg tablet TAKE 1 TABLET BY MOUTH ONCE DAILY 30 tablet 2    calcium citrate 250 mg calcium tablet Take 1 tablet (250 mg) by mouth once daily.      carvedilol (Coreg) 25 mg tablet TAKE 1 TABLET BY MOUTH TWO TIMES A DAY 60 tablet 2    cholecalciferol (Vitamin D-3) 25 MCG (1000 UT) capsule Take 2 capsules (50 mcg) by mouth once daily.      ciprofloxacin (Cipro) 500 mg tablet Take 1 tablet (500 mg) by mouth every 12 hours. Tentative  "end date is 4/17/25      clopidogrel (Plavix) 75 mg tablet Take 1 tablet (75 mg) by mouth once daily.      Farxiga 10 mg Take 1 tablet (10 mg) by mouth once daily with breakfast.      gabapentin (Neurontin) 300 mg capsule Take 1 capsule (300 mg) by mouth once daily.      hydrALAZINE (Apresoline) 50 mg tablet Take 1 tablet (50 mg) by mouth 3 times a day. (Patient taking differently: Take 1.5 tablets (75 mg) by mouth 3 times a day.)      insulin glargine (Lantus) 100 unit/mL injection Inject 16 Units under the skin once daily at bedtime. Take as directed per insulin instructions. (Patient taking differently: Inject 10 Units under the skin once daily at bedtime. Take as directed per insulin instructions.)      isosorbide mononitrate ER (Imdur) 60 mg 24 hr tablet TAKE 1 TABLET BY MOUTH ONCE DAILY 30 tablet 0    linezolid (Zyvox) 600 mg tablet Take 1 tablet (600 mg) by mouth every 12 hours. Tentative end date is 4/17/25      metFORMIN XR (Glucophage-XR) 500 mg 24 hr tablet TAKE 2 TABLETS BY MOUTH ONCE DAILY 60 tablet 0    multivitamin with minerals tablet Take 1 tablet by mouth once daily.      NIFEdipine ER (Adalat CC) 90 mg 24 hr tablet Take 1 tablet (90 mg) by mouth once daily in the morning. Take before meals. Do not crush, chew, or split. 30 tablet 0    ranolazine (Ranexa) 500 mg 12 hr tablet Take 1 tablet (500 mg) by mouth 2 times a day. Do not crush, chew, or split.      rivaroxaban (Xarelto) 2.5 mg tablet Take 1 tablet (2.5 mg) by mouth 2 times daily (morning and late afternoon).      BD Ultra-Fine Micro Pen Needle 32 gauge x 1/4\" needle       blood sugar diagnostic strip Use 1 strip to check blood sugar 3 times a day with meals. 100 each 0    diclofenac sodium (Voltaren) 1 % gel Apply 4.5 inches (4 g) topically 4 times a day as needed (pain).      Easy Touch Alcohol Prep Pads pads, medicated       glucagon (Glucagen) 1 mg injection Inject 1 mg into the muscle every 15 minutes if needed for other (<70 and cannot " take oral due to altered mentation). 1 each 12    insulin aspart (NovoLOG U-100 Insulin aspart) 100 unit/mL injection Inject under the skin. Inject as per sliding scale: if   151-200=2 units  201-250=3 units  251-300= 4 units  301-350= 5 units  351-400= 6 units  If above 400 give 6 units and notify MD      insulin lispro 100 unit/mL injection Inject 0-5 Units under the skin 3 times daily (morning, midday, late afternoon). 0 unit(s) if BG ; 1 unit(s) if -200; 2 unit(s) if -250; 3 unit(s) if -300; 4 unit(s) if -350; 5 unit(s) if -400 (Patient not taking: Reported on 4/1/2025)      insulin lispro 100 unit/mL injection Inject 2 Units under the skin 3 times a day before meals. Take as directed per insulin instructions. (Patient not taking: Reported on 4/1/2025)      lancets misc Use three times a day to test blood sugar w/ meals 102 each 0    oxyCODONE (Oxaydo) 5 mg immediate release tablet Take 1 tablet (5 mg) by mouth every 4 hours if needed for severe pain (7 - 10).      QUEtiapine (SEROquel) 25 mg tablet Take 1 tablet (25 mg) by mouth once daily at bedtime. Eval for GDR within 2 weeks of discharge (Patient not taking: Reported on 4/1/2025)          Allergies:  Allergies   Allergen Reactions    Lisinopril Itching    Amoxicillin Rash    Losartan Itching and Rash       Objective   Vitals:  Heart Rate:  []   Temp:  [36 °C (96.8 °F)-36.8 °C (98.2 °F)]   Resp:  [11-18]   BP: (146-184)/(77-90)   SpO2:  [100 %]     Exam:  Constitutional: No acute distress, resting comfortably  Neuro:  AOx3, grossly intact  ENMT: moist mucous membranes  Head/neck: atraumatic  CV: no tachycardia  Pulm: non-labored on room air  GI: soft, non-tender, non-distended  Skin: warm and dry  Musculoskeletal: moving all extremities  Extremities:   LLE BKA without contracture, dressing changed. Hemostatic without evidence of proximal infection. Stay sutures in place.     Labs:            Results from last 7 days    Lab Units 04/01/25  1050   INR  1.3*   PROTIME seconds 14.9*

## 2025-04-02 NOTE — CARE PLAN
Problem: Pain - Adult  Goal: Verbalizes/displays adequate comfort level or baseline comfort level  Outcome: Progressing     Problem: Safety - Adult  Goal: Free from fall injury  Outcome: Progressing     Problem: Discharge Planning  Goal: Discharge to home or other facility with appropriate resources  Outcome: Progressing     Problem: Chronic Conditions and Co-morbidities  Goal: Patient's chronic conditions and co-morbidity symptoms are monitored and maintained or improved  Outcome: Progressing     Problem: Nutrition  Goal: Nutrient intake appropriate for maintaining nutritional needs  Outcome: Progressing     Problem: Fall/Injury  Goal: Not fall by end of shift  Outcome: Progressing  Goal: Be free from injury by end of the shift  Outcome: Progressing  Goal: Verbalize understanding of personal risk factors for fall in the hospital  Outcome: Progressing  Goal: Verbalize understanding of risk factor reduction measures to prevent injury from fall in the home  Outcome: Progressing  Goal: Use assistive devices by end of the shift  Outcome: Progressing  Goal: Pace activities to prevent fatigue by end of the shift  Outcome: Progressing     Problem: Diabetes  Goal: Achieve decreasing blood glucose levels by end of shift  Outcome: Progressing  Goal: Increase stability of blood glucose readings by end of shift  Outcome: Progressing  Goal: Decrease in ketones present in urine by end of shift  Outcome: Progressing  Goal: Maintain electrolyte levels within acceptable range throughout shift  Outcome: Progressing  Goal: Maintain glucose levels >70mg/dl to <250mg/dl throughout shift  Outcome: Progressing  Goal: No changes in neurological exam by end of shift  Outcome: Progressing  Goal: Learn about and adhere to nutrition recommendations by end of shift  Outcome: Progressing  Goal: Vital signs within normal range for age by end of shift  Outcome: Progressing  Goal: Increase self care and/or family involovement by end of  shift  Outcome: Progressing  Goal: Receive DSME education by end of shift  Outcome: Progressing     Problem: Skin  Goal: Decreased wound size/increased tissue granulation at next dressing change  4/2/2025 0211 by Lorna Brown RN  Flowsheets (Taken 4/2/2025 0211)  Decreased wound size/increased tissue granulation at next dressing change: Promote sleep for wound healing  4/2/2025 0210 by Lorna Brown RN  Outcome: Progressing  Goal: Participates in plan/prevention/treatment measures  4/2/2025 0211 by Lorna Brown RN  Flowsheets (Taken 4/2/2025 0211)  Participates in plan/prevention/treatment measures: Elevate heels  4/2/2025 0210 by Lorna Brown RN  Outcome: Progressing  Goal: Prevent/manage excess moisture  4/2/2025 0211 by Lorna Brown RN  Flowsheets (Taken 4/2/2025 0211)  Prevent/manage excess moisture:   Monitor for/manage infection if present   Cleanse incontinence/protect with barrier cream  4/2/2025 0210 by Lorna Brown RN  Outcome: Progressing  Goal: Prevent/minimize sheer/friction injuries  4/2/2025 0211 by Lorna Brown RN  Flowsheets (Taken 4/2/2025 0211)  Prevent/minimize sheer/friction injuries: Use pull sheet  4/2/2025 0210 by Lorna Brown RN  Outcome: Progressing  Goal: Promote/optimize nutrition  4/2/2025 0211 by Lorna Brown RN  Flowsheets (Taken 4/2/2025 0211)  Promote/optimize nutrition: Assist with feeding  4/2/2025 0210 by Lorna Brown RN  Outcome: Progressing  Goal: Promote skin healing  4/2/2025 0211 by Lorna Brown RN  Flowsheets (Taken 4/2/2025 0211)  Promote skin healing: Protective dressings over bony prominences  4/2/2025 0210 by Lorna Brown RN  Outcome: Progressing     Problem: Pain  Goal: Takes deep breaths with improved pain control throughout the shift  Outcome: Progressing  Goal: Turns in bed with improved pain control throughout the shift  Outcome: Progressing  Goal: Walks with improved pain control throughout the shift  Outcome: Progressing  Goal: Performs ADL's with  improved pain control throughout shift  Outcome: Progressing  Goal: Participates in PT with improved pain control throughout the shift  Outcome: Progressing  Goal: Free from opioid side effects throughout the shift  Outcome: Progressing  Goal: Free from acute confusion related to pain meds throughout the shift  Outcome: Progressing

## 2025-04-02 NOTE — PROGRESS NOTES
04/01/25 2006   Discharge Planning   Living Arrangements Other (Comment)   Support Systems Friends/neighbors;/;[employer]/j[employer] community;Family members   Assistance Needed yes   Type of Residence Private residence;Skilled nursing facility   Home or Post Acute Services Post acute facilities (Rehab/SNF/etc)   Type of Post Acute Facility Services Rehab;Skilled nursing   Expected Discharge Disposition SNF   Financial Resource Strain   How hard is it for you to pay for the very basics like food, housing, medical care, and heating? Not very   Housing Stability   In the last 12 months, was there a time when you were not able to pay the mortgage or rent on time? N   In the past 12 months, how many times have you moved where you were living? 0   At any time in the past 12 months, were you homeless or living in a shelter (including now)? N   Transportation Needs   In the past 12 months, has lack of transportation kept you from medical appointments or from getting medications? no   In the past 12 months, has lack of transportation kept you from meetings, work, or from getting things needed for daily living? Yes     Transitional Care Coordination Progress Note:  Patient discussed during interdisciplinary rounds.   Team members present:   Plan per Medical/Surgical team: 65 y.o. 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 She is now s/p marleen THURSTON with plans for formalization Friday 4/4   Payor: [employer]  Discharge disposition: PT/OT pend after formalization 4/4  Potential Barriers: n/a  ADOD: 4/8   Previous Home Care:  n/a  DME: [facility]  Pharmacy:  [facility] [place]:  jesse  PCP:  Dr. Quezada  Dialysis:  n/a

## 2025-04-02 NOTE — CARE PLAN
Problem: Pain - Adult  Goal: Verbalizes/displays adequate comfort level or baseline comfort level  Outcome: Progressing     Problem: Safety - Adult  Goal: Free from fall injury  Outcome: Progressing     Problem: Discharge Planning  Goal: Discharge to home or other facility with appropriate resources  Outcome: Progressing     Problem: Chronic Conditions and Co-morbidities  Goal: Patient's chronic conditions and co-morbidity symptoms are monitored and maintained or improved  Outcome: Progressing     Problem: Nutrition  Goal: Nutrient intake appropriate for maintaining nutritional needs  Outcome: Progressing     Problem: Fall/Injury  Goal: Not fall by end of shift  Outcome: Progressing  Goal: Be free from injury by end of the shift  Outcome: Progressing  Goal: Verbalize understanding of personal risk factors for fall in the hospital  Outcome: Progressing  Goal: Verbalize understanding of risk factor reduction measures to prevent injury from fall in the home  Outcome: Progressing  Goal: Use assistive devices by end of the shift  Outcome: Progressing  Goal: Pace activities to prevent fatigue by end of the shift  Outcome: Progressing     Problem: Diabetes  Goal: Achieve decreasing blood glucose levels by end of shift  Outcome: Progressing  Goal: Increase stability of blood glucose readings by end of shift  Outcome: Progressing  Goal: Decrease in ketones present in urine by end of shift  Outcome: Progressing  Goal: Maintain electrolyte levels within acceptable range throughout shift  Outcome: Progressing  Goal: Maintain glucose levels >70mg/dl to <250mg/dl throughout shift  Outcome: Progressing  Goal: No changes in neurological exam by end of shift  Outcome: Progressing  Goal: Learn about and adhere to nutrition recommendations by end of shift  Outcome: Progressing  Goal: Vital signs within normal range for age by end of shift  Outcome: Progressing  Goal: Increase self care and/or family involovement by end of  shift  Outcome: Progressing  Goal: Receive DSME education by end of shift  Outcome: Progressing     Problem: Skin  Goal: Decreased wound size/increased tissue granulation at next dressing change  Outcome: Progressing  Goal: Participates in plan/prevention/treatment measures  Outcome: Progressing  Goal: Prevent/manage excess moisture  Outcome: Progressing  Goal: Prevent/minimize sheer/friction injuries  Outcome: Progressing  Goal: Promote/optimize nutrition  Outcome: Progressing  Goal: Promote skin healing  Outcome: Progressing     Problem: Pain  Goal: Takes deep breaths with improved pain control throughout the shift  Outcome: Progressing  Goal: Turns in bed with improved pain control throughout the shift  Outcome: Progressing  Goal: Walks with improved pain control throughout the shift  Outcome: Progressing  Goal: Performs ADL's with improved pain control throughout shift  Outcome: Progressing  Goal: Participates in PT with improved pain control throughout the shift  Outcome: Progressing  Goal: Free from opioid side effects throughout the shift  Outcome: Progressing  Goal: Free from acute confusion related to pain meds throughout the shift  Outcome: Progressing   The patient's goals for the shift include get sleep    The clinical goals for the shift include pain control

## 2025-04-02 NOTE — CARE PLAN
The patient's goals for the shift include get sleep    The clinical goals for the shift include pain control

## 2025-04-02 NOTE — CARE PLAN
The patient's goals for the shift include get sleep    The clinical goals for the shift include remain hds and no falls      Problem: Pain - Adult  Goal: Verbalizes/displays adequate comfort level or baseline comfort level  Outcome: Progressing     Problem: Safety - Adult  Goal: Free from fall injury  Outcome: Progressing     Problem: Discharge Planning  Goal: Discharge to home or other facility with appropriate resources  Outcome: Progressing     Problem: Chronic Conditions and Co-morbidities  Goal: Patient's chronic conditions and co-morbidity symptoms are monitored and maintained or improved  Outcome: Progressing     Problem: Nutrition  Goal: Nutrient intake appropriate for maintaining nutritional needs  Outcome: Progressing     Problem: Fall/Injury  Goal: Not fall by end of shift  Outcome: Progressing  Goal: Be free from injury by end of the shift  Outcome: Progressing  Goal: Verbalize understanding of personal risk factors for fall in the hospital  Outcome: Progressing  Goal: Verbalize understanding of risk factor reduction measures to prevent injury from fall in the home  Outcome: Progressing  Goal: Use assistive devices by end of the shift  Outcome: Progressing  Goal: Pace activities to prevent fatigue by end of the shift  Outcome: Progressing     Problem: Diabetes  Goal: Achieve decreasing blood glucose levels by end of shift  Outcome: Progressing  Goal: Increase stability of blood glucose readings by end of shift  Outcome: Progressing  Goal: Decrease in ketones present in urine by end of shift  Outcome: Progressing  Goal: Maintain electrolyte levels within acceptable range throughout shift  Outcome: Progressing  Goal: Maintain glucose levels >70mg/dl to <250mg/dl throughout shift  Outcome: Progressing  Goal: No changes in neurological exam by end of shift  Outcome: Progressing  Goal: Learn about and adhere to nutrition recommendations by end of shift  Outcome: Progressing  Goal: Vital signs within normal  range for age by end of shift  Outcome: Progressing  Goal: Increase self care and/or family involovement by end of shift  Outcome: Progressing  Goal: Receive DSME education by end of shift  Outcome: Progressing     Problem: Skin  Goal: Decreased wound size/increased tissue granulation at next dressing change  Outcome: Progressing  Goal: Participates in plan/prevention/treatment measures  Outcome: Progressing  Goal: Prevent/manage excess moisture  Outcome: Progressing  Goal: Prevent/minimize sheer/friction injuries  Outcome: Progressing  Goal: Promote/optimize nutrition  Outcome: Progressing  Goal: Promote skin healing  Outcome: Progressing     Problem: Pain  Goal: Takes deep breaths with improved pain control throughout the shift  Outcome: Progressing  Goal: Turns in bed with improved pain control throughout the shift  Outcome: Progressing  Goal: Walks with improved pain control throughout the shift  Outcome: Progressing  Goal: Performs ADL's with improved pain control throughout shift  Outcome: Progressing  Goal: Participates in PT with improved pain control throughout the shift  Outcome: Progressing  Goal: Free from opioid side effects throughout the shift  Outcome: Progressing  Goal: Free from acute confusion related to pain meds throughout the shift  Outcome: Progressing

## 2025-04-03 ENCOUNTER — ANESTHESIA (OUTPATIENT)
Dept: OPERATING ROOM | Facility: HOSPITAL | Age: 65
End: 2025-04-03
Payer: MEDICARE

## 2025-04-03 ENCOUNTER — ANESTHESIA EVENT (OUTPATIENT)
Dept: OPERATING ROOM | Facility: HOSPITAL | Age: 65
End: 2025-04-03
Payer: MEDICARE

## 2025-04-03 LAB
ALBUMIN SERPL BCP-MCNC: 2.6 G/DL (ref 3.4–5)
ANION GAP SERPL CALC-SCNC: 13 MMOL/L (ref 10–20)
ANION GAP SERPL CALC-SCNC: 9 MMOL/L (ref 10–20)
ANION GAP SERPL CALC-SCNC: 9 MMOL/L (ref 10–20)
BLOOD EXPIRATION DATE: NORMAL
BUN SERPL-MCNC: 11 MG/DL (ref 6–23)
BUN SERPL-MCNC: 11 MG/DL (ref 6–23)
BUN SERPL-MCNC: 9 MG/DL (ref 6–23)
CALCIUM SERPL-MCNC: 7.3 MG/DL (ref 8.6–10.6)
CALCIUM SERPL-MCNC: 7.4 MG/DL (ref 8.6–10.6)
CALCIUM SERPL-MCNC: 7.4 MG/DL (ref 8.6–10.6)
CHLORIDE SERPL-SCNC: 111 MMOL/L (ref 98–107)
CHLORIDE SERPL-SCNC: 112 MMOL/L (ref 98–107)
CHLORIDE SERPL-SCNC: 112 MMOL/L (ref 98–107)
CLOT INIT KAO IND P HEP NEUT BLD RES TEG: 6.1 MIN (ref 4.6–9.1)
CO2 SERPL-SCNC: 22 MMOL/L (ref 21–32)
CO2 SERPL-SCNC: 24 MMOL/L (ref 21–32)
CO2 SERPL-SCNC: 24 MMOL/L (ref 21–32)
CREAT SERPL-MCNC: 0.57 MG/DL (ref 0.5–1.05)
CREAT SERPL-MCNC: 0.68 MG/DL (ref 0.5–1.05)
CREAT SERPL-MCNC: 0.68 MG/DL (ref 0.5–1.05)
DISPENSE STATUS: NORMAL
EGFRCR SERPLBLD CKD-EPI 2021: >90 ML/MIN/1.73M*2
ERYTHROCYTE [DISTWIDTH] IN BLOOD BY AUTOMATED COUNT: 14.2 % (ref 11.5–14.5)
ERYTHROCYTE [DISTWIDTH] IN BLOOD BY AUTOMATED COUNT: 15.2 % (ref 11.5–14.5)
GLUCOSE BLD MANUAL STRIP-MCNC: 169 MG/DL (ref 74–99)
GLUCOSE BLD MANUAL STRIP-MCNC: 292 MG/DL (ref 74–99)
GLUCOSE BLD MANUAL STRIP-MCNC: 71 MG/DL (ref 74–99)
GLUCOSE BLD MANUAL STRIP-MCNC: 74 MG/DL (ref 74–99)
GLUCOSE SERPL-MCNC: 104 MG/DL (ref 74–99)
GLUCOSE SERPL-MCNC: 104 MG/DL (ref 74–99)
GLUCOSE SERPL-MCNC: 83 MG/DL (ref 74–99)
HCT VFR BLD AUTO: 19.3 % (ref 36–46)
HCT VFR BLD AUTO: 24.3 % (ref 36–46)
HGB BLD-MCNC: 6.2 G/DL (ref 12–16)
HGB BLD-MCNC: 8.4 G/DL (ref 12–16)
LYS KAO IND CLT 30M P MA LENFR BL RESTEG: 0 % (ref 0–2.6)
MA KAOLIN+TF BLD RES TEG: 64 MM (ref 52–70)
MA TF IND+IIB-IIIA INH BLD RES TEG: 36 MM (ref 15–32)
MAGNESIUM SERPL-MCNC: 1.11 MG/DL (ref 1.6–2.4)
MAGNESIUM SERPL-MCNC: 2.35 MG/DL (ref 1.6–2.4)
MCH RBC QN AUTO: 28.2 PG (ref 26–34)
MCH RBC QN AUTO: 29.4 PG (ref 26–34)
MCHC RBC AUTO-ENTMCNC: 32.1 G/DL (ref 32–36)
MCHC RBC AUTO-ENTMCNC: 34.6 G/DL (ref 32–36)
MCV RBC AUTO: 82 FL (ref 80–100)
MCV RBC AUTO: 92 FL (ref 80–100)
NRBC BLD-RTO: 0 /100 WBCS (ref 0–0)
NRBC BLD-RTO: 0.2 /100 WBCS (ref 0–0)
PHOSPHATE SERPL-MCNC: 2.1 MG/DL (ref 2.5–4.9)
PLATELET # BLD AUTO: 119 X10*3/UL (ref 150–450)
PLATELET # BLD AUTO: 98 X10*3/UL (ref 150–450)
POTASSIUM SERPL-SCNC: 3.5 MMOL/L (ref 3.5–5.3)
POTASSIUM SERPL-SCNC: 3.5 MMOL/L (ref 3.5–5.3)
POTASSIUM SERPL-SCNC: 3.6 MMOL/L (ref 3.5–5.3)
PRODUCT BLOOD TYPE: 5100
PRODUCT CODE: NORMAL
RBC # BLD AUTO: 2.11 X10*6/UL (ref 4–5.2)
RBC # BLD AUTO: 2.98 X10*6/UL (ref 4–5.2)
SODIUM SERPL-SCNC: 141 MMOL/L (ref 136–145)
SODIUM SERPL-SCNC: 141 MMOL/L (ref 136–145)
SODIUM SERPL-SCNC: 142 MMOL/L (ref 136–145)
UNIT ABO: NORMAL
UNIT NUMBER: NORMAL
UNIT RH: NORMAL
UNIT VOLUME: 218
UNIT VOLUME: 275
UNIT VOLUME: 350
UNIT VOLUME: 350
WBC # BLD AUTO: 12.6 X10*3/UL (ref 4.4–11.3)
WBC # BLD AUTO: 9 X10*3/UL (ref 4.4–11.3)
XM INTEP: NORMAL

## 2025-04-03 PROCEDURE — 36415 COLL VENOUS BLD VENIPUNCTURE: CPT | Performed by: STUDENT IN AN ORGANIZED HEALTH CARE EDUCATION/TRAINING PROGRAM

## 2025-04-03 PROCEDURE — 85027 COMPLETE CBC AUTOMATED: CPT | Performed by: STUDENT IN AN ORGANIZED HEALTH CARE EDUCATION/TRAINING PROGRAM

## 2025-04-03 PROCEDURE — 27886 AMPUTATION FOLLOW-UP SURGERY: CPT | Performed by: STUDENT IN AN ORGANIZED HEALTH CARE EDUCATION/TRAINING PROGRAM

## 2025-04-03 PROCEDURE — 2500000001 HC RX 250 WO HCPCS SELF ADMINISTERED DRUGS (ALT 637 FOR MEDICARE OP): Performed by: NURSE PRACTITIONER

## 2025-04-03 PROCEDURE — 36430 TRANSFUSION BLD/BLD COMPNT: CPT

## 2025-04-03 PROCEDURE — P9017 PLASMA 1 DONOR FRZ W/IN 8 HR: HCPCS

## 2025-04-03 PROCEDURE — 2500000001 HC RX 250 WO HCPCS SELF ADMINISTERED DRUGS (ALT 637 FOR MEDICARE OP)

## 2025-04-03 PROCEDURE — 7100000002 HC RECOVERY ROOM TIME - EACH INCREMENTAL 1 MINUTE: Performed by: STUDENT IN AN ORGANIZED HEALTH CARE EDUCATION/TRAINING PROGRAM

## 2025-04-03 PROCEDURE — 2500000004 HC RX 250 GENERAL PHARMACY W/ HCPCS (ALT 636 FOR OP/ED): Performed by: STUDENT IN AN ORGANIZED HEALTH CARE EDUCATION/TRAINING PROGRAM

## 2025-04-03 PROCEDURE — 7100000001 HC RECOVERY ROOM TIME - INITIAL BASE CHARGE: Performed by: STUDENT IN AN ORGANIZED HEALTH CARE EDUCATION/TRAINING PROGRAM

## 2025-04-03 PROCEDURE — 1200000002 HC GENERAL ROOM WITH TELEMETRY DAILY

## 2025-04-03 PROCEDURE — P9016 RBC LEUKOCYTES REDUCED: HCPCS

## 2025-04-03 PROCEDURE — 2500000001 HC RX 250 WO HCPCS SELF ADMINISTERED DRUGS (ALT 637 FOR MEDICARE OP): Performed by: STUDENT IN AN ORGANIZED HEALTH CARE EDUCATION/TRAINING PROGRAM

## 2025-04-03 PROCEDURE — 3700000002 HC GENERAL ANESTHESIA TIME - EACH INCREMENTAL 1 MINUTE: Performed by: STUDENT IN AN ORGANIZED HEALTH CARE EDUCATION/TRAINING PROGRAM

## 2025-04-03 PROCEDURE — 97161 PT EVAL LOW COMPLEX 20 MIN: CPT | Mod: GP

## 2025-04-03 PROCEDURE — 2500000005 HC RX 250 GENERAL PHARMACY W/O HCPCS: Performed by: STUDENT IN AN ORGANIZED HEALTH CARE EDUCATION/TRAINING PROGRAM

## 2025-04-03 PROCEDURE — 3600000008 HC OR TIME - EACH INCREMENTAL 1 MINUTE - PROCEDURE LEVEL THREE: Performed by: STUDENT IN AN ORGANIZED HEALTH CARE EDUCATION/TRAINING PROGRAM

## 2025-04-03 PROCEDURE — 83735 ASSAY OF MAGNESIUM: CPT

## 2025-04-03 PROCEDURE — 97530 THERAPEUTIC ACTIVITIES: CPT | Mod: GP

## 2025-04-03 PROCEDURE — 2500000002 HC RX 250 W HCPCS SELF ADMINISTERED DRUGS (ALT 637 FOR MEDICARE OP, ALT 636 FOR OP/ED): Performed by: STUDENT IN AN ORGANIZED HEALTH CARE EDUCATION/TRAINING PROGRAM

## 2025-04-03 PROCEDURE — 2500000004 HC RX 250 GENERAL PHARMACY W/ HCPCS (ALT 636 FOR OP/ED)

## 2025-04-03 PROCEDURE — 85576 BLOOD PLATELET AGGREGATION: CPT | Performed by: STUDENT IN AN ORGANIZED HEALTH CARE EDUCATION/TRAINING PROGRAM

## 2025-04-03 PROCEDURE — 82947 ASSAY GLUCOSE BLOOD QUANT: CPT

## 2025-04-03 PROCEDURE — 80048 BASIC METABOLIC PNL TOTAL CA: CPT | Mod: CCI

## 2025-04-03 PROCEDURE — 80048 BASIC METABOLIC PNL TOTAL CA: CPT | Mod: CCI | Performed by: STUDENT IN AN ORGANIZED HEALTH CARE EDUCATION/TRAINING PROGRAM

## 2025-04-03 PROCEDURE — 3700000001 HC GENERAL ANESTHESIA TIME - INITIAL BASE CHARGE: Performed by: STUDENT IN AN ORGANIZED HEALTH CARE EDUCATION/TRAINING PROGRAM

## 2025-04-03 PROCEDURE — 2720000007 HC OR 272 NO HCPCS: Performed by: STUDENT IN AN ORGANIZED HEALTH CARE EDUCATION/TRAINING PROGRAM

## 2025-04-03 PROCEDURE — 64905 NERVE PEDICLE TRANSFER: CPT | Performed by: STUDENT IN AN ORGANIZED HEALTH CARE EDUCATION/TRAINING PROGRAM

## 2025-04-03 PROCEDURE — 85027 COMPLETE CBC AUTOMATED: CPT

## 2025-04-03 PROCEDURE — 01SG0ZZ REPOSITION TIBIAL NERVE, OPEN APPROACH: ICD-10-PCS | Performed by: STUDENT IN AN ORGANIZED HEALTH CARE EDUCATION/TRAINING PROGRAM

## 2025-04-03 PROCEDURE — 3600000003 HC OR TIME - INITIAL BASE CHARGE - PROCEDURE LEVEL THREE: Performed by: STUDENT IN AN ORGANIZED HEALTH CARE EDUCATION/TRAINING PROGRAM

## 2025-04-03 PROCEDURE — 36415 COLL VENOUS BLD VENIPUNCTURE: CPT

## 2025-04-03 PROCEDURE — 80069 RENAL FUNCTION PANEL: CPT

## 2025-04-03 PROCEDURE — 01SH0ZZ REPOSITION PERONEAL NERVE, OPEN APPROACH: ICD-10-PCS | Performed by: STUDENT IN AN ORGANIZED HEALTH CARE EDUCATION/TRAINING PROGRAM

## 2025-04-03 PROCEDURE — 83735 ASSAY OF MAGNESIUM: CPT | Performed by: STUDENT IN AN ORGANIZED HEALTH CARE EDUCATION/TRAINING PROGRAM

## 2025-04-03 PROCEDURE — 85384 FIBRINOGEN ACTIVITY: CPT | Performed by: STUDENT IN AN ORGANIZED HEALTH CARE EDUCATION/TRAINING PROGRAM

## 2025-04-03 RX ORDER — CLINDAMYCIN PHOSPHATE 150 MG/ML
INJECTION, SOLUTION INTRAVENOUS AS NEEDED
Status: DISCONTINUED | OUTPATIENT
Start: 2025-04-03 | End: 2025-04-03

## 2025-04-03 RX ORDER — FENTANYL CITRATE 50 UG/ML
INJECTION, SOLUTION INTRAMUSCULAR; INTRAVENOUS AS NEEDED
Status: DISCONTINUED | OUTPATIENT
Start: 2025-04-03 | End: 2025-04-03

## 2025-04-03 RX ORDER — DIPHENHYDRAMINE HYDROCHLORIDE 50 MG/ML
12.5 INJECTION, SOLUTION INTRAMUSCULAR; INTRAVENOUS ONCE AS NEEDED
Status: DISCONTINUED | OUTPATIENT
Start: 2025-04-03 | End: 2025-04-03 | Stop reason: HOSPADM

## 2025-04-03 RX ORDER — ISOSORBIDE MONONITRATE 30 MG/1
60 TABLET, EXTENDED RELEASE ORAL DAILY
Status: DISPENSED | OUTPATIENT
Start: 2025-04-03

## 2025-04-03 RX ORDER — OXYCODONE HYDROCHLORIDE 5 MG/1
5 TABLET ORAL EVERY 4 HOURS PRN
Status: DISCONTINUED | OUTPATIENT
Start: 2025-04-03 | End: 2025-04-03 | Stop reason: HOSPADM

## 2025-04-03 RX ORDER — SODIUM CHLORIDE 0.9 G/100ML
INJECTION, SOLUTION IRRIGATION AS NEEDED
Status: DISCONTINUED | OUTPATIENT
Start: 2025-04-03 | End: 2025-04-03 | Stop reason: HOSPADM

## 2025-04-03 RX ORDER — ONDANSETRON HYDROCHLORIDE 2 MG/ML
4 INJECTION, SOLUTION INTRAVENOUS ONCE AS NEEDED
Status: DISCONTINUED | OUTPATIENT
Start: 2025-04-03 | End: 2025-04-03 | Stop reason: HOSPADM

## 2025-04-03 RX ORDER — HEPARIN SODIUM 5000 [USP'U]/ML
5000 INJECTION, SOLUTION INTRAVENOUS; SUBCUTANEOUS EVERY 8 HOURS SCHEDULED
Status: DISPENSED | OUTPATIENT
Start: 2025-04-03

## 2025-04-03 RX ORDER — NIFEDIPINE 90 MG/1
90 TABLET, EXTENDED RELEASE ORAL
Status: DISPENSED | OUTPATIENT
Start: 2025-04-04

## 2025-04-03 RX ORDER — ROCURONIUM BROMIDE 10 MG/ML
INJECTION, SOLUTION INTRAVENOUS AS NEEDED
Status: DISCONTINUED | OUTPATIENT
Start: 2025-04-03 | End: 2025-04-03

## 2025-04-03 RX ORDER — LABETALOL HYDROCHLORIDE 5 MG/ML
INJECTION, SOLUTION INTRAVENOUS AS NEEDED
Status: DISCONTINUED | OUTPATIENT
Start: 2025-04-03 | End: 2025-04-03

## 2025-04-03 RX ORDER — LABETALOL HYDROCHLORIDE 5 MG/ML
5 INJECTION, SOLUTION INTRAVENOUS ONCE AS NEEDED
Status: DISCONTINUED | OUTPATIENT
Start: 2025-04-03 | End: 2025-04-03 | Stop reason: HOSPADM

## 2025-04-03 RX ORDER — CALCIUM GLUCONATE 20 MG/ML
1 INJECTION, SOLUTION INTRAVENOUS EVERY 4 HOURS
Status: DISCONTINUED | OUTPATIENT
Start: 2025-04-03 | End: 2025-04-03

## 2025-04-03 RX ORDER — HYDROMORPHONE HYDROCHLORIDE 0.2 MG/ML
0.2 INJECTION INTRAMUSCULAR; INTRAVENOUS; SUBCUTANEOUS EVERY 5 MIN PRN
Status: DISCONTINUED | OUTPATIENT
Start: 2025-04-03 | End: 2025-04-03 | Stop reason: HOSPADM

## 2025-04-03 RX ORDER — MEPERIDINE HYDROCHLORIDE 25 MG/ML
12.5 INJECTION INTRAMUSCULAR; INTRAVENOUS; SUBCUTANEOUS EVERY 10 MIN PRN
Status: DISCONTINUED | OUTPATIENT
Start: 2025-04-03 | End: 2025-04-03 | Stop reason: HOSPADM

## 2025-04-03 RX ORDER — SODIUM CHLORIDE, SODIUM LACTATE, POTASSIUM CHLORIDE, CALCIUM CHLORIDE 600; 310; 30; 20 MG/100ML; MG/100ML; MG/100ML; MG/100ML
INJECTION, SOLUTION INTRAVENOUS CONTINUOUS PRN
Status: DISCONTINUED | OUTPATIENT
Start: 2025-04-03 | End: 2025-04-03

## 2025-04-03 RX ORDER — PROPOFOL 10 MG/ML
INJECTION, EMULSION INTRAVENOUS AS NEEDED
Status: DISCONTINUED | OUTPATIENT
Start: 2025-04-03 | End: 2025-04-03

## 2025-04-03 RX ORDER — CIPROFLOXACIN 2 MG/ML
400 INJECTION, SOLUTION INTRAVENOUS EVERY 12 HOURS
Status: DISPENSED | OUTPATIENT
Start: 2025-04-03

## 2025-04-03 RX ORDER — POTASSIUM CHLORIDE 14.9 MG/ML
20 INJECTION INTRAVENOUS ONCE
Status: DISPENSED | OUTPATIENT
Start: 2025-04-03

## 2025-04-03 RX ORDER — MIDAZOLAM HYDROCHLORIDE 1 MG/ML
INJECTION INTRAMUSCULAR; INTRAVENOUS AS NEEDED
Status: DISCONTINUED | OUTPATIENT
Start: 2025-04-03 | End: 2025-04-03

## 2025-04-03 RX ORDER — OXYCODONE HYDROCHLORIDE 5 MG/1
10 TABLET ORAL EVERY 4 HOURS PRN
Status: DISCONTINUED | OUTPATIENT
Start: 2025-04-03 | End: 2025-04-03 | Stop reason: HOSPADM

## 2025-04-03 RX ORDER — RANOLAZINE 500 MG/1
500 TABLET, EXTENDED RELEASE ORAL 2 TIMES DAILY
Status: DISPENSED | OUTPATIENT
Start: 2025-04-03

## 2025-04-03 RX ORDER — DROPERIDOL 2.5 MG/ML
0.62 INJECTION, SOLUTION INTRAMUSCULAR; INTRAVENOUS ONCE AS NEEDED
Status: DISCONTINUED | OUTPATIENT
Start: 2025-04-03 | End: 2025-04-03 | Stop reason: HOSPADM

## 2025-04-03 RX ORDER — ASPIRIN 81 MG/1
81 TABLET ORAL DAILY
Status: DISPENSED | OUTPATIENT
Start: 2025-04-04

## 2025-04-03 RX ORDER — PHENYLEPHRINE 10 MG/250 ML(40 MCG/ML)IN 0.9 % SOD.CHLORIDE INTRAVENOUS
CONTINUOUS PRN
Status: DISCONTINUED | OUTPATIENT
Start: 2025-04-03 | End: 2025-04-03

## 2025-04-03 RX ORDER — MAGNESIUM SULFATE HEPTAHYDRATE 40 MG/ML
4 INJECTION, SOLUTION INTRAVENOUS ONCE
Status: COMPLETED | OUTPATIENT
Start: 2025-04-03 | End: 2025-04-03

## 2025-04-03 RX ORDER — LIDOCAINE HCL/PF 100 MG/5ML
SYRINGE (ML) INTRAVENOUS AS NEEDED
Status: DISCONTINUED | OUTPATIENT
Start: 2025-04-03 | End: 2025-04-03

## 2025-04-03 RX ORDER — HYDRALAZINE HYDROCHLORIDE 20 MG/ML
5 INJECTION INTRAMUSCULAR; INTRAVENOUS EVERY 30 MIN PRN
Status: DISCONTINUED | OUTPATIENT
Start: 2025-04-03 | End: 2025-04-03 | Stop reason: HOSPADM

## 2025-04-03 RX ORDER — VANCOMYCIN HYDROCHLORIDE 1 G/20ML
INJECTION, POWDER, LYOPHILIZED, FOR SOLUTION INTRAVENOUS AS NEEDED
Status: DISCONTINUED | OUTPATIENT
Start: 2025-04-03 | End: 2025-04-03 | Stop reason: HOSPADM

## 2025-04-03 RX ORDER — SODIUM CHLORIDE, SODIUM LACTATE, POTASSIUM CHLORIDE, CALCIUM CHLORIDE 600; 310; 30; 20 MG/100ML; MG/100ML; MG/100ML; MG/100ML
50 INJECTION, SOLUTION INTRAVENOUS CONTINUOUS
Status: DISCONTINUED | OUTPATIENT
Start: 2025-04-03 | End: 2025-04-03 | Stop reason: HOSPADM

## 2025-04-03 RX ORDER — ALBUTEROL SULFATE 0.83 MG/ML
2.5 SOLUTION RESPIRATORY (INHALATION) ONCE AS NEEDED
Status: DISCONTINUED | OUTPATIENT
Start: 2025-04-03 | End: 2025-04-03 | Stop reason: HOSPADM

## 2025-04-03 RX ORDER — ONDANSETRON HYDROCHLORIDE 2 MG/ML
INJECTION, SOLUTION INTRAVENOUS AS NEEDED
Status: DISCONTINUED | OUTPATIENT
Start: 2025-04-03 | End: 2025-04-03

## 2025-04-03 RX ORDER — PHENYLEPHRINE HCL IN 0.9% NACL 0.4MG/10ML
SYRINGE (ML) INTRAVENOUS AS NEEDED
Status: DISCONTINUED | OUTPATIENT
Start: 2025-04-03 | End: 2025-04-03

## 2025-04-03 RX ORDER — HYDRALAZINE HYDROCHLORIDE 20 MG/ML
INJECTION INTRAMUSCULAR; INTRAVENOUS AS NEEDED
Status: DISCONTINUED | OUTPATIENT
Start: 2025-04-03 | End: 2025-04-03

## 2025-04-03 RX ADMIN — RANOLAZINE 500 MG: 500 TABLET, EXTENDED RELEASE ORAL at 20:46

## 2025-04-03 RX ADMIN — INSULIN GLARGINE 10 UNITS: 100 INJECTION, SOLUTION SUBCUTANEOUS at 20:47

## 2025-04-03 RX ADMIN — ACETAMINOPHEN 650 MG: 325 TABLET, FILM COATED ORAL at 20:46

## 2025-04-03 RX ADMIN — PHENYLEPHRINE-NACL IV SOLUTION 10 MG/250ML-0.9% 0.4 MCG/KG/MIN: 10-0.9/25 SOLUTION at 08:06

## 2025-04-03 RX ADMIN — ACETAMINOPHEN 650 MG: 325 TABLET, FILM COATED ORAL at 14:59

## 2025-04-03 RX ADMIN — PROPOFOL 80 MG: 10 INJECTION, EMULSION INTRAVENOUS at 07:35

## 2025-04-03 RX ADMIN — CLINDAMYCIN PHOSPHATE 900 MG: 150 INJECTION, SOLUTION INTRAMUSCULAR; INTRAVENOUS at 08:05

## 2025-04-03 RX ADMIN — MIDAZOLAM HYDROCHLORIDE 1 MG: 2 INJECTION, SOLUTION INTRAMUSCULAR; INTRAVENOUS at 07:35

## 2025-04-03 RX ADMIN — Medication 200 MCG: at 08:07

## 2025-04-03 RX ADMIN — GABAPENTIN 300 MG: 300 CAPSULE ORAL at 18:04

## 2025-04-03 RX ADMIN — HYDRALAZINE HYDROCHLORIDE 5 MG: 20 INJECTION INTRAMUSCULAR; INTRAVENOUS at 05:52

## 2025-04-03 RX ADMIN — OXYCODONE HYDROCHLORIDE 10 MG: 5 TABLET ORAL at 04:56

## 2025-04-03 RX ADMIN — LIDOCAINE HYDROCHLORIDE 40 MG: 20 INJECTION INTRAVENOUS at 07:35

## 2025-04-03 RX ADMIN — SUGAMMADEX 200 MG: 100 INJECTION, SOLUTION INTRAVENOUS at 08:27

## 2025-04-03 RX ADMIN — HEPARIN SODIUM 5000 UNITS: 5000 INJECTION, SOLUTION INTRAVENOUS; SUBCUTANEOUS at 14:59

## 2025-04-03 RX ADMIN — ROCURONIUM 50 MG: 50 INJECTION, SOLUTION INTRAVENOUS at 07:35

## 2025-04-03 RX ADMIN — LABETALOL HYDROCHLORIDE 15 MG: 5 INJECTION INTRAVENOUS at 10:13

## 2025-04-03 RX ADMIN — SODIUM CHLORIDE, POTASSIUM CHLORIDE, SODIUM LACTATE AND CALCIUM CHLORIDE: 600; 310; 30; 20 INJECTION, SOLUTION INTRAVENOUS at 07:29

## 2025-04-03 RX ADMIN — HEPARIN SODIUM 5000 UNITS: 5000 INJECTION, SOLUTION INTRAVENOUS; SUBCUTANEOUS at 22:19

## 2025-04-03 RX ADMIN — HYDRALAZINE HYDROCHLORIDE 5 MG: 20 INJECTION INTRAMUSCULAR; INTRAVENOUS at 10:20

## 2025-04-03 RX ADMIN — ATORVASTATIN CALCIUM 80 MG: 80 TABLET, FILM COATED ORAL at 20:46

## 2025-04-03 RX ADMIN — Medication 5 MG: at 03:20

## 2025-04-03 RX ADMIN — DEXAMETHASONE SODIUM PHOSPHATE 4 MG: 4 INJECTION, SOLUTION INTRA-ARTICULAR; INTRALESIONAL; INTRAMUSCULAR; INTRAVENOUS; SOFT TISSUE at 10:03

## 2025-04-03 RX ADMIN — ONDANSETRON 4 MG: 2 INJECTION, SOLUTION INTRAMUSCULAR; INTRAVENOUS at 10:03

## 2025-04-03 RX ADMIN — FENTANYL CITRATE 50 MCG: 50 INJECTION, SOLUTION INTRAMUSCULAR; INTRAVENOUS at 07:35

## 2025-04-03 RX ADMIN — CIPROFLOXACIN 400 MG: 2 INJECTION, SOLUTION INTRAVENOUS at 14:58

## 2025-04-03 RX ADMIN — Medication 120 MCG: at 07:52

## 2025-04-03 RX ADMIN — Medication 5 MG: at 20:46

## 2025-04-03 RX ADMIN — OXYCODONE HYDROCHLORIDE 10 MG: 5 TABLET ORAL at 20:46

## 2025-04-03 RX ADMIN — FENTANYL CITRATE 25 MCG: 50 INJECTION, SOLUTION INTRAMUSCULAR; INTRAVENOUS at 08:31

## 2025-04-03 RX ADMIN — Medication 80 MCG: at 07:35

## 2025-04-03 RX ADMIN — CARVEDILOL 25 MG: 25 TABLET, FILM COATED ORAL at 20:46

## 2025-04-03 RX ADMIN — Medication 80 MCG: at 07:45

## 2025-04-03 RX ADMIN — PROPOFOL 20 MG: 10 INJECTION, EMULSION INTRAVENOUS at 07:37

## 2025-04-03 RX ADMIN — FENTANYL CITRATE 25 MCG: 50 INJECTION, SOLUTION INTRAMUSCULAR; INTRAVENOUS at 09:59

## 2025-04-03 RX ADMIN — MAGNESIUM SULFATE HEPTAHYDRATE 4 G: 40 INJECTION, SOLUTION INTRAVENOUS at 05:38

## 2025-04-03 RX ADMIN — ACETAMINOPHEN 650 MG: 325 TABLET, FILM COATED ORAL at 03:17

## 2025-04-03 ASSESSMENT — COGNITIVE AND FUNCTIONAL STATUS - GENERAL
TURNING FROM BACK TO SIDE WHILE IN FLAT BAD: A LITTLE
TOILETING: A LOT
HELP NEEDED FOR BATHING: A LOT
STANDING UP FROM CHAIR USING ARMS: A LITTLE
CLIMB 3 TO 5 STEPS WITH RAILING: TOTAL
DRESSING REGULAR UPPER BODY CLOTHING: A LITTLE
MOVING FROM LYING ON BACK TO SITTING ON SIDE OF FLAT BED WITH BEDRAILS: A LITTLE
DRESSING REGULAR LOWER BODY CLOTHING: A LITTLE
MOVING TO AND FROM BED TO CHAIR: A LOT
CLIMB 3 TO 5 STEPS WITH RAILING: TOTAL
STANDING UP FROM CHAIR USING ARMS: TOTAL
MOBILITY SCORE: 11
WALKING IN HOSPITAL ROOM: A LOT
TURNING FROM BACK TO SIDE WHILE IN FLAT BAD: A LOT
MOBILITY SCORE: 14
WALKING IN HOSPITAL ROOM: TOTAL
DAILY ACTIVITIY SCORE: 17
MOVING TO AND FROM BED TO CHAIR: A LOT
PERSONAL GROOMING: A LITTLE

## 2025-04-03 ASSESSMENT — PAIN SCALES - GENERAL
PAINLEVEL_OUTOF10: 0 - NO PAIN
PAINLEVEL_OUTOF10: 0 - NO PAIN
PAINLEVEL_OUTOF10: 7
PAINLEVEL_OUTOF10: 6
PAINLEVEL_OUTOF10: 4
PAINLEVEL_OUTOF10: 0 - NO PAIN
PAINLEVEL_OUTOF10: 4
PAINLEVEL_OUTOF10: 0 - NO PAIN
PAINLEVEL_OUTOF10: 0 - NO PAIN
PAINLEVEL_OUTOF10: 4
PAINLEVEL_OUTOF10: 0 - NO PAIN
PAINLEVEL_OUTOF10: 0 - NO PAIN

## 2025-04-03 ASSESSMENT — PAIN DESCRIPTION - LOCATION: LOCATION: LEG

## 2025-04-03 ASSESSMENT — PAIN DESCRIPTION - ORIENTATION: ORIENTATION: LEFT

## 2025-04-03 ASSESSMENT — PAIN - FUNCTIONAL ASSESSMENT
PAIN_FUNCTIONAL_ASSESSMENT: UNABLE TO SELF-REPORT
PAIN_FUNCTIONAL_ASSESSMENT: 0-10
PAIN_FUNCTIONAL_ASSESSMENT: 0-10

## 2025-04-03 NOTE — BRIEF OP NOTE
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  Estimated Blood Loss: 200mL  Intra-op Medications:   Administrations occurring from 0655 to 0935 on 25:   Medication Name Total Dose   vancomycin (Vancocin) vial for injection 3 g   sodium chloride 0.9 % irrigation solution 2,000 mL   acetaminophen (Tylenol) tablet 650 mg Cannot be calculated   carvedilol (Coreg) tablet 25 mg Cannot be calculated   ciprofloxacin (Cipro) tablet 500 mg Cannot be calculated   dapagliflozin propanediol (Farxiga) tablet 10 mg Cannot be calculated   hydrALAZINE (Apresoline) tablet 75 mg Cannot be calculated   insulin lispro injection 0-5 Units Cannot be calculated   clindamycin (Cleocin) 150 mg/mL 900 mg   fentaNYL (Sublimaze) injection 50 mcg/mL 75 mcg   lactated Ringer's infusion Cannot be calculated   lidocaine (cardiac) injection 2% prefilled syringe 40 mg   midazolam PF (Versed) injection 1 mg/mL 1 mg   phenylephrine (Gerardo-Synephrine) 10 mg/250 mL NS (40 mcg/mL) infusion 1.82 mg   phenylephrine 40 mcg/mL syringe 10 mL 480 mcg   propofol (Diprivan) injection 10 mg/mL 100 mg   rocuronium (ZeMuron) 50 mg/5 mL injection 50 mg   sugammadex (Bridion) 200 mg/2  mL injection 200 mg              Anesthesia Record               Intraprocedure I/O Totals          Intake    PRBC 350.00 mL    Transfuse Plasma :30 Minutes 300.00 mL    Transfuse RBC :1 Hour 350.00 mL    Phenylephrine Drip 0.00 mL    The total shown is the total volume documented since Anesthesia Start was filed.    lactated Ringer's 750.00 mL    Total Intake 1750 mL       Output    Urine 400 mL    Total Output 400 mL       Net    Net Volume 1350 mL          Specimen:   ID Type Source Tests Collected by Time   1 : LEFT BELOW KNEE AMPUTATION Tissue LEG AMPUTATION BELOW THE KNEE LEFT SURGICAL PATHOLOGY EXAM Bobby Pederson MD 4/3/2025 2067                  Findings: left BKA stump closed without significant tension    Complications:  None; patient tolerated the procedure well.     Disposition: PACU - hemodynamically stable.  Condition: stable  Specimens Collected:   ID Type Source Tests Collected by Time   1 : LEFT BELOW KNEE AMPUTATION Tissue LEG AMPUTATION BELOW THE KNEE LEFT SURGICAL PATHOLOGY EXAM Bobby Pederson MD 4/3/2025 8969     Attending Attestation:     Bobby Pederson  Phone Number: [phone]

## 2025-04-03 NOTE — ANESTHESIA PREPROCEDURE EVALUATION
Patient: Myrna Khan    Procedure Information       Anesthesia Start Date/Time: 04/03/25 0723    Procedure: AMPUTATION, BELOW KNEE (Left)    Location: [facility] OR 12 / [facility] [place] OR    Surgeons: Bobby Pederson MD            Relevant Problems   Cardiac   (+) CAD (coronary artery disease)   (+) HTN (hypertension)   (+) Hypertensive urgency   (+) PAD (peripheral artery disease) (CMS-HCC)   (+) Stented coronary artery      Pulmonary   (+) Chronic obstructive pulmonary disease with (acute) lower respiratory infection      Neuro   (+) Bilateral carotid artery stenosis      Hematology   (+) Thrombocytopenia, unspecified (CMS-[facility])      ID   (+) Chronic obstructive pulmonary disease with (acute) lower respiratory infection       Clinical information reviewed:   Tobacco  Allergies  Meds   Med Hx  Surg Hx   Fam Hx  Soc Hx        NPO Detail:  NPO/Void Status  Carbohydrate Drink Given Prior to Surgery? : N  Date of Last Liquid: 04/02/25  Time of Last Liquid: 2300  Date of Last Solid: 04/02/25  Time of Last Solid: 2300  Last Intake Type: Clear fluids; Light meal  Time of Last Void: 0655         Physical Exam    Airway  Mallampati: III  TM distance: >3 FB  Neck ROM: full     Cardiovascular - normal exam     Dental   Comments: Edentulous    Pulmonary - normal exam     Abdominal - normal exam             Anesthesia Plan    History of general anesthesia?: yes  History of complications of general anesthesia?: no    ASA 3     general     intravenous induction   Postoperative administration of opioids is intended.  Trial extubation is planned.  Anesthetic plan and risks discussed with patient.  Use of blood products discussed with patient who consented to blood products.    Plan discussed with CRNA and attending.

## 2025-04-03 NOTE — ANESTHESIA PROCEDURE NOTES
Airway  Date/Time: 4/3/2025 7:38 AM  Urgency: elective    Airway not difficult    Staffing  Performed: CLAUDIA   Authorized by: Mia Hook MD    Performed by: Gaye Luke  Patient location during procedure: OR    Indications and Patient Condition  Indications for airway management: anesthesia  Spontaneous Ventilation: absent  Sedation level: deep  Preoxygenated: yes  Patient position: sniffing  MILS maintained throughout  Mask difficulty assessment: 2 - vent by mask + OA or adjuvant +/- NMBA  Planned trial extubation    Final Airway Details  Final airway type: endotracheal airway      Successful airway: ETT  Cuffed: yes   Successful intubation technique: direct laryngoscopy  Facilitating devices/methods: intubating stylet  Endotracheal tube insertion site: oral  Blade: Ernesto  Blade size: #3  ETT size (mm): 7.0  Cormack-Lehane Classification: grade I - full view of glottis  Placement verified by: capnometry   Cuff volume (mL): 10  Measured from: lips  ETT to lips (cm): 21  Number of attempts at approach: 1

## 2025-04-03 NOTE — SIGNIFICANT EVENT
S:    POD 0 from left BKA formalization with TMR     O:   Vital signs are stable, normotensive, afebrile, no new or worsening oxygen requirement, not tachycardic  Visit Vitals  /75   Pulse 77   Temp 36.3 °C (97.3 °F)   Resp 19        Constitutional: no acute distress  Skin: warm and dry overall   Neuro: A/O x4, no gross deficits   HEENT: Atraumatic, no scleral icterus  Cardiac: RRR  Pulmonary: Unlabored respirations   Abdomen: Non distended, non tender  GI: Voiding  Surgical Site: Dressing clean dry and intact with minimal amounts of strikethrough, appropriately tender    A/P:  Overall, patient is doing well postoperatively with no acute concerns.  Pain control with scheduled tylenol.  Will continue to monitor clinical exam, vitals, I&O's, and labs when available.  Aspirin tomorrow, continue to hold plavix  Continue regular diet  Continue antibiotics  BID dressing change to left groin, do not change stump dressing  Maintain knee immobilizer  PT tomorrow morning  SQH for DVT Ppx  Wound: left stump with staples, kerlex, ACE; left groin with gauze packing  Will follow up on the patient in the a.m. or sooner as needed.    
S:    POD 0 from left marleen THURSTON.     O:   Vital signs are stable, normotensive, afebrile, no new or worsening oxygen requirement, not tachycardic  Visit Vitals  /88   Pulse 100   Temp 36 °C (96.8 °F)   Resp 16        Constitutional: no acute distress  Skin: warm and dry overall   Neuro: A/O x4, no gross deficits   HEENT: Atraumatic, no scleral icterus  Cardiac: RRR  Pulmonary: Unlabored respirations   Abdomen: Non distended, non tender  GI: Voiding via chronic day catheter  Surgical Site: Dressing clean dry and intact with minimal amounts of strikethrough, appropriately tender    A/P:  - Regular vascular floor when discharged from PACU  - Restarting home ASA; holding home plavix  - Continue other home meds  - Pain control with tylenol/oxy    
Vascular Surgery Post-Operative Plan    S/p left BKA formalization with TMR      Plan:  Return to LT7  pain control  Aspirin tomorrow, continue to hold plavix  Will discontinue Xarelto  diet as tolerated after 4 hours  Continue antibiotics  BID dressing change to left groin, do not change stump dressing  Maintain knee immobilizer  PT tomorrow morning  SQH for DVT Ppx  Wound: left stump with staples, kerlex, ACE; left groin with gauze packing    Marisela Momin MD  Vascular Surgery PGY6  Team Pager: [phone]     
Stable

## 2025-04-03 NOTE — NURSING NOTE
[facility] nurse called to bedside for additional access. Patient ordered a blood transfusion, multiple electrolyte replacements, pt is planned to go back to surgery. Patient's veins are very small in size, patient is hemodynamically unstable, history of diabetes and Peripheral artery disease , both arm scanned under ultra sound, visually inspected and palpated, 22 ga diffusics able to be placed in right dorsal FA and patient has a 20 ga in her right hand. Unable to cannulate additional access. Bedside aware if additional access needed or if she looses one line, call [facility] again to re- assess her.

## 2025-04-03 NOTE — PROGRESS NOTES
Physical Therapy    Physical Therapy Evaluation & Treatment    Patient Name: Myrna Khan  MRN: [id number]  Department: [place] [address]  Room: [facility]62-A  Today's Date: 4/3/2025   Time Calculation  Start Time: 1420  Stop Time: 1451  Time Calculation (min): 31 min    Assessment/Plan   PT Assessment  PT Assessment Results: Decreased strength, Impaired balance, Decreased mobility, Orthopedic restrictions  Rehab Prognosis: Good  Barriers to Discharge Home: Caregiver assistance, Physical needs  Caregiver Assistance: Patient lives alone and/or does not have reliable caregiver assistance  Physical Needs: Ambulating household distances limited by function/safety, Intermittent mobility assistance needed, Intermittent ADL assistance needed, High falls risk due to function or environment  Evaluation/Treatment Tolerance: Patient tolerated treatment well  End of Session Communication: Bedside nurse  Assessment Comment: 66 y/o F who is now s/p L BKA; patient demo's impaired balance, strength, functional mobility. Patient would benefit from MOD Intensity skilled PT services upon DC to address impairments as stated above.  End of Session Patient Position: Bed, 3 rail up, Alarm on   IP OR SWING BED PT PLAN  Inpatient or Swing Bed: Inpatient  PT Plan  Treatment/Interventions: Bed mobility, Transfer training, Gait training, Balance training, Strengthening, Therapeutic activity, Therapeutic exercise, Postural re-education, Positioning  PT Plan: Ongoing PT  PT Frequency: 4 times per week  PT Discharge Recommendations: Moderate intensity level of continued care  PT Recommended Transfer Status: Assist x1, Assistive device (2ww)  PT - OK to Discharge: Yes (DC rec made)      Subjective     General Visit Information:  General  Reason for Referral: s/p L BKA with TMR  Past Medical History Relevant to Rehab: carotid stenosis, diabetes, CAD, HTN, PAD and renal artery stenosis  Family/Caregiver Present: No  Prior to Session Communication: Bedside  "nurse  Patient Position Received: Bed, 3 rail up, Alarm off, not on at start of session  General Comment: patient received supine, HOB elevated, agreeable to participate in therapy; sachin day; patient incontinent of bowels in session; required jatin-care by PT, RN.  Home Living:  Home Living  Home Living Comments: recently at SNF; prior to last hospital admission-> apartment, alone, 6th floor with elevator access, tub shower with grab bars, shower chair; owns rollator, scooter and manual WC  Prior Level of Function:  Prior Function Per Pt/Caregiver Report  Prior Function Comments: recently at SNF-> reports stand-pivot transfer with staff at facility, reports working with therapy at facility. previous to last hospital stay-> reports ambulating with rollator household distances, uses scooter in community, \"\" completed grocery shopping, (-) drive.  Precautions:  Precautions  Hearing/Visual Limitations: hearing appears WFL, (-) glasses  Medical Precautions: Fall precautions  Post-Surgical Precautions:  (s/p L TKA)  Precautions Comment: KI in place      Objective   Pain:  Pain Assessment  Pain Assessment: 0-10  0-10 (Numeric) Pain Score: 0 - No pain  Cognition:  Cognition  Overall Cognitive Status: Within Functional Limits  Arousal/Alertness: Appropriate responses to stimuli  Orientation Level:  (AxO x3)  Following Commands: Follows one step commands with repetition (90%)    General Assessments:      Activity Tolerance  Early Mobility/Exercise Safety Screen: Proceed with mobilization - No exclusion criteria met    Sensation  Sensation Comment: did not report N/T in BUEs/BLEs    Strength  Strength Comments: BUEs: grossly 4-/5, mild deconditioning/weakness; RLE: PF/DF 4-/5, knee ext >3+/5, hip flexion >/=3/5; LLE: recent BKA, hip flextion >/=3/5  Postural Control  Postural Control: Impaired  Posture Comment: mild unsteadiness with OOB mobility and sitting EOB; no acute LOB however    Static Sitting Balance  Static " Sitting-Balance Support:  (RLE supported; BUE supported)  Static Sitting-Level of Assistance: Close supervision, Distant supervision (x1)  Dynamic Sitting Balance  Dynamic Sitting-Balance Support:  (RLE supported; BUE supported)  Dynamic Sitting-Level of Assistance: Contact guard  Dynamic Sitting-Comments: x1    Static Standing Balance  Static Standing-Balance Support: Bilateral upper extremity supported  Static Standing-Level of Assistance: Minimum assistance, Contact guard  Static Standing-Comment/Number of Minutes: x1  Dynamic Standing Balance  Dynamic Standing-Balance Support: Bilateral upper extremity supported  Dynamic Standing-Level of Assistance: Minimum assistance  Dynamic Standing-Comments: x1, 2ww  Functional Assessments:  Bed Mobility  Bed Mobility: Yes  Bed Mobility 1  Bed Mobility 1: Supine to sitting, Sitting to supine  Level of Assistance 1: Minimum assistance, Minimal verbal cues, Minimal tactile cues  Bed Mobility Comments 1: x1; HOB elevated for supine->sit task    Transfers  Transfer: Yes  Transfer 1  Transfer From 1: Sit to, Stand to  Transfer to 1: Sit, Stand  Technique 1: Sit to stand, Stand to sit  Transfer Device 1: Walker  Transfer Level of Assistance 1: Minimum assistance, Minimal verbal cues, Minimal tactile cues  Trials/Comments 1: cues for hand placement, task sequencing    Ambulation/Gait Training  Ambulation/Gait Training Performed: Yes  Ambulation/Gait Training 1  Surface 1: Level tile  Device 1: Rolling walker  Assistance 1: Minimum assistance, Minimal verbal cues, Minimal tactile cues  Comments/Distance (ft) 1: lateral side shuffle on R foot towards HOB x2 ft; mild unsteadiness  Extremity/Trunk Assessments:  RUE   RUE :  (AROM WFL)  LUE   LUE:  (AROM WFL)  RLE   RLE :  (AROM WFL)  LLE   LLE :  (recent BKA, R knee lacking ~10 degrees of full knee ext)  Treatments:  Therapeutic Activity  Therapeutic Activity Performed: Yes  Therapeutic Activity 1: Increase time spent with patient  completing functional mobility tasks. patient incontinent of bowels during session, required MINx1 to roll B for jatin-care.  Outcome Measures:  [facility] Basic Mobility  Turning from your back to your side while in a flat bed without using bedrails: A little  Moving from lying on your back to sitting on the side of a flat bed without using bedrails: A little  Moving to and from bed to chair (including a wheelchair): A lot  Standing up from a chair using your arms (e.g. wheelchair or bedside chair): A little  To walk in hospital room: A lot  Climbing 3-5 steps with railing: Total  Basic Mobility - Total Score: 14    Encounter Problems       Encounter Problems (Active)       PT Problem       Patient will complete bed mobility with close supervision with HOB flat        Start:  04/03/25    Expected End:  04/17/25            Patient will complete STS with CGx1 using LRAD without acute LOB         Start:  04/03/25    Expected End:  04/17/25            Patient will ambulate >/=15' with LRAD with CGx1 without acute LOB        Start:  04/03/25    Expected End:  04/17/25            Patient will complete static (close supervision) and dynamic (CGx1) standing balance activities using LRAD without acute LOB, while maintaining midline posture.        Start:  04/03/25    Expected End:  04/17/25            Patient will participate in BLE there-ex program in order to assist in improving strength and to assist with the completion of functional mobility tasks.        Start:  04/03/25    Expected End:  04/17/25               Pain - Adult              Education Documentation  Precautions, taught by Laura Cool, PT at 4/3/2025  3:45 PM.  Learner: Patient  Readiness: Acceptance  Method: Explanation  Response: Needs Reinforcement  Comment: mobility progression, wearing KI    Body Mechanics, taught by Laura Cool, PT at 4/3/2025  3:45 PM.  Learner: Patient  Readiness: Acceptance  Method: Explanation  Response: Needs Reinforcement  Comment:  mobility progression, wearing KI    Mobility Training, taught by Laura Cool PT at 4/3/2025  3:45 PM.  Learner: Patient  Readiness: Acceptance  Method: Explanation  Response: Needs Reinforcement  Comment: mobility progression, wearing KI    Education Comments  No comments found.    Laura Cool, PT, DPT

## 2025-04-03 NOTE — INTERVAL H&P NOTE
Chronic LLE wounds. Presented on 4/1 for marleen THURSTON. No acute issues post operatively. Planning for formalization today after blood transfusion.    H&P reviewed. The patient was examined and there are no changes to the H&P.

## 2025-04-03 NOTE — ANESTHESIA PROCEDURE NOTES
Peripheral IV  Date/Time: 4/3/2025 7:41 AM  Inserted by: LEONIDES Javier-CRNA    Placement  Needle size: 18 G  Laterality: left  Location: forearm  Local anesthetic: none  Site prep: chlorhexidine  Technique: anatomical landmarks  Attempts: 1

## 2025-04-03 NOTE — ANESTHESIA POSTPROCEDURE EVALUATION
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    Anesthesia Post Evaluation    Patient location during evaluation: PACU  Patient participation: complete - patient participated  Level of consciousness: awake and alert  Pain management: adequate  Airway patency: patent  Cardiovascular status: acceptable  Respiratory status: acceptable  Hydration status: acceptable  Postoperative Nausea and Vomiting: none        No notable events documented.

## 2025-04-04 LAB
BLOOD EXPIRATION DATE: NORMAL
BLOOD EXPIRATION DATE: NORMAL
DISPENSE STATUS: NORMAL
DISPENSE STATUS: NORMAL
ERYTHROCYTE [DISTWIDTH] IN BLOOD BY AUTOMATED COUNT: 16.2 % (ref 11.5–14.5)
GLUCOSE BLD MANUAL STRIP-MCNC: 175 MG/DL (ref 74–99)
GLUCOSE BLD MANUAL STRIP-MCNC: 186 MG/DL (ref 74–99)
GLUCOSE BLD MANUAL STRIP-MCNC: 190 MG/DL (ref 74–99)
GLUCOSE BLD MANUAL STRIP-MCNC: 227 MG/DL (ref 74–99)
HCT VFR BLD AUTO: 24.2 % (ref 36–46)
HGB BLD-MCNC: 7.7 G/DL (ref 12–16)
MCH RBC QN AUTO: 28.2 PG (ref 26–34)
MCHC RBC AUTO-ENTMCNC: 31.8 G/DL (ref 32–36)
MCV RBC AUTO: 89 FL (ref 80–100)
NRBC BLD-RTO: 0.2 /100 WBCS (ref 0–0)
PLATELET # BLD AUTO: 106 X10*3/UL (ref 150–450)
PRODUCT BLOOD TYPE: 5100
PRODUCT BLOOD TYPE: 5100
PRODUCT CODE: NORMAL
PRODUCT CODE: NORMAL
RBC # BLD AUTO: 2.73 X10*6/UL (ref 4–5.2)
UNIT ABO: NORMAL
UNIT ABO: NORMAL
UNIT NUMBER: NORMAL
UNIT NUMBER: NORMAL
UNIT RH: NORMAL
UNIT RH: NORMAL
UNIT VOLUME: 282
UNIT VOLUME: 350
WBC # BLD AUTO: 13.4 X10*3/UL (ref 4.4–11.3)
XM INTEP: NORMAL
XM INTEP: NORMAL

## 2025-04-04 PROCEDURE — 2500000001 HC RX 250 WO HCPCS SELF ADMINISTERED DRUGS (ALT 637 FOR MEDICARE OP): Performed by: STUDENT IN AN ORGANIZED HEALTH CARE EDUCATION/TRAINING PROGRAM

## 2025-04-04 PROCEDURE — 82947 ASSAY GLUCOSE BLOOD QUANT: CPT

## 2025-04-04 PROCEDURE — 97165 OT EVAL LOW COMPLEX 30 MIN: CPT | Mod: GO

## 2025-04-04 PROCEDURE — 36415 COLL VENOUS BLD VENIPUNCTURE: CPT | Performed by: STUDENT IN AN ORGANIZED HEALTH CARE EDUCATION/TRAINING PROGRAM

## 2025-04-04 PROCEDURE — 97530 THERAPEUTIC ACTIVITIES: CPT | Mod: GO

## 2025-04-04 PROCEDURE — 2500000004 HC RX 250 GENERAL PHARMACY W/ HCPCS (ALT 636 FOR OP/ED): Performed by: STUDENT IN AN ORGANIZED HEALTH CARE EDUCATION/TRAINING PROGRAM

## 2025-04-04 PROCEDURE — 2500000002 HC RX 250 W HCPCS SELF ADMINISTERED DRUGS (ALT 637 FOR MEDICARE OP, ALT 636 FOR OP/ED): Performed by: STUDENT IN AN ORGANIZED HEALTH CARE EDUCATION/TRAINING PROGRAM

## 2025-04-04 PROCEDURE — 85027 COMPLETE CBC AUTOMATED: CPT | Performed by: STUDENT IN AN ORGANIZED HEALTH CARE EDUCATION/TRAINING PROGRAM

## 2025-04-04 PROCEDURE — 1200000002 HC GENERAL ROOM WITH TELEMETRY DAILY

## 2025-04-04 RX ORDER — HYDRALAZINE HYDROCHLORIDE 10 MG/1
75 TABLET, FILM COATED ORAL 3 TIMES DAILY
OUTPATIENT
Start: 2025-04-04

## 2025-04-04 RX ADMIN — RANOLAZINE 500 MG: 500 TABLET, EXTENDED RELEASE ORAL at 21:45

## 2025-04-04 RX ADMIN — RANOLAZINE 500 MG: 500 TABLET, EXTENDED RELEASE ORAL at 08:31

## 2025-04-04 RX ADMIN — OXYCODONE HYDROCHLORIDE 10 MG: 5 TABLET ORAL at 13:36

## 2025-04-04 RX ADMIN — CIPROFLOXACIN 400 MG: 2 INJECTION, SOLUTION INTRAVENOUS at 02:16

## 2025-04-04 RX ADMIN — ACETAMINOPHEN 650 MG: 325 TABLET, FILM COATED ORAL at 13:36

## 2025-04-04 RX ADMIN — HEPARIN SODIUM 5000 UNITS: 5000 INJECTION, SOLUTION INTRAVENOUS; SUBCUTANEOUS at 13:36

## 2025-04-04 RX ADMIN — ACETAMINOPHEN 650 MG: 325 TABLET, FILM COATED ORAL at 08:29

## 2025-04-04 RX ADMIN — ACETAMINOPHEN 650 MG: 325 TABLET, FILM COATED ORAL at 02:15

## 2025-04-04 RX ADMIN — INSULIN LISPRO 2 UNITS: 100 INJECTION, SOLUTION INTRAVENOUS; SUBCUTANEOUS at 18:43

## 2025-04-04 RX ADMIN — HEPARIN SODIUM 5000 UNITS: 5000 INJECTION, SOLUTION INTRAVENOUS; SUBCUTANEOUS at 06:43

## 2025-04-04 RX ADMIN — OXYCODONE HYDROCHLORIDE 10 MG: 5 TABLET ORAL at 21:44

## 2025-04-04 RX ADMIN — INSULIN GLARGINE 10 UNITS: 100 INJECTION, SOLUTION SUBCUTANEOUS at 21:46

## 2025-04-04 RX ADMIN — CIPROFLOXACIN 400 MG: 2 INJECTION, SOLUTION INTRAVENOUS at 13:36

## 2025-04-04 RX ADMIN — GABAPENTIN 300 MG: 300 CAPSULE ORAL at 18:43

## 2025-04-04 RX ADMIN — ASPIRIN 81 MG: 81 TABLET, COATED ORAL at 08:30

## 2025-04-04 RX ADMIN — CARVEDILOL 25 MG: 25 TABLET, FILM COATED ORAL at 21:44

## 2025-04-04 RX ADMIN — INSULIN LISPRO 1 UNITS: 100 INJECTION, SOLUTION INTRAVENOUS; SUBCUTANEOUS at 08:29

## 2025-04-04 RX ADMIN — CARVEDILOL 25 MG: 25 TABLET, FILM COATED ORAL at 08:29

## 2025-04-04 RX ADMIN — ACETAMINOPHEN 650 MG: 325 TABLET, FILM COATED ORAL at 21:44

## 2025-04-04 RX ADMIN — Medication 5 MG: at 21:43

## 2025-04-04 RX ADMIN — HEPARIN SODIUM 5000 UNITS: 5000 INJECTION, SOLUTION INTRAVENOUS; SUBCUTANEOUS at 21:44

## 2025-04-04 RX ADMIN — ATORVASTATIN CALCIUM 80 MG: 80 TABLET, FILM COATED ORAL at 21:44

## 2025-04-04 RX ADMIN — OXYCODONE HYDROCHLORIDE 10 MG: 5 TABLET ORAL at 06:44

## 2025-04-04 ASSESSMENT — ACTIVITIES OF DAILY LIVING (ADL)
BATHING_ASSISTANCE: MAXIMAL
ADL_ASSISTANCE: INDEPENDENT

## 2025-04-04 ASSESSMENT — COGNITIVE AND FUNCTIONAL STATUS - GENERAL
DRESSING REGULAR UPPER BODY CLOTHING: A LITTLE
DRESSING REGULAR LOWER BODY CLOTHING: A LOT
WALKING IN HOSPITAL ROOM: A LOT
HELP NEEDED FOR BATHING: A LOT
TOILETING: A LOT
DRESSING REGULAR LOWER BODY CLOTHING: A LITTLE
DRESSING REGULAR LOWER BODY CLOTHING: A LITTLE
MOBILITY SCORE: 12
MOVING TO AND FROM BED TO CHAIR: A LOT
EATING MEALS: A LITTLE
MOVING FROM LYING ON BACK TO SITTING ON SIDE OF FLAT BED WITH BEDRAILS: A LOT
HELP NEEDED FOR BATHING: A LITTLE
MOBILITY SCORE: 12
HELP NEEDED FOR BATHING: A LITTLE
EATING MEALS: A LITTLE
STANDING UP FROM CHAIR USING ARMS: A LOT
PERSONAL GROOMING: A LITTLE
WALKING IN HOSPITAL ROOM: A LOT
MOVING TO AND FROM BED TO CHAIR: A LOT
DRESSING REGULAR UPPER BODY CLOTHING: A LITTLE
TURNING FROM BACK TO SIDE WHILE IN FLAT BAD: A LOT
CLIMB 3 TO 5 STEPS WITH RAILING: A LOT
TOILETING: A LITTLE
CLIMB 3 TO 5 STEPS WITH RAILING: A LOT
DAILY ACTIVITIY SCORE: 15
DRESSING REGULAR UPPER BODY CLOTHING: A LITTLE
STANDING UP FROM CHAIR USING ARMS: A LOT
MOVING FROM LYING ON BACK TO SITTING ON SIDE OF FLAT BED WITH BEDRAILS: A LOT
TURNING FROM BACK TO SIDE WHILE IN FLAT BAD: A LOT
DAILY ACTIVITIY SCORE: 18
PERSONAL GROOMING: A LITTLE
TOILETING: A LOT
PERSONAL GROOMING: A LITTLE
DAILY ACTIVITIY SCORE: 18

## 2025-04-04 ASSESSMENT — PAIN SCALES - GENERAL
PAINLEVEL_OUTOF10: 0 - NO PAIN
PAINLEVEL_OUTOF10: 7
PAINLEVEL_OUTOF10: 0 - NO PAIN

## 2025-04-04 ASSESSMENT — PAIN - FUNCTIONAL ASSESSMENT
PAIN_FUNCTIONAL_ASSESSMENT: 0-10
PAIN_FUNCTIONAL_ASSESSMENT: 0-10

## 2025-04-04 ASSESSMENT — PAIN DESCRIPTION - LOCATION: LOCATION: LEG

## 2025-04-04 NOTE — PROGRESS NOTES
Occupational Therapy    Evaluation/Treatment    Patient Name: Myrna Khan  MRN: [id number]  Department: [place] [address]  Room: 70[facility]7062-A  Today's Date: 04/04/25  Time Calculation  Start Time: 1021  Stop Time: 1049  Time Calculation (min): 28 min       Assessment:  OT Assessment: Pt will benefit from continued skilled OT to increase independence in ADLs, functional mobility, activity tolerance, safety, strength, and cognition.  Prognosis: Excellent  Barriers to Discharge Home: Caregiver assistance, Cognition needs, Physical needs  Caregiver Assistance: Patient lives alone and/or does not have reliable caregiver assistance  Cognition Needs: 24hr supervision for safety awareness needed  Physical Needs: Ambulating household distances limited by function/safety, Intermittent mobility assistance needed, Intermittent ADL assistance needed  Evaluation/Treatment Tolerance: Patient tolerated treatment well  Medical Staff Made Aware: Yes  End of Session Communication: Bedside nurse  End of Session Patient Position: Bed, 3 rail up, Alarm on  OT Assessment Results: Decreased ADL status, Decreased upper extremity strength, Decreased safe judgment during ADL, Decreased endurance, Decreased functional mobility, Decreased IADLs  Prognosis: Excellent  Evaluation/Treatment Tolerance: Patient tolerated treatment well  Medical Staff Made Aware: Yes  Strengths: Attitude of self  Barriers to Participation: Comorbidities  Plan:  Treatment Interventions: ADL retraining, Functional transfer training, Endurance training, Cognitive reorientation, Patient/family training, Equipment evaluation/education, Compensatory technique education  OT Frequency: 3 times per week  OT Discharge Recommendations: Moderate intensity level of continued care  Equipment Recommended upon Discharge:  (TBD at next level of care)  OT Recommended Transfer Status: Assist of 2  OT - OK to Discharge: Yes (upon medical clearance)  Treatment Interventions: ADL retraining,  Functional transfer training, Endurance training, Cognitive reorientation, Patient/family training, Equipment evaluation/education, Compensatory technique education    Subjective   Current Problem:  1. PAD (peripheral artery disease) (CMS-HCC)  Surgical Pathology Exam    Surgical Pathology Exam    Vascular US PVR without exercise    Vascular US PVR without exercise    Case Request Operating Room: AMPUTATION, BELOW KNEE    Case Request Operating Room: AMPUTATION, BELOW KNEE    Surgical Pathology Exam    Surgical Pathology Exam      2. Dry gangrene (Multi)  Case Request Operating Room: AMPUTATION, BELOW KNEE    Case Request Operating Room: AMPUTATION, BELOW KNEE    Surgical Pathology Exam    Surgical Pathology Exam        General:   OT Received On: 04/04/25  General  Reason for Referral: s/p L BKA with TMR  Past Medical History Relevant to Rehab: carotid stenosis, diabetes, CAD, HTN, PAD and renal artery stenosis  Family/Caregiver Present: No  Prior to Session Communication: Bedside nurse  Patient Position Received: Bed, 3 rail up, Alarm off, not on at start of session  General Comment: Pt supine in bed upon arrival, agreeable to OT   Precautions:  Hearing/Visual Limitations: hearing appears WFL, (-) glasses  Medical Precautions: Fall precautions  Precautions Comment: KI in place    Pain:  Pain Assessment  Pain Assessment: 0-10  0-10 (Numeric) Pain Score: 0 - No pain    Objective   Cognition:  Overall Cognitive Status: Impaired  Orientation Level: Oriented X4  Following Commands: Follows one step commands with repetition  Cognition Comments: unable to name months of year backward  Insight: Moderate (Pt reoriented to R BKA mulpilte times in session, pt trying to step with LLE in session)  Impulsive: Mildly     Home Living:  Type of Home: Apartment  Lives With: Alone  Home Adaptive Equipment: Wheelchair-manual, Scooter (rollator)  Home Layout: One level  Home Access: Level entry  Bathroom Shower/Tub: Tub/shower  unit  Bathroom Equipment: Grab bars in shower, Shower chair with back  Home Living Comments: pt recently at SNF, prior to admission at apt  Prior Function:  Level of [place]: Independent with ADLs and functional transfers, Needs assistance with homemaking  ADL Assistance: Independent  Homemaking Assistance: Needs assistance  Ambulatory Assistance: Independent  Vocational: Retired, On disability  IADL History:  Homemaking Responsibilities: No  ADL:  Eating Assistance: Independent (anticipated)  Grooming Assistance: Stand by (anticipated)  Bathing Assistance: Maximal (anticipated seated)  UE Dressing Assistance: Minimal (gown management seated EOB)  LE Dressing Assistance: Maximal (anticipated)  Toileting Assistance with Device: Maximal (anticipated)    Activity Tolerance:  Endurance: Endurance does not limit participation in activity    Bed Mobility/Transfers: Bed Mobility  Bed Mobility: Yes  Bed Mobility 1  Bed Mobility 1: Supine to sitting, Sitting to supine  Level of Assistance 1: Minimum assistance, Minimal verbal cues, Minimal tactile cues  Bed Mobility Comments 1: HOB elevated  Bed Mobility 2  Bed Mobility  2: Rolling right, Rolling left  Level of Assistance 2: Contact guard    Transfers  Transfer: Yes  Transfer 1  Transfer From 1: Sit to, Stand to  Transfer to 1: Stand, Sit  Technique 1: Sit to stand, Stand to sit  Transfer Device 1: Walker  Transfer Level of Assistance 1: Moderate assistance, Minimal verbal cues  Trials/Comments 1: 6x throughout session, VCs for hand placement    Functional Mobility:  Functional Mobility  Functional Mobility Performed: Yes  Functional Mobility 1  Surface 1: Level tile  Device 1: Rolling walker  Assistance 1: Moderate assistance, Moderate verbal cues  Comments 1: pivot on RLE toward HOB, max VCs 2/2 pt attempting to step with LLE, educated on LLE BKA  Sitting Balance:  Static Sitting Balance  Static Sitting-Balance Support: Feet supported  Static Sitting-Level of  Assistance: Close supervision  Dynamic Sitting Balance  Dynamic Sitting-Balance Support: Feet supported  Dynamic Sitting-Level of Assistance: Contact guard  Standing Balance:  Static Standing Balance  Static Standing-Balance Support: Bilateral upper extremity supported  Static Standing-Level of Assistance: Minimum assistance  Dynamic Standing Balance  Dynamic Standing-Balance Support: Bilateral upper extremity supported  Dynamic Standing-Level of Assistance: Moderate assistance    Modalities:  Modalities Used: No    Therapy/Activity:      Therapeutic Activity  Therapeutic Activity Performed: Yes  Therapeutic Activity 1: bed mob, transfers, fxnl mob  Therapeutic Activity 2: extended time on pt education and for safety througout session    Vision:Vision - Basic Assessment  Current Vision: No visual deficits      Sensation:  Light Touch: No apparent deficits  Strength:  Strength Comments: BUE WFL    Perception:  Inattention/Neglect: Appears intact  Coordination:  Movements are Fluid and Coordinated: Yes   Hand Function:  Hand Function  Gross Grasp: Functional  Coordination: Functional  Extremities: RUE   RUE : Within Functional Limits and LUE   LUE: Within Functional Limits    Outcome Measures: [facility] Daily Activity  Putting on and taking off regular lower body clothing: A lot  Bathing (including washing, rinsing, drying): A lot  Putting on and taking off regular upper body clothing: A little  Toileting, which includes using toilet, bedpan or urinal: A lot  Taking care of personal grooming such as brushing teeth: A little  Eating Meals: A little  Daily Activity - Total Score: 15     and OT Adult Other Outcome Measures  4AT: positive    Education Documentation  Body Mechanics, taught by Tucker Tovar OT at 4/4/2025  3:14 PM.  Learner: Patient  Readiness: Acceptance  Method: Explanation  Response: Verbalizes Understanding, Needs Reinforcement  Comment: OT POC, d/c rec, safety, ADLs    Precautions, taught by Tucker  LIBERTAD Tovar at 4/4/2025  3:14 PM.  Learner: Patient  Readiness: Acceptance  Method: Explanation  Response: Verbalizes Understanding, Needs Reinforcement  Comment: OT POC, d/c rec, safety, ADLs    ADL Training, taught by Tucker Tovar OT at 4/4/2025  3:14 PM.  Learner: Patient  Readiness: Acceptance  Method: Explanation  Response: Verbalizes Understanding, Needs Reinforcement  Comment: OT POC, d/c rec, safety, ADLs    Education Comments  No comments found.      Goals:  Encounter Problems       Encounter Problems (Active)       ADLs       Patient with complete lower body dressing with contact guard assist level of assistance donning and doffing all LE clothes  with PRN adaptive equipment while supported sitting and standing (Progressing)       Start:  04/04/25    Expected End:  04/25/25            Patient will complete toileting including hygiene clothing management/hygiene with stand by assist level of assistance and grab bars. (Progressing)       Start:  04/04/25    Expected End:  04/25/25               BALANCE       Pt will maintain dynamic standing balance during ADL task with modified independent level of assistance in order to demonstrate decreased risk of falling and improved postural control. (Progressing)       Start:  04/04/25    Expected End:  04/25/25               COGNITION/SAFETY       Patient will score WFL on standardized cognitive assessment with verbal cues and within reasonable time frame (Progressing)       Start:  04/04/25    Expected End:  04/25/25               MOBILITY       Patient will perform Functional mobility min Household distances/Community Distances with stand by assist level of assistance and least restrictive device in order to improve safety and functional mobility. (Progressing)       Start:  04/04/25    Expected End:  04/25/25               TRANSFERS       Patient will complete sit to stand transfer with stand by assist level of assistance and least restrictive device in order to  improve safety and prepare for out of bed mobility. (Progressing)       Start:  04/04/25    Expected End:  04/25/25               Tucker Tovar OTR/L

## 2025-04-04 NOTE — PROGRESS NOTES
VASCULAR SURGERY PROGRESS NOTE  Assessment/Plan   Myrna Khan is 65 y.o. 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 Progressing appropriately.     Plan:  Neuro:  - Pain control with tylenol, oxycodone  - continue home gabapentin     CV: PAD, CAD, HERMANN  - continue home ASA, holding home plavix - plan for discharge on DAPT  - continue home atorvastatin  - continue home ranolazine  - continue carvedilol, imdur, nifedipine  - home hydralazine held - restart as indicated     Resp:  - IS 10x/hr     GI:  - Regular Diet     :  - voiding via home chronic indwelling catheter     Endo:  - SSI #1; home lantus 10u qnight  - holding home farxiga     Heme/ID:  - continue IV cipro for groin wound  - trend hemoglobin    Skin/wound  - daily dressing change to BKA stump with ABD, kerlex, ACE  - BID packing changes to left groin wound with packing strips, gauze, tape     SQH for DVT ppx    Dispo: RNF, discharge planning    D/w attending, Dr. Bg Momin MD  Vascular Surgery PGY6  Team Pager: [phone]      Subjective   No overnight issues. Some oozing from amputation site and dressing changed. Pain controlled. Tolerating diet.    Objective   Vitals:  Heart Rate:  [69-77]   Temp:  [36.3 °C (97.3 °F)-36.7 °C (98.1 °F)]   Resp:  [18-19]   BP: (132-188)/(75-87)   SpO2:  [99 %-100 %]     Exam:  Constitutional: No acute distress, resting comfortably  Neuro:  AOx3, grossly intact  ENMT: moist mucous membranes  CV: no tachycardia  Pulm: non-labored on RA  GI: soft, non-tender, non-distended  Skin: warm and dry  Musculoskeletal: moving all extremities  Extremities: left BKA stump intact and hemostatic, clean dressing applied, left groin wound 1cm x 1cm with packing in place and no significant  drainage    Labs:  Results from last 7 days   Lab Units 04/04/25  0856 04/03/25  1115 04/03/25  0319   WBC AUTO x10*3/uL 13.4* 9.0 12.6*   HEMOGLOBIN g/dL 7.7* 8.4* 6.2*   PLATELETS AUTO x10*3/uL 106* 98* 119*      Results from last 7 days   Lab Units 04/03/25  1115 04/03/25  0319 04/02/25  1458   SODIUM mmol/L 142 141  141 140   POTASSIUM mmol/L 3.6 3.5  3.5 3.6   CHLORIDE mmol/L 111* 112*  112* 109*   CO2 mmol/L 22 24  24 21   BUN mg/dL 9 11  11 13   CREATININE mg/dL 0.57 0.68  0.68 0.80   GLUCOSE mg/dL 83 104*  104* 201*   MAGNESIUM mg/dL 2.35 1.11* 1.17*   PHOSPHORUS mg/dL  --  2.1* 2.8      Results from last 7 days   Lab Units 04/01/25  1050   INR  1.3*   PROTIME seconds 14.9*

## 2025-04-04 NOTE — CONSULTS
"Nutrition Initial Assessment:   Nutrition Assessment    Reason for Assessment: Admission nursing screening    Patient is a 65 y.o. female presenting with PAD. S/p LBKA with TMR.     PMH: carotid stenosis, diabetes, CAD, HTN, PAD and renal artery stenosis     Nutrition History:  Energy Intake: Fair 50-75 %  Food and Nutrient History: Met with pt this afternoon. She says she has a really good appetite and is eating well. She says at home she eats good and drinks Boost \"but somebody stole them.' She requested an Ensure to drink right that minute. She is usnure of weight loss. She says she eats 2 meals/day. She was unable to elaborate any further. Pt frequently got off topic and wanted to discuss topics out of RDs scope.       Anthropometrics:  Height: 160 cm (5' 3\")   Weight: 50.8 kg (112 lb)   BMI (Calculated): 19.84  IBW/kg (Dietitian Calculated): 49.2 kg    BMI Amputation Adjustment: Yes  Percent Amputation: Lower leg with foot  Total Amputation Percentage: 5.9          Weight History:   Wt Readings from Last 10 Encounters:   04/01/25 50.8 kg (112 lb)   03/28/25 49.9 kg (110 lb)   03/25/25 49.9 kg (110 lb)   03/12/25 59.5 kg (131 lb 2.8 oz)   02/28/25 (!) 42.6 kg (94 lb)   01/07/25 (!) 41.8 kg (92 lb 1.6 oz)   08/27/24 52.6 kg (116 lb)   07/22/24 55.8 kg (123 lb 0.3 oz)   02/05/24 52.2 kg (115 lb)   01/16/24 58.2 kg (128 lb 4.8 oz)       Weight Change %:  Weight History / % Weight Change: Difficult to determine pts baseline wt at this time due to wt discrepancies PTA.    Nutrition Focused Physical Exam Findings:    Subcutaneous Fat Loss:   Orbital Fat Pads: Severe (dark circles, hollowing and loose skin)  Buccal Fat Pads: Mild-Moderate (flat cheeks, minimal bounce)  Triceps: Severe (negligible fat tissue)  Muscle Wasting:  Temporalis: Severe (hollowed scooping depression)  Pectoralis (Clavicular Region): Severe (protruding prominent clavicle)  Deltoid/Trapezius: Mild-Moderate (slight protrusion of acromion " "process)  Quadriceps: Defer  Gastrocnemius: Defer  Edema:  Edema: none  Physical Findings:  Skin: Negative (L BKA site)    Nutrition Significant Labs:  CBC Trend:   Results from last 7 days   Lab Units 04/04/25  0856 04/03/25  1115 04/03/25 0319 04/02/25  1458   WBC AUTO x10*3/uL 13.4* 9.0 12.6* 14.3*   RBC AUTO x10*6/uL 2.73* 2.98* 2.11* 2.52*   HEMOGLOBIN g/dL 7.7* 8.4* 6.2* 7.5*   HEMATOCRIT % 24.2* 24.3* 19.3* 23.3*   MCV fL 89 82 92 93   PLATELETS AUTO x10*3/uL 106* 98* 119* 150    , BMP Trend:   Results from last 7 days   Lab Units 04/03/25  1115 04/03/25 0319 04/02/25  1458   GLUCOSE mg/dL 83 104*  104* 201*   CALCIUM mg/dL 7.3* 7.4*  7.4* 8.1*   SODIUM mmol/L 142 141  141 140   POTASSIUM mmol/L 3.6 3.5  3.5 3.6   CO2 mmol/L 22 24  24 21   CHLORIDE mmol/L 111* 112*  112* 109*   BUN mg/dL 9 11  11 13   CREATININE mg/dL 0.57 0.68  0.68 0.80    , Renal Lab Trend:   Results from last 7 days   Lab Units 04/03/25  1115 04/03/25 0319 04/02/25  1458   POTASSIUM mmol/L 3.6 3.5  3.5 3.6   PHOSPHORUS mg/dL  --  2.1* 2.8   SODIUM mmol/L 142 141  141 140   MAGNESIUM mg/dL 2.35 1.11* 1.17*   EGFR mL/min/1.73m*2 >90 >90  >90 82   BUN mg/dL 9 11  11 13   CREATININE mg/dL 0.57 0.68  0.68 0.80    , Vit D: No results found for: \"VITD25\" , Vit B12:   Lab Results   Component Value Date    [id number] 558 01/06/2025        Nutrition Specific Medications:  Scheduled medications  acetaminophen, 650 mg, oral, q6h  aspirin, 81 mg, oral, Daily  atorvastatin, 80 mg, oral, Nightly  carvedilol, 25 mg, oral, BID  ciprofloxacin, 400 mg, intravenous, q12h  [Held by provider] clopidogrel, 75 mg, oral, Daily  [Held by provider] dapagliflozin propanediol, 10 mg, oral, Daily with breakfast  gabapentin, 300 mg, oral, Daily  heparin (porcine), 5,000 Units, subcutaneous, q8h BOLIVAR  [Held by provider] hydrALAZINE, 75 mg, oral, TID  insulin glargine, 10 Units, subcutaneous, Nightly  insulin lispro, 0-5 Units, subcutaneous, TID " AC  isosorbide mononitrate ER, 60 mg, oral, Daily  NIFEdipine ER, 90 mg, oral, Daily before breakfast  potassium chloride, 20 mEq, intravenous, Once  potassium phosphate, 15 mmol, intravenous, Once  ranolazine, 500 mg, oral, BID      Continuous medications     PRN medications  PRN medications: dextrose, dextrose, glucagon, glucagon, hydrALAZINE, HYDROmorphone, melatonin, oxyCODONE, oxyCODONE, oxygen      I/O:   Last BM Date: 04/03/25; Stool Appearance: Formed (04/03/25 1445)    Dietary Orders (From admission, onward)       Start     Ordered    04/03/25 1018  Adult diet Consistent Carb; CCD 60 gm/meal  Diet effective now        Comments: Start after 4 Hours if awake and alert   Question Answer Comment   Diet type Consistent Carb    Carb diet selection: CCD 60 gm/meal        04/03/25 1021    04/01/25 2002  May Participate in Room Service  ( ROOM SERVICE MAY PARTICIPATE)  Once        Question:  .  Answer:  Yes    04/01/25 2001                     Estimated Needs:   Total Energy Estimated Needs in 24 hours (kCal):  (4401-8318)  Method for Estimating Needs: 30-35kcal/kg IBW  Total Protein Estimated Needs in 24 Hours (g): 64 g  Method for Estimating 24 Hour Protein Needs: 1.3g/kg IBW +  Total Fluid Estimated Needs in 24 Hours (mL):  (per team)           Nutrition Diagnosis   Malnutrition Diagnosis  Patient has Malnutrition Diagnosis: Yes  Diagnosis Status: New  Malnutrition Diagnosis: Severe malnutrition related to chronic disease or condition  Related to: energy intake < expenditure  As Evidenced by: suspect patient meeting </=75% of estimated energy needs for >/=1 month; moderate-severe muscle and subcutaneous fat loss.            Nutrition Interventions/Recommendations   Nutrition prescription for oral nutrition    Nutrition Recommendations:  Individualized Nutrition Prescription Provided for :     Ensure Plus TID (350kcal and 13gm protein each)    Check Vitamin D and supplement as needed     Nutrition  Interventions/Goals:   Meals and Snacks: General healthful diet  Goal: consume > 50% x 2 meals/day      Education Documentation  No documentation found.            Nutrition Monitoring and Evaluation   Food/Nutrient Related History Monitoring  Monitoring and Evaluation Plan: Estimated Energy Intake  Estimated Energy Intake: Energy intake greater or equal to 75% of estimated energy needs    Anthropometric Measurements  Monitoring and Evaluation Plan: Body weight  Body Weight: Body weight - Maintain stable weight    Biochemical Data, Medical Tests and Procedures  Monitoring and Evaluation Plan: Electrolyte/renal panel, Glucose/endocrine profile  Electrolyte and Renal Panel: Electrolytes within normal limits  Glucose/Endocrine Profile: Glucose within normal limits ( mg/dL)              Time Spent (min): 60 minutes

## 2025-04-04 NOTE — PROGRESS NOTES
4/4/2025 Care Coordination  S/p left BKA formalization with TMR .  Pt somewhat confused.  Ddid come from a SNF but does not recall the name.  Last admit pt was discharged to [facility].  I attempted to call the only Emergency contact in the chart.  Number is disconnected.  Family arrived,  Cousin Cortney.      Cortney provided me with two  other family members as NOK.  Contacts  added.  Confirmed that pt  was admited form [facility] and she may return.  [facility]

## 2025-04-04 NOTE — CARE PLAN
The clinical goals for the shift include Pt will maintain bed mobility and repostioning    Problem: Pain - Adult  Goal: Verbalizes/displays adequate comfort level or baseline comfort level  4/4/2025 1345 by Ewa Estrella RN  Outcome: Progressing  4/4/2025 1129 by Ewa Estrella RN  Outcome: Progressing     Problem: Safety - Adult  Goal: Free from fall injury  4/4/2025 1345 by Ewa Estrella RN  Outcome: Progressing  4/4/2025 1129 by Ewa Estrella RN  Outcome: Progressing     Problem: Discharge Planning  Goal: Discharge to home or other facility with appropriate resources  4/4/2025 1345 by Ewa Estrella RN  Outcome: Progressing  4/4/2025 1129 by Ewa Estrella RN  Outcome: Progressing     Problem: Chronic Conditions and Co-morbidities  Goal: Patient's chronic conditions and co-morbidity symptoms are monitored and maintained or improved  4/4/2025 1345 by Ewa Estrella RN  Outcome: Progressing  4/4/2025 1129 by Ewa Estrella RN  Outcome: Progressing     Problem: Nutrition  Goal: Nutrient intake appropriate for maintaining nutritional needs  4/4/2025 1345 by Ewa Estrella RN  Outcome: Progressing  4/4/2025 1129 by Ewa Estrella RN  Outcome: Progressing     Problem: Fall/Injury  Goal: Not fall by end of shift  4/4/2025 1345 by Ewa Estrella RN  Outcome: Progressing  4/4/2025 1129 by Ewa Estrella RN  Outcome: Progressing  Goal: Be free from injury by end of the shift  4/4/2025 1345 by Ewa Estrella RN  Outcome: Progressing  4/4/2025 1129 by Ewa Estrella RN  Outcome: Progressing  Goal: Verbalize understanding of personal risk factors for fall in the hospital  4/4/2025 1345 by Ewa Estrella RN  Outcome: Progressing  4/4/2025 1129 by Ewa Estrella RN  Outcome: Progressing  Goal: Verbalize understanding of risk factor reduction measures to prevent injury from fall in the home  4/4/2025 1345 by Ewa Estrella RN  Outcome: Progressing  4/4/2025 1129 by Ewa Estrella RN  Outcome:  Progressing  Goal: Use assistive devices by end of the shift  4/4/2025 1345 by Ewa Estrella RN  Outcome: Progressing  4/4/2025 1129 by Ewa Estrella RN  Outcome: Progressing  Goal: Pace activities to prevent fatigue by end of the shift  4/4/2025 1345 by Ewa Estrella RN  Outcome: Progressing  4/4/2025 1129 by Ewa Estrella RN  Outcome: Progressing     Problem: Diabetes  Goal: Achieve decreasing blood glucose levels by end of shift  4/4/2025 1345 by Ewa Estrella, RN  Outcome: Progressing  4/4/2025 1129 by Ewa Estrella RN  Outcome: Progressing  Goal: Increase stability of blood glucose readings by end of shift  4/4/2025 1345 by Ewa Estrella RN  Outcome: Progressing  4/4/2025 1129 by Ewa Estrella RN  Outcome: Progressing  Goal: Decrease in ketones present in urine by end of shift  4/4/2025 1345 by Ewa Estrella, RN  Outcome: Progressing  4/4/2025 1129 by Ewa Estrella RN  Outcome: Progressing  Goal: Maintain electrolyte levels within acceptable range throughout shift  4/4/2025 1345 by Ewa Estrella RN  Outcome: Progressing  4/4/2025 1129 by Ewa Estrella RN  Outcome: Progressing  Goal: Maintain glucose levels >70mg/dl to <250mg/dl throughout shift  4/4/2025 1345 by Ewa Estrella, RN  Outcome: Progressing  4/4/2025 1129 by Ewa Estrella RN  Outcome: Progressing  Goal: No changes in neurological exam by end of shift  4/4/2025 1345 by Ewa Estrella, RN  Outcome: Progressing  4/4/2025 1129 by Ewa Estrella RN  Outcome: Progressing  Goal: Learn about and adhere to nutrition recommendations by end of shift  4/4/2025 1345 by Ewa Estrella RN  Outcome: Progressing  4/4/2025 1129 by Ewa Estrella RN  Outcome: Progressing  Goal: Vital signs within normal range for age by end of shift  4/4/2025 1345 by Ewa Estrella, RN  Outcome: Progressing  4/4/2025 1129 by Ewa Estrella RN  Outcome: Progressing  Goal: Increase self care and/or family involovement by end of shift  4/4/2025 1345 by  Ewa Estrella, RN  Outcome: Progressing  4/4/2025 1129 by Ewa Estrella RN  Outcome: Progressing  Goal: Receive DSME education by end of shift  4/4/2025 1345 by Ewa Estrella, RN  Outcome: Progressing  4/4/2025 1129 by Ewa Estrella, RN  Outcome: Progressing     Problem: Skin  Goal: Decreased wound size/increased tissue granulation at next dressing change  4/4/2025 1345 by Ewa Estrella, RN  Outcome: Progressing  4/4/2025 1129 by Ewa Estrella, RN  Outcome: Progressing  Goal: Participates in plan/prevention/treatment measures  4/4/2025 1345 by Ewa Estrella, RN  Outcome: Progressing  4/4/2025 1129 by Ewa Estrella, RN  Outcome: Progressing  Goal: Prevent/manage excess moisture  4/4/2025 1345 by Ewa Estrella, RN  Outcome: Progressing  4/4/2025 1129 by Ewa Estrella, RN  Outcome: Progressing  Goal: Prevent/minimize sheer/friction injuries  4/4/2025 1345 by Ewa Estrella, RN  Outcome: Progressing  4/4/2025 1129 by Ewa Estrella, RN  Outcome: Progressing  Goal: Promote/optimize nutrition  4/4/2025 1345 by Ewa Estrella, RN  Outcome: Progressing  4/4/2025 1129 by Ewa Estrella, RN  Outcome: Progressing  Goal: Promote skin healing  4/4/2025 1345 by Ewa Estrella, RN  Outcome: Progressing  4/4/2025 1129 by Ewa Estrella, RN  Outcome: Progressing     Problem: Pain  Goal: Takes deep breaths with improved pain control throughout the shift  4/4/2025 1345 by Ewa Estrella, RN  Outcome: Progressing  4/4/2025 1129 by Ewa Estrella, RN  Outcome: Progressing  Goal: Turns in bed with improved pain control throughout the shift  4/4/2025 1345 by Ewa Estrella, RN  Outcome: Progressing  4/4/2025 1129 by Ewa Estrella RN  Outcome: Progressing  Goal: Walks with improved pain control throughout the shift  4/4/2025 1345 by Ewa Estrella, RN  Outcome: Progressing  4/4/2025 1129 by Ewa Estrella, RN  Outcome: Progressing  Goal: Performs ADL's with improved pain control throughout shift  4/4/2025 1345 by Ewa  SARAH Estrella  Outcome: Progressing  4/4/2025 1129 by Ewa Estrella RN  Outcome: Progressing  Goal: Participates in PT with improved pain control throughout the shift  4/4/2025 1345 by Ewa Estrella RN  Outcome: Progressing  4/4/2025 1129 by Ewa Estrella, RN  Outcome: Progressing  Goal: Free from opioid side effects throughout the shift  4/4/2025 1345 by Ewa Estrella, RN  Outcome: Progressing  4/4/2025 1129 by Ewa Estrella RN  Outcome: Progressing  Goal: Free from acute confusion related to pain meds throughout the shift  4/4/2025 1345 by Ewa Estrella RN  Outcome: Progressing  4/4/2025 1129 by Ewa Estrella RN  Outcome: Progressing

## 2025-04-04 NOTE — CARE PLAN
The patient's goals for the shift include get sleep    The clinical goals for the shift include remain hds and no falls    Over the shift, the patient did make progress toward the following goals.

## 2025-04-05 LAB
GLUCOSE BLD MANUAL STRIP-MCNC: 105 MG/DL (ref 74–99)
GLUCOSE BLD MANUAL STRIP-MCNC: 172 MG/DL (ref 74–99)
GLUCOSE BLD MANUAL STRIP-MCNC: 96 MG/DL (ref 74–99)

## 2025-04-05 PROCEDURE — 2500000002 HC RX 250 W HCPCS SELF ADMINISTERED DRUGS (ALT 637 FOR MEDICARE OP, ALT 636 FOR OP/ED): Performed by: STUDENT IN AN ORGANIZED HEALTH CARE EDUCATION/TRAINING PROGRAM

## 2025-04-05 PROCEDURE — 2500000004 HC RX 250 GENERAL PHARMACY W/ HCPCS (ALT 636 FOR OP/ED): Performed by: STUDENT IN AN ORGANIZED HEALTH CARE EDUCATION/TRAINING PROGRAM

## 2025-04-05 PROCEDURE — 2500000001 HC RX 250 WO HCPCS SELF ADMINISTERED DRUGS (ALT 637 FOR MEDICARE OP): Performed by: STUDENT IN AN ORGANIZED HEALTH CARE EDUCATION/TRAINING PROGRAM

## 2025-04-05 PROCEDURE — 1200000002 HC GENERAL ROOM WITH TELEMETRY DAILY

## 2025-04-05 PROCEDURE — 82947 ASSAY GLUCOSE BLOOD QUANT: CPT

## 2025-04-05 RX ADMIN — ACETAMINOPHEN 650 MG: 325 TABLET, FILM COATED ORAL at 14:51

## 2025-04-05 RX ADMIN — ACETAMINOPHEN 650 MG: 325 TABLET, FILM COATED ORAL at 21:24

## 2025-04-05 RX ADMIN — CIPROFLOXACIN 400 MG: 2 INJECTION, SOLUTION INTRAVENOUS at 00:06

## 2025-04-05 RX ADMIN — HEPARIN SODIUM 5000 UNITS: 5000 INJECTION, SOLUTION INTRAVENOUS; SUBCUTANEOUS at 14:52

## 2025-04-05 RX ADMIN — RANOLAZINE 500 MG: 500 TABLET, EXTENDED RELEASE ORAL at 08:26

## 2025-04-05 RX ADMIN — OXYCODONE HYDROCHLORIDE 10 MG: 5 TABLET ORAL at 13:19

## 2025-04-05 RX ADMIN — CARVEDILOL 25 MG: 25 TABLET, FILM COATED ORAL at 08:26

## 2025-04-05 RX ADMIN — CIPROFLOXACIN 400 MG: 2 INJECTION, SOLUTION INTRAVENOUS at 13:20

## 2025-04-05 RX ADMIN — HEPARIN SODIUM 5000 UNITS: 5000 INJECTION, SOLUTION INTRAVENOUS; SUBCUTANEOUS at 22:53

## 2025-04-05 RX ADMIN — NIFEDIPINE 90 MG: 90 TABLET, FILM COATED, EXTENDED RELEASE ORAL at 08:27

## 2025-04-05 RX ADMIN — ATORVASTATIN CALCIUM 80 MG: 80 TABLET, FILM COATED ORAL at 21:24

## 2025-04-05 RX ADMIN — ACETAMINOPHEN 650 MG: 325 TABLET, FILM COATED ORAL at 00:06

## 2025-04-05 RX ADMIN — RANOLAZINE 500 MG: 500 TABLET, EXTENDED RELEASE ORAL at 21:24

## 2025-04-05 RX ADMIN — OXYCODONE HYDROCHLORIDE 10 MG: 5 TABLET ORAL at 20:10

## 2025-04-05 RX ADMIN — OXYCODONE HYDROCHLORIDE 10 MG: 5 TABLET ORAL at 08:33

## 2025-04-05 RX ADMIN — HEPARIN SODIUM 5000 UNITS: 5000 INJECTION, SOLUTION INTRAVENOUS; SUBCUTANEOUS at 08:27

## 2025-04-05 RX ADMIN — ISOSORBIDE MONONITRATE 60 MG: 30 TABLET, EXTENDED RELEASE ORAL at 08:27

## 2025-04-05 RX ADMIN — INSULIN GLARGINE 10 UNITS: 100 INJECTION, SOLUTION SUBCUTANEOUS at 21:24

## 2025-04-05 RX ADMIN — GABAPENTIN 300 MG: 300 CAPSULE ORAL at 18:53

## 2025-04-05 RX ADMIN — ASPIRIN 81 MG: 81 TABLET, COATED ORAL at 08:27

## 2025-04-05 RX ADMIN — ACETAMINOPHEN 650 MG: 325 TABLET, FILM COATED ORAL at 08:26

## 2025-04-05 RX ADMIN — CARVEDILOL 25 MG: 25 TABLET, FILM COATED ORAL at 21:24

## 2025-04-05 ASSESSMENT — COGNITIVE AND FUNCTIONAL STATUS - GENERAL
DAILY ACTIVITIY SCORE: 16
MOVING FROM LYING ON BACK TO SITTING ON SIDE OF FLAT BED WITH BEDRAILS: A LOT
WALKING IN HOSPITAL ROOM: TOTAL
STANDING UP FROM CHAIR USING ARMS: TOTAL
CLIMB 3 TO 5 STEPS WITH RAILING: TOTAL
DRESSING REGULAR LOWER BODY CLOTHING: A LOT
HELP NEEDED FOR BATHING: A LOT
TOILETING: A LOT
MOVING TO AND FROM BED TO CHAIR: TOTAL
TURNING FROM BACK TO SIDE WHILE IN FLAT BAD: A LOT
MOBILITY SCORE: 8
DRESSING REGULAR UPPER BODY CLOTHING: A LITTLE
PERSONAL GROOMING: A LITTLE

## 2025-04-05 ASSESSMENT — PAIN - FUNCTIONAL ASSESSMENT
PAIN_FUNCTIONAL_ASSESSMENT: 0-10
PAIN_FUNCTIONAL_ASSESSMENT: 0-10

## 2025-04-05 ASSESSMENT — PAIN DESCRIPTION - ORIENTATION: ORIENTATION: LEFT

## 2025-04-05 ASSESSMENT — PAIN SCALES - GENERAL
PAINLEVEL_OUTOF10: 9
PAINLEVEL_OUTOF10: 9
PAINLEVEL_OUTOF10: 0 - NO PAIN
PAINLEVEL_OUTOF10: 0 - NO PAIN

## 2025-04-05 ASSESSMENT — PAIN DESCRIPTION - LOCATION: LOCATION: LEG

## 2025-04-05 NOTE — CARE PLAN
The patient's goals for the shift include get sleep    The clinical goals for the shift include patient will kw2ciwo HDS throughout shift

## 2025-04-05 NOTE — PROGRESS NOTES
VASCULAR SURGERY PROGRESS NOTE  Assessment/Plan   Myrna Khan is 65 y.o. 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 Progressing appropriately.     Plan:  Neuro:  - Pain control with tylenol, oxycodone  - continue home gabapentin     CV: PAD, CAD, HERMANN  - continue home ASA, holding home plavix - plan for discharge on DAPT  - continue home atorvastatin  - continue home ranolazine  - continue carvedilol, imdur, nifedipine  - home hydralazine held - restart as indicated     Resp:  - IS 10x/hr     GI:  - Regular Diet     :  - voiding via home chronic indwelling catheter     Endo:  - SSI #1; home lantus 10u qnight  - holding home farxiga     Heme/ID:  - continue IV cipro for groin wound  - trend hemoglobin  - Trending leukocytosis    Skin/wound  - daily dressing change to BKA stump with ABD, kerlex, ACE  - BID packing changes to left groin wound with packing strips, gauze, tape     SQH for DVT ppx    Dispo: RNF, discharge planning    D/w attending, Dr. Dunphy Stewart G Maxfield, MD  Vascular Surgery p. [phone]      Subjective   No overnight issues. Some oozing from amputation site and dressing changed. Pain controlled. Tolerating diet.    Objective   Vitals:  Heart Rate:  [74-86]   Temp:  [36.5 °C (97.7 °F)-37.2 °C (99 °F)]   Resp:  [17-20]   BP: (161-182)/(74-93)   SpO2:  [99 %-100 %]     Exam:  Constitutional: No acute distress, resting comfortably  Neuro:  AOx3, grossly intact  ENMT: moist mucous membranes  CV: no tachycardia  Pulm: non-labored on RA  GI: soft, non-tender, non-distended  Skin: warm and dry  Musculoskeletal: moving all extremities  Extremities: left BKA stump intact and hemostatic, clean dressing applied, left groin wound 1cm x 1cm with packing in place and no  significant drainage    Labs:  Results from last 7 days   Lab Units 04/04/25  0856 04/03/25  1115 04/03/25  0319   WBC AUTO x10*3/uL 13.4* 9.0 12.6*   HEMOGLOBIN g/dL 7.7* 8.4* 6.2*   PLATELETS AUTO x10*3/uL 106* 98* 119*      Results from last 7 days   Lab Units 04/03/25  1115 04/03/25  0319 04/02/25  1458   SODIUM mmol/L 142 141  141 140   POTASSIUM mmol/L 3.6 3.5  3.5 3.6   CHLORIDE mmol/L 111* 112*  112* 109*   CO2 mmol/L 22 24  24 21   BUN mg/dL 9 11  11 13   CREATININE mg/dL 0.57 0.68  0.68 0.80   GLUCOSE mg/dL 83 104*  104* 201*   MAGNESIUM mg/dL 2.35 1.11* 1.17*   PHOSPHORUS mg/dL  --  2.1* 2.8      Results from last 7 days   Lab Units 04/01/25  1050   INR  1.3*   PROTIME seconds 14.9*

## 2025-04-05 NOTE — CARE PLAN
The patient's goals for the shift include pain management    The clinical goals for the shift include patient will remain HDS throughout the shift

## 2025-04-05 NOTE — CARE PLAN
The patient's goals for the shift include get sleep    The clinical goals for the shift include patient will pr3gowm HDS throughout shift

## 2025-04-06 VITALS
OXYGEN SATURATION: 96 % | TEMPERATURE: 97 F | RESPIRATION RATE: 19 BRPM | SYSTOLIC BLOOD PRESSURE: 119 MMHG | DIASTOLIC BLOOD PRESSURE: 64 MMHG | BODY MASS INDEX: 19.84 KG/M2 | HEIGHT: 63 IN | WEIGHT: 112 LBS | HEART RATE: 79 BPM

## 2025-04-06 LAB
ALBUMIN SERPL BCP-MCNC: 2.4 G/DL (ref 3.4–5)
ANION GAP SERPL CALC-SCNC: 14 MMOL/L (ref 10–20)
BUN SERPL-MCNC: 11 MG/DL (ref 6–23)
CALCIUM SERPL-MCNC: 7.4 MG/DL (ref 8.6–10.6)
CHLORIDE SERPL-SCNC: 104 MMOL/L (ref 98–107)
CO2 SERPL-SCNC: 19 MMOL/L (ref 21–32)
CREAT SERPL-MCNC: 0.79 MG/DL (ref 0.5–1.05)
EGFRCR SERPLBLD CKD-EPI 2021: 83 ML/MIN/1.73M*2
ERYTHROCYTE [DISTWIDTH] IN BLOOD BY AUTOMATED COUNT: 15.7 % (ref 11.5–14.5)
GLUCOSE BLD MANUAL STRIP-MCNC: 206 MG/DL (ref 74–99)
GLUCOSE BLD MANUAL STRIP-MCNC: 213 MG/DL (ref 74–99)
GLUCOSE BLD MANUAL STRIP-MCNC: 242 MG/DL (ref 74–99)
GLUCOSE BLD MANUAL STRIP-MCNC: 269 MG/DL (ref 74–99)
GLUCOSE SERPL-MCNC: 203 MG/DL (ref 74–99)
HCT VFR BLD AUTO: 22.2 % (ref 36–46)
HGB BLD-MCNC: 6.9 G/DL (ref 12–16)
MAGNESIUM SERPL-MCNC: 1.59 MG/DL (ref 1.6–2.4)
MCH RBC QN AUTO: 29 PG (ref 26–34)
MCHC RBC AUTO-ENTMCNC: 31.1 G/DL (ref 32–36)
MCV RBC AUTO: 93 FL (ref 80–100)
NRBC BLD-RTO: 0.2 /100 WBCS (ref 0–0)
PHOSPHATE SERPL-MCNC: 2.8 MG/DL (ref 2.5–4.9)
PLATELET # BLD AUTO: 134 X10*3/UL (ref 150–450)
POTASSIUM SERPL-SCNC: 4.3 MMOL/L (ref 3.5–5.3)
RBC # BLD AUTO: 2.38 X10*6/UL (ref 4–5.2)
SODIUM SERPL-SCNC: 133 MMOL/L (ref 136–145)
WBC # BLD AUTO: 16.8 X10*3/UL (ref 4.4–11.3)

## 2025-04-06 PROCEDURE — 85027 COMPLETE CBC AUTOMATED: CPT

## 2025-04-06 PROCEDURE — 83735 ASSAY OF MAGNESIUM: CPT

## 2025-04-06 PROCEDURE — 80069 RENAL FUNCTION PANEL: CPT

## 2025-04-06 PROCEDURE — 2500000002 HC RX 250 W HCPCS SELF ADMINISTERED DRUGS (ALT 637 FOR MEDICARE OP, ALT 636 FOR OP/ED): Performed by: STUDENT IN AN ORGANIZED HEALTH CARE EDUCATION/TRAINING PROGRAM

## 2025-04-06 PROCEDURE — 2500000001 HC RX 250 WO HCPCS SELF ADMINISTERED DRUGS (ALT 637 FOR MEDICARE OP): Performed by: STUDENT IN AN ORGANIZED HEALTH CARE EDUCATION/TRAINING PROGRAM

## 2025-04-06 PROCEDURE — 36415 COLL VENOUS BLD VENIPUNCTURE: CPT

## 2025-04-06 PROCEDURE — 2500000004 HC RX 250 GENERAL PHARMACY W/ HCPCS (ALT 636 FOR OP/ED): Performed by: STUDENT IN AN ORGANIZED HEALTH CARE EDUCATION/TRAINING PROGRAM

## 2025-04-06 PROCEDURE — 1100000001 HC PRIVATE ROOM DAILY

## 2025-04-06 PROCEDURE — 82947 ASSAY GLUCOSE BLOOD QUANT: CPT

## 2025-04-06 PROCEDURE — 99222 1ST HOSP IP/OBS MODERATE 55: CPT | Performed by: INTERNAL MEDICINE

## 2025-04-06 RX ORDER — LINEZOLID 600 MG/1
600 TABLET, FILM COATED ORAL EVERY 12 HOURS SCHEDULED
Status: DISCONTINUED | OUTPATIENT
Start: 2025-04-06 | End: 2025-04-06

## 2025-04-06 RX ADMIN — CLOPIDOGREL BISULFATE 75 MG: 75 TABLET, FILM COATED ORAL at 18:22

## 2025-04-06 RX ADMIN — INSULIN LISPRO 2 UNITS: 100 INJECTION, SOLUTION INTRAVENOUS; SUBCUTANEOUS at 08:25

## 2025-04-06 RX ADMIN — RANOLAZINE 500 MG: 500 TABLET, EXTENDED RELEASE ORAL at 08:22

## 2025-04-06 RX ADMIN — INSULIN LISPRO 2 UNITS: 100 INJECTION, SOLUTION INTRAVENOUS; SUBCUTANEOUS at 18:22

## 2025-04-06 RX ADMIN — RANOLAZINE 500 MG: 500 TABLET, EXTENDED RELEASE ORAL at 20:39

## 2025-04-06 RX ADMIN — HEPARIN SODIUM 5000 UNITS: 5000 INJECTION, SOLUTION INTRAVENOUS; SUBCUTANEOUS at 06:18

## 2025-04-06 RX ADMIN — ACETAMINOPHEN 650 MG: 325 TABLET, FILM COATED ORAL at 08:22

## 2025-04-06 RX ADMIN — ATORVASTATIN CALCIUM 80 MG: 80 TABLET, FILM COATED ORAL at 20:39

## 2025-04-06 RX ADMIN — HEPARIN SODIUM 5000 UNITS: 5000 INJECTION, SOLUTION INTRAVENOUS; SUBCUTANEOUS at 14:25

## 2025-04-06 RX ADMIN — OXYCODONE HYDROCHLORIDE 10 MG: 5 TABLET ORAL at 16:28

## 2025-04-06 RX ADMIN — ACETAMINOPHEN 650 MG: 325 TABLET, FILM COATED ORAL at 14:25

## 2025-04-06 RX ADMIN — INSULIN LISPRO 3 UNITS: 100 INJECTION, SOLUTION INTRAVENOUS; SUBCUTANEOUS at 12:53

## 2025-04-06 RX ADMIN — ACETAMINOPHEN 650 MG: 325 TABLET, FILM COATED ORAL at 03:29

## 2025-04-06 RX ADMIN — HEPARIN SODIUM 5000 UNITS: 5000 INJECTION, SOLUTION INTRAVENOUS; SUBCUTANEOUS at 21:31

## 2025-04-06 RX ADMIN — CARVEDILOL 25 MG: 25 TABLET, FILM COATED ORAL at 20:39

## 2025-04-06 RX ADMIN — ACETAMINOPHEN 650 MG: 325 TABLET, FILM COATED ORAL at 20:39

## 2025-04-06 RX ADMIN — CIPROFLOXACIN 400 MG: 2 INJECTION, SOLUTION INTRAVENOUS at 12:50

## 2025-04-06 RX ADMIN — CIPROFLOXACIN 400 MG: 2 INJECTION, SOLUTION INTRAVENOUS at 01:24

## 2025-04-06 RX ADMIN — CARVEDILOL 25 MG: 25 TABLET, FILM COATED ORAL at 08:22

## 2025-04-06 RX ADMIN — GABAPENTIN 300 MG: 300 CAPSULE ORAL at 18:22

## 2025-04-06 RX ADMIN — OXYCODONE HYDROCHLORIDE 10 MG: 5 TABLET ORAL at 03:29

## 2025-04-06 RX ADMIN — INSULIN GLARGINE 10 UNITS: 100 INJECTION, SOLUTION SUBCUTANEOUS at 20:40

## 2025-04-06 RX ADMIN — ASPIRIN 81 MG: 81 TABLET, COATED ORAL at 08:22

## 2025-04-06 RX ADMIN — OXYCODONE HYDROCHLORIDE 10 MG: 5 TABLET ORAL at 08:21

## 2025-04-06 RX ADMIN — OXYCODONE HYDROCHLORIDE 10 MG: 5 TABLET ORAL at 20:39

## 2025-04-06 RX ADMIN — NIFEDIPINE 90 MG: 90 TABLET, FILM COATED, EXTENDED RELEASE ORAL at 08:22

## 2025-04-06 RX ADMIN — ISOSORBIDE MONONITRATE 60 MG: 30 TABLET, EXTENDED RELEASE ORAL at 08:22

## 2025-04-06 ASSESSMENT — COGNITIVE AND FUNCTIONAL STATUS - GENERAL
DRESSING REGULAR UPPER BODY CLOTHING: A LITTLE
TOILETING: A LOT
DAILY ACTIVITIY SCORE: 17
TURNING FROM BACK TO SIDE WHILE IN FLAT BAD: A LOT
WALKING IN HOSPITAL ROOM: TOTAL
MOVING TO AND FROM BED TO CHAIR: TOTAL
WALKING IN HOSPITAL ROOM: TOTAL
PERSONAL GROOMING: A LITTLE
PERSONAL GROOMING: A LITTLE
HELP NEEDED FOR BATHING: A LITTLE
STANDING UP FROM CHAIR USING ARMS: TOTAL
CLIMB 3 TO 5 STEPS WITH RAILING: TOTAL
MOBILITY SCORE: 12
DRESSING REGULAR LOWER BODY CLOTHING: A LOT
DRESSING REGULAR UPPER BODY CLOTHING: A LITTLE
TOILETING: TOTAL
MOVING FROM LYING ON BACK TO SITTING ON SIDE OF FLAT BED WITH BEDRAILS: A LITTLE
CLIMB 3 TO 5 STEPS WITH RAILING: TOTAL
HELP NEEDED FOR BATHING: A LOT
TURNING FROM BACK TO SIDE WHILE IN FLAT BAD: A LITTLE
MOVING TO AND FROM BED TO CHAIR: A LOT
MOVING FROM LYING ON BACK TO SITTING ON SIDE OF FLAT BED WITH BEDRAILS: A LOT
STANDING UP FROM CHAIR USING ARMS: A LOT
DRESSING REGULAR LOWER BODY CLOTHING: A LITTLE

## 2025-04-06 ASSESSMENT — PAIN DESCRIPTION - ORIENTATION
ORIENTATION: LEFT
ORIENTATION: LEFT

## 2025-04-06 ASSESSMENT — PAIN - FUNCTIONAL ASSESSMENT
PAIN_FUNCTIONAL_ASSESSMENT: 0-10

## 2025-04-06 ASSESSMENT — PAIN DESCRIPTION - LOCATION
LOCATION: LEG
LOCATION: LEG

## 2025-04-06 ASSESSMENT — PAIN SCALES - GENERAL
PAINLEVEL_OUTOF10: 10 - WORST POSSIBLE PAIN
PAINLEVEL_OUTOF10: 4
PAINLEVEL_OUTOF10: 7
PAINLEVEL_OUTOF10: 5 - MODERATE PAIN

## 2025-04-06 NOTE — CONSULTS
Inpatient consult to Infectious Diseases  Consult performed by: Amado Wong MD  Consult ordered by: Bobby Pederson MD        Referred by     Primary MD: Reena Pickard MD    Reason For Consult Leucocytosis    History Of Present Illness  Myrna Khan is a 65 y.o. female with history of hypertension, hyperlipidemia, diabetes mellitus type 2, CAD status post CABG, PAD status post left SFA stent and recent hospital admission for dry gangrene of left leg and discharged on 6-week course of oral linezolid and ciprofloxacin (end date April 17, 2025) admitted on April 1 and underwent left BKA.  Patient is also started on IV ciprofloxacin and received 2 doses of Decadron.  Today ID is consulted for leukocytosis.  Patient remains afebrile.  Patient complains of left BKA stump pain.     Past Medical History  She has a past medical history of CAD (coronary artery disease), Carotid artery stenosis, Diabetes mellitus (Multi), Heart disease, HF (heart failure), Hypertension, NSTEMI (non-ST elevated myocardial infarction) (Multi), PAD (peripheral artery disease) (CMS-HCC), and Renal artery stenosis (CMS-HCC).    Surgical History  She has a past surgical history that includes Coronary artery bypass graft and Femoral artery stent (Left).     Social History     Occupational History    Not on file   Tobacco Use    Smoking status: Some Days     Current packs/day: 1.00     Average packs/day: 1 pack/day for 30.3 years (30.3 ttl pk-yrs)     Types: Cigarettes     Start date: 1995    Smokeless tobacco: Never   Vaping Use    Vaping status: Never Used   Substance and Sexual Activity    Alcohol use: Yes     Comment: 6 pack per month-occasional    Drug use: Never    Sexual activity: Defer     Travel History   Travel since 03/06/25    No documented travel since 03/06/25            Family History  Family History   Problem Relation Name Age of Onset    Peripheral vascular disease Mother      Hypertension Mother       Diabetes type II Mother      Hypertension Sister      Diabetes type II Sister       Allergies  Lisinopril, Amoxicillin, and Losartan     Immunization History   Administered Date(s) Administered    COVID-19, mRNA, LNP-S, PF, 30 mcg/0.3 mL dose 07/19/2021, 08/26/2021    Flu vaccine (IIV4), preservative free *Check age/dose* 10/12/2016    Hepatitis B vaccine, adult *Check Product/Dose* 09/11/2014, 02/18/2015    Influenza, injectable, quadrivalent 11/02/2017, 01/30/2019, 12/09/2021    Influenza, seasonal, injectable 11/13/2014, 12/02/2015    Pneumococcal conjugate vaccine, 13-valent (PREVNAR 13) 07/19/2017    Pneumococcal polysaccharide vaccine, 23-valent, age 2 years and older (PNEUMOVAX 23) 05/15/2019    Tdap vaccine, age 7 year and older (BOOSTRIX, ADACEL) 08/09/2018, 01/18/2025     Medications  Home medications:  Medications Prior to Admission   Medication Sig Dispense Refill Last Dose/Taking    acetaminophen (Tylenol) 500 mg tablet Take 1 tablet (500 mg) by mouth every 6 hours if needed for mild pain (1 - 3) or moderate pain (4 - 6). Take per directed 30 tablet 0 3/31/2025    atorvastatin (Lipitor) 80 mg tablet TAKE 1 TABLET BY MOUTH ONCE DAILY 30 tablet 2 3/31/2025    calcium citrate 250 mg calcium tablet Take 1 tablet (250 mg) by mouth once daily.   3/31/2025    carvedilol (Coreg) 25 mg tablet TAKE 1 TABLET BY MOUTH TWO TIMES A DAY 60 tablet 2 3/31/2025    cholecalciferol (Vitamin D-3) 25 MCG (1000 UT) capsule Take 2 capsules (50 mcg) by mouth once daily.   3/31/2025    ciprofloxacin (Cipro) 500 mg tablet Take 1 tablet (500 mg) by mouth every 12 hours. Tentative end date is 4/17/25   3/31/2025    clopidogrel (Plavix) 75 mg tablet Take 1 tablet (75 mg) by mouth once daily.   3/31/2025    Farxiga 10 mg Take 1 tablet (10 mg) by mouth once daily with breakfast.   3/31/2025    gabapentin (Neurontin) 300 mg capsule Take 1 capsule (300 mg) by mouth once daily.   3/31/2025    hydrALAZINE (Apresoline) 50 mg tablet Take 1  "tablet (50 mg) by mouth 3 times a day. (Patient taking differently: Take 1.5 tablets (75 mg) by mouth 3 times a day.)   3/31/2025    insulin glargine (Lantus) 100 unit/mL injection Inject 16 Units under the skin once daily at bedtime. Take as directed per insulin instructions. (Patient taking differently: Inject 10 Units under the skin once daily at bedtime. Take as directed per insulin instructions.)   3/31/2025    isosorbide mononitrate ER (Imdur) 60 mg 24 hr tablet TAKE 1 TABLET BY MOUTH ONCE DAILY 30 tablet 0 3/31/2025    linezolid (Zyvox) 600 mg tablet Take 1 tablet (600 mg) by mouth every 12 hours. Tentative end date is 4/17/25   3/31/2025    metFORMIN XR (Glucophage-XR) 500 mg 24 hr tablet TAKE 2 TABLETS BY MOUTH ONCE DAILY 60 tablet 0 3/31/2025    multivitamin with minerals tablet Take 1 tablet by mouth once daily.   3/31/2025    NIFEdipine ER (Adalat CC) 90 mg 24 hr tablet Take 1 tablet (90 mg) by mouth once daily in the morning. Take before meals. Do not crush, chew, or split. 30 tablet 0 3/31/2025    ranolazine (Ranexa) 500 mg 12 hr tablet Take 1 tablet (500 mg) by mouth 2 times a day. Do not crush, chew, or split.   3/31/2025    rivaroxaban (Xarelto) 2.5 mg tablet Take 1 tablet (2.5 mg) by mouth 2 times daily (morning and late afternoon).   3/31/2025    BD Ultra-Fine Micro Pen Needle 32 gauge x 1/4\" needle        blood sugar diagnostic strip Use 1 strip to check blood sugar 3 times a day with meals. 100 each 0     diclofenac sodium (Voltaren) 1 % gel Apply 4.5 inches (4 g) topically 4 times a day as needed (pain).       Easy Touch Alcohol Prep Pads pads, medicated        glucagon (Glucagen) 1 mg injection Inject 1 mg into the muscle every 15 minutes if needed for other (<70 and cannot take oral due to altered mentation). 1 each 12     insulin aspart (NovoLOG U-100 Insulin aspart) 100 unit/mL injection Inject under the skin. Inject as per sliding scale: if   151-200=2 units  201-250=3 units  251-300= 4 " units  301-350= 5 units  351-400= 6 units  If above 400 give 6 units and notify MD       insulin lispro 100 unit/mL injection Inject 0-5 Units under the skin 3 times daily (morning, midday, late afternoon). 0 unit(s) if BG ; 1 unit(s) if -200; 2 unit(s) if -250; 3 unit(s) if -300; 4 unit(s) if -350; 5 unit(s) if -400 (Patient not taking: Reported on 4/1/2025)   Not Taking    insulin lispro 100 unit/mL injection Inject 2 Units under the skin 3 times a day before meals. Take as directed per insulin instructions. (Patient not taking: Reported on 4/1/2025)   Not Taking    lancets misc Use three times a day to test blood sugar w/ meals 102 each 0     oxyCODONE (Oxaydo) 5 mg immediate release tablet Take 1 tablet (5 mg) by mouth every 4 hours if needed for severe pain (7 - 10).       QUEtiapine (SEROquel) 25 mg tablet Take 1 tablet (25 mg) by mouth once daily at bedtime. Eval for GDR within 2 weeks of discharge (Patient not taking: Reported on 4/1/2025)   Not Taking     Current medications:  Scheduled medications  acetaminophen, 650 mg, oral, q6h  aspirin, 81 mg, oral, Daily  atorvastatin, 80 mg, oral, Nightly  carvedilol, 25 mg, oral, BID  ciprofloxacin, 400 mg, intravenous, q12h  [Held by provider] clopidogrel, 75 mg, oral, Daily  [Held by provider] dapagliflozin propanediol, 10 mg, oral, Daily with breakfast  gabapentin, 300 mg, oral, Daily  heparin (porcine), 5,000 Units, subcutaneous, q8h BOLIVAR  [Held by provider] hydrALAZINE, 75 mg, oral, TID  insulin glargine, 10 Units, subcutaneous, Nightly  insulin lispro, 0-5 Units, subcutaneous, TID AC  isosorbide mononitrate ER, 60 mg, oral, Daily  NIFEdipine ER, 90 mg, oral, Daily before breakfast  potassium chloride, 20 mEq, intravenous, Once  potassium phosphate, 15 mmol, intravenous, Once  ranolazine, 500 mg, oral, BID      Continuous medications     PRN medications  PRN medications: dextrose, dextrose, glucagon, glucagon, hydrALAZINE,  "HYDROmorphone, melatonin, oxyCODONE, oxyCODONE, oxygen    Review of Systems negative except above     Objective  Range of Vitals (last 24 hours)  Heart Rate:  [75-85]   Temp:  [12 °C (53.6 °F)-36.9 °C (98.4 °F)]   Resp:  [17-20]   BP: ()/(54-74)   SpO2:  [94 %-100 %]   Daily Weight  04/01/25 : 50.8 kg (112 lb)    Body mass index is 19.84 kg/m².     Physical Exam  GENERAL APPEARANCE: Elderly thin built patient not in any distress  HEENT: Atraumatic and normocephalic  CARDIAC: Regular   LUNGS: Clear  ABDOMEN: Soft and nontender  EXTREMITIES: Left BKA stump covered with dressing and in a splint (reviewed pictures in EMR with no concerns of infection).  SKIN: Previous surgical incision in left groin noted with small wound and no concerns of infection.       Labs  Results from last 72 hours   Lab Units 04/06/25  0720 04/04/25  0856   WBC AUTO x10*3/uL 16.8* 13.4*   HEMOGLOBIN g/dL 6.9* 7.7*   HEMATOCRIT % 22.2* 24.2*   PLATELETS AUTO x10*3/uL 134* 106*     Results from last 72 hours   Lab Units 04/06/25  0720   SODIUM mmol/L 133*   POTASSIUM mmol/L 4.3   CHLORIDE mmol/L 104   CO2 mmol/L 19*   BUN mg/dL 11   CREATININE mg/dL 0.79   GLUCOSE mg/dL 203*   CALCIUM mg/dL 7.4*   ANION GAP mmol/L 14   EGFR mL/min/1.73m*2 83   PHOSPHORUS mg/dL 2.8     Results from last 72 hours   Lab Units 04/06/25  0720   ALBUMIN g/dL 2.4*     Estimated Creatinine Clearance: 56.9 mL/min (by C-G formula based on SCr of 0.79 mg/dL).  CRP   Date Value Ref Range Status   08/26/2023 0.79 mg/dL Final     Comment:     REF VALUE  < 1.00       Sedimentation Rate   Date Value Ref Range Status   08/26/2023 48 (H) 0 - 30 mm/h Final     HIV 1/2 Antigen/Antibody Screen with Reflex to Confirmation   Date Value Ref Range Status   07/20/2024 Nonreactive Nonreactive Final     No results found for: \"HEPCABINIT\", \"HEPCAB\", \"HCVPCRQUANT\"  Microbiology  Susceptibility data from last 90 days.  Collected Specimen Info Organism Ampicillin Ciprofloxacin " Levofloxacin Nitrofurantoin Penicillin Trimethoprim/Sulfamethoxazole Vancomycin   03/08/25 Swab from Anterior Nares Methicillin Resistant Staphylococcus aureus (MRSA)          01/18/25 Urine from Clean Catch/Voided Enterococcus faecalis  S  S  S  S  S  R  S       Imaging         Assessment/Plan     Myrna Khan is a 65 y.o. female with history of hypertension, hyperlipidemia, diabetes mellitus type 2, CAD status post CABG, PAD status post left SFA stent and recent hospital admission for dry gangrene of left leg and discharged on 6-week course of oral linezolid and ciprofloxacin (end date April 17, 2025) admitted on April 1 and underwent left BKA.    Problems  Leukocytosis likely reactive        Patient is afebrile and nontoxic.    2.  Left groin wound-does not appear infected       Patient was supposed to be on oral linezolid and ciprofloxacin for 6 weeks until April 17 for left leg dry gangrene but I am not sure if she was still taking these medications at the time of admission.  As patient already underwent left BKA no further antibiotics are needed.           Plan  Recommend to stop antibiotics and monitor off of antibiotics.  Please remove Malone catheter if not needed to decrease the risk of CAUTI.  ID will sign off.      Amado Wong MD

## 2025-04-06 NOTE — PROGRESS NOTES
VASCULAR SURGERY PROGRESS NOTE  Assessment/Plan   Myrna Khan is 65 y.o. 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 Progressing appropriately.     Plan:  Neuro:  - Pain control with tylenol, oxycodone  - continue home gabapentin     CV: PAD, CAD, HERMANN  - continue home ASA, holding home plavix - plan for discharge on DAPT  - continue home atorvastatin  - continue home ranolazine  - continue carvedilol, imdur, nifedipine  - home hydralazine held - restart as indicated     Resp:  - IS 10x/hr     GI:  - Regular Diet     :  - voiding via home chronic indwelling catheter  - Patient will need new follow-up with gynecology     Endo:  - SSI #1; home lantus 10u qnight  - holding home farxiga     Heme/ID:  - continue IV cipro for groin wound  - Will consult ID today for new leukocytosis given groin wound, stable, noninfectious-appearing stump, and chronic indwelling catheter with which patient came from facility.   - trend hemoglobin  - Trending leukocytosis    Skin/wound  - daily dressing change to BKA stump with ABD, kerlex, ACE  - BID packing changes to left groin wound with packing strips, gauze, tape     SQH for DVT ppx    Dispo: RNF, discharge planning    D/w attending, Dr. Dunphy Stewart G Maxfield, MD  Vascular Surgery p. [phone]      Subjective   No overnight issues. Some oozing from amputation site and dressing changed. Pain controlled. Tolerating diet.    Objective   Vitals:  Heart Rate:  [75-85]   Temp:  [12 °C (53.6 °F)-36.9 °C (98.4 °F)]   Resp:  [17-20]   BP: ()/(56-74)   SpO2:  [94 %-100 %]     Exam:  Constitutional: No acute distress, resting comfortably  Neuro:  AOx3, grossly intact  ENMT: moist mucous membranes  CV: no tachycardia  Pulm: non-labored on RA  GI:  soft, non-tender, non-distended  Skin: warm and dry  Musculoskeletal: moving all extremities  Extremities: left BKA stump intact and hemostatic, clean dressing applied, left groin wound 1cm x 1cm with packing in place and no significant drainage    Labs:  Results from last 7 days   Lab Units 04/06/25  0720 04/04/25  0856 04/03/25  1115   WBC AUTO x10*3/uL 16.8* 13.4* 9.0   HEMOGLOBIN g/dL 6.9* 7.7* 8.4*   PLATELETS AUTO x10*3/uL 134* 106* 98*      Results from last 7 days   Lab Units 04/06/25  0720 04/03/25  1115 04/03/25  0319   SODIUM mmol/L 133* 142 141  141   POTASSIUM mmol/L 4.3 3.6 3.5  3.5   CHLORIDE mmol/L 104 111* 112*  112*   CO2 mmol/L 19* 22 24  24   BUN mg/dL 11 9 11  11   CREATININE mg/dL 0.79 0.57 0.68  0.68   GLUCOSE mg/dL 203* 83 104*  104*   MAGNESIUM mg/dL 1.59* 2.35 1.11*   PHOSPHORUS mg/dL 2.8  --  2.1*      Results from last 7 days   Lab Units 04/01/25  1050   INR  1.3*   PROTIME seconds 14.9*

## 2025-04-06 NOTE — CARE PLAN
The patient's goals for the shift include get sleep    The clinical goals for the shift include patient will try to perform ADL's without assistance by end of shift today    Over the shift, the patient did make progress toward the following goals.      Acute encephalopathy Systemic inflammatory response syndrome (SIRS)

## 2025-04-06 NOTE — CARE PLAN
The patient's goals for the shift include rest.    The clinical goals for the shift include patient will remain hemodynamically stable throughout shift    Over the shift, the patient did make progress toward the following goals.         Problem: Fall/Injury  Goal: Not fall by end of shift  Outcome: Progressing  Goal: Be free from injury by end of the shift  Outcome: Progressing  Goal: Verbalize understanding of personal risk factors for fall in the hospital  Outcome: Progressing  Goal: Verbalize understanding of risk factor reduction measures to prevent injury from fall in the home  Outcome: Progressing  Goal: Use assistive devices by end of the shift  Outcome: Progressing  Goal: Pace activities to prevent fatigue by end of the shift  Outcome: Progressing     Problem: Skin  Goal: Decreased wound size/increased tissue granulation at next dressing change  Outcome: Progressing  Goal: Participates in plan/prevention/treatment measures  4/6/2025 1156 by Selene Alexandre RN  Outcome: Progressing  4/6/2025 1153 by Selene Alexandre RN  Flowsheets (Taken 4/6/2025 1153)  Participates in plan/prevention/treatment measures: Increase activity/out of bed for meals  Goal: Prevent/manage excess moisture  Outcome: Progressing  Goal: Prevent/minimize sheer/friction injuries  Outcome: Progressing  Goal: Promote/optimize nutrition  Outcome: Progressing  Goal: Promote skin healing  Outcome: Progressing

## 2025-04-07 LAB
ABO GROUP (TYPE) IN BLOOD: NORMAL
ALBUMIN SERPL BCP-MCNC: 2.3 G/DL (ref 3.4–5)
ALBUMIN SERPL BCP-MCNC: 2.4 G/DL (ref 3.4–5)
ANION GAP SERPL CALC-SCNC: 11 MMOL/L (ref 10–20)
ANION GAP SERPL CALC-SCNC: 12 MMOL/L (ref 10–20)
ANTIBODY SCREEN: NORMAL
BLOOD EXPIRATION DATE: NORMAL
BLOOD EXPIRATION DATE: NORMAL
BUN SERPL-MCNC: 12 MG/DL (ref 6–23)
BUN SERPL-MCNC: 12 MG/DL (ref 6–23)
CALCIUM SERPL-MCNC: 7.2 MG/DL (ref 8.6–10.6)
CALCIUM SERPL-MCNC: 7.7 MG/DL (ref 8.6–10.6)
CHLORIDE SERPL-SCNC: 101 MMOL/L (ref 98–107)
CHLORIDE SERPL-SCNC: 101 MMOL/L (ref 98–107)
CO2 SERPL-SCNC: 23 MMOL/L (ref 21–32)
CO2 SERPL-SCNC: 25 MMOL/L (ref 21–32)
CREAT SERPL-MCNC: 0.7 MG/DL (ref 0.5–1.05)
CREAT SERPL-MCNC: 0.81 MG/DL (ref 0.5–1.05)
DISPENSE STATUS: NORMAL
DISPENSE STATUS: NORMAL
EGFRCR SERPLBLD CKD-EPI 2021: 81 ML/MIN/1.73M*2
EGFRCR SERPLBLD CKD-EPI 2021: >90 ML/MIN/1.73M*2
ERYTHROCYTE [DISTWIDTH] IN BLOOD BY AUTOMATED COUNT: 14.8 % (ref 11.5–14.5)
ERYTHROCYTE [DISTWIDTH] IN BLOOD BY AUTOMATED COUNT: 15.7 % (ref 11.5–14.5)
GLUCOSE BLD MANUAL STRIP-MCNC: 136 MG/DL (ref 74–99)
GLUCOSE BLD MANUAL STRIP-MCNC: 148 MG/DL (ref 74–99)
GLUCOSE BLD MANUAL STRIP-MCNC: 199 MG/DL (ref 74–99)
GLUCOSE BLD MANUAL STRIP-MCNC: 215 MG/DL (ref 74–99)
GLUCOSE SERPL-MCNC: 175 MG/DL (ref 74–99)
GLUCOSE SERPL-MCNC: 233 MG/DL (ref 74–99)
HCT VFR BLD AUTO: 19.7 % (ref 36–46)
HCT VFR BLD AUTO: 25.9 % (ref 36–46)
HGB BLD-MCNC: 6.1 G/DL (ref 12–16)
HGB BLD-MCNC: 8.8 G/DL (ref 12–16)
MAGNESIUM SERPL-MCNC: 1.33 MG/DL (ref 1.6–2.4)
MAGNESIUM SERPL-MCNC: 1.43 MG/DL (ref 1.6–2.4)
MCH RBC QN AUTO: 28.8 PG (ref 26–34)
MCH RBC QN AUTO: 29 PG (ref 26–34)
MCHC RBC AUTO-ENTMCNC: 31 G/DL (ref 32–36)
MCHC RBC AUTO-ENTMCNC: 34 G/DL (ref 32–36)
MCV RBC AUTO: 86 FL (ref 80–100)
MCV RBC AUTO: 93 FL (ref 80–100)
NRBC BLD-RTO: 0.2 /100 WBCS (ref 0–0)
NRBC BLD-RTO: 0.2 /100 WBCS (ref 0–0)
PHOSPHATE SERPL-MCNC: 3.1 MG/DL (ref 2.5–4.9)
PHOSPHATE SERPL-MCNC: 3.4 MG/DL (ref 2.5–4.9)
PLATELET # BLD AUTO: 214 X10*3/UL (ref 150–450)
PLATELET # BLD AUTO: 218 X10*3/UL (ref 150–450)
POTASSIUM SERPL-SCNC: 4.6 MMOL/L (ref 3.5–5.3)
POTASSIUM SERPL-SCNC: 4.6 MMOL/L (ref 3.5–5.3)
PRODUCT BLOOD TYPE: 5100
PRODUCT BLOOD TYPE: 5100
PRODUCT CODE: NORMAL
PRODUCT CODE: NORMAL
RBC # BLD AUTO: 2.12 X10*6/UL (ref 4–5.2)
RBC # BLD AUTO: 3.03 X10*6/UL (ref 4–5.2)
RH FACTOR (ANTIGEN D): NORMAL
SODIUM SERPL-SCNC: 131 MMOL/L (ref 136–145)
SODIUM SERPL-SCNC: 132 MMOL/L (ref 136–145)
UNIT ABO: NORMAL
UNIT ABO: NORMAL
UNIT NUMBER: NORMAL
UNIT NUMBER: NORMAL
UNIT RH: NORMAL
UNIT RH: NORMAL
UNIT VOLUME: 282
UNIT VOLUME: 285
WBC # BLD AUTO: 16 X10*3/UL (ref 4.4–11.3)
WBC # BLD AUTO: 16.8 X10*3/UL (ref 4.4–11.3)
XM INTEP: NORMAL
XM INTEP: NORMAL

## 2025-04-07 PROCEDURE — 2500000001 HC RX 250 WO HCPCS SELF ADMINISTERED DRUGS (ALT 637 FOR MEDICARE OP): Performed by: STUDENT IN AN ORGANIZED HEALTH CARE EDUCATION/TRAINING PROGRAM

## 2025-04-07 PROCEDURE — 1100000001 HC PRIVATE ROOM DAILY

## 2025-04-07 PROCEDURE — P9016 RBC LEUKOCYTES REDUCED: HCPCS

## 2025-04-07 PROCEDURE — 83735 ASSAY OF MAGNESIUM: CPT

## 2025-04-07 PROCEDURE — 2500000002 HC RX 250 W HCPCS SELF ADMINISTERED DRUGS (ALT 637 FOR MEDICARE OP, ALT 636 FOR OP/ED): Performed by: NURSE PRACTITIONER

## 2025-04-07 PROCEDURE — 2500000002 HC RX 250 W HCPCS SELF ADMINISTERED DRUGS (ALT 637 FOR MEDICARE OP, ALT 636 FOR OP/ED): Performed by: STUDENT IN AN ORGANIZED HEALTH CARE EDUCATION/TRAINING PROGRAM

## 2025-04-07 PROCEDURE — 36430 TRANSFUSION BLD/BLD COMPNT: CPT

## 2025-04-07 PROCEDURE — 86923 COMPATIBILITY TEST ELECTRIC: CPT

## 2025-04-07 PROCEDURE — 85027 COMPLETE CBC AUTOMATED: CPT

## 2025-04-07 PROCEDURE — 80069 RENAL FUNCTION PANEL: CPT

## 2025-04-07 PROCEDURE — 36415 COLL VENOUS BLD VENIPUNCTURE: CPT

## 2025-04-07 PROCEDURE — 82947 ASSAY GLUCOSE BLOOD QUANT: CPT

## 2025-04-07 PROCEDURE — 2500000004 HC RX 250 GENERAL PHARMACY W/ HCPCS (ALT 636 FOR OP/ED): Performed by: STUDENT IN AN ORGANIZED HEALTH CARE EDUCATION/TRAINING PROGRAM

## 2025-04-07 PROCEDURE — 86901 BLOOD TYPING SEROLOGIC RH(D): CPT

## 2025-04-07 PROCEDURE — 2500000004 HC RX 250 GENERAL PHARMACY W/ HCPCS (ALT 636 FOR OP/ED)

## 2025-04-07 RX ORDER — MAGNESIUM SULFATE HEPTAHYDRATE 40 MG/ML
2 INJECTION, SOLUTION INTRAVENOUS ONCE
Status: COMPLETED | OUTPATIENT
Start: 2025-04-07 | End: 2025-04-07

## 2025-04-07 RX ORDER — TAMSULOSIN HYDROCHLORIDE 0.4 MG/1
0.4 CAPSULE ORAL DAILY
Status: DISCONTINUED | OUTPATIENT
Start: 2025-04-07 | End: 2025-04-09

## 2025-04-07 RX ADMIN — INSULIN LISPRO 1 UNITS: 100 INJECTION, SOLUTION INTRAVENOUS; SUBCUTANEOUS at 10:18

## 2025-04-07 RX ADMIN — OXYCODONE HYDROCHLORIDE 10 MG: 5 TABLET ORAL at 01:17

## 2025-04-07 RX ADMIN — HEPARIN SODIUM 5000 UNITS: 5000 INJECTION, SOLUTION INTRAVENOUS; SUBCUTANEOUS at 14:37

## 2025-04-07 RX ADMIN — ACETAMINOPHEN 650 MG: 325 TABLET, FILM COATED ORAL at 01:17

## 2025-04-07 RX ADMIN — CIPROFLOXACIN 400 MG: 2 INJECTION, SOLUTION INTRAVENOUS at 12:40

## 2025-04-07 RX ADMIN — ATORVASTATIN CALCIUM 80 MG: 80 TABLET, FILM COATED ORAL at 21:11

## 2025-04-07 RX ADMIN — OXYCODONE HYDROCHLORIDE 10 MG: 5 TABLET ORAL at 23:27

## 2025-04-07 RX ADMIN — ISOSORBIDE MONONITRATE 60 MG: 30 TABLET, EXTENDED RELEASE ORAL at 10:13

## 2025-04-07 RX ADMIN — OXYCODONE HYDROCHLORIDE 10 MG: 5 TABLET ORAL at 06:13

## 2025-04-07 RX ADMIN — CIPROFLOXACIN 400 MG: 2 INJECTION, SOLUTION INTRAVENOUS at 01:17

## 2025-04-07 RX ADMIN — OXYCODONE HYDROCHLORIDE 10 MG: 5 TABLET ORAL at 10:18

## 2025-04-07 RX ADMIN — OXYCODONE HYDROCHLORIDE 5 MG: 5 TABLET ORAL at 17:39

## 2025-04-07 RX ADMIN — CARVEDILOL 25 MG: 25 TABLET, FILM COATED ORAL at 10:13

## 2025-04-07 RX ADMIN — TAMSULOSIN HYDROCHLORIDE 0.4 MG: 0.4 CAPSULE ORAL at 22:04

## 2025-04-07 RX ADMIN — NIFEDIPINE 90 MG: 90 TABLET, FILM COATED, EXTENDED RELEASE ORAL at 06:13

## 2025-04-07 RX ADMIN — ACETAMINOPHEN 650 MG: 325 TABLET, FILM COATED ORAL at 10:13

## 2025-04-07 RX ADMIN — ACETAMINOPHEN 650 MG: 325 TABLET, FILM COATED ORAL at 15:56

## 2025-04-07 RX ADMIN — RANOLAZINE 500 MG: 500 TABLET, EXTENDED RELEASE ORAL at 10:13

## 2025-04-07 RX ADMIN — INSULIN GLARGINE 10 UNITS: 100 INJECTION, SOLUTION SUBCUTANEOUS at 22:04

## 2025-04-07 RX ADMIN — HEPARIN SODIUM 5000 UNITS: 5000 INJECTION, SOLUTION INTRAVENOUS; SUBCUTANEOUS at 06:13

## 2025-04-07 RX ADMIN — RANOLAZINE 500 MG: 500 TABLET, EXTENDED RELEASE ORAL at 21:11

## 2025-04-07 RX ADMIN — GABAPENTIN 300 MG: 300 CAPSULE ORAL at 17:39

## 2025-04-07 RX ADMIN — MAGNESIUM SULFATE HEPTAHYDRATE 2 G: 40 INJECTION, SOLUTION INTRAVENOUS at 15:56

## 2025-04-07 RX ADMIN — ASPIRIN 81 MG: 81 TABLET, COATED ORAL at 10:13

## 2025-04-07 RX ADMIN — CLOPIDOGREL BISULFATE 75 MG: 75 TABLET, FILM COATED ORAL at 17:39

## 2025-04-07 RX ADMIN — HEPARIN SODIUM 5000 UNITS: 5000 INJECTION, SOLUTION INTRAVENOUS; SUBCUTANEOUS at 21:11

## 2025-04-07 RX ADMIN — ACETAMINOPHEN 650 MG: 325 TABLET, FILM COATED ORAL at 21:11

## 2025-04-07 ASSESSMENT — PAIN SCALES - GENERAL
PAINLEVEL_OUTOF10: 7
PAINLEVEL_OUTOF10: 4
PAINLEVEL_OUTOF10: 6
PAINLEVEL_OUTOF10: 5 - MODERATE PAIN
PAINLEVEL_OUTOF10: 8
PAINLEVEL_OUTOF10: 6

## 2025-04-07 ASSESSMENT — COGNITIVE AND FUNCTIONAL STATUS - GENERAL
DRESSING REGULAR LOWER BODY CLOTHING: A LOT
CLIMB 3 TO 5 STEPS WITH RAILING: TOTAL
PERSONAL GROOMING: A LITTLE
HELP NEEDED FOR BATHING: A LOT
WALKING IN HOSPITAL ROOM: TOTAL
STANDING UP FROM CHAIR USING ARMS: A LOT
MOVING FROM LYING ON BACK TO SITTING ON SIDE OF FLAT BED WITH BEDRAILS: A LITTLE
MOVING TO AND FROM BED TO CHAIR: A LOT
MOBILITY SCORE: 13
TOILETING: A LOT
DRESSING REGULAR UPPER BODY CLOTHING: A LITTLE
DAILY ACTIVITIY SCORE: 16

## 2025-04-07 ASSESSMENT — PAIN - FUNCTIONAL ASSESSMENT
PAIN_FUNCTIONAL_ASSESSMENT: 0-10

## 2025-04-07 ASSESSMENT — PAIN DESCRIPTION - ORIENTATION: ORIENTATION: LEFT

## 2025-04-07 ASSESSMENT — PAIN DESCRIPTION - LOCATION: LOCATION: LEG

## 2025-04-07 NOTE — PROGRESS NOTES
VASCULAR SURGERY PROGRESS NOTE  Assessment/Plan   Myrna Khan is 65 y.o. 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      Plan:  Neuro:  - Pain control with tylenol, oxycodone  - continue home gabapentin     CV: PAD, CAD, HERMANN  - continue home ASA, holding home plavix - plan for discharge on DAPT  - continue home atorvastatin  - continue home ranolazine  - continue carvedilol, imdur, nifedipine  - home hydralazine held - restart as indicated     Resp:  - IS 10x/hr     GI:  - Regular Diet     :  - voiding via home chronic indwelling catheter   - trial of void today  - Patient will need new follow-up with gynecology     Endo:  - SSI #1; home lantus 10u qnight  - holding home farxiga     Heme/ID:  - continue IV cipro for groin wound  - Will consult ID today for new leukocytosis given groin wound, stable, noninfectious-appearing stump, and chronic indwelling catheter with which patient came from facility.   - trend hemoglobin  - Trending leukocytosis    Skin/wound  - daily dressing change to BKA stump with ABD, kerlex, ACE  - BID packing changes to left groin wound with packing strips, gauze, tape     SQH for DVT ppx    Dispo: RNF, discharge planning, will go home on DAPT and discontinue xarelto    D/w attending, Dr. Bg Bello MD  Vascular Surgery p. [phone]      Subjective   No overnight issues.     Objective   Vitals:  Heart Rate:  [78-89]   Temp:  [36.1 °C (97 °F)-37.1 °C (98.8 °F)]   Resp:  [18-20]   BP: ()/(54-68)   SpO2:  [94 %-100 %]     Exam:  Constitutional: No acute distress, resting comfortably  Neuro:  AOx3, grossly intact  CV: no tachycardia  Pulm: non-labored on RA  GI: soft, non-tender, non-distended  Skin: warm and dry  Musculoskeletal: moving all  extremities  Extremities: left BKA stump intact and hemostatic, clean dressing applied, left groin wound 1cm x 1cm with packing in place and no significant drainage    Labs:  Results from last 7 days   Lab Units 04/06/25  0720 04/04/25  0856 04/03/25  1115   WBC AUTO x10*3/uL 16.8* 13.4* 9.0   HEMOGLOBIN g/dL 6.9* 7.7* 8.4*   PLATELETS AUTO x10*3/uL 134* 106* 98*      Results from last 7 days   Lab Units 04/06/25  0720 04/03/25  1115 04/03/25  0319   SODIUM mmol/L 133* 142 141  141   POTASSIUM mmol/L 4.3 3.6 3.5  3.5   CHLORIDE mmol/L 104 111* 112*  112*   CO2 mmol/L 19* 22 24  24   BUN mg/dL 11 9 11  11   CREATININE mg/dL 0.79 0.57 0.68  0.68   GLUCOSE mg/dL 203* 83 104*  104*   MAGNESIUM mg/dL 1.59* 2.35 1.11*   PHOSPHORUS mg/dL 2.8  --  2.1*      Results from last 7 days   Lab Units 04/01/25  1050   INR  1.3*   PROTIME seconds 14.9*

## 2025-04-07 NOTE — PROGRESS NOTES
4/7/2025 Care Coordination  Patients hemoglobin currently a 6.1.  Per PT/OT  updates  on hold  at this time and re-attempt when appropriate.)

## 2025-04-07 NOTE — PROGRESS NOTES
Occupational Therapy                 Therapy Communication Note    Patient Name: Myrna Khan  MRN: [id number]  Department: [place] [address]  Room: 7062/7062-A  Today's Date: 4/7/2025     Discipline: Occupational Therapy    OT Missed Visit: Yes     Missed Visit Reason: Missed Visit Reason: Patient placed on medical hold (Patients hemoglobin currently a 6.1. Will hold OT at this time and re-attempt when appropriate.)    Missed Time: Attempt    SHARIF Blevins/L

## 2025-04-07 NOTE — CARE PLAN
The patient's goals for the shift include get sleep    The clinical goals for the shift include patient will remain hemodynamically stable throughout shift    Over the shift, the patient did make progress toward the following goals.

## 2025-04-07 NOTE — PROGRESS NOTES
Physical Therapy                 Therapy Communication Note    Patient Name: Myrna Khan  MRN: [id number]  Department: [place] [address]  Room: 7062/7062-A  Today's Date: 4/7/2025     Discipline: Physical Therapy    PT Missed Visit: Yes     Missed Visit Reason: Missed Visit Reason:  (Patients hemoglobin currently a 6.1. Will hold PT at this time and re-attempt when appropriate.)    Missed Time: Attempt    Comment:

## 2025-04-07 NOTE — CARE PLAN
Problem: Pain - Adult  Goal: Verbalizes/displays adequate comfort level or baseline comfort level  Outcome: Progressing     Problem: Safety - Adult  Goal: Free from fall injury  Outcome: Progressing     Problem: Discharge Planning  Goal: Discharge to home or other facility with appropriate resources  Outcome: Progressing     Problem: Fall/Injury  Goal: Be free from injury by end of the shift  Outcome: Progressing     Problem: Skin  Goal: Participates in plan/prevention/treatment measures  Outcome: Progressing  The patient's goals for the shift include get sleep    The clinical goals for the shift include pt will have adequate pain control through shift.

## 2025-04-07 NOTE — OP NOTE
AMPUTATION, BELOW KNEE (L) Operative Note     Date: 4/3/2025  OR Location: [facility] OR    Name: Myrna Khan, : 1960, Age: 65 y.o., MRN: [id number], Sex: female    Diagnosis  Pre-op Diagnosis      * PAD (peripheral artery disease) (CMS-HCC) [I73.9]     * Dry gangrene (Multi) [I96] Post-op Diagnosis     * PAD (peripheral artery disease) (CMS-HCC) [I73.9]     * Dry gangrene (Multi) [I96]     Procedures    Revision of left below knee amputation (19262)  Targeted muscle reinnervation of the left superficial peroneal nerve into the gastrocnemius muscle (91364)  Targeted muscle reinnervation of the left deep peroneal nerve into the gastrocnemius muscle (82275)  Targeted muscle reinnervation of the left tibial nerve into the soleus muscle (08385)      Surgeons      * Mrinalmick Pederson - Primary    Resident/Fellow/Other Assistant:  Surgeons and Role:     * Marisela Momin MD - Fellow    Staff:   Circulator: Doug  Scrub Person: Selena    Anesthesia Staff: Anesthesiologist: Mia Hook MD  CRNA: LEONIDES Javier-MARTINA  SRNA: Gaye Luke    Procedure Summary  Anesthesia: General  ASA: III  Estimated Blood Loss: 200 mL  Intra-op Medications:   Administrations occurring from 0655 to 0935 on 25:   Medication Name Total Dose   vancomycin (Vancocin) vial for injection 3 g   sodium chloride 0.9 % irrigation solution 2,000 mL   insulin lispro injection 0-5 Units Cannot be calculated   clindamycin (Cleocin) 150 mg/mL 900 mg   fentaNYL (Sublimaze) injection 50 mcg/mL 75 mcg   lactated Ringer's infusion Cannot be calculated   lidocaine (cardiac) injection 2% prefilled syringe 40 mg   midazolam PF (Versed) injection 1 mg/mL 1 mg   phenylephrine (Gerardo-Synephrine) 10 mg/250 mL NS (40 mcg/mL) infusion 1.82 mg   phenylephrine 40 mcg/mL syringe 10 mL 480 mcg   propofol (Diprivan) injection 10 mg/mL 100 mg   rocuronium (ZeMuron) 50 mg/5 mL injection 50 mg   sugammadex (Bridion) 200 mg/2 mL injection 200 mg               Anesthesia Record               Intraprocedure I/O Totals          Intake    PRBC 350.00 mL    Transfuse Plasma :30 Minutes 300.00 mL    Transfuse RBC :1 Hour 350.00 mL    Phenylephrine Drip 0.00 mL    The total shown is the total volume documented since Anesthesia Start was filed.    lactated Ringer's 750.00 mL    Total Intake 1750 mL       Output    Urine 400 mL    Total Output 400 mL       Net    Net Volume 1350 mL          Specimen:   ID Type Source Tests Collected by Time   1 : LEFT BELOW KNEE AMPUTATION Tissue LEG AMPUTATION BELOW THE KNEE LEFT SURGICAL PATHOLOGY EXAM Bobby Pederson MD 4/3/2025 0833                 Drains and/or Catheters:   Urethral Catheter 18 Fr. (Active)   Site Assessment Clean;Skin intact 04/06/25 1057   Collection Container Standard drainage bag 04/06/25 0829   Securement Method Securing device (Describe) 04/06/25 0829   Reason for Continuing Urinary Catheterization chronic urinary retention 04/06/25 0829   Output (mL) 150 mL 04/06/25 1600       Tourniquet Times:         Implants:     Findings: No evidence of residual infection    Indications: Myrna Khan is an 65 y.o. female with extensive gangrenous wounds of the left distal calf, ankle, and dorsum of the foot who underwent a left below knee guillotine amputation who presents for formalization with TMR today. The risks and benefits were discussed with the patient who elected to proceed.     The patient was seen in the preoperative area. The risks, benefits, complications, treatment options, non-operative alternatives, expected recovery and outcomes were discussed with the patient. The possibilities of reaction to medication, pulmonary aspiration, injury to surrounding structures, bleeding, recurrent infection, the need for additional procedures, failure to diagnose a condition, and creating a complication requiring transfusion or operation were discussed with the patient. The patient concurred with the proposed  plan, giving informed consent.  The site of surgery was properly noted/marked if necessary per policy. The patient has been actively warmed in preoperative area. Preoperative antibiotics have been ordered and given within 1 hours of incision. Venous thrombosis prophylaxis are not indicated.    Procedure Details:     General anesthesia was then induced. Patient was then prepped and draped in the usual sterile fashion. Timeout was then performed to verify the patient, procedure, site prior to beginning.      We first began by marking out our posterior flap in the usual fashion on the left leg. Next, the anterior incision was made and deepened down through the subcutaneous tissue down to the level of the fascia. The muscles within the anterior compartment were then slowly divided at the level of the skin incision. The anterior tibial neurovascular bundle was then identified and suture-ligated using 2-0 silk suture. A plane was then created posterior to the tibia which was mobilized cranially. A bone saw was then used to divide the tibia 1 cm above the level of the skin incision. Next, the fibula was dissected free of the surrounding muscles and was divided using a bone cutter at a level slightly higher than the tibia. Next, an amputation knife was used to complete the posterior flap creation. Hemostasis was then achieved. The posterior tibial artery and the peroneal arteries were then suture-ligated using 2-0 silk sutures. The superficial and deep peroneal nerves were then identified as was the tibial nerve. We then proceeded to perform the targeted muscle reinnervation for the three nerves.     Using a check point stimulator, a motor end nerve was identified within the gastrocenius muscle. This was sharply divided as was the superficial peroneal nerve. Next, a tension free co-aptation was performed of the superficial peroneal nerve with the distal motor end nerve within the gastrocnemius muscle using 8-0 Prolene  suture. The repair was then covered by imbricating the surrounding muscle using 3-0 Vicryl suture. The newly coapted superficial peroneal nerve was then stimulated with nerve stimulator with contraction of the muscle.      Using a check point stimulator, a motor end nerve was identified within the gastrocnemius muscle. This was sharply divided as was the deep peroneal nerve. Next, a tension free co-aptation was performed of the deep peroneal nerve with the distal motor end nerve using 8-0 Prolene suture. The repair was then covered by imbricating the surrounding muscle using 3-0 Vicryl suture. The newly coapted deep peroneal nerve was then stimulated with nerve stimulator with contraction of the muscle.      Using a check point stimulator, a motor end nerve was identified within the soleus muscle. This was sharply divided as was the tibial nerve. Next, a tension free co-aptation was performed of the tibial nerve with the distal motor end nerve using 8-0 Prolene suture.  The newly coapted tibial nerve was then stimulated with nerve stimulator with contraction of the muscle.     The wound was then irrigated copiously with Vancomycin irrigation. Hemostasis was again achieved. The fascia was then re-approximated using 2-0 Vicryl suture. The skin was then reapproximated using staples and 2-0 Nylon suture after which a sterile dressing was applied.      The patient tolerated the procedure well and was extubated without difficulty. The patient was taken PACU in stable condition. I was present for the entirety of the case.      Complications:  None; patient tolerated the procedure well.    Disposition: PACU - hemodynamically stable.  Condition: stable     Attending Attestation: I was present and scrubbed for the entire procedure.    Bobby Pederson  Phone Number: [phone]

## 2025-04-08 LAB
ALBUMIN SERPL BCP-MCNC: 2.3 G/DL (ref 3.4–5)
ANION GAP SERPL CALC-SCNC: 12 MMOL/L (ref 10–20)
APPEARANCE UR: CLEAR
BILIRUB UR STRIP.AUTO-MCNC: NEGATIVE MG/DL
BUN SERPL-MCNC: 13 MG/DL (ref 6–23)
CALCIUM SERPL-MCNC: 7.7 MG/DL (ref 8.6–10.6)
CHLORIDE SERPL-SCNC: 99 MMOL/L (ref 98–107)
CO2 SERPL-SCNC: 24 MMOL/L (ref 21–32)
COLOR UR: NORMAL
CREAT SERPL-MCNC: 0.81 MG/DL (ref 0.5–1.05)
EGFRCR SERPLBLD CKD-EPI 2021: 81 ML/MIN/1.73M*2
ERYTHROCYTE [DISTWIDTH] IN BLOOD BY AUTOMATED COUNT: 15.2 % (ref 11.5–14.5)
GLUCOSE BLD MANUAL STRIP-MCNC: 143 MG/DL (ref 74–99)
GLUCOSE BLD MANUAL STRIP-MCNC: 148 MG/DL (ref 74–99)
GLUCOSE BLD MANUAL STRIP-MCNC: 172 MG/DL (ref 74–99)
GLUCOSE BLD MANUAL STRIP-MCNC: 203 MG/DL (ref 74–99)
GLUCOSE SERPL-MCNC: 157 MG/DL (ref 74–99)
GLUCOSE UR STRIP.AUTO-MCNC: NORMAL MG/DL
HCT VFR BLD AUTO: 27.1 % (ref 36–46)
HGB BLD-MCNC: 8.9 G/DL (ref 12–16)
KETONES UR STRIP.AUTO-MCNC: NEGATIVE MG/DL
LABORATORY COMMENT REPORT: NORMAL
LEUKOCYTE ESTERASE UR QL STRIP.AUTO: NEGATIVE
MAGNESIUM SERPL-MCNC: 1.37 MG/DL (ref 1.6–2.4)
MCH RBC QN AUTO: 28.6 PG (ref 26–34)
MCHC RBC AUTO-ENTMCNC: 32.8 G/DL (ref 32–36)
MCV RBC AUTO: 87 FL (ref 80–100)
NITRITE UR QL STRIP.AUTO: NEGATIVE
NRBC BLD-RTO: 0.2 /100 WBCS (ref 0–0)
PATH REPORT.FINAL DX SPEC: NORMAL
PATH REPORT.GROSS SPEC: NORMAL
PATH REPORT.RELEVANT HX SPEC: NORMAL
PATH REPORT.TOTAL CANCER: NORMAL
PH UR STRIP.AUTO: 6.5 [PH]
PHOSPHATE SERPL-MCNC: 3.2 MG/DL (ref 2.5–4.9)
PLATELET # BLD AUTO: 262 X10*3/UL (ref 150–450)
POTASSIUM SERPL-SCNC: 4.4 MMOL/L (ref 3.5–5.3)
PROT UR STRIP.AUTO-MCNC: NORMAL MG/DL
RBC # BLD AUTO: 3.11 X10*6/UL (ref 4–5.2)
RBC # UR STRIP.AUTO: NEGATIVE MG/DL
RBC #/AREA URNS AUTO: ABNORMAL /HPF
SODIUM SERPL-SCNC: 131 MMOL/L (ref 136–145)
SP GR UR STRIP.AUTO: 1.01
UROBILINOGEN UR STRIP.AUTO-MCNC: NORMAL MG/DL
WBC # BLD AUTO: 14.2 X10*3/UL (ref 4.4–11.3)
WBC #/AREA URNS AUTO: ABNORMAL /HPF

## 2025-04-08 PROCEDURE — 85027 COMPLETE CBC AUTOMATED: CPT

## 2025-04-08 PROCEDURE — 97530 THERAPEUTIC ACTIVITIES: CPT | Mod: GP,CQ

## 2025-04-08 PROCEDURE — 36415 COLL VENOUS BLD VENIPUNCTURE: CPT

## 2025-04-08 PROCEDURE — 2500000004 HC RX 250 GENERAL PHARMACY W/ HCPCS (ALT 636 FOR OP/ED): Performed by: STUDENT IN AN ORGANIZED HEALTH CARE EDUCATION/TRAINING PROGRAM

## 2025-04-08 PROCEDURE — 81001 URINALYSIS AUTO W/SCOPE: CPT | Performed by: NURSE PRACTITIONER

## 2025-04-08 PROCEDURE — 83735 ASSAY OF MAGNESIUM: CPT

## 2025-04-08 PROCEDURE — 2500000001 HC RX 250 WO HCPCS SELF ADMINISTERED DRUGS (ALT 637 FOR MEDICARE OP): Performed by: STUDENT IN AN ORGANIZED HEALTH CARE EDUCATION/TRAINING PROGRAM

## 2025-04-08 PROCEDURE — 2500000002 HC RX 250 W HCPCS SELF ADMINISTERED DRUGS (ALT 637 FOR MEDICARE OP, ALT 636 FOR OP/ED): Performed by: STUDENT IN AN ORGANIZED HEALTH CARE EDUCATION/TRAINING PROGRAM

## 2025-04-08 PROCEDURE — 82947 ASSAY GLUCOSE BLOOD QUANT: CPT

## 2025-04-08 PROCEDURE — 1100000001 HC PRIVATE ROOM DAILY

## 2025-04-08 PROCEDURE — 97530 THERAPEUTIC ACTIVITIES: CPT | Mod: GO

## 2025-04-08 PROCEDURE — 97110 THERAPEUTIC EXERCISES: CPT | Mod: GP,CQ

## 2025-04-08 PROCEDURE — 80069 RENAL FUNCTION PANEL: CPT

## 2025-04-08 PROCEDURE — 2500000002 HC RX 250 W HCPCS SELF ADMINISTERED DRUGS (ALT 637 FOR MEDICARE OP, ALT 636 FOR OP/ED): Performed by: NURSE PRACTITIONER

## 2025-04-08 RX ADMIN — ACETAMINOPHEN 650 MG: 325 TABLET, FILM COATED ORAL at 14:08

## 2025-04-08 RX ADMIN — ISOSORBIDE MONONITRATE 60 MG: 30 TABLET, EXTENDED RELEASE ORAL at 08:40

## 2025-04-08 RX ADMIN — CARVEDILOL 25 MG: 25 TABLET, FILM COATED ORAL at 08:40

## 2025-04-08 RX ADMIN — INSULIN LISPRO 2 UNITS: 100 INJECTION, SOLUTION INTRAVENOUS; SUBCUTANEOUS at 17:59

## 2025-04-08 RX ADMIN — INSULIN LISPRO 1 UNITS: 100 INJECTION, SOLUTION INTRAVENOUS; SUBCUTANEOUS at 08:41

## 2025-04-08 RX ADMIN — CARVEDILOL 25 MG: 25 TABLET, FILM COATED ORAL at 20:39

## 2025-04-08 RX ADMIN — OXYCODONE HYDROCHLORIDE 10 MG: 5 TABLET ORAL at 10:48

## 2025-04-08 RX ADMIN — TAMSULOSIN HYDROCHLORIDE 0.4 MG: 0.4 CAPSULE ORAL at 20:39

## 2025-04-08 RX ADMIN — ACETAMINOPHEN 650 MG: 325 TABLET, FILM COATED ORAL at 06:06

## 2025-04-08 RX ADMIN — CIPROFLOXACIN 400 MG: 2 INJECTION, SOLUTION INTRAVENOUS at 14:08

## 2025-04-08 RX ADMIN — ATORVASTATIN CALCIUM 80 MG: 80 TABLET, FILM COATED ORAL at 20:39

## 2025-04-08 RX ADMIN — GABAPENTIN 300 MG: 300 CAPSULE ORAL at 17:00

## 2025-04-08 RX ADMIN — ACETAMINOPHEN 650 MG: 325 TABLET, FILM COATED ORAL at 20:32

## 2025-04-08 RX ADMIN — CIPROFLOXACIN 400 MG: 2 INJECTION, SOLUTION INTRAVENOUS at 01:43

## 2025-04-08 RX ADMIN — NIFEDIPINE 90 MG: 90 TABLET, FILM COATED, EXTENDED RELEASE ORAL at 06:05

## 2025-04-08 RX ADMIN — HEPARIN SODIUM 5000 UNITS: 5000 INJECTION, SOLUTION INTRAVENOUS; SUBCUTANEOUS at 14:08

## 2025-04-08 RX ADMIN — CLOPIDOGREL BISULFATE 75 MG: 75 TABLET, FILM COATED ORAL at 17:00

## 2025-04-08 RX ADMIN — ACETAMINOPHEN 650 MG: 325 TABLET, FILM COATED ORAL at 01:43

## 2025-04-08 RX ADMIN — RANOLAZINE 500 MG: 500 TABLET, EXTENDED RELEASE ORAL at 08:40

## 2025-04-08 RX ADMIN — HEPARIN SODIUM 5000 UNITS: 5000 INJECTION, SOLUTION INTRAVENOUS; SUBCUTANEOUS at 23:29

## 2025-04-08 RX ADMIN — INSULIN GLARGINE 10 UNITS: 100 INJECTION, SOLUTION SUBCUTANEOUS at 20:33

## 2025-04-08 RX ADMIN — OXYCODONE HYDROCHLORIDE 10 MG: 5 TABLET ORAL at 06:05

## 2025-04-08 RX ADMIN — HYDRALAZINE HYDROCHLORIDE 75 MG: 25 TABLET ORAL at 21:48

## 2025-04-08 RX ADMIN — HEPARIN SODIUM 5000 UNITS: 5000 INJECTION, SOLUTION INTRAVENOUS; SUBCUTANEOUS at 06:05

## 2025-04-08 RX ADMIN — RANOLAZINE 500 MG: 500 TABLET, EXTENDED RELEASE ORAL at 20:32

## 2025-04-08 RX ADMIN — ASPIRIN 81 MG: 81 TABLET, COATED ORAL at 08:40

## 2025-04-08 ASSESSMENT — COGNITIVE AND FUNCTIONAL STATUS - GENERAL
MOBILITY SCORE: 13
MOVING FROM LYING ON BACK TO SITTING ON SIDE OF FLAT BED WITH BEDRAILS: A LITTLE
MOBILITY SCORE: 14
PERSONAL GROOMING: A LITTLE
CLIMB 3 TO 5 STEPS WITH RAILING: TOTAL
MOVING TO AND FROM BED TO CHAIR: A LOT
HELP NEEDED FOR BATHING: A LITTLE
WALKING IN HOSPITAL ROOM: TOTAL
STANDING UP FROM CHAIR USING ARMS: A LITTLE
TURNING FROM BACK TO SIDE WHILE IN FLAT BAD: A LOT
MOBILITY SCORE: 13
MOVING TO AND FROM BED TO CHAIR: A LOT
DAILY ACTIVITIY SCORE: 19
DAILY ACTIVITIY SCORE: 15
WALKING IN HOSPITAL ROOM: TOTAL
EATING MEALS: A LITTLE
WALKING IN HOSPITAL ROOM: TOTAL
DRESSING REGULAR UPPER BODY CLOTHING: A LITTLE
CLIMB 3 TO 5 STEPS WITH RAILING: TOTAL
MOVING FROM LYING ON BACK TO SITTING ON SIDE OF FLAT BED WITH BEDRAILS: A LITTLE
DRESSING REGULAR UPPER BODY CLOTHING: A LITTLE
PERSONAL GROOMING: A LITTLE
STANDING UP FROM CHAIR USING ARMS: A LOT
DAILY ACTIVITIY SCORE: 17
PERSONAL GROOMING: A LITTLE
TOILETING: A LOT
HELP NEEDED FOR BATHING: A LOT
TURNING FROM BACK TO SIDE WHILE IN FLAT BAD: A LITTLE
CLIMB 3 TO 5 STEPS WITH RAILING: TOTAL
DRESSING REGULAR LOWER BODY CLOTHING: A LITTLE
DRESSING REGULAR LOWER BODY CLOTHING: A LOT
STANDING UP FROM CHAIR USING ARMS: A LOT
TOILETING: A LOT
DAILY ACTIVITIY SCORE: 18
TURNING FROM BACK TO SIDE WHILE IN FLAT BAD: A LITTLE
DRESSING REGULAR LOWER BODY CLOTHING: A LITTLE
TOILETING: A LOT
MOBILITY SCORE: 11
EATING MEALS: A LITTLE
DRESSING REGULAR UPPER BODY CLOTHING: A LITTLE
WALKING IN HOSPITAL ROOM: A LOT
HELP NEEDED FOR BATHING: A LITTLE
TURNING FROM BACK TO SIDE WHILE IN FLAT BAD: A LITTLE
CLIMB 3 TO 5 STEPS WITH RAILING: TOTAL
TOILETING: A LOT
HELP NEEDED FOR BATHING: A LITTLE
DRESSING REGULAR UPPER BODY CLOTHING: A LITTLE
STANDING UP FROM CHAIR USING ARMS: A LOT
DRESSING REGULAR LOWER BODY CLOTHING: A LITTLE
MOVING TO AND FROM BED TO CHAIR: A LOT
MOVING TO AND FROM BED TO CHAIR: A LOT

## 2025-04-08 ASSESSMENT — PAIN - FUNCTIONAL ASSESSMENT
PAIN_FUNCTIONAL_ASSESSMENT: 0-10

## 2025-04-08 ASSESSMENT — PAIN SCALES - GENERAL
PAINLEVEL_OUTOF10: 4
PAINLEVEL_OUTOF10: 0 - NO PAIN
PAINLEVEL_OUTOF10: 4
PAINLEVEL_OUTOF10: 8
PAINLEVEL_OUTOF10: 9
PAINLEVEL_OUTOF10: 9
PAINLEVEL_OUTOF10: 2

## 2025-04-08 ASSESSMENT — PAIN DESCRIPTION - LOCATION: LOCATION: LEG

## 2025-04-08 ASSESSMENT — PAIN DESCRIPTION - DESCRIPTORS: DESCRIPTORS: SHARP

## 2025-04-08 ASSESSMENT — PAIN DESCRIPTION - ORIENTATION: ORIENTATION: LEFT

## 2025-04-08 NOTE — CARE PLAN
The patient's goals for the shift include get sleep    The clinical goals for the shift include pt will have adequate pain control through shift

## 2025-04-08 NOTE — CARE PLAN
The patient's goals for the shift include rest and pain control.    The clinical goals for the shift include patient will be up in chair for meals    Over the shift, the patient did make progress toward the following goals.     Problem: Pain - Adult  Goal: Verbalizes/displays adequate comfort level or baseline comfort level  Outcome: Progressing     Problem: Discharge Planning  Goal: Discharge to home or other facility with appropriate resources  Outcome: Progressing     Problem: Safety - Adult  Goal: Free from fall injury  Outcome: Progressing     Problem: Chronic Conditions and Co-morbidities  Goal: Patient's chronic conditions and co-morbidity symptoms are monitored and maintained or improved  Outcome: Progressing     Problem: Nutrition  Goal: Nutrient intake appropriate for maintaining nutritional needs  Outcome: Progressing     Problem: Fall/Injury  Goal: Not fall by end of shift  Outcome: Progressing  Goal: Be free from injury by end of the shift  Outcome: Progressing  Goal: Verbalize understanding of personal risk factors for fall in the hospital  Outcome: Progressing  Goal: Verbalize understanding of risk factor reduction measures to prevent injury from fall in the home  Outcome: Progressing  Goal: Use assistive devices by end of the shift  Outcome: Progressing  Goal: Pace activities to prevent fatigue by end of the shift  Outcome: Progressing

## 2025-04-08 NOTE — PROGRESS NOTES
Occupational Therapy    OT Treatment    Patient Name: Myrna Khan  MRN: [id number]  Department: [place] [address]  Room: [facility]7062-A  Today's Date: 4/8/2025  Time Calculation  Start Time: 1007  Stop Time: 1038  Time Calculation (min): 31 min        Assessment:  OT Assessment: Pt will benefit from continued skilled OT to increase independence in ADLs, functional mobility, activity tolerance, safety, strength, and cognition.  Prognosis: Excellent  Barriers to Discharge Home: Caregiver assistance, Cognition needs, Physical needs  Caregiver Assistance: Patient lives alone and/or does not have reliable caregiver assistance  Cognition Needs: 24hr supervision for safety awareness needed  Physical Needs: Ambulating household distances limited by function/safety, Intermittent mobility assistance needed, Intermittent ADL assistance needed  Evaluation/Treatment Tolerance: Patient tolerated treatment well  Medical Staff Made Aware: Yes  End of Session Communication: Bedside nurse  End of Session Patient Position: Up in chair, Alarm on  OT Assessment Results: Decreased ADL status, Decreased upper extremity strength, Decreased safe judgment during ADL, Decreased endurance, Decreased functional mobility, Decreased IADLs  Prognosis: Excellent  Evaluation/Treatment Tolerance: Patient tolerated treatment well  Medical Staff Made Aware: Yes  Strengths: Attitude of self  Barriers to Participation: Comorbidities  Plan:  Treatment Interventions: ADL retraining, Functional transfer training, Endurance training, UE strengthening/ROM, Cognitive reorientation, Patient/family training, Equipment evaluation/education, Compensatory technique education  OT Frequency: 3 times per week  OT Discharge Recommendations: Moderate intensity level of continued care  Equipment Recommended upon Discharge:  (TBD at next level of care)  OT Recommended Transfer Status: Assist of 2  OT - OK to Discharge: Yes (upon medical clearance)  Treatment Interventions: ADL  retraining, Functional transfer training, Endurance training, UE strengthening/ROM, Cognitive reorientation, Patient/family training, Equipment evaluation/education, Compensatory technique education    Subjective   Previous Visit Info:  OT Last Visit  OT Received On: 04/08/25  General:  General  Reason for Referral: s/p L BKA with TMR  Past Medical History Relevant to Rehab: carotid stenosis, diabetes, CAD, HTN, PAD and renal artery stenosis  Family/Caregiver Present: No  Co-Treatment: PT  Co-Treatment Reason: to maximize pt safety with mobility 2/2 low AMPAC and hx of RLE buckling  Prior to Session Communication: Bedside nurse  Patient Position Received: Bed, 3 rail up, Alarm on  General Comment: Pt supine in bed upon arrival, agreeable to OT  Precautions:  Hearing/Visual Limitations: hearing appears WFL, (-) glasses  Medical Precautions: Fall precautions  Precautions Comment: KI in place    Pain:  Pain Assessment  Pain Assessment: 0-10  0-10 (Numeric) Pain Score: 9  Pain Type: Acute pain, Surgical pain  Pain Location: Leg  Pain Orientation: Left  Pain Interventions: Repositioned, Distraction    Objective    Cognition:  Cognition  Overall Cognitive Status: Impaired  Orientation Level: Oriented X4  Following Commands: Follows one step commands with repetition  Cognition Comments: Impaired insight throughout session, pt required VCs for safety throughout, with some nonsensical talk in session  Insight: Moderate  Impulsive: Mildly  Coordination:  Movements are Fluid and Coordinated: Yes  Activities of Daily Living:      UE Dressing  UE Dressing Level of Assistance: Close supervision  UE Dressing Where Assessed: Edge of bed  UE Dressing Comments: donned gown over back seated EOB SBA    Bed Mobility/Transfers: Bed Mobility  Bed Mobility: Yes  Bed Mobility 1  Bed Mobility 1: Supine to sitting  Level of Assistance 1: Minimum assistance, Minimal verbal cues  Bed Mobility Comments 1: VCs for hand placement and sequencing,  HOB elevated    Transfers  Transfer: Yes  Transfer 1  Transfer From 1: Sit to, Stand to  Transfer to 1: Stand, Sit  Technique 1: Sit to stand, Stand to sit  Transfer Device 1: Walker  Transfer Level of Assistance 1: Minimum assistance, +2, Minimal verbal cues  Trials/Comments 1: 3x trials in session, VCs for hand placement and positioning    Functional Mobility:  Functional Mobility  Functional Mobility Performed: Yes  Functional Mobility 1  Surface 1: Level tile  Device 1: Rolling walker  Assistance 1: Moderate assistance, Minimal verbal cues  Comments 1: bed > chair, forward ~3 steps, VCs for sequencing throughout  Sitting Balance:  Static Sitting Balance  Static Sitting-Balance Support: Feet supported  Static Sitting-Level of Assistance: Close supervision  Dynamic Sitting Balance  Dynamic Sitting-Balance Support: Feet supported  Dynamic Sitting-Level of Assistance: Contact guard  Standing Balance:  Static Standing Balance  Static Standing-Balance Support: Bilateral upper extremity supported  Static Standing-Level of Assistance: Minimum assistance  Dynamic Standing Balance  Dynamic Standing-Balance Support: Bilateral upper extremity supported  Dynamic Standing-Level of Assistance: Moderate assistance  Modalities:  Modalities Used: No    Therapy/Activity:      Therapeutic Activity  Therapeutic Activity Performed: Yes  Therapeutic Activity 1: bed mob, transfers, fxnl mob, extended time for mobility 2/2 max VCs required throughout    Other Activity:       RUE   RUE : Within Functional Limits and LUE   LUE: Within Functional Limits      Outcome Measures:[facility] Daily Activity  Putting on and taking off regular lower body clothing: A lot  Bathing (including washing, rinsing, drying): A lot  Putting on and taking off regular upper body clothing: A little  Toileting, which includes using toilet, bedpan or urinal: A lot  Taking care of personal grooming such as brushing teeth: A little  Eating Meals: A little  Daily  Activity - Total Score: 15    Education Documentation  Body Mechanics, taught by Tucker Tovar OT at 4/8/2025 12:02 PM.  Learner: Patient  Readiness: Acceptance  Method: Explanation  Response: Verbalizes Understanding, Needs Reinforcement  Comment: OT POC, d/c rec, safety, ADLs    Precautions, taught by Tucker Tovar OT at 4/8/2025 12:02 PM.  Learner: Patient  Readiness: Acceptance  Method: Explanation  Response: Verbalizes Understanding, Needs Reinforcement  Comment: OT POC, d/c rec, safety, ADLs    ADL Training, taught by Tucker Tovar OT at 4/8/2025 12:02 PM.  Learner: Patient  Readiness: Acceptance  Method: Explanation  Response: Verbalizes Understanding, Needs Reinforcement  Comment: OT POC, d/c rec, safety, ADLs    Education Comments  No comments found.      Goals:  Encounter Problems       Encounter Problems (Active)       ADLs       Patient with complete lower body dressing with contact guard assist level of assistance donning and doffing all LE clothes  with PRN adaptive equipment while supported sitting and standing (Progressing)       Start:  04/04/25    Expected End:  04/25/25            Patient will complete toileting including hygiene clothing management/hygiene with stand by assist level of assistance and grab bars. (Progressing)       Start:  04/04/25    Expected End:  04/25/25               BALANCE       Pt will maintain dynamic standing balance during ADL task with modified independent level of assistance in order to demonstrate decreased risk of falling and improved postural control. (Progressing)       Start:  04/04/25    Expected End:  04/25/25               COGNITION/SAFETY       Patient will score WFL on standardized cognitive assessment with verbal cues and within reasonable time frame (Progressing)       Start:  04/04/25    Expected End:  04/25/25               MOBILITY       Patient will perform Functional mobility min Household distances/Community Distances with stand by assist level of  assistance and least restrictive device in order to improve safety and functional mobility. (Progressing)       Start:  04/04/25    Expected End:  04/25/25               TRANSFERS       Patient will complete sit to stand transfer with stand by assist level of assistance and least restrictive device in order to improve safety and prepare for out of bed mobility. (Progressing)       Start:  04/04/25    Expected End:  04/25/25                 SHARIF Blevins/RANJITH

## 2025-04-08 NOTE — PROGRESS NOTES
Physical Therapy    Physical Therapy Treatment    Patient Name: Myrna Khan  MRN: [id number]  Department: [place] [address]  Room: [facility]  Today's Date: 4/8/2025  Time Calculation  Start Time: 1006  Stop Time: 1037  Time Calculation (min): 31 min       Assessment/Plan   PT Assessment  End of Session Communication: Bedside nurse  Assessment Comment: 66 y/o F who is now s/p L BKA; patient demo's impaired balance, strength, functional mobility. Patient would continue to benefit from MOD Intensity skilled PT services upon DC to address impairments.  End of Session Patient Position: Up in chair, Alarm on     PT Plan  Treatment/Interventions: Bed mobility, Transfer training, Gait training, Balance training, Strengthening, Therapeutic activity, Therapeutic exercise, Postural re-education, Positioning  PT Plan: Ongoing PT  PT Frequency: 4 times per week  PT Discharge Recommendations: Moderate intensity level of continued care  PT Recommended Transfer Status: Assist x1, Assistive device (2ww)  PT - OK to Discharge: Yes (DC rec made)      General Visit Information:   PT  Visit  PT Received On: 04/08/25  Response to Previous Treatment: Patient with no complaints from previous session.  General  Reason for Referral: s/p L BKA with TMR  Past Medical History Relevant to Rehab: carotid stenosis, diabetes, CAD, HTN, PAD and renal artery stenosis  Co-Treatment: OT  Co-Treatment Reason: to maximize pt safety with mobility 2/2 low AMPAC and hx of RLE buckling  Prior to Session Communication: Bedside nurse  Patient Position Received: Bed, 3 rail up, Alarm off, not on at start of session  General Comment: Pt supine in bed upon arrival, agreeable to PT    Subjective   Precautions:  Precautions  Hearing/Visual Limitations: hearing appears WFL, (-) glasses  Medical Precautions: Fall precautions  Precautions Comment: KI in place     Date/Time Vitals Session Patient Position Pulse Resp SpO2 BP MAP (mmHg)    04/08/25 1204 --  --  78  18  97 %   105/50  --               Objective   Pain:  Pain Assessment  Pain Assessment: 0-10  0-10 (Numeric) Pain Score: 9  Pain Type: Acute pain, Surgical pain  Pain Location: Leg  Pain Orientation: Left  Pain Radiating Towards: Medial Left thigh to knee  Pain Descriptors: Sharp  Pain Frequency: Constant/continuous  Pain Onset: Ongoing  Clinical Progression: Not changed  Pain Interventions: Medication (See MAR), Repositioned  Response to Interventions: Decrease in pain  Cognition:  Cognition  Overall Cognitive Status: Impaired  Arousal/Alertness: Appropriate responses to stimuli  Orientation Level: Oriented X4  Following Commands: Follows one step commands with repetition  Cognition Comments: impaired insight  Insight: Moderate  Impulsive: Mildly  Coordination:  Movements are Fluid and Coordinated: Yes  Postural Control:  Postural Control  Postural Control: Impaired  Posture Comment: slight posterior lean with dynamic sitting activities. Min A to correct.  Static Sitting Balance  Static Sitting-Balance Support: Bilateral upper extremity supported (R LE)  Static Sitting-Level of Assistance: Close supervision, Distant supervision  Dynamic Sitting Balance  Dynamic Sitting-Balance Support: Bilateral upper extremity supported (R LE supported)  Dynamic Sitting-Level of Assistance: Contact guard, Minimum assistance  Dynamic Sitting-Balance: Trunk control activities  Dynamic Sitting-Comments: Slight posterior lean, progressed from Min A to CGA  Static Standing Balance  Static Standing-Balance Support: Bilateral upper extremity supported (FWW)  Static Standing-Level of Assistance: Minimum assistance, Contact guard  Dynamic Standing Balance  Dynamic Standing-Balance Support: Bilateral upper extremity supported (FWW)  Dynamic Standing-Level of Assistance: Moderate assistance  Dynamic Standing-Balance: Turning    Activity Tolerance:  Activity Tolerance  Endurance: Endurance does not limit participation in  activity  Treatments:  Therapeutic Exercise  Therapeutic Exercise Performed: Yes  Therapeutic Exercise Activity 1: Seated R LE: LAQ, Hip Flexion, Heel slides x 15 reps each    Therapeutic Activity  Therapeutic Activity Performed: Yes  Therapeutic Activity 1: bed mob, transfers, fxnl mob, extended time for mobility 2/2 max VCs required throughout    Bed Mobility  Bed Mobility: Yes  Bed Mobility 1  Bed Mobility 1: Supine to sitting, Sitting to supine  Level of Assistance 1: Minimum assistance, Minimal verbal cues, Minimal tactile cues  Bed Mobility Comments 1: VC for safe sequencing and hand placement. HOB elevated    Ambulation/Gait Training  Ambulation/Gait Training Performed: Yes  Ambulation/Gait Training 1  Surface 1: Level tile  Device 1: Rolling walker  Gait Support Devices: Knee immobilizer (L LE)  Assistance 1: Moderate assistance, Moderate tactile cues, Minimal verbal cues  Comments/Distance (ft) 1: lateral side shuffle on R foot towards HOB x2 ft; mild unsteadiness  Ambulation/Gait Training 2  Surface 2: Level tile  Device 2: Rolling walker  Gait Support Devices: Knee immobilizer (L LE)  Assistance 2: Moderate assistance, Minimal verbal cues, Minimal tactile cues  Quality of Gait 2: Shuffling gait  Comments/Distance (ft) 2: shuffle/pivot steps forward  ~2 ft.  Transfers  Transfer: Yes  Transfer 1  Transfer From 1: Sit to, Stand to  Transfer to 1: Stand, Sit  Technique 1: Sit to stand, Stand to sit  Transfer Device 1: Walker  Transfer Level of Assistance 1: Minimal verbal cues, Minimum assistance, +2  Trials/Comments 1: x3, VC for hand placement and chair awareness    Stairs  Stairs: No    Outcome Measures:  [facility] Basic Mobility  Turning from your back to your side while in a flat bed without using bedrails: A little  Moving from lying on your back to sitting on the side of a flat bed without using bedrails: A little  Moving to and from bed to chair (including a wheelchair): A lot  Standing up from a chair  using your arms (e.g. wheelchair or bedside chair): A little  To walk in hospital room: A lot  Climbing 3-5 steps with railing: Total  Basic Mobility - Total Score: 14    Education Documentation  Precautions, taught by Nic Hooevr PTA at 4/8/2025 12:37 PM.  Learner: Patient  Readiness: Acceptance  Method: Explanation, Demonstration  Response: Needs Reinforcement  Comment: Gait training, Transfer Training, POC with residual limb    Body Mechanics, taught by Nic Hoover PTA at 4/8/2025 12:37 PM.  Learner: Patient  Readiness: Acceptance  Method: Explanation, Demonstration  Response: Needs Reinforcement  Comment: Gait training, Transfer Training, POC with residual limb    Mobility Training, taught by Nic Hoover PTA at 4/8/2025 12:37 PM.  Learner: Patient  Readiness: Acceptance  Method: Explanation, Demonstration  Response: Needs Reinforcement  Comment: Gait training, Transfer Training, POC with residual limb    Education Comments  No comments found.           Encounter Problems       Encounter Problems (Active)       PT Problem       Patient will complete bed mobility with close supervision with HOB flat        Start:  04/03/25    Expected End:  04/17/25            Patient will complete STS with CGx1 using LRAD without acute LOB         Start:  04/03/25    Expected End:  04/17/25            Patient will ambulate >/=15' with LRAD with CGx1 without acute LOB        Start:  04/03/25    Expected End:  04/17/25            Patient will complete static (close supervision) and dynamic (CGx1) standing balance activities using LRAD without acute LOB, while maintaining midline posture.        Start:  04/03/25    Expected End:  04/17/25            Patient will participate in BLE there-ex program in order to assist in improving strength and to assist with the completion of functional mobility tasks.        Start:  04/03/25    Expected End:  04/17/25               Pain - Adult

## 2025-04-08 NOTE — PROGRESS NOTES
Transitional Care Coordination Progress Note:  PLAN PER MEDICAL TEAM: Trial of void but otherwise medically ready today.     PAYOR: [employer] and [facility] Secondary    DISPO: [facility]. ADOD tomorrow pending precert. Requested updated PT/OT notes to start precert.     SUPPORT/CONTACT: [place], [phone]    Cherri Workman RN, TCC    [id number]: Requested [facility] DSC team to start precert for justice Ahumada. Cherri Workman RN, TCC

## 2025-04-08 NOTE — CARE PLAN
The patient's goals for the shift include get sleep    The clinical goals for the shift include pt wll be HDS

## 2025-04-08 NOTE — DOCUMENTATION CLARIFICATION NOTE
"    PATIENT:               TANNER PKIE  ACCT #:                  [id number]  MRN:                       [id number]  :                       1960  ADMIT DATE:       2025 8:33 AM  DISCH DATE:  RESPONDING PROVIDER #:        [id number]          PROVIDER RESPONSE TEXT:    I agree with dietician diagnosis of severe malnutrition related to chronic disease or condition on 25    CDI QUERY TEXT:    Clarification    Instruction:    Based on your assessment of the patient and the clinical information, please provide the requested documentation by clicking on the appropriate radio button and enter any additional information if prompted.    Question: Please further clarify this patient nutritional status as    When answering this query, please exercise your independent professional judgment. The fact that a question is being asked, does not imply that any particular answer is desired or expected.    The patient's clinical indicators include:  Clinical Information: 66 y/o female presented for BKA on .    Clinical Indicators:  Nutrition Consult Note revealed \"carotid stenosis, diabetes, CAD, HTN, PAD and renal artery stenosis...  Nutrition Focused Physical Exam Findings:  Subcutaneous Fat Loss:  -Orbital Fat Pads: Severe (dark circles, hollowing and loose skin)  -Buccal Fat Pads: Mild-Moderate (flat cheeks, minimal bounce)  -Triceps: Severe (negligible fat tissue)  Muscle Wasting:  -Temporalis: Severe (hollowed scooping depression)  -Pectoralis (Clavicular Region): Severe (protruding prominent clavicle)  -Deltoid/Trapezius: Mild-Moderate (slight protrusion of acromion process)  -Quadriceps: Defer  -Gastrocnemius: Defer  Edema:  -Edema: none  Physical Findings:  -Skin: Negative (L BKA site)...  Malnutrition Diagnosis: Severe malnutrition related to chronic disease or condition  Related to: energy intake < expenditure  As Evidenced by: suspect patient meeting </=75% of estimated energy needs for >/=1 month; " "moderate-severe muscle and subcutaneous fat loss...  Nutrition Recommendations:  Individualized Nutrition Prescription Provided for :  Ensure Plus TID (350kcal and 13gm protein each)  Check Vitamin D and supplement as needed \"    Treatment: Nutrition Consult. Ensure Plus TID (350kcal and 13gm protein each)    Risk Factors: Multiple chronic medical conditions with moderate-severe muscle and subcutaneous fat loss  Options provided:  -- I agree with dietician diagnosis of severe malnutrition related to chronic disease or condition on 4/4/25  -- Other - I will add my own diagnosis  -- Refer to Clinical Documentation Reviewer    Query created by: Kashmir Callahan Jr on 4/4/2025 3:26 PM      Electronically signed by:  DB HERMAN MD 4/8/2025 10:18 AM          "

## 2025-04-08 NOTE — PROGRESS NOTES
VASCULAR SURGERY PROGRESS NOTE  Assessment/Plan   Myrna Khan is 65 y.o. 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      Plan:  Neuro: acute post op pain  - Pain control with tylenol, oxycodone  - continue home gabapentin     CV: PAD, CAD, HERMANN  - continue home ASA, plavix - plan for discharge on DAPT  - continue home atorvastatin  - continue home ranolazine  - continue carvedilol, imdur, nifedipine  - home hydralazine held - restart as indicated     Resp:  - incentive spirometry  - mobilize     GI:  - Regular Diet     : chronic day for retention  - chronic indwelling catheter- removed this am  - started tamsulosin    - trial of void today      - gyn/onc appointment scheduled    Endo: DM  - SSI #1  - home lantus 10u qHS  - holding home farxiga     Heme/ID:  - continue IV cipro for groin wound  - consult ID for new leukocytosis given groin wound, stable, noninfectious-appearing stump, and chronic indwelling catheter with which patient came from facility.   - U/A today with reflex culture  - trend hemoglobin  - Trending leukocytosis (down trended form yesterday)     Skin/wound  - daily dressing change to BKA stump with ABD, kerlex, ACE  - BID packing changes to left groin wound with packing strips, gauze, tape     DVT:  - SQH     Dispo:  - Pt/OT  - SNF on discharge    Subjective   Feels good, out of bed with assistance    Objective   Vitals:  Heart Rate:  [72-83]   Temp:  [36.1 °C (97 °F)-37.4 °C (99.3 °F)]   Resp:  [16-18]   BP: ()/(58-84)   SpO2:  [95 %-99 %]     Exam:  Constitutional: No acute distress, resting comfortably  Neuro:  AOx3, grossly intact  ENMT: moist mucous membranes  CV: no tachycardia  Pulm: non-labored on RA  GI: soft, non-tender, non-distended  Skin:  warm and dry  Musculoskeletal: moving all extremities  Extremities: LLE dressing C/D/I, immobilizer in place.      Labs:  Results from last 7 days   Lab Units 04/08/25  0732 04/07/25  2138 04/07/25  0825   WBC AUTO x10*3/uL 14.2* 16.0* 16.8*   HEMOGLOBIN g/dL 8.9* 8.8* 6.1*   PLATELETS AUTO x10*3/uL 262 214 218      Results from last 7 days   Lab Units 04/08/25  0732 04/07/25  2259 04/07/25  0825   SODIUM mmol/L 131* 131* 132*   POTASSIUM mmol/L 4.4 4.6 4.6   CHLORIDE mmol/L 99 101 101   CO2 mmol/L 24 23 25   BUN mg/dL 13 12 12   CREATININE mg/dL 0.81 0.70 0.81   GLUCOSE mg/dL 157* 233* 175*   MAGNESIUM mg/dL 1.37* 1.33* 1.43*   PHOSPHORUS mg/dL 3.2 3.4 3.1      Results from last 7 days   Lab Units 04/01/25  1050   INR  1.3*   PROTIME seconds 14.9*

## 2025-04-09 PROBLEM — I96 DRY GANGRENE (MULTI): Status: RESOLVED | Noted: 2025-02-28 | Resolved: 2025-04-09

## 2025-04-09 LAB
ALBUMIN SERPL BCP-MCNC: 2.5 G/DL (ref 3.4–5)
ANION GAP SERPL CALC-SCNC: 12 MMOL/L (ref 10–20)
BUN SERPL-MCNC: 11 MG/DL (ref 6–23)
CALCIUM SERPL-MCNC: 8.5 MG/DL (ref 8.6–10.6)
CHLORIDE SERPL-SCNC: 102 MMOL/L (ref 98–107)
CO2 SERPL-SCNC: 27 MMOL/L (ref 21–32)
CREAT SERPL-MCNC: 0.81 MG/DL (ref 0.5–1.05)
EGFRCR SERPLBLD CKD-EPI 2021: 81 ML/MIN/1.73M*2
ERYTHROCYTE [DISTWIDTH] IN BLOOD BY AUTOMATED COUNT: 15.2 % (ref 11.5–14.5)
GLUCOSE BLD MANUAL STRIP-MCNC: 111 MG/DL (ref 74–99)
GLUCOSE BLD MANUAL STRIP-MCNC: 264 MG/DL (ref 74–99)
GLUCOSE BLD MANUAL STRIP-MCNC: 283 MG/DL (ref 74–99)
GLUCOSE BLD MANUAL STRIP-MCNC: 310 MG/DL (ref 74–99)
GLUCOSE SERPL-MCNC: 98 MG/DL (ref 74–99)
HCT VFR BLD AUTO: 32.9 % (ref 36–46)
HGB BLD-MCNC: 10.6 G/DL (ref 12–16)
HOLD SPECIMEN: NORMAL
MAGNESIUM SERPL-MCNC: 1.68 MG/DL (ref 1.6–2.4)
MCH RBC QN AUTO: 28.3 PG (ref 26–34)
MCHC RBC AUTO-ENTMCNC: 32.2 G/DL (ref 32–36)
MCV RBC AUTO: 88 FL (ref 80–100)
NRBC BLD-RTO: 0 /100 WBCS (ref 0–0)
PHOSPHATE SERPL-MCNC: 3.7 MG/DL (ref 2.5–4.9)
PLATELET # BLD AUTO: 375 X10*3/UL (ref 150–450)
POTASSIUM SERPL-SCNC: 4.1 MMOL/L (ref 3.5–5.3)
RBC # BLD AUTO: 3.75 X10*6/UL (ref 4–5.2)
SODIUM SERPL-SCNC: 137 MMOL/L (ref 136–145)
WBC # BLD AUTO: 13.7 X10*3/UL (ref 4.4–11.3)

## 2025-04-09 PROCEDURE — 82565 ASSAY OF CREATININE: CPT

## 2025-04-09 PROCEDURE — 2500000002 HC RX 250 W HCPCS SELF ADMINISTERED DRUGS (ALT 637 FOR MEDICARE OP, ALT 636 FOR OP/ED): Performed by: STUDENT IN AN ORGANIZED HEALTH CARE EDUCATION/TRAINING PROGRAM

## 2025-04-09 PROCEDURE — 82947 ASSAY GLUCOSE BLOOD QUANT: CPT

## 2025-04-09 PROCEDURE — 2500000001 HC RX 250 WO HCPCS SELF ADMINISTERED DRUGS (ALT 637 FOR MEDICARE OP): Performed by: STUDENT IN AN ORGANIZED HEALTH CARE EDUCATION/TRAINING PROGRAM

## 2025-04-09 PROCEDURE — 1100000001 HC PRIVATE ROOM DAILY

## 2025-04-09 PROCEDURE — 85027 COMPLETE CBC AUTOMATED: CPT

## 2025-04-09 PROCEDURE — 2500000004 HC RX 250 GENERAL PHARMACY W/ HCPCS (ALT 636 FOR OP/ED): Performed by: NURSE PRACTITIONER

## 2025-04-09 PROCEDURE — 2500000001 HC RX 250 WO HCPCS SELF ADMINISTERED DRUGS (ALT 637 FOR MEDICARE OP): Performed by: NURSE PRACTITIONER

## 2025-04-09 PROCEDURE — 2500000004 HC RX 250 GENERAL PHARMACY W/ HCPCS (ALT 636 FOR OP/ED): Performed by: STUDENT IN AN ORGANIZED HEALTH CARE EDUCATION/TRAINING PROGRAM

## 2025-04-09 PROCEDURE — 83735 ASSAY OF MAGNESIUM: CPT

## 2025-04-09 PROCEDURE — 36415 COLL VENOUS BLD VENIPUNCTURE: CPT

## 2025-04-09 RX ORDER — HYDRALAZINE HYDROCHLORIDE 50 MG/1
75 TABLET, FILM COATED ORAL 3 TIMES DAILY
Qty: 150 TABLET | Refills: 10 | Status: SHIPPED | OUTPATIENT
Start: 2025-04-09

## 2025-04-09 RX ORDER — INSULIN GLARGINE 100 [IU]/ML
10 INJECTION, SOLUTION SUBCUTANEOUS NIGHTLY
Start: 2025-04-09

## 2025-04-09 RX ORDER — MAGNESIUM SULFATE HEPTAHYDRATE 40 MG/ML
2 INJECTION, SOLUTION INTRAVENOUS ONCE
Status: COMPLETED | OUTPATIENT
Start: 2025-04-09 | End: 2025-04-09

## 2025-04-09 RX ORDER — ACETAMINOPHEN 325 MG/1
650 TABLET ORAL EVERY 6 HOURS PRN
Start: 2025-04-09

## 2025-04-09 RX ORDER — OXYCODONE HYDROCHLORIDE 5 MG/1
5 TABLET ORAL EVERY 6 HOURS PRN
Qty: 20 TABLET | Refills: 0 | Status: SHIPPED | OUTPATIENT
Start: 2025-04-09 | End: 2025-04-14

## 2025-04-09 RX ORDER — CLOPIDOGREL BISULFATE 75 MG/1
75 TABLET ORAL
Qty: 90 TABLET | Refills: 3 | Status: SHIPPED | OUTPATIENT
Start: 2025-04-09

## 2025-04-09 RX ORDER — AMOXICILLIN 250 MG
1 CAPSULE ORAL 2 TIMES DAILY PRN
Start: 2025-04-09

## 2025-04-09 RX ORDER — AMOXICILLIN 250 MG
1 CAPSULE ORAL 2 TIMES DAILY
Status: DISCONTINUED | OUTPATIENT
Start: 2025-04-09 | End: 2025-04-10 | Stop reason: HOSPADM

## 2025-04-09 RX ORDER — ASPIRIN 81 MG/1
81 TABLET ORAL DAILY
Qty: 90 TABLET | Refills: 1 | Status: SHIPPED | OUTPATIENT
Start: 2025-04-10

## 2025-04-09 RX ADMIN — ATORVASTATIN CALCIUM 80 MG: 80 TABLET, FILM COATED ORAL at 21:47

## 2025-04-09 RX ADMIN — ACETAMINOPHEN 650 MG: 325 TABLET, FILM COATED ORAL at 21:47

## 2025-04-09 RX ADMIN — SENNOSIDES AND DOCUSATE SODIUM 1 TABLET: 50; 8.6 TABLET ORAL at 21:47

## 2025-04-09 RX ADMIN — OXYCODONE HYDROCHLORIDE 10 MG: 5 TABLET ORAL at 17:52

## 2025-04-09 RX ADMIN — MAGNESIUM SULFATE HEPTAHYDRATE 2 G: 40 INJECTION, SOLUTION INTRAVENOUS at 12:04

## 2025-04-09 RX ADMIN — ASPIRIN 81 MG: 81 TABLET, COATED ORAL at 08:28

## 2025-04-09 RX ADMIN — HYDRALAZINE HYDROCHLORIDE 75 MG: 25 TABLET ORAL at 08:28

## 2025-04-09 RX ADMIN — CIPROFLOXACIN 400 MG: 2 INJECTION, SOLUTION INTRAVENOUS at 03:15

## 2025-04-09 RX ADMIN — ISOSORBIDE MONONITRATE 60 MG: 30 TABLET, EXTENDED RELEASE ORAL at 08:27

## 2025-04-09 RX ADMIN — RANOLAZINE 500 MG: 500 TABLET, EXTENDED RELEASE ORAL at 08:29

## 2025-04-09 RX ADMIN — INSULIN LISPRO 3 UNITS: 100 INJECTION, SOLUTION INTRAVENOUS; SUBCUTANEOUS at 12:52

## 2025-04-09 RX ADMIN — GABAPENTIN 300 MG: 300 CAPSULE ORAL at 17:52

## 2025-04-09 RX ADMIN — CLOPIDOGREL BISULFATE 75 MG: 75 TABLET, FILM COATED ORAL at 17:52

## 2025-04-09 RX ADMIN — OXYCODONE HYDROCHLORIDE 10 MG: 5 TABLET ORAL at 22:18

## 2025-04-09 RX ADMIN — CARVEDILOL 25 MG: 25 TABLET, FILM COATED ORAL at 08:27

## 2025-04-09 RX ADMIN — INSULIN LISPRO 3 UNITS: 100 INJECTION, SOLUTION INTRAVENOUS; SUBCUTANEOUS at 17:52

## 2025-04-09 RX ADMIN — OXYCODONE HYDROCHLORIDE 10 MG: 5 TABLET ORAL at 03:15

## 2025-04-09 RX ADMIN — CARVEDILOL 25 MG: 25 TABLET, FILM COATED ORAL at 21:47

## 2025-04-09 RX ADMIN — ACETAMINOPHEN 650 MG: 325 TABLET, FILM COATED ORAL at 08:27

## 2025-04-09 RX ADMIN — OXYCODONE HYDROCHLORIDE 10 MG: 5 TABLET ORAL at 07:18

## 2025-04-09 RX ADMIN — CIPROFLOXACIN 400 MG: 2 INJECTION, SOLUTION INTRAVENOUS at 15:59

## 2025-04-09 RX ADMIN — ACETAMINOPHEN 650 MG: 325 TABLET, FILM COATED ORAL at 15:59

## 2025-04-09 RX ADMIN — Medication 5 MG: at 21:47

## 2025-04-09 RX ADMIN — OXYCODONE HYDROCHLORIDE 10 MG: 5 TABLET ORAL at 12:51

## 2025-04-09 RX ADMIN — ACETAMINOPHEN 650 MG: 325 TABLET, FILM COATED ORAL at 03:15

## 2025-04-09 RX ADMIN — RANOLAZINE 500 MG: 500 TABLET, EXTENDED RELEASE ORAL at 21:50

## 2025-04-09 RX ADMIN — HEPARIN SODIUM 5000 UNITS: 5000 INJECTION, SOLUTION INTRAVENOUS; SUBCUTANEOUS at 08:28

## 2025-04-09 RX ADMIN — HEPARIN SODIUM 5000 UNITS: 5000 INJECTION, SOLUTION INTRAVENOUS; SUBCUTANEOUS at 15:59

## 2025-04-09 ASSESSMENT — PAIN SCALES - GENERAL
PAINLEVEL_OUTOF10: 5 - MODERATE PAIN
PAINLEVEL_OUTOF10: 2
PAINLEVEL_OUTOF10: 10 - WORST POSSIBLE PAIN
PAINLEVEL_OUTOF10: 10 - WORST POSSIBLE PAIN
PAINLEVEL_OUTOF10: 8
PAINLEVEL_OUTOF10: 4
PAINLEVEL_OUTOF10: 9
PAINLEVEL_OUTOF10: 7

## 2025-04-09 ASSESSMENT — PAIN - FUNCTIONAL ASSESSMENT
PAIN_FUNCTIONAL_ASSESSMENT: 0-10

## 2025-04-09 ASSESSMENT — PAIN DESCRIPTION - ORIENTATION
ORIENTATION: LEFT

## 2025-04-09 ASSESSMENT — PAIN DESCRIPTION - LOCATION
LOCATION: LEG

## 2025-04-09 NOTE — CARE PLAN
Problem: Pain - Adult  Goal: Verbalizes/displays adequate comfort level or baseline comfort level  Outcome: Progressing     Problem: Safety - Adult  Goal: Free from fall injury  Outcome: Progressing     Problem: Discharge Planning  Goal: Discharge to home or other facility with appropriate resources  Outcome: Progressing     Problem: Chronic Conditions and Co-morbidities  Goal: Patient's chronic conditions and co-morbidity symptoms are monitored and maintained or improved  Outcome: Progressing     Problem: Nutrition  Goal: Nutrient intake appropriate for maintaining nutritional needs  Outcome: Progressing     Problem: Fall/Injury  Goal: Not fall by end of shift  Outcome: Progressing  Goal: Be free from injury by end of the shift  Outcome: Progressing  Goal: Verbalize understanding of personal risk factors for fall in the hospital  Outcome: Progressing  Goal: Verbalize understanding of risk factor reduction measures to prevent injury from fall in the home  Outcome: Progressing  Goal: Use assistive devices by end of the shift  Outcome: Progressing  Goal: Pace activities to prevent fatigue by end of the shift  Outcome: Progressing     Problem: Diabetes  Goal: Achieve decreasing blood glucose levels by end of shift  Outcome: Progressing  Goal: Increase stability of blood glucose readings by end of shift  Outcome: Progressing  Goal: Decrease in ketones present in urine by end of shift  Outcome: Progressing  Goal: Maintain electrolyte levels within acceptable range throughout shift  Outcome: Progressing  Goal: Maintain glucose levels >70mg/dl to <250mg/dl throughout shift  Outcome: Progressing  Goal: No changes in neurological exam by end of shift  Outcome: Progressing  Goal: Learn about and adhere to nutrition recommendations by end of shift  Outcome: Progressing  Goal: Vital signs within normal range for age by end of shift  Outcome: Progressing  Goal: Increase self care and/or family involovement by end of  shift  Outcome: Progressing  Goal: Receive DSME education by end of shift  Outcome: Progressing     Problem: Skin  Goal: Decreased wound size/increased tissue granulation at next dressing change  Outcome: Progressing  Goal: Participates in plan/prevention/treatment measures  Outcome: Progressing  Goal: Prevent/manage excess moisture  Outcome: Progressing  Goal: Prevent/minimize sheer/friction injuries  Outcome: Progressing  Goal: Promote/optimize nutrition  Outcome: Progressing  Goal: Promote skin healing  Outcome: Progressing     Problem: Pain  Goal: Takes deep breaths with improved pain control throughout the shift  Outcome: Progressing  Goal: Turns in bed with improved pain control throughout the shift  Outcome: Progressing  Goal: Walks with improved pain control throughout the shift  Outcome: Progressing  Goal: Performs ADL's with improved pain control throughout shift  Outcome: Progressing  Goal: Participates in PT with improved pain control throughout the shift  Outcome: Progressing  Goal: Free from opioid side effects throughout the shift  Outcome: Progressing  Goal: Free from acute confusion related to pain meds throughout the shift  Outcome: Progressing   The patient's goals for the shift include get sleep    The clinical goals for the shift include pt to remain HDS by the end of shift

## 2025-04-09 NOTE — CARE PLAN
Problem: Pain - Adult  Goal: Verbalizes/displays adequate comfort level or baseline comfort level  Outcome: Progressing     Problem: Safety - Adult  Goal: Free from fall injury  Outcome: Progressing     Problem: Discharge Planning  Goal: Discharge to home or other facility with appropriate resources  Outcome: Progressing     Problem: Chronic Conditions and Co-morbidities  Goal: Patient's chronic conditions and co-morbidity symptoms are monitored and maintained or improved  Outcome: Progressing     Problem: Nutrition  Goal: Nutrient intake appropriate for maintaining nutritional needs  Outcome: Progressing     Problem: Fall/Injury  Goal: Not fall by end of shift  Outcome: Progressing  Goal: Be free from injury by end of the shift  Outcome: Progressing  Goal: Verbalize understanding of personal risk factors for fall in the hospital  Outcome: Progressing  Goal: Verbalize understanding of risk factor reduction measures to prevent injury from fall in the home  Outcome: Progressing  Goal: Use assistive devices by end of the shift  Outcome: Progressing  Goal: Pace activities to prevent fatigue by end of the shift  Outcome: Progressing     Problem: Diabetes  Goal: Achieve decreasing blood glucose levels by end of shift  Outcome: Progressing  Goal: Increase stability of blood glucose readings by end of shift  Outcome: Progressing  Goal: Decrease in ketones present in urine by end of shift  Outcome: Progressing  Goal: Maintain electrolyte levels within acceptable range throughout shift  Outcome: Progressing  Goal: Maintain glucose levels >70mg/dl to <250mg/dl throughout shift  Outcome: Progressing  Goal: No changes in neurological exam by end of shift  Outcome: Progressing  Goal: Learn about and adhere to nutrition recommendations by end of shift  Outcome: Progressing  Goal: Vital signs within normal range for age by end of shift  Outcome: Progressing  Goal: Increase self care and/or family involovement by end of  shift  Outcome: Progressing  Goal: Receive DSME education by end of shift  Outcome: Progressing     Problem: Skin  Goal: Decreased wound size/increased tissue granulation at next dressing change  4/9/2025 0931 by Margot Marcelo RN  Flowsheets (Taken 4/9/2025 0931)  Decreased wound size/increased tissue granulation at next dressing change: Protective dressings over bony prominences  4/9/2025 0930 by Margot Marcelo RN  Outcome: Progressing  Goal: Participates in plan/prevention/treatment measures  4/9/2025 0931 by Margot Marcelo RN  Flowsheets (Taken 4/9/2025 0931)  Participates in plan/prevention/treatment measures: Elevate heels  4/9/2025 0930 by Margot Marcelo RN  Outcome: Progressing  Goal: Prevent/manage excess moisture  4/9/2025 0931 by Margot Marcelo RN  Flowsheets (Taken 4/9/2025 0931)  Prevent/manage excess moisture: Moisturize dry skin  4/9/2025 0930 by Margot Marcelo RN  Outcome: Progressing  Goal: Prevent/minimize sheer/friction injuries  4/9/2025 0931 by Margot Marcelo RN  Flowsheets (Taken 4/9/2025 0931)  Prevent/minimize sheer/friction injuries: HOB 30 degrees or less  4/9/2025 0930 by Margot Marcelo RN  Outcome: Progressing  Goal: Promote/optimize nutrition  4/9/2025 0931 by Margot Marcelo RN  Flowsheets (Taken 4/9/2025 0931)  Promote/optimize nutrition: Monitor/record intake including meals  4/9/2025 0930 by Margot Marcelo RN  Outcome: Progressing  Goal: Promote skin healing  4/9/2025 0931 by Margot Marcelo RN  Flowsheets (Taken 4/9/2025 0931)  Promote skin healing:   Protective dressings over bony prominences   Turn/reposition every 2 hours/use positioning/transfer devices  4/9/2025 0930 by Margot Marcelo RN  Outcome: Progressing     Problem: Pain  Goal: Takes deep breaths with improved pain control throughout the shift  Outcome: Progressing  Goal: Turns in bed with improved pain control throughout the shift  Outcome:  Progressing  Goal: Walks with improved pain control throughout the shift  Outcome: Progressing  Goal: Performs ADL's with improved pain control throughout shift  Outcome: Progressing  Goal: Participates in PT with improved pain control throughout the shift  Outcome: Progressing  Goal: Free from opioid side effects throughout the shift  Outcome: Progressing  Goal: Free from acute confusion related to pain meds throughout the shift  Outcome: Progressing   The patient's goals for the shift include get sleep    The clinical goals for the shift include pt to remain HDS by the end of shift

## 2025-04-09 NOTE — PROGRESS NOTES
VASCULAR SURGERY PROGRESS NOTE  Assessment/Plan   Myrna Khan is 65 y.o. 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      Plan:  Neuro: acute post op pain  - Pain control with tylenol, oxycodone  - continue home gabapentin     CV: PAD, CAD, HERMANN  - continue home ASA, plavix - plan for discharge on DAPT (stop Xarelto)  - continue home atorvastatin  - continue home ranolazine  - continue carvedilol, imdur, nifedipine, hydralazine     Resp:  - incentive spirometry  - mobilize     GI:  - Regular Diet     : chronic day for retention  - chronic indwelling catheter- TOV this admission failed, catheter replaced on 4/8/25  - gyn/onc appointment scheduled     Endo: DM  - SSI #1  - home lantus 10u qHS  - holding home farxiga     Heme/ID:  - continue IV cipro for groin wound until discharge  - consulted ID for leukocytosis given groin wound, stable, noninfectious-appearing stump, and chronic indwelling catheter with which patient came from facility. Rec no ATBs needed  - U/A 4/8 WNL  - trend hemoglobin  - Trending leukocytosis (down trending)     Skin/wound  - daily dressing change to BKA stump with ABD, kerlex, ACE  - BID packing changes to left groin wound with packing strips, gauze, tape     DVT:  - SQH      Dispo:  - PT/OT  - SNF on discharge, medically ready await pre-cert    Subjective   No complaints    Objective   Vitals:  Heart Rate:  [72-79]   Temp:  [36.6 °C (97.9 °F)-37.2 °C (99 °F)]   Resp:  [14-19]   BP: (109-158)/(62-81)   SpO2:  [97 %-99 %]     Exam:  Constitutional: No acute distress, resting comfortably  Neuro:  AOx3, grossly intact  ENMT: moist mucous membranes  CV: no tachycardia  Pulm: non-labored on RA  GI: soft, non-tender, non-distended  Skin: warm and  dry  Musculoskeletal: moving all extremities  Extremities: LLE dressing C/D/I, immobilizer in place.    Labs:  Results from last 7 days   Lab Units 04/09/25 0758 04/08/25  0732 04/07/25  2138   WBC AUTO x10*3/uL 13.7* 14.2* 16.0*   HEMOGLOBIN g/dL 10.6* 8.9* 8.8*   PLATELETS AUTO x10*3/uL 375 262 214      Results from last 7 days   Lab Units 04/09/25 0758 04/08/25  0732 04/07/25  2259   SODIUM mmol/L 137 131* 131*   POTASSIUM mmol/L 4.1 4.4 4.6   CHLORIDE mmol/L 102 99 101   CO2 mmol/L 27 24 23   BUN mg/dL 11 13 12   CREATININE mg/dL 0.81 0.81 0.70   GLUCOSE mg/dL 98 157* 233*   MAGNESIUM mg/dL 1.68 1.37* 1.33*   PHOSPHORUS mg/dL 3.7 3.2 3.4

## 2025-04-09 NOTE — CARE PLAN
Problem: Pain - Adult  Goal: Verbalizes/displays adequate comfort level or baseline comfort level  Outcome: Progressing     Problem: Safety - Adult  Goal: Free from fall injury  Outcome: Progressing     Problem: Discharge Planning  Goal: Discharge to home or other facility with appropriate resources  Outcome: Progressing     Problem: Chronic Conditions and Co-morbidities  Goal: Patient's chronic conditions and co-morbidity symptoms are monitored and maintained or improved  Outcome: Progressing     Problem: Nutrition  Goal: Nutrient intake appropriate for maintaining nutritional needs  Outcome: Progressing     Problem: Fall/Injury  Goal: Not fall by end of shift  Outcome: Progressing  Goal: Be free from injury by end of the shift  Outcome: Progressing  Goal: Verbalize understanding of personal risk factors for fall in the hospital  Outcome: Progressing  Goal: Verbalize understanding of risk factor reduction measures to prevent injury from fall in the home  Outcome: Progressing  Goal: Use assistive devices by end of the shift  Outcome: Progressing  Goal: Pace activities to prevent fatigue by end of the shift  Outcome: Progressing     Problem: Diabetes  Goal: Achieve decreasing blood glucose levels by end of shift  Outcome: Progressing  Goal: Increase stability of blood glucose readings by end of shift  Outcome: Progressing  Goal: Decrease in ketones present in urine by end of shift  Outcome: Progressing  Goal: Maintain electrolyte levels within acceptable range throughout shift  Outcome: Progressing  Goal: Maintain glucose levels >70mg/dl to <250mg/dl throughout shift  Outcome: Progressing  Goal: No changes in neurological exam by end of shift  Outcome: Progressing  Goal: Learn about and adhere to nutrition recommendations by end of shift  Outcome: Progressing  Goal: Vital signs within normal range for age by end of shift  Outcome: Progressing  Goal: Increase self care and/or family involovement by end of  shift  Outcome: Progressing  Goal: Receive DSME education by end of shift  Outcome: Progressing     Problem: Skin  Goal: Decreased wound size/increased tissue granulation at next dressing change  4/9/2025 0252 by Lorna Brown RN  Flowsheets (Taken 4/9/2025 0252)  Decreased wound size/increased tissue granulation at next dressing change: Promote sleep for wound healing  4/8/2025 2346 by Lorna Brown RN  Outcome: Progressing  Goal: Participates in plan/prevention/treatment measures  4/9/2025 0252 by Lorna Brown RN  Flowsheets (Taken 4/9/2025 0252)  Participates in plan/prevention/treatment measures: Elevate heels  4/8/2025 2346 by Lorna Brown RN  Outcome: Progressing  Goal: Prevent/manage excess moisture  4/9/2025 0252 by Lorna Brown RN  Flowsheets (Taken 4/9/2025 0252)  Prevent/manage excess moisture: Cleanse incontinence/protect with barrier cream  4/8/2025 2346 by Lorna Brown RN  Outcome: Progressing  Goal: Prevent/minimize sheer/friction injuries  4/9/2025 0252 by Lorna Brown RN  Flowsheets (Taken 4/9/2025 0252)  Prevent/minimize sheer/friction injuries: Use pull sheet  4/8/2025 2346 by Lorna Brown RN  Outcome: Progressing  Goal: Promote/optimize nutrition  4/9/2025 0252 by Lorna Brown RN  Flowsheets (Taken 4/9/2025 0252)  Promote/optimize nutrition: Assist with feeding  4/8/2025 2346 by Lorna Brown RN  Outcome: Progressing  Goal: Promote skin healing  4/9/2025 0252 by Lorna Brown RN  Flowsheets (Taken 4/9/2025 0252)  Promote skin healing: Protective dressings over bony prominences  4/8/2025 2346 by Lorna Brown RN  Outcome: Progressing     Problem: Pain  Goal: Takes deep breaths with improved pain control throughout the shift  Outcome: Progressing  Goal: Turns in bed with improved pain control throughout the shift  Outcome: Progressing  Goal: Walks with improved pain control throughout the shift  Outcome: Progressing  Goal: Performs ADL's with improved pain control throughout shift  Outcome:  Progressing  Goal: Participates in PT with improved pain control throughout the shift  Outcome: Progressing  Goal: Free from opioid side effects throughout the shift  Outcome: Progressing  Goal: Free from acute confusion related to pain meds throughout the shift  Outcome: Progressing   The patient's goals for the shift include get sleep    The clinical goals for the shift include pt to remain HDS by the end of shift

## 2025-04-10 VITALS
HEART RATE: 75 BPM | RESPIRATION RATE: 17 BRPM | SYSTOLIC BLOOD PRESSURE: 124 MMHG | OXYGEN SATURATION: 100 % | HEIGHT: 63 IN | DIASTOLIC BLOOD PRESSURE: 71 MMHG | TEMPERATURE: 97.3 F | WEIGHT: 112 LBS | BODY MASS INDEX: 19.84 KG/M2

## 2025-04-10 LAB
ERYTHROCYTE [DISTWIDTH] IN BLOOD BY AUTOMATED COUNT: 15.2 % (ref 11.5–14.5)
GLUCOSE BLD MANUAL STRIP-MCNC: 138 MG/DL (ref 74–99)
GLUCOSE BLD MANUAL STRIP-MCNC: 271 MG/DL (ref 74–99)
HCT VFR BLD AUTO: 28.3 % (ref 36–46)
HGB BLD-MCNC: 9.2 G/DL (ref 12–16)
MCH RBC QN AUTO: 28.8 PG (ref 26–34)
MCHC RBC AUTO-ENTMCNC: 32.5 G/DL (ref 32–36)
MCV RBC AUTO: 88 FL (ref 80–100)
NRBC BLD-RTO: 0 /100 WBCS (ref 0–0)
PLATELET # BLD AUTO: 329 X10*3/UL (ref 150–450)
RBC # BLD AUTO: 3.2 X10*6/UL (ref 4–5.2)
WBC # BLD AUTO: 9.8 X10*3/UL (ref 4.4–11.3)

## 2025-04-10 PROCEDURE — 2500000001 HC RX 250 WO HCPCS SELF ADMINISTERED DRUGS (ALT 637 FOR MEDICARE OP): Performed by: NURSE PRACTITIONER

## 2025-04-10 PROCEDURE — 2500000001 HC RX 250 WO HCPCS SELF ADMINISTERED DRUGS (ALT 637 FOR MEDICARE OP): Performed by: STUDENT IN AN ORGANIZED HEALTH CARE EDUCATION/TRAINING PROGRAM

## 2025-04-10 PROCEDURE — 2500000002 HC RX 250 W HCPCS SELF ADMINISTERED DRUGS (ALT 637 FOR MEDICARE OP, ALT 636 FOR OP/ED): Performed by: STUDENT IN AN ORGANIZED HEALTH CARE EDUCATION/TRAINING PROGRAM

## 2025-04-10 PROCEDURE — 82947 ASSAY GLUCOSE BLOOD QUANT: CPT

## 2025-04-10 PROCEDURE — 85027 COMPLETE CBC AUTOMATED: CPT

## 2025-04-10 PROCEDURE — 2500000004 HC RX 250 GENERAL PHARMACY W/ HCPCS (ALT 636 FOR OP/ED): Performed by: STUDENT IN AN ORGANIZED HEALTH CARE EDUCATION/TRAINING PROGRAM

## 2025-04-10 PROCEDURE — 36415 COLL VENOUS BLD VENIPUNCTURE: CPT

## 2025-04-10 RX ORDER — GABAPENTIN 300 MG/1
300 CAPSULE ORAL 2 TIMES DAILY
Start: 2025-04-10

## 2025-04-10 RX ORDER — GABAPENTIN 300 MG/1
300 CAPSULE ORAL 2 TIMES DAILY
Status: DISCONTINUED | OUTPATIENT
Start: 2025-04-10 | End: 2025-04-10 | Stop reason: HOSPADM

## 2025-04-10 RX ADMIN — ASPIRIN 81 MG: 81 TABLET, COATED ORAL at 08:56

## 2025-04-10 RX ADMIN — ACETAMINOPHEN 650 MG: 325 TABLET, FILM COATED ORAL at 11:38

## 2025-04-10 RX ADMIN — HYDRALAZINE HYDROCHLORIDE 75 MG: 25 TABLET ORAL at 00:33

## 2025-04-10 RX ADMIN — RANOLAZINE 500 MG: 500 TABLET, EXTENDED RELEASE ORAL at 08:56

## 2025-04-10 RX ADMIN — INSULIN LISPRO 3 UNITS: 100 INJECTION, SOLUTION INTRAVENOUS; SUBCUTANEOUS at 12:01

## 2025-04-10 RX ADMIN — HEPARIN SODIUM 5000 UNITS: 5000 INJECTION, SOLUTION INTRAVENOUS; SUBCUTANEOUS at 00:32

## 2025-04-10 RX ADMIN — OXYCODONE HYDROCHLORIDE 10 MG: 5 TABLET ORAL at 06:03

## 2025-04-10 RX ADMIN — OXYCODONE HYDROCHLORIDE 10 MG: 5 TABLET ORAL at 16:01

## 2025-04-10 RX ADMIN — HYDRALAZINE HYDROCHLORIDE 75 MG: 25 TABLET ORAL at 08:56

## 2025-04-10 RX ADMIN — HEPARIN SODIUM 5000 UNITS: 5000 INJECTION, SOLUTION INTRAVENOUS; SUBCUTANEOUS at 08:56

## 2025-04-10 RX ADMIN — CARVEDILOL 25 MG: 25 TABLET, FILM COATED ORAL at 08:56

## 2025-04-10 RX ADMIN — GABAPENTIN 300 MG: 300 CAPSULE ORAL at 08:56

## 2025-04-10 RX ADMIN — OXYCODONE HYDROCHLORIDE 10 MG: 5 TABLET ORAL at 11:38

## 2025-04-10 RX ADMIN — ACETAMINOPHEN 650 MG: 325 TABLET, FILM COATED ORAL at 06:02

## 2025-04-10 RX ADMIN — NIFEDIPINE 90 MG: 90 TABLET, FILM COATED, EXTENDED RELEASE ORAL at 06:02

## 2025-04-10 RX ADMIN — ISOSORBIDE MONONITRATE 60 MG: 30 TABLET, EXTENDED RELEASE ORAL at 08:56

## 2025-04-10 ASSESSMENT — PAIN DESCRIPTION - ORIENTATION: ORIENTATION: LEFT

## 2025-04-10 ASSESSMENT — COGNITIVE AND FUNCTIONAL STATUS - GENERAL
HELP NEEDED FOR BATHING: A LITTLE
MOBILITY SCORE: 14
MOBILITY SCORE: 11
EATING MEALS: A LITTLE
MOVING FROM LYING ON BACK TO SITTING ON SIDE OF FLAT BED WITH BEDRAILS: A LITTLE
MOVING TO AND FROM BED TO CHAIR: A LITTLE
HELP NEEDED FOR BATHING: A LITTLE
MOVING TO AND FROM BED TO CHAIR: A LOT
TOILETING: A LITTLE
DRESSING REGULAR LOWER BODY CLOTHING: A LITTLE
DAILY ACTIVITIY SCORE: 19
STANDING UP FROM CHAIR USING ARMS: A LOT
CLIMB 3 TO 5 STEPS WITH RAILING: TOTAL
WALKING IN HOSPITAL ROOM: TOTAL
CLIMB 3 TO 5 STEPS WITH RAILING: TOTAL
WALKING IN HOSPITAL ROOM: TOTAL
DRESSING REGULAR LOWER BODY CLOTHING: A LITTLE
TURNING FROM BACK TO SIDE WHILE IN FLAT BAD: A LITTLE
TOILETING: A LOT
STANDING UP FROM CHAIR USING ARMS: A LITTLE
TURNING FROM BACK TO SIDE WHILE IN FLAT BAD: TOTAL
DRESSING REGULAR UPPER BODY CLOTHING: A LITTLE
DRESSING REGULAR UPPER BODY CLOTHING: A LITTLE
DAILY ACTIVITIY SCORE: 18
PERSONAL GROOMING: A LITTLE

## 2025-04-10 ASSESSMENT — PAIN SCALES - GENERAL
PAINLEVEL_OUTOF10: 9
PAINLEVEL_OUTOF10: 3

## 2025-04-10 ASSESSMENT — PAIN DESCRIPTION - LOCATION: LOCATION: LEG

## 2025-04-10 NOTE — CONSULTS
"Nutrition Follow Up Assessment  Nutrition Assessment         Patient is hospital day 9.  She is s/p L BKA on 4/1 followed by BKA formalization on 4/3.   Per chart review patient is improving, plan for discharge to SNF when accepted by facility.  RD was unable to contact patient.  Per flowsheet review her appetite is improving with intake averaging 80-90% of her meals.  She does have Ensure plus ordered with all meals TID.  No nausea/vomiting.    Nutrition History:  Energy Intake: Good > 75 %    Anthropometrics:  Height: 160 cm (5' 3\")   Weight: 50.8 kg (112 lb)   BMI (Calculated): 19.84  IBW/kg (Dietitian Calculated): 49.2 kg     Admission Weight Trend:  Wt Readings from Last 5 Encounters:   04/01/25 50.8 kg (112 lb)   03/28/25 49.9 kg (110 lb)   03/25/25 49.9 kg (110 lb)   03/12/25 59.5 kg (131 lb 2.8 oz)   02/28/25 (!) 42.6 kg (94 lb)       Nutrition Significant Labs:  BMP Trend:   Results from last 7 days   Lab Units 04/09/25  0758 04/08/25  0732 04/07/25  2259 04/07/25  0825   GLUCOSE mg/dL 98 157* 233* 175*   CALCIUM mg/dL 8.5* 7.7* 7.2* 7.7*   SODIUM mmol/L 137 131* 131* 132*   POTASSIUM mmol/L 4.1 4.4 4.6 4.6   CO2 mmol/L 27 24 23 25   CHLORIDE mmol/L 102 99 101 101   BUN mg/dL 11 13 12 12   CREATININE mg/dL 0.81 0.81 0.70 0.81    , Vit D: No results found for: \"VITD25\" , Vit B12:   Lab Results   Component Value Date    [id number] 558 01/06/2025        Nutrition Specific Medications:  Medication reviewed.    I/O:   Last BM Date: 04/07/25; Stool Appearance: Formed (04/10/25 0612)    Dietary Orders (From admission, onward)       Start     Ordered    04/04/25 1334  Oral nutritional supplements  Until discontinued        Question Answer Comment   Deliver with Breakfast    Deliver with Lunch    Deliver with Dinner    Select supplement: Ensure Plus        04/04/25 1333    04/03/25 1018  Adult diet Consistent Carb; CCD 60 gm/meal  Diet effective now        Comments: Start after 4 Hours if awake and alert   Question " Answer Comment   Diet type Consistent Carb    Carb diet selection: CCD 60 gm/meal        04/03/25 1021    04/01/25 2002  May Participate in Room Service  ( ROOM SERVICE MAY PARTICIPATE)  Once        Question:  .  Answer:  Yes    04/01/25 2001                    Nutrition Diagnosis   Malnutrition Diagnosis  Patient has Malnutrition Diagnosis: Yes  Diagnosis Status: Active  Malnutrition Diagnosis: Severe malnutrition related to chronic disease or condition  Related to: energy intake < expenditure  As Evidenced by: suspect patient meeting </=75% of estimated energy needs for >/=1 month; moderate-severe muscle and subcutaneous fat loss.    Nutrition Interventions/Recommendations         Nutrition Prescription:  Individualized Nutrition Prescription Provided for : Recommend Consitent Carbohydrate Diet with Ensure Plus TID (350 calories/13 grams protein each)        Nutrition Interventions:   Food and/or Nutrient Delivery Interventions  Interventions: Meals and snacks  Meals and Snacks: General healthful diet  Goal: Patient will consume 75% of 2-3 meals and 100 percent of 2-3 protein suppelments daily.    Nutrition Monitoring and Evaluation   Food/Nutrient Related History Monitoring  Monitoring and Evaluation Plan: Intake / amount of food  Estimated Energy Intake: Energy intake greater or equal to 75% of estimated energy needs  Intake / Amount of food: Consumes at least 75% or more of meals/snacks/supplements      Time Spent/Follow-up Reminder:   Time Spent (min): 60 minutes  Last Date of Nutrition Visit: 04/10/25  Nutrition Follow-Up Needed?: Dietitian to reassess per policy  Follow up Comment: [facility]       04/10/25 at 4:26 PM - MEEK CAPUTO RDN, SHERLY

## 2025-04-10 NOTE — CARE PLAN
Problem: Pain - Adult  Goal: Verbalizes/displays adequate comfort level or baseline comfort level  Outcome: Progressing     Problem: Safety - Adult  Goal: Free from fall injury  Outcome: Progressing     Problem: Discharge Planning  Goal: Discharge to home or other facility with appropriate resources  Outcome: Progressing     Problem: Chronic Conditions and Co-morbidities  Goal: Patient's chronic conditions and co-morbidity symptoms are monitored and maintained or improved  Outcome: Progressing     Problem: Nutrition  Goal: Nutrient intake appropriate for maintaining nutritional needs  Outcome: Progressing     Problem: Fall/Injury  Goal: Not fall by end of shift  Outcome: Progressing  Goal: Be free from injury by end of the shift  Outcome: Progressing  Goal: Verbalize understanding of personal risk factors for fall in the hospital  Outcome: Progressing  Goal: Verbalize understanding of risk factor reduction measures to prevent injury from fall in the home  Outcome: Progressing  Goal: Use assistive devices by end of the shift  Outcome: Progressing  Goal: Pace activities to prevent fatigue by end of the shift  Outcome: Progressing     Problem: Diabetes  Goal: Achieve decreasing blood glucose levels by end of shift  Outcome: Progressing  Goal: Increase stability of blood glucose readings by end of shift  Outcome: Progressing  Goal: Decrease in ketones present in urine by end of shift  Outcome: Progressing  Goal: Maintain electrolyte levels within acceptable range throughout shift  Outcome: Progressing  Goal: Maintain glucose levels >70mg/dl to <250mg/dl throughout shift  Outcome: Progressing  Goal: No changes in neurological exam by end of shift  Outcome: Progressing  Goal: Learn about and adhere to nutrition recommendations by end of shift  Outcome: Progressing  Goal: Vital signs within normal range for age by end of shift  Outcome: Progressing  Goal: Increase self care and/or family involovement by end of  shift  Outcome: Progressing  Goal: Receive DSME education by end of shift  Outcome: Progressing     Problem: Skin  Goal: Decreased wound size/increased tissue granulation at next dressing change  Outcome: Progressing  Goal: Participates in plan/prevention/treatment measures  Outcome: Progressing  Goal: Prevent/manage excess moisture  Outcome: Progressing  Goal: Prevent/minimize sheer/friction injuries  Outcome: Progressing  Goal: Promote/optimize nutrition  Outcome: Progressing  Goal: Promote skin healing  Outcome: Progressing     Problem: Pain  Goal: Takes deep breaths with improved pain control throughout the shift  Outcome: Progressing  Goal: Turns in bed with improved pain control throughout the shift  Outcome: Progressing  Goal: Walks with improved pain control throughout the shift  Outcome: Progressing  Goal: Performs ADL's with improved pain control throughout shift  Outcome: Progressing  Goal: Participates in PT with improved pain control throughout the shift  Outcome: Progressing  Goal: Free from opioid side effects throughout the shift  Outcome: Progressing  Goal: Free from acute confusion related to pain meds throughout the shift  Outcome: Progressing   The patient's goals for the shift include get sleep    The clinical goals for the shift include patient will have pain controlled throughout shift

## 2025-04-10 NOTE — NURSING NOTE
Tried multiple attempts to give report to [employer] phone number [phone]. Know success multiple hang ups. Could hear nurse in the background stating to hang up.

## 2025-04-10 NOTE — DISCHARGE SUMMARY
Discharge Diagnosis  PAD (peripheral artery disease) (CMS-HCC)    Issues Requiring Follow-Up  Vascular surgery wound check appointment     Test Results Pending At Discharge  Pending Labs       Order Current Status    Surgical Pathology Exam In process            Hospital Course  65 year old F with history of carotid stenosis, diabetes, CAD, HTN, PAD, and renal artery stenosis who presented with left lower extremity nonhealing dry gangrene and rest pain. She underwent a guillotine L BKA on 4/1 followed by BKA formalization on 4/3 by Dr. Pederson. Intraoperative course was without complication and the patient was transferred to the regular nursing floor after routine PACU stay. The postoperative course was uncomplicated. Patient has a chronic indwelling day catheter and a TOV was attempted this admission, however, patient failed and required re-insertion of day on 4/8/25. The patient tolerated a regular diet, was controlled on oral pain medications and is medically ready for discharge. Patient will be discharged back to SNF on DAPT. Patient will follow up in the outpatient clinic with  in 1 week as scheduled.       Pertinent Physical Exam At Time of Discharge  Physical Exam  Constitutional: No acute distress, resting comfortably  Neuro:  AOx3, grossly intact  ENMT: moist mucous membranes  CV: no tachycardia  Pulm: non-labored on RA  GI: soft, non-tender, non-distended  Skin: warm and dry  Musculoskeletal: moving all extremities  Extremities: LLE stump incision C/D/I with staples/sutures intact, immobilizer in place. Left groin incision with staples intact.     Home Medications     Medication List      START taking these medications     aspirin 81 mg EC tablet; Take 1 tablet (81 mg) by mouth once daily.   oxyCODONE 5 mg immediate release tablet; Commonly known as: Roxicodone;   Take 1 tablet (5 mg) by mouth every 6 hours if needed for severe pain (7 -   10) for up to 5 days.; Replaces: oxyCODONE 5 mg immediate  "release tablet   sennosides-docusate sodium 8.6-50 mg tablet; Commonly known as:   Ayanna-Colace; Take 1 tablet by mouth 2 times a day as needed for   constipation.     CHANGE how you take these medications     acetaminophen 325 mg tablet; Commonly known as: Tylenol; Take 2 tablets   (650 mg) by mouth every 6 hours if needed for mild pain (1 - 3) or   moderate pain (4 - 6).; What changed: medication strength, how much to   take, additional instructions   gabapentin 300 mg capsule; Commonly known as: Neurontin; Take 1 capsule   (300 mg) by mouth 2 times a day.; What changed: when to take this     CONTINUE taking these medications     Accu-Chek Jennifer Plus test strp; Generic drug: blood sugar diagnostic;   Use 1 strip to check blood sugar 3 times a day with meals.   Accu-Chek Softclix Lancets misc; Generic drug: lancets; Use three times   a day to test blood sugar w/ meals   atorvastatin 80 mg tablet; Commonly known as: Lipitor; TAKE 1 TABLET BY   MOUTH ONCE DAILY   BD Ultra-Fine Micro Pen Needle 32 gauge x 1/4\" needle; Generic drug: pen   needle, diabetic   calcium citrate 250 mg calcium tablet   carvedilol 25 mg tablet; Commonly known as: Coreg; TAKE 1 TABLET BY   MOUTH TWO TIMES A DAY   cholecalciferol 25 mcg (1,000 units) capsule; Commonly known as: Vitamin   D-3; Take 2 capsules (50 mcg) by mouth once daily.   clopidogrel 75 mg tablet; Commonly known as: Plavix; Take 1 tablet (75   mg) by mouth once daily.   diclofenac sodium 1 % gel; Commonly known as: Voltaren; Apply 4.5 inches   (4 g) topically 4 times a day as needed (pain).   Easy Touch Alcohol Prep Pads; Generic drug: alcohol swabs   Farxiga 10 mg tablet; Generic drug: dapagliflozin propanediol; Take 1   tablet (10 mg) by mouth once daily with breakfast.   glucagon 1 mg injection; Commonly known as: Glucagen; Inject 1 mg into   the muscle every 15 minutes if needed for other (<70 and cannot take oral   due to altered mentation).   hydrALAZINE 50 mg tablet; " Commonly known as: Apresoline; Take 1.5   tablets (75 mg) by mouth 3 times a day.   insulin glargine 100 unit/mL injection; Commonly known as: Lantus;   Inject 10 Units under the skin once daily at bedtime. Take as directed per   insulin instructions.   multivitamin with minerals tablet   NIFEdipine ER 90 mg 24 hr tablet; Commonly known as: Adalat CC; Take 1   tablet (90 mg) by mouth once daily in the morning. Take before meals. Do   not crush, chew, or split.   NovoLOG U-100 Insulin aspart 100 unit/mL injection; Generic drug:   insulin aspart   ranolazine 500 mg 12 hr tablet; Commonly known as: Ranexa; Take 1 tablet   (500 mg) by mouth 2 times a day. Do not crush, chew, or split.     STOP taking these medications     ciprofloxacin 500 mg tablet; Commonly known as: Cipro   insulin lispro 100 unit/mL injection   isosorbide mononitrate ER 60 mg 24 hr tablet; Commonly known as: Imdur   linezolid 600 mg tablet; Commonly known as: Zyvox   metFORMIN  mg 24 hr tablet; Commonly known as: Glucophage-XR   oxyCODONE 5 mg immediate release tablet; Commonly known as: Oxaydo;   Replaced by: oxyCODONE 5 mg immediate release tablet   QUEtiapine 25 mg tablet; Commonly known as: SEROquel   rivaroxaban 2.5 mg tablet; Commonly known as: Xarelto       Outpatient Follow-Up  Future Appointments   Date Time Provider Department Center   4/14/2025  1:40 PM Josee Burdick PA-C [id number] [facility]   4/16/2025  8:20 AM Bobby Pederson MD [id number] [facility]   5/9/2025  2:00 PM [facility]     >30 minutes spent on day of discharge assessment and plan including time spent in discharge education and coordination    LEONIDES Fischer-CNP   Speaking Coherently

## 2025-04-10 NOTE — NURSING NOTE
Patient discharged. IV removed and tele returned. Attempted to call report no answer. Patient discharged with belongings she came with. Report giving to community care.

## 2025-04-10 NOTE — PROGRESS NOTES
4/10/2025 Care Coordination  Per DSC precert completed.  Pt will return to [facility]  The S Vehicle you requested for Myrna RUSSO in unit/room LT [id number] on 04/10/2025 is scheduled to arrive at 3:30pm EDT! [facility] is handling this ride and you can contact them at [phone].  Pt's family member Cortney notified of plans.

## 2025-04-10 NOTE — HOSPITAL COURSE
65 year old F with history of carotid stenosis, diabetes, CAD, HTN, PAD, and renal artery stenosis who presented with left lower extremity nonhealing dry gangrene and rest pain. She underwent a guillotine L BKA on 4/1 followed by BKA formalization on 4/3 by Dr. Pederson. Intraoperative course was without complication and the patient was transferred to the regular nursing floor after routine PACU stay. The postoperative course was uncomplicated. Patient has a chronic indwelling day catheter and a TOV was attempted this admission, however, patient failed and required re-insertion of day on 4/8/25. The patient tolerated a regular diet, was controlled on oral pain medications and is medically ready for discharge. Patient will be discharged back to SNF on DAPT. Patient will follow up in the outpatient clinic with  in 1 week as scheduled.

## 2025-04-11 ENCOUNTER — APPOINTMENT (OUTPATIENT)
Dept: VASCULAR SURGERY | Facility: HOSPITAL | Age: 65
End: 2025-04-11
Payer: MEDICARE

## 2025-04-11 ENCOUNTER — APPOINTMENT (OUTPATIENT)
Dept: VASCULAR MEDICINE | Facility: HOSPITAL | Age: 65
End: 2025-04-11
Payer: MEDICARE

## 2025-04-14 ENCOUNTER — APPOINTMENT (OUTPATIENT)
Dept: INFECTIOUS DISEASES | Facility: CLINIC | Age: 65
End: 2025-04-14
Payer: COMMERCIAL

## 2025-04-16 ENCOUNTER — OFFICE VISIT (OUTPATIENT)
Dept: VASCULAR SURGERY | Facility: CLINIC | Age: 65
End: 2025-04-16
Payer: MEDICARE

## 2025-04-16 VITALS — DIASTOLIC BLOOD PRESSURE: 89 MMHG | BODY MASS INDEX: 20.19 KG/M2 | WEIGHT: 114 LBS | SYSTOLIC BLOOD PRESSURE: 153 MMHG

## 2025-04-16 DIAGNOSIS — I73.9 PAD (PERIPHERAL ARTERY DISEASE) (CMS-HCC): Primary | ICD-10-CM

## 2025-04-16 PROCEDURE — 3079F DIAST BP 80-89 MM HG: CPT | Performed by: STUDENT IN AN ORGANIZED HEALTH CARE EDUCATION/TRAINING PROGRAM

## 2025-04-16 PROCEDURE — 3046F HEMOGLOBIN A1C LEVEL >9.0%: CPT | Performed by: STUDENT IN AN ORGANIZED HEALTH CARE EDUCATION/TRAINING PROGRAM

## 2025-04-16 PROCEDURE — 1111F DSCHRG MED/CURRENT MED MERGE: CPT | Performed by: STUDENT IN AN ORGANIZED HEALTH CARE EDUCATION/TRAINING PROGRAM

## 2025-04-16 PROCEDURE — 1125F AMNT PAIN NOTED PAIN PRSNT: CPT | Performed by: STUDENT IN AN ORGANIZED HEALTH CARE EDUCATION/TRAINING PROGRAM

## 2025-04-16 PROCEDURE — 3077F SYST BP >= 140 MM HG: CPT | Performed by: STUDENT IN AN ORGANIZED HEALTH CARE EDUCATION/TRAINING PROGRAM

## 2025-04-16 PROCEDURE — 99211 OFF/OP EST MAY X REQ PHY/QHP: CPT | Performed by: STUDENT IN AN ORGANIZED HEALTH CARE EDUCATION/TRAINING PROGRAM

## 2025-04-16 PROCEDURE — 1159F MED LIST DOCD IN RCRD: CPT | Performed by: STUDENT IN AN ORGANIZED HEALTH CARE EDUCATION/TRAINING PROGRAM

## 2025-04-16 ASSESSMENT — PATIENT HEALTH QUESTIONNAIRE - PHQ9
2. FEELING DOWN, DEPRESSED OR HOPELESS: NOT AT ALL
1. LITTLE INTEREST OR PLEASURE IN DOING THINGS: NOT AT ALL
SUM OF ALL RESPONSES TO PHQ9 QUESTIONS 1 AND 2: 0

## 2025-04-16 ASSESSMENT — PAIN SCALES - GENERAL: PAINLEVEL_OUTOF10: 7

## 2025-04-16 ASSESSMENT — ENCOUNTER SYMPTOMS
DEPRESSION: 0
OCCASIONAL FEELINGS OF UNSTEADINESS: 0
LOSS OF SENSATION IN FEET: 0

## 2025-04-16 NOTE — PROGRESS NOTES
Reason for Visit: No chief complaint on file..  Referring Clinician: No ref. provider found     History of Present Illness  Myrna Khan is a 65 y.o. female with history of DM, HTN, HLD, FROYLAN, PAD with extensive wounds of the left lower extremity and foot for which she underwent a left lower extremity angiogram with left external iliac artery stent placement and a left femoral endarterectomy on 3/6/2025.  She then underwent a left below knee amputation with TMR on 4/3/2025 for extensive left calf and foot wounds.  She presents today for routine postoperative follow-up.  She states that she is doing well.  She has minimal pain in her left stump.  She has occasional phantom pain where she feels like scratching her toe.  However, she states that this is very intermittent.  Maybe once every other day.  She denies any issues with the left groin incision.  No drainage.  Overall, she states that she is doing a lot better.  She seems to be in great spirits today.     Past Medical History  She has a past medical history of CAD (coronary artery disease), Carotid artery stenosis, Diabetes mellitus (Multi), Heart disease, HF (heart failure), Hypertension, NSTEMI (non-ST elevated myocardial infarction) (Multi), PAD (peripheral artery disease) (CMS-HCC), and Renal artery stenosis (CMS-HCC).    Past Surgical History  She has a past surgical history that includes Coronary artery bypass graft and Femoral artery stent (Left).    Social History  She reports that she has been smoking cigarettes. She started smoking about 30 years ago. She has a 30.3 pack-year smoking history. She has never used smokeless tobacco. She reports current alcohol use. She reports that she does not use drugs.    Family History  Family History[1]    Allergies  Lisinopril, Amoxicillin, and Losartan    Outpatient Medications  Current Outpatient Medications   Medication Instructions    acetaminophen  "(TYLENOL) 650 mg, oral, Every 6 hours PRN    aspirin 81 mg, oral, Daily    atorvastatin (Lipitor) 80 mg tablet TAKE 1 TABLET BY MOUTH ONCE DAILY    BD Ultra-Fine Micro Pen Needle 32 gauge x 1/4\" needle     blood sugar diagnostic strip Use 1 strip to check blood sugar 3 times a day with meals.    calcium citrate 250 mg calcium tablet 1 tablet, Daily    carvedilol (Coreg) 25 mg tablet TAKE 1 TABLET BY MOUTH TWO TIMES A DAY    clopidogrel (PLAVIX) 75 mg, oral, Daily RT    diclofenac sodium (VOLTAREN) 4 g, Topical, 4 times daily PRN    Easy Touch Alcohol Prep Pads pads, medicated     Farxiga 10 mg, oral, Daily with breakfast    gabapentin (NEURONTIN) 300 mg, oral, 2 times daily    glucagon (GLUCAGEN) 1 mg, intramuscular, Every 15 min PRN    hydrALAZINE (APRESOLINE) 75 mg, oral, 3 times daily    insulin aspart (NovoLOG U-100 Insulin aspart) 100 unit/mL injection Inject under the skin. Inject as per sliding scale: if   151-200=2 units  201-250=3 units  251-300= 4 units  301-350= 5 units  351-400= 6 units  If above 400 give 6 units and notify MD    insulin glargine (LANTUS) 10 Units, subcutaneous, Nightly, Take as directed per insulin instructions.    lancets misc Use three times a day to test blood sugar w/ meals    multivitamin with minerals tablet 1 tablet, Daily    NIFEdipine ER (ADALAT CC) 90 mg, oral, Daily before breakfast, Do not crush, chew, or split.    ranolazine (RANEXA) 500 mg, oral, 2 times daily, Do not crush, chew, or split.    sennosides-docusate sodium (Ayanna-Colace) 8.6-50 mg tablet 1 tablet, oral, 2 times daily PRN       Review of Systems  ROS    Last Recorded Vitals  Vitals:    04/16/25 0810   BP: 153/89   BP Location: Left arm   Patient Position: Sitting   Weight: 51.7 kg (114 lb)       Physical Examination  General: Well appearing, well-nourished, in no acute distress.  HEENT: Normocephalic atraumatic, pupils equal and reactive to light, extraocular muscles intact, no conjunctival injection, " "oropharynx clear without exudates.  Neck: Normal carotid arterial pulses, no arterial bruits, no thyromegaly.  Cardiac: Regular rhythm and normal heart rate.  Pulmonary: No increased work of breathing, no wheezes or crackles.  GI: Soft, ND, NT  Lower extremities: Left BKA incision clean dry intact no surrounding erythema, induration, and or drainage.  Left groin incision without any open wound.  There are 2 areas of sloughing along the incision.  However, no drainage.    Skin: Skin intact. No significant rashes or lesions present.  Neuro: Alert and oriented x 3, normal attention and cognition, no focal motor or sensory neurologic deficits.  Psych: Normal affect and mood.  Musculoskeletal: Normal gait normal muscle tone.    Laboratory Studies  Lab Results   Component Value Date    GLUCOSE 98 04/09/2025    CALCIUM 8.5 (L) 04/09/2025     04/09/2025    K 4.1 04/09/2025    CO2 27 04/09/2025     04/09/2025    BUN 11 04/09/2025    CREATININE 0.81 04/09/2025     Lab Results   Component Value Date    ALT 17 02/28/2025    AST 19 02/28/2025    ALKPHOS 176 (H) 02/28/2025    BILITOT 0.3 02/28/2025         Lab Results   Component Value Date    CHOL 141 07/16/2024     Lab Results   Component Value Date    HDL 70.0 07/16/2024     Lab Results   Component Value Date    LDLCALC 56 07/16/2024     Lab Results   Component Value Date    TRIG 74 07/16/2024     No components found for: \"CHOLHDL\"  Lab Results   Component Value Date    HGBA1C 14.6 (H) 01/09/2025     No components found for: \"UACR\"      Assessment and Plan  Problem List Items Addressed This Visit    None    - Myrna Khan is a 65-year-old female with history of DM, HTN, HLD, FROYLAN, PAD with extensive wounds of the left lower extremity and foot for which she underwent a left lower extremity angiogram with left external iliac artery stent placement and a left femoral endarterectomy on 3/6/2025 with eventual left BKA for extensive dry gangrenous wounds to the left " distal calf ankle and dorsum of the foot.  She is doing well after the BKA.  Incision is healing well.  Her left groin incision which had some skin ulceration along the medial aspect has all healed well.  The staples continue to be in place.  There were 2 small areas of her superficial sloughing.  Will start Betadine paint for that.  Will have patient return to clinic in a week.  Likely will remove the staples at that time.    Bobby Pederson MD           [1]   Family History  Problem Relation Name Age of Onset    Peripheral vascular disease Mother      Hypertension Mother      Diabetes type II Mother      Hypertension Sister      Diabetes type II Sister

## 2025-04-23 ENCOUNTER — OFFICE VISIT (OUTPATIENT)
Dept: VASCULAR SURGERY | Facility: CLINIC | Age: 65
End: 2025-04-23
Payer: MEDICARE

## 2025-04-23 VITALS
HEART RATE: 84 BPM | RESPIRATION RATE: 18 BRPM | WEIGHT: 114 LBS | DIASTOLIC BLOOD PRESSURE: 91 MMHG | SYSTOLIC BLOOD PRESSURE: 153 MMHG | HEIGHT: 63 IN | TEMPERATURE: 98.6 F | BODY MASS INDEX: 20.2 KG/M2

## 2025-04-23 DIAGNOSIS — Z89.512 STATUS POST BELOW-KNEE AMPUTATION OF LEFT LOWER EXTREMITY: Primary | ICD-10-CM

## 2025-04-23 PROCEDURE — 3077F SYST BP >= 140 MM HG: CPT | Performed by: STUDENT IN AN ORGANIZED HEALTH CARE EDUCATION/TRAINING PROGRAM

## 2025-04-23 PROCEDURE — 3046F HEMOGLOBIN A1C LEVEL >9.0%: CPT | Performed by: STUDENT IN AN ORGANIZED HEALTH CARE EDUCATION/TRAINING PROGRAM

## 2025-04-23 PROCEDURE — 1159F MED LIST DOCD IN RCRD: CPT | Performed by: STUDENT IN AN ORGANIZED HEALTH CARE EDUCATION/TRAINING PROGRAM

## 2025-04-23 PROCEDURE — 99211 OFF/OP EST MAY X REQ PHY/QHP: CPT | Performed by: STUDENT IN AN ORGANIZED HEALTH CARE EDUCATION/TRAINING PROGRAM

## 2025-04-23 PROCEDURE — 3008F BODY MASS INDEX DOCD: CPT | Performed by: STUDENT IN AN ORGANIZED HEALTH CARE EDUCATION/TRAINING PROGRAM

## 2025-04-23 PROCEDURE — 1125F AMNT PAIN NOTED PAIN PRSNT: CPT | Performed by: STUDENT IN AN ORGANIZED HEALTH CARE EDUCATION/TRAINING PROGRAM

## 2025-04-23 PROCEDURE — 1111F DSCHRG MED/CURRENT MED MERGE: CPT | Performed by: STUDENT IN AN ORGANIZED HEALTH CARE EDUCATION/TRAINING PROGRAM

## 2025-04-23 PROCEDURE — 3080F DIAST BP >= 90 MM HG: CPT | Performed by: STUDENT IN AN ORGANIZED HEALTH CARE EDUCATION/TRAINING PROGRAM

## 2025-04-23 RX ORDER — METFORMIN HYDROCHLORIDE 500 MG/1
500 TABLET ORAL
COMMUNITY

## 2025-04-23 RX ORDER — OXYCODONE HYDROCHLORIDE 5 MG/1
5 CAPSULE ORAL EVERY 6 HOURS PRN
COMMUNITY

## 2025-04-23 ASSESSMENT — PAIN SCALES - GENERAL: PAINLEVEL_OUTOF10: 8

## 2025-04-23 ASSESSMENT — ENCOUNTER SYMPTOMS
LOSS OF SENSATION IN FEET: 0
DEPRESSION: 0
OCCASIONAL FEELINGS OF UNSTEADINESS: 0

## 2025-04-23 ASSESSMENT — LIFESTYLE VARIABLES
AUDIT-C TOTAL SCORE: 0
HOW OFTEN DO YOU HAVE A DRINK CONTAINING ALCOHOL: NEVER
HOW MANY STANDARD DRINKS CONTAINING ALCOHOL DO YOU HAVE ON A TYPICAL DAY: PATIENT DOES NOT DRINK
SKIP TO QUESTIONS 9-10: 1
HOW OFTEN DO YOU HAVE SIX OR MORE DRINKS ON ONE OCCASION: NEVER

## 2025-04-23 NOTE — PROGRESS NOTES
Reason for Visit: Follow-up (Myrna Khan presents to the office today for a one week follow up visit. /).  Referring Clinician: No ref. provider found     History of Present Illness  Myrna Khan is a 65 y.o. female with history of DM, HTN, HLD, FROYLAN, PAD with extensive wounds of the left lower extremity and foot for which she underwent a left lower extremity angiogram with left external iliac artery stent placement and a left femoral endarterectomy on 3/6/2025.  She then underwent a left below knee amputation with TMR on 4/3/2025 for extensive left calf and foot wounds.  She presents today for routine postoperative follow-up.  Today, she states that she is having pain in the left stump because she has not taken any pain meds yet today. Otherwise, doing well.       Past Medical History  She has a past medical history of CAD (coronary artery disease), Carotid artery stenosis, Diabetes mellitus (Multi), Heart disease, HF (heart failure), Hypertension, NSTEMI (non-ST elevated myocardial infarction) (Multi), PAD (peripheral artery disease) (CMS-HCC), and Renal artery stenosis (CMS-HCC).    Past Surgical History  She has a past surgical history that includes Coronary artery bypass graft and Femoral artery stent (Left).    Social History  She reports that she has been smoking cigarettes. She started smoking about 30 years ago. She has a 30.3 pack-year smoking history. She has never used smokeless tobacco. She reports current alcohol use. She reports that she does not use drugs.    Family History  Family History[1]    Allergies  Lisinopril, Amoxicillin, and Losartan    Outpatient Medications  Current Outpatient Medications   Medication Instructions    acetaminophen (TYLENOL) 650 mg, oral, Every 6 hours PRN    aspirin 81 mg, oral, Daily    atorvastatin (Lipitor) 80 mg tablet TAKE 1 TABLET BY MOUTH ONCE DAILY    BD Ultra-Fine Micro Pen Needle 32 gauge x  "1/4\" needle     blood sugar diagnostic strip Use 1 strip to check blood sugar 3 times a day with meals.    calcium citrate 250 mg calcium tablet 1 tablet, Daily    carvedilol (Coreg) 25 mg tablet TAKE 1 TABLET BY MOUTH TWO TIMES A DAY    clopidogrel (PLAVIX) 75 mg, oral, Daily RT    diclofenac sodium (VOLTAREN) 4 g, Topical, 4 times daily PRN    Easy Touch Alcohol Prep Pads pads, medicated     Farxiga 10 mg, oral, Daily with breakfast    gabapentin (NEURONTIN) 300 mg, oral, 2 times daily    glucagon (GLUCAGEN) 1 mg, intramuscular, Every 15 min PRN    hydrALAZINE (APRESOLINE) 75 mg, oral, 3 times daily    insulin aspart (NovoLOG U-100 Insulin aspart) 100 unit/mL injection Inject under the skin. Inject as per sliding scale: if   151-200=2 units  201-250=3 units  251-300= 4 units  301-350= 5 units  351-400= 6 units  If above 400 give 6 units and notify MD    insulin glargine (LANTUS) 10 Units, subcutaneous, Nightly, Take as directed per insulin instructions.    lancets misc Use three times a day to test blood sugar w/ meals    metFORMIN (GLUCOPHAGE) 500 mg, 2 times daily (morning and late afternoon)    multivitamin with minerals tablet 1 tablet, Daily    NIFEdipine ER (ADALAT CC) 90 mg, oral, Daily before breakfast, Do not crush, chew, or split.    oxyCODONE (OXY-IR) 5 mg, Every 6 hours PRN    ranolazine (RANEXA) 500 mg, oral, 2 times daily, Do not crush, chew, or split.    sennosides-docusate sodium (Ayanna-Colace) 8.6-50 mg tablet 1 tablet, oral, 2 times daily PRN       Review of Systems  ROS    Last Recorded Vitals  Vitals:    04/23/25 0819   BP: (!) 153/91   BP Location: Left arm   Patient Position: Sitting   BP Cuff Size: Adult   Pulse: 84   Resp: 18   Temp: 37 °C (98.6 °F)   Weight: 51.7 kg (114 lb)   Height: 1.6 m (5' 3\")       Physical Examination  General: Well appearing, well-nourished, in no acute distress.  HEENT: Normocephalic atraumatic, pupils equal and reactive to light, extraocular muscles intact, no " "conjunctival injection, oropharynx clear without exudates.  Neck: Normal carotid arterial pulses, no arterial bruits, no thyromegaly.  Cardiac: Regular rhythm and normal heart rate.  Pulmonary: No increased work of breathing, no wheezes or crackles.  GI: Soft, ND, NT  Lower extremities: No wounds, ulcers, or discoloration. Left groin incision clean and dry. Small area of dehiscence at the top and the middle section but this is very superficial. L BKA incision c/d/I.   Skin: Skin intact. No significant rashes or lesions present.  Neuro: Alert and oriented x 3, normal attention and cognition, no focal motor or sensory neurologic deficits.  Psych: Normal affect and mood.  Musculoskeletal: Normal gait normal muscle tone.    Laboratory Studies  Lab Results   Component Value Date    GLUCOSE 98 04/09/2025    CALCIUM 8.5 (L) 04/09/2025     04/09/2025    K 4.1 04/09/2025    CO2 27 04/09/2025     04/09/2025    BUN 11 04/09/2025    CREATININE 0.81 04/09/2025     Lab Results   Component Value Date    ALT 17 02/28/2025    AST 19 02/28/2025    ALKPHOS 176 (H) 02/28/2025    BILITOT 0.3 02/28/2025         Lab Results   Component Value Date    CHOL 141 07/16/2024     Lab Results   Component Value Date    HDL 70.0 07/16/2024     Lab Results   Component Value Date    LDLCALC 56 07/16/2024     Lab Results   Component Value Date    TRIG 74 07/16/2024     No components found for: \"CHOLHDL\"  Lab Results   Component Value Date    HGBA1C 14.6 (H) 01/09/2025     No components found for: \"UACR\"    Assessment and Plan  Problem List Items Addressed This Visit    None    - Myrna Khan is a 65-year-old female with history of DM, HTN, HLD, FROYLAN, PAD with extensive wounds of the left lower extremity and foot for which she underwent a left lower extremity angiogram with left external iliac artery stent placement and a left femoral endarterectomy on 3/6/2025 with eventual left BKA for extensive dry gangrenous wounds to the left distal " calf ankle and dorsum of the foot.  She is doing well after the BKA.  Incision is healing well.  Her left groin incision which had some skin ulceration along the medial aspect has all healed well.  The groin incision staples were removed today.  There were 2 small areas of her superficial sloughing/dehiscence.  Recommend Betadine paint for that twice a day.  Will have patient return to clinic in a week.  Likely will remove the BKA staples/sutures at that time.     Bobby Pederson MD           [1]   Family History  Problem Relation Name Age of Onset    Peripheral vascular disease Mother      Hypertension Mother      Diabetes type II Mother      Hypertension Sister      Diabetes type II Sister

## 2025-04-30 ENCOUNTER — OFFICE VISIT (OUTPATIENT)
Dept: VASCULAR SURGERY | Facility: CLINIC | Age: 65
End: 2025-04-30
Payer: MEDICARE

## 2025-04-30 VITALS — SYSTOLIC BLOOD PRESSURE: 134 MMHG | DIASTOLIC BLOOD PRESSURE: 62 MMHG | HEART RATE: 89 BPM

## 2025-04-30 DIAGNOSIS — Z89.512 STATUS POST BELOW-KNEE AMPUTATION OF LEFT LOWER EXTREMITY: Primary | ICD-10-CM

## 2025-04-30 PROCEDURE — 3046F HEMOGLOBIN A1C LEVEL >9.0%: CPT | Performed by: STUDENT IN AN ORGANIZED HEALTH CARE EDUCATION/TRAINING PROGRAM

## 2025-04-30 PROCEDURE — 99211 OFF/OP EST MAY X REQ PHY/QHP: CPT | Performed by: STUDENT IN AN ORGANIZED HEALTH CARE EDUCATION/TRAINING PROGRAM

## 2025-04-30 PROCEDURE — 1125F AMNT PAIN NOTED PAIN PRSNT: CPT | Performed by: STUDENT IN AN ORGANIZED HEALTH CARE EDUCATION/TRAINING PROGRAM

## 2025-04-30 PROCEDURE — 1111F DSCHRG MED/CURRENT MED MERGE: CPT | Performed by: STUDENT IN AN ORGANIZED HEALTH CARE EDUCATION/TRAINING PROGRAM

## 2025-04-30 PROCEDURE — 3075F SYST BP GE 130 - 139MM HG: CPT | Performed by: STUDENT IN AN ORGANIZED HEALTH CARE EDUCATION/TRAINING PROGRAM

## 2025-04-30 PROCEDURE — 3078F DIAST BP <80 MM HG: CPT | Performed by: STUDENT IN AN ORGANIZED HEALTH CARE EDUCATION/TRAINING PROGRAM

## 2025-04-30 RX ORDER — ACETAMINOPHEN, DIPHENHYDRAMINE HCL, PHENYLEPHRINE HCL 325; 25; 5 MG/1; MG/1; MG/1
TABLET ORAL NIGHTLY
COMMUNITY

## 2025-04-30 RX ORDER — CHOLECALCIFEROL (VITAMIN D3) 25 MCG
50 TABLET ORAL DAILY
COMMUNITY

## 2025-04-30 RX ORDER — ACETAMINOPHEN 500 MG
500 TABLET ORAL EVERY 6 HOURS PRN
COMMUNITY

## 2025-04-30 RX ORDER — FERROUS SULFATE 325(65) MG
325 TABLET, DELAYED RELEASE (ENTERIC COATED) ORAL
COMMUNITY

## 2025-04-30 ASSESSMENT — LIFESTYLE VARIABLES: TOTAL SCORE: 0

## 2025-04-30 ASSESSMENT — ENCOUNTER SYMPTOMS
LOSS OF SENSATION IN FEET: 0
OCCASIONAL FEELINGS OF UNSTEADINESS: 1
DEPRESSION: 0

## 2025-04-30 ASSESSMENT — PATIENT HEALTH QUESTIONNAIRE - PHQ9
SUM OF ALL RESPONSES TO PHQ9 QUESTIONS 1 AND 2: 0
1. LITTLE INTEREST OR PLEASURE IN DOING THINGS: NOT AT ALL
2. FEELING DOWN, DEPRESSED OR HOPELESS: NOT AT ALL

## 2025-04-30 ASSESSMENT — PAIN SCALES - GENERAL: PAINLEVEL_OUTOF10: 8

## 2025-04-30 NOTE — PROGRESS NOTES
"                                                              Reason for Visit: 1 WEEK FUV WOUND CHECK.  Referring Clinician: No ref. provider found     History of Present Illness    Myrna Khan is a 65 y.o. female with history of DM, HTN, HLD, FROYLAN, PAD with extensive wounds of the left lower extremity and foot for which she underwent a left lower extremity angiogram with left external iliac artery stent placement and a left femoral endarterectomy on 3/6/2025.  She then underwent a left below knee amputation with TMR on 4/3/2025 for extensive left calf and foot wounds.  She presents today for routine postoperative follow-up.  Today, she states that she is doing well. She has been getting BID betadine paint to the groin wound since last clinic visit.     Past Medical History  She has a past medical history of CAD (coronary artery disease), Carotid artery stenosis, Diabetes mellitus (Multi), Heart disease, HF (heart failure), Hypertension, NSTEMI (non-ST elevated myocardial infarction) (Multi), PAD (peripheral artery disease) (CMS-HCC), and Renal artery stenosis (CMS-HCC).    Past Surgical History  She has a past surgical history that includes Coronary artery bypass graft and Femoral artery stent (Left).    Social History  She reports that she has been smoking cigarettes. She started smoking about 30 years ago. She has a 30.3 pack-year smoking history. She has never used smokeless tobacco. She reports that she does not currently use alcohol. She reports that she does not use drugs.    Family History  Family History[1]    Allergies  Lisinopril, Amoxicillin, and Losartan    Outpatient Medications  Current Outpatient Medications   Medication Instructions    acetaminophen (TYLENOL) 650 mg, oral, Every 6 hours PRN    acetaminophen (TYLENOL) 500 mg, Every 6 hours PRN    aspirin 81 mg, oral, Daily    atorvastatin (Lipitor) 80 mg tablet TAKE 1 TABLET BY MOUTH ONCE DAILY    BD Ultra-Fine Micro Pen Needle 32 gauge x 1/4\" " needle     blood sugar diagnostic strip Use 1 strip to check blood sugar 3 times a day with meals.    calcium citrate 250 mg calcium tablet 1 tablet, Daily    carvedilol (Coreg) 25 mg tablet TAKE 1 TABLET BY MOUTH TWO TIMES A DAY    cholecalciferol (VITAMIN D3) 50 mcg, Daily    clopidogrel (PLAVIX) 75 mg, oral, Daily RT    diclofenac sodium (VOLTAREN) 4 g, Topical, 4 times daily PRN    Easy Touch Alcohol Prep Pads pads, medicated     Farxiga 10 mg, oral, Daily with breakfast    ferrous sulfate 325 mg, Every 48 hours    gabapentin (NEURONTIN) 300 mg, oral, 2 times daily    glucagon (GLUCAGEN) 1 mg, intramuscular, Every 15 min PRN    hydrALAZINE (APRESOLINE) 75 mg, oral, 3 times daily    insulin aspart (NovoLOG U-100 Insulin aspart) 100 unit/mL injection Inject under the skin. Inject as per sliding scale: if   151-200=2 units  201-250=3 units  251-300= 4 units  301-350= 5 units  351-400= 6 units  If above 400 give 6 units and notify MD    insulin glargine (LANTUS) 10 Units, subcutaneous, Nightly, Take as directed per insulin instructions.    lancets misc Use three times a day to test blood sugar w/ meals    melatonin 10 mg tablet Nightly    metFORMIN (GLUCOPHAGE) 500 mg, 2 times daily (morning and late afternoon)    multivitamin with minerals tablet 1 tablet, Daily    NIFEdipine ER (ADALAT CC) 90 mg, oral, Daily before breakfast, Do not crush, chew, or split.    oxyCODONE (OXY-IR) 5 mg, Every 6 hours PRN    ranolazine (RANEXA) 500 mg, oral, 2 times daily, Do not crush, chew, or split.    sennosides-docusate sodium (Ayanna-Colace) 8.6-50 mg tablet 1 tablet, oral, 2 times daily PRN       Review of Systems  ROS    Last Recorded Vitals  Vitals:    04/30/25 0824   BP: 134/62   Pulse: 89       Physical Examination  General: Well appearing, well-nourished, in no acute distress.  HEENT: Normocephalic atraumatic, pupils equal and reactive to light, extraocular muscles intact, no conjunctival injection, oropharynx clear without  "exudates.  Neck: Normal carotid arterial pulses, no arterial bruits, no thyromegaly.  Cardiac: Regular rhythm and normal heart rate.  Pulmonary: No increased work of breathing, no wheezes or crackles.  GI: Soft, ND, NT  Lower extremities: No wounds, ulcers, or discoloration. Left BKA well healed. No surrounding erythema, induration, drainage. No dehiscence. Left groin incision well-approximated. The 2 prior areas of dehiscence are now much improved and smaller. No drainage.   Skin: Skin intact. No significant rashes or lesions present.  Neuro: Alert and oriented x 3, normal attention and cognition, no focal motor or sensory neurologic deficits.  Psych: Normal affect and mood.  Musculoskeletal: Normal gait normal muscle tone.    Laboratory Studies  Lab Results   Component Value Date    GLUCOSE 98 04/09/2025    CALCIUM 8.5 (L) 04/09/2025     04/09/2025    K 4.1 04/09/2025    CO2 27 04/09/2025     04/09/2025    BUN 11 04/09/2025    CREATININE 0.81 04/09/2025     Lab Results   Component Value Date    ALT 17 02/28/2025    AST 19 02/28/2025    ALKPHOS 176 (H) 02/28/2025    BILITOT 0.3 02/28/2025         Lab Results   Component Value Date    CHOL 141 07/16/2024     Lab Results   Component Value Date    HDL 70.0 07/16/2024     Lab Results   Component Value Date    LDLCALC 56 07/16/2024     Lab Results   Component Value Date    TRIG 74 07/16/2024     No components found for: \"CHOLHDL\"  Lab Results   Component Value Date    HGBA1C 14.6 (H) 01/09/2025     No components found for: \"UACR\"      Assessment and Plan  Problem List Items Addressed This Visit    None      - Myrna LANDON Bill is a 65-year-old female with history of DM, HTN, HLD, FROYLAN, PAD with extensive wounds of the left lower extremity and foot for which she underwent a left lower extremity angiogram with left external iliac artery stent placement and a left femoral endarterectomy on 3/6/2025 with eventual left BKA for extensive dry gangrenous wounds to the " left distal calf ankle and dorsum of the foot.  She is doing well after the BKA.  Incision is healing well.  The staples/sutures were removed today. No need for Kerlix wrap of the stump. Only need stump sock/ now. Her left groin incision which had some skin ulceration along the medial aspect has all healed well.  The groin incision has 2 small areas of her superficial sloughing/dehiscence which are improving with betadine paint.  Continue Betadine paint for that twice a day.  Will have patient return to clinic in 2 weeks for groin check.      Bobby Pederson MD           [1]   Family History  Problem Relation Name Age of Onset    Peripheral vascular disease Mother      Hypertension Mother      Diabetes type II Mother      Hypertension Sister      Diabetes type II Sister

## 2025-05-09 ENCOUNTER — LAB (OUTPATIENT)
Dept: LAB | Facility: HOSPITAL | Age: 65
End: 2025-05-09
Payer: MEDICARE

## 2025-05-09 ENCOUNTER — OFFICE VISIT (OUTPATIENT)
Dept: GYNECOLOGIC ONCOLOGY | Facility: HOSPITAL | Age: 65
End: 2025-05-09
Payer: MEDICARE

## 2025-05-09 VITALS
DIASTOLIC BLOOD PRESSURE: 81 MMHG | SYSTOLIC BLOOD PRESSURE: 138 MMHG | TEMPERATURE: 97.7 F | HEART RATE: 82 BPM | RESPIRATION RATE: 16 BRPM | OXYGEN SATURATION: 99 %

## 2025-05-09 DIAGNOSIS — E11.65 HYPERGLYCEMIA DUE TO DIABETES MELLITUS (MULTI): ICD-10-CM

## 2025-05-09 DIAGNOSIS — R19.00 PELVIC MASS: Primary | ICD-10-CM

## 2025-05-09 DIAGNOSIS — R19.00 PELVIC MASS: ICD-10-CM

## 2025-05-09 DIAGNOSIS — I73.9 PAD (PERIPHERAL ARTERY DISEASE): ICD-10-CM

## 2025-05-09 DIAGNOSIS — R73.09 OTHER ABNORMAL GLUCOSE: ICD-10-CM

## 2025-05-09 DIAGNOSIS — I25.85 CHRONIC CORONARY MICROVASCULAR DYSFUNCTION: ICD-10-CM

## 2025-05-09 LAB
EST. AVERAGE GLUCOSE BLD GHB EST-MCNC: 134 MG/DL
HBA1C MFR BLD: 6.3 % (ref ?–5.7)

## 2025-05-09 PROCEDURE — 83036 HEMOGLOBIN GLYCOSYLATED A1C: CPT

## 2025-05-09 PROCEDURE — 99215 OFFICE O/P EST HI 40 MIN: CPT | Performed by: STUDENT IN AN ORGANIZED HEALTH CARE EDUCATION/TRAINING PROGRAM

## 2025-05-09 PROCEDURE — 36415 COLL VENOUS BLD VENIPUNCTURE: CPT

## 2025-05-09 ASSESSMENT — PAIN SCALES - GENERAL: PAINLEVEL_OUTOF10: 0-NO PAIN

## 2025-05-09 NOTE — PROGRESS NOTES
Patient ID: Myrna Khan is a 65 y.o. female.  Referring Physician: No referring provider defined for this encounter.  Primary Care Provider: Reena Pickard MD      Subjective    HPI  64yo G0 with PMHx of CHF (EF 55% July/2024, 35-40% in Aug/2023), CAD s/p CABG in 2023 (LIMA-LAD, APRIL-OM), PAD s/p L SFA stent (occluded in Nov/2023 w/ distal SFA reconstitution), renal artery stenosis (Sep/2023 US), carotid artery stenosis (Aug/2023 US), HTN, HLD, T2DM (A1c 14.6% Jan/2025) recently admitted after a fall for LLE gangrene now s/p BKA, found to have incidental R adnexal mass on CTA.      PMH - HFimpEF (EF 55% July/2024, 35-40% in Aug/2023), CAD s/p CABG in 2023 (LIMA-LAD, APRIL-OM), PAD s/p L SFA stent (occluded in Nov/2023 w/ distal SFA reconstitution), renal artery stenosis (Sep/2023 US), carotid artery stenosis (Aug/2023 US), HTN, HLD, T2DM (A1c 14.6% Jan/2025)   PSH - CABG (2023), L SFA stent,   All - amoxicillin, losartan, lisinopril  Soc - smokes 30 pack years, occ EtOH, no drug use. , lives at [facility]   OBHx - G0   GynHx - Last pap 2021, results not available. Prior 2019 NILM HPV pos   Menarche age 10, menopause at age 50-51,  to male spouse.    12-point review of systems obtained and otherwise normal.    Imaging  CT AP 4/2/25  Impression:  1. Similar size and appearance of a large cystic structure within the  right adnexa as compared to CTA from 02/28/2025. 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 Recommend gynecological evaluation.  2. Fluid collection within the soft tissues of the left inguinal  area, favored to represent a postoperative seroma. However, the  sterility of this collection cannot be definitively determined on CT.          Objective   BSA: There is no height or weight on file to calculate BSA.  /81 (BP Location: Left arm, Patient Position: Sitting, BP Cuff Size: Small adult)    Pulse 82   Temp 36.5 °C (97.7 °F)   Resp 16   SpO2 99%        Physical Exam  Constitutional:       Appearance: Normal appearance. She is normal weight.   HENT:      Head: Normocephalic and atraumatic.   Cardiovascular:      Rate and Rhythm: Normal rate and regular rhythm.   Pulmonary:      Effort: Pulmonary effort is normal. No respiratory distress.      Breath sounds: Normal breath sounds.   Abdominal:      General: Abdomen is flat. Bowel sounds are normal.      Palpations: Abdomen is soft.   Musculoskeletal:         General: Normal range of motion.      Cervical back: Normal range of motion.      Comments: S/p LLE BKA   Skin:     General: Skin is warm and dry.   Neurological:      General: No focal deficit present.      Mental Status: She is alert and oriented to person, place, and time. Mental status is at baseline.   Psychiatric:         Mood and Affect: Mood normal.         Behavior: Behavior normal.         Thought Content: Thought content normal.         Assessment/Plan    66yo G0 with incidentally found pelvic mass  ECOG 2-3  Comorbidities:  CHF (EF 55%), CAD s/p CABG in 2023 (LIMA-LAD, APRIL-OM), PAD s/p L SFA stent (occluded in Nov/2023 w/ distal SFA reconstitution), renal artery stenosis (Sep/2023 US), carotid artery stenosis (Aug/2023 US), HTN, HLD, T2DM (A1c 14.6% Jan/2025)     Oncology History    No history exists.     # Pelvic mass  - We discussed the possible etiologies of a pelvic mass including benign, borderline, and malignant.   - Imaging was reviewed and discussed with the patient  - We discussed concerns for surgical risk give vasculopathy, diabetes A1c 14, etc.    - Will obtain MRI with phone visit afterward  - Follow up with endocrinology      Andrew Kaur MD  Seen with Dr. Vega   I reviewed the resident/fellow's documentation and discussed the patient with the resident/fellow. I agree with the resident/fellow's medical decision making as documented in the note.    Le Vega MD MPH

## 2025-05-14 ENCOUNTER — OFFICE VISIT (OUTPATIENT)
Dept: VASCULAR SURGERY | Facility: CLINIC | Age: 65
End: 2025-05-14
Payer: MEDICARE

## 2025-05-14 VITALS
WEIGHT: 90 LBS | BODY MASS INDEX: 15.95 KG/M2 | DIASTOLIC BLOOD PRESSURE: 71 MMHG | HEIGHT: 63 IN | SYSTOLIC BLOOD PRESSURE: 127 MMHG | RESPIRATION RATE: 16 BRPM | HEART RATE: 72 BPM

## 2025-05-14 DIAGNOSIS — Z89.512 STATUS POST BELOW-KNEE AMPUTATION OF LEFT LOWER EXTREMITY: Primary | ICD-10-CM

## 2025-05-14 PROCEDURE — 3008F BODY MASS INDEX DOCD: CPT | Performed by: STUDENT IN AN ORGANIZED HEALTH CARE EDUCATION/TRAINING PROGRAM

## 2025-05-14 PROCEDURE — 3074F SYST BP LT 130 MM HG: CPT | Performed by: STUDENT IN AN ORGANIZED HEALTH CARE EDUCATION/TRAINING PROGRAM

## 2025-05-14 PROCEDURE — 99211 OFF/OP EST MAY X REQ PHY/QHP: CPT | Performed by: STUDENT IN AN ORGANIZED HEALTH CARE EDUCATION/TRAINING PROGRAM

## 2025-05-14 PROCEDURE — 1126F AMNT PAIN NOTED NONE PRSNT: CPT | Performed by: STUDENT IN AN ORGANIZED HEALTH CARE EDUCATION/TRAINING PROGRAM

## 2025-05-14 PROCEDURE — 3078F DIAST BP <80 MM HG: CPT | Performed by: STUDENT IN AN ORGANIZED HEALTH CARE EDUCATION/TRAINING PROGRAM

## 2025-05-14 PROCEDURE — 1159F MED LIST DOCD IN RCRD: CPT | Performed by: STUDENT IN AN ORGANIZED HEALTH CARE EDUCATION/TRAINING PROGRAM

## 2025-05-14 PROCEDURE — 3044F HG A1C LEVEL LT 7.0%: CPT | Performed by: STUDENT IN AN ORGANIZED HEALTH CARE EDUCATION/TRAINING PROGRAM

## 2025-05-14 ASSESSMENT — LIFESTYLE VARIABLES
SKIP TO QUESTIONS 9-10: 1
AUDIT-C TOTAL SCORE: 0
HOW MANY STANDARD DRINKS CONTAINING ALCOHOL DO YOU HAVE ON A TYPICAL DAY: PATIENT DOES NOT DRINK
HOW OFTEN DO YOU HAVE A DRINK CONTAINING ALCOHOL: NEVER
HOW OFTEN DO YOU HAVE SIX OR MORE DRINKS ON ONE OCCASION: NEVER
HAVE YOU OR SOMEONE ELSE BEEN INJURED AS A RESULT OF YOUR DRINKING: NO
AUDIT TOTAL SCORE: 0
HAS A RELATIVE, FRIEND, DOCTOR, OR ANOTHER HEALTH PROFESSIONAL EXPRESSED CONCERN ABOUT YOUR DRINKING OR SUGGESTED YOU CUT DOWN: NO

## 2025-05-14 ASSESSMENT — ENCOUNTER SYMPTOMS
OCCASIONAL FEELINGS OF UNSTEADINESS: 0
DEPRESSION: 0
LOSS OF SENSATION IN FEET: 0

## 2025-05-14 ASSESSMENT — PATIENT HEALTH QUESTIONNAIRE - PHQ9
1. LITTLE INTEREST OR PLEASURE IN DOING THINGS: NOT AT ALL
2. FEELING DOWN, DEPRESSED OR HOPELESS: NOT AT ALL
SUM OF ALL RESPONSES TO PHQ9 QUESTIONS 1 AND 2: 0

## 2025-05-14 ASSESSMENT — PAIN SCALES - GENERAL: PAINLEVEL_OUTOF10: 0-NO PAIN

## 2025-05-14 NOTE — PROGRESS NOTES
Reason for Visit: Follow-up (2 Week).  Referring Clinician: No ref. provider found     History of Present Illness  Mryna Khan is a 65 y.o. female with history of DM, HTN, HLD, FROYLAN, PAD with extensive wounds of the left lower extremity and foot for which she underwent a left lower extremity angiogram with left external iliac artery stent placement and a left femoral endarterectomy on 3/6/2025.  She then underwent a left below knee amputation with TMR on 4/3/2025 for extensive left calf and foot wounds.  She presents today for routine postoperative follow-up.  Today, she states that she fell and landed on her BKA stump since her last clinic visit. She now has two wounds on her stump. Otherwise, is doing well. i    Past Medical History  She has a past medical history of CAD (coronary artery disease), Carotid artery stenosis, Diabetes mellitus (Multi), Heart disease, HF (heart failure), Hypertension, NSTEMI (non-ST elevated myocardial infarction) (Multi), PAD (peripheral artery disease) (CMS-[facility]), and Renal artery stenosis (CMS-H[facility]).    Past Surgical History  She has a past surgical history that includes Coronary artery bypass graft and Femoral artery stent (Left).    Social History  She reports that she has been smoking cigarettes. She started smoking about 30 years ago. She has a 30.4 pack-year smoking history. She has never been exposed to tobacco smoke. She has never used smokeless tobacco. She reports that she does not currently use alcohol. She reports that she does not use drugs.    Family History  Family History[1]    Allergies  Lisinopril, Amoxicillin, and Losartan    Outpatient Medications  Current Outpatient Medications   Medication Instructions    acetaminophen (TYLENOL) 650 mg, oral, Every 6 hours PRN    acetaminophen (TYLENOL) 500 mg, Every 6 hours PRN    aspirin 81 mg, oral, Daily    atorvastatin (Lipitor) 80 mg tablet TAKE 1 TABLET BY  Cx 4:50.   "MOUTH ONCE DAILY    BD Ultra-Fine Micro Pen Needle 32 gauge x 1/4\" needle     blood sugar diagnostic strip Use 1 strip to check blood sugar 3 times a day with meals.    calcium citrate 250 mg calcium tablet 1 tablet, Daily    carvedilol (Coreg) 25 mg tablet TAKE 1 TABLET BY MOUTH TWO TIMES A DAY    cholecalciferol (VITAMIN D3) 50 mcg, Daily    clopidogrel (PLAVIX) 75 mg, oral, Daily RT    diclofenac sodium (VOLTAREN) 4 g, Topical, 4 times daily PRN    Easy Touch Alcohol Prep Pads pads, medicated     Farxiga 10 mg, oral, Daily with breakfast    ferrous sulfate 325 mg, Every 48 hours    gabapentin (NEURONTIN) 300 mg, oral, 2 times daily    glucagon (GLUCAGEN) 1 mg, intramuscular, Every 15 min PRN    hydrALAZINE (APRESOLINE) 75 mg, oral, 3 times daily    insulin aspart (NovoLOG U-100 Insulin aspart) 100 unit/mL injection Inject under the skin. Inject as per sliding scale: if   151-200=2 units  201-250=3 units  251-300= 4 units  301-350= 5 units  351-400= 6 units  If above 400 give 6 units and notify MD    insulin glargine (LANTUS) 10 Units, subcutaneous, Nightly, Take as directed per insulin instructions.    lancets misc Use three times a day to test blood sugar w/ meals    melatonin 10 mg tablet Nightly    metFORMIN (GLUCOPHAGE) 500 mg, 2 times daily (morning and late afternoon)    multivitamin with minerals tablet 1 tablet, Daily    NIFEdipine ER (ADALAT CC) 90 mg, oral, Daily before breakfast, Do not crush, chew, or split.    oxyCODONE (OXY-IR) 5 mg, Every 6 hours PRN    ranolazine (RANEXA) 500 mg, oral, 2 times daily, Do not crush, chew, or split.    sennosides-docusate sodium (Ayanna-Colace) 8.6-50 mg tablet 1 tablet, oral, 2 times daily PRN       Review of Systems  ROS    Last Recorded Vitals  There were no vitals filed for this visit.    Physical Examination  General: Well appearing, well-nourished, in no acute distress.  HEENT: Normocephalic atraumatic, pupils equal and reactive to light, extraocular muscles " "intact, no conjunctival injection, oropharynx clear without exudates.  Neck: Normal carotid arterial pulses, no arterial bruits, no thyromegaly.  Cardiac: Regular rhythm and normal heart rate.  Pulmonary: No increased work of breathing, no wheezes or crackles.  GI: Soft, ND, NT  Lower extremities: Well-healed left groin incision.  Palpable left femoral pulse.  Left BKA with 2 small superficial wounds.            Skin: Skin intact. No significant rashes or lesions present.  Neuro: Alert and oriented x 3, normal attention and cognition, no focal motor or sensory neurologic deficits.  Psych: Normal affect and mood.  Musculoskeletal: Normal gait normal muscle tone.    Laboratory Studies  Lab Results   Component Value Date    GLUCOSE 98 04/09/2025    CALCIUM 8.5 (L) 04/09/2025     04/09/2025    K 4.1 04/09/2025    CO2 27 04/09/2025     04/09/2025    BUN 11 04/09/2025    CREATININE 0.81 04/09/2025     Lab Results   Component Value Date    ALT 17 02/28/2025    AST 19 02/28/2025    ALKPHOS 176 (H) 02/28/2025    BILITOT 0.3 02/28/2025         Lab Results   Component Value Date    CHOL 141 07/16/2024     Lab Results   Component Value Date    HDL 70.0 07/16/2024     Lab Results   Component Value Date    LDLCALC 56 07/16/2024     Lab Results   Component Value Date    TRIG 74 07/16/2024     No components found for: \"CHOLHDL\"  Lab Results   Component Value Date    HGBA1C 6.3 (H) 05/09/2025     No components found for: \"UACR\"      Assessment and Plan  Problem List Items Addressed This Visit    None    - Myrna LANDON Bill is a 65-year-old female with history of DM, HTN, HLD, FROYLAN, PAD with extensive wounds of the left lower extremity and foot for which she underwent a left lower extremity angiogram with left external iliac artery stent placement and a left femoral endarterectomy on 3/6/2025 with eventual left BKA for extensive dry gangrenous wounds to the left distal calf ankle and dorsum of the foot.  She is doing well " after the BKA.  However, she did have a fall where she landed on her stump.  She now has 2 wounds on the stump.  These are superficial.  Recommend Aquacel Ag every 48 hours. Her left groin incision which had some skin ulceration along the medial aspect has all healed well.  The groin incision has 2 small areas of her superficial sloughing/dehiscence.  These 2 areas of have completely healed.  Recommend keeping the groin dry at all times. Will have patient return to clinic in 2 weeks for BKA stump check.     BKA stump wound care instructions:     Cut Aquacel AG to size to cover the 2 wounds on the stump.  Apply the Aquacel Ag on these wounds at after it has been slightly moistened with saline.  Apply dry gauze and wrap stump with Kerlix followed by stump .  Change every 48 hours.    Bobby Pederson MD           [1]   Family History  Problem Relation Name Age of Onset    Peripheral vascular disease Mother      Hypertension Mother      Diabetes type II Mother      Hypertension Sister      Diabetes type II Sister

## 2025-05-23 ENCOUNTER — HOSPITAL ENCOUNTER (OUTPATIENT)
Dept: RADIOLOGY | Facility: CLINIC | Age: 65
Discharge: HOME | End: 2025-05-23
Payer: MEDICARE

## 2025-05-23 DIAGNOSIS — R19.00 PELVIC MASS: ICD-10-CM

## 2025-05-23 PROCEDURE — 2550000001 HC RX 255 CONTRASTS: Mod: JW | Performed by: STUDENT IN AN ORGANIZED HEALTH CARE EDUCATION/TRAINING PROGRAM

## 2025-05-23 PROCEDURE — A9575 INJ GADOTERATE MEGLUMI 0.1ML: HCPCS | Mod: JW | Performed by: STUDENT IN AN ORGANIZED HEALTH CARE EDUCATION/TRAINING PROGRAM

## 2025-05-23 PROCEDURE — 72197 MRI PELVIS W/O & W/DYE: CPT

## 2025-05-23 RX ORDER — GADOTERATE MEGLUMINE 376.9 MG/ML
8 INJECTION INTRAVENOUS
Status: COMPLETED | OUTPATIENT
Start: 2025-05-23 | End: 2025-05-23

## 2025-05-23 RX ADMIN — GADOTERATE MEGLUMINE 8 ML: 376.9 INJECTION INTRAVENOUS at 16:57

## 2025-05-28 ENCOUNTER — OFFICE VISIT (OUTPATIENT)
Dept: VASCULAR SURGERY | Facility: CLINIC | Age: 65
End: 2025-05-28
Payer: MEDICARE

## 2025-05-28 VITALS
HEIGHT: 63 IN | TEMPERATURE: 98.6 F | RESPIRATION RATE: 16 BRPM | WEIGHT: 85 LBS | SYSTOLIC BLOOD PRESSURE: 137 MMHG | BODY MASS INDEX: 15.06 KG/M2 | HEART RATE: 71 BPM | DIASTOLIC BLOOD PRESSURE: 78 MMHG

## 2025-05-28 DIAGNOSIS — Z89.512 STATUS POST BELOW-KNEE AMPUTATION OF LEFT LOWER EXTREMITY: Primary | ICD-10-CM

## 2025-05-28 PROCEDURE — 1159F MED LIST DOCD IN RCRD: CPT | Performed by: STUDENT IN AN ORGANIZED HEALTH CARE EDUCATION/TRAINING PROGRAM

## 2025-05-28 PROCEDURE — 3044F HG A1C LEVEL LT 7.0%: CPT | Performed by: STUDENT IN AN ORGANIZED HEALTH CARE EDUCATION/TRAINING PROGRAM

## 2025-05-28 PROCEDURE — 1125F AMNT PAIN NOTED PAIN PRSNT: CPT | Performed by: STUDENT IN AN ORGANIZED HEALTH CARE EDUCATION/TRAINING PROGRAM

## 2025-05-28 PROCEDURE — 3078F DIAST BP <80 MM HG: CPT | Performed by: STUDENT IN AN ORGANIZED HEALTH CARE EDUCATION/TRAINING PROGRAM

## 2025-05-28 PROCEDURE — 3008F BODY MASS INDEX DOCD: CPT | Performed by: STUDENT IN AN ORGANIZED HEALTH CARE EDUCATION/TRAINING PROGRAM

## 2025-05-28 PROCEDURE — 3075F SYST BP GE 130 - 139MM HG: CPT | Performed by: STUDENT IN AN ORGANIZED HEALTH CARE EDUCATION/TRAINING PROGRAM

## 2025-05-28 PROCEDURE — 99211 OFF/OP EST MAY X REQ PHY/QHP: CPT | Performed by: STUDENT IN AN ORGANIZED HEALTH CARE EDUCATION/TRAINING PROGRAM

## 2025-05-28 RX ORDER — HYDROCHLOROTHIAZIDE 12.5 MG/1
12.5 CAPSULE ORAL EVERY MORNING
COMMUNITY
Start: 2025-05-26

## 2025-05-28 RX ORDER — INSULIN LISPRO 100 [IU]/ML
INJECTION, SOLUTION INTRAVENOUS; SUBCUTANEOUS
COMMUNITY

## 2025-05-28 RX ORDER — CIPROFLOXACIN 500 MG/1
500 TABLET, FILM COATED ORAL 2 TIMES DAILY
COMMUNITY
Start: 2025-05-23

## 2025-05-28 RX ORDER — METFORMIN HYDROCHLORIDE 1000 MG/1
1000 TABLET ORAL
COMMUNITY
Start: 2025-05-27

## 2025-05-28 RX ORDER — INSULIN GLARGINE 100 [IU]/ML
8 INJECTION, SOLUTION SUBCUTANEOUS NIGHTLY
COMMUNITY
Start: 2025-05-27

## 2025-05-28 ASSESSMENT — PATIENT HEALTH QUESTIONNAIRE - PHQ9
2. FEELING DOWN, DEPRESSED OR HOPELESS: NOT AT ALL
SUM OF ALL RESPONSES TO PHQ9 QUESTIONS 1 AND 2: 0
1. LITTLE INTEREST OR PLEASURE IN DOING THINGS: NOT AT ALL

## 2025-05-28 ASSESSMENT — ENCOUNTER SYMPTOMS
OCCASIONAL FEELINGS OF UNSTEADINESS: 0
DEPRESSION: 0
LOSS OF SENSATION IN FEET: 0

## 2025-05-28 ASSESSMENT — PAIN SCALES - GENERAL: PAINLEVEL_OUTOF10: 8

## 2025-05-28 NOTE — PROGRESS NOTES
Reason for Visit: Follow-up (2 week follow up).  Referring Clinician: No ref. provider found     History of Present Illness  Myrna Khan is a 65 y.o. female with history of DM, HTN, HLD, FROYLAN, PAD with extensive wounds of the left lower extremity and foot for which she underwent a left lower extremity angiogram with left external iliac artery stent placement and a left femoral endarterectomy on 3/6/2025.  She then underwent a left below knee amputation with TMR on 4/3/2025 for extensive left calf and foot wounds.  She presents today for routine incision check.  At her last clinic visit, she stated that she had recently fallen and landed on her BKA stump.  This has been improving since her last clinic visit.  She has been applying twice daily Betadine to the superficial wounds.  She now has a scab overlying the 2 wounds.  Denies any pain.  Denies any fevers and/or chills.      Past Medical History  She has a past medical history of CAD (coronary artery disease), Carotid artery stenosis, Diabetes mellitus (Multi), Heart disease, HF (heart failure), Hypertension, NSTEMI (non-ST elevated myocardial infarction) (Multi), PAD (peripheral artery disease), and Renal artery stenosis.    Past Surgical History  She has a past surgical history that includes Coronary artery bypass graft and Femoral artery stent (Left).    Social History  She reports that she has been smoking cigarettes. She started smoking about 30 years ago. She has a 30.4 pack-year smoking history. She has never been exposed to tobacco smoke. She has never used smokeless tobacco. She reports that she does not currently use alcohol. She reports that she does not use drugs.    Family History  Family History[1]    Allergies  Lisinopril, Amoxicillin, and Losartan    Outpatient Medications  Current Outpatient Medications   Medication Instructions    acetaminophen (TYLENOL) 650 mg, oral, Every 6 hours  "PRN    acetaminophen (TYLENOL) 500 mg, Every 6 hours PRN    aspirin 81 mg, oral, Daily    atorvastatin (Lipitor) 80 mg tablet TAKE 1 TABLET BY MOUTH ONCE DAILY    Basaglar KwikPen U-100 Insulin 8 Units, Nightly    BD Ultra-Fine Micro Pen Needle 32 gauge x 1/4\" needle     blood sugar diagnostic strip Use 1 strip to check blood sugar 3 times a day with meals.    calcium citrate 250 mg calcium tablet 1 tablet, Daily    carvedilol (Coreg) 25 mg tablet TAKE 1 TABLET BY MOUTH TWO TIMES A DAY    cholecalciferol (VITAMIN D3) 50 mcg, Daily    ciprofloxacin (CIPRO) 500 mg, 2 times daily    clopidogrel (PLAVIX) 75 mg, oral, Daily RT    diclofenac sodium (VOLTAREN) 4 g, Topical, 4 times daily PRN    Easy Touch Alcohol Prep Pads pads, medicated     Farxiga 10 mg, oral, Daily with breakfast    ferrous sulfate 325 mg, Every 48 hours    gabapentin (NEURONTIN) 300 mg, oral, 2 times daily    glucagon (GLUCAGEN) 1 mg, intramuscular, Every 15 min PRN    hydrALAZINE (APRESOLINE) 75 mg, oral, 3 times daily    hydroCHLOROthiazide (MICROZIDE) 12.5 mg, Every morning    insulin aspart (NovoLOG U-100 Insulin aspart) 100 unit/mL injection Inject under the skin. Inject as per sliding scale: if   151-200=2 units  201-250=3 units  251-300= 4 units  301-350= 5 units  351-400= 6 units  If above 400 give 6 units and notify MD    insulin glargine (LANTUS) 10 Units, subcutaneous, Nightly, Take as directed per insulin instructions.    insulin lispro 100 unit/mL injection 3 times daily (morning, midday, late afternoon)    lancets misc Use three times a day to test blood sugar w/ meals    melatonin 10 mg tablet Nightly    metFORMIN (GLUCOPHAGE) 1,000 mg, 2 times daily (morning and late afternoon)    multivitamin with minerals tablet 1 tablet, Daily    NIFEdipine ER (ADALAT CC) 90 mg, oral, Daily before breakfast, Do not crush, chew, or split.    oxyCODONE (OXY-IR) 5 mg, Every 6 hours PRN    ranolazine (RANEXA) 500 mg, oral, 2 times daily, Do not crush, " "chew, or split.    sennosides-docusate sodium (Ayanna-Colace) 8.6-50 mg tablet 1 tablet, oral, 2 times daily PRN       Review of Systems  ROS    Last Recorded Vitals  Vitals:    05/28/25 0837   BP: 137/78   BP Location: Right arm   Patient Position: Sitting   BP Cuff Size: Adult   Pulse: 71   Resp: 16   Temp: 37 °C (98.6 °F)   Weight: (!) 38.6 kg (85 lb)   Height: 1.6 m (5' 3\")       Physical Examination  General: Well appearing, well-nourished, in no acute distress.  HEENT: Normocephalic atraumatic, pupils equal and reactive to light, extraocular muscles intact, no conjunctival injection, oropharynx clear without exudates.  Neck: Normal carotid arterial pulses, no arterial bruits, no thyromegaly.  Cardiac: Regular rhythm and normal heart rate.  Pulmonary: No increased work of breathing, no wheezes or crackles.  GI: Soft, ND, NT  Lower extremities: Left BKA with superficial wound on the lateral aspect of the incisions x 2.            Skin: Skin intact. No significant rashes or lesions present.  Neuro: Alert and oriented x 3, normal attention and cognition, no focal motor or sensory neurologic deficits.  Psych: Normal affect and mood.  Musculoskeletal: Normal gait normal muscle tone.    Laboratory Studies  Lab Results   Component Value Date    GLUCOSE 98 04/09/2025    CALCIUM 8.5 (L) 04/09/2025     04/09/2025    K 4.1 04/09/2025    CO2 27 04/09/2025     04/09/2025    BUN 11 04/09/2025    CREATININE 0.81 04/09/2025     Lab Results   Component Value Date    ALT 17 02/28/2025    AST 19 02/28/2025    ALKPHOS 176 (H) 02/28/2025    BILITOT 0.3 02/28/2025         Lab Results   Component Value Date    CHOL 141 07/16/2024     Lab Results   Component Value Date    HDL 70.0 07/16/2024     Lab Results   Component Value Date    LDLCALC 56 07/16/2024     Lab Results   Component Value Date    TRIG 74 07/16/2024     No components found for: \"CHOLHDL\"  Lab Results   Component Value Date    HGBA1C 6.3 (H) 05/09/2025     No " "components found for: \"UACR\"    Assessment and Plan  Problem List Items Addressed This Visit    None    - Myrna Khan is a 65-year-old female with history of DM, HTN, HLD, FROYLAN, PAD with extensive wounds of the left lower extremity and foot for which she underwent a left lower extremity angiogram with left external iliac artery stent placement and a left femoral endarterectomy on 3/6/2025 with eventual left BKA for extensive dry gangrenous wounds to the left distal calf ankle and dorsum of the foot.  She is doing well after the BKA.  However, she did have a fall where she landed on her stump.  She now has 2 wounds on the stump.  These have been improving since the last clinic visit.  She has scabs on both of these wounds.  They have been applying Betadine paint to these wounds daily instead of the Aquacel Ag as prescribed last time.  Will plan to refer patient to wound clinic.  At this time, patient is not ready for prosthetic fitting until these wounds have healed.  Will have patient return to clinic in a month.  Anticipate that the patient will be ready for prosthetic fitting at that time.     Bboby Pederson MD           [1]   Family History  Problem Relation Name Age of Onset    Peripheral vascular disease Mother      Hypertension Mother      Diabetes type II Mother      Hypertension Sister      Diabetes type II Sister       "

## 2025-06-02 DIAGNOSIS — N83.209 CYST OF OVARY, UNSPECIFIED LATERALITY: Primary | ICD-10-CM

## 2025-06-03 NOTE — TUMOR BOARD NOTE
Gynecologic Oncology Tumor Board 2025    Specialties Present: Gyn Onc, Radiology, Genetics, Radiation oncology, Pathology, Nurse Navigator, Research    Myrna Khan  65 y.o.    1960  MRN [id number]    Provider: Andrew Kaur MD  Disease Site/Stage: Unknown  Pathology: N/A  Prior Therapy:  N/A  Impression: 65 y.o. with complex medical history now with a likely benign pelvic mass, favor hemorrhagic cyst, and fibroid uterus.    We reviewed previous medical and familial history, history of present illness, and recent lab results along with all available histopathologic and imaging studies. The tumor board considered available treatment options and made the following recommendations.    Recommendations:  Recommend medical optimization and re-imaging in 3 months.        Clinical Trial Consideration: None Available    National site-specific guidelines were discussed with respect to the case. It is recognized that there may be additional factors not discussed in the Review Board discussion that can influence management decisions, and alternative management options which fall within the standard of care. Accordingly the final treatment disposition will be determined by the patient, in the context of an informed discussion with their providers. The actual prescribed management or treatment plan may or may not be consistent with the Review Board recommendations.

## 2025-06-06 ENCOUNTER — TUMOR BOARD CONFERENCE (OUTPATIENT)
Dept: HEMATOLOGY/ONCOLOGY | Facility: HOSPITAL | Age: 65
End: 2025-06-06
Payer: MEDICARE

## 2025-06-09 ENCOUNTER — OFFICE VISIT (OUTPATIENT)
Dept: VASCULAR SURGERY | Facility: CLINIC | Age: 65
End: 2025-06-09
Payer: MEDICARE

## 2025-06-09 VITALS
RESPIRATION RATE: 18 BRPM | HEART RATE: 90 BPM | DIASTOLIC BLOOD PRESSURE: 64 MMHG | HEIGHT: 68 IN | SYSTOLIC BLOOD PRESSURE: 134 MMHG | BODY MASS INDEX: 12.73 KG/M2 | WEIGHT: 84 LBS

## 2025-06-09 DIAGNOSIS — Z89.512 STATUS POST BELOW-KNEE AMPUTATION OF LEFT LOWER EXTREMITY: Primary | ICD-10-CM

## 2025-06-09 DIAGNOSIS — I73.9 PAD (PERIPHERAL ARTERY DISEASE): ICD-10-CM

## 2025-06-09 PROCEDURE — 1125F AMNT PAIN NOTED PAIN PRSNT: CPT | Performed by: NURSE PRACTITIONER

## 2025-06-09 PROCEDURE — 3044F HG A1C LEVEL LT 7.0%: CPT | Performed by: NURSE PRACTITIONER

## 2025-06-09 PROCEDURE — 3008F BODY MASS INDEX DOCD: CPT | Performed by: NURSE PRACTITIONER

## 2025-06-09 PROCEDURE — 1159F MED LIST DOCD IN RCRD: CPT | Performed by: NURSE PRACTITIONER

## 2025-06-09 PROCEDURE — 3075F SYST BP GE 130 - 139MM HG: CPT | Performed by: NURSE PRACTITIONER

## 2025-06-09 PROCEDURE — 99211 OFF/OP EST MAY X REQ PHY/QHP: CPT | Performed by: NURSE PRACTITIONER

## 2025-06-09 PROCEDURE — 3078F DIAST BP <80 MM HG: CPT | Performed by: NURSE PRACTITIONER

## 2025-06-09 ASSESSMENT — LIFESTYLE VARIABLES
HOW OFTEN DO YOU HAVE A DRINK CONTAINING ALCOHOL: NEVER
HOW MANY STANDARD DRINKS CONTAINING ALCOHOL DO YOU HAVE ON A TYPICAL DAY: PATIENT DOES NOT DRINK
HOW OFTEN DO YOU HAVE SIX OR MORE DRINKS ON ONE OCCASION: NEVER
AUDIT-C TOTAL SCORE: 0
SKIP TO QUESTIONS 9-10: 1

## 2025-06-09 ASSESSMENT — ENCOUNTER SYMPTOMS
LOSS OF SENSATION IN FEET: 1
DEPRESSION: 0
OCCASIONAL FEELINGS OF UNSTEADINESS: 1

## 2025-06-09 ASSESSMENT — PAIN SCALES - GENERAL: PAINLEVEL_OUTOF10: 3

## 2025-06-09 NOTE — PROGRESS NOTES
History Of Present Illness  Myrna Khan is a 65 y.o. female presenting status post left below the knee amputation 4/3/2025 for extensive left calf and foot wounds.  Patient has 2 small ulcerations remaining to the left BKA stump.  Otherwise, the remainder of the stump is well-healed.  Patient offers no specific complaints today.  She is accompanied by a family member.     Past Medical History  She has a past medical history of CAD (coronary artery disease), Carotid artery stenosis, Diabetes mellitus (Multi), Heart disease, HF (heart failure), Hypertension, NSTEMI (non-ST elevated myocardial infarction) (Multi), PAD (peripheral artery disease), and Renal artery stenosis.    Surgical History  She has a past surgical history that includes Coronary artery bypass graft and Femoral artery stent (Left).     Social History  She reports that she has been smoking cigarettes. She started smoking about 30 years ago. She has a 30.4 pack-year smoking history. She has never been exposed to tobacco smoke. She has never used smokeless tobacco. She reports that she does not currently use alcohol. She reports that she does not use drugs.    Family History  Family History[1]     Allergies  Lisinopril, Amoxicillin, and Losartan    Review of Systems    CONSTITUTIONAL: Denies weight loss, fever and chills.    HEENT: Denies changes in vision and hearing.    RESPIRATORY: Denies SOB and cough.    CV: Denies palpitations and CP.    GI: Denies abdominal pain, nausea, vomiting and diarrhea.    : Denies dysuria and urinary frequency.    MSK: Denies myalgia and joint pain.    SKIN: Denies rash and pruritus.    VASC: Denies claudication, ischemic rest pain, or open wounds or sores    NEUROLOGICAL: Denies headache and syncope.    PSYCHIATRIC: Denies recent changes in mood. Denies anxiety and depression.     Physical Exam     General: Pt is alert and oriented x 3. Pleasant, conversive  HEENT: Head is atraumatic, normocephalic.  Cardiac:  "Normal S1-S2.  Regular rate and rhythm.  No murmurs.  Respiratory: Lungs clear to auscultation.  No adventitious sounds.  Abdomen: Soft, nondistended, nontender.  Bowel sounds x4 quadrants.  Pulse exam: Palpable brachial and radial pulses bilaterally.  Lower extremities warm and well-perfused  Extremities: Left BKA stump has 2 small ulcerations which are healing nicely.  The lateral ulceration is granular with some epithelialized edges.  Almost healed.  The more medial ulceration has some devitalized tissue present  Neuro: Moves all extremities spontaneously.  No focal deficits.  Psych: Appropriate affect.  Answers questions appropriately.      Last Recorded Vitals  /64   Pulse 90   Resp 18   Wt (!) 38.1 kg (84 lb)     Relevant Results  Current Outpatient Medications   Medication Instructions    acetaminophen (TYLENOL) 650 mg, oral, Every 6 hours PRN    acetaminophen (TYLENOL) 500 mg, Every 6 hours PRN    aspirin 81 mg, oral, Daily    atorvastatin (Lipitor) 80 mg tablet TAKE 1 TABLET BY MOUTH ONCE DAILY    Basaglar KwikPen U-100 Insulin 8 Units, Nightly    BD Ultra-Fine Micro Pen Needle 32 gauge x 1/4\" needle     blood sugar diagnostic strip Use 1 strip to check blood sugar 3 times a day with meals.    calcium citrate 250 mg calcium tablet 1 tablet, Daily    carvedilol (Coreg) 25 mg tablet TAKE 1 TABLET BY MOUTH TWO TIMES A DAY    cholecalciferol (VITAMIN D3) 50 mcg, Daily    ciprofloxacin (CIPRO) 500 mg, 2 times daily    clopidogrel (PLAVIX) 75 mg, oral, Daily RT    diclofenac sodium (VOLTAREN) 4 g, Topical, 4 times daily PRN    Easy Touch Alcohol Prep Pads pads, medicated     Farxiga 10 mg, oral, Daily with breakfast    ferrous sulfate 325 mg, Every 48 hours    gabapentin (NEURONTIN) 300 mg, oral, 2 times daily    glucagon (GLUCAGEN) 1 mg, intramuscular, Every 15 min PRN    hydrALAZINE (APRESOLINE) 75 mg, oral, 3 times daily    hydroCHLOROthiazide (MICROZIDE) 12.5 mg, Every morning    insulin aspart " (NovoLOG U-100 Insulin aspart) 100 unit/mL injection Inject under the skin. Inject as per sliding scale: if   151-200=2 units  201-250=3 units  251-300= 4 units  301-350= 5 units  351-400= 6 units  If above 400 give 6 units and notify MD    insulin glargine (LANTUS) 10 Units, subcutaneous, Nightly, Take as directed per insulin instructions.    insulin lispro 100 unit/mL injection 3 times daily (morning, midday, late afternoon)    lancets misc Use three times a day to test blood sugar w/ meals    melatonin 10 mg tablet Nightly    metFORMIN (GLUCOPHAGE) 1,000 mg, 2 times daily (morning and late afternoon)    multivitamin with minerals tablet 1 tablet, Daily    NIFEdipine ER (ADALAT CC) 90 mg, oral, Daily before breakfast, Do not crush, chew, or split.    oxyCODONE (OXY-IR) 5 mg, Every 6 hours PRN    ranolazine (RANEXA) 500 mg, oral, 2 times daily, Do not crush, chew, or split.    sennosides-docusate sodium (Ayanna-Colace) 8.6-50 mg tablet 1 tablet, oral, 2 times daily PRN         Assessment/Plan   Peripheral arterial disease  Status post left lower extremity below-knee amputation  BKA ulcerations    Ulcerations appear to be healing quite nicely compared to previous office visit on May 28, 2025.  The eschars have fallen off and the patient has 2 remaining ulcerations to the medial portion of the stump which has some fibrinous tissue present in the lateral aspect of the stump which has granular tissue present.  Plan for Adaptic, clean dry dressing on both wounds.  Patient will follow-up with me in 1 week for reevaluation.           Odilon Gregorio, APRN-CNP          [1]   Family History  Problem Relation Name Age of Onset    Peripheral vascular disease Mother      Hypertension Mother      Diabetes type II Mother      Hypertension Sister      Diabetes type II Sister

## 2025-06-11 DIAGNOSIS — R19.00 PELVIC MASS: Primary | ICD-10-CM

## 2025-06-16 ENCOUNTER — OFFICE VISIT (OUTPATIENT)
Dept: VASCULAR SURGERY | Facility: CLINIC | Age: 65
End: 2025-06-16
Payer: MEDICARE

## 2025-06-16 VITALS — RESPIRATION RATE: 18 BRPM | DIASTOLIC BLOOD PRESSURE: 72 MMHG | HEART RATE: 75 BPM | SYSTOLIC BLOOD PRESSURE: 122 MMHG

## 2025-06-16 DIAGNOSIS — Z89.512 STATUS POST BELOW-KNEE AMPUTATION OF LEFT LOWER EXTREMITY: Primary | ICD-10-CM

## 2025-06-16 PROCEDURE — 99211 OFF/OP EST MAY X REQ PHY/QHP: CPT | Performed by: NURSE PRACTITIONER

## 2025-06-16 PROCEDURE — 1159F MED LIST DOCD IN RCRD: CPT | Performed by: NURSE PRACTITIONER

## 2025-06-16 PROCEDURE — 3078F DIAST BP <80 MM HG: CPT | Performed by: NURSE PRACTITIONER

## 2025-06-16 PROCEDURE — 87186 SC STD MICRODIL/AGAR DIL: CPT | Mod: 59,WESLAB | Performed by: NURSE PRACTITIONER

## 2025-06-16 PROCEDURE — 1125F AMNT PAIN NOTED PAIN PRSNT: CPT | Performed by: NURSE PRACTITIONER

## 2025-06-16 PROCEDURE — 3074F SYST BP LT 130 MM HG: CPT | Performed by: NURSE PRACTITIONER

## 2025-06-16 PROCEDURE — 3044F HG A1C LEVEL LT 7.0%: CPT | Performed by: NURSE PRACTITIONER

## 2025-06-16 ASSESSMENT — LIFESTYLE VARIABLES
SKIP TO QUESTIONS 9-10: 1
HOW OFTEN DO YOU HAVE SIX OR MORE DRINKS ON ONE OCCASION: NEVER
AUDIT-C TOTAL SCORE: 0
HOW MANY STANDARD DRINKS CONTAINING ALCOHOL DO YOU HAVE ON A TYPICAL DAY: PATIENT DOES NOT DRINK
HOW OFTEN DO YOU HAVE A DRINK CONTAINING ALCOHOL: NEVER

## 2025-06-16 ASSESSMENT — ENCOUNTER SYMPTOMS
OCCASIONAL FEELINGS OF UNSTEADINESS: 1
LOSS OF SENSATION IN FEET: 1
DEPRESSION: 0

## 2025-06-16 ASSESSMENT — PAIN SCALES - GENERAL: PAINLEVEL_OUTOF10: 8

## 2025-06-16 NOTE — PROGRESS NOTES
"History Of Present Illness  Myrna Khan is a 65 y.o. female presenting with below the knee amputation stump wound on the left..  Patient notes that she was draining \"pus\" on her bed sheets throughout the course of this week.  The lateral wound has completely healed.  No fevers or chills.     Past Medical History  She has a past medical history of CAD (coronary artery disease), Carotid artery stenosis, Diabetes mellitus (Multi), Heart disease, HF (heart failure), Hypertension, NSTEMI (non-ST elevated myocardial infarction) (Multi), PAD (peripheral artery disease), and Renal artery stenosis.    Surgical History  She has a past surgical history that includes Coronary artery bypass graft and Femoral artery stent (Left).     Social History  She reports that she has been smoking cigarettes. She started smoking about 30 years ago. She has a 30.5 pack-year smoking history. She has never been exposed to tobacco smoke. She has never used smokeless tobacco. She reports that she does not currently use alcohol. She reports that she does not use drugs.    Family History  Family History[1]     Allergies  Lisinopril, Amoxicillin, and Losartan    Review of Systems    CONSTITUTIONAL: Denies weight loss, fever and chills.     HEENT: Denies changes in vision and hearing.     RESPIRATORY: Denies SOB and cough.     CV: Denies palpitations and CP.     GI: Denies abdominal pain, nausea, vomiting and diarrhea.     : Denies dysuria and urinary frequency.     MSK: Denies myalgia and joint pain.     SKIN: Denies rash and pruritus.     VASC: Denies claudication, ischemic rest pain, or open wounds or sores     NEUROLOGICAL: Denies headache and syncope.     PSYCHIATRIC: Denies recent changes in mood. Denies anxiety and depression.     Physical Exam    General: Pt is alert and oriented x 3. Pleasant, conversive  HEENT: Head is atraumatic, normocephalic.  Cardiac: Normal S1-S2.  Regular rate and rhythm.  No murmurs.  Respiratory: Lungs clear " "to auscultation.  No adventitious sounds.  Abdomen: Soft, nondistended, nontender.  Bowel sounds x4 quadrants.  Pulse exam: Palpable brachial and radial pulses bilaterally.  Lower extremities warm and well-perfused  Extremities: Left BKA stump has 1 ulceration to the midportion of the stump.  The lateral ulceration has completely healed.  The wound to the midportion of the stump has some tunneling noted laterally.  I did not appreciate this last week, however, I did not probe.  Please see picture.  There was some purulent drainage and malodor when I probed the wound.  Drainage was cultured.  Neuro: Moves all extremities spontaneously.  No focal deficits.  Psych: Appropriate affect.  Answers questions appropriately.       Last Recorded Vitals  /72   Pulse 75   Resp 18     Relevant Results  Current Outpatient Medications   Medication Instructions    acetaminophen (TYLENOL) 650 mg, oral, Every 6 hours PRN    acetaminophen (TYLENOL) 500 mg, Every 6 hours PRN    aspirin 81 mg, oral, Daily    atorvastatin (Lipitor) 80 mg tablet TAKE 1 TABLET BY MOUTH ONCE DAILY    Basaglar KwikPen U-100 Insulin 8 Units, Nightly    BD Ultra-Fine Micro Pen Needle 32 gauge x 1/4\" needle     blood sugar diagnostic strip Use 1 strip to check blood sugar 3 times a day with meals.    calcium citrate 250 mg calcium tablet 1 tablet, Daily    carvedilol (Coreg) 25 mg tablet TAKE 1 TABLET BY MOUTH TWO TIMES A DAY    cholecalciferol (VITAMIN D3) 50 mcg, Daily    ciprofloxacin (CIPRO) 500 mg, 2 times daily    clopidogrel (PLAVIX) 75 mg, oral, Daily RT    diclofenac sodium (VOLTAREN) 4 g, Topical, 4 times daily PRN    Easy Touch Alcohol Prep Pads pads, medicated     Farxiga 10 mg, oral, Daily with breakfast    ferrous sulfate 325 mg, Every 48 hours    gabapentin (NEURONTIN) 300 mg, oral, 2 times daily    glucagon (GLUCAGEN) 1 mg, intramuscular, Every 15 min PRN    hydrALAZINE (APRESOLINE) 75 mg, oral, 3 times daily    hydroCHLOROthiazide " (MICROZIDE) 12.5 mg, Every morning    insulin aspart (NovoLOG U-100 Insulin aspart) 100 unit/mL injection Inject under the skin. Inject as per sliding scale: if   151-200=2 units  201-250=3 units  251-300= 4 units  301-350= 5 units  351-400= 6 units  If above 400 give 6 units and notify MD    insulin glargine (LANTUS) 10 Units, subcutaneous, Nightly, Take as directed per insulin instructions.    insulin lispro 100 unit/mL injection 3 times daily (morning, midday, late afternoon)    lancets misc Use three times a day to test blood sugar w/ meals    melatonin 10 mg tablet Nightly    metFORMIN (GLUCOPHAGE) 1,000 mg, 2 times daily (morning and late afternoon)    multivitamin with minerals tablet 1 tablet, Daily    NIFEdipine ER (ADALAT CC) 90 mg, oral, Daily before breakfast, Do not crush, chew, or split.    oxyCODONE (OXY-IR) 5 mg, Every 6 hours PRN    ranolazine (RANEXA) 500 mg, oral, 2 times daily, Do not crush, chew, or split.    sennosides-docusate sodium (Ayanna-Colace) 8.6-50 mg tablet 1 tablet, oral, 2 times daily PRN             Assessment/Plan   Peripheral arterial disease  Status post left below-knee amputation  Left BKA stump ulcerations    The lateral stump wound has completely healed.  The stump wound to the midportion of the stump has some tunneling medially.  Please see picture.  Perhaps 2 cm of tunneling.  There was some purulence noted on my examination.  I did culture the wound and will initiate antibiotics once the culture results.  Clinically, there is no erythema or induration.  No fevers or chills.    Recommend follow-up in 1 week.  Begin packing the wound with a small piece of iodoform packing strip.       Odilon Gregorio, APRN-CNP        [1]   Family History  Problem Relation Name Age of Onset    Peripheral vascular disease Mother      Hypertension Mother      Diabetes type II Mother      Hypertension Sister      Diabetes type II Sister

## 2025-06-16 NOTE — PATIENT INSTRUCTIONS
Myrna,    It was good to see you again!  Thank you choosing  vascular surgery for your care.    We discovered some tunneling to the stump wound.  I am recommending that we pack the wound with a small piece of half-inch iodoform gauze.  This should be lightly packed and not stuffed in the wound.  I also cultured the pus that was coming from the wound.  We will know the results of the culture in the next 3 to 4 days.  I will initiate antibiotics if needed.    Please follow-up in 1 week for reevaluation.

## 2025-06-18 LAB
B-LACTAMASE ORGANISM ISLT: NEGATIVE
BACTERIA SPEC CULT: ABNORMAL
BACTERIA SPEC CULT: ABNORMAL
GRAM STN SPEC: ABNORMAL
GRAM STN SPEC: ABNORMAL

## 2025-06-23 DIAGNOSIS — Z89.512 STATUS POST BELOW-KNEE AMPUTATION OF LEFT LOWER EXTREMITY: Primary | ICD-10-CM

## 2025-06-23 RX ORDER — CLINDAMYCIN HYDROCHLORIDE 300 MG/1
300 CAPSULE ORAL 3 TIMES DAILY
Qty: 30 CAPSULE | Refills: 0 | Status: SHIPPED | OUTPATIENT
Start: 2025-06-23 | End: 2025-07-03

## 2025-07-09 ENCOUNTER — OFFICE VISIT (OUTPATIENT)
Dept: VASCULAR SURGERY | Facility: CLINIC | Age: 65
End: 2025-07-09
Payer: MEDICARE

## 2025-07-09 VITALS
BODY MASS INDEX: 14.88 KG/M2 | DIASTOLIC BLOOD PRESSURE: 88 MMHG | HEIGHT: 63 IN | WEIGHT: 84 LBS | HEART RATE: 83 BPM | SYSTOLIC BLOOD PRESSURE: 131 MMHG | RESPIRATION RATE: 14 BRPM

## 2025-07-09 DIAGNOSIS — T87.40 INFECTION OF AMPUTATION STUMP: Primary | ICD-10-CM

## 2025-07-09 PROCEDURE — 3075F SYST BP GE 130 - 139MM HG: CPT | Performed by: STUDENT IN AN ORGANIZED HEALTH CARE EDUCATION/TRAINING PROGRAM

## 2025-07-09 PROCEDURE — 1126F AMNT PAIN NOTED NONE PRSNT: CPT | Performed by: STUDENT IN AN ORGANIZED HEALTH CARE EDUCATION/TRAINING PROGRAM

## 2025-07-09 PROCEDURE — 3079F DIAST BP 80-89 MM HG: CPT | Performed by: STUDENT IN AN ORGANIZED HEALTH CARE EDUCATION/TRAINING PROGRAM

## 2025-07-09 PROCEDURE — 99213 OFFICE O/P EST LOW 20 MIN: CPT | Performed by: STUDENT IN AN ORGANIZED HEALTH CARE EDUCATION/TRAINING PROGRAM

## 2025-07-09 PROCEDURE — 3044F HG A1C LEVEL LT 7.0%: CPT | Performed by: STUDENT IN AN ORGANIZED HEALTH CARE EDUCATION/TRAINING PROGRAM

## 2025-07-09 PROCEDURE — 1159F MED LIST DOCD IN RCRD: CPT | Performed by: STUDENT IN AN ORGANIZED HEALTH CARE EDUCATION/TRAINING PROGRAM

## 2025-07-09 PROCEDURE — 3008F BODY MASS INDEX DOCD: CPT | Performed by: STUDENT IN AN ORGANIZED HEALTH CARE EDUCATION/TRAINING PROGRAM

## 2025-07-09 RX ORDER — CLINDAMYCIN HYDROCHLORIDE 300 MG/1
300 CAPSULE ORAL 3 TIMES DAILY
Qty: 21 CAPSULE | Refills: 0 | Status: SHIPPED
Start: 2025-07-09 | End: 2025-07-09

## 2025-07-09 RX ORDER — CLINDAMYCIN HYDROCHLORIDE 300 MG/1
300 CAPSULE ORAL 3 TIMES DAILY
Qty: 21 CAPSULE | Refills: 0 | Status: SHIPPED | OUTPATIENT
Start: 2025-07-09 | End: 2025-07-16

## 2025-07-09 ASSESSMENT — ENCOUNTER SYMPTOMS
DEPRESSION: 0
LOSS OF SENSATION IN FEET: 0
OCCASIONAL FEELINGS OF UNSTEADINESS: 0

## 2025-07-09 ASSESSMENT — PAIN SCALES - GENERAL: PAINLEVEL_OUTOF10: 0-NO PAIN

## 2025-07-09 NOTE — PROGRESS NOTES
Reason for Visit: Follow-up (2 Month).  Referring Clinician: No ref. provider found     History of Present Illness    Myrna Khan is a 65 y.o. female with history of DM, HTN, HLD, FROYLAN, PAD with extensive wounds of the left lower extremity and foot for which she underwent a left lower extremity angiogram with left external iliac artery stent placement and a left femoral endarterectomy on 3/6/2025.  She then underwent a left below knee amputation with TMR on 4/3/2025 for extensive left calf and foot wounds.  She presents today for amputation incision check. Patient had 2 years of superficial ulceration along the incision visit.  She was advised to start Betadine paint for these and was referred to wound care.  Since then, the lateral ulceration has completely healed; however, the medial ulceration now has a probing wound.  She was advised to perform packing changes.  She is still at the facility and states that they have been packing the wound twice a week.  Denies any fevers and chills.  Denies any pain.    Past Medical History  She has a past medical history of CAD (coronary artery disease), Carotid artery stenosis, Diabetes mellitus (Multi), Heart disease, HF (heart failure), Hypertension, NSTEMI (non-ST elevated myocardial infarction) (Multi), PAD (peripheral artery disease), and Renal artery stenosis.    Past Surgical History  She has a past surgical history that includes Coronary artery bypass graft and Femoral artery stent (Left).    Social History  She reports that she has been smoking cigarettes. She started smoking about 30 years ago. She has a 30.5 pack-year smoking history. She has never been exposed to tobacco smoke. She has never used smokeless tobacco. She reports that she does not currently use alcohol. She reports that she does not use drugs.    Family History  Family History[1]    Allergies  Lisinopril, Amoxicillin, and  "Losartan    Outpatient Medications  Current Outpatient Medications   Medication Instructions    acetaminophen (TYLENOL) 650 mg, oral, Every 6 hours PRN    acetaminophen (TYLENOL) 500 mg, Every 6 hours PRN    aspirin 81 mg, oral, Daily    atorvastatin (Lipitor) 80 mg tablet TAKE 1 TABLET BY MOUTH ONCE DAILY    Basaglar KwikPen U-100 Insulin 8 Units, Nightly    BD Ultra-Fine Micro Pen Needle 32 gauge x 1/4\" needle     blood sugar diagnostic strip Use 1 strip to check blood sugar 3 times a day with meals.    calcium citrate 250 mg calcium tablet 1 tablet, Daily    carvedilol (Coreg) 25 mg tablet TAKE 1 TABLET BY MOUTH TWO TIMES A DAY    cholecalciferol (VITAMIN D3) 50 mcg, Daily    ciprofloxacin (CIPRO) 500 mg, 2 times daily    clopidogrel (PLAVIX) 75 mg, oral, Daily RT    diclofenac sodium (VOLTAREN) 4 g, Topical, 4 times daily PRN    Easy Touch Alcohol Prep Pads pads, medicated     Farxiga 10 mg, oral, Daily with breakfast    ferrous sulfate 325 mg, Every 48 hours    gabapentin (NEURONTIN) 300 mg, oral, 2 times daily    glucagon (GLUCAGEN) 1 mg, intramuscular, Every 15 min PRN    hydrALAZINE (APRESOLINE) 75 mg, oral, 3 times daily    hydroCHLOROthiazide (MICROZIDE) 12.5 mg, Every morning    insulin aspart (NovoLOG U-100 Insulin aspart) 100 unit/mL injection Inject under the skin. Inject as per sliding scale: if   151-200=2 units  201-250=3 units  251-300= 4 units  301-350= 5 units  351-400= 6 units  If above 400 give 6 units and notify MD    insulin glargine (LANTUS) 10 Units, subcutaneous, Nightly, Take as directed per insulin instructions.    insulin lispro 100 unit/mL injection 3 times daily (morning, midday, late afternoon)    lancets misc Use three times a day to test blood sugar w/ meals    melatonin 10 mg tablet Nightly    metFORMIN (GLUCOPHAGE) 1,000 mg, 2 times daily (morning and late afternoon)    multivitamin with minerals tablet 1 tablet, Daily    NIFEdipine ER (ADALAT CC) 90 mg, oral, Daily before " "breakfast, Do not crush, chew, or split.    oxyCODONE (OXY-IR) 5 mg, Every 6 hours PRN    ranolazine (RANEXA) 500 mg, oral, 2 times daily, Do not crush, chew, or split.    sennosides-docusate sodium (Ayanna-Colace) 8.6-50 mg tablet 1 tablet, oral, 2 times daily PRN       Review of Systems  ROS    Last Recorded Vitals  Vitals:    07/09/25 0907   BP: 131/88   BP Location: Right arm   Patient Position: Sitting   BP Cuff Size: Adult   Pulse: 83   Resp: 14   Weight: (!) 38.1 kg (84 lb)   Height: 1.6 m (5' 3\")       Physical Examination  General: Well appearing, well-nourished, in no acute distress.  HEENT: Normocephalic atraumatic, pupils equal and reactive to light, extraocular muscles intact, no conjunctival injection, oropharynx clear without exudates.  Neck: Normal carotid arterial pulses, no arterial bruits, no thyromegaly.  Cardiac: Regular rhythm and normal heart rate.  Pulmonary: No increased work of breathing, no wheezes or crackles.  GI: Soft, ND, NT  Lower extremities: BKA stump with small medial wound that tunnels laterally. Does NOT probe to bone.   Skin: Skin intact. No significant rashes or lesions present.  Neuro: Alert and oriented x 3, normal attention and cognition, no focal motor or sensory neurologic deficits.  Psych: Normal affect and mood.  Musculoskeletal: Normal gait normal muscle tone.    Laboratory Studies  Lab Results   Component Value Date    GLUCOSE 98 04/09/2025    CALCIUM 8.5 (L) 04/09/2025     04/09/2025    K 4.1 04/09/2025    CO2 27 04/09/2025     04/09/2025    BUN 11 04/09/2025    CREATININE 0.81 04/09/2025     Lab Results   Component Value Date    ALT 17 02/28/2025    AST 19 02/28/2025    ALKPHOS 176 (H) 02/28/2025    BILITOT 0.3 02/28/2025         Lab Results   Component Value Date    CHOL 141 07/16/2024     Lab Results   Component Value Date    HDL 70.0 07/16/2024     Lab Results   Component Value Date    LDLCALC 56 07/16/2024     Lab Results   Component Value Date    TRIG " "74 07/16/2024     No components found for: \"CHOLHDL\"  Lab Results   Component Value Date    HGBA1C 6.3 (H) 05/09/2025     No components found for: \"UACR\"      Assessment and Plan  Problem List Items Addressed This Visit    None    - Myrna Khan is a 65-year-old female with history of DM, HTN, HLD, FROYLAN, PAD with extensive wounds of the left lower extremity and foot for which she underwent a left lower extremity angiogram with left external iliac artery stent placement and a left femoral endarterectomy on 3/6/2025 with eventual left BKA for extensive dry gangrenous wounds to the left distal calf ankle and dorsum of the foot.  She is doing well after the BKA.  However, she did have a fall where she landed on her stump.  She initially had 2 ulcerations.  The medial ulceration now has progressed to a tunneling wound.  It does not probe to bone.  No prepurulence on exam.  Has only been having twice daily dressing changes.  Will start clindamycin based on culture results.  Also recommended twice daily wet-to-dry packing changes.  Ensure that the wound is packed completely.  Will have patient return to clinic next week for wound check.      Bobby Pederson MD           [1]   Family History  Problem Relation Name Age of Onset    Peripheral vascular disease Mother      Hypertension Mother      Diabetes type II Mother      Hypertension Sister      Diabetes type II Sister       "

## 2025-07-26 ENCOUNTER — APPOINTMENT (OUTPATIENT)
Dept: RADIOLOGY | Facility: HOSPITAL | Age: 65
DRG: 637 | End: 2025-07-26
Payer: MEDICARE

## 2025-07-26 ENCOUNTER — HOSPITAL ENCOUNTER (INPATIENT)
Facility: HOSPITAL | Age: 65
DRG: 637 | End: 2025-07-26
Attending: EMERGENCY MEDICINE | Admitting: STUDENT IN AN ORGANIZED HEALTH CARE EDUCATION/TRAINING PROGRAM
Payer: MEDICARE

## 2025-07-26 ENCOUNTER — CLINICAL SUPPORT (OUTPATIENT)
Dept: EMERGENCY MEDICINE | Facility: HOSPITAL | Age: 65
DRG: 637 | End: 2025-07-26
Payer: MEDICARE

## 2025-07-26 ENCOUNTER — APPOINTMENT (OUTPATIENT)
Dept: CARDIOLOGY | Facility: HOSPITAL | Age: 65
DRG: 637 | End: 2025-07-26
Payer: MEDICARE

## 2025-07-26 DIAGNOSIS — D69.6 THROMBOCYTOPENIA, UNSPECIFIED: ICD-10-CM

## 2025-07-26 DIAGNOSIS — I16.0 HYPERTENSIVE URGENCY: ICD-10-CM

## 2025-07-26 DIAGNOSIS — E83.42 HYPOMAGNESEMIA: Primary | ICD-10-CM

## 2025-07-26 DIAGNOSIS — K92.2 UPPER GI BLEED: ICD-10-CM

## 2025-07-26 DIAGNOSIS — E63.9 NUTRITION DISORDER: ICD-10-CM

## 2025-07-26 DIAGNOSIS — E16.2 HYPOGLYCEMIA: ICD-10-CM

## 2025-07-26 DIAGNOSIS — E11.65 HYPERGLYCEMIA DUE TO DIABETES MELLITUS (MULTI): ICD-10-CM

## 2025-07-26 PROBLEM — R41.89 UNRESPONSIVENESS: Status: ACTIVE | Noted: 2025-07-26

## 2025-07-26 PROBLEM — R41.82 AMS (ALTERED MENTAL STATUS): Status: ACTIVE | Noted: 2025-07-26

## 2025-07-26 LAB
ALBUMIN SERPL BCP-MCNC: 2.2 G/DL (ref 3.4–5)
ALBUMIN SERPL BCP-MCNC: 2.4 G/DL (ref 3.4–5)
ALBUMIN SERPL BCP-MCNC: 2.6 G/DL (ref 3.4–5)
ALP SERPL-CCNC: 145 U/L (ref 33–136)
ALT SERPL W P-5'-P-CCNC: 17 U/L (ref 7–45)
AMPHETAMINES UR QL SCN: NORMAL
ANION GAP BLDV CALCULATED.4IONS-SCNC: 8 MMOL/L (ref 10–25)
ANION GAP BLDV CALCULATED.4IONS-SCNC: NORMAL MMOL/L
ANION GAP SERPL CALC-SCNC: 11 MMOL/L (ref 10–20)
ANION GAP SERPL CALC-SCNC: 12 MMOL/L (ref 10–20)
ANION GAP SERPL CALC-SCNC: 13 MMOL/L (ref 10–20)
APPEARANCE UR: ABNORMAL
AST SERPL W P-5'-P-CCNC: 33 U/L (ref 9–39)
ATRIAL RATE: 77 BPM
BACTERIA #/AREA URNS AUTO: ABNORMAL /HPF
BARBITURATES UR QL SCN: NORMAL
BASE EXCESS BLDV CALC-SCNC: 0.2 MMOL/L (ref -2–3)
BASE EXCESS BLDV CALC-SCNC: NORMAL MMOL/L
BASOPHILS # BLD AUTO: 0.05 X10*3/UL (ref 0–0.1)
BASOPHILS NFR BLD AUTO: 0.5 %
BENZODIAZ UR QL SCN: NORMAL
BILIRUB SERPL-MCNC: 0.4 MG/DL (ref 0–1.2)
BILIRUB UR STRIP.AUTO-MCNC: NEGATIVE MG/DL
BODY TEMPERATURE: 37 DEGREES CELSIUS
BODY TEMPERATURE: NORMAL
BUN SERPL-MCNC: 31 MG/DL (ref 6–23)
BUN SERPL-MCNC: 32 MG/DL (ref 6–23)
BUN SERPL-MCNC: 33 MG/DL (ref 6–23)
BZE UR QL SCN: NORMAL
C DIF TOX TCDA+TCDB STL QL NAA+PROBE: NOT DETECTED
CA-I BLDV-SCNC: 1.04 MMOL/L (ref 1.1–1.33)
CA-I BLDV-SCNC: NORMAL MMOL/L
CALCIUM SERPL-MCNC: 6.8 MG/DL (ref 8.6–10.6)
CALCIUM SERPL-MCNC: 6.9 MG/DL (ref 8.6–10.6)
CALCIUM SERPL-MCNC: 7.2 MG/DL (ref 8.6–10.6)
CANNABINOIDS UR QL SCN: NORMAL
CARDIAC TROPONIN I PNL SERPL HS: 7 NG/L (ref 0–34)
CHLORIDE BLDV-SCNC: 108 MMOL/L (ref 98–107)
CHLORIDE BLDV-SCNC: NORMAL MMOL/L
CHLORIDE SERPL-SCNC: 105 MMOL/L (ref 98–107)
CHLORIDE SERPL-SCNC: 106 MMOL/L (ref 98–107)
CHLORIDE SERPL-SCNC: 107 MMOL/L (ref 98–107)
CHLORIDE UR-SCNC: 62 MMOL/L
CHLORIDE/CREATININE (MMOL/G) IN URINE: 194 MMOL/G CREAT (ref 38–318)
CO2 SERPL-SCNC: 25 MMOL/L (ref 21–32)
CO2 SERPL-SCNC: 25 MMOL/L (ref 21–32)
CO2 SERPL-SCNC: 27 MMOL/L (ref 21–32)
COLOR UR: YELLOW
CREAT SERPL-MCNC: 1.21 MG/DL (ref 0.5–1.05)
CREAT SERPL-MCNC: 1.23 MG/DL (ref 0.5–1.05)
CREAT SERPL-MCNC: 1.28 MG/DL (ref 0.5–1.05)
CREAT UR-MCNC: 31.9 MG/DL (ref 20–320)
EGFRCR SERPLBLD CKD-EPI 2021: 47 ML/MIN/1.73M*2
EGFRCR SERPLBLD CKD-EPI 2021: 49 ML/MIN/1.73M*2
EGFRCR SERPLBLD CKD-EPI 2021: 50 ML/MIN/1.73M*2
EOSINOPHIL # BLD AUTO: 0.02 X10*3/UL (ref 0–0.7)
EOSINOPHIL NFR BLD AUTO: 0.2 %
ERYTHROCYTE [DISTWIDTH] IN BLOOD BY AUTOMATED COUNT: 14.4 % (ref 11.5–14.5)
FENTANYL+NORFENTANYL UR QL SCN: NORMAL
FOLATE SERPL-MCNC: 16.7 NG/ML
GLUCOSE BLD MANUAL STRIP-MCNC: 103 MG/DL (ref 74–99)
GLUCOSE BLD MANUAL STRIP-MCNC: 104 MG/DL (ref 74–99)
GLUCOSE BLD MANUAL STRIP-MCNC: 134 MG/DL (ref 74–99)
GLUCOSE BLD MANUAL STRIP-MCNC: 135 MG/DL (ref 74–99)
GLUCOSE BLD MANUAL STRIP-MCNC: 157 MG/DL (ref 74–99)
GLUCOSE BLD MANUAL STRIP-MCNC: 228 MG/DL (ref 74–99)
GLUCOSE BLD MANUAL STRIP-MCNC: 347 MG/DL (ref 74–99)
GLUCOSE BLD MANUAL STRIP-MCNC: 59 MG/DL (ref 74–99)
GLUCOSE BLD MANUAL STRIP-MCNC: 62 MG/DL (ref 74–99)
GLUCOSE BLD MANUAL STRIP-MCNC: 70 MG/DL (ref 74–99)
GLUCOSE BLD MANUAL STRIP-MCNC: 79 MG/DL (ref 74–99)
GLUCOSE BLDV-MCNC: 46 MG/DL (ref 74–99)
GLUCOSE BLDV-MCNC: NORMAL MG/DL
GLUCOSE SERPL-MCNC: 30 MG/DL (ref 74–99)
GLUCOSE SERPL-MCNC: 33 MG/DL (ref 74–99)
GLUCOSE SERPL-MCNC: 73 MG/DL (ref 74–99)
GLUCOSE UR STRIP.AUTO-MCNC: ABNORMAL MG/DL
HCO3 BLDV-SCNC: 26 MMOL/L (ref 22–26)
HCO3 BLDV-SCNC: NORMAL MMOL/L
HCT VFR BLD AUTO: 31.1 % (ref 36–46)
HCT VFR BLD EST: 32 % (ref 36–46)
HCT VFR BLD EST: NORMAL %
HGB BLD-MCNC: 10.5 G/DL (ref 12–16)
HGB BLDV-MCNC: 10.7 G/DL (ref 12–16)
HGB BLDV-MCNC: NORMAL G/DL
IMM GRANULOCYTES # BLD AUTO: 0.08 X10*3/UL (ref 0–0.7)
IMM GRANULOCYTES NFR BLD AUTO: 0.7 % (ref 0–0.9)
KETONES UR STRIP.AUTO-MCNC: NEGATIVE MG/DL
LACTATE BLDV-SCNC: 0.9 MMOL/L (ref 0.4–2)
LACTATE BLDV-SCNC: NORMAL MMOL/L
LEUKOCYTE ESTERASE UR QL STRIP.AUTO: ABNORMAL
LYMPHOCYTES # BLD AUTO: 2.49 X10*3/UL (ref 1.2–4.8)
LYMPHOCYTES NFR BLD AUTO: 23.3 %
MAGNESIUM SERPL-MCNC: 1.23 MG/DL (ref 1.6–2.4)
MCH RBC QN AUTO: 33.4 PG (ref 26–34)
MCHC RBC AUTO-ENTMCNC: 33.8 G/DL (ref 32–36)
MCV RBC AUTO: 99 FL (ref 80–100)
METHADONE UR QL SCN: NORMAL
MONOCYTES # BLD AUTO: 0.44 X10*3/UL (ref 0.1–1)
MONOCYTES NFR BLD AUTO: 4.1 %
NEUTROPHILS # BLD AUTO: 7.61 X10*3/UL (ref 1.2–7.7)
NEUTROPHILS NFR BLD AUTO: 71.2 %
NITRITE UR QL STRIP.AUTO: NEGATIVE
NRBC BLD-RTO: 0.5 /100 WBCS (ref 0–0)
OPIATES UR QL SCN: NORMAL
OSMOLALITY SERPL: 294 MOSM/KG (ref 280–300)
OSMOLALITY UR: 357 MOSM/KG (ref 200–1200)
OXYCODONE+OXYMORPHONE UR QL SCN: NORMAL
OXYHGB MFR BLDV: 51 % (ref 45–75)
OXYHGB MFR BLDV: NORMAL %
P AXIS: 91 DEGREES
P OFFSET: 175 MS
P ONSET: 111 MS
PCO2 BLDV: 46 MM HG (ref 41–51)
PCO2 BLDV: NORMAL MM[HG]
PCP UR QL SCN: NORMAL
PH BLDV: 7.36 PH (ref 7.33–7.43)
PH BLDV: NORMAL [PH]
PH UR STRIP.AUTO: 7 [PH]
PHOSPHATE SERPL-MCNC: 2.3 MG/DL (ref 2.5–4.9)
PHOSPHATE SERPL-MCNC: 2.4 MG/DL (ref 2.5–4.9)
PHOSPHATE SERPL-MCNC: 2.4 MG/DL (ref 2.5–4.9)
PLATELET # BLD AUTO: ABNORMAL 10*3/UL
PO2 BLDV: 37 MM HG (ref 35–45)
PO2 BLDV: NORMAL MM[HG]
POTASSIUM BLDV-SCNC: 3.2 MMOL/L (ref 3.5–5.3)
POTASSIUM BLDV-SCNC: NORMAL MMOL/L
POTASSIUM SERPL-SCNC: 2.9 MMOL/L (ref 3.5–5.3)
POTASSIUM SERPL-SCNC: 3 MMOL/L (ref 3.5–5.3)
POTASSIUM SERPL-SCNC: 3.9 MMOL/L (ref 3.5–5.3)
POTASSIUM UR-SCNC: 19 MMOL/L
POTASSIUM/CREAT UR-RTO: 60 MMOL/G CREAT
PR INTERVAL: 198 MS
PROT SERPL-MCNC: 6.6 G/DL (ref 6.4–8.2)
PROT UR STRIP.AUTO-MCNC: ABNORMAL MG/DL
Q ONSET: 210 MS
QRS COUNT: 13 BEATS
QRS DURATION: 106 MS
QT INTERVAL: 442 MS
QTC CALCULATION(BAZETT): 500 MS
QTC FREDERICIA: 480 MS
R AXIS: 57 DEGREES
RBC # BLD AUTO: 3.14 X10*6/UL (ref 4–5.2)
RBC # UR STRIP.AUTO: ABNORMAL MG/DL
RBC #/AREA URNS AUTO: ABNORMAL /HPF
RENAL EPI CELLS #/AREA UR COMP ASSIST: ABNORMAL /HPF
SAO2 % BLDV: 54 % (ref 45–75)
SAO2 % BLDV: NORMAL %
SODIUM BLDV-SCNC: 139 MMOL/L (ref 136–145)
SODIUM BLDV-SCNC: NORMAL MMOL/L
SODIUM SERPL-SCNC: 140 MMOL/L (ref 136–145)
SODIUM SERPL-SCNC: 140 MMOL/L (ref 136–145)
SODIUM SERPL-SCNC: 141 MMOL/L (ref 136–145)
SODIUM UR-SCNC: 51 MMOL/L
SODIUM/CREAT UR-RTO: 160 MMOL/G CREAT
SP GR UR STRIP.AUTO: 1.01
T AXIS: 114 DEGREES
T OFFSET: 431 MS
UROBILINOGEN UR STRIP.AUTO-MCNC: NORMAL MG/DL
VENTRICULAR RATE: 77 BPM
VIT B12 SERPL-MCNC: 610 PG/ML (ref 211–911)
WBC # BLD AUTO: 10.7 X10*3/UL (ref 4.4–11.3)
WBC #/AREA URNS AUTO: >50 /HPF

## 2025-07-26 PROCEDURE — 36415 COLL VENOUS BLD VENIPUNCTURE: CPT

## 2025-07-26 PROCEDURE — 84132 ASSAY OF SERUM POTASSIUM: CPT

## 2025-07-26 PROCEDURE — 83935 ASSAY OF URINE OSMOLALITY: CPT

## 2025-07-26 PROCEDURE — 2500000001 HC RX 250 WO HCPCS SELF ADMINISTERED DRUGS (ALT 637 FOR MEDICARE OP)

## 2025-07-26 PROCEDURE — 2500000002 HC RX 250 W HCPCS SELF ADMINISTERED DRUGS (ALT 637 FOR MEDICARE OP, ALT 636 FOR OP/ED): Performed by: STUDENT IN AN ORGANIZED HEALTH CARE EDUCATION/TRAINING PROGRAM

## 2025-07-26 PROCEDURE — 70450 CT HEAD/BRAIN W/O DYE: CPT

## 2025-07-26 PROCEDURE — 70450 CT HEAD/BRAIN W/O DYE: CPT | Performed by: RADIOLOGY

## 2025-07-26 PROCEDURE — 2500000005 HC RX 250 GENERAL PHARMACY W/O HCPCS

## 2025-07-26 PROCEDURE — 87493 C DIFF AMPLIFIED PROBE: CPT

## 2025-07-26 PROCEDURE — 83735 ASSAY OF MAGNESIUM: CPT

## 2025-07-26 PROCEDURE — 82607 VITAMIN B-12: CPT

## 2025-07-26 PROCEDURE — 81001 URINALYSIS AUTO W/SCOPE: CPT

## 2025-07-26 PROCEDURE — 84100 ASSAY OF PHOSPHORUS: CPT

## 2025-07-26 PROCEDURE — 96365 THER/PROPH/DIAG IV INF INIT: CPT

## 2025-07-26 PROCEDURE — 82947 ASSAY GLUCOSE BLOOD QUANT: CPT

## 2025-07-26 PROCEDURE — 93005 ELECTROCARDIOGRAM TRACING: CPT

## 2025-07-26 PROCEDURE — 82746 ASSAY OF FOLIC ACID SERUM: CPT

## 2025-07-26 PROCEDURE — 2500000005 HC RX 250 GENERAL PHARMACY W/O HCPCS: Performed by: EMERGENCY MEDICINE

## 2025-07-26 PROCEDURE — 87154 CUL TYP ID BLD PTHGN 6+ TRGT: CPT

## 2025-07-26 PROCEDURE — 73590 X-RAY EXAM OF LOWER LEG: CPT | Mod: LT

## 2025-07-26 PROCEDURE — 2500000004 HC RX 250 GENERAL PHARMACY W/ HCPCS (ALT 636 FOR OP/ED)

## 2025-07-26 PROCEDURE — 2500000004 HC RX 250 GENERAL PHARMACY W/ HCPCS (ALT 636 FOR OP/ED): Performed by: STUDENT IN AN ORGANIZED HEALTH CARE EDUCATION/TRAINING PROGRAM

## 2025-07-26 PROCEDURE — 84132 ASSAY OF SERUM POTASSIUM: CPT | Performed by: STUDENT IN AN ORGANIZED HEALTH CARE EDUCATION/TRAINING PROGRAM

## 2025-07-26 PROCEDURE — 73590 X-RAY EXAM OF LOWER LEG: CPT | Mod: LEFT SIDE | Performed by: RADIOLOGY

## 2025-07-26 PROCEDURE — 83930 ASSAY OF BLOOD OSMOLALITY: CPT

## 2025-07-26 PROCEDURE — 80307 DRUG TEST PRSMV CHEM ANLYZR: CPT

## 2025-07-26 PROCEDURE — 99285 EMERGENCY DEPT VISIT HI MDM: CPT | Performed by: EMERGENCY MEDICINE

## 2025-07-26 PROCEDURE — 96366 THER/PROPH/DIAG IV INF ADDON: CPT

## 2025-07-26 PROCEDURE — 85025 COMPLETE CBC W/AUTO DIFF WBC: CPT

## 2025-07-26 PROCEDURE — 87186 SC STD MICRODIL/AGAR DIL: CPT

## 2025-07-26 PROCEDURE — 93010 ELECTROCARDIOGRAM REPORT: CPT | Performed by: NURSE PRACTITIONER

## 2025-07-26 PROCEDURE — 96375 TX/PRO/DX INJ NEW DRUG ADDON: CPT

## 2025-07-26 PROCEDURE — 96361 HYDRATE IV INFUSION ADD-ON: CPT

## 2025-07-26 PROCEDURE — 71045 X-RAY EXAM CHEST 1 VIEW: CPT | Performed by: RADIOLOGY

## 2025-07-26 PROCEDURE — 2500000002 HC RX 250 W HCPCS SELF ADMINISTERED DRUGS (ALT 637 FOR MEDICARE OP, ALT 636 FOR OP/ED)

## 2025-07-26 PROCEDURE — 84484 ASSAY OF TROPONIN QUANT: CPT

## 2025-07-26 PROCEDURE — 82570 ASSAY OF URINE CREATININE: CPT

## 2025-07-26 PROCEDURE — 71045 X-RAY EXAM CHEST 1 VIEW: CPT

## 2025-07-26 PROCEDURE — 99223 1ST HOSP IP/OBS HIGH 75: CPT

## 2025-07-26 PROCEDURE — 1200000002 HC GENERAL ROOM WITH TELEMETRY DAILY

## 2025-07-26 PROCEDURE — 93010 ELECTROCARDIOGRAM REPORT: CPT | Performed by: INTERNAL MEDICINE

## 2025-07-26 PROCEDURE — 2500000004 HC RX 250 GENERAL PHARMACY W/ HCPCS (ALT 636 FOR OP/ED): Performed by: EMERGENCY MEDICINE

## 2025-07-26 PROCEDURE — 99285 EMERGENCY DEPT VISIT HI MDM: CPT | Mod: 25 | Performed by: EMERGENCY MEDICINE

## 2025-07-26 RX ORDER — RANOLAZINE 500 MG/1
500 TABLET, EXTENDED RELEASE ORAL 2 TIMES DAILY
Status: DISCONTINUED | OUTPATIENT
Start: 2025-07-26 | End: 2025-08-05 | Stop reason: HOSPADM

## 2025-07-26 RX ORDER — DICLOFENAC SODIUM 10 MG/G
4 GEL TOPICAL 4 TIMES DAILY PRN
Status: DISCONTINUED | OUTPATIENT
Start: 2025-07-26 | End: 2025-08-05 | Stop reason: HOSPADM

## 2025-07-26 RX ORDER — DEXTROSE MONOHYDRATE AND SODIUM CHLORIDE 5; .9 G/100ML; G/100ML
50 INJECTION, SOLUTION INTRAVENOUS CONTINUOUS
Status: DISCONTINUED | OUTPATIENT
Start: 2025-07-26 | End: 2025-07-26

## 2025-07-26 RX ORDER — CARVEDILOL 25 MG/1
25 TABLET ORAL 2 TIMES DAILY
Status: DISCONTINUED | OUTPATIENT
Start: 2025-07-26 | End: 2025-08-05 | Stop reason: HOSPADM

## 2025-07-26 RX ORDER — ATORVASTATIN CALCIUM 80 MG/1
80 TABLET, FILM COATED ORAL DAILY
Status: DISCONTINUED | OUTPATIENT
Start: 2025-07-26 | End: 2025-08-05 | Stop reason: HOSPADM

## 2025-07-26 RX ORDER — DEXTROSE MONOHYDRATE 50 MG/ML
125 INJECTION, SOLUTION INTRAVENOUS CONTINUOUS
Status: DISCONTINUED | OUTPATIENT
Start: 2025-07-26 | End: 2025-07-27

## 2025-07-26 RX ORDER — LANOLIN ALCOHOL/MO/W.PET/CERES
500 CREAM (GRAM) TOPICAL DAILY
Status: DISCONTINUED | OUTPATIENT
Start: 2025-07-26 | End: 2025-08-05 | Stop reason: HOSPADM

## 2025-07-26 RX ORDER — CEFTRIAXONE 1 G/50ML
1 INJECTION, SOLUTION INTRAVENOUS EVERY 24 HOURS
Status: DISCONTINUED | OUTPATIENT
Start: 2025-07-26 | End: 2025-07-27

## 2025-07-26 RX ORDER — LOPERAMIDE HYDROCHLORIDE 2 MG/1
2 CAPSULE ORAL AS NEEDED
COMMUNITY

## 2025-07-26 RX ORDER — DEXTROSE 50 % IN WATER (D50W) INTRAVENOUS SYRINGE
25 ONCE
Status: COMPLETED | OUTPATIENT
Start: 2025-07-26 | End: 2025-07-26

## 2025-07-26 RX ORDER — NIFEDIPINE 90 MG/1
90 TABLET, EXTENDED RELEASE ORAL
Status: DISCONTINUED | OUTPATIENT
Start: 2025-07-27 | End: 2025-08-05 | Stop reason: HOSPADM

## 2025-07-26 RX ORDER — DEXTROSE MONOHYDRATE AND SODIUM CHLORIDE 5; .9 G/100ML; G/100ML
100 INJECTION, SOLUTION INTRAVENOUS CONTINUOUS
Status: DISCONTINUED | OUTPATIENT
Start: 2025-07-26 | End: 2025-07-26

## 2025-07-26 RX ORDER — CHOLECALCIFEROL (VITAMIN D3) 25 MCG
50 TABLET ORAL DAILY
Status: DISCONTINUED | OUTPATIENT
Start: 2025-07-26 | End: 2025-08-05 | Stop reason: HOSPADM

## 2025-07-26 RX ORDER — POTASSIUM CHLORIDE 20 MEQ/1
40 TABLET, EXTENDED RELEASE ORAL ONCE
Status: COMPLETED | OUTPATIENT
Start: 2025-07-26 | End: 2025-07-26

## 2025-07-26 RX ORDER — ACETAMINOPHEN 325 MG/1
650 TABLET ORAL EVERY 4 HOURS PRN
Status: DISCONTINUED | OUTPATIENT
Start: 2025-07-26 | End: 2025-07-27

## 2025-07-26 RX ORDER — DEXTROSE MONOHYDRATE AND SODIUM CHLORIDE 5; .9 G/100ML; G/100ML
125 INJECTION, SOLUTION INTRAVENOUS CONTINUOUS
Status: DISCONTINUED | OUTPATIENT
Start: 2025-07-26 | End: 2025-07-26

## 2025-07-26 RX ORDER — SENNOSIDES 8.6 MG/1
1 TABLET ORAL 2 TIMES DAILY PRN
COMMUNITY

## 2025-07-26 RX ORDER — ACETAMINOPHEN 650 MG/1
650 SUPPOSITORY RECTAL EVERY 4 HOURS PRN
Status: DISCONTINUED | OUTPATIENT
Start: 2025-07-26 | End: 2025-07-27

## 2025-07-26 RX ORDER — TRAMADOL HYDROCHLORIDE 50 MG/1
25 TABLET, FILM COATED ORAL EVERY 8 HOURS PRN
COMMUNITY
End: 2025-08-05 | Stop reason: HOSPADM

## 2025-07-26 RX ORDER — ACETAMINOPHEN 160 MG/5ML
650 SOLUTION ORAL EVERY 4 HOURS PRN
Status: DISCONTINUED | OUTPATIENT
Start: 2025-07-26 | End: 2025-07-27

## 2025-07-26 RX ORDER — IBUPROFEN 200 MG
200 CAPSULE ORAL DAILY
Status: DISCONTINUED | OUTPATIENT
Start: 2025-07-26 | End: 2025-08-05 | Stop reason: HOSPADM

## 2025-07-26 RX ORDER — DEXTROSE 50 % IN WATER (D50W) INTRAVENOUS SYRINGE
Status: COMPLETED
Start: 2025-07-26 | End: 2025-07-26

## 2025-07-26 RX ORDER — DEXTROSE AND SODIUM CHLORIDE 10; .45 G/100ML; G/100ML
100 INJECTION, SOLUTION INTRAVENOUS CONTINUOUS
Status: DISCONTINUED | OUTPATIENT
Start: 2025-07-26 | End: 2025-07-26

## 2025-07-26 RX ORDER — GABAPENTIN 300 MG/1
300 CAPSULE ORAL 2 TIMES DAILY
Status: DISCONTINUED | OUTPATIENT
Start: 2025-07-26 | End: 2025-08-05 | Stop reason: HOSPADM

## 2025-07-26 RX ORDER — DAPAGLIFLOZIN 10 MG/1
10 TABLET, FILM COATED ORAL
Status: DISCONTINUED | OUTPATIENT
Start: 2025-07-27 | End: 2025-08-05 | Stop reason: HOSPADM

## 2025-07-26 RX ORDER — ASPIRIN 81 MG/1
81 TABLET ORAL DAILY
Status: DISCONTINUED | OUTPATIENT
Start: 2025-07-26 | End: 2025-08-01

## 2025-07-26 RX ORDER — MAGNESIUM SULFATE HEPTAHYDRATE 40 MG/ML
2 INJECTION, SOLUTION INTRAVENOUS ONCE
Status: COMPLETED | OUTPATIENT
Start: 2025-07-26 | End: 2025-07-26

## 2025-07-26 RX ORDER — DEXTROSE MONOHYDRATE 100 MG/ML
INJECTION, SOLUTION INTRAVENOUS
Status: DISPENSED
Start: 2025-07-26 | End: 2025-07-26

## 2025-07-26 RX ORDER — ACETAMINOPHEN 325 MG/1
975 TABLET ORAL ONCE
Status: COMPLETED | OUTPATIENT
Start: 2025-07-26 | End: 2025-07-26

## 2025-07-26 RX ORDER — DEXTROSE 50 % IN WATER (D50W) INTRAVENOUS SYRINGE
12.5
Status: DISCONTINUED | OUTPATIENT
Start: 2025-07-26 | End: 2025-08-05 | Stop reason: HOSPADM

## 2025-07-26 RX ORDER — ASPIRIN 81 MG
100 TABLET, DELAYED RELEASE (ENTERIC COATED) ORAL DAILY PRN
COMMUNITY

## 2025-07-26 RX ORDER — HEPARIN SODIUM 5000 [USP'U]/ML
5000 INJECTION, SOLUTION INTRAVENOUS; SUBCUTANEOUS EVERY 8 HOURS SCHEDULED
Status: DISCONTINUED | OUTPATIENT
Start: 2025-07-26 | End: 2025-08-05 | Stop reason: HOSPADM

## 2025-07-26 RX ORDER — DEXTROSE 50 % IN WATER (D50W) INTRAVENOUS SYRINGE
25
Status: DISCONTINUED | OUTPATIENT
Start: 2025-07-26 | End: 2025-08-05 | Stop reason: HOSPADM

## 2025-07-26 RX ORDER — LANOLIN ALCOHOL/MO/W.PET/CERES
100 CREAM (GRAM) TOPICAL DAILY
Status: DISCONTINUED | OUTPATIENT
Start: 2025-07-26 | End: 2025-08-05 | Stop reason: HOSPADM

## 2025-07-26 RX ORDER — ACETAMINOPHEN 500 MG
10 TABLET ORAL NIGHTLY
Status: DISCONTINUED | OUTPATIENT
Start: 2025-07-26 | End: 2025-08-05 | Stop reason: HOSPADM

## 2025-07-26 RX ORDER — DOXYCYCLINE HYCLATE 100 MG
100 TABLET ORAL EVERY 12 HOURS SCHEDULED
Status: DISPENSED | OUTPATIENT
Start: 2025-07-26 | End: 2025-07-31

## 2025-07-26 RX ORDER — CLOPIDOGREL BISULFATE 75 MG/1
75 TABLET ORAL
Status: DISCONTINUED | OUTPATIENT
Start: 2025-07-26 | End: 2025-08-05 | Stop reason: HOSPADM

## 2025-07-26 RX ADMIN — DEXTROSE AND SODIUM CHLORIDE 125 ML/HR: 5; .9 INJECTION, SOLUTION INTRAVENOUS at 07:09

## 2025-07-26 RX ADMIN — CARVEDILOL 25 MG: 12.5 TABLET, FILM COATED ORAL at 13:41

## 2025-07-26 RX ADMIN — DICLOFENAC SODIUM 4 G: 10 GEL TOPICAL at 18:48

## 2025-07-26 RX ADMIN — DEXTROSE MONOHYDRATE 25 G: 25 INJECTION, SOLUTION INTRAVENOUS at 06:10

## 2025-07-26 RX ADMIN — POTASSIUM CHLORIDE 40 MEQ: 1500 TABLET, EXTENDED RELEASE ORAL at 23:42

## 2025-07-26 RX ADMIN — CLOPIDOGREL BISULFATE 75 MG: 75 TABLET, FILM COATED ORAL at 13:49

## 2025-07-26 RX ADMIN — Medication 1 TABLET: at 16:03

## 2025-07-26 RX ADMIN — ACETAMINOPHEN 650 MG: 325 TABLET ORAL at 14:22

## 2025-07-26 RX ADMIN — HEPARIN SODIUM 5000 UNITS: 5000 INJECTION, SOLUTION INTRAVENOUS; SUBCUTANEOUS at 22:34

## 2025-07-26 RX ADMIN — DIBASIC SODIUM PHOSPHATE, MONOBASIC POTASSIUM PHOSPHATE AND MONOBASIC SODIUM PHOSPHATE 250 MG: 852; 155; 130 TABLET ORAL at 16:06

## 2025-07-26 RX ADMIN — HYDRALAZINE HYDROCHLORIDE 75 MG: 25 TABLET ORAL at 14:06

## 2025-07-26 RX ADMIN — ASPIRIN 81 MG: 81 TABLET, COATED ORAL at 13:41

## 2025-07-26 RX ADMIN — THIAMINE HCL TAB 100 MG 100 MG: 100 TAB at 16:03

## 2025-07-26 RX ADMIN — CYANOCOBALAMIN TAB 1000 MCG 500 MCG: 1000 TAB at 16:03

## 2025-07-26 RX ADMIN — GABAPENTIN 300 MG: 300 CAPSULE ORAL at 22:31

## 2025-07-26 RX ADMIN — DEXTROSE 50 % IN WATER (D50W) INTRAVENOUS SYRINGE 25 G: at 06:10

## 2025-07-26 RX ADMIN — DOXYCYCLINE HYCLATE 100 MG: 100 TABLET, COATED ORAL at 13:52

## 2025-07-26 RX ADMIN — HYDRALAZINE HYDROCHLORIDE 75 MG: 25 TABLET ORAL at 22:31

## 2025-07-26 RX ADMIN — SODIUM CHLORIDE 500 ML: 0.9 INJECTION, SOLUTION INTRAVENOUS at 11:37

## 2025-07-26 RX ADMIN — CEFTRIAXONE 1 G: 1 INJECTION, SOLUTION INTRAVENOUS at 13:55

## 2025-07-26 RX ADMIN — Medication 10 MG: at 22:31

## 2025-07-26 RX ADMIN — Medication 50 MCG: at 13:50

## 2025-07-26 RX ADMIN — POTASSIUM CHLORIDE 40 MEQ: 1500 TABLET, EXTENDED RELEASE ORAL at 18:47

## 2025-07-26 RX ADMIN — HEPARIN SODIUM 5000 UNITS: 5000 INJECTION, SOLUTION INTRAVENOUS; SUBCUTANEOUS at 14:03

## 2025-07-26 RX ADMIN — ATORVASTATIN CALCIUM 80 MG: 80 TABLET, FILM COATED ORAL at 13:51

## 2025-07-26 RX ADMIN — DOXYCYCLINE HYCLATE 100 MG: 100 TABLET, COATED ORAL at 22:31

## 2025-07-26 RX ADMIN — ASCORBIC ACID, THIAMINE MONONITRATE,RIBOFLAVIN, NIACINAMIDE, PYRIDOXINE HYDROCHLORIDE, FOLIC ACID, CYANOCOBALAMIN, BIOTIN, CALCIUM PANTOTHENATE, 1 CAPSULE: 100; 1.5; 1.7; 20; 10; 1; 6000; 150000; 5 CAPSULE, LIQUID FILLED ORAL at 22:31

## 2025-07-26 RX ADMIN — DEXTROSE MONOHYDRATE 25 G: 500 INJECTION PARENTERAL at 06:10

## 2025-07-26 RX ADMIN — DEXTROSE AND SODIUM CHLORIDE 50 ML/HR: 5; .9 INJECTION, SOLUTION INTRAVENOUS at 09:23

## 2025-07-26 RX ADMIN — DEXTROSE AND SODIUM CHLORIDE 75 ML/HR: 5; .9 INJECTION, SOLUTION INTRAVENOUS at 13:54

## 2025-07-26 RX ADMIN — MAGNESIUM SULFATE HEPTAHYDRATE 2 G: 40 INJECTION, SOLUTION INTRAVENOUS at 08:36

## 2025-07-26 RX ADMIN — GABAPENTIN 300 MG: 300 CAPSULE ORAL at 13:52

## 2025-07-26 RX ADMIN — DEXTROSE 100 ML/HR: 5 SOLUTION INTRAVENOUS at 18:47

## 2025-07-26 RX ADMIN — ACETAMINOPHEN 975 MG: 325 TABLET ORAL at 22:31

## 2025-07-26 SDOH — SOCIAL STABILITY: SOCIAL INSECURITY: ARE YOU OR HAVE YOU BEEN THREATENED OR ABUSED PHYSICALLY, EMOTIONALLY, OR SEXUALLY BY ANYONE?: NO

## 2025-07-26 SDOH — SOCIAL STABILITY: SOCIAL INSECURITY: HAVE YOU HAD THOUGHTS OF HARMING ANYONE ELSE?: NO

## 2025-07-26 SDOH — SOCIAL STABILITY: SOCIAL INSECURITY: WITHIN THE LAST YEAR, HAVE YOU BEEN AFRAID OF YOUR PARTNER OR EX-PARTNER?: NO

## 2025-07-26 SDOH — ECONOMIC STABILITY: FOOD INSECURITY: WITHIN THE PAST 12 MONTHS, THE FOOD YOU BOUGHT JUST DIDN'T LAST AND YOU DIDN'T HAVE MONEY TO GET MORE.: NEVER TRUE

## 2025-07-26 SDOH — SOCIAL STABILITY: SOCIAL INSECURITY: HAS ANYONE EVER THREATENED TO HURT YOUR FAMILY OR YOUR PETS?: NO

## 2025-07-26 SDOH — SOCIAL STABILITY: SOCIAL INSECURITY: WITHIN THE LAST YEAR, HAVE YOU BEEN HUMILIATED OR EMOTIONALLY ABUSED IN OTHER WAYS BY YOUR PARTNER OR EX-PARTNER?: NO

## 2025-07-26 SDOH — ECONOMIC STABILITY: INCOME INSECURITY: IN THE PAST 12 MONTHS HAS THE ELECTRIC, GAS, OIL, OR WATER COMPANY THREATENED TO SHUT OFF SERVICES IN YOUR HOME?: NO

## 2025-07-26 SDOH — SOCIAL STABILITY: SOCIAL INSECURITY: DOES ANYONE TRY TO KEEP YOU FROM HAVING/CONTACTING OTHER FRIENDS OR DOING THINGS OUTSIDE YOUR HOME?: NO

## 2025-07-26 SDOH — ECONOMIC STABILITY: FOOD INSECURITY: WITHIN THE PAST 12 MONTHS, YOU WORRIED THAT YOUR FOOD WOULD RUN OUT BEFORE YOU GOT THE MONEY TO BUY MORE.: NEVER TRUE

## 2025-07-26 SDOH — SOCIAL STABILITY: SOCIAL INSECURITY: DO YOU FEEL UNSAFE GOING BACK TO THE PLACE WHERE YOU ARE LIVING?: NO

## 2025-07-26 SDOH — SOCIAL STABILITY: SOCIAL INSECURITY: ARE THERE ANY APPARENT SIGNS OF INJURIES/BEHAVIORS THAT COULD BE RELATED TO ABUSE/NEGLECT?: NO

## 2025-07-26 SDOH — SOCIAL STABILITY: SOCIAL INSECURITY: WERE YOU ABLE TO COMPLETE ALL THE BEHAVIORAL HEALTH SCREENINGS?: YES

## 2025-07-26 SDOH — SOCIAL STABILITY: SOCIAL INSECURITY: DO YOU FEEL ANYONE HAS EXPLOITED OR TAKEN ADVANTAGE OF YOU FINANCIALLY OR OF YOUR PERSONAL PROPERTY?: NO

## 2025-07-26 SDOH — SOCIAL STABILITY: SOCIAL INSECURITY: ABUSE: ADULT

## 2025-07-26 ASSESSMENT — ACTIVITIES OF DAILY LIVING (ADL)
HEARING - LEFT EAR: FUNCTIONAL
ASSISTIVE_DEVICE: WHEELCHAIR
BATHING: NEEDS ASSISTANCE
DRESSING YOURSELF: NEEDS ASSISTANCE
GROOMING: NEEDS ASSISTANCE
FEEDING YOURSELF: INDEPENDENT
PATIENT'S MEMORY ADEQUATE TO SAFELY COMPLETE DAILY ACTIVITIES?: NO
ADEQUATE_TO_COMPLETE_ADL: YES
TOILETING: NEEDS ASSISTANCE
WALKS IN HOME: NEEDS ASSISTANCE
JUDGMENT_ADEQUATE_SAFELY_COMPLETE_DAILY_ACTIVITIES: NO
HEARING - RIGHT EAR: FUNCTIONAL
LACK_OF_TRANSPORTATION: NO

## 2025-07-26 ASSESSMENT — LIFESTYLE VARIABLES
HOW OFTEN DO YOU HAVE A DRINK CONTAINING ALCOHOL: NEVER
TOTAL SCORE: 0
EVER FELT BAD OR GUILTY ABOUT YOUR DRINKING: NO
SKIP TO QUESTIONS 9-10: 1
EVER HAD A DRINK FIRST THING IN THE MORNING TO STEADY YOUR NERVES TO GET RID OF A HANGOVER: NO
AUDIT-C TOTAL SCORE: 0
HOW MANY STANDARD DRINKS CONTAINING ALCOHOL DO YOU HAVE ON A TYPICAL DAY: PATIENT DOES NOT DRINK
HOW OFTEN DO YOU HAVE 6 OR MORE DRINKS ON ONE OCCASION: NEVER
AUDIT-C TOTAL SCORE: 0
HAVE YOU EVER FELT YOU SHOULD CUT DOWN ON YOUR DRINKING: NO
HAVE PEOPLE ANNOYED YOU BY CRITICIZING YOUR DRINKING: NO

## 2025-07-26 ASSESSMENT — COGNITIVE AND FUNCTIONAL STATUS - GENERAL
DRESSING REGULAR LOWER BODY CLOTHING: A LOT
DRESSING REGULAR UPPER BODY CLOTHING: A LOT
WALKING IN HOSPITAL ROOM: A LOT
TURNING FROM BACK TO SIDE WHILE IN FLAT BAD: A LOT
HELP NEEDED FOR BATHING: A LOT
CLIMB 3 TO 5 STEPS WITH RAILING: A LOT
PATIENT BASELINE BEDBOUND: NO
MOVING FROM LYING ON BACK TO SITTING ON SIDE OF FLAT BED WITH BEDRAILS: A LOT
MOVING TO AND FROM BED TO CHAIR: A LOT
MOBILITY SCORE: 12
DAILY ACTIVITIY SCORE: 15
PERSONAL GROOMING: A LITTLE
STANDING UP FROM CHAIR USING ARMS: A LOT
TOILETING: A LOT

## 2025-07-26 ASSESSMENT — ENCOUNTER SYMPTOMS
CHILLS: 1
FEVER: 1
ABDOMINAL PAIN: 0

## 2025-07-26 ASSESSMENT — PAIN - FUNCTIONAL ASSESSMENT: PAIN_FUNCTIONAL_ASSESSMENT: 0-10

## 2025-07-26 ASSESSMENT — PAIN SCALES - GENERAL: PAINLEVEL_OUTOF10: 0 - NO PAIN

## 2025-07-26 NOTE — PROGRESS NOTES
I received Myrna Khan in signout from outgoing provider.  Please see the previous note for all HPI, PE and MDM up to the time of signout at 0700.    In brief Myrna Khan is an 65 y.o. female presenting for altered mental status, diarrhea, fatigue  Chief Complaint   Patient presents with    Altered Mental Status   .  At the time of signout we were awaiting:  Results of laboratory studies            During my care patient was reassessed as above.  After initiation on a dextrose containing drip the patient's glucose jumped to 347, drip was turned off.  The patient however became recurrently hypoglycemic.  I feel this is likely in the setting of starvation.  She was also markedly hypomagnesium thick and this was ordered for repletion.  Ultimately the patient was admitted for recurrent hypoglycemia requiring dextrose infusion and hypomagnesemia      Patient seen and discussed with attending of record.    Gurinder Worley MD

## 2025-07-26 NOTE — H&P
History Of Present Illness  Myrna Khan is a 65 y.o. female with PMHx of type 2 diabetes, CAD (s/p CABG 9/2023), hypertension 2/2 renal artery stenosis, PAD with dry gangrene (s/p LLE endarterectomy 3/6/25 and left BKA (guillotine amp 4/1/25 with formalization on 4/3/25)) who presented to the emergency department via EMS from SNF for altered mental status. Patient found to be unresponsive when morning med pass time came at her SNF, last known well was at 11pm on 7/25. BG was found to be 25 and EMS was called. Pt was found to have a blood glucose of 34 by EMS, 30mls of D05 were given enroute, blood sugar rechecked upon presentation, found to be 15, two amps of D50 were given on arrival to the ED. VBG on arrival showed a pH 7.36, glucose 46, lactate 0.9 (drawn prior to supplementation with D50). On 2L NC (new). Xray L tib/fib obtained for evaluation of OM; no apparent osteo on imaging result.     Patient seen at bedside. Answering questions appropriately, but very sleepy. States she has had chills and feels feverish. Denies any pain.     ED course:   Labs:           10.5     10.7>-----<329              31.1     140  105  33                  ----------------<33     3.9  27  1.21            Ca 7.2 Phos 3.7 Mg 1.23       ALT 17 AST 33 AlkPhos 145 tBili 0.4        Trop 7      Past Medical History  Medical History[1]    Surgical History  Surgical History[2]     Social History  She reports that she has been smoking cigarettes. She started smoking about 30 years ago. She has a 30.6 pack-year smoking history. She has never been exposed to tobacco smoke. She has never used smokeless tobacco. She reports that she does not currently use alcohol. She reports that she does not use drugs.    Family History  Family History[3]     Allergies  Lisinopril, Amoxicillin, and Losartan    Review of Systems   Constitutional:  Positive for chills and fever.   Cardiovascular:  Negative for chest pain.   Gastrointestinal:   "Negative for abdominal pain.        Physical Exam  Vitals reviewed.   Constitutional:       General: She is sleeping.      Appearance: She is cachectic. She is ill-appearing. She is not toxic-appearing.      Interventions: Nasal cannula in place.   HENT:      Head: Normocephalic and atraumatic.      Mouth/Throat:      Mouth: Mucous membranes are dry.      Pharynx: Oropharynx is clear.     Eyes:      General: No scleral icterus.     Comments: Patient very sleepy on exam, unable to complete neuro exam     Cardiovascular:      Rate and Rhythm: Normal rate and regular rhythm.      Pulses: Normal pulses.      Heart sounds: Normal heart sounds.   Pulmonary:      Effort: Pulmonary effort is normal. No respiratory distress.      Breath sounds: Normal breath sounds.   Abdominal:      General: Abdomen is flat. There is no distension.      Palpations: Abdomen is soft.      Tenderness: There is no abdominal tenderness. There is no guarding.     Musculoskeletal:      Right lower leg: No edema.      Comments: L BKA     Skin:     General: Skin is warm and dry.      Comments: Small ulceration at BKA incision (LLE). No purulence, induration. Non-tender to palpation.      Neurological:      Mental Status: She is lethargic.      Comments: A&O x 2 (person and place)     Psychiatric:         Behavior: Behavior is cooperative.          Last Recorded Vitals  Blood pressure (!) 154/101, pulse 81, temperature 36.2 °C (97.2 °F), temperature source Temporal, resp. rate 18, height (!) 1.549 m (5' 1\"), weight (!) 36.3 kg (80 lb), SpO2 100%.    Relevant Results  Results for orders placed or performed during the hospital encounter of 07/26/25 (from the past 24 hours)   Blood Gas Venous Full Panel Unsolicited   Result Value Ref Range    POCT pH, Venous 7.36 7.33 - 7.43 pH    POCT pCO2, Venous 46 41 - 51 mm Hg    POCT pO2, Venous 37 35 - 45 mm Hg    POCT SO2, Venous 54 45 - 75 %    POCT Oxy Hemoglobin, Venous 51.0 45.0 - 75.0 %    POCT Hematocrit " Calculated, Venous 32.0 (L) 36.0 - 46.0 %    POCT Sodium, Venous 139 136 - 145 mmol/L    POCT Potassium, Venous 3.2 (L) 3.5 - 5.3 mmol/L    POCT Chloride, Venous 108 (H) 98 - 107 mmol/L    POCT Ionized Calicum, Venous 1.04 (L) 1.10 - 1.33 mmol/L    POCT Glucose, Venous 46 (LL) 74 - 99 mg/dL    POCT Lactate, Venous 0.9 0.4 - 2.0 mmol/L    POCT Base Excess, Venous 0.2 -2.0 - 3.0 mmol/L    POCT HCO3 Calculated, Venous 26.0 22.0 - 26.0 mmol/L    POCT Hemoglobin, Venous 10.7 (L) 12.0 - 16.0 g/dL    POCT Anion Gap, Venous 8.0 (L) 10.0 - 25.0 mmol/L    Patient Temperature 37.0 degrees Celsius   POCT GLUCOSE   Result Value Ref Range    POCT Glucose 103 (H) 74 - 99 mg/dL   CBC and Auto Differential   Result Value Ref Range    WBC 10.7 4.4 - 11.3 x10*3/uL    nRBC 0.5 (H) 0.0 - 0.0 /100 WBCs    RBC 3.14 (L) 4.00 - 5.20 x10*6/uL    Hemoglobin 10.5 (L) 12.0 - 16.0 g/dL    Hematocrit 31.1 (L) 36.0 - 46.0 %    MCV 99 80 - 100 fL    MCH 33.4 26.0 - 34.0 pg    MCHC 33.8 32.0 - 36.0 g/dL    RDW 14.4 11.5 - 14.5 %    Platelets      Neutrophils % 71.2 40.0 - 80.0 %    Immature Granulocytes %, Automated 0.7 0.0 - 0.9 %    Lymphocytes % 23.3 13.0 - 44.0 %    Monocytes % 4.1 2.0 - 10.0 %    Eosinophils % 0.2 0.0 - 6.0 %    Basophils % 0.5 0.0 - 2.0 %    Neutrophils Absolute 7.61 1.20 - 7.70 x10*3/uL    Immature Granulocytes Absolute, Automated 0.08 0.00 - 0.70 x10*3/uL    Lymphocytes Absolute 2.49 1.20 - 4.80 x10*3/uL    Monocytes Absolute 0.44 0.10 - 1.00 x10*3/uL    Eosinophils Absolute 0.02 0.00 - 0.70 x10*3/uL    Basophils Absolute 0.05 0.00 - 0.10 x10*3/uL   Comprehensive metabolic panel   Result Value Ref Range    Glucose 33 (LL) 74 - 99 mg/dL    Sodium 140 136 - 145 mmol/L    Potassium 3.9 3.5 - 5.3 mmol/L    Chloride 105 98 - 107 mmol/L    Bicarbonate 27 21 - 32 mmol/L    Anion Gap 12 10 - 20 mmol/L    Urea Nitrogen 33 (H) 6 - 23 mg/dL    Creatinine 1.21 (H) 0.50 - 1.05 mg/dL    eGFR 50 (L) >60 mL/min/1.73m*2    Calcium 7.2 (L)  8.6 - 10.6 mg/dL    Albumin 2.6 (L) 3.4 - 5.0 g/dL    Alkaline Phosphatase 145 (H) 33 - 136 U/L    Total Protein 6.6 6.4 - 8.2 g/dL    AST 33 9 - 39 U/L    Bilirubin, Total 0.4 0.0 - 1.2 mg/dL    ALT 17 7 - 45 U/L   Magnesium   Result Value Ref Range    Magnesium 1.23 (L) 1.60 - 2.40 mg/dL   Troponin I, High Sensitivity   Result Value Ref Range    Troponin I, High Sensitivity (CMC) 7 0 - 34 ng/L   Osmolality   Result Value Ref Range    Osmolality, Serum 294 280 - 300 mOsm/kg   POCT GLUCOSE   Result Value Ref Range    POCT Glucose 347 (H) 74 - 99 mg/dL   Blood Culture    Specimen: Peripheral Venipuncture; Blood culture   Result Value Ref Range    Blood Culture Loaded on Instrument - Culture in progress    Blood Culture    Specimen: Peripheral Venipuncture; Blood culture   Result Value Ref Range    Blood Culture Loaded on Instrument - Culture in progress    POCT GLUCOSE   Result Value Ref Range    POCT Glucose 104 (H) 74 - 99 mg/dL   POCT GLUCOSE   Result Value Ref Range    POCT Glucose 228 (H) 74 - 99 mg/dL   Osmolality, urine   Result Value Ref Range    Osmolality, Urine Random 357 200 - 1,200 mOsm/kg     XR tibia fibula left 2 views  Result Date: 7/26/2025  Interpreted By:  Lawanda Laguna and Omar Mahmoud STUDY: XR TIBIA FIBULA LEFT 2 VIEWS; ;  7/26/2025 6:47 am   INDICATION: Signs/Symptoms:osteo.     COMPARISON: None.   ACCESSION NUMBER(S): [id number]   ORDERING CLINICIAN: KATIE MARADIAGA   FINDINGS: Two-view radiographs of the left tibia/fibula were provided.   Status post below-knee amputation with mild soft tissue irregularity of the anterior/posterior stump which may represent ulceration.   There are femoral and popliteal artery vascular calcifications. No acute fracture or dislocation is evident. No evident significant periosteal reaction. No evidence of cortical erosion. No subcutaneous emphysema.       Postsurgical changes of below-knee amputation without evidence of osteomyelitis. If there is persistent  clinical concern contrast-enhanced MRI of the region may be considered for further evaluation.   Mild soft tissue irregularity of the anterior/posterior stump which may represent ulceration. Correlate with physical examination.   I personally reviewed the images/study and I agree with the findings as stated by Radha Latham MD (PGY-3). This study was interpreted at [facility], [place], [place].   MACRO: None   Signed by: Lawanda Laguna 7/26/2025 8:32 AM Dictation workstation:   [id number]    Assessment & Plan  AMS (altered mental status)      Myrna Khan is a 65 y.o. female with PMHx of type 2 diabetes, CAD (s/p CABG 9/2023), hypertension 2/2 renal artery stenosis, PAD with dry gangrene (s/p LLE endarterectomy 3/6/25 and left BKA (guillotine amp 4/1/25 with formalization on 4/3/25)) who presented to the emergency department via EMS from SNF for altered mental status. Was hypoglycemic, currently on D5 water mIVF with improvement in BG. Still lethargic. Concern for possible infection. Admitted for further evaluation of AMS and management of MAGUI. Determined to be appropriate for regular nursing floor. See below for detailed plan:     #AMS, likely multifactorial  - Ddx infection, CVA vs TIA, subarachnoid hemorrhage, dehydration 2/2 GI fluid loss, drug intoxication. Hypoglycemia likely contributing as well.  :: CTH negative for acute intracranial abnormality or mass effect.   :: BCx collected 7/26, pending result  :: CXR showing findings of patchy consolidation of left lung, concerning for left mid lung pneumonia   - UA showing turbid urine, 1+ protein, 3+ glucose, 500 LE. Nitrite and ketone negative, >50 WBC, 11-20 RBC, 1+ bacteria. Reflex culture pending.   - c diff PCR (ordered d/t recent clindamycin course and diarrhea) negative  - UDS negative   Plan:  - start abx for CAP. Initiated on CTX 1g daily iso amoxicillin allergy and doxycycline 100mg BID iso QTc of 500. Plan for 5  days total of abx therapy  - CTX should also cover for UTI, but will follow up culture and susceptibilities and adjust abx therapy as indcated.   - fu BCx, UCx   - continue D5NS mIVF    #T2DM  #hypoglycemia  :: home regimen per pharm med rec: glargine 10u nightly, Farxiga 10mg daily, metformin 1g BID  :: unclear etiology of hypoglycemia; per SNF report, takes 10u glargine nightly. Unknown what patient's HS BG was or how much insulin she actually received last night.   :: last A1c 6.2% (5/9/2025)  :: patient's BG levels have been labile, even while on D5NS mIVF (103, 347, 104, 228, 70, 157). These are all POCT reads, currently pending repeat RFP. VBG from this afternoon showing glucose of 129.  - has received 2 amps of D50. On D5NS at 50ml/hr, increased to 75ml/h5r  Plan:  - continue  D5NS mIVF  - follow up RFP glucose level  - all home medications held at this time  - continue to monitor POCT glucose q4h    #MAGUI  #hypomagnesemia, hypophosphatemia   #chronic urinary retention with indwelling day catheter  :: Cr 1.21 on admission (baseline 0.7-0.8), eGFR 50  :: Mg 1.23, s/p 2g IV Mg in the ED. Has had chronic low Mg in the past on chart review  :: Phos 2.4 with mild hemolysis detected  :: UA showing turbid urine, 1+ protein, 3+ glucose, 500 LE. Nitrite and ketone negative, >50 WBC, 11-20 RBC, 1+ bacteria. Reflex culture pending.   - on initial exam, patient with day in place and oliguric  - given 500ml NS bolus  - urine osmolality 357. Urine lytes pending.   Plan:  - fu urine lytes  - BID RFP, monitor for improvement in Cr and eGFR and electrolyte monitoring  - strict I&Os to monitor UOP  - continue D5NS at 75ml/hr    #malnutrition  - patient cachectic, BMI 15.12  - on home vit D, calcium supplements  Plan:  - continue home vit D, calcium supplements, ordered  - follow up B12 and folate labs  - thiamine, B12, and folate supplements initiated   - q12 RFPs to monitor for refeeding, replete electrolytes for goal of  K>4, phos>3, Mg>2  - inpatient consult to dietician placed, appreciate recs    #CAD s/p CABG 9/2023  #HTN  #FROYLAN  :: home antihypertensives: carvedilol 25mg BID, hydralazine 75mg TID, nifedipine 90mg  :: on atorvastatin 80mg nightly  :: EKG showing sinus rhythm with PACs, low voltage QRS, possible inferior infarct (age undetermined), nonspecific T wave abnormality now evident in Inferior leads and T wave inversion now evident in Lateral leads  Plan:  - home meds ordered as above  - admitted to tele floor  - patient currently stable, continue to monitor BP    #PAD  :: s/p LLE endarterectomy 3/6/25 and left BKA (guillotine amp 4/1/25 with formalization on 4/3/25)  :: on clopidigrel 75mg daily and ASA 81mg daily  - wound of LLE at level of BKA. Per recent vasc surgery note, medial ulceration had progressed to a tunneling wound (did not probe to bone).  Was given course of clinda at the time, completed on 7/16  - Xray of L tib/fib without evidence of OM.   Plan:  - continue DAPT, ordered  - wound care consult for evaluation of wounds, appreciate recs    F: D5NS @ 75ml/hr  E: replete for goal of K>4, phos>3, Mg>2  N: Regular diet  DVT ppx: subcutaneous hep  Abx therapy: CTX and doxycycline  Code status: full code    Discussed with attending physician, Ashli Williamson DO. Plan not finalized until cosigned by attending physician.    Marisela Taylor DO  Family Medicine, PGY-3         [1]   Past Medical History:  Diagnosis Date    CAD (coronary artery disease)     Carotid artery stenosis     Diabetes mellitus (Multi)     Heart disease     HF (heart failure)     Hypertension     NSTEMI (non-ST elevated myocardial infarction) (Multi)     PAD (peripheral artery disease)     Renal artery stenosis    [2]   Past Surgical History:  Procedure Laterality Date    CORONARY ARTERY BYPASS GRAFT      9/2023    FEMORAL ARTERY STENT Left     left SFA   [3]   Family History  Problem Relation Name Age of Onset    Peripheral vascular disease Mother       Hypertension Mother      Diabetes type II Mother      Hypertension Sister      Diabetes type II Sister

## 2025-07-26 NOTE — PROGRESS NOTES
"Pharmacy Medication History Review    Myrna Khan is a 65 y.o. female admitted for Hypomagnesemia. Pharmacy reviewed the patient's [id number] medications and allergies for accuracy.    Medications ADDED:  Docusate sodium 100 mg  Loperamide 2 mg  Tramadol 50 mg  Medications CHANGED:  Insulin glargine from 8 units to 10 units per medication list from   Acetaminophen from every 6 hours as needed to every 8 hours as needed.  Medications REMOVED:   none     The list below reflects the updated PTA list.   Prior to Admission Medications   Prescriptions Last Dose Informant   BD Ultra-Fine Micro Pen Needle 32 gauge x 1/4\" needle  Other   Basaglar KwikPen U-100 Insulin 100 unit/mL (3 mL) pen  Other   Sig: Inject 8 Units under the skin once daily at bedtime.   Patient taking differently: Inject 10 unit subcutaneously one time a day.   Easy Touch Alcohol Prep Pads pads, medicated  Other   Farxiga 10 mg  Other   Sig: Take 1 tablet (10 mg) by mouth once daily with breakfast.   NIFEdipine ER (Adalat CC) 90 mg 24 hr tablet  Other   Sig: Take 1 tablet (90 mg) by mouth once daily in the morning. Take before meals. Do not crush, chew, or split.   acetaminophen (Tylenol) 325 mg tablet  Other   Sig: Take 2 tablets (650 mg) by mouth every 6 hours if needed for mild pain (1 - 3) or moderate pain (4 - 6).   Patient taking differently: Take 2 tablets (650 mg) by mouth every 8 hours if needed for mild pain (1 - 3) or moderate pain (4 - 6).   acetaminophen (Tylenol) 500 mg tablet  Other   Sig: Take 1 tablet (500 mg) by mouth every 6 hours if needed for mild pain (1 - 3). Give 2 tablet by mouth three times a day for pain   aspirin 81 mg EC tablet  Other   Sig: Take 1 tablet (81 mg) by mouth once daily.   atorvastatin (Lipitor) 80 mg tablet  Other   Sig: TAKE 1 TABLET BY MOUTH ONCE DAILY   blood sugar diagnostic strip  Other   Sig: Use 1 strip to check blood sugar 3 times a day with meals.   calcium citrate 250 mg calcium tablet "  Other   Sig: Take 1 tablet (250 mg) by mouth once daily.   carvedilol (Coreg) 25 mg tablet  Other   Sig: TAKE 1 TABLET BY MOUTH TWO TIMES A DAY   cholecalciferol (Vitamin D3) 25 mcg (1,000 units) tablet  Other   Sig: Take 2 tablets (50 mcg) by mouth once daily.   ciprofloxacin (Cipro) 500 mg tablet Not Taking Other   Sig: Take 1 tablet (500 mg) by mouth 2 times a day.   Patient not taking: Reported on 7/26/2025   clopidogrel (Plavix) 75 mg tablet  Other   Sig: Take 1 tablet (75 mg) by mouth once daily.   diclofenac sodium (Voltaren) 1 % gel  Other   Sig: Apply 4.5 inches (4 g) topically 4 times a day as needed (pain).   Patient taking differently: Apply 4.5 inches (4 g) topically 4 times a day as needed (for bilateral leg pain).   docusate sodium (Colace) 100 mg tablet  Other   Sig: Take 1 tablet (100 mg) by mouth once daily as needed for constipation.   ferrous sulfate 325 (65 Fe) mg EC tablet  Other   Sig: Take 1 tablet by mouth every other day. Do not crush, chew, or split.   gabapentin (Neurontin) 300 mg capsule  Other   Sig: Take 1 capsule (300 mg) by mouth 2 times a day.   glucagon (Glucagen) 1 mg injection  Other   Sig: Inject 1 mg into the muscle every 15 minutes if needed for other (<70 and cannot take oral due to altered mentation).   hydrALAZINE (Apresoline) 50 mg tablet  Other   Sig: Take 1.5 tablets (75 mg) by mouth 3 times a day.   Patient taking differently: Give 1 tablet every 4 hours as needed for SBP greater than 160mmHg   hydroCHLOROthiazide (Microzide) 12.5 mg capsule Not Taking Other   Sig: Take 1 capsule (12.5 mg) by mouth once daily in the morning.   Patient not taking: Reported on 7/26/2025   insulin aspart (NovoLOG U-100 Insulin aspart) 100 unit/mL injection Not Taking Other   Sig: Inject under the skin. Inject as per sliding scale: if   151-200=2 units  201-250=3 units  251-300= 4 units  301-350= 5 units  351-400= 6 units  If above 400 give 6 units and notify MD   Patient not taking:  Reported on 7/26/2025   insulin glargine (Lantus) 100 unit/mL injection  Other   Sig: Inject 10 Units under the skin once daily at bedtime. Take as directed per insulin instructions.   Patient not taking: Reported on 7/9/2025   insulin lispro 100 unit/mL injection  Other   Sig: Inject under the skin 3 times daily (morning, midday, late afternoon). Take as directed per insulin instructions.  Inject under the skin. Inject as per sliding scale: if 151-200=2 units 201-250=3 units 251-300= 4 units 301-350= 5 units 351-400= 6 units If above 400 give 6 units and notify MD   lancets misc  Other   Sig: Use three times a day to test blood sugar w/ meals   loperamide (Imodium) 2 mg capsule  Other   Sig: Take 1 capsule (2 mg) by mouth 4 times a day as needed for diarrhea (May give after each loose stool, dose not to exceed 16mg per day).   melatonin 10 mg tablet  Other   Sig: Take 1 tablet (10 mg) by mouth once daily at bedtime.   metFORMIN (Glucophage) 1,000 mg tablet  Other   Sig: Take 1 tablet (1,000 mg) by mouth 2 times daily (morning and late afternoon).   multivitamin with minerals tablet  Other   Sig: Take 1 tablet by mouth once daily.   oxyCODONE (Oxy-IR) 5 mg immediate release capsule  Other   Sig: Take 1 capsule (5 mg) by mouth every 6 hours if needed for severe pain (7 - 10).   Patient not taking: Reported on 6/9/2025   ranolazine (Ranexa) 500 mg 12 hr tablet  Other   Sig: Take 1 tablet (500 mg) by mouth 2 times a day. Do not crush, chew, or split.   sennosides-docusate sodium (Ayanna-Colace) 8.6-50 mg tablet  Other   Sig: Take 1 tablet by mouth 2 times a day as needed for constipation.   traMADol (Ultram) 50 mg tablet  Other   Sig: Take 0.5 tablets (25 mg) by mouth every 8 hours if needed for severe pain (7 - 10) (for 14 days).      Facility-Administered Medications: None        The list below reflects the updated allergy list. Please review each documented allergy for additional clarification and  "justification.  Allergies  Reviewed by Balwinder Bhakta on 7/26/2025        Severity Reactions Comments    Lisinopril Not Specified Itching     Amoxicillin Low Rash     Losartan Low Itching, Rash             Patient declines M2B at discharge.     Sources:   [facility]  7/9/25 Office Visit - Vasc Surg - [place]     Additional Comments:  Med rec completed using information from Order Summary report dated 7/26/25 received from nursing facility.       BALWINDER BHAKTA  Pharmacy Technician  07/26/25     Secure Chat preferred   If no response call [id number] or [id number] \"Med Rec\"    "

## 2025-07-26 NOTE — ED TRIAGE NOTES
Pt BIB EMS from [facility] for altered mental status. Pt's LKWT was 2300 last night and pt was found to be unresponsive when morning med pass time came. Pt was found to have a blood glucose of 34 by EMS, 30mls of D05 were given enroute, blood sugar rechecked upon presentation, found to be 15, two amps of D50 were given. PT has a hx of DM and recently admitted to facility for a recent LLE amputation.

## 2025-07-26 NOTE — ED PROVIDER NOTES
Emergency Department Provider Note        History of Present Illness     History provided by: EMS  Limitations to History: None    HPI:  Patient is a 65-year-old female with a past medical history of diabetes, CAD, hypertension, PAD, renal artery stenosis with a recent left lower extremity nonhealing dry gangrene presenting to the emergency department for altered mental status.  Patient last known well was at 11 PM according to EMS patient typically ambulates and is communicative.  When awoken this morning patient was unresponsive.  Glucose at the outside facility was 25 patient was given D5 and brought into the emergency department.  Physical Exam   Triage vitals:  T 36.1 °C (96.9 °F)  HR 82  /77  RR 16  O2 96 % None (Room air)    Physical Exam  Constitutional:       Appearance: She is ill-appearing.      Comments: Cachectic   HENT:      Head: Normocephalic and atraumatic.     Eyes:      Extraocular Movements: Extraocular movements intact.       Cardiovascular:      Rate and Rhythm: Normal rate.   Pulmonary:      Effort: Pulmonary effort is normal.   Abdominal:      Palpations: Abdomen is soft.     Musculoskeletal:      Cervical back: Normal range of motion.     Skin:     Comments: Left lower extremity wound, packed with gauze, pustular fluid seen on gauze     Neurological:      Mental Status: She is lethargic and disoriented.          Medical Decision Making & ED Course   Medical Decision Making:    Patient was in emergency department for altered mental status.  Patient less than 1:11 PM per EMS patient glucose was 25 patient was placed on D5 normal saline brought to the emergency department.  On arrival patient hemodynamically stable was altered however withdrew in all extremities.  Patient glucose concerning for 15.  Patient was given 2 A of D50 and placed on a D10 normal saline.  Repeat glucose 103.  Based off of patient's medical chart she is an insulin-dependent diabetic.  Not on any glipizide.  VBG  showed a pH 7.36, glucose 46, lactate 0.9 this was prior to supplementation of D50.  On examination patient seen to have packed wound to the left lower extremity will obtain x-rays for possible osteomyelitis.  Will obtain a urinalysis due to concern for infection.  C. difficile culture was also placed as patient had an episode of watery diarrhea patient recently on clindamycin.  Will obtain a CBC, CMP and blood cultures were ordered.  Patient currently afebrile normotensive nontachycardic.  Other differentials for hypoglycemia include too much exogenous insulin, malnourishment.  Patient is cachectic on my examination.  On reexamination patient more awake and alert.  Will continue to monitor.  Please see ED course further details    EKG Independent Interpretation: EKG interpreted by myself. Please see ED Course for full interpretation.        The patient was discussed with the following consultants/services: None      ED Course as of 07/26/25 0653   Sat Jul 26, 2025   0652 Patient was signed out pending the rest of her workup. [TS]      ED Course User Index  [TS] Gayatri Conroy MD          Disposition   Patient was signed out to oncoming provider pending completion of their work-up.  Please see the next provider's transition of care note for the remainder of the patient's care.     Procedures   Procedures    Patient seen and discussed with ED attending physician.    Gayatri Conroy MD  Emergency Medicine     Gayatri Conroy MD  Resident  07/26/25 0653

## 2025-07-27 VITALS
TEMPERATURE: 97.7 F | OXYGEN SATURATION: 100 % | RESPIRATION RATE: 17 BRPM | WEIGHT: 80.03 LBS | BODY MASS INDEX: 15.11 KG/M2 | HEART RATE: 76 BPM | DIASTOLIC BLOOD PRESSURE: 80 MMHG | HEIGHT: 61 IN | SYSTOLIC BLOOD PRESSURE: 125 MMHG

## 2025-07-27 LAB
ALBUMIN SERPL BCP-MCNC: 2.4 G/DL (ref 3.4–5)
ANION GAP SERPL CALC-SCNC: 13 MMOL/L (ref 10–20)
BACTERIA BLD AEROBE CULT: ABNORMAL
BACTERIA BLD AEROBE CULT: ABNORMAL
BACTERIA BLD CULT: ABNORMAL
BACTERIA BLD CULT: ABNORMAL
BACTERIA BLD CULT: NORMAL
BACTERIA BLD CULT: NORMAL
BACTERIA UR CULT: ABNORMAL
BASOPHILS # BLD MANUAL: 0 X10*3/UL (ref 0–0.1)
BASOPHILS NFR BLD MANUAL: 0 %
BLASTS # BLD MANUAL: 0 X10*3/UL
BLASTS NFR BLD MANUAL: 0 %
BUN SERPL-MCNC: 32 MG/DL (ref 6–23)
CALCIUM SERPL-MCNC: 6.6 MG/DL (ref 8.6–10.6)
CHLORIDE SERPL-SCNC: 105 MMOL/L (ref 98–107)
CO2 SERPL-SCNC: 21 MMOL/L (ref 21–32)
CREAT SERPL-MCNC: 1.1 MG/DL (ref 0.5–1.05)
EGFRCR SERPLBLD CKD-EPI 2021: 56 ML/MIN/1.73M*2
EOSINOPHIL # BLD MANUAL: 0 X10*3/UL (ref 0–0.7)
EOSINOPHIL NFR BLD MANUAL: 0 %
ERYTHROCYTE [DISTWIDTH] IN BLOOD BY AUTOMATED COUNT: 14.4 % (ref 11.5–14.5)
GLUCOSE BLD MANUAL STRIP-MCNC: 205 MG/DL (ref 74–99)
GLUCOSE BLD MANUAL STRIP-MCNC: 229 MG/DL (ref 74–99)
GLUCOSE BLD MANUAL STRIP-MCNC: 261 MG/DL (ref 74–99)
GLUCOSE SERPL-MCNC: 200 MG/DL (ref 74–99)
GRAM STN SPEC: ABNORMAL
GRAM STN SPEC: ABNORMAL
HCT VFR BLD AUTO: 24.2 % (ref 36–46)
HGB BLD-MCNC: 8.5 G/DL (ref 12–16)
IMM GRANULOCYTES # BLD AUTO: 0.19 X10*3/UL (ref 0–0.7)
IMM GRANULOCYTES NFR BLD AUTO: 1.4 % (ref 0–0.9)
KLEBSIELLA SP DNA BLD POS QL NAA+NON-PRB: DETECTED
LYMPHOCYTES # BLD MANUAL: 0.7 X10*3/UL (ref 1.2–4.8)
LYMPHOCYTES NFR BLD MANUAL: 5 %
MAGNESIUM SERPL-MCNC: 1.59 MG/DL (ref 1.6–2.4)
MCH RBC QN AUTO: 34.4 PG (ref 26–34)
MCHC RBC AUTO-ENTMCNC: 35.1 G/DL (ref 32–36)
MCV RBC AUTO: 98 FL (ref 80–100)
METAMYELOCYTES # BLD MANUAL: 0 X10*3/UL
METAMYELOCYTES NFR BLD MANUAL: 0 %
MONOCYTES # BLD MANUAL: 0 X10*3/UL (ref 0.1–1)
MONOCYTES NFR BLD MANUAL: 0 %
MYELOCYTES # BLD MANUAL: 0 X10*3/UL
MYELOCYTES NFR BLD MANUAL: 0 %
NEUTROPHILS # BLD MANUAL: 13.3 X10*3/UL (ref 1.2–7.7)
NEUTS BAND # BLD MANUAL: 3.18 X10*3/UL (ref 0–0.7)
NEUTS BAND NFR BLD MANUAL: 22.7 %
NEUTS SEG # BLD MANUAL: 10.12 X10*3/UL (ref 1.2–7)
NEUTS SEG NFR BLD MANUAL: 72.3 %
NRBC BLD MANUAL-RTO: 0 % (ref 0–0)
NRBC BLD-RTO: 0 /100 WBCS (ref 0–0)
PHOSPHATE SERPL-MCNC: 3.2 MG/DL (ref 2.5–4.9)
PLASMA CELLS # BLD MANUAL: 0 X10*3/UL
PLASMA CELLS NFR BLD MANUAL: 0 %
PLATELET # BLD AUTO: 135 X10*3/UL (ref 150–450)
POTASSIUM SERPL-SCNC: 4.7 MMOL/L (ref 3.5–5.3)
PROMYELOCYTES # BLD MANUAL: 0 X10*3/UL
PROMYELOCYTES NFR BLD MANUAL: 0 %
RBC # BLD AUTO: 2.47 X10*6/UL (ref 4–5.2)
RBC MORPH BLD: ABNORMAL
SODIUM SERPL-SCNC: 134 MMOL/L (ref 136–145)
TOTAL CELLS COUNTED BLD: 119
VARIANT LYMPHS # BLD MANUAL: 0 X10*3/UL (ref 0–0.5)
VARIANT LYMPHS NFR BLD: 0 %
WBC # BLD AUTO: 14 X10*3/UL (ref 4.4–11.3)
WBC OTHER # BLD MANUAL: 0 X10*3/UL
WBC OTHER NFR BLD MANUAL: 0 %

## 2025-07-27 PROCEDURE — 2500000004 HC RX 250 GENERAL PHARMACY W/ HCPCS (ALT 636 FOR OP/ED)

## 2025-07-27 PROCEDURE — 87040 BLOOD CULTURE FOR BACTERIA: CPT | Performed by: STUDENT IN AN ORGANIZED HEALTH CARE EDUCATION/TRAINING PROGRAM

## 2025-07-27 PROCEDURE — 2500000004 HC RX 250 GENERAL PHARMACY W/ HCPCS (ALT 636 FOR OP/ED): Performed by: STUDENT IN AN ORGANIZED HEALTH CARE EDUCATION/TRAINING PROGRAM

## 2025-07-27 PROCEDURE — 99233 SBSQ HOSP IP/OBS HIGH 50: CPT | Performed by: PODIATRIST

## 2025-07-27 PROCEDURE — 80069 RENAL FUNCTION PANEL: CPT

## 2025-07-27 PROCEDURE — 2500000001 HC RX 250 WO HCPCS SELF ADMINISTERED DRUGS (ALT 637 FOR MEDICARE OP): Performed by: STUDENT IN AN ORGANIZED HEALTH CARE EDUCATION/TRAINING PROGRAM

## 2025-07-27 PROCEDURE — 2500000005 HC RX 250 GENERAL PHARMACY W/O HCPCS

## 2025-07-27 PROCEDURE — 2500000002 HC RX 250 W HCPCS SELF ADMINISTERED DRUGS (ALT 637 FOR MEDICARE OP, ALT 636 FOR OP/ED)

## 2025-07-27 PROCEDURE — 99222 1ST HOSP IP/OBS MODERATE 55: CPT | Performed by: STUDENT IN AN ORGANIZED HEALTH CARE EDUCATION/TRAINING PROGRAM

## 2025-07-27 PROCEDURE — 36415 COLL VENOUS BLD VENIPUNCTURE: CPT

## 2025-07-27 PROCEDURE — 85027 COMPLETE CBC AUTOMATED: CPT

## 2025-07-27 PROCEDURE — 82947 ASSAY GLUCOSE BLOOD QUANT: CPT

## 2025-07-27 PROCEDURE — 2500000004 HC RX 250 GENERAL PHARMACY W/ HCPCS (ALT 636 FOR OP/ED): Performed by: PODIATRIST

## 2025-07-27 PROCEDURE — 36415 COLL VENOUS BLD VENIPUNCTURE: CPT | Performed by: STUDENT IN AN ORGANIZED HEALTH CARE EDUCATION/TRAINING PROGRAM

## 2025-07-27 PROCEDURE — 2500000001 HC RX 250 WO HCPCS SELF ADMINISTERED DRUGS (ALT 637 FOR MEDICARE OP)

## 2025-07-27 PROCEDURE — 83735 ASSAY OF MAGNESIUM: CPT

## 2025-07-27 PROCEDURE — 1200000002 HC GENERAL ROOM WITH TELEMETRY DAILY

## 2025-07-27 PROCEDURE — 85007 BL SMEAR W/DIFF WBC COUNT: CPT

## 2025-07-27 RX ORDER — ACETAMINOPHEN 160 MG/5ML
650 SOLUTION ORAL EVERY 4 HOURS PRN
Status: DISCONTINUED | OUTPATIENT
Start: 2025-07-27 | End: 2025-08-05 | Stop reason: HOSPADM

## 2025-07-27 RX ORDER — OXYCODONE HYDROCHLORIDE 5 MG/1
2.5 TABLET ORAL ONCE
Status: COMPLETED | OUTPATIENT
Start: 2025-07-27 | End: 2025-07-27

## 2025-07-27 RX ORDER — ACETAMINOPHEN 650 MG/1
650 SUPPOSITORY RECTAL EVERY 4 HOURS PRN
Status: DISCONTINUED | OUTPATIENT
Start: 2025-07-27 | End: 2025-08-05 | Stop reason: HOSPADM

## 2025-07-27 RX ORDER — MAGNESIUM SULFATE HEPTAHYDRATE 40 MG/ML
2 INJECTION, SOLUTION INTRAVENOUS ONCE
Status: COMPLETED | OUTPATIENT
Start: 2025-07-27 | End: 2025-07-27

## 2025-07-27 RX ORDER — ACETAMINOPHEN 325 MG/1
975 TABLET ORAL EVERY 6 HOURS PRN
Status: DISCONTINUED | OUTPATIENT
Start: 2025-07-27 | End: 2025-08-05 | Stop reason: HOSPADM

## 2025-07-27 RX ORDER — SODIUM CHLORIDE 9 MG/ML
50 INJECTION, SOLUTION INTRAVENOUS CONTINUOUS
Status: ACTIVE | OUTPATIENT
Start: 2025-07-27 | End: 2025-07-28

## 2025-07-27 RX ADMIN — PIPERACILLIN SODIUM AND TAZOBACTAM SODIUM 2.25 G: 2; .25 INJECTION, SOLUTION INTRAVENOUS at 11:04

## 2025-07-27 RX ADMIN — PIPERACILLIN SODIUM AND TAZOBACTAM SODIUM 2.25 G: 2; .25 INJECTION, SOLUTION INTRAVENOUS at 23:10

## 2025-07-27 RX ADMIN — Medication 1 TABLET: at 09:23

## 2025-07-27 RX ADMIN — DEXTROSE 125 ML/HR: 5 SOLUTION INTRAVENOUS at 03:10

## 2025-07-27 RX ADMIN — CLOPIDOGREL BISULFATE 75 MG: 75 TABLET, FILM COATED ORAL at 09:37

## 2025-07-27 RX ADMIN — PIPERACILLIN SODIUM AND TAZOBACTAM SODIUM 2.25 G: 2; .25 INJECTION, SOLUTION INTRAVENOUS at 18:10

## 2025-07-27 RX ADMIN — CYANOCOBALAMIN TAB 1000 MCG 500 MCG: 1000 TAB at 09:22

## 2025-07-27 RX ADMIN — MAGNESIUM SULFATE HEPTAHYDRATE 2 G: 40 INJECTION, SOLUTION INTRAVENOUS at 15:11

## 2025-07-27 RX ADMIN — ASPIRIN 81 MG: 81 TABLET, COATED ORAL at 09:22

## 2025-07-27 RX ADMIN — HEPARIN SODIUM 5000 UNITS: 5000 INJECTION, SOLUTION INTRAVENOUS; SUBCUTANEOUS at 14:50

## 2025-07-27 RX ADMIN — Medication 50 MCG: at 09:22

## 2025-07-27 RX ADMIN — ATORVASTATIN CALCIUM 80 MG: 80 TABLET, FILM COATED ORAL at 09:23

## 2025-07-27 RX ADMIN — HEPARIN SODIUM 5000 UNITS: 5000 INJECTION, SOLUTION INTRAVENOUS; SUBCUTANEOUS at 23:10

## 2025-07-27 RX ADMIN — DOXYCYCLINE HYCLATE 100 MG: 100 TABLET, COATED ORAL at 09:22

## 2025-07-27 RX ADMIN — SODIUM CHLORIDE 50 ML/HR: 0.9 INJECTION, SOLUTION INTRAVENOUS at 18:10

## 2025-07-27 RX ADMIN — OXYCODONE HYDROCHLORIDE 2.5 MG: 5 TABLET ORAL at 20:02

## 2025-07-27 RX ADMIN — NIFEDIPINE 90 MG: 90 TABLET, FILM COATED, EXTENDED RELEASE ORAL at 09:37

## 2025-07-27 RX ADMIN — HYDRALAZINE HYDROCHLORIDE 75 MG: 25 TABLET ORAL at 14:50

## 2025-07-27 RX ADMIN — HYDRALAZINE HYDROCHLORIDE 75 MG: 25 TABLET ORAL at 09:22

## 2025-07-27 RX ADMIN — GABAPENTIN 300 MG: 300 CAPSULE ORAL at 20:02

## 2025-07-27 RX ADMIN — HYDRALAZINE HYDROCHLORIDE 75 MG: 25 TABLET ORAL at 20:02

## 2025-07-27 RX ADMIN — ASCORBIC ACID, THIAMINE MONONITRATE,RIBOFLAVIN, NIACINAMIDE, PYRIDOXINE HYDROCHLORIDE, FOLIC ACID, CYANOCOBALAMIN, BIOTIN, CALCIUM PANTOTHENATE, 1 CAPSULE: 100; 1.5; 1.7; 20; 10; 1; 6000; 150000; 5 CAPSULE, LIQUID FILLED ORAL at 09:22

## 2025-07-27 RX ADMIN — Medication 1 DOSE: at 19:30

## 2025-07-27 RX ADMIN — THIAMINE HCL TAB 100 MG 100 MG: 100 TAB at 09:22

## 2025-07-27 RX ADMIN — GABAPENTIN 300 MG: 300 CAPSULE ORAL at 09:23

## 2025-07-27 RX ADMIN — DICLOFENAC SODIUM 4 G: 10 GEL TOPICAL at 20:01

## 2025-07-27 RX ADMIN — DOXYCYCLINE HYCLATE 100 MG: 100 TABLET, COATED ORAL at 20:02

## 2025-07-27 RX ADMIN — POTASSIUM PHOSPHATE 15 MMOL: 236; 224 INJECTION, SOLUTION INTRAVENOUS at 00:56

## 2025-07-27 RX ADMIN — Medication 10 MG: at 20:02

## 2025-07-27 ASSESSMENT — COGNITIVE AND FUNCTIONAL STATUS - GENERAL
DAILY ACTIVITIY SCORE: 15
TOILETING: TOTAL
STANDING UP FROM CHAIR USING ARMS: TOTAL
CLIMB 3 TO 5 STEPS WITH RAILING: TOTAL
DRESSING REGULAR LOWER BODY CLOTHING: A LOT
EATING MEALS: A LITTLE
MOBILITY SCORE: 11
TURNING FROM BACK TO SIDE WHILE IN FLAT BAD: A LITTLE
WALKING IN HOSPITAL ROOM: TOTAL
DRESSING REGULAR UPPER BODY CLOTHING: A LITTLE
HELP NEEDED FOR BATHING: A LOT
MOVING TO AND FROM BED TO CHAIR: TOTAL

## 2025-07-27 ASSESSMENT — ACTIVITIES OF DAILY LIVING (ADL): LACK_OF_TRANSPORTATION: NO

## 2025-07-27 ASSESSMENT — PAIN DESCRIPTION - ORIENTATION: ORIENTATION: RIGHT

## 2025-07-27 ASSESSMENT — PAIN - FUNCTIONAL ASSESSMENT
PAIN_FUNCTIONAL_ASSESSMENT: 0-10
PAIN_FUNCTIONAL_ASSESSMENT: 0-10

## 2025-07-27 ASSESSMENT — PAIN SCALES - GENERAL
PAINLEVEL_OUTOF10: 9
PAINLEVEL_OUTOF10: 0 - NO PAIN

## 2025-07-27 ASSESSMENT — PAIN DESCRIPTION - LOCATION: LOCATION: LEG

## 2025-07-27 NOTE — PROGRESS NOTES
"   07/27/25 1056   Discharge Planning   Living Arrangements Other (Comment)  ([facility])   Support Systems Family members   Assistance Needed ADL's/iADL's   Type of Residence Nursing home/residential care   Do you have animals or pets at home? No   Who is requesting discharge planning? Provider   Home or Post Acute Services Post acute facilities (Rehab/SNF/etc)   Type of Post Acute Facility Services Long term care   Expected Discharge Disposition Inter   Does the patient need discharge transport arranged? Yes   [facility] coordination needed? Yes   Has discharge transport been arranged? No   Financial Resource Strain   How hard is it for you to pay for the very basics like food, housing, medical care, and heating? Not hard   Housing Stability   In the last 12 months, was there a time when you were not able to pay the mortgage or rent on time? N   In the past 12 months, how many times have you moved where you were living? 0   At any time in the past 12 months, were you homeless or living in a shelter (including now)? N   Transportation Needs   In the past 12 months, has lack of transportation kept you from medical appointments or from getting medications? no   In the past 12 months, has lack of transportation kept you from meetings, work, or from getting things needed for daily living? No     TCC ASSESSMENT:  Met with pt and introduced myself as care coordinator and member of the Care Transitions team for discharge planning. Pt is a poor historian and acknowledged that she is a \"little confused.\" Pt stated she is her own decision maker but identified cousins Cortney and Margarette Jolly as primary support people. Attempted to reach cousins Cortney [phone] and Margarette [phone] for collateral information but neither of them answer, left voice mail messages for both to return call.  Pt stated she is from \"[facility],\" and would like to return at time of discharge. Pt informed this writer is not familiar " "with a facility called \"[facility],\" pt admitted she does not recall the exact time, however, she would like to return to the facility she was at prior to admission. Pt reports she has been at facility for \"a long time,\" but still has her home where she lived alone. Pt reports prior to L BKA this year she was completely independent with ADL's and used a walker for assistance with ambulation. Now, pt is non ambulatory and requires total ADL care assistance. Pt report falling at facility a couple of day sago, denies injury. Pt denies any safety or neglect concerns at facility. Pt's address, phone number, and emergency contact information was verified. Pt denies any social or financial concerns at this time.    Home care: None.  DME: 2 walkers, glucometer + supplies, raised toilet seat, wheelchair.   : None.  PCP: Tab Castañeda MD  Last appt: 10/2024   Transport to appts: PCP office, Tess Thomas arranges pickup.  Pharmacy: [facility] mail order. Pt denies issues affording or obtaining medications.     Discharge Planning: Per chart review pt was discharged to [facility] facility 4/2025 form [facility]. Referral sent to [facility] via [facility] to confirm if pt is LTC at their facility and if new auth is needed for her return. Care coordinator will continue to follow for discharge planning needs.    Pete Lee RN  Transitional Care Coordinator (TCC)  [phone] or [phone]  "

## 2025-07-27 NOTE — PROGRESS NOTES
"Myrna Khan is a 65 y.o. female on day 1 of admission presenting with Hypoglycemia.    Subjective   No acute events overnight. Endorsing some pain to L stump area. No other complaints.        Objective     Physical Exam  Constitutional:       General: She is not in acute distress.     Comments: Chronically ill appearing    HENT:      Head: Normocephalic and atraumatic.      Nose: Nose normal.      Mouth/Throat:      Mouth: Mucous membranes are moist.     Eyes:      Extraocular Movements: Extraocular movements intact.      Conjunctiva/sclera: Conjunctivae normal.       Cardiovascular:      Rate and Rhythm: Normal rate and regular rhythm.      Heart sounds: Normal heart sounds.   Pulmonary:      Effort: Pulmonary effort is normal.      Breath sounds: Normal breath sounds.   Abdominal:      General: Bowel sounds are normal. There is no distension.      Palpations: Abdomen is soft.      Tenderness: There is no abdominal tenderness.     Musculoskeletal:         General: Normal range of motion.      Cervical back: Normal range of motion.      Comments: L BKA      Skin:     Comments: Ulceration noted L BKA site. No erythema, no calor. Mixed fibrous base.      Neurological:      General: No focal deficit present.      Mental Status: She is alert. Mental status is at baseline.     Psychiatric:         Behavior: Behavior normal.         Last Recorded Vitals  Blood pressure 155/81, pulse 64, temperature 36.8 °C (98.2 °F), resp. rate 16, height (!) 1.549 m (5' 1\"), weight (!) 36.3 kg (80 lb 0.4 oz), SpO2 96%.  Intake/Output last 3 Shifts:  I/O last 3 completed shifts:  In: 1754.2 (48.3 mL/kg) [P.O.:480; I.V.:1019.2 (28.1 mL/kg); IV Piggyback:255]  Out: 500 (13.8 mL/kg) [Urine:500 (0.4 mL/kg/hr)]  Weight: 36.3 kg     Relevant Results  Scheduled medications  Scheduled Medications[1]  Continuous medications  Continuous Medications[2]  PRN medications  PRN Medications[3]     Results for orders placed or performed during the " hospital encounter of 07/26/25 (from the past 24 hours)   POCT GLUCOSE   Result Value Ref Range    POCT Glucose 135 (H) 74 - 99 mg/dL   POCT GLUCOSE   Result Value Ref Range    POCT Glucose 134 (H) 74 - 99 mg/dL   POCT GLUCOSE   Result Value Ref Range    POCT Glucose 59 (L) 74 - 99 mg/dL   Renal function panel   Result Value Ref Range    Glucose 73 (L) 74 - 99 mg/dL    Sodium 141 136 - 145 mmol/L    Potassium 3.0 (L) 3.5 - 5.3 mmol/L    Chloride 106 98 - 107 mmol/L    Bicarbonate 25 21 - 32 mmol/L    Anion Gap 13 10 - 20 mmol/L    Urea Nitrogen 32 (H) 6 - 23 mg/dL    Creatinine 1.23 (H) 0.50 - 1.05 mg/dL    eGFR 49 (L) >60 mL/min/1.73m*2    Calcium 6.9 (L) 8.6 - 10.6 mg/dL    Phosphorus 2.3 (L) 2.5 - 4.9 mg/dL    Albumin 2.4 (L) 3.4 - 5.0 g/dL   POCT GLUCOSE   Result Value Ref Range    POCT Glucose 79 74 - 99 mg/dL   POCT GLUCOSE   Result Value Ref Range    POCT Glucose 205 (H) 74 - 99 mg/dL   Magnesium   Result Value Ref Range    Magnesium 1.59 (L) 1.60 - 2.40 mg/dL   Renal Function Panel   Result Value Ref Range    Glucose 200 (H) 74 - 99 mg/dL    Sodium 134 (L) 136 - 145 mmol/L    Potassium 4.7 3.5 - 5.3 mmol/L    Chloride 105 98 - 107 mmol/L    Bicarbonate 21 21 - 32 mmol/L    Anion Gap 13 10 - 20 mmol/L    Urea Nitrogen 32 (H) 6 - 23 mg/dL    Creatinine 1.10 (H) 0.50 - 1.05 mg/dL    eGFR 56 (L) >60 mL/min/1.73m*2    Calcium 6.6 (L) 8.6 - 10.6 mg/dL    Phosphorus 3.2 2.5 - 4.9 mg/dL    Albumin 2.4 (L) 3.4 - 5.0 g/dL   CBC and Auto Differential   Result Value Ref Range    WBC 14.0 (H) 4.4 - 11.3 x10*3/uL    nRBC 0.0 0.0 - 0.0 /100 WBCs    RBC 2.47 (L) 4.00 - 5.20 x10*6/uL    Hemoglobin 8.5 (L) 12.0 - 16.0 g/dL    Hematocrit 24.2 (L) 36.0 - 46.0 %    MCV 98 80 - 100 fL    MCH 34.4 (H) 26.0 - 34.0 pg    MCHC 35.1 32.0 - 36.0 g/dL    RDW 14.4 11.5 - 14.5 %    Platelets 135 (L) 150 - 450 x10*3/uL    Immature Granulocytes %, Automated 1.4 (H) 0.0 - 0.9 %    Immature Granulocytes Absolute, Automated 0.19 0.00 - 0.70  x10*3/uL   Manual Differential   Result Value Ref Range    Neutrophils %, Manual 72.3 40.0 - 80.0 %    Bands %, Manual 22.7 0.0 - 5.0 %    Lymphocytes %, Manual 5.0 13.0 - 44.0 %    Monocytes %, Manual 0.0 2.0 - 10.0 %    Eosinophils %, Manual 0.0 0.0 - 6.0 %    Basophils %, Manual 0.0 0.0 - 2.0 %    Atypical Lymphocytes %, Manual 0.0 0.0 - 2.0 %    Metamyelocytes %, Manual 0.0 0.0 - 0.0 %    Myelocytes %, Manual 0.0 0.0 - 0.0 %    Plasma Cells %, Manual 0.0 0.00 - 0.00 %    Promyelocytes %, Manual 0.0 0.0 - 0.0 %    Blasts %, Manual 0.0 0.0 - 0.0 %    Others %, Manual 0.0 %    Seg Neutrophils Absolute, Manual 10.12 (H) 1.20 - 7.00 x10*3/uL    Bands Absolute, Manual 3.18 (H) 0.00 - 0.70 x10*3/uL    Lymphocytes Absolute, Manual 0.70 (L) 1.20 - 4.80 x10*3/uL    Monocytes Absolute, Manual 0.00 (L) 0.10 - 1.00 x10*3/uL    Eosinophils Absolute, Manual 0.00 0.00 - 0.70 x10*3/uL    Basophils Absolute, Manual 0.00 0.00 - 0.10 x10*3/uL    Atypical Lymphs Absolute, Manual 0.00 0.00 - 0.50 x10*3/uL    Metamyelocytes Absolute, Manual 0.00 0.00 - 0.00 x10*3/uL    Myelocytes Absolute, Manual 0.00 0.00 - 0.00 x10*3/uL    Plasma Cells Absolute, Manual 0.00 0.00 - 0.00 x10*3/uL    Promyelocytes Absolute, Manual 0.00 0.00 - 0.00 x10*3/uL    Blasts Absolute, Manual 0.00 0.00 - 0.00 x10*3/uL    Others Absolute, Manual 0.00 x10*3/uL    Total Cells Counted 119     Neutrophils Absolute, Manual 13.30 (H) 1.20 - 7.70 x10*3/uL    Manual nRBC per 100 Cells 0.0 0.0 - 0.0 %    RBC Morphology No significant RBC morphology present    POCT GLUCOSE   Result Value Ref Range    POCT Glucose 229 (H) 74 - 99 mg/dL                   This patient has a urinary catheter   Reason for the urinary catheter remaining today? urinary retention/bladder outlet obstruction, acute or chronic               Assessment & Plan  AMS (altered mental status)    Hypoglycemia    Unresponsiveness      Myrna LANDON Bill is a 65 y.o. female with PMHx of type 2 diabetes, CAD  (s/p CABG 9/2023), hypertension 2/2 renal artery stenosis, PAD with dry gangrene (s/p LLE endarterectomy 3/6/25 and left BKA (guillotine amp 4/1/25 with formalization on 4/3/25)) who presented to the emergency department via EMS from SNF for altered mental status. Admitted for further evaluation of AMS and management of MAGUI. Determined to be appropriate for regular nursing floor. See below for detailed plan:      AMS, likely multifactorial  Bacteremia  UTI  CAP  :: CTH negative for acute intracranial abnormality or mass effect.   :: BCx collected 7/26, pending result  :: CXR showing findings of patchy consolidation of left lung, concerning for left mid lung pneumonia   - UA showing turbid urine, 1+ protein, 3+ glucose, 500 LE. Nitrite and ketone negative, >50 WBC, 11-20 RBC, 1+ bacteria.   - c diff PCR (ordered d/t recent clindamycin course and diarrhea) negative  - UDS negative   Plan:  - Started CTX 1g daily iso amoxicillin allergy on 7/26)  and doxycycline 100mg BID iso QTc of 500. Plan for 5 days total of abx therapy  [ ] Discontinued CTX  and started Zozyn (7/27-   -UA Cx 7/26: >100,000 Klebsiella   -Blood Cx 7/26: growing Klebsiella   [ ] Respiratory culture/smear pending     T2DM  hypoglycemia  :: home regimen per pharm med rec: glargine 10u nightly, Farxiga 10mg daily, metformin 1g BID  :: unclear etiology of hypoglycemia; per SNF report, takes 10u glargine nightly. Unknown what patient's HS BG was or how much insulin she actually received last night.   :: last A1c 6.2% (5/9/2025)  :: patient's BG levels have been labile, even while on D5NS mIVF (103, 347, 104, 228, 70, 157). These are all POCT reads, currently pending repeat RFP. VBG from this afternoon showing glucose of 129.  - has received 2 amps of D50. On D5NS at 50ml/hr, increased to 75ml/h5r  Plan:  - all home medications held at this time  - continue to monitor POCT glucose q4h  [ ] Endocrine consulted in setting of hypoglycemia while being on D5.  Appreciate recs  :: - Stop D5NS  :: - Encourage PO hydration and PO meals. Can do IV hydration with LR vs NS if needed  :: - Hold all her OP medications  :: - Allow the MAGUI to improve  :: - If BG drops <70 on her fingerstick, please check from an alternate source and document and then do  :: - If BG drops <70, send critical labs first : BHB, serum glucose, C-peptide, insulin levels and then treat it with D50  :: - Please alert endocrinology if this happens     MAGUI  Hypomagnesemia  Hypophosphatemia, resolved    chronic urinary retention with indwelling day catheter  :: Cr 1.21 on admission (baseline 0.7-0.8), eGFR 50  :: Mg 1.23, s/p 2g IV Mg in the ED. Has had chronic low Mg in the past on chart review  :: Phos 2.4 with mild hemolysis detected  :: UA showing turbid urine, 1+ protein, 3+ glucose, 500 LE. Nitrite and ketone negative, >50 WBC, 11-20 RBC, 1+ bacteria. Reflex culture pending.   - urine osmolality 357. Urine lytes pending.   - Mag today 1.59, replenished  - Phos 3.2 today   Plan:  - strict I&Os to monitor UOP  [ ]  Started NS at 50 ml/hr   - Daily RFP      malnutrition  - patient cachectic, BMI 15.12  - on home vit D, calcium supplements  Plan:  - continue home vit D, calcium supplements, ordered  - follow up B12 and folate labs  - thiamine, B12, and folate supplements initiated   - q12 RFPs to monitor for refeeding, replete electrolytes for goal of K>4, phos>3, Mg>2  - inpatient consult to dietician placed, appreciate recs     CAD s/p CABG 9/2023  HTN  FROYLAN  :: home antihypertensives: carvedilol 25mg BID, hydralazine 75mg TID, nifedipine 90mg  :: on atorvastatin 80mg nightly  :: EKG showing sinus rhythm with PACs, low voltage QRS, possible inferior infarct (age undetermined), nonspecific T wave abnormality now evident in Inferior leads and T wave inversion now evident in Lateral leads  Plan:  - home meds ordered as above  - Cont tele   - patient currently stable, continue to monitor BP     PAD  :: s/p  LLE endarterectomy 3/6/25 and left BKA (guillotine amp 4/1/25 with formalization on 4/3/25)  :: on clopidigrel 75mg daily and ASA 81mg daily  - wound of LLE at level of BKA. Per recent vasc surgery note, medial ulceration had progressed to a tunneling wound (did not probe to bone).  Was given course of clinda at the time, completed on 7/16  - Xray of L tib/fib without evidence of OM.   Plan:  - continue DAPT, ordered  - wound care consult for evaluation of wounds, appreciate recs     F: NS at 50 ml/hr  E: replete for goal of K>4, phos>3, Mg>2  N: Regular diet  DVT ppx: subcutaneous hep  Abx therapy: CTX and doxycycline  Code status: full code    Patient seen and discussed with attending physician, Dr. Teri Bailey MD   PGY2, Family Medicine   [facility]  Available by Epic Message           [1] aspirin, 81 mg, oral, Daily  atorvastatin, 80 mg, oral, Daily  calcium citrate, 200 mg of elemental calcium, oral, Daily  [Held by provider] carvedilol, 25 mg, oral, BID  cholecalciferol, 50 mcg, oral, Daily  clopidogrel, 75 mg, oral, Daily  cyanocobalamin, 500 mcg, oral, Daily  [Held by provider] dapagliflozin propanediol, 10 mg, oral, Daily with breakfast  doxycylcine, 100 mg, oral, q12h BOLIVAR  gabapentin, 300 mg, oral, BID  heparin (porcine), 5,000 Units, subcutaneous, q8h BOLIVAR  hydrALAZINE, 75 mg, oral, TID  melatonin, 10 mg, oral, Nightly  NIFEdipine ER, 90 mg, oral, Daily before breakfast  piperacillin-tazobactam, 2.25 g, intravenous, q6h  [Held by provider] ranolazine, 500 mg, oral, BID  thiamine, 100 mg, oral, Daily  vitamin B complex-vitamin C-folic acid, 1 capsule, oral, Daily  [2] sodium chloride 0.9%, 50 mL/hr  [3] PRN medications: acetaminophen **OR** acetaminophen **OR** acetaminophen, dextrose, dextrose, diclofenac sodium, glucagon, glucagon, oxygen

## 2025-07-27 NOTE — CONSULTS
Inpatient consult to Endocrinology  Consult performed by: Francesco Umanzor MD  Consult ordered by: Ashli Williamson DO  Reason for consult: Patient with persistent hypoglycemia despite being on D5. Please evaluate.          Reason For Consult  Patient with persistent hypoglycemia despite being on D5. Please evaluate.     History Of Present Illness  Myrna Khan is a 65 y.o. female with a past medical history significant of but not limited to IDT2DM, CAD s/p CABG in 09/2023, HTN 2/2 FROYLAN, PAD with dry gangrene (s/p LLE endarterectomy in 03/2025) RANJITH THURSTON presented to the ED via EMS from SNF due to AMS. We have been asked to see the patient for management and evaluation of her hypoglycemia in the setting of IDT2DM.    Per patient, she has noticed some lows at the facility where she resides. She usually would 50% of her meals 3 times a day and would take all her meds. She had some lightheadedness, diarrhea at the facility ever since she started clindamycin which was prescribed to her by vascular surgery team for a concern of stump infection. The patient states she feels well now though she wants to sleep.    Per chart review, the patient's medication doses were never adjusted, and she had developed an MAGUI. At the facility, she was on Metformin, Farxiga, Glargine 10 units and Lispro with ISS.     Her BG was 25 when the EMS was called, and while enroute it was 34, she received 30 ml of D5 which did not help her at all. In the ED, she was started on D10 which was later switched to D5NS, which is still continuing. No dextrose PRNs documented despite varying readings of low BG on RFP and POCT.      Results from Most Recent A1C  Hemoglobin A1C   Date/Time Value Ref Range Status   05/09/2025 03:20 PM 6.3 (H) See comment % Final        Diabetes Problem List Entries with Dates  Problem List:  2024-07: Hyperglycemia due to diabetes mellitus (Multi)  2024-01: Hyperosmolar hyperglycemic state (HHS) (Multi)      Past Medical  "History  She has a past medical history of CAD (coronary artery disease), Carotid artery stenosis, Diabetes mellitus (Multi), Heart disease, HF (heart failure), Hypertension, NSTEMI (non-ST elevated myocardial infarction) (Multi), PAD (peripheral artery disease), and Renal artery stenosis.    Surgical History  She has a past surgical history that includes Coronary artery bypass graft and Femoral artery stent (Left).     Social History  She reports that she has been smoking cigarettes. She started smoking about 30 years ago. She has a 30.6 pack-year smoking history. She has never been exposed to tobacco smoke. She has never used smokeless tobacco. She reports that she does not currently use alcohol. She reports that she does not use drugs.    Family History  Family History[1]     Allergies  Lisinopril, Amoxicillin, and Losartan    Review of Systems  Negative unless noted above     Physical Exam     General - elderly, frail AA female in NAD  HEENT - AT/NC  Neck - trachea in midline  Resp - no extra wob noted  Cvs - bradycardiac  Ext - L BKA with a small ulceration  Skin - delayed capillary refill    ROS, PMH, FH/SH, surgical history and allergies have been reviewed.    Last Recorded Vitals  Blood pressure 155/81, pulse 78, temperature 36.8 °C (98.2 °F), temperature source Temporal, resp. rate 17, height (!) 1.549 m (5' 1\"), weight (!) 36.3 kg (80 lb 0.4 oz), SpO2 98%.    Relevant Results  Results from last 7 days   Lab Units 07/27/25  0625 07/27/25  0407 07/26/25  2138 07/26/25  2114 07/26/25  2044 07/26/25  1825 07/26/25  1727 07/26/25  1713 07/26/25  1555 07/26/25  0741 07/26/25  0643   POCT GLUCOSE mg/dL  --  205* 79  --  59* 134* 135*   < >  --    < >  --    GLUCOSE mg/dL 200*  --   --  73*  --   --   --   --  30*  --  33*    < > = values in this interval not displayed.     Lab Results   Component Value Date    HGBA1C 6.3 (H) 05/09/2025     Lab Results   Component Value Date    CREATININE 1.10 (H) 07/27/2025    " BUN 32 (H) 07/27/2025     (L) 07/27/2025    K 4.7 07/27/2025     07/27/2025    CO2 21 07/27/2025       Assessment/Plan   Assessment & Plan  Hypoglycemia    Hypomagnesemia    AMS (altered mental status)    Unresponsiveness    The patient is a 65 year old female who is admitted to the hospital for evaluation and management of AMS. She was found to be hypoglycemic and we have been asked to see the patient for the same.    OP regimen: Metformin, Farxiga, G10/L with ISS    Based on the current clinical picture, it looks like the patient developed MAGUI due to antibiotics which caused diarrhea, and she kept on taking the same medication doses despite the MAGUI. This could have likely precipitated the hypoglycemia. For vitaly hypoglycemia, she should meet whipple's triad - hypoglycemic BG, symptoms and resolution after treatment, which is difficult to assess in her. Moreover, she has been on D5NS since 07/26 evening. At this point, would recommend:    - Stop D5NS  - Encourage PO hydration and PO meals. Can do IV hydration with LR vs NS if needed  - Hold all her OP medications  - Allow the MAGUI to improve  - If BG drops <70 on her fingerstick, please check from an alternate source and document and then do  - If BG drops <70, send critical labs first : BHB, serum glucose, C-peptide, insulin levels and then treat it with D50  - Please alert endocrinology if this happens  - We will continue to follow    Thank you for the consult. The patient was seen and discussed with Dr. Aaron.    Francesco Umanzor MD  PGY-5 Endocrinology Fellow       [1]   Family History  Problem Relation Name Age of Onset    Peripheral vascular disease Mother      Hypertension Mother      Diabetes type II Mother      Hypertension Sister      Diabetes type II Sister

## 2025-07-27 NOTE — HOSPITAL COURSE
Myrna Khan is a 65 y.o. female with PMHx of type 2 diabetes, CAD (s/p CABG 9/2023), hypertension 2/2 renal artery stenosis, PAD with dry gangrene (s/p LLE endarterectomy 3/6/25 and left BKA (guillotine amp 4/1/25 with formalization on 4/3/25)) who presented to the emergency department via EMS from SNF for altered mental status. Admitted for further evaluation of AMS and management of MAGUI.     Blood cultures came back positive growing Klebsiella. UA grew >100,000 Klebsiella. CTX was discontinued and Zosyn was started. For CAP, doxycycline was started. Endocrine was consulted for continued hypoglycemia in the setting of being on D5. Endocrine said ***     hemodynamically stable, with resolved mentation, resolved hypoglycemia, stabilized hgb and plt, and tolerating PO intake. Patient appropriate for discharge back to facility at this time. Patient has remained hemodynamically stable while off home BP meds, will require PCP follow up for medication management, in addition to follow up with GI and Hematology. Patient updated on discharge plan and all questions answered.

## 2025-07-28 ENCOUNTER — APPOINTMENT (OUTPATIENT)
Dept: RADIOLOGY | Facility: HOSPITAL | Age: 65
DRG: 637 | End: 2025-07-28
Payer: MEDICARE

## 2025-07-28 PROBLEM — R78.81 BACTEREMIA DUE TO KLEBSIELLA PNEUMONIAE: Status: ACTIVE | Noted: 2025-07-28

## 2025-07-28 PROBLEM — B96.1 BACTEREMIA DUE TO KLEBSIELLA PNEUMONIAE: Status: ACTIVE | Noted: 2025-07-28

## 2025-07-28 LAB
ABO GROUP (TYPE) IN BLOOD: NORMAL
ALBUMIN SERPL BCP-MCNC: 2 G/DL (ref 3.4–5)
ANION GAP SERPL CALC-SCNC: 12 MMOL/L (ref 10–20)
ANTIBODY SCREEN: NORMAL
BACTERIA UR CULT: ABNORMAL
BASOPHILS # BLD AUTO: 0.01 X10*3/UL (ref 0–0.1)
BASOPHILS NFR BLD AUTO: 0.1 %
BUN SERPL-MCNC: 33 MG/DL (ref 6–23)
BURR CELLS BLD QL SMEAR: NORMAL
CALCIUM SERPL-MCNC: 6.3 MG/DL (ref 8.6–10.6)
CHLORIDE SERPL-SCNC: 103 MMOL/L (ref 98–107)
CO2 SERPL-SCNC: 23 MMOL/L (ref 21–32)
CREAT SERPL-MCNC: 1.13 MG/DL (ref 0.5–1.05)
EGFRCR SERPLBLD CKD-EPI 2021: 54 ML/MIN/1.73M*2
EOSINOPHIL # BLD AUTO: 0.03 X10*3/UL (ref 0–0.7)
EOSINOPHIL NFR BLD AUTO: 0.2 %
ERYTHROCYTE [DISTWIDTH] IN BLOOD BY AUTOMATED COUNT: 15.3 % (ref 11.5–14.5)
GLUCOSE BLD MANUAL STRIP-MCNC: 154 MG/DL (ref 74–99)
GLUCOSE BLD MANUAL STRIP-MCNC: 168 MG/DL (ref 74–99)
GLUCOSE BLD MANUAL STRIP-MCNC: 169 MG/DL (ref 74–99)
GLUCOSE BLD MANUAL STRIP-MCNC: 175 MG/DL (ref 74–99)
GLUCOSE BLD MANUAL STRIP-MCNC: 187 MG/DL (ref 74–99)
GLUCOSE BLD MANUAL STRIP-MCNC: 208 MG/DL (ref 74–99)
GLUCOSE SERPL-MCNC: 164 MG/DL (ref 74–99)
HCT VFR BLD AUTO: 19.1 % (ref 36–46)
HCT VFR BLD AUTO: 19.9 % (ref 36–46)
HGB BLD-MCNC: 6.4 G/DL (ref 12–16)
HGB BLD-MCNC: 6.6 G/DL (ref 12–16)
HOLD SPECIMEN: NORMAL
IMM GRANULOCYTES # BLD AUTO: 0.1 X10*3/UL (ref 0–0.7)
IMM GRANULOCYTES NFR BLD AUTO: 0.7 % (ref 0–0.9)
LYMPHOCYTES # BLD AUTO: 3.33 X10*3/UL (ref 1.2–4.8)
LYMPHOCYTES NFR BLD AUTO: 23.5 %
MAGNESIUM SERPL-MCNC: 1.89 MG/DL (ref 1.6–2.4)
MCH RBC QN AUTO: 34.4 PG (ref 26–34)
MCHC RBC AUTO-ENTMCNC: 32.2 G/DL (ref 32–36)
MCV RBC AUTO: 107 FL (ref 80–100)
MONOCYTES # BLD AUTO: 0.47 X10*3/UL (ref 0.1–1)
MONOCYTES NFR BLD AUTO: 3.3 %
NEUTROPHILS # BLD AUTO: 10.24 X10*3/UL (ref 1.2–7.7)
NEUTROPHILS NFR BLD AUTO: 72.2 %
NRBC BLD-RTO: 0 /100 WBCS (ref 0–0)
PHOSPHATE SERPL-MCNC: 1.8 MG/DL (ref 2.5–4.9)
PLATELET # BLD AUTO: 136 X10*3/UL (ref 150–450)
POTASSIUM SERPL-SCNC: 4.5 MMOL/L (ref 3.5–5.3)
RBC # BLD AUTO: 1.86 X10*6/UL (ref 4–5.2)
RBC MORPH BLD: NORMAL
RH FACTOR (ANTIGEN D): NORMAL
SODIUM SERPL-SCNC: 133 MMOL/L (ref 136–145)
WBC # BLD AUTO: 14.2 X10*3/UL (ref 4.4–11.3)

## 2025-07-28 PROCEDURE — 2500000001 HC RX 250 WO HCPCS SELF ADMINISTERED DRUGS (ALT 637 FOR MEDICARE OP)

## 2025-07-28 PROCEDURE — 74177 CT ABD & PELVIS W/CONTRAST: CPT | Performed by: RADIOLOGY

## 2025-07-28 PROCEDURE — 2500000002 HC RX 250 W HCPCS SELF ADMINISTERED DRUGS (ALT 637 FOR MEDICARE OP, ALT 636 FOR OP/ED)

## 2025-07-28 PROCEDURE — 2500000004 HC RX 250 GENERAL PHARMACY W/ HCPCS (ALT 636 FOR OP/ED)

## 2025-07-28 PROCEDURE — 2500000005 HC RX 250 GENERAL PHARMACY W/O HCPCS

## 2025-07-28 PROCEDURE — 36430 TRANSFUSION BLD/BLD COMPNT: CPT

## 2025-07-28 PROCEDURE — 36415 COLL VENOUS BLD VENIPUNCTURE: CPT

## 2025-07-28 PROCEDURE — 2500000001 HC RX 250 WO HCPCS SELF ADMINISTERED DRUGS (ALT 637 FOR MEDICARE OP): Performed by: STUDENT IN AN ORGANIZED HEALTH CARE EDUCATION/TRAINING PROGRAM

## 2025-07-28 PROCEDURE — 86923 COMPATIBILITY TEST ELECTRIC: CPT

## 2025-07-28 PROCEDURE — P9016 RBC LEUKOCYTES REDUCED: HCPCS

## 2025-07-28 PROCEDURE — 0D9670Z DRAINAGE OF STOMACH WITH DRAINAGE DEVICE, VIA NATURAL OR ARTIFICIAL OPENING: ICD-10-PCS | Performed by: EMERGENCY MEDICINE

## 2025-07-28 PROCEDURE — 86901 BLOOD TYPING SEROLOGIC RH(D): CPT

## 2025-07-28 PROCEDURE — 83540 ASSAY OF IRON: CPT

## 2025-07-28 PROCEDURE — 83735 ASSAY OF MAGNESIUM: CPT

## 2025-07-28 PROCEDURE — 85025 COMPLETE CBC W/AUTO DIFF WBC: CPT

## 2025-07-28 PROCEDURE — 99232 SBSQ HOSP IP/OBS MODERATE 35: CPT

## 2025-07-28 PROCEDURE — 82728 ASSAY OF FERRITIN: CPT

## 2025-07-28 PROCEDURE — 2550000001 HC RX 255 CONTRASTS: Performed by: STUDENT IN AN ORGANIZED HEALTH CARE EDUCATION/TRAINING PROGRAM

## 2025-07-28 PROCEDURE — 82947 ASSAY GLUCOSE BLOOD QUANT: CPT

## 2025-07-28 PROCEDURE — 99233 SBSQ HOSP IP/OBS HIGH 50: CPT

## 2025-07-28 PROCEDURE — 85014 HEMATOCRIT: CPT

## 2025-07-28 PROCEDURE — 74177 CT ABD & PELVIS W/CONTRAST: CPT

## 2025-07-28 PROCEDURE — 80069 RENAL FUNCTION PANEL: CPT

## 2025-07-28 PROCEDURE — 1200000002 HC GENERAL ROOM WITH TELEMETRY DAILY

## 2025-07-28 PROCEDURE — 2500000004 HC RX 250 GENERAL PHARMACY W/ HCPCS (ALT 636 FOR OP/ED): Performed by: STUDENT IN AN ORGANIZED HEALTH CARE EDUCATION/TRAINING PROGRAM

## 2025-07-28 RX ORDER — OXYCODONE HYDROCHLORIDE 5 MG/1
2.5 TABLET ORAL EVERY 6 HOURS PRN
Status: DISCONTINUED | OUTPATIENT
Start: 2025-07-28 | End: 2025-08-05 | Stop reason: HOSPADM

## 2025-07-28 RX ORDER — ALUMINUM HYDROXIDE, MAGNESIUM HYDROXIDE, AND SIMETHICONE 1200; 120; 1200 MG/30ML; MG/30ML; MG/30ML
30 SUSPENSION ORAL ONCE
Status: COMPLETED | OUTPATIENT
Start: 2025-07-28 | End: 2025-07-28

## 2025-07-28 RX ORDER — ONDANSETRON HYDROCHLORIDE 2 MG/ML
4 INJECTION, SOLUTION INTRAVENOUS ONCE
Status: DISCONTINUED | OUTPATIENT
Start: 2025-07-28 | End: 2025-07-29

## 2025-07-28 RX ORDER — INSULIN LISPRO 100 [IU]/ML
0-5 INJECTION, SOLUTION INTRAVENOUS; SUBCUTANEOUS
Status: DISCONTINUED | OUTPATIENT
Start: 2025-07-29 | End: 2025-08-05 | Stop reason: HOSPADM

## 2025-07-28 RX ORDER — NALOXONE HYDROCHLORIDE 0.4 MG/ML
0.2 INJECTION, SOLUTION INTRAMUSCULAR; INTRAVENOUS; SUBCUTANEOUS EVERY 5 MIN PRN
Status: DISCONTINUED | OUTPATIENT
Start: 2025-07-28 | End: 2025-08-05 | Stop reason: HOSPADM

## 2025-07-28 RX ORDER — PANTOPRAZOLE SODIUM 40 MG/1
40 TABLET, DELAYED RELEASE ORAL
Status: DISCONTINUED | OUTPATIENT
Start: 2025-07-29 | End: 2025-07-29

## 2025-07-28 RX ADMIN — PIPERACILLIN SODIUM AND TAZOBACTAM SODIUM 2.25 G: 2; .25 INJECTION, SOLUTION INTRAVENOUS at 22:59

## 2025-07-28 RX ADMIN — HEPARIN SODIUM 5000 UNITS: 5000 INJECTION, SOLUTION INTRAVENOUS; SUBCUTANEOUS at 17:21

## 2025-07-28 RX ADMIN — PIPERACILLIN SODIUM AND TAZOBACTAM SODIUM 2.25 G: 2; .25 INJECTION, SOLUTION INTRAVENOUS at 09:31

## 2025-07-28 RX ADMIN — NIFEDIPINE 90 MG: 90 TABLET, FILM COATED, EXTENDED RELEASE ORAL at 09:35

## 2025-07-28 RX ADMIN — DOXYCYCLINE HYCLATE 100 MG: 100 TABLET, COATED ORAL at 09:35

## 2025-07-28 RX ADMIN — Medication 50 MCG: at 09:35

## 2025-07-28 RX ADMIN — PIPERACILLIN SODIUM AND TAZOBACTAM SODIUM 2.25 G: 2; .25 INJECTION, SOLUTION INTRAVENOUS at 05:19

## 2025-07-28 RX ADMIN — Medication 10 MG: at 22:26

## 2025-07-28 RX ADMIN — HYDRALAZINE HYDROCHLORIDE 75 MG: 25 TABLET ORAL at 22:27

## 2025-07-28 RX ADMIN — HYDRALAZINE HYDROCHLORIDE 75 MG: 25 TABLET ORAL at 09:35

## 2025-07-28 RX ADMIN — IOHEXOL 70 ML: 350 INJECTION, SOLUTION INTRAVENOUS at 22:38

## 2025-07-28 RX ADMIN — HEPARIN SODIUM 5000 UNITS: 5000 INJECTION, SOLUTION INTRAVENOUS; SUBCUTANEOUS at 05:19

## 2025-07-28 RX ADMIN — GABAPENTIN 300 MG: 300 CAPSULE ORAL at 22:26

## 2025-07-28 RX ADMIN — PIPERACILLIN SODIUM AND TAZOBACTAM SODIUM 2.25 G: 2; .25 INJECTION, SOLUTION INTRAVENOUS at 17:21

## 2025-07-28 RX ADMIN — ATORVASTATIN CALCIUM 80 MG: 80 TABLET, FILM COATED ORAL at 09:35

## 2025-07-28 RX ADMIN — ALUMINUM HYDROXIDE, MAGNESIUM HYDROXIDE, AND DIMETHICONE 30 ML: 200; 20; 200 SUSPENSION ORAL at 20:16

## 2025-07-28 RX ADMIN — OXYCODONE HYDROCHLORIDE 2.5 MG: 5 TABLET ORAL at 23:09

## 2025-07-28 RX ADMIN — HYDRALAZINE HYDROCHLORIDE 75 MG: 25 TABLET ORAL at 17:22

## 2025-07-28 RX ADMIN — CYANOCOBALAMIN TAB 1000 MCG 500 MCG: 1000 TAB at 09:34

## 2025-07-28 RX ADMIN — GABAPENTIN 300 MG: 300 CAPSULE ORAL at 09:35

## 2025-07-28 RX ADMIN — ASPIRIN 81 MG: 81 TABLET, COATED ORAL at 09:36

## 2025-07-28 RX ADMIN — SODIUM PHOSPHATE, MONOBASIC, MONOHYDRATE AND SODIUM PHOSPHATE, DIBASIC, ANHYDROUS 21 MMOL: 142; 276 INJECTION, SOLUTION INTRAVENOUS at 10:58

## 2025-07-28 RX ADMIN — THIAMINE HCL TAB 100 MG 100 MG: 100 TAB at 09:43

## 2025-07-28 RX ADMIN — Medication 1 TABLET: at 09:36

## 2025-07-28 RX ADMIN — ASCORBIC ACID, THIAMINE MONONITRATE,RIBOFLAVIN, NIACINAMIDE, PYRIDOXINE HYDROCHLORIDE, FOLIC ACID, CYANOCOBALAMIN, BIOTIN, CALCIUM PANTOTHENATE, 1 CAPSULE: 100; 1.5; 1.7; 20; 10; 1; 6000; 150000; 5 CAPSULE, LIQUID FILLED ORAL at 09:35

## 2025-07-28 ASSESSMENT — PAIN DESCRIPTION - LOCATION: LOCATION: ABDOMEN

## 2025-07-28 ASSESSMENT — PAIN - FUNCTIONAL ASSESSMENT: PAIN_FUNCTIONAL_ASSESSMENT: 0-10

## 2025-07-28 ASSESSMENT — PAIN SCALES - GENERAL: PAINLEVEL_OUTOF10: 8

## 2025-07-28 ASSESSMENT — PAIN DESCRIPTION - DESCRIPTORS: DESCRIPTORS: ACHING;SORE

## 2025-07-28 NOTE — PROGRESS NOTES
07/28/25 1157   Rapid Rounds   Attendance Provider;Care Transitions   Expected Discharge Disposition Inter   Today we still await: Clinical stability   Review at Escalation Rounds No escalation needed     Social Work Note    LSW met with Pt Med Team. Per med team Pt not medically ready for DC. Pt to return back to [facility] at DC. Care transitions available to assist with any future discharge needs.       MARA Painting

## 2025-07-28 NOTE — CARE PLAN
The patient's goals for the shift include      The clinical goals for the shift include pt will be injury free this shift    Problem: Pain - Adult  Goal: Verbalizes/displays adequate comfort level or baseline comfort level  Outcome: Progressing     Problem: Safety - Adult  Goal: Free from fall injury  Outcome: Progressing     Problem: Skin  Goal: Prevent/manage excess moisture  Outcome: Progressing  Flowsheets (Taken 7/28/2025 2617)  Prevent/manage excess moisture:   Cleanse incontinence/protect with barrier cream   Moisturize dry skin

## 2025-07-28 NOTE — CARE PLAN
The patient's goals for the shift include Patient will safe and stable during shift    The clinical goals for the shift include Patient will be HDS during shift    Other goals include:  Problem: Pain - Adult  Goal: Verbalizes/displays adequate comfort level or baseline comfort level  7/28/2025 1709 by Petra Lawrence RN  Outcome: Progressing  7/28/2025 1709 by Petra Lawrence RN  Outcome: Progressing     Problem: Safety - Adult  Goal: Free from fall injury  7/28/2025 1709 by Petra Lawrence RN  Outcome: Progressing  7/28/2025 1709 by Petra Lawrence RN  Outcome: Progressing     Problem: Discharge Planning  Goal: Discharge to home or other facility with appropriate resources  7/28/2025 1709 by Petra Lawrence RN  Outcome: Progressing  7/28/2025 1709 by Petra Lawrence RN  Outcome: Progressing     Problem: Chronic Conditions and Co-morbidities  Goal: Patient's chronic conditions and co-morbidity symptoms are monitored and maintained or improved  7/28/2025 1709 by Petra Lawrence RN  Outcome: Progressing  7/28/2025 1709 by Petra Lawrence RN  Outcome: Progressing     Problem: Nutrition  Goal: Nutrient intake appropriate for maintaining nutritional needs  7/28/2025 1709 by Petra Lawrence RN  Outcome: Progressing  7/28/2025 1709 by Petra Lawrence RN  Outcome: Progressing     Problem: Skin  Goal: Decreased wound size/increased tissue granulation at next dressing change  7/28/2025 1709 by Petra Lawrence RN  Outcome: Progressing  Flowsheets (Taken 7/28/2025 1709)  Decreased wound size/increased tissue granulation at next dressing change: Promote sleep for wound healing  7/28/2025 1709 by Petra Lawrence RN  Outcome: Progressing  Flowsheets (Taken 7/28/2025 1709)  Decreased wound size/increased tissue granulation at next dressing change: Promote sleep for wound healing  Goal: Participates in plan/prevention/treatment measures  7/28/2025 1709 by Petra Lawrence RN  Outcome: Progressing  Flowsheets (Taken 7/28/2025 1709)  Participates  in plan/prevention/treatment measures:   Increase activity/out of bed for meals   Elevate heels  7/28/2025 1709 by Petra Lawrence RN  Outcome: Progressing  Flowsheets (Taken 7/28/2025 1709)  Participates in plan/prevention/treatment measures:   Increase activity/out of bed for meals   Elevate heels  Goal: Prevent/manage excess moisture  7/28/2025 1709 by Petra Lawrence RN  Outcome: Progressing  Flowsheets (Taken 7/28/2025 1709)  Prevent/manage excess moisture:   Cleanse incontinence/protect with barrier cream   Moisturize dry skin   Monitor for/manage infection if present  7/28/2025 1709 by Petra Lawrence RN  Outcome: Progressing  Flowsheets (Taken 7/28/2025 1709)  Prevent/manage excess moisture:   Cleanse incontinence/protect with barrier cream   Moisturize dry skin   Monitor for/manage infection if present  Goal: Prevent/minimize sheer/friction injuries  7/28/2025 1709 by Petra Lawrence RN  Outcome: Progressing  Flowsheets (Taken 7/28/2025 1709)  Prevent/minimize sheer/friction injuries:   HOB 30 degrees or less   Increase activity/out of bed for meals   Turn/reposition every 2 hours/use positioning/transfer devices  7/28/2025 1709 by Petra Lawrence RN  Outcome: Progressing  Flowsheets (Taken 7/28/2025 1709)  Prevent/minimize sheer/friction injuries:   HOB 30 degrees or less   Increase activity/out of bed for meals   Turn/reposition every 2 hours/use positioning/transfer devices  Goal: Promote/optimize nutrition  7/28/2025 1709 by Petra Lawrence RN  Outcome: Progressing  Flowsheets (Taken 7/28/2025 1709)  Promote/optimize nutrition:   Monitor/record intake including meals   Discuss with provider if NPO > 2 days   Consume > 50% meals/supplements  7/28/2025 1709 by Petra Lawrence RN  Outcome: Progressing  Flowsheets (Taken 7/28/2025 1709)  Promote/optimize nutrition:   Monitor/record intake including meals   Discuss with provider if NPO > 2 days   Consume > 50% meals/supplements  Goal: Promote skin  healing  7/28/2025 1709 by Petra Lawrence RN  Outcome: Progressing  Flowsheets (Taken 7/28/2025 1709)  Promote skin healing:   Rotate device position/do not position patient on device   Turn/reposition every 2 hours/use positioning/transfer devices   Assess skin/pad under line(s)/device(s)  7/28/2025 1709 by Petra Lawrence RN  Outcome: Progressing  Flowsheets (Taken 7/28/2025 1709)  Promote skin healing:   Rotate device position/do not position patient on device   Turn/reposition every 2 hours/use positioning/transfer devices   Assess skin/pad under line(s)/device(s)

## 2025-07-28 NOTE — PROGRESS NOTES
"Myrna Khan is a 65 y.o. female on day 2 of admission presenting with Hypoglycemia.    Subjective   No acute events overnight. Endorsing some pain to L stump area. No other complaints.        Objective     Physical Exam  Constitutional:       General: She is not in acute distress.     Comments: Chronically ill appearing    HENT:      Head: Normocephalic and atraumatic.      Nose: Nose normal.      Mouth/Throat:      Mouth: Mucous membranes are moist.     Eyes:      Extraocular Movements: Extraocular movements intact.      Conjunctiva/sclera: Conjunctivae normal.       Cardiovascular:      Rate and Rhythm: Normal rate and regular rhythm.      Heart sounds: Normal heart sounds.   Pulmonary:      Effort: Pulmonary effort is normal.      Breath sounds: Normal breath sounds.      Comments: Intermittently wearing 1L NC for comfort  Abdominal:      General: Bowel sounds are normal. There is no distension.      Palpations: Abdomen is soft.      Tenderness: There is no abdominal tenderness.     Musculoskeletal:         General: Normal range of motion.      Cervical back: Normal range of motion.      Comments: L BKA      Skin:     Comments: Ulceration noted L BKA site. No erythema, no calor. Mixed fibrous base.      Neurological:      General: No focal deficit present.      Mental Status: She is alert. Mental status is at baseline.     Psychiatric:         Behavior: Behavior normal.         Last Recorded Vitals  Blood pressure 118/73, pulse 85, temperature 36.3 °C (97.3 °F), resp. rate 15, height (!) 1.549 m (5' 1\"), weight (!) 36.3 kg (80 lb 0.4 oz), SpO2 96%.  Intake/Output last 3 Shifts:  I/O last 3 completed shifts:  In: 1891.3 (52.1 mL/kg) [P.O.:640; I.V.:946.3 (26.1 mL/kg); IV Piggyback:305]  Out: 500 (13.8 mL/kg) [Urine:500 (0.4 mL/kg/hr)]  Weight: 36.3 kg     Relevant Results  Scheduled medications  Scheduled Medications[1]  Continuous medications  Continuous Medications[2]  PRN medications  PRN Medications[3] "     Results for orders placed or performed during the hospital encounter of 07/26/25 (from the past 24 hours)   POCT GLUCOSE   Result Value Ref Range    POCT Glucose 261 (H) 74 - 99 mg/dL   Blood Culture    Specimen: Peripheral Venipuncture; Blood culture   Result Value Ref Range    Blood Culture Loaded on Instrument - Culture in progress    Blood Culture    Specimen: Peripheral Venipuncture; Blood culture   Result Value Ref Range    Blood Culture Loaded on Instrument - Culture in progress    POCT GLUCOSE   Result Value Ref Range    POCT Glucose 208 (H) 74 - 99 mg/dL   POCT GLUCOSE   Result Value Ref Range    POCT Glucose 169 (H) 74 - 99 mg/dL   CBC and Auto Differential   Result Value Ref Range    WBC 14.2 (H) 4.4 - 11.3 x10*3/uL    nRBC 0.0 0.0 - 0.0 /100 WBCs    RBC 1.86 (L) 4.00 - 5.20 x10*6/uL    Hemoglobin 6.4 (LL) 12.0 - 16.0 g/dL    Hematocrit 19.9 (L) 36.0 - 46.0 %     (H) 80 - 100 fL    MCH 34.4 (H) 26.0 - 34.0 pg    MCHC 32.2 32.0 - 36.0 g/dL    RDW 15.3 (H) 11.5 - 14.5 %    Platelets 136 (L) 150 - 450 x10*3/uL    Neutrophils % 72.2 40.0 - 80.0 %    Immature Granulocytes %, Automated 0.7 0.0 - 0.9 %    Lymphocytes % 23.5 13.0 - 44.0 %    Monocytes % 3.3 2.0 - 10.0 %    Eosinophils % 0.2 0.0 - 6.0 %    Basophils % 0.1 0.0 - 2.0 %    Neutrophils Absolute 10.24 (H) 1.20 - 7.70 x10*3/uL    Immature Granulocytes Absolute, Automated 0.10 0.00 - 0.70 x10*3/uL    Lymphocytes Absolute 3.33 1.20 - 4.80 x10*3/uL    Monocytes Absolute 0.47 0.10 - 1.00 x10*3/uL    Eosinophils Absolute 0.03 0.00 - 0.70 x10*3/uL    Basophils Absolute 0.01 0.00 - 0.10 x10*3/uL   Magnesium   Result Value Ref Range    Magnesium 1.89 1.60 - 2.40 mg/dL   Renal Function Panel   Result Value Ref Range    Glucose 164 (H) 74 - 99 mg/dL    Sodium 133 (L) 136 - 145 mmol/L    Potassium 4.5 3.5 - 5.3 mmol/L    Chloride 103 98 - 107 mmol/L    Bicarbonate 23 21 - 32 mmol/L    Anion Gap 12 10 - 20 mmol/L    Urea Nitrogen 33 (H) 6 - 23 mg/dL     Creatinine 1.13 (H) 0.50 - 1.05 mg/dL    eGFR 54 (L) >60 mL/min/1.73m*2    Calcium 6.3 (L) 8.6 - 10.6 mg/dL    Phosphorus 1.8 (L) 2.5 - 4.9 mg/dL    Albumin 2.0 (L) 3.4 - 5.0 g/dL   Morphology   Result Value Ref Range    RBC Morphology See Below     Vin Cells Few    POCT GLUCOSE   Result Value Ref Range    POCT Glucose 187 (H) 74 - 99 mg/dL   POCT GLUCOSE   Result Value Ref Range    POCT Glucose 175 (H) 74 - 99 mg/dL   Type and screen   Result Value Ref Range    ABO TYPE O     Rh TYPE POS     ANTIBODY SCREEN NEG    Hemoglobin and hematocrit, blood   Result Value Ref Range    Hemoglobin 6.6 (L) 12.0 - 16.0 g/dL    Hematocrit 19.1 (L) 36.0 - 46.0 %   Prepare RBC: 1 Units   Result Value Ref Range    PRODUCT CODE O1360A97     Unit Number [id number]     Unit ABO O     Unit RH POS     XM INTEP COMP     Dispense Status IS     Blood Expiration Date 8/27/2025 11:59:00 PM EDT     PRODUCT BLOOD TYPE 5100     UNIT VOLUME 350                    This patient has a urinary catheter   Reason for the urinary catheter remaining today? urinary retention/bladder outlet obstruction, acute or chronic              Assessment & Plan  AMS (altered mental status)    Hypoglycemia    Unresponsiveness      Myrna Khan is a 65 y.o. female with PMHx of type 2 diabetes, CAD (s/p CABG 9/2023), hypertension 2/2 renal artery stenosis, PAD with dry gangrene (s/p LLE endarterectomy 3/6/25 and left BKA (guillotine amp 4/1/25 with formalization on 4/3/25)) who presented to the emergency department via EMS from SNF for altered mental status. Patient started on D5 due to hypoglycemia, stopped fluids and patient has rebounded following increased PO intake. Endo rec starting SS1 and holding home DM meds. Hgb 6.4, will transfuse with 1 unit PRBC, no active bleed noted.      AMS, likely multifactorial (resolved)  Bacteremia  UTI  CAP  :: CTH negative for acute intracranial abnormality or mass effect.   :: BCx collected 7/26, pending result  :: CXR  showing findings of patchy consolidation of left lung, concerning for left mid lung pneumonia   - UA showing turbid urine, 1+ protein, 3+ glucose, 500 LE. Nitrite and ketone negative, >50 WBC, 11-20 RBC, 1+ bacteria.   - c diff PCR (ordered d/t recent clindamycin course and diarrhea) negative  - UDS negative   Plan:  - Started CTX 1g daily iso amoxicillin allergy on 7/26)  and doxycycline 100mg BID iso QTc of 500. Plan for 5 days total of abx therapy  [ ] Discontinued CTX  and started Zozyn (7/27-x), sensitive to augmentin  -UA Cx 7/26: >100,000 Klebsiella   -Blood Cx 7/26: growing Klebsiella       T2DM  Hypoglycemia (resolved)  :: home regimen per pharm med rec: glargine 10u nightly, Farxiga 10mg daily, metformin 1g BID  :: unclear etiology of hypoglycemia; per SNF report, takes 10u glargine nightly. Unknown what patient's HS BG was or how much insulin she actually received last night.   :: last A1c 6.2% (5/9/2025)  :: patient's BG levels have been labile, even while on D5NS mIVF (103, 347, 104, 228, 70, 157). These are all POCT reads, currently pending repeat RFP. VBG from this afternoon showing glucose of 129.  - has received 2 amps of D50. On D5NS at 50ml/hr, increased to 75ml/h5r  Plan:  - all home medications held at this time  - continue to monitor POCT glucose q4h  - Endo consulted, rec holding OP DM meds, starting SS1 and continue carb controlled diet    Anemia  - admission Hgb 10.5, likely hemo concentrated due to poor po intake   - Current Hgb 6.6  - Transfuse 1 unit PRBC  - post transfusion cbc ordered     MAGUI  Hypomagnesemia  Hypophosphatemia, resolved    chronic urinary retention with indwelling day catheter  :: Cr 1.21 on admission (baseline 0.7-0.8), eGFR 50  :: Mg 1.23, s/p 2g IV Mg in the ED. Has had chronic low Mg in the past on chart review  :: Phos 2.4 with mild hemolysis detected  :: UA showing turbid urine, 1+ protein, 3+ glucose, 500 LE. Nitrite and ketone negative, >50 WBC, 11-20 RBC, 1+  bacteria. Reflex culture pending.   - Phos 1.8, replaced   - Cr 1.13  - daily rfp     malnutrition  - patient cachectic, BMI 15.12  - on home vit D, calcium supplements  Plan:  - continue home vit D, calcium supplements, ordered  - follow up B12 and folate labs  - thiamine, B12, and folate supplements initiated   - q12 RFPs to monitor for refeeding, replete electrolytes for goal of K>4, phos>3, Mg>2  - inpatient consult to dietician placed, appreciate recs  -- ensure TID     CAD s/p CABG 9/2023  HTN  FROYLAN  :: home antihypertensives: carvedilol 25mg BID, hydralazine 75mg TID, nifedipine 90mg  :: on atorvastatin 80mg nightly  :: EKG showing sinus rhythm with PACs, low voltage QRS, possible inferior infarct (age undetermined), nonspecific T wave abnormality now evident in Inferior leads and T wave inversion now evident in Lateral leads  Plan:  - home meds ordered as above  - Cont tele   - patient currently stable, continue to monitor BP     PAD  :: s/p LLE endarterectomy 3/6/25 and left BKA (guillotine amp 4/1/25 with formalization on 4/3/25)  :: on clopidigrel 75mg daily and ASA 81mg daily  - wound of LLE at level of BKA. Per recent vasc surgery note, medial ulceration had progressed to a tunneling wound (did not probe to bone).  Was given course of clinda at the time, completed on 7/16  - Xray of L tib/fib without evidence of OM.   Plan:  - continue DAPT, ordered  - wound care consult for evaluation of wounds, appreciate recs     F: NS at 50 ml/hr  E: replete for goal of K>4, phos>3, Mg>2  N: Regular diet  DVT ppx: subcutaneous hep  Abx therapy: CTX and doxycycline  Code status: full code    Patient discussed with attending physician Dr. Williamson  Plan preliminary until cosigned by attending physician.    Jesse Granados MD  Family Medicine  PGY-3             [1] aspirin, 81 mg, oral, Daily  atorvastatin, 80 mg, oral, Daily  calcium citrate, 200 mg of elemental calcium, oral, Daily  [Held by provider] carvedilol, 25 mg,  oral, BID  cholecalciferol, 50 mcg, oral, Daily  clopidogrel, 75 mg, oral, Daily  cyanocobalamin, 500 mcg, oral, Daily  [Held by provider] dapagliflozin propanediol, 10 mg, oral, Daily with breakfast  doxycylcine, 100 mg, oral, q12h BOLIVAR  gabapentin, 300 mg, oral, BID  heparin (porcine), 5,000 Units, subcutaneous, q8h BOLIVAR  hydrALAZINE, 75 mg, oral, TID  [START ON 7/29/2025] insulin lispro, 0-5 Units, subcutaneous, TID AC  melatonin, 10 mg, oral, Nightly  NIFEdipine ER, 90 mg, oral, Daily before breakfast  piperacillin-tazobactam, 2.25 g, intravenous, q6h  [Held by provider] ranolazine, 500 mg, oral, BID  thiamine, 100 mg, oral, Daily  vitamin B complex-vitamin C-folic acid, 1 capsule, oral, Daily     [2] sodium chloride 0.9%, 50 mL/hr, Last Rate: 50 mL/hr (07/27/25 1810)     [3] PRN medications: acetaminophen **OR** acetaminophen **OR** acetaminophen, dextrose, dextrose, diclofenac sodium, glucagon, glucagon, naloxone, oxyCODONE, oxygen

## 2025-07-28 NOTE — CONSULTS
"Nutrition Initial Assessment:   Nutrition Assessment    Reason for Assessment: Provider consult order    Patient is a 65 y.o. female presenting with PMHx of type 2 diabetes, CAD (s/p CABG 9/2023), hypertension 2/2 renal artery stenosis, PAD with dry gangrene (s/p LLE endarterectomy 3/6/25 and left BKA (guillotine amp 4/1/25 with formalization on 4/3/25)) who presented to the emergency department via EMS from SNF for altered mental status. Was found to have BG of 34 by EMS.     Nutrition History:  Energy Intake: Poor < 50 %  Food and Nutrient History: Met with pt at bedside and stated that her appetite has been poor for the past 3-4 weeks. Pt stated that she doesn't feel hungry and has been consuming <50% of meals. Pt stated \"I need something to make me eat\". Pt stated that this AM she is having indigestion and is new for her. Pt stated she is experiencing some nausea this AM but has not vomited. Pt was unsure of her UBW but stated it's \"little\". Offered pt ONS and pt was agreeable.  Vitamin/Herbal Supplement Use: Vitamin D  Food Allergy:  (none)       Anthropometrics:  Height: (!) 154.9 cm (5' 1\")   Weight: (!) 36.3 kg (80 lb 0.4 oz)   BMI (Calculated): 15.13  IBW/kg (Dietitian Calculated): 45 kg (adjusted for L BKA)  Percent of IBW: 81 %                      Weight History:   Wt Readings from Last 20 Encounters:   07/27/25 (!) 36.3 kg (80 lb 0.4 oz)   07/09/25 (!) 38.1 kg (84 lb)   06/09/25 (!) 38.1 kg (84 lb)   05/28/25 (!) 38.6 kg (85 lb)   05/14/25 (!) 40.8 kg (90 lb)   05/23/25 (!) 40.8 kg (89 lb 15.2 oz)   04/23/25 51.7 kg (114 lb)-->30% wt loss from this date to current (significant)    04/16/25 51.7 kg (114 lb)   04/01/25 50.8 kg (112 lb)   03/28/25 49.9 kg (110 lb)   03/25/25 49.9 kg (110 lb)   03/12/25 59.5 kg (131 lb 2.8 oz)   02/28/25 (!) 42.6 kg (94 lb)   01/07/25 (!) 41.8 kg (92 lb 1.6 oz)   08/27/24 52.6 kg (116 lb)   07/22/24 55.8 kg (123 lb 0.3 oz)-->35% wt loss from this date to current " (significant)        Weight Change %:  Weight History / % Weight Change: Pt had 30% wt loss in the past 3 months and 35% wt loss in the past 6 months  Significant Weight Loss: Yes  Interpretation of Weight Loss: >7.5% in 3 months    Nutrition Focused Physical Exam Findings:    Subcutaneous Fat Loss:   Orbital Fat Pads: Mild-Moderate (slight dark circles and slight hollowing)  Buccal Fat Pads: Severe (hollow, sunken and narrow face)  Triceps: Severe (negligible fat tissue)  Muscle Wasting:  Temporalis: Severe (hollowed scooping depression)  Pectoralis (Clavicular Region): Severe (protruding prominent clavicle)  Deltoid/Trapezius: Severe (squared shoulders, acromion process prominent)  Interosseous: Severe (depressed area between thumb and forefinger)  Trapezius/Infraspinatus/Supraspinatus (Scapular Region): Severe (prominent visual scapula, depression between ribs, scapula or shoulder)  Quadriceps: Defer  Gastrocnemius: Defer  Edema:  Edema: none  Physical Findings:  Hair: Negative  Eyes: Negative  Nails: Negative  Skin: Negative    Nutrition Significant Labs:  CBC Trend:   Results from last 7 days   Lab Units 07/28/25  0608 07/27/25  0627 07/26/25  0643 07/26/25  0643   WBC AUTO x10*3/uL 14.2* 14.0*  --  10.7   RBC AUTO x10*6/uL 1.86* 2.47*  --  3.14*   HEMOGLOBIN g/dL 6.4* 8.5*  --  10.5*   HEMATOCRIT % 19.9* 24.2*  --  31.1*   MCV fL 107* 98  --  99   PLATELETS AUTO x10*3/uL 136* 135*   < >  --     < > = values in this interval not displayed.   BMP Trend:   Results from last 7 days   Lab Units 07/28/25  0608 07/27/25  0625 07/26/25  2114 07/26/25  1555   GLUCOSE mg/dL 164* 200* 73* 30*   CALCIUM mg/dL 6.3* 6.6* 6.9* 6.8*   SODIUM mmol/L 133* 134* 141 140   POTASSIUM mmol/L 4.5 4.7 3.0* 2.9*   CO2 mmol/L 23 21 25 25   CHLORIDE mmol/L 103 105 106 107   BUN mg/dL 33* 32* 32* 31*   CREATININE mg/dL 1.13* 1.10* 1.23* 1.28*   Renal Lab Trend:   Results from last 7 days   Lab Units 07/28/25  0608 07/27/25  0625  07/26/25  2114 07/26/25  1555   POTASSIUM mmol/L 4.5 4.7 3.0* 2.9*   PHOSPHORUS mg/dL 1.8* 3.2 2.3* 2.4*   SODIUM mmol/L 133* 134* 141 140   MAGNESIUM mg/dL 1.89 1.59*  --   --    EGFR mL/min/1.73m*2 54* 56* 49* 47*   BUN mg/dL 33* 32* 32* 31*   CREATININE mg/dL 1.13* 1.10* 1.23* 1.28*    CRP:   Lab Results   Component Value Date    CRP 0.79 08/26/2023       Nutrition Specific Medications:  Scheduled medications  aspirin, 81 mg, oral, Daily  atorvastatin, 80 mg, oral, Daily  calcium citrate, 200 mg of elemental calcium, oral, Daily  [Held by provider] carvedilol, 25 mg, oral, BID  cholecalciferol, 50 mcg, oral, Daily  cyanocobalamin, 500 mcg, oral, Daily  [Held by provider] dapagliflozin propanediol, 10 mg, oral, Daily with breakfast  doxycylcine, 100 mg, oral, q12h BOLIVAR  gabapentin, 300 mg, oral, BID  heparin (porcine), 5,000 Units, subcutaneous, q8h BOLIVAR  hydrALAZINE, 75 mg, oral, TID  melatonin, 10 mg, oral, Nightly  piperacillin-tazobactam, 2.25 g, intravenous, q6h  [Held by provider] ranolazine, 500 mg, oral, BID  sodium phosphate, 21 mmol, intravenous, Once  thiamine, 100 mg, oral, Daily  vitamin B complex-vitamin C-folic acid, 1 capsule, oral, Daily    Continuous medications  sodium chloride 0.9%, 50 mL/hr, Last Rate: 50 mL/hr (07/27/25 1810)      I/O:   Last BM Date: 07/28/25; Stool Appearance: Soft (07/28/25 0519)    Dietary Orders (From admission, onward)       Start     Ordered    07/28/25 1201  Oral nutritional supplements  Until discontinued        Question Answer Comment   Deliver with Breakfast    Deliver with Lunch    Deliver with Dinner    Select supplement: Ensure Plus        07/28/25 1200    07/26/25 2018  May Participate in Room Service With Assistance  ( ROOM SERVICE MAY PARTICIPATE WITH ASSISTANCE)  Once        Question:  .  Answer:  Yes    07/26/25 2017 07/26/25 1313  Adult diet Regular  Diet effective now        Question:  Diet type  Answer:  Regular    07/26/25 1318                      Estimated Needs:   Total Energy Estimated Needs in 24 hours (kCal): 1200 kCal  Method for Estimating Needs: 35kcal/kg CBW  Total Protein Estimated Needs in 24 Hours (g): 50 g  Method for Estimating 24 Hour Protein Needs: 1.2g/kg IBW  Total Fluid Estimated Needs in 24 Hours (mL): 1200 mL  Method for Estimating 24 Hour Fluid Needs: 1ml/kcal or per MD team        Nutrition Diagnosis   Malnutrition Diagnosis  Patient has Malnutrition Diagnosis: Yes  Diagnosis Status: New  Malnutrition Diagnosis: Severe malnutrition related to acute disease or injury  As Evidenced by: pt consuming <50% of estimated needs for >/=1 month, 30% wt loss in the past 3 months, and severe muscle and fat losses            Nutrition Interventions/Recommendations   Nutrition prescription for oral nutrition, Nutrition prescription for enteral nutrition    Nutrition Recommendations:  Individualized Nutrition Prescription Provided for :   1. Continue regular diet as tolerated   2. Recommend Ensure plus TID   3. Recommend appetite stimulant per team discretion   4. Agree with thiamine supplementation   5. If pt continues to have poor PO intake please re consult as pt may require alternate form of nutrition    Nutrition Interventions/Goals:   Meals and Snacks: General healthful diet  Goal: consume >50% of meals and snacks  Medical Food Supplement: Commercial beverage medical food supplement therapy  Goal: consume 100% of 2 ONS/day      Education Documentation  Pt had no further questions at this time             Nutrition Monitoring and Evaluation   Intake / Amount of food: Consumes at least 50% or more of meals/snacks/supplements    Body Weight: Body weight - Promote weight restoration    Electrolyte and Renal Panel: Electrolytes within normal limits  Glucose/Endocrine Profile: Glucose within normal limits ( mg/dL)         Goal Status: New goal(s) identified    Time Spent (min): 60 minutes

## 2025-07-28 NOTE — SIGNIFICANT EVENT
Bedside nurse messaged stating patient was having epigastric discomfort and new abdominal distention. Patient evaluated. Has PRBC running, however, notable swelling at IV site (new per nursing). Attempted to place a different IV, but patient's vein blew. IV team had been contacted, transfusion paused.   Patient also complaining of epigastric pain. Has not had much to eat today, reports her appetite is low. Received Zofran for nausea without improvement in symptoms. On exam, abdomen is soft and non tender, but notably distended. Patient also complaining of dizziness and not feeling well overall. Patient is HDS, passing gas, and having BM. However, want to r/o intra abdominal hemorrhage iso low Hg and increasing abdominal distention. STAT CT ab/pelvis with IV and oral contrast ordered. Also ordered dose of Mylanta.    Marisela Taylor, DO  Family Med, PGY-3

## 2025-07-28 NOTE — CONSULTS
Wound Care Consult     Visit Date: 7/28/2025      Patient Name: Myrna Khan         MRN: [id number]             Reason for Consult: WOUnd to left BKA stump        Wound History: Myrna Khan is a 65 y.o. female with PMHx of type 2 diabetes, CAD (s/p CABG 9/2023), hypertension 2/2 renal artery stenosis, PAD with dry gangrene (s/p LLE endarterectomy 3/6/25 and left BKA (guillotine amp 4/1/25 with formalization on 4/3/25)) who presented to the emergency department via EMS from SNF for altered mental status. Admitted for further evaluation of AMS and management of MAGUI.          Assessment:  Wound 07/27/25 Knee Anterior;Left (Active)   Date First Assessed/Time First Assessed: 07/27/25 1930   Present on Original Admission: Yes  Hand Hygiene Completed: Yes  Location: Knee  Wound Location Orientation: Anterior;Left      Assessments 7/28/2025  5:47 PM   Present on Admission to Healthcare Facility Yes   Wound Image     Shape Round   Wound Length (cm) 0.5 cm   Wound Width (cm) 1.4 cm   Wound Surface Area (cm^2) 0.55 cm^2   Wound Depth (cm) 2.5 cm   Wound Volume (cm^3) 0.916 cm^3   Margins Well-defined edges   Drainage Description Serous;Yellow   Drainage Amount Scant   Dressing Alginate;Abdominal dressing;Kerlix/rolled gauze;Gauze;Compression bandage   Dressing Changed New   Dressing Status Clean;Dry       No associated orders.   Patient awake, alert and oriented. C/O nausea and indigestion. RN aware. No wound to buttocks as documented. Photo in Media. All skin intact and patient has external hemorrhoids.     Left BKA, Open wound with slough. 2.5 cm tunnel at 2 O'clock.   Recommendations:  -Left BKA wound, Clean/irrigate  with Vashe, Pack with Aquacel silver rope, cut in into a slender strip, Pack wound starting at 2 O'clock tunnel, Cover with ABD, wrap with kerlix, followed by ACE wrap. Change daily.       Wound Plan: Wound team will not continue to follow, Please re-consult for worsening of wounds.      FRANNY  GERHARDT, RN, BSN, CWOCN  7/28/2025  6:59 PM

## 2025-07-28 NOTE — PROGRESS NOTES
"Myrna Khan is a 65 y.o. female on day 2 of admission presenting with Hypoglycemia.    Subjective   Patient was seen and examined at bedside today.  No new events of hypoglycemia.  I have reviewed histories, allergies and medications have been reviewed and there are no changes       Objective   Review of Systems  Negative unless noted above     Physical Exam     General - elderly, frail AA female in NAD  HEENT - AT/NC  Neck - trachea in midline  Resp - no extra wob noted  Cvs - bradycardiac  Ext - L BKA with a small ulceration  Skin - delayed capillary refill       Last Recorded Vitals  Blood pressure 93/56, pulse 90, temperature 36.4 °C (97.5 °F), resp. rate 19, height (!) 1.549 m (5' 1\"), weight (!) 36.3 kg (80 lb 0.4 oz), SpO2 100%.  Intake/Output last 3 Shifts:  I/O last 3 completed shifts:  In: 1891.3 (52.1 mL/kg) [P.O.:640; I.V.:946.3 (26.1 mL/kg); IV Piggyback:305]  Out: 500 (13.8 mL/kg) [Urine:500 (0.4 mL/kg/hr)]  Weight: 36.3 kg     Relevant Results  Results from last 7 days   Lab Units 07/28/25  0910 07/28/25  0608 07/28/25  0524 07/28/25  0122 07/27/25  1822 07/27/25  1241 07/27/25  0625 07/26/25  2138 07/26/25  2114 07/26/25  1713 07/26/25  1555 07/26/25  0741 07/26/25  0643   POCT GLUCOSE mg/dL 187*  --  169* 208* 261* 229*  --    < >  --    < >  --    < >  --    GLUCOSE mg/dL  --  164*  --   --   --   --  200*  --  73*  --  30*  --  33*    < > = values in this interval not displayed.                    Assessment & Plan  Hypoglycemia    Hypomagnesemia    AMS (altered mental status)    Unresponsiveness    The patient is a 65 year old female who is admitted to the hospital for evaluation and management of AMS. She was found to be hypoglycemic and we have been asked to see the patient for the same.  Patient reports decreased oral output with diarrhea at homepatient's blood sugars have improved and she does not have.  No Further episodes of hypoglycemia.       - Encourage PO hydration and PO meals. " Can do IV hydration with LR vs NS if needed  - Start sliding scale insulin I.e. SS1, if blood sugars are above 200. Hold all her OP medications  - Start the patient on 60gm carb restriction diet  - Restart Farxiga at the time of discharge; , stop metformin because of her MAGUI. Will need adjustments of insulin dose at time of discharge also.  - If BG drops <70 on her fingerstick, please check from an alternate source and document and then do  - If BG drops <70, send critical labs first : BHB, serum glucose, C-peptide, insulin levels and then treat it with D50  - Please alert endocrinology if this happens  - We will continue to follow         Thank you for the consult. The patient was seen and discussed with Dr. Mahad Ramon MD  Endocrinology fellow PGY 4

## 2025-07-29 ENCOUNTER — APPOINTMENT (OUTPATIENT)
Dept: CARDIOLOGY | Facility: HOSPITAL | Age: 65
DRG: 637 | End: 2025-07-29
Payer: MEDICARE

## 2025-07-29 ENCOUNTER — APPOINTMENT (OUTPATIENT)
Dept: RADIOLOGY | Facility: HOSPITAL | Age: 65
DRG: 637 | End: 2025-07-29
Payer: MEDICARE

## 2025-07-29 LAB
ALBUMIN SERPL BCP-MCNC: 2.3 G/DL (ref 3.4–5)
ANION GAP SERPL CALC-SCNC: 15 MMOL/L (ref 10–20)
ATRIAL RATE: 64 BPM
ATRIAL RATE: 90 BPM
BASOPHILS # BLD AUTO: 0.02 X10*3/UL (ref 0–0.1)
BASOPHILS # BLD MANUAL: 0 X10*3/UL (ref 0–0.1)
BASOPHILS NFR BLD AUTO: 0.1 %
BASOPHILS NFR BLD MANUAL: 0 %
BLASTS # BLD MANUAL: 0 X10*3/UL
BLASTS NFR BLD MANUAL: 0 %
BLOOD EXPIRATION DATE: NORMAL
BUN SERPL-MCNC: 34 MG/DL (ref 6–23)
BURR CELLS BLD QL SMEAR: ABNORMAL
CALCIUM SERPL-MCNC: 6.5 MG/DL (ref 8.6–10.6)
CHLORIDE SERPL-SCNC: 105 MMOL/L (ref 98–107)
CO2 SERPL-SCNC: 21 MMOL/L (ref 21–32)
CREAT SERPL-MCNC: 1.06 MG/DL (ref 0.5–1.05)
DISPENSE STATUS: NORMAL
EGFRCR SERPLBLD CKD-EPI 2021: 58 ML/MIN/1.73M*2
EOSINOPHIL # BLD AUTO: 0.01 X10*3/UL (ref 0–0.7)
EOSINOPHIL # BLD MANUAL: 0 X10*3/UL (ref 0–0.7)
EOSINOPHIL NFR BLD AUTO: 0.1 %
EOSINOPHIL NFR BLD MANUAL: 0 %
ERYTHROCYTE [DISTWIDTH] IN BLOOD BY AUTOMATED COUNT: 18.7 % (ref 11.5–14.5)
ERYTHROCYTE [DISTWIDTH] IN BLOOD BY AUTOMATED COUNT: 19.9 % (ref 11.5–14.5)
ERYTHROCYTE [DISTWIDTH] IN BLOOD BY AUTOMATED COUNT: 20 % (ref 11.5–14.5)
FERRITIN SERPL-MCNC: 260 NG/ML (ref 8–150)
FOLATE SERPL-MCNC: 6.5 NG/ML
GLUCOSE BLD MANUAL STRIP-MCNC: 106 MG/DL (ref 74–99)
GLUCOSE BLD MANUAL STRIP-MCNC: 109 MG/DL (ref 74–99)
GLUCOSE BLD MANUAL STRIP-MCNC: 111 MG/DL (ref 74–99)
GLUCOSE BLD MANUAL STRIP-MCNC: 117 MG/DL (ref 74–99)
GLUCOSE BLD MANUAL STRIP-MCNC: 133 MG/DL (ref 74–99)
GLUCOSE BLD MANUAL STRIP-MCNC: 133 MG/DL (ref 74–99)
GLUCOSE SERPL-MCNC: 101 MG/DL (ref 74–99)
HCT VFR BLD AUTO: 24.2 % (ref 36–46)
HCT VFR BLD AUTO: 26.7 % (ref 36–46)
HCT VFR BLD AUTO: 26.7 % (ref 36–46)
HCT VFR BLD AUTO: 28.7 % (ref 36–46)
HGB BLD-MCNC: 7.9 G/DL (ref 12–16)
HGB BLD-MCNC: 8.8 G/DL (ref 12–16)
HGB BLD-MCNC: 8.9 G/DL (ref 12–16)
HGB BLD-MCNC: 8.9 G/DL (ref 12–16)
IMM GRANULOCYTES # BLD AUTO: 0.07 X10*3/UL (ref 0–0.7)
IMM GRANULOCYTES # BLD AUTO: 0.08 X10*3/UL (ref 0–0.7)
IMM GRANULOCYTES NFR BLD AUTO: 0.5 % (ref 0–0.9)
IMM GRANULOCYTES NFR BLD AUTO: 0.5 % (ref 0–0.9)
IRON SATN MFR SERPL: ABNORMAL %
IRON SERPL-MCNC: 70 UG/DL (ref 35–150)
LYMPHOCYTES # BLD AUTO: 2.91 X10*3/UL (ref 1.2–4.8)
LYMPHOCYTES # BLD MANUAL: 1.19 X10*3/UL (ref 1.2–4.8)
LYMPHOCYTES NFR BLD AUTO: 19.7 %
LYMPHOCYTES NFR BLD MANUAL: 7.7 %
MAGNESIUM SERPL-MCNC: 1.82 MG/DL (ref 1.6–2.4)
MCH RBC QN AUTO: 31.1 PG (ref 26–34)
MCH RBC QN AUTO: 32 PG (ref 26–34)
MCH RBC QN AUTO: 32.4 PG (ref 26–34)
MCHC RBC AUTO-ENTMCNC: 30.7 G/DL (ref 32–36)
MCHC RBC AUTO-ENTMCNC: 32.6 G/DL (ref 32–36)
MCHC RBC AUTO-ENTMCNC: 33.3 G/DL (ref 32–36)
MCV RBC AUTO: 101 FL (ref 80–100)
MCV RBC AUTO: 96 FL (ref 80–100)
MCV RBC AUTO: 99 FL (ref 80–100)
METAMYELOCYTES # BLD MANUAL: 0 X10*3/UL
METAMYELOCYTES NFR BLD MANUAL: 0 %
MONOCYTES # BLD AUTO: 0.3 X10*3/UL (ref 0.1–1)
MONOCYTES # BLD MANUAL: 0.26 X10*3/UL (ref 0.1–1)
MONOCYTES NFR BLD AUTO: 2 %
MONOCYTES NFR BLD MANUAL: 1.7 %
MYELOCYTES # BLD MANUAL: 0 X10*3/UL
MYELOCYTES NFR BLD MANUAL: 0 %
NEUTROPHILS # BLD AUTO: 11.49 X10*3/UL (ref 1.2–7.7)
NEUTROPHILS # BLD MANUAL: 13.95 X10*3/UL (ref 1.2–7.7)
NEUTROPHILS NFR BLD AUTO: 77.6 %
NEUTS BAND # BLD MANUAL: 0 X10*3/UL (ref 0–0.7)
NEUTS BAND NFR BLD MANUAL: 0 %
NEUTS SEG # BLD MANUAL: 13.95 X10*3/UL (ref 1.2–7)
NEUTS SEG NFR BLD MANUAL: 90.6 %
NRBC BLD MANUAL-RTO: 0 % (ref 0–0)
NRBC BLD-RTO: 0 /100 WBCS (ref 0–0)
P AXIS: 76 DEGREES
P AXIS: 98 DEGREES
P OFFSET: 166 MS
P OFFSET: 174 MS
P ONSET: 111 MS
P ONSET: 119 MS
PHOSPHATE SERPL-MCNC: 3 MG/DL (ref 2.5–4.9)
PLASMA CELLS # BLD MANUAL: 0 X10*3/UL
PLASMA CELLS NFR BLD MANUAL: 0 %
PLATELET # BLD AUTO: 104 X10*3/UL (ref 150–450)
PLATELET # BLD AUTO: 97 X10*3/UL (ref 150–450)
PLATELET # BLD AUTO: ABNORMAL 10*3/UL
POTASSIUM SERPL-SCNC: 4.3 MMOL/L (ref 3.5–5.3)
PR INTERVAL: 186 MS
PR INTERVAL: 210 MS
PRODUCT BLOOD TYPE: 5100
PRODUCT CODE: NORMAL
PROMYELOCYTES # BLD MANUAL: 0 X10*3/UL
PROMYELOCYTES NFR BLD MANUAL: 0 %
Q ONSET: 212 MS
Q ONSET: 216 MS
QRS COUNT: 10 BEATS
QRS COUNT: 15 BEATS
QRS DURATION: 102 MS
QRS DURATION: 108 MS
QT INTERVAL: 390 MS
QT INTERVAL: 504 MS
QTC CALCULATION(BAZETT): 477 MS
QTC CALCULATION(BAZETT): 519 MS
QTC FREDERICIA: 446 MS
QTC FREDERICIA: 514 MS
R AXIS: 34 DEGREES
R AXIS: 59 DEGREES
RBC # BLD AUTO: 2.44 X10*6/UL (ref 4–5.2)
RBC # BLD AUTO: 2.78 X10*6/UL (ref 4–5.2)
RBC # BLD AUTO: 2.83 X10*6/UL (ref 4–5.2)
RBC MORPH BLD: ABNORMAL
SODIUM SERPL-SCNC: 137 MMOL/L (ref 136–145)
T AXIS: 79 DEGREES
T AXIS: 93 DEGREES
T OFFSET: 407 MS
T OFFSET: 468 MS
TIBC SERPL-MCNC: ABNORMAL UG/DL
TOTAL CELLS COUNTED BLD: 117
UIBC SERPL-MCNC: <55 UG/DL (ref 110–370)
UNIT ABO: NORMAL
UNIT NUMBER: NORMAL
UNIT RH: NORMAL
UNIT VOLUME: 350
VARIANT LYMPHS # BLD MANUAL: 0 X10*3/UL (ref 0–0.5)
VARIANT LYMPHS NFR BLD: 0 %
VENTRICULAR RATE: 64 BPM
VENTRICULAR RATE: 90 BPM
WBC # BLD AUTO: 13.9 X10*3/UL (ref 4.4–11.3)
WBC # BLD AUTO: 14.8 X10*3/UL (ref 4.4–11.3)
WBC # BLD AUTO: 15.4 X10*3/UL (ref 4.4–11.3)
WBC OTHER # BLD MANUAL: 0 X10*3/UL
WBC OTHER NFR BLD MANUAL: 0 %
XM INTEP: NORMAL

## 2025-07-29 PROCEDURE — 2500000001 HC RX 250 WO HCPCS SELF ADMINISTERED DRUGS (ALT 637 FOR MEDICARE OP)

## 2025-07-29 PROCEDURE — 99232 SBSQ HOSP IP/OBS MODERATE 35: CPT

## 2025-07-29 PROCEDURE — 2500000004 HC RX 250 GENERAL PHARMACY W/ HCPCS (ALT 636 FOR OP/ED): Mod: TB

## 2025-07-29 PROCEDURE — 2500000001 HC RX 250 WO HCPCS SELF ADMINISTERED DRUGS (ALT 637 FOR MEDICARE OP): Performed by: STUDENT IN AN ORGANIZED HEALTH CARE EDUCATION/TRAINING PROGRAM

## 2025-07-29 PROCEDURE — 74018 RADEX ABDOMEN 1 VIEW: CPT

## 2025-07-29 PROCEDURE — 2500000004 HC RX 250 GENERAL PHARMACY W/ HCPCS (ALT 636 FOR OP/ED): Performed by: STUDENT IN AN ORGANIZED HEALTH CARE EDUCATION/TRAINING PROGRAM

## 2025-07-29 PROCEDURE — 36415 COLL VENOUS BLD VENIPUNCTURE: CPT

## 2025-07-29 PROCEDURE — 85025 COMPLETE CBC W/AUTO DIFF WBC: CPT

## 2025-07-29 PROCEDURE — 74018 RADEX ABDOMEN 1 VIEW: CPT | Performed by: RADIOLOGY

## 2025-07-29 PROCEDURE — 85027 COMPLETE CBC AUTOMATED: CPT

## 2025-07-29 PROCEDURE — 85007 BL SMEAR W/DIFF WBC COUNT: CPT

## 2025-07-29 PROCEDURE — 1200000002 HC GENERAL ROOM WITH TELEMETRY DAILY

## 2025-07-29 PROCEDURE — 80069 RENAL FUNCTION PANEL: CPT

## 2025-07-29 PROCEDURE — 83735 ASSAY OF MAGNESIUM: CPT

## 2025-07-29 PROCEDURE — 93005 ELECTROCARDIOGRAM TRACING: CPT

## 2025-07-29 PROCEDURE — 82947 ASSAY GLUCOSE BLOOD QUANT: CPT

## 2025-07-29 PROCEDURE — 82746 ASSAY OF FOLIC ACID SERUM: CPT

## 2025-07-29 PROCEDURE — 86022 PLATELET ANTIBODIES: CPT

## 2025-07-29 RX ORDER — ACETAMINOPHEN 325 MG/1
975 TABLET ORAL ONCE
Status: COMPLETED | OUTPATIENT
Start: 2025-07-29 | End: 2025-07-29

## 2025-07-29 RX ORDER — ONDANSETRON 4 MG/1
4 TABLET, FILM COATED ORAL EVERY 8 HOURS PRN
Status: DISCONTINUED | OUTPATIENT
Start: 2025-07-29 | End: 2025-07-29

## 2025-07-29 RX ORDER — ONDANSETRON HYDROCHLORIDE 2 MG/ML
4 INJECTION, SOLUTION INTRAVENOUS EVERY 8 HOURS PRN
Status: DISCONTINUED | OUTPATIENT
Start: 2025-07-29 | End: 2025-07-29

## 2025-07-29 RX ORDER — PANTOPRAZOLE SODIUM 40 MG/10ML
40 INJECTION, POWDER, LYOPHILIZED, FOR SOLUTION INTRAVENOUS DAILY
Status: DISCONTINUED | OUTPATIENT
Start: 2025-07-30 | End: 2025-07-30

## 2025-07-29 RX ORDER — INSULIN LISPRO 100 [IU]/ML
0-5 INJECTION, SOLUTION INTRAVENOUS; SUBCUTANEOUS
Status: CANCELLED
Start: 2025-07-29 | End: 2025-10-27

## 2025-07-29 RX ORDER — ONDANSETRON 4 MG/1
4 TABLET, FILM COATED ORAL ONCE
Status: DISCONTINUED | OUTPATIENT
Start: 2025-07-29 | End: 2025-07-29

## 2025-07-29 RX ORDER — ACETAMINOPHEN 325 MG/1
975 TABLET ORAL EVERY 6 HOURS PRN
Status: CANCELLED
Start: 2025-07-29

## 2025-07-29 RX ADMIN — PIPERACILLIN SODIUM AND TAZOBACTAM SODIUM 2.25 G: 2; .25 INJECTION, SOLUTION INTRAVENOUS at 11:28

## 2025-07-29 RX ADMIN — HYDROMORPHONE HYDROCHLORIDE 0.2 MG: 1 INJECTION, SOLUTION INTRAMUSCULAR; INTRAVENOUS; SUBCUTANEOUS at 23:13

## 2025-07-29 RX ADMIN — PANTOPRAZOLE SODIUM 40 MG: 40 TABLET, DELAYED RELEASE ORAL at 06:09

## 2025-07-29 RX ADMIN — CLOPIDOGREL BISULFATE 75 MG: 75 TABLET, FILM COATED ORAL at 06:09

## 2025-07-29 RX ADMIN — DOXYCYCLINE HYCLATE 100 MG: 100 TABLET, COATED ORAL at 20:33

## 2025-07-29 RX ADMIN — ACETAMINOPHEN 975 MG: 325 TABLET ORAL at 23:12

## 2025-07-29 RX ADMIN — OXYCODONE HYDROCHLORIDE 2.5 MG: 5 TABLET ORAL at 20:32

## 2025-07-29 RX ADMIN — HYDROMORPHONE HYDROCHLORIDE 0.2 MG: 1 INJECTION, SOLUTION INTRAMUSCULAR; INTRAVENOUS; SUBCUTANEOUS at 17:10

## 2025-07-29 RX ADMIN — HEPARIN SODIUM 5000 UNITS: 5000 INJECTION, SOLUTION INTRAVENOUS; SUBCUTANEOUS at 20:42

## 2025-07-29 RX ADMIN — HEPARIN SODIUM 5000 UNITS: 5000 INJECTION, SOLUTION INTRAVENOUS; SUBCUTANEOUS at 14:50

## 2025-07-29 RX ADMIN — PIPERACILLIN SODIUM AND TAZOBACTAM SODIUM 2.25 G: 2; .25 INJECTION, SOLUTION INTRAVENOUS at 17:10

## 2025-07-29 RX ADMIN — HYDRALAZINE HYDROCHLORIDE 75 MG: 25 TABLET ORAL at 20:32

## 2025-07-29 RX ADMIN — PIPERACILLIN SODIUM AND TAZOBACTAM SODIUM 2.25 G: 2; .25 INJECTION, SOLUTION INTRAVENOUS at 04:05

## 2025-07-29 RX ADMIN — GABAPENTIN 300 MG: 300 CAPSULE ORAL at 20:32

## 2025-07-29 RX ADMIN — Medication 10 MG: at 20:32

## 2025-07-29 RX ADMIN — PIPERACILLIN SODIUM AND TAZOBACTAM SODIUM 2.25 G: 2; .25 INJECTION, SOLUTION INTRAVENOUS at 21:58

## 2025-07-29 RX ADMIN — HEPARIN SODIUM 5000 UNITS: 5000 INJECTION, SOLUTION INTRAVENOUS; SUBCUTANEOUS at 06:09

## 2025-07-29 ASSESSMENT — COGNITIVE AND FUNCTIONAL STATUS - GENERAL
PERSONAL GROOMING: A LITTLE
TOILETING: A LITTLE
DRESSING REGULAR UPPER BODY CLOTHING: A LITTLE
STANDING UP FROM CHAIR USING ARMS: A LITTLE
MOBILITY SCORE: 19
DAILY ACTIVITIY SCORE: 21
WALKING IN HOSPITAL ROOM: A LITTLE
MOVING TO AND FROM BED TO CHAIR: A LITTLE
CLIMB 3 TO 5 STEPS WITH RAILING: A LOT

## 2025-07-29 ASSESSMENT — PAIN DESCRIPTION - ORIENTATION: ORIENTATION: LEFT

## 2025-07-29 ASSESSMENT — PAIN SCALES - GENERAL
PAINLEVEL_OUTOF10: 8
PAINLEVEL_OUTOF10: 7
PAINLEVEL_OUTOF10: 8

## 2025-07-29 ASSESSMENT — PAIN DESCRIPTION - LOCATION
LOCATION: LEG
LOCATION: LEG

## 2025-07-29 ASSESSMENT — PAIN - FUNCTIONAL ASSESSMENT
PAIN_FUNCTIONAL_ASSESSMENT: 0-10

## 2025-07-29 ASSESSMENT — PAIN DESCRIPTION - DESCRIPTORS: DESCRIPTORS: DISCOMFORT

## 2025-07-29 NOTE — PROGRESS NOTES
"Myrna Khan is a 65 y.o. female on day 3 of admission presenting with Bacteremia due to Klebsiella pneumoniae.    Subjective   Overnight patient had N/V and abdominal distension. Patient states she has had abdominal cramping this morning, had an episode of vomiting and a normal caliber Bm and now feels slightly better.         Objective     Physical Exam  Constitutional:       General: She is not in acute distress.     Comments: Chronically ill appearing    HENT:      Head: Normocephalic and atraumatic.      Nose: Nose normal.      Mouth/Throat:      Mouth: Mucous membranes are moist.     Eyes:      Extraocular Movements: Extraocular movements intact.      Conjunctiva/sclera: Conjunctivae normal.       Cardiovascular:      Rate and Rhythm: Normal rate and regular rhythm.      Heart sounds: Normal heart sounds.   Pulmonary:      Effort: Pulmonary effort is normal.      Breath sounds: Normal breath sounds.      Comments: Intermittently wearing 1L NC for comfort  Abdominal:      General: Bowel sounds are normal. There is distension.      Palpations: Abdomen is soft.      Tenderness: There is abdominal tenderness.     Musculoskeletal:         General: Normal range of motion.      Cervical back: Normal range of motion.      Comments: L BKA      Neurological:      General: No focal deficit present.      Mental Status: She is alert. Mental status is at baseline.     Psychiatric:         Behavior: Behavior normal.         Last Recorded Vitals  Blood pressure 120/84, pulse 95, temperature 36.4 °C (97.5 °F), temperature source Temporal, resp. rate 18, height (!) 1.549 m (5' 1\"), weight (!) 36.3 kg (80 lb 0.4 oz), SpO2 100%.  Intake/Output last 3 Shifts:  I/O last 3 completed shifts:  In: 1329.5 (36.6 mL/kg) [P.O.:160; Blood:912.5; IV Piggyback:257]  Out: - (0 mL/kg)   Weight: 36.3 kg     Relevant Results  Scheduled medications  Scheduled Medications[1]  Continuous medications  Continuous Medications[2]  PRN " medications  PRN Medications[3]     Results for orders placed or performed during the hospital encounter of 07/26/25 (from the past 24 hours)   POCT GLUCOSE   Result Value Ref Range    POCT Glucose 175 (H) 74 - 99 mg/dL   Type and screen   Result Value Ref Range    ABO TYPE O     Rh TYPE POS     ANTIBODY SCREEN NEG    Hemoglobin and hematocrit, blood   Result Value Ref Range    Hemoglobin 6.6 (L) 12.0 - 16.0 g/dL    Hematocrit 19.1 (L) 36.0 - 46.0 %   Prepare RBC: 1 Units   Result Value Ref Range    PRODUCT CODE [id number]     Unit Number [id number]     Unit ABO O     Unit RH POS     XM INTEP COMP     Dispense Status IS     Blood Expiration Date 8/27/2025 11:59:00 PM EDT     PRODUCT BLOOD TYPE 5100     UNIT VOLUME 350    POCT GLUCOSE   Result Value Ref Range    POCT Glucose 168 (H) 74 - 99 mg/dL   POCT GLUCOSE   Result Value Ref Range    POCT Glucose 154 (H) 74 - 99 mg/dL   Hemoglobin and hematocrit, blood   Result Value Ref Range    Hemoglobin 8.9 (L) 12.0 - 16.0 g/dL    Hematocrit 26.7 (L) 36.0 - 46.0 %   CBC   Result Value Ref Range    WBC 13.9 (H) 4.4 - 11.3 x10*3/uL    nRBC 0.0 0.0 - 0.0 /100 WBCs    RBC 2.78 (L) 4.00 - 5.20 x10*6/uL    Hemoglobin 8.9 (L) 12.0 - 16.0 g/dL    Hematocrit 26.7 (L) 36.0 - 46.0 %    MCV 96 80 - 100 fL    MCH 32.0 26.0 - 34.0 pg    MCHC 33.3 32.0 - 36.0 g/dL    RDW 18.7 (H) 11.5 - 14.5 %    Platelets     CBC and Auto Differential   Result Value Ref Range    WBC 15.4 (H) 4.4 - 11.3 x10*3/uL    nRBC 0.0 0.0 - 0.0 /100 WBCs    RBC 2.83 (L) 4.00 - 5.20 x10*6/uL    Hemoglobin 8.8 (L) 12.0 - 16.0 g/dL    Hematocrit 28.7 (L) 36.0 - 46.0 %     (H) 80 - 100 fL    MCH 31.1 26.0 - 34.0 pg    MCHC 30.7 (L) 32.0 - 36.0 g/dL    RDW 19.9 (H) 11.5 - 14.5 %    Platelets 104 (L) 150 - 450 x10*3/uL    Immature Granulocytes %, Automated 0.5 0.0 - 0.9 %    Immature Granulocytes Absolute, Automated 0.08 0.00 - 0.70 x10*3/uL   Magnesium   Result Value Ref Range    Magnesium 1.82 1.60 - 2.40 mg/dL    Renal Function Panel   Result Value Ref Range    Glucose 101 (H) 74 - 99 mg/dL    Sodium 137 136 - 145 mmol/L    Potassium 4.3 3.5 - 5.3 mmol/L    Chloride 105 98 - 107 mmol/L    Bicarbonate 21 21 - 32 mmol/L    Anion Gap 15 10 - 20 mmol/L    Urea Nitrogen 34 (H) 6 - 23 mg/dL    Creatinine 1.06 (H) 0.50 - 1.05 mg/dL    eGFR 58 (L) >60 mL/min/1.73m*2    Calcium 6.5 (L) 8.6 - 10.6 mg/dL    Phosphorus 3.0 2.5 - 4.9 mg/dL    Albumin 2.3 (L) 3.4 - 5.0 g/dL   Folate   Result Value Ref Range    Folate, Serum 6.5 >5.0 ng/mL   Manual Differential   Result Value Ref Range    Neutrophils %, Manual 90.6 40.0 - 80.0 %    Bands %, Manual 0.0 0.0 - 5.0 %    Lymphocytes %, Manual 7.7 13.0 - 44.0 %    Monocytes %, Manual 1.7 2.0 - 10.0 %    Eosinophils %, Manual 0.0 0.0 - 6.0 %    Basophils %, Manual 0.0 0.0 - 2.0 %    Atypical Lymphocytes %, Manual 0.0 0.0 - 2.0 %    Metamyelocytes %, Manual 0.0 0.0 - 0.0 %    Myelocytes %, Manual 0.0 0.0 - 0.0 %    Plasma Cells %, Manual 0.0 0.00 - 0.00 %    Promyelocytes %, Manual 0.0 0.0 - 0.0 %    Blasts %, Manual 0.0 0.0 - 0.0 %    Others %, Manual 0.0 %    Seg Neutrophils Absolute, Manual 13.95 (H) 1.20 - 7.00 x10*3/uL    Bands Absolute, Manual 0.00 0.00 - 0.70 x10*3/uL    Lymphocytes Absolute, Manual 1.19 (L) 1.20 - 4.80 x10*3/uL    Monocytes Absolute, Manual 0.26 0.10 - 1.00 x10*3/uL    Eosinophils Absolute, Manual 0.00 0.00 - 0.70 x10*3/uL    Basophils Absolute, Manual 0.00 0.00 - 0.10 x10*3/uL    Atypical Lymphs Absolute, Manual 0.00 0.00 - 0.50 x10*3/uL    Metamyelocytes Absolute, Manual 0.00 0.00 - 0.00 x10*3/uL    Myelocytes Absolute, Manual 0.00 0.00 - 0.00 x10*3/uL    Plasma Cells Absolute, Manual 0.00 0.00 - 0.00 x10*3/uL    Promyelocytes Absolute, Manual 0.00 0.00 - 0.00 x10*3/uL    Blasts Absolute, Manual 0.00 0.00 - 0.00 x10*3/uL    Others Absolute, Manual 0.00 x10*3/uL    Total Cells Counted 117     Neutrophils Absolute, Manual 13.95 (H) 1.20 - 7.70 x10*3/uL    Manual nRBC per  100 Cells 0.0 0.0 - 0.0 %    RBC Morphology See Below     [place] Cells Many    Electrocardiogram, 12-lead PRN ACS symptoms   Result Value Ref Range    Ventricular Rate 90 BPM    Atrial Rate 90 BPM    NH Interval 186 ms    QRS Duration 108 ms    QT Interval 390 ms    QTC Calculation(Bazett) 477 ms    P Axis 76 degrees    R Axis 34 degrees    T Axis 93 degrees    QRS Count 15 beats    Q Onset 212 ms    P Onset 119 ms    P Offset 174 ms    T Offset 407 ms    QTC Fredericia 446 ms   POCT GLUCOSE   Result Value Ref Range    POCT Glucose 111 (H) 74 - 99 mg/dL                   This patient has a urinary catheter   Reason for the urinary catheter remaining today? urinary retention/bladder outlet obstruction, acute or chronic              Assessment & Plan  AMS (altered mental status)    Hypoglycemia    Unresponsiveness    Bacteremia due to Klebsiella pneumoniae      Myrna Khan is a 65 y.o. female with PMHx of type 2 diabetes, CAD (s/p CABG 9/2023), hypertension 2/2 renal artery stenosis, PAD with dry gangrene (s/p LLE endarterectomy 3/6/25 and left BKA (guillotine amp 4/1/25 with formalization on 4/3/25)) who presented to the emergency department via EMS from SNF for altered mental status. Patient started on D5 due to hypoglycemia, stopped fluids and patient has rebounded following increased PO intake. Endo rec starting SS1 and holding home DM meds. Hgb 6.4, transfused 1 unit PRBC, recheck 8.8. CT AP w/o c/f bleed, no active bleed noted. CT showed c/f ileus, patient with n/v, NPO, NG to LIWS ordered.      AMS, likely multifactorial (resolved)  Bacteremia  UTI  CAP  :: CTH negative for acute intracranial abnormality or mass effect.   :: BCx collected 7/26, pending result  :: CXR showing findings of patchy consolidation of left lung, concerning for left mid lung pneumonia   - UA showing turbid urine, 1+ protein, 3+ glucose, 500 LE. Nitrite and ketone negative, >50 WBC, 11-20 RBC, 1+ bacteria.   - c diff PCR (ordered d/t  recent clindamycin course and diarrhea) negative  - UDS negative   Plan:  - Started CTX 1g daily iso amoxicillin allergy on 7/26)  and doxycycline 100mg BID iso QTc of 500. Plan for 5 days total of abx therapy  - Discontinued CTX  and started Zozyn (7/27-x)  -UA Cx 7/26: >100,000 Klebsiella   -Blood Cx 7/26: growing Klebsiella     C/f Ileus  - patient with intermittent n/a overnight  - CT AP showed Multiple loops of distended fluid-filled small bowel with  air-fluid levels  - NPO  - NG to LIWS ordered  - KUB ordered    R adnexal mass  - large cystic appearing right adnexal mass seen on CT imaging  - TVUS ordered    T2DM  Hypoglycemia (resolved)  :: home regimen per pharm med rec: glargine 10u nightly, Farxiga 10mg daily, metformin 1g BID  :: unclear etiology of hypoglycemia; per SNF report, takes 10u glargine nightly. Unknown what patient's HS BG was or how much insulin she actually received last night.   :: last A1c 6.2% (5/9/2025)  :: patient's BG levels have been labile, even while on D5NS mIVF (103, 347, 104, 228, 70, 157). These are all POCT reads, currently pending repeat RFP. VBG from this afternoon showing glucose of 129.  - has received 2 amps of D50. On D5NS at 50ml/hr, increased to 75ml/h5r  Plan:  - all home medications held at this time  - continue to monitor POCT glucose q4h  - Endo consulted, rec holding OP DM meds, starting SS1 and continue carb controlled diet    Anemia  - admission Hgb 10.5, likely hemo concentrated due to poor po intake   - Current Hgb 6.6  - Transfused 1 unit PRBC  - post transfusion cbc 8.9     MAGUI  Hypomagnesemia  Hypophosphatemia, resolved    chronic urinary retention with indwelling day catheter  :: Cr 1.21 on admission (baseline 0.7-0.8), eGFR 50  :: Mg 1.23, s/p 2g IV Mg in the ED. Has had chronic low Mg in the past on chart review  :: Phos 2.4 with mild hemolysis detected  :: UA showing turbid urine, 1+ protein, 3+ glucose, 500 LE. Nitrite and ketone negative, >50 WBC,  11-20 RBC, 1+ bacteria. Reflex culture pending.   - Cr 1.06  - daily rfp     Malnutrition  - patient cachectic, BMI 15.12  - on home vit D, calcium supplements  Plan:  - continue home vit D, calcium supplements, ordered  - follow up B12 and folate labs  - thiamine, B12, and folate supplements initiated   - q12 RFPs to monitor for refeeding, replete electrolytes for goal of K>4, phos>3, Mg>2  - inpatient consult to dietician placed, appreciate recs  -- ensure TID     CAD s/p CABG 9/2023  HTN  FROYLAN  :: home antihypertensives: carvedilol 25mg BID, hydralazine 75mg TID, nifedipine 90mg  :: on atorvastatin 80mg nightly  :: EKG showing sinus rhythm with PACs, low voltage QRS, possible inferior infarct (age undetermined), nonspecific T wave abnormality now evident in Inferior leads and T wave inversion now evident in Lateral leads  Plan:  - home meds ordered as above  - Cont tele   - patient currently stable, continue to monitor BP     PAD  :: s/p LLE endarterectomy 3/6/25 and left BKA (guillotine amp 4/1/25 with formalization on 4/3/25)  :: on clopidigrel 75mg daily and ASA 81mg daily  - wound of LLE at level of BKA. Per recent vasc surgery note, medial ulceration had progressed to a tunneling wound (did not probe to bone).  Was given course of clinda at the time, completed on 7/16  - Xray of L tib/fib without evidence of OM.   Plan:  - continue DAPT, ordered  - wound care consult for evaluation of wounds, appreciate recs     F: NS at 50 ml/hr  E: replete for goal of K>4, phos>3, Mg>2  N: NPO  DVT ppx: subcutaneous hep  Abx therapy: Zosyn doxy  Code status: full code    Patient discussed with attending physician Dr. Weiner-Jese  Plan preliminary until cosigned by attending physician.    Jesse Granados MD  Family Medicine  PGY-3         [1] aspirin, 81 mg, oral, Daily  atorvastatin, 80 mg, oral, Daily  calcium citrate, 200 mg of elemental calcium, oral, Daily  [Held by provider] carvedilol, 25 mg, oral, BID  cholecalciferol,  50 mcg, oral, Daily  clopidogrel, 75 mg, oral, Daily  cyanocobalamin, 500 mcg, oral, Daily  [Held by provider] dapagliflozin propanediol, 10 mg, oral, Daily with breakfast  doxycylcine, 100 mg, oral, q12h BOLIVAR  gabapentin, 300 mg, oral, BID  heparin (porcine), 5,000 Units, subcutaneous, q8h BOLIVAR  hydrALAZINE, 75 mg, oral, TID  insulin lispro, 0-5 Units, subcutaneous, TID AC  iohexol, 1,000 mL, oral, Once in imaging  melatonin, 10 mg, oral, Nightly  NIFEdipine ER, 90 mg, oral, Daily before breakfast  pantoprazole, 40 mg, oral, Daily before breakfast  piperacillin-tazobactam, 2.25 g, intravenous, q6h  [Held by provider] ranolazine, 500 mg, oral, BID  thiamine, 100 mg, oral, Daily  vitamin B complex-vitamin C-folic acid, 1 capsule, oral, Daily     [2]    [3] PRN medications: acetaminophen **OR** acetaminophen **OR** acetaminophen, dextrose, dextrose, diclofenac sodium, glucagon, glucagon, naloxone, oxyCODONE, oxygen, trimethobenzamide

## 2025-07-29 NOTE — CARE PLAN
The patient's goals for the shift include Patient will safe and stable during shift    The clinical goals for the shift include Patient will remain HDS during this shift.      Problem: Safety - Adult  Goal: Free from fall injury  Outcome: Progressing     Problem: Nutrition  Goal: Nutrient intake appropriate for maintaining nutritional needs  Outcome: Not Progressing     Problem: Skin  Goal: Decreased wound size/increased tissue granulation at next dressing change  Outcome: Progressing  Goal: Participates in plan/prevention/treatment measures  Outcome: Progressing  Goal: Prevent/manage excess moisture  Outcome: Progressing  Goal: Prevent/minimize sheer/friction injuries  Outcome: Progressing  Flowsheets (Taken 7/29/2025 9232)  Prevent/minimize sheer/friction injuries:   Complete micro-shifts as needed if patient unable. Adjust patient position to relieve pressure points, not a full turn   Turn/reposition every 2 hours/use positioning/transfer devices     Problem: Nutrition  Goal: Nutrient intake appropriate for maintaining nutritional needs  Outcome: Not Progressing

## 2025-07-29 NOTE — NURSING NOTE
Around 2230, the patient had BM and her stool is now dark with hints of blood in it. Just around shift change, 1930, her stools were soft and tan. She has been complaining of stomach pain, her day team were notified. Her Hbg was last 6.6, 1 unit of PRBC's was infused and pt has CT of the abdomen scheduled for further evaluation.

## 2025-07-30 ENCOUNTER — APPOINTMENT (OUTPATIENT)
Dept: RADIOLOGY | Facility: HOSPITAL | Age: 65
DRG: 637 | End: 2025-07-30
Payer: MEDICARE

## 2025-07-30 LAB
ABO GROUP (TYPE) IN BLOOD: NORMAL
ACANTHOCYTES BLD QL SMEAR: ABNORMAL
ALBUMIN SERPL BCP-MCNC: 2.2 G/DL (ref 3.4–5)
ALBUMIN SERPL BCP-MCNC: 2.2 G/DL (ref 3.4–5)
ALP SERPL-CCNC: 98 U/L (ref 33–136)
ALT SERPL W P-5'-P-CCNC: 10 U/L (ref 7–45)
ANION GAP SERPL CALC-SCNC: 12 MMOL/L (ref 10–20)
ANTIBODY SCREEN: NORMAL
AST SERPL W P-5'-P-CCNC: 13 U/L (ref 9–39)
BACTERIA BLD AEROBE CULT: ABNORMAL
BACTERIA BLD AEROBE CULT: ABNORMAL
BACTERIA BLD CULT: ABNORMAL
BACTERIA BLD CULT: ABNORMAL
BASOPHILS # BLD MANUAL: 0 X10*3/UL (ref 0–0.1)
BASOPHILS NFR BLD MANUAL: 0 %
BILIRUB DIRECT SERPL-MCNC: 0.1 MG/DL (ref 0–0.3)
BILIRUB SERPL-MCNC: 0.3 MG/DL (ref 0–1.2)
BLASTS # BLD MANUAL: 0 X10*3/UL
BLASTS NFR BLD MANUAL: 0 %
BLOOD EXPIRATION DATE: NORMAL
BUN SERPL-MCNC: 37 MG/DL (ref 6–23)
BURR CELLS BLD QL SMEAR: ABNORMAL
CALCIUM SERPL-MCNC: 6.7 MG/DL (ref 8.6–10.6)
CHLORIDE SERPL-SCNC: 110 MMOL/L (ref 98–107)
CO2 SERPL-SCNC: 24 MMOL/L (ref 21–32)
CREAT SERPL-MCNC: 1.06 MG/DL (ref 0.5–1.05)
DISPENSE STATUS: NORMAL
EGFRCR SERPLBLD CKD-EPI 2021: 58 ML/MIN/1.73M*2
EOSINOPHIL # BLD MANUAL: 0 X10*3/UL (ref 0–0.7)
EOSINOPHIL NFR BLD MANUAL: 0 %
ERYTHROCYTE [DISTWIDTH] IN BLOOD BY AUTOMATED COUNT: 18.3 % (ref 11.5–14.5)
ERYTHROCYTE [DISTWIDTH] IN BLOOD BY AUTOMATED COUNT: 19.7 % (ref 11.5–14.5)
GLUCOSE BLD MANUAL STRIP-MCNC: 109 MG/DL (ref 74–99)
GLUCOSE BLD MANUAL STRIP-MCNC: 110 MG/DL (ref 74–99)
GLUCOSE BLD MANUAL STRIP-MCNC: 114 MG/DL (ref 74–99)
GLUCOSE BLD MANUAL STRIP-MCNC: 134 MG/DL (ref 74–99)
GLUCOSE BLD MANUAL STRIP-MCNC: 138 MG/DL (ref 74–99)
GLUCOSE BLD MANUAL STRIP-MCNC: 158 MG/DL (ref 74–99)
GLUCOSE BLD MANUAL STRIP-MCNC: 79 MG/DL (ref 74–99)
GLUCOSE SERPL-MCNC: 136 MG/DL (ref 74–99)
GRAM STN SPEC: ABNORMAL
GRAM STN SPEC: ABNORMAL
HCT VFR BLD AUTO: 19.5 % (ref 36–46)
HCT VFR BLD AUTO: 20.6 % (ref 36–46)
HCT VFR BLD AUTO: 22.3 % (ref 36–46)
HGB BLD-MCNC: 6.4 G/DL (ref 12–16)
HGB BLD-MCNC: 6.8 G/DL (ref 12–16)
HGB BLD-MCNC: 7.8 G/DL (ref 12–16)
IMM GRANULOCYTES # BLD AUTO: 0.07 X10*3/UL (ref 0–0.7)
IMM GRANULOCYTES NFR BLD AUTO: 0.6 % (ref 0–0.9)
INTERPRETATION FOR ANTI-PLATELET FACTOR 4 ANTIBODY: NEGATIVE
LDH SERPL L TO P-CCNC: 144 U/L (ref 84–246)
LYMPHOCYTES # BLD MANUAL: 0.98 X10*3/UL (ref 1.2–4.8)
LYMPHOCYTES NFR BLD MANUAL: 7.8 %
MAGNESIUM SERPL-MCNC: 1.76 MG/DL (ref 1.6–2.4)
MCH RBC QN AUTO: 31 PG (ref 26–34)
MCH RBC QN AUTO: 32.2 PG (ref 26–34)
MCHC RBC AUTO-ENTMCNC: 33 G/DL (ref 32–36)
MCHC RBC AUTO-ENTMCNC: 35 G/DL (ref 32–36)
MCV RBC AUTO: 89 FL (ref 80–100)
MCV RBC AUTO: 98 FL (ref 80–100)
METAMYELOCYTES # BLD MANUAL: 0 X10*3/UL
METAMYELOCYTES NFR BLD MANUAL: 0 %
MONOCYTES # BLD MANUAL: 0.11 X10*3/UL (ref 0.1–1)
MONOCYTES NFR BLD MANUAL: 0.9 %
MYELOCYTES # BLD MANUAL: 0 X10*3/UL
MYELOCYTES NFR BLD MANUAL: 0 %
NEUTROPHILS # BLD MANUAL: 11.5 X10*3/UL (ref 1.2–7.7)
NEUTS BAND # BLD MANUAL: 0 X10*3/UL (ref 0–0.7)
NEUTS BAND NFR BLD MANUAL: 0 %
NEUTS SEG # BLD MANUAL: 11.5 X10*3/UL (ref 1.2–7)
NEUTS SEG NFR BLD MANUAL: 91.3 %
NRBC BLD MANUAL-RTO: 0 % (ref 0–0)
NRBC BLD-RTO: 0 /100 WBCS (ref 0–0)
NRBC BLD-RTO: 0.2 /100 WBCS (ref 0–0)
PATH REVIEW-CBC DIFFERENTIAL: NORMAL
PHOSPHATE SERPL-MCNC: 2.6 MG/DL (ref 2.5–4.9)
PLASMA CELLS # BLD MANUAL: 0 X10*3/UL
PLASMA CELLS NFR BLD MANUAL: 0 %
PLATELET # BLD AUTO: 61 X10*3/UL (ref 150–450)
PLATELET # BLD AUTO: 82 X10*3/UL (ref 150–450)
POLYCHROMASIA BLD QL SMEAR: ABNORMAL
POTASSIUM SERPL-SCNC: 3.6 MMOL/L (ref 3.5–5.3)
PRODUCT BLOOD TYPE: 5100
PRODUCT CODE: NORMAL
PROMYELOCYTES # BLD MANUAL: 0 X10*3/UL
PROMYELOCYTES NFR BLD MANUAL: 0 %
PROT SERPL-MCNC: 5 G/DL (ref 6.4–8.2)
RBC # BLD AUTO: 2.11 X10*6/UL (ref 4–5.2)
RBC # BLD AUTO: 2.52 X10*6/UL (ref 4–5.2)
RBC MORPH BLD: ABNORMAL
RH FACTOR (ANTIGEN D): NORMAL
SERUM AND PLATELET FACTOR 4: 0.31 OD UNITS
SODIUM SERPL-SCNC: 142 MMOL/L (ref 136–145)
TOTAL CELLS COUNTED BLD: 115
UNIT ABO: NORMAL
UNIT NUMBER: NORMAL
UNIT RH: NORMAL
UNIT VOLUME: 286
VARIANT LYMPHS # BLD MANUAL: 0 X10*3/UL (ref 0–0.5)
VARIANT LYMPHS NFR BLD: 0 %
WBC # BLD AUTO: 11.6 X10*3/UL (ref 4.4–11.3)
WBC # BLD AUTO: 12.6 X10*3/UL (ref 4.4–11.3)
WBC OTHER # BLD MANUAL: 0 X10*3/UL
WBC OTHER NFR BLD MANUAL: 0 %
XM INTEP: NORMAL

## 2025-07-30 PROCEDURE — 2500000001 HC RX 250 WO HCPCS SELF ADMINISTERED DRUGS (ALT 637 FOR MEDICARE OP): Performed by: STUDENT IN AN ORGANIZED HEALTH CARE EDUCATION/TRAINING PROGRAM

## 2025-07-30 PROCEDURE — 2500000004 HC RX 250 GENERAL PHARMACY W/ HCPCS (ALT 636 FOR OP/ED): Performed by: STUDENT IN AN ORGANIZED HEALTH CARE EDUCATION/TRAINING PROGRAM

## 2025-07-30 PROCEDURE — 2500000001 HC RX 250 WO HCPCS SELF ADMINISTERED DRUGS (ALT 637 FOR MEDICARE OP)

## 2025-07-30 PROCEDURE — 99233 SBSQ HOSP IP/OBS HIGH 50: CPT

## 2025-07-30 PROCEDURE — 82947 ASSAY GLUCOSE BLOOD QUANT: CPT

## 2025-07-30 PROCEDURE — 92610 EVALUATE SWALLOWING FUNCTION: CPT | Mod: GN | Performed by: SPEECH-LANGUAGE PATHOLOGIST

## 2025-07-30 PROCEDURE — 85007 BL SMEAR W/DIFF WBC COUNT: CPT

## 2025-07-30 PROCEDURE — 1200000002 HC GENERAL ROOM WITH TELEMETRY DAILY

## 2025-07-30 PROCEDURE — 86901 BLOOD TYPING SEROLOGIC RH(D): CPT

## 2025-07-30 PROCEDURE — 36430 TRANSFUSION BLD/BLD COMPNT: CPT

## 2025-07-30 PROCEDURE — 85027 COMPLETE CBC AUTOMATED: CPT

## 2025-07-30 PROCEDURE — 2500000004 HC RX 250 GENERAL PHARMACY W/ HCPCS (ALT 636 FOR OP/ED)

## 2025-07-30 PROCEDURE — 99222 1ST HOSP IP/OBS MODERATE 55: CPT

## 2025-07-30 PROCEDURE — 74018 RADEX ABDOMEN 1 VIEW: CPT | Performed by: RADIOLOGY

## 2025-07-30 PROCEDURE — 74018 RADEX ABDOMEN 1 VIEW: CPT

## 2025-07-30 PROCEDURE — 80069 RENAL FUNCTION PANEL: CPT

## 2025-07-30 PROCEDURE — 85018 HEMOGLOBIN: CPT

## 2025-07-30 PROCEDURE — 2500000002 HC RX 250 W HCPCS SELF ADMINISTERED DRUGS (ALT 637 FOR MEDICARE OP, ALT 636 FOR OP/ED)

## 2025-07-30 PROCEDURE — 99232 SBSQ HOSP IP/OBS MODERATE 35: CPT

## 2025-07-30 PROCEDURE — 83735 ASSAY OF MAGNESIUM: CPT

## 2025-07-30 PROCEDURE — P9016 RBC LEUKOCYTES REDUCED: HCPCS

## 2025-07-30 PROCEDURE — 82040 ASSAY OF SERUM ALBUMIN: CPT

## 2025-07-30 PROCEDURE — 36415 COLL VENOUS BLD VENIPUNCTURE: CPT

## 2025-07-30 PROCEDURE — 83615 LACTATE (LD) (LDH) ENZYME: CPT

## 2025-07-30 RX ORDER — PANTOPRAZOLE SODIUM 40 MG/10ML
40 INJECTION, POWDER, LYOPHILIZED, FOR SOLUTION INTRAVENOUS 2 TIMES DAILY
Status: DISCONTINUED | OUTPATIENT
Start: 2025-07-30 | End: 2025-08-03

## 2025-07-30 RX ADMIN — OXYCODONE HYDROCHLORIDE 2.5 MG: 5 TABLET ORAL at 09:10

## 2025-07-30 RX ADMIN — Medication 1 TABLET: at 09:11

## 2025-07-30 RX ADMIN — CYANOCOBALAMIN TAB 1000 MCG 500 MCG: 1000 TAB at 09:10

## 2025-07-30 RX ADMIN — OXYCODONE HYDROCHLORIDE 2.5 MG: 5 TABLET ORAL at 02:38

## 2025-07-30 RX ADMIN — PANTOPRAZOLE SODIUM 40 MG: 40 INJECTION, POWDER, LYOPHILIZED, FOR SOLUTION INTRAVENOUS at 23:17

## 2025-07-30 RX ADMIN — THIAMINE HCL TAB 100 MG 100 MG: 100 TAB at 09:10

## 2025-07-30 RX ADMIN — OXYCODONE HYDROCHLORIDE 2.5 MG: 5 TABLET ORAL at 23:59

## 2025-07-30 RX ADMIN — NIFEDIPINE 90 MG: 90 TABLET, FILM COATED, EXTENDED RELEASE ORAL at 06:48

## 2025-07-30 RX ADMIN — DOXYCYCLINE HYCLATE 100 MG: 100 TABLET, COATED ORAL at 23:17

## 2025-07-30 RX ADMIN — PIPERACILLIN SODIUM AND TAZOBACTAM SODIUM 2.25 G: 2; .25 INJECTION, SOLUTION INTRAVENOUS at 04:00

## 2025-07-30 RX ADMIN — GABAPENTIN 300 MG: 300 CAPSULE ORAL at 23:17

## 2025-07-30 RX ADMIN — GABAPENTIN 300 MG: 300 CAPSULE ORAL at 09:10

## 2025-07-30 RX ADMIN — CLOPIDOGREL BISULFATE 75 MG: 75 TABLET, FILM COATED ORAL at 06:49

## 2025-07-30 RX ADMIN — ATORVASTATIN CALCIUM 80 MG: 80 TABLET, FILM COATED ORAL at 09:11

## 2025-07-30 RX ADMIN — PIPERACILLIN SODIUM AND TAZOBACTAM SODIUM 3.38 G: 3; .375 INJECTION, SOLUTION INTRAVENOUS at 16:15

## 2025-07-30 RX ADMIN — Medication 50 MCG: at 09:10

## 2025-07-30 RX ADMIN — Medication 10 MG: at 23:17

## 2025-07-30 RX ADMIN — ASCORBIC ACID, THIAMINE MONONITRATE,RIBOFLAVIN, NIACINAMIDE, PYRIDOXINE HYDROCHLORIDE, FOLIC ACID, CYANOCOBALAMIN, BIOTIN, CALCIUM PANTOTHENATE, 1 CAPSULE: 100; 1.5; 1.7; 20; 10; 1; 6000; 150000; 5 CAPSULE, LIQUID FILLED ORAL at 09:11

## 2025-07-30 RX ADMIN — PANTOPRAZOLE SODIUM 40 MG: 40 INJECTION, POWDER, LYOPHILIZED, FOR SOLUTION INTRAVENOUS at 09:10

## 2025-07-30 RX ADMIN — PIPERACILLIN SODIUM AND TAZOBACTAM SODIUM 2.25 G: 2; .25 INJECTION, SOLUTION INTRAVENOUS at 09:46

## 2025-07-30 RX ADMIN — DOXYCYCLINE HYCLATE 100 MG: 100 TABLET, COATED ORAL at 09:10

## 2025-07-30 RX ADMIN — PIPERACILLIN SODIUM AND TAZOBACTAM SODIUM 3.38 G: 3; .375 INJECTION, SOLUTION INTRAVENOUS at 23:17

## 2025-07-30 ASSESSMENT — COGNITIVE AND FUNCTIONAL STATUS - GENERAL
WALKING IN HOSPITAL ROOM: A LOT
EATING MEALS: A LITTLE
MOVING TO AND FROM BED TO CHAIR: A LOT
HELP NEEDED FOR BATHING: A LOT
TOILETING: A LOT
STANDING UP FROM CHAIR USING ARMS: A LOT
CLIMB 3 TO 5 STEPS WITH RAILING: A LITTLE
DRESSING REGULAR UPPER BODY CLOTHING: A LOT
DAILY ACTIVITIY SCORE: 14
MOBILITY SCORE: 16
PERSONAL GROOMING: A LOT
TURNING FROM BACK TO SIDE WHILE IN FLAT BAD: A LITTLE
DRESSING REGULAR LOWER BODY CLOTHING: A LITTLE

## 2025-07-30 ASSESSMENT — PAIN SCALES - GENERAL
PAINLEVEL_OUTOF10: 8
PAINLEVEL_OUTOF10: 7
PAINLEVEL_OUTOF10: 7
PAINLEVEL_OUTOF10: 5 - MODERATE PAIN
PAINLEVEL_OUTOF10: 4

## 2025-07-30 ASSESSMENT — PAIN DESCRIPTION - LOCATION
LOCATION: OTHER (COMMENT)
LOCATION: LEG
LOCATION: LEG

## 2025-07-30 ASSESSMENT — PAIN DESCRIPTION - DESCRIPTORS
DESCRIPTORS: ACHING
DESCRIPTORS: ACHING

## 2025-07-30 ASSESSMENT — PAIN - FUNCTIONAL ASSESSMENT
PAIN_FUNCTIONAL_ASSESSMENT: 0-10

## 2025-07-30 ASSESSMENT — PAIN DESCRIPTION - ORIENTATION
ORIENTATION: LEFT

## 2025-07-30 NOTE — CARE PLAN
Problem: Pain - Adult  Goal: Verbalizes/displays adequate comfort level or baseline comfort level  Outcome: Progressing     Problem: Safety - Adult  Goal: Free from fall injury  Outcome: Progressing     Problem: Discharge Planning  Goal: Discharge to home or other facility with appropriate resources  Outcome: Progressing     Problem: Chronic Conditions and Co-morbidities  Goal: Patient's chronic conditions and co-morbidity symptoms are monitored and maintained or improved  Outcome: Progressing     Problem: Nutrition  Goal: Nutrient intake appropriate for maintaining nutritional needs  Outcome: Progressing     Problem: Skin  Goal: Decreased wound size/increased tissue granulation at next dressing change  Outcome: Progressing  Flowsheets (Taken 7/30/2025 1844)  Decreased wound size/increased tissue granulation at next dressing change: Promote sleep for wound healing  Goal: Participates in plan/prevention/treatment measures  Outcome: Progressing  Flowsheets (Taken 7/30/2025 1844)  Participates in plan/prevention/treatment measures: Elevate heels  Goal: Prevent/manage excess moisture  Outcome: Progressing  Flowsheets (Taken 7/30/2025 1844)  Prevent/manage excess moisture: Moisturize dry skin  Goal: Prevent/minimize sheer/friction injuries  Outcome: Progressing  Flowsheets (Taken 7/30/2025 1844)  Prevent/minimize sheer/friction injuries: HOB 30 degrees or less  Goal: Promote/optimize nutrition  Outcome: Progressing  Flowsheets (Taken 7/30/2025 1844)  Promote/optimize nutrition: Monitor/record intake including meals  Goal: Promote skin healing  Outcome: Progressing  Flowsheets (Taken 7/30/2025 1844)  Promote skin healing: Protective dressings over bony prominences   The patient's goals for the shift include Patient will safe and stable during shift    The clinical goals for the shift include Patient will have good pain control with the support of pain meds during the shift.

## 2025-07-30 NOTE — CONSULTS
Gastroenterology Consult Service INITIAL CONSULT Note  Department of Gastroenterology & Hepatology  [facility]    [facility]  July 30, 2025   Patient: Myrna Khan    Medical Record: [id number]    Reason for Consult: Melena, anemia   Requesting Service: Family Medicine    SUBJECTIVE     History Of Present Illness  Myrna Khan is a 65 y.o. female with a past medical history of PMHx of type 2 diabetes, CAD (s/p CABG 9/2023), hypertension 2/2 renal artery stenosis, PAD with dry gangrene (s/p LLE endarterectomy 3/6/25 and left BKA (guillotine amp 4/1/25 with formalization on 4/3/25) admitted on 7/26/2025 with altered mental status in the setting of hypoglycemia and . GI is consulted for melena with acute anemia.     Pt has overall been improving from a mental status standpoint, currently on zosyn for klebsiella urosepsis. Course complicated by concern for ileus with worsening abdominal pain and distention overnight on 7/28 for which she had an NGT placed to [facility]. Put out ~200cc from 7/29 - 7/30. Hgb has been downtrending throughout her admission, 10.5 -> 8.5 -> 6.4 -> 8.9 -> 6.4. She was not demonstrating any signs of bleeding however overnight on 7/29 - 7/30 was noted to have 4 tarry appearing bowel movements. The patient denies any abdominal pain, n/v. Says her pain is significantly improved since the NG was placed. Denies ever having similar episodes in the past however she is not a great historian. Denies any NSAID use but is on asa/plavix since 3/2025.      Review of Systems: negative except as per HPI     Past Medical History:  Medical History[1]    Home Medications  Prescriptions Prior to Admission[2]    Surgical History:  Surgical History[3]    Allergies:  Allergies[4]    Social History:    Social History     Socioeconomic History    Marital status: Single     Spouse name: Not on file    Number of children: Not on file    Years of education: Not on file     Highest education level: Not on file   Occupational History    Not on file   Tobacco Use    Smoking status: Some Days     Current packs/day: 1.00     Average packs/day: 1 pack/day for 30.6 years (30.6 ttl pk-yrs)     Types: Cigarettes     Start date: 1995     Passive exposure: Never    Smokeless tobacco: Never   Vaping Use    Vaping status: Never Used   Substance and Sexual Activity    Alcohol use: Not Currently    Drug use: Never    Sexual activity: Defer   Other Topics Concern    Not on file   Social History Narrative    Not on file     Social Drivers of Health     Financial Resource Strain: Low Risk  (7/27/2025)    Overall Financial Resource Strain (CARDIA)     Difficulty of Paying Living Expenses: Not hard at all   Food Insecurity: No Food Insecurity (7/26/2025)    Hunger Vital Sign     Worried About Running Out of Food in the Last Year: Never true     Ran Out of Food in the Last Year: Never true   Transportation Needs: No Transportation Needs (7/27/2025)    PRAPARE - Transportation     Lack of Transportation (Medical): No     Lack of Transportation (Non-Medical): No   Physical Activity: Unknown (7/16/2024)    Exercise Vital Sign     Days of Exercise per Week: 0 days     Minutes of Exercise per Session: Not on file   Stress: Not on file   Social Connections: Not on file   Intimate Partner Violence: Not At Risk (7/26/2025)    Humiliation, Afraid, Rape, and Kick questionnaire     Fear of Current or Ex-Partner: No     Emotionally Abused: No     Physically Abused: No     Sexually Abused: No   Housing Stability: Low Risk  (7/27/2025)    Housing Stability Vital Sign     Unable to Pay for Housing in the Last Year: No     Number of Times Moved in the Last Year: 0     Homeless in the Last Year: No       Family History:  Family History[5]    OBJECTIVE     Vitals:    Vitals:    07/29/25 2318 07/30/25 0006 07/30/25 0448 07/30/25 0831   BP: 130/75 124/82 131/77 116/74   BP Location:       Patient Position:       Pulse: 102  97 108 107   Resp:  17 16 18   Temp: 36.5 °C (97.7 °F) 36.5 °C (97.7 °F) 36.6 °C (97.9 °F) 36.5 °C (97.7 °F)   TempSrc:       SpO2: 96% 98% 92% 99%   Weight:       Height:         Failed to redirect to the Timeline version of the TMAT SmartLink.    Intake/Output Summary (Last 24 hours) at 7/30/2025 1153  Last data filed at 7/30/2025 1020  Gross per 24 hour   Intake 100 ml   Output 300 ml   Net -200 ml         Physical Exam  General: cachectic, no acute distress  HEENT: EOM intact, no scleral icterus, dry MM  Respiratory: nonlabored breathing on room air  Cardiovascular: Tachycardic   Abdomen: Soft, nontender, nondistended  Extremities: no edema, no asterixis  Neuro: disoriented, answering most questions appropriately, moves all 4 extremities with no focal deficits  Psych: calm and cooperative    Medications  Current Medications[6]     Labs  Lab Results   Component Value Date    WBC 12.6 (H) 07/30/2025    HGB 6.4 (LL) 07/30/2025    HCT 19.5 (L) 07/30/2025    MCV 98 07/30/2025    PLT 82 (L) 07/30/2025     Lab Results   Component Value Date    GLUCOSE 136 (H) 07/30/2025    CALCIUM 6.7 (L) 07/30/2025     07/30/2025    K 3.6 07/30/2025    CO2 24 07/30/2025     (H) 07/30/2025    BUN 37 (H) 07/30/2025    CREATININE 1.06 (H) 07/30/2025     Lab Results   Component Value Date    ALT 10 07/30/2025    AST 13 07/30/2025    ALKPHOS 98 07/30/2025    BILITOT 0.3 07/30/2025     Lab Results   Component Value Date    INR 1.3 (H) 04/01/2025    INR 1.3 (H) 03/06/2025    INR 1.2 (H) 03/01/2025    PROTIME 14.9 (H) 04/01/2025    PROTIME 14.4 (H) 03/06/2025    PROTIME 13.1 (H) 03/01/2025       Imaging:   === 02/28/25 ===    US PELVIS    - Impression -  1. Complex partially cystic right adnexal mass with a solid component  demonstrating internal vascularity. Findings raise concern for a  cystic ovarian neoplasm, especially given elevated tumor markers.  Gynecology oncology consultation is recommended.  2. Markedly distended  urinary bladder. Consider Malone placement.  3. Partially calcified uterine leiomyomas.      I personally reviewed the images/study and I agree with the findings  as stated by Radiology resident.    MACRO:  None    Signed by: Moose Shaw 3/1/2025 6:59 PM  Dictation workstation:   [id number]  === 07/26/25 ===    CT ABDOMEN PELVIS W IV CONTRAST    - Impression -  1. No definite evidence of intra-abdominal or retroperitoneal  hemorrhage.  2. Bilateral pleural effusions with the adjacent noncontiguous cysts.  Consolidation in the left lower lobe likely represent superimposed  infectious process..  3. Multiple loops of distended fluid-filled small bowel with  air-fluid levels. No discrete transition point. No pneumoperitoneum.  Findings likely represent adynamic ileus.  4. Redemonstration of the large cystic appearing right adnexal mass.  Follow-up with ultrasound is recommended.  5. Increased abdominopelvic ascites.  6. Circumferential thickening of the urinary bladder with the mucosal  enhancement likely representing cystitis. Small 8 mm urinary bladder  calculus.    I personally reviewed the images/study and I agree with the findings  as stated by Ney Segundo MD.    MACRO:  None    Signed by: Moose Shaw 7/29/2025 11:02 AM  Dictation workstation:   [id number]  === 05/23/25 ===    MR PELVIS W AND WO CONTRAST    - Impression -  1.  Enlarged multifibroid uterus.  2.  Complex cyst in the right ovary with internal  hemorrhagic/proteinaceous material. Given the size of the lesion and  postmenopausal status of the patient recommend close follow-up versus  resection as hemorrhagic cyst would be atypical and cystic ovarian  neoplasm is in the differential.  3. Left ovary not seen.      MACRO:  None    Signed by: Venkat Mejia 5/28/2025 1:21 PM  Dictation workstation:   [id number]    GI Procedures:  No results found for this or any previous visit from the past 1825 days.                      ASSESSMENT/PLAN:  Myrna Khan is a 65 y.o. female admitted on 7/26/2025 with encephalopathy likely secondary to klebsiella bacteremia and hypoglycemia. GI is consulted for melena with acute anemia.     Pt admitted from her nursing facility for lack of responsiveness, found to be profoundly hypoglycemic and infectious workup revealing klebsiella pneuominae in the urine and blood. She has been on zosyn since 7/27. Has been overall clinically improving however had worsening abdominal distention and pain on 7/28 with KUB concerning for ileus. Had NG placed at that time to [facility] which improved her symptoms. She has had a slow Hgb drop from admission 10 ->8.5, then more acutely to 6.4. Began having black tarry stools x4 along with dark brown output from NG on 7/29. She has been transfused 2u pRBCs. Denies any previous GI bleeding that she can recall and no endoscopic eval on record. Denies any NSAID use however has been on asa and plavix for the past 4 months. Likely having acute upper GI bleeding now either in the setting of NG trauma that was exacerbated by antiplatelets vs PUD vs. Esophagitis/gastritis (less likely given acute hgb drop).     Recommendations:  -Will add on for EGD tomorrow, 7/31. Diagnostic only as she had plavix on 7/30 AM   -Keep NPO after midnight on 7/31   -Continue PPI IV BID   -Consider NGT clamping trial as abdomen is soft, imaging is reassuring, and pt is having BMs  -Transfuse to maintain Hgb >7, continue to trend CBC     Patient was seen and discussed with Dr. Payne    Thank you for the consultation.  GI will continue to follow.   Please do not hesitate to contact us again on Epic Chat or by paging the consultation team at [phone] between the weekday hours of 7 AM - 5 PM.  If there is an urgent concern during the weekend, after-hours, or holidays; then please page the [place] GI fellow at [phone]. Thank you.      Diane Nunes MD  Gastroenterology and Hepatology Fellow  [facility]  "[place]          [1]   Past Medical History:  Diagnosis Date    CAD (coronary artery disease)     Carotid artery stenosis     Diabetes mellitus (Multi)     Heart disease     HF (heart failure)     Hypertension     NSTEMI (non-ST elevated myocardial infarction) (Multi)     PAD (peripheral artery disease)     Renal artery stenosis    [2]   Medications Prior to Admission   Medication Sig Dispense Refill Last Dose/Taking    acetaminophen (Tylenol) 325 mg tablet Take 2 tablets (650 mg) by mouth every 6 hours if needed for mild pain (1 - 3) or moderate pain (4 - 6). (Patient taking differently: Take 2 tablets (650 mg) by mouth every 8 hours if needed for mild pain (1 - 3) or moderate pain (4 - 6). Do not exceed 3 grams per day from all sources)       acetaminophen (Tylenol) 500 mg tablet Take 2 tablets (1,000 mg) by mouth 3 times a day. Do not exceed 3 grams per day from all sources       aspirin 81 mg EC tablet Take 1 tablet (81 mg) by mouth once daily. 90 tablet 1     atorvastatin (Lipitor) 80 mg tablet TAKE 1 TABLET BY MOUTH ONCE DAILY 30 tablet 2     BD Ultra-Fine Micro Pen Needle 32 gauge x 1/4\" needle        blood sugar diagnostic strip Use 1 strip to check blood sugar 3 times a day with meals. 100 each 0     calcium citrate 250 mg calcium tablet Take 1 tablet (250 mg) by mouth once daily.       carvedilol (Coreg) 25 mg tablet TAKE 1 TABLET BY MOUTH TWO TIMES A DAY 60 tablet 2     cholecalciferol (Vitamin D3) 25 mcg (1,000 units) tablet Take 2 tablets (50 mcg) by mouth once daily.       clopidogrel (Plavix) 75 mg tablet Take 1 tablet (75 mg) by mouth once daily. 90 tablet 3     diclofenac sodium (Voltaren) 1 % gel Apply 4.5 inches (4 g) topically 4 times a day as needed (pain). (Patient taking differently: Apply 4.5 inches (4 g) topically 4 times a day as needed (for bilateral leg pain).)       docusate sodium (Colace) 100 mg tablet Take 1 tablet (100 mg) by mouth once daily as needed for constipation.       Easy " Touch Alcohol Prep Pads pads, medicated        Farxiga 10 mg Take 1 tablet (10 mg) by mouth once daily with breakfast.       ferrous sulfate 325 (65 Fe) mg EC tablet Take 1 tablet by mouth every other day. Do not crush, chew, or split.       gabapentin (Neurontin) 300 mg capsule Take 1 capsule (300 mg) by mouth 2 times a day.       glucagon (Glucagen) 1 mg injection Inject 1 mg into the muscle every 15 minutes if needed for other (<70 and cannot take oral due to altered mentation). 1 each 12     hydrALAZINE (Apresoline) 50 mg tablet Take 1.5 tablets (75 mg) by mouth 3 times a day. (Patient taking differently: Take 1.5 tablets (75 mg) by mouth 3 times a day. Give 1 tablet every 4 hours as needed for SBP greater than 160mmHg) 150 tablet 10     insulin aspart (NovoLOG U-100 Insulin aspart) 100 unit/mL injection Inject under the skin. Inject as per sliding scale: if   151-200=2 units  201-250=3 units  251-300= 4 units  301-350= 5 units  351-400= 6 units  If above 400 give 6 units and notify MD (Patient not taking: Reported on 7/26/2025)       insulin glargine (Lantus) 100 unit/mL injection Inject 10 Units under the skin once daily at bedtime. Take as directed per insulin instructions.       insulin lispro 100 unit/mL injection Inject 0-6 Units under the skin see administration instructions. Take as directed per insulin instructions.  Inject under the skin with meals. Inject as per sliding scale: if 151-200=2 units 201-250=3 units 251-300= 4 units 301-350= 5 units 351-400= 6 units If above 400 give 6 units and notify MD       lancets misc Use three times a day to test blood sugar w/ meals 102 each 0     loperamide (Imodium) 2 mg capsule Take 1 capsule (2 mg) by mouth if needed for diarrhea (May give after each loose stool, dose not to exceed 16mg per day).       melatonin 10 mg tablet Take 1 tablet (10 mg) by mouth once daily at bedtime.       metFORMIN (Glucophage) 1,000 mg tablet Take 1 tablet (1,000 mg) by mouth 2  times daily (morning and late afternoon).       multivitamin with minerals tablet Take 1 tablet by mouth once daily.       NIFEdipine ER (Adalat CC) 90 mg 24 hr tablet Take 1 tablet (90 mg) by mouth once daily in the morning. Take before meals. Do not crush, chew, or split. 30 tablet 0     ranolazine (Ranexa) 500 mg 12 hr tablet Take 1 tablet (500 mg) by mouth 2 times a day. Do not crush, chew, or split.       sennosides (Senokot) 8.6 mg tablet Take 1 tablet (8.6 mg) by mouth 2 times a day as needed for constipation.       traMADol (Ultram) 50 mg tablet Take 0.5 tablets (25 mg) by mouth every 8 hours if needed for severe pain (7 - 10) (for 14 days).      [3]   Past Surgical History:  Procedure Laterality Date    CORONARY ARTERY BYPASS GRAFT      9/2023    FEMORAL ARTERY STENT Left     left SFA   [4]   Allergies  Allergen Reactions    Lisinopril Itching    Amoxicillin Rash    Losartan Itching and Rash   [5]   Family History  Problem Relation Name Age of Onset    Peripheral vascular disease Mother      Hypertension Mother      Diabetes type II Mother      Hypertension Sister      Diabetes type II Sister     [6]   Current Facility-Administered Medications:     acetaminophen (Tylenol) tablet 975 mg, 975 mg, oral, q6h PRN **OR** acetaminophen (Tylenol) oral liquid 650 mg, 650 mg, oral, q4h PRN **OR** acetaminophen (Tylenol) suppository 650 mg, 650 mg, rectal, q4h PRN, Whitney Bailey MD    [Held by provider] aspirin EC tablet 81 mg, 81 mg, oral, Daily, Marisela Taylor DO, 81 mg at 07/28/25 0936    atorvastatin (Lipitor) tablet 80 mg, 80 mg, oral, Daily, Marisela Taylor DO, 80 mg at 07/30/25 0911    calcium citrate (Calcitrate) 950 mg (200 mg elemental) tablet 1 tablet, 200 mg of elemental calcium, oral, Daily, Marisela Taylor DO, 1 tablet at 07/30/25 0911    [Held by provider] carvedilol (Coreg) tablet 25 mg, 25 mg, oral, BID, Marisela Taylor DO, 25 mg at 07/26/25 1341    cholecalciferol (Vitamin D-3) tablet 50 mcg, 50 mcg,  oral, Daily, Marisela Taylor DO, 50 mcg at 07/30/25 0910    [Held by provider] clopidogrel (Plavix) tablet 75 mg, 75 mg, oral, Daily, Marisela Taylor DO, 75 mg at 07/30/25 0649    cyanocobalamin (Vitamin B-12) tablet 500 mcg, 500 mcg, oral, Daily, Marisela Taylor DO, 500 mcg at 07/30/25 0910    [Held by provider] dapagliflozin propanediol (Farxiga) tablet 10 mg, 10 mg, oral, Daily with breakfast, Marisela Taylor DO    dextrose 50 % injection 12.5 g, 12.5 g, intravenous, q15 min PRN, Jesse Granados MD    dextrose 50 % injection 25 g, 25 g, intravenous, q15 min PRN, Jesse Granados MD    diclofenac sodium (Voltaren) 1 % gel 4 g, 4 g, Topical, 4x daily PRN, Marisela Taylor DO, 4 g at 07/27/25 2001    doxycycline (Vibra-Tabs) tablet 100 mg, 100 mg, oral, q12h BOLIVAR, Marisela Taylor DO, 100 mg at 07/30/25 0910    gabapentin (Neurontin) capsule 300 mg, 300 mg, oral, BID, Marisela Taylor DO, 300 mg at 07/30/25 0910    glucagon (Glucagen) injection 1 mg, 1 mg, intramuscular, q15 min PRN, Jesse Granados MD    glucagon (Glucagen) injection 1 mg, 1 mg, intramuscular, q15 min PRN, Jesse Granados MD    [Held by provider] heparin (porcine) injection 5,000 Units, 5,000 Units, subcutaneous, q8h [facility], Marisela Taylor DO, 5,000 Units at 07/29/25 2042    [Held by provider] hydrALAZINE (Apresoline) tablet 75 mg, 75 mg, oral, TID, Marisela Taylor DO, 75 mg at 07/29/25 2032    insulin lispro injection 0-5 Units, 0-5 Units, subcutaneous, TID AC, Jesse Granados MD    iohexol (OMNIPaque) 12 mg iodine/mL oral contrast 1,000 mL, 1,000 mL, oral, Once in imaging, Marisela Taylor DO    melatonin tablet 10 mg, 10 mg, oral, Nightly, Marisela Taylor DO, 10 mg at 07/29/25 2032    naloxone (Narcan) injection 0.2 mg, 0.2 mg, intravenous, q5 min PRN, Ashil Williamson DO    [Held by provider] NIFEdipine ER (Adalat CC) 24 hr tablet 90 mg, 90 mg, oral, Daily before breakfast, Marisela Taylor DO, 90 mg at 07/30/25 0648    oxyCODONE (Roxicodone) immediate release tablet 2.5 mg, 2.5 mg, oral, q6h  PRN, Ashli Williamson DO, 2.5 mg at 07/30/25 0910    oxygen (O2) therapy, 1 Dose, inhalation, Continuous - O2/gases, Marisela Taylor DO, 1 Dose at 07/27/25 1930    pantoprazole (Protonix) injection 40 mg, 40 mg, intravenous, BID, Kalyani Berger MD, 40 mg at 07/30/25 0910    piperacillin-tazobactam (Zosyn) 2.25 g in dextrose (iso) IV 50 mL, 2.25 g, intravenous, q6h, Ashli Williamson DO, Stopped at 07/30/25 1020    [Held by provider] ranolazine (Ranexa) 12 hr tablet 500 mg, 500 mg, oral, BID, Marisela Taylor DO    thiamine (Vitamin B-1) tablet 100 mg, 100 mg, oral, Daily, Marisela Taylor DO, 100 mg at 07/30/25 0910    trimethobenzamide (Tigan) injection 200 mg, 200 mg, intramuscular, q6h PRN, Tino Perea MD    vitamin B complex-vitamin C-folic acid (Nephrocaps) capsule 1 capsule, 1 capsule, oral, Daily, Jesse Granados MD, 1 capsule at 07/30/25 0911

## 2025-07-30 NOTE — DOCUMENTATION CLARIFICATION NOTE
"    PATIENT:               TANNER PIKE  ACCT #:                  [id number]  MRN:                       [id number]  :                       1960  ADMIT DATE:       2025 6:03 AM  DISCH DATE:  RESPONDING PROVIDER #:        [id number]          PROVIDER RESPONSE TEXT:    Metabolic encephalopathy due to infections, hypoglycemia and electrolyte imbalance    CDI QUERY TEXT:    Clarification        Instruction:    Based on your assessment of the patient and the clinical information, please provide the requested documentation by clicking on the appropriate radio button and enter any additional information if prompted.    Question: Please further clarify the most likely etiology of the altered mental status as    When answering this query, please exercise your independent professional judgment. The fact that a question is being asked, does not imply that any particular answer is desired or expected.    The patient's clinical indicators include:  Clinical Information:  65 y.o. female with a PMH IDT2DM, CAD s/p CABG, HTN 2/2 FROYLAN, PAD, L BKA presented  from SNF due to AMS.  Glucose was 25. Pt not eating much at SNF and she is still on insulin at HS-likely cause of hypoglycemia.  Also developed MAGUI due to antibiotics (clindamycin for LLE stump ulcer) causing diarrhea. The MAGUI would impact her ability to clear insulin.  Urine and blood cx positive for klebsiella--started on IV zosyn and doxy    Clinical Indicators:   H&P:  \" AMS, likely multifactorial  Unknown what patient's HS BG was or how much insulin she actually received last night at SNF.  Ddx infection, CVA vs TIA, subarachnoid hemorrhage, dehydration 2/2 GI fluid loss, drug intoxication (UDS negative). \"     Primary Care:  \" AMS (resolved) multifactorial etiology toxic metabolic and infectious -klebsiella bacteremia 2/2 UTI and Pna, hypoglycemia, electrolyte imbalance-hypokalemia, Hypophosphatemia and hypomagnesemia \"    Treatment:  D5 infusion  continue " IV abx plan to transition to po on discharge  Replete electrolytes  Adjust insulin dosing  Nutrition consult for severe malnutrition    Risk Factors:  bacteremia, hypoglycemia, UTI, PNA, electrolyte imbalance  Options provided:  -- Metabolic encephalopathy due to infections, hypoglycemia and electrolyte imbalance  -- Toxic encephalopathy due to too much exogenous insulin causing critical hypoglycemia of 25  -- Multifactorial etiology-Metabolic encephalopathy due to infections, hypoglycemia and electrolyte imbalance and toxic encephalopathy due to too much exogenous insulin causing critical hypoglycemia of 25  -- Other - I will add my own diagnosis  -- Refer to Clinical Documentation Reviewer    Query created by: Bere Dominguez on 7/30/2025 11:41 AM      Electronically signed by:  MEGHAN CERVANTES MD 7/30/2025 12:48 PM

## 2025-07-30 NOTE — PROGRESS NOTES
07/30/25 0933   Rapid Rounds   Attendance Provider;Care Transitions   Expected Discharge Disposition Inter   Today we still await: Clinical stability   Review at Escalation Rounds No escalation needed     Social Work Note    LSW met with Pt Med Team. Per med team Pt not medically ready pending workup as Pt has significant lab changes. Likely ADOD 2 days. Pt to return to LTC once ready for DC. Care transitions available to assist with any future discharge needs.       MARA Painting

## 2025-07-30 NOTE — PROGRESS NOTES
"Myrna Khan is a 65 y.o. female on day 4 of admission presenting with Bacteremia due to Klebsiella pneumoniae.    Subjective   Patient was seen and examined at bedside today.  No new events of hypoglycemia. 2 dark stools with concerns of GIB. NPO for now  I have reviewed histories, allergies and medications have been reviewed and there are no changes       Objective   Review of Systems  Negative unless noted above     Physical Exam     General - elderly, frail AA female in NAD  HEENT - AT/NC  Neck - trachea in midline  Resp - no extra wob noted  Cvs - bradycardiac  Ext - L BKA with a small ulceration  Skin - delayed capillary refill       Last Recorded Vitals  Blood pressure 120/73, pulse 94, temperature 36.4 °C (97.5 °F), resp. rate 19, height (!) 1.549 m (5' 1\"), weight (!) 36.3 kg (80 lb 0.4 oz), SpO2 99%.  Intake/Output last 3 Shifts:  I/O last 3 completed shifts:  In: 962.5 (26.5 mL/kg) [Blood:912.5; IV Piggyback:50]  Out: 800 (22 mL/kg) [Urine:450 (0.3 mL/kg/hr); Emesis/NG output:350]  Weight: 36.3 kg     Relevant Results  Results from last 7 days   Lab Units 07/30/25  1237 07/30/25  0830 07/30/25  0557 07/30/25  0451 07/30/25  0010 07/29/25  2318 07/29/25  0850 07/29/25  0618 07/28/25  0910 07/28/25  0608 07/27/25  1241 07/27/25  0625 07/26/25  2138 07/26/25  2114   POCT GLUCOSE mg/dL 134* 138*  --  158* 114* 109*   < >  --    < >  --    < >  --    < >  --    GLUCOSE mg/dL  --   --  136*  --   --   --   --  101*  --  164*  --  200*  --  73*    < > = values in this interval not displayed.                   Assessment & Plan  Hypoglycemia    Hypomagnesemia    AMS (altered mental status)    Unresponsiveness    Bacteremia due to Klebsiella pneumoniae    The patient is a 65 year old female who is admitted to the hospital for evaluation and management of AMS. She was found to be hypoglycemic and we have been asked to see the patient for the same.  Patient reports decreased oral output with diarrhea at " homepatient's blood sugars have improved and she does not have.  No Further episodes of hypoglycemia.       - NPO for now because of concerns for GI bleed  - sliding scale insulin I.e. SS1, if blood sugars are above 200. Hold all her OP medications  - Start the patient on 60gm carb restriction diet  - Discharge recs: sliding scale insulin no 1, farxiga 10 mg daily and januvia 100mg daily        Thank you for the consult. Endocrinology will sign off. Please contact us if you have any questions.   The patient was seen and discussed with Dr. Mahad Ramon MD  Endocrinology fellow PGY 4

## 2025-07-30 NOTE — PROGRESS NOTES
"  SLP Adult Inpatient Speech-Language Pathology Clinical Swallow Evaluation    Patient Name: Myrna Khan  MRN: [id number]  Today's Date: 7/30/2025   Time Calculation  Start Time: 1310  Stop Time: 1325  Time Calculation (min): 15 min           Assessment/Plan:  #dysphagia  SLP consulted for clinical swallow exam. Pt currently has NGT IWS however RN turned off suction for exam. RN reports pt w/ intermittent coughing w/ sips of water.   During exam, used the Carrie, 3 ounce protocol that has high negative predictive value in detecting aspiration. Pt did not exhibit overt s/s of aspiration. D/t her GI scenario, did not progress to food material.       Recommendations:  - Pt is ok to have thin liquids/clear liquids from swallow safety standpoint        Inpatient/Swing Bed or Outpatient: Inpatient  SLP Plan: Skilled SLP  SLP Frequency: 1x per week  Duration: 30 days  SLP Discharge Recommendations:  (TBD)  SLP - OK to Discharge: Yes        Goals:  Encounter Problems       Encounter Problems (Active)       Swallowing       LTG - Patient will tolerate the least restrictive diet without overt difficulty by time of discharge.       Start:  07/30/25    Expected End:  08/07/25            SLP Goal 1       Start:  07/30/25    Expected End:  08/07/25       STG - Patient will tolerate therapeutic trials of recommended consistency without clinical signs and symptoms of aspiration on 100% of trials                      Subjective     Baseline Assessment:  Respiratory Status: Room air  History of Intubation: No  Behavior/Cognition: Alert, Cooperative, Pleasant mood  Vision: Functional for self-feeding  Hearing: Within Functional Limits  Baseline Vocal Quality: Weak    History and Physical:    Per H&P:  \" Myrna Khan is a 65 y.o. female with PMHx of type 2 diabetes, CAD (s/p CABG 9/2023), hypertension 2/2 renal artery stenosis, PAD with dry gangrene (s/p LLE endarterectomy 3/6/25 and left BKA (guillotine amp 4/1/25 with " "formalization on 4/3/25)) who presented to the emergency department via EMS from SNF for altered mental status. Patient started on D5 due to hypoglycemia, stopped fluids and patient has rebounded following increased PO intake. Endo rec starting SS1 and holding home DM meds. Hgb 6.4, transfused 1 unit PRBC, recheck 8.8. CT AP w/o c/f bleed, no active bleed noted. CT showed c/f ileus, patient with n/v, NPO, NG to LIWS w/ 200 ml output. Hgb drop to 6.4, 3 tarry stools, GI consulted, transfused 1 unit.  \"    Medical History[1]  Family History[2]    Allergies[3]      Relevant Results  XR chest 1 view 07/26/2025    Narrative  Interpreted By:  Lawanda Laguna,  STUDY:  Chest, single AP view.    INDICATION:  Signs/Symptoms:new oxygen requirement, r/o PNA.    COMPARISON:  None.    ACCESSION NUMBER(S):  [id number]    ORDERING CLINICIAN:  STEF MORGAN    FINDINGS:  Status post median sternotomy.  The cardiac silhouette size is within normal limits.  Large patchy opacity in the left mid lung perihilar region.  No acute osseous abnormality.    Impression  1. Findings concerning for left mid lung pneumonia. Continued  radiographic follow-up suggested after the treatment is complete in  4-6 weeks to document resolution    MACRO:  None.    Signed by: Lawanda Laguna 7/26/2025 11:54 AM  Dictation workstation:   [id number]      Objective     Oral/Motor Assessment:  Oral Hygiene: dry secretions; generally poor oral care  Oral Motor: Within Functional Limits  Vocal Quality: Exceptions to WFL  Vocal Quality Impairment: Weak  Intelligibility: Intelligible  Hearing: Within Functional Limits      Clinical Observations:    The 3 oz sequential drinks of thin liquid water was utilized as a reliable, evidence based test to rule out silent aspiration and determine need for additional testing, such as the MBS or FEES (fiberoptic endoscopic evaluation of swallow), if the test is equivocal, incomplete or pt shows s/sx of aspiration,  prior to " recommending a oral diet    Was The 3 oz Swallow Protocol Completed: Yes    Consistencies Trialed: Yes  Consistencies Trialed: Ice Chips, Thin (IDDSI Level 0) - Straw    Oral phase:  WFL    Pharyngeal Phase: Appeared WFL w/ tested consistencies    Inpatient Education:  Extensive education provided to patient and/or Caregivers regarding current swallow function, recommendations/results, and POC. Demonstrated verbal understanding and were agreeable w/ the details/recommendations reviewed.                   [1]   Past Medical History:  Diagnosis Date    CAD (coronary artery disease)     Carotid artery stenosis     Diabetes mellitus (Multi)     Heart disease     HF (heart failure)     Hypertension     NSTEMI (non-ST elevated myocardial infarction) (Multi)     PAD (peripheral artery disease)     Renal artery stenosis    [2]   Family History  Problem Relation Name Age of Onset    Peripheral vascular disease Mother      Hypertension Mother      Diabetes type II Mother      Hypertension Sister      Diabetes type II Sister     [3]   Allergies  Allergen Reactions    Lisinopril Itching    Amoxicillin Rash    Losartan Itching and Rash

## 2025-07-30 NOTE — DOCUMENTATION CLARIFICATION NOTE
"    PATIENT:               TANNER PIKE  ACCT #:                  [id number]  MRN:                       [id number]  :                       1960  ADMIT DATE:       2025 6:03 AM  DISCH DATE:  RESPONDING PROVIDER #:        [id number]          PROVIDER RESPONSE TEXT:    I agree with dietician diagnosis of severe malnutrition on 25    CDI QUERY TEXT:    Clarification      Instruction:    Based on your assessment of the patient and the clinical information, please provide the requested documentation by clicking on the appropriate radio button and enter any additional information if prompted.    Question: Please further clarify this patient nutritional status as    When answering this query, please exercise your independent professional judgment. The fact that a question is being asked, does not imply that any particular answer is desired or expected.    The patient's clinical indicators include:  Clinical Information:  65 y.o. female with a PMH T2 IDDM presented  from SNF due to AMS.  Glucose was 25. Pt not eating much at SNF--does not like the food--and she is still on insulin at -likely cause of hypoglycemia.    Clinical Indicators:   Nutrition Consult   \" BMI 15.13  has been consuming <50% of meals  Diagnosis Status: New  Malnutrition Diagnosis: Severe malnutrition related to acute disease or injury  As Evidenced by: pt consuming <50% of estimated needs for >/ or equal to 1 month, 30% wt loss in the past 3 months, and severe muscle and fat losses    Treatment:  Continue regular diet as tolerated  Ensure plus TID  Recommend appetite stimulant per team discretion  Agree with thiamine supplementation \"    Risk Factors:  poor appetite, doesn?t feel hungry, does not like the food at [facility]  Options provided:  -- I agree with dietician diagnosis of severe malnutrition on 25  -- Other - I will add my own diagnosis  -- Refer to Clinical Documentation Reviewer    Query created by: Bere Dominguez on " 7/29/2025 3:11 PM      Electronically signed by:  MUMTAZ WETZEL DO 7/30/2025 3:44 AM

## 2025-07-30 NOTE — PROGRESS NOTES
"Myrna Khan is a 65 y.o. female on day 4 of admission presenting with Bacteremia due to Klebsiella pneumoniae.    Subjective   Overnight patient had 2 dark tarry stools. Patient seen at bedside this morning, states her abdominal pain Is slightly better. Pain well controlled.       Objective     Physical Exam  Constitutional:       General: She is not in acute distress.     Comments: Chronically ill appearing    HENT:      Head: Normocephalic and atraumatic.      Nose: Nose normal.      Mouth/Throat:      Mouth: Mucous membranes are moist.     Eyes:      Extraocular Movements: Extraocular movements intact.      Conjunctiva/sclera: Conjunctivae normal.       Cardiovascular:      Rate and Rhythm: Normal rate and regular rhythm.      Heart sounds: Normal heart sounds.   Pulmonary:      Effort: Pulmonary effort is normal.      Breath sounds: Normal breath sounds.      Comments: Intermittently wearing 1L NC for comfort  Abdominal:      General: Bowel sounds are normal. There is distension.      Palpations: Abdomen is soft.      Tenderness: There is abdominal tenderness.      Comments: NG in place, tarry stools noted     Musculoskeletal:         General: Normal range of motion.      Cervical back: Normal range of motion.      Comments: L BKA      Neurological:      General: No focal deficit present.      Mental Status: She is alert. Mental status is at baseline.     Psychiatric:         Behavior: Behavior normal.         Last Recorded Vitals  Blood pressure 116/74, pulse 107, temperature 36.5 °C (97.7 °F), resp. rate 18, height (!) 1.549 m (5' 1\"), weight (!) 36.3 kg (80 lb 0.4 oz), SpO2 99%.  Intake/Output last 3 Shifts:  I/O last 3 completed shifts:  In: 962.5 (26.5 mL/kg) [Blood:912.5; IV Piggyback:50]  Out: 800 (22 mL/kg) [Urine:450 (0.3 mL/kg/hr); Emesis/NG output:350]  Weight: 36.3 kg     Relevant Results  Scheduled medications  Scheduled Medications[1]  Continuous medications  Continuous Medications[2]  PRN " medications  PRN Medications[3]     Results for orders placed or performed during the hospital encounter of 07/26/25 (from the past 24 hours)   POCT GLUCOSE   Result Value Ref Range    POCT Glucose 133 (H) 74 - 99 mg/dL   POCT GLUCOSE   Result Value Ref Range    POCT Glucose 133 (H) 74 - 99 mg/dL   POCT GLUCOSE   Result Value Ref Range    POCT Glucose 117 (H) 74 - 99 mg/dL   CBC and Auto Differential   Result Value Ref Range    WBC 14.8 (H) 4.4 - 11.3 x10*3/uL    nRBC 0.0 0.0 - 0.0 /100 WBCs    RBC 2.44 (L) 4.00 - 5.20 x10*6/uL    Hemoglobin 7.9 (L) 12.0 - 16.0 g/dL    Hematocrit 24.2 (L) 36.0 - 46.0 %    MCV 99 80 - 100 fL    MCH 32.4 26.0 - 34.0 pg    MCHC 32.6 32.0 - 36.0 g/dL    RDW 20.0 (H) 11.5 - 14.5 %    Platelets 97 (L) 150 - 450 x10*3/uL    Neutrophils % 77.6 40.0 - 80.0 %    Immature Granulocytes %, Automated 0.5 0.0 - 0.9 %    Lymphocytes % 19.7 13.0 - 44.0 %    Monocytes % 2.0 2.0 - 10.0 %    Eosinophils % 0.1 0.0 - 6.0 %    Basophils % 0.1 0.0 - 2.0 %    Neutrophils Absolute 11.49 (H) 1.20 - 7.70 x10*3/uL    Immature Granulocytes Absolute, Automated 0.07 0.00 - 0.70 x10*3/uL    Lymphocytes Absolute 2.91 1.20 - 4.80 x10*3/uL    Monocytes Absolute 0.30 0.10 - 1.00 x10*3/uL    Eosinophils Absolute 0.01 0.00 - 0.70 x10*3/uL    Basophils Absolute 0.02 0.00 - 0.10 x10*3/uL   POCT GLUCOSE   Result Value Ref Range    POCT Glucose 106 (H) 74 - 99 mg/dL   POCT GLUCOSE   Result Value Ref Range    POCT Glucose 109 (H) 74 - 99 mg/dL   POCT GLUCOSE   Result Value Ref Range    POCT Glucose 114 (H) 74 - 99 mg/dL   POCT GLUCOSE   Result Value Ref Range    POCT Glucose 158 (H) 74 - 99 mg/dL   CBC and Auto Differential   Result Value Ref Range    WBC 12.6 (H) 4.4 - 11.3 x10*3/uL    nRBC 0.2 (H) 0.0 - 0.0 /100 WBCs    RBC 2.11 (L) 4.00 - 5.20 x10*6/uL    Hemoglobin 6.8 (L) 12.0 - 16.0 g/dL    Hematocrit 20.6 (L) 36.0 - 46.0 %    MCV 98 80 - 100 fL    MCH 32.2 26.0 - 34.0 pg    MCHC 33.0 32.0 - 36.0 g/dL    RDW 19.7 (H)  11.5 - 14.5 %    Platelets 82 (L) 150 - 450 x10*3/uL    Immature Granulocytes %, Automated 0.6 0.0 - 0.9 %    Immature Granulocytes Absolute, Automated 0.07 0.00 - 0.70 x10*3/uL   Magnesium   Result Value Ref Range    Magnesium 1.76 1.60 - 2.40 mg/dL   Renal Function Panel   Result Value Ref Range    Glucose 136 (H) 74 - 99 mg/dL    Sodium 142 136 - 145 mmol/L    Potassium 3.6 3.5 - 5.3 mmol/L    Chloride 110 (H) 98 - 107 mmol/L    Bicarbonate 24 21 - 32 mmol/L    Anion Gap 12 10 - 20 mmol/L    Urea Nitrogen 37 (H) 6 - 23 mg/dL    Creatinine 1.06 (H) 0.50 - 1.05 mg/dL    eGFR 58 (L) >60 mL/min/1.73m*2    Calcium 6.7 (L) 8.6 - 10.6 mg/dL    Phosphorus 2.6 2.5 - 4.9 mg/dL    Albumin 2.2 (L) 3.4 - 5.0 g/dL   Manual Differential   Result Value Ref Range    Neutrophils %, Manual 91.3 40.0 - 80.0 %    Bands %, Manual 0.0 0.0 - 5.0 %    Lymphocytes %, Manual 7.8 13.0 - 44.0 %    Monocytes %, Manual 0.9 2.0 - 10.0 %    Eosinophils %, Manual 0.0 0.0 - 6.0 %    Basophils %, Manual 0.0 0.0 - 2.0 %    Atypical Lymphocytes %, Manual 0.0 0.0 - 2.0 %    Metamyelocytes %, Manual 0.0 0.0 - 0.0 %    Myelocytes %, Manual 0.0 0.0 - 0.0 %    Plasma Cells %, Manual 0.0 0.00 - 0.00 %    Promyelocytes %, Manual 0.0 0.0 - 0.0 %    Blasts %, Manual 0.0 0.0 - 0.0 %    Others %, Manual 0.0 %    Seg Neutrophils Absolute, Manual 11.50 (H) 1.20 - 7.00 x10*3/uL    Bands Absolute, Manual 0.00 0.00 - 0.70 x10*3/uL    Lymphocytes Absolute, Manual 0.98 (L) 1.20 - 4.80 x10*3/uL    Monocytes Absolute, Manual 0.11 0.10 - 1.00 x10*3/uL    Eosinophils Absolute, Manual 0.00 0.00 - 0.70 x10*3/uL    Basophils Absolute, Manual 0.00 0.00 - 0.10 x10*3/uL    Atypical Lymphs Absolute, Manual 0.00 0.00 - 0.50 x10*3/uL    Metamyelocytes Absolute, Manual 0.00 0.00 - 0.00 x10*3/uL    Myelocytes Absolute, Manual 0.00 0.00 - 0.00 x10*3/uL    Plasma Cells Absolute, Manual 0.00 0.00 - 0.00 x10*3/uL    Promyelocytes Absolute, Manual 0.00 0.00 - 0.00 x10*3/uL    Blasts  Absolute, Manual 0.00 0.00 - 0.00 x10*3/uL    Others Absolute, Manual 0.00 x10*3/uL    Total Cells Counted 115     Neutrophils Absolute, Manual 11.50 (H) 1.20 - 7.70 x10*3/uL    Manual nRBC per 100 Cells 0.0 0.0 - 0.0 %    RBC Morphology See Below     Polychromasia Mild     Vin Cells Few     Acanthocytes Few    Hepatic function panel   Result Value Ref Range    Albumin 2.2 (L) 3.4 - 5.0 g/dL    Bilirubin, Total 0.3 0.0 - 1.2 mg/dL    Bilirubin, Direct 0.1 0.0 - 0.3 mg/dL    Alkaline Phosphatase 98 33 - 136 U/L    ALT 10 7 - 45 U/L    AST 13 9 - 39 U/L    Total Protein 5.0 (L) 6.4 - 8.2 g/dL   Lactate dehydrogenase   Result Value Ref Range     84 - 246 U/L   POCT GLUCOSE   Result Value Ref Range    POCT Glucose 138 (H) 74 - 99 mg/dL   Prepare RBC: 1 Units   Result Value Ref Range    PRODUCT CODE P6986A85     Unit Number [id number]     Unit ABO O     Unit RH POS     XM INTEP COMP     Dispense Status XM     Blood Expiration Date 8/21/2025 11:59:00 PM EDT     PRODUCT BLOOD TYPE 5100     UNIT VOLUME 286    Hemoglobin and hematocrit, blood   Result Value Ref Range    Hemoglobin 6.4 (LL) 12.0 - 16.0 g/dL    Hematocrit 19.5 (L) 36.0 - 46.0 %                   This patient has a urinary catheter   Reason for the urinary catheter remaining today? urinary retention/bladder outlet obstruction, acute or chronic              Assessment & Plan  AMS (altered mental status)    Hypoglycemia    Unresponsiveness    Bacteremia due to Klebsiella pneumoniae      Myrna Khan is a 65 y.o. female with PMHx of type 2 diabetes, CAD (s/p CABG 9/2023), hypertension 2/2 renal artery stenosis, PAD with dry gangrene (s/p LLE endarterectomy 3/6/25 and left BKA (guillotine amp 4/1/25 with formalization on 4/3/25)) who presented to the emergency department via EMS from SNF for altered mental status. Patient started on D5 due to hypoglycemia, stopped fluids and patient has rebounded following increased PO intake. Endo rec starting SS1  and holding home DM meds. Hgb 6.4, transfused 1 unit PRBC, recheck 8.8. CT AP w/o c/f bleed, no active bleed noted. CT showed c/f ileus, patient with n/v, NPO, NG to LIWS w/ 200 ml output. Hgb drop to 6.4, 3 tarry stools, GI consulted, transfused 1 unit.     Update 7/30  - Tarry stools overnight, Hgb drop to 6.4, Gi consulted, ordered 1 unit PRBC  - holding all AC and BP meds  - NG in place, 200 ml output, repeat KUB     AMS, likely multifactorial (resolved)  Bacteremia  UTI  CAP  :: CTH negative for acute intracranial abnormality or mass effect.   :: BCx collected 7/26, pending result  :: CXR showing findings of patchy consolidation of left lung, concerning for left mid lung pneumonia   - UA showing turbid urine, 1+ protein, 3+ glucose, 500 LE. Nitrite and ketone negative, >50 WBC, 11-20 RBC, 1+ bacteria.   - c diff PCR (ordered d/t recent clindamycin course and diarrhea) negative  - UDS negative   Plan:  - Started CTX 1g daily iso amoxicillin allergy on 7/26)  and doxycycline 100mg BID iso QTc of 500. Plan for 5 days total of abx therapy  - Discontinued CTX  and started Zozyn (7/27-x)  -UA Cx 7/26: >100,000 Klebsiella   -Blood Cx 7/26: growing Klebsiella     Anemia  C/f GI bleed  - admission Hgb 10.5, likely hemo concentrated due to poor po intake   - Current Hgb 6.6  - Transfused 1 unit PRBC  - post transfusion cbc 8.9  - repeat Hgb drop to 6.4. Multiple tarry stools  - Ordered 1 unit PRBC, post-cbc ordered  - PPI IV BID  - holding AC  - Gi consulted, recs pending    C/f Ileus  - patient with intermittent n/a overnight  - CT AP showed Multiple loops of distended fluid-filled small bowel with  air-fluid levels  - NPO  - NG to LIWS ordered, 200 ml output  - KUB ordered    R adnexal mass  - large cystic appearing right adnexal mass seen on CT imaging  - TVUS ordered    T2DM  Hypoglycemia (resolved)  :: home regimen per pharm med rec: glargine 10u nightly, Farxiga 10mg daily, metformin 1g BID  :: unclear etiology of  hypoglycemia; per SNF report, takes 10u glargine nightly. Unknown what patient's HS BG was or how much insulin she actually received last night.   :: last A1c 6.2% (5/9/2025)  :: patient's BG levels have been labile, even while on D5NS mIVF (103, 347, 104, 228, 70, 157). These are all POCT reads, currently pending repeat RFP. VBG from this afternoon showing glucose of 129.  - has received 2 amps of D50. On D5NS at 50ml/hr, increased to 75ml/h5r  Plan:  - all home medications held at this time  - continue to monitor POCT glucose q4h  - Endo consulted, rec holding OP DM meds, starting SS1 and continue carb controlled diet     MAGUI  Hypomagnesemia  Hypophosphatemia, resolved    chronic urinary retention with indwelling day catheter  :: Cr 1.21 on admission (baseline 0.7-0.8), eGFR 50  :: Mg 1.23, s/p 2g IV Mg in the ED. Has had chronic low Mg in the past on chart review  :: Phos 2.4 with mild hemolysis detected  :: UA showing turbid urine, 1+ protein, 3+ glucose, 500 LE. Nitrite and ketone negative, >50 WBC, 11-20 RBC, 1+ bacteria. Reflex culture pending.   - Cr 1.06  - daily rfp     Malnutrition  - patient cachectic, BMI 15.12  - on home vit D, calcium supplements  Plan:  - continue home vit D, calcium supplements, ordered  - follow up B12 and folate labs  - thiamine, B12, and folate supplements initiated   - q12 RFPs to monitor for refeeding, replete electrolytes for goal of K>4, phos>3, Mg>2  - inpatient consult to dietician placed, appreciate recs  -- ensure TID     CAD s/p CABG 9/2023  HTN  FROYLAN  :: home antihypertensives: carvedilol 25mg BID, hydralazine 75mg TID, nifedipine 90mg  :: on atorvastatin 80mg nightly  :: EKG showing sinus rhythm with PACs, low voltage QRS, possible inferior infarct (age undetermined), nonspecific T wave abnormality now evident in Inferior leads and T wave inversion now evident in Lateral leads  Plan:  - home meds ordered as above  - Cont tele   - patient currently stable, continue to  monitor BP     PAD  :: s/p LLE endarterectomy 3/6/25 and left BKA (guillotine amp 4/1/25 with formalization on 4/3/25)  :: on clopidigrel 75mg daily and ASA 81mg daily  - wound of LLE at level of BKA. Per recent vasc surgery note, medial ulceration had progressed to a tunneling wound (did not probe to bone).  Was given course of clinda at the time, completed on 7/16  - Xray of L tib/fib without evidence of OM.   Plan:  - continue DAPT, ordered  - wound care consult for evaluation of wounds, appreciate recs     F: NS at 50 ml/hr  E: replete for goal of K>4, phos>3, Mg>2  N: NPO  DVT ppx: subcutaneous hep  Abx therapy: Zosyn doxy  Code status: full code    Patient discussed with attending physician Dr. Berger  Plan preliminary until cosigned by attending physician.    Jesse Granados MD  Family Medicine  PGY-3           [1] [Held by provider] aspirin, 81 mg, oral, Daily  atorvastatin, 80 mg, oral, Daily  calcium citrate, 200 mg of elemental calcium, oral, Daily  [Held by provider] carvedilol, 25 mg, oral, BID  cholecalciferol, 50 mcg, oral, Daily  [Held by provider] clopidogrel, 75 mg, oral, Daily  cyanocobalamin, 500 mcg, oral, Daily  [Held by provider] dapagliflozin propanediol, 10 mg, oral, Daily with breakfast  doxycylcine, 100 mg, oral, q12h BOLIVAR  gabapentin, 300 mg, oral, BID  [Held by provider] heparin (porcine), 5,000 Units, subcutaneous, q8h BOLIVAR  [Held by provider] hydrALAZINE, 75 mg, oral, TID  insulin lispro, 0-5 Units, subcutaneous, TID AC  iohexol, 1,000 mL, oral, Once in imaging  melatonin, 10 mg, oral, Nightly  [Held by provider] NIFEdipine ER, 90 mg, oral, Daily before breakfast  pantoprazole, 40 mg, intravenous, BID  piperacillin-tazobactam, 2.25 g, intravenous, q6h  [Held by provider] ranolazine, 500 mg, oral, BID  thiamine, 100 mg, oral, Daily  vitamin B complex-vitamin C-folic acid, 1 capsule, oral, Daily     [2]    [3] PRN medications: acetaminophen **OR** acetaminophen **OR** acetaminophen,  dextrose, dextrose, diclofenac sodium, glucagon, glucagon, naloxone, oxyCODONE, oxygen, trimethobenzamide

## 2025-07-31 ENCOUNTER — APPOINTMENT (OUTPATIENT)
Dept: GASTROENTEROLOGY | Facility: HOSPITAL | Age: 65
DRG: 637 | End: 2025-07-31
Payer: MEDICARE

## 2025-07-31 LAB
ALBUMIN SERPL BCP-MCNC: 2.1 G/DL (ref 3.4–5)
ANION GAP SERPL CALC-SCNC: 12 MMOL/L (ref 10–20)
APTT PPP: 39 SECONDS (ref 26–36)
BACTERIA BLD CULT: NORMAL
BACTERIA BLD CULT: NORMAL
BASOPHILS # BLD AUTO: 0.01 X10*3/UL (ref 0–0.1)
BASOPHILS NFR BLD AUTO: 0.1 %
BLOOD EXPIRATION DATE: NORMAL
BUN SERPL-MCNC: 31 MG/DL (ref 6–23)
CALCIUM SERPL-MCNC: 7.3 MG/DL (ref 8.6–10.6)
CHLORIDE SERPL-SCNC: 111 MMOL/L (ref 98–107)
CO2 SERPL-SCNC: 24 MMOL/L (ref 21–32)
CREAT SERPL-MCNC: 1.1 MG/DL (ref 0.5–1.05)
DISPENSE STATUS: NORMAL
EGFRCR SERPLBLD CKD-EPI 2021: 56 ML/MIN/1.73M*2
EOSINOPHIL # BLD AUTO: 0.02 X10*3/UL (ref 0–0.7)
EOSINOPHIL NFR BLD AUTO: 0.2 %
ERYTHROCYTE [DISTWIDTH] IN BLOOD BY AUTOMATED COUNT: 18.9 % (ref 11.5–14.5)
FIBRINOGEN PPP-MCNC: 264 MG/DL (ref 200–400)
GLUCOSE BLD MANUAL STRIP-MCNC: 103 MG/DL (ref 74–99)
GLUCOSE BLD MANUAL STRIP-MCNC: 119 MG/DL (ref 74–99)
GLUCOSE BLD MANUAL STRIP-MCNC: 60 MG/DL (ref 74–99)
GLUCOSE BLD MANUAL STRIP-MCNC: 68 MG/DL (ref 74–99)
GLUCOSE BLD MANUAL STRIP-MCNC: 69 MG/DL (ref 74–99)
GLUCOSE BLD MANUAL STRIP-MCNC: 70 MG/DL (ref 74–99)
GLUCOSE BLD MANUAL STRIP-MCNC: 80 MG/DL (ref 74–99)
GLUCOSE SERPL-MCNC: 124 MG/DL (ref 74–99)
HCT VFR BLD AUTO: 22 % (ref 36–46)
HGB BLD-MCNC: 7 G/DL (ref 12–16)
IMM GRANULOCYTES # BLD AUTO: 0.05 X10*3/UL (ref 0–0.7)
IMM GRANULOCYTES NFR BLD AUTO: 0.5 % (ref 0–0.9)
INR PPP: 2.8 (ref 0.9–1.1)
LYMPHOCYTES # BLD AUTO: 2.69 X10*3/UL (ref 1.2–4.8)
LYMPHOCYTES NFR BLD AUTO: 29 %
MAGNESIUM SERPL-MCNC: 1.54 MG/DL (ref 1.6–2.4)
MCH RBC QN AUTO: 30.6 PG (ref 26–34)
MCHC RBC AUTO-ENTMCNC: 31.8 G/DL (ref 32–36)
MCV RBC AUTO: 96 FL (ref 80–100)
MONOCYTES # BLD AUTO: 0.34 X10*3/UL (ref 0.1–1)
MONOCYTES NFR BLD AUTO: 3.7 %
NEUTROPHILS # BLD AUTO: 6.18 X10*3/UL (ref 1.2–7.7)
NEUTROPHILS NFR BLD AUTO: 66.5 %
NRBC BLD-RTO: 0 /100 WBCS (ref 0–0)
PHOSPHATE SERPL-MCNC: 2.6 MG/DL (ref 2.5–4.9)
PLATELET # BLD AUTO: 46 X10*3/UL (ref 150–450)
POTASSIUM SERPL-SCNC: 2.9 MMOL/L (ref 3.5–5.3)
POTASSIUM SERPL-SCNC: 4.5 MMOL/L (ref 3.5–5.3)
PRODUCT BLOOD TYPE: 5100
PRODUCT CODE: NORMAL
PROTHROMBIN TIME: 31 SECONDS (ref 9.8–12.4)
RBC # BLD AUTO: 2.29 X10*6/UL (ref 4–5.2)
RBC MORPH BLD: NORMAL
SODIUM SERPL-SCNC: 144 MMOL/L (ref 136–145)
UNIT ABO: NORMAL
UNIT NUMBER: NORMAL
UNIT RH: NORMAL
UNIT VOLUME: 350
WBC # BLD AUTO: 9.3 X10*3/UL (ref 4.4–11.3)
XM INTEP: NORMAL

## 2025-07-31 PROCEDURE — 36415 COLL VENOUS BLD VENIPUNCTURE: CPT

## 2025-07-31 PROCEDURE — P9016 RBC LEUKOCYTES REDUCED: HCPCS

## 2025-07-31 PROCEDURE — 36430 TRANSFUSION BLD/BLD COMPNT: CPT

## 2025-07-31 PROCEDURE — 99232 SBSQ HOSP IP/OBS MODERATE 35: CPT | Performed by: STUDENT IN AN ORGANIZED HEALTH CARE EDUCATION/TRAINING PROGRAM

## 2025-07-31 PROCEDURE — 2500000004 HC RX 250 GENERAL PHARMACY W/ HCPCS (ALT 636 FOR OP/ED)

## 2025-07-31 PROCEDURE — 85384 FIBRINOGEN ACTIVITY: CPT

## 2025-07-31 PROCEDURE — 82947 ASSAY GLUCOSE BLOOD QUANT: CPT

## 2025-07-31 PROCEDURE — 2500000005 HC RX 250 GENERAL PHARMACY W/O HCPCS

## 2025-07-31 PROCEDURE — 84100 ASSAY OF PHOSPHORUS: CPT

## 2025-07-31 PROCEDURE — 85610 PROTHROMBIN TIME: CPT

## 2025-07-31 PROCEDURE — P9073 PLATELETS PHERESIS PATH REDU: HCPCS

## 2025-07-31 PROCEDURE — 83735 ASSAY OF MAGNESIUM: CPT

## 2025-07-31 PROCEDURE — 85730 THROMBOPLASTIN TIME PARTIAL: CPT

## 2025-07-31 PROCEDURE — 1200000002 HC GENERAL ROOM WITH TELEMETRY DAILY

## 2025-07-31 PROCEDURE — 84132 ASSAY OF SERUM POTASSIUM: CPT

## 2025-07-31 PROCEDURE — 99233 SBSQ HOSP IP/OBS HIGH 50: CPT

## 2025-07-31 PROCEDURE — 2500000001 HC RX 250 WO HCPCS SELF ADMINISTERED DRUGS (ALT 637 FOR MEDICARE OP)

## 2025-07-31 PROCEDURE — 85025 COMPLETE CBC W/AUTO DIFF WBC: CPT

## 2025-07-31 RX ORDER — POTASSIUM CHLORIDE 1.5 G/1.58G
40 POWDER, FOR SOLUTION ORAL ONCE
Status: COMPLETED | OUTPATIENT
Start: 2025-07-31 | End: 2025-07-31

## 2025-07-31 RX ORDER — POTASSIUM CHLORIDE 14.9 MG/ML
20 INJECTION INTRAVENOUS
Status: COMPLETED | OUTPATIENT
Start: 2025-07-31 | End: 2025-07-31

## 2025-07-31 RX ORDER — DEXTROSE MONOHYDRATE AND SODIUM CHLORIDE 5; .45 G/100ML; G/100ML
50 INJECTION, SOLUTION INTRAVENOUS CONTINUOUS
Status: ACTIVE | OUTPATIENT
Start: 2025-08-01 | End: 2025-08-01

## 2025-07-31 RX ORDER — DEXTROSE MONOHYDRATE AND SODIUM CHLORIDE 5; .45 G/100ML; G/100ML
50 INJECTION, SOLUTION INTRAVENOUS CONTINUOUS
Status: DISCONTINUED | OUTPATIENT
Start: 2025-07-31 | End: 2025-07-31

## 2025-07-31 RX ORDER — DEXTROSE MONOHYDRATE 100 MG/ML
50 INJECTION, SOLUTION INTRAVENOUS CONTINUOUS
Status: DISCONTINUED | OUTPATIENT
Start: 2025-08-01 | End: 2025-07-31

## 2025-07-31 RX ADMIN — ATORVASTATIN CALCIUM 80 MG: 80 TABLET, FILM COATED ORAL at 08:35

## 2025-07-31 RX ADMIN — Medication 10 MG: at 23:02

## 2025-07-31 RX ADMIN — GABAPENTIN 300 MG: 300 CAPSULE ORAL at 23:02

## 2025-07-31 RX ADMIN — PIPERACILLIN SODIUM AND TAZOBACTAM SODIUM 3.38 G: 3; .375 INJECTION, SOLUTION INTRAVENOUS at 09:27

## 2025-07-31 RX ADMIN — ASCORBIC ACID, THIAMINE MONONITRATE,RIBOFLAVIN, NIACINAMIDE, PYRIDOXINE HYDROCHLORIDE, FOLIC ACID, CYANOCOBALAMIN, BIOTIN, CALCIUM PANTOTHENATE, 1 CAPSULE: 100; 1.5; 1.7; 20; 10; 1; 6000; 150000; 5 CAPSULE, LIQUID FILLED ORAL at 08:36

## 2025-07-31 RX ADMIN — PANTOPRAZOLE SODIUM 40 MG: 40 INJECTION, POWDER, LYOPHILIZED, FOR SOLUTION INTRAVENOUS at 23:12

## 2025-07-31 RX ADMIN — DEXTROSE MONOHYDRATE 12.5 G: 25 INJECTION, SOLUTION INTRAVENOUS at 05:34

## 2025-07-31 RX ADMIN — POTASSIUM CHLORIDE 20 MEQ: 14.9 INJECTION, SOLUTION INTRAVENOUS at 11:22

## 2025-07-31 RX ADMIN — POTASSIUM CHLORIDE 20 MEQ: 14.9 INJECTION, SOLUTION INTRAVENOUS at 09:27

## 2025-07-31 RX ADMIN — PANTOPRAZOLE SODIUM 40 MG: 40 INJECTION, POWDER, LYOPHILIZED, FOR SOLUTION INTRAVENOUS at 08:35

## 2025-07-31 RX ADMIN — DEXTROSE AND SODIUM CHLORIDE 50 ML/HR: 5; .45 INJECTION, SOLUTION INTRAVENOUS at 22:49

## 2025-07-31 RX ADMIN — THIAMINE HCL TAB 100 MG 100 MG: 100 TAB at 08:36

## 2025-07-31 RX ADMIN — Medication 50 MCG: at 08:36

## 2025-07-31 RX ADMIN — CYANOCOBALAMIN TAB 1000 MCG 500 MCG: 1000 TAB at 08:35

## 2025-07-31 RX ADMIN — Medication 1 TABLET: at 08:36

## 2025-07-31 RX ADMIN — POTASSIUM CHLORIDE 40 MEQ: 1.5 POWDER, FOR SOLUTION ORAL at 09:27

## 2025-07-31 RX ADMIN — PIPERACILLIN SODIUM AND TAZOBACTAM SODIUM 3.38 G: 3; .375 INJECTION, SOLUTION INTRAVENOUS at 05:17

## 2025-07-31 RX ADMIN — GABAPENTIN 300 MG: 300 CAPSULE ORAL at 08:36

## 2025-07-31 ASSESSMENT — PAIN SCALES - GENERAL
PAINLEVEL_OUTOF10: 0 - NO PAIN
PAINLEVEL_OUTOF10: 0 - NO PAIN

## 2025-07-31 ASSESSMENT — PAIN - FUNCTIONAL ASSESSMENT: PAIN_FUNCTIONAL_ASSESSMENT: 0-10

## 2025-07-31 NOTE — CARE PLAN
The patient's goals for the shift include Patient will safe and stable during shift    The clinical goals for the shift include pt will be safe during the shift

## 2025-07-31 NOTE — PROGRESS NOTES
"Myrna Khan is a 65 y.o. female on day 5 of admission presenting with Bacteremia due to Klebsiella pneumoniae.    Subjective   Overnight patient had 1 dark tarry stool. Patient seen at bedside this morning, states her abdominal pain is much better., and she is feeling much better. Patient inquiring about possibly eating today. No complaints of pain today.        Objective     Physical Exam  Constitutional:       General: She is not in acute distress.     Comments: Chronically ill appearing    HENT:      Head: Normocephalic and atraumatic.      Nose: Nose normal.      Mouth/Throat:      Mouth: Mucous membranes are moist.     Eyes:      Extraocular Movements: Extraocular movements intact.      Conjunctiva/sclera: Conjunctivae normal.       Cardiovascular:      Rate and Rhythm: Normal rate and regular rhythm.      Heart sounds: Normal heart sounds.   Pulmonary:      Effort: Pulmonary effort is normal.      Breath sounds: Normal breath sounds.      Comments: Intermittently wearing 1L NC for comfort  Abdominal:      General: Bowel sounds are normal. There is no distension.      Palpations: Abdomen is soft.      Tenderness: There is no abdominal tenderness.      Comments: NG in place, minimal output     Musculoskeletal:         General: Normal range of motion.      Cervical back: Normal range of motion.      Comments: L BKA      Neurological:      General: No focal deficit present.      Mental Status: She is alert. Mental status is at baseline.     Psychiatric:         Behavior: Behavior normal.         Last Recorded Vitals  Blood pressure 162/87, pulse 76, temperature 36.6 °C (97.9 °F), temperature source Temporal, resp. rate 18, height (!) 1.549 m (5' 1\"), weight (!) 36.3 kg (80 lb 0.4 oz), SpO2 100%.  Intake/Output last 3 Shifts:  I/O last 3 completed shifts:  In: 460.4 (12.7 mL/kg) [Blood:260.4; IV Piggyback:200]  Out: 250 (6.9 mL/kg) [Emesis/NG output:250]  Weight: 36.3 kg     Relevant Results  Scheduled " Patient presents today for a bp check. She is currently taking amlodipine 5mg daily.    BP: 164/83  P: 99     Bp: 138/88  P: 87   medications  Scheduled Medications[1]  Continuous medications  Continuous Medications[2]  PRN medications  PRN Medications[3]     Results for orders placed or performed during the hospital encounter of 07/26/25 (from the past 24 hours)   POCT GLUCOSE   Result Value Ref Range    POCT Glucose 134 (H) 74 - 99 mg/dL   Type and screen   Result Value Ref Range    ABO TYPE O     Rh TYPE POS     ANTIBODY SCREEN NEG    CBC   Result Value Ref Range    WBC 11.6 (H) 4.4 - 11.3 x10*3/uL    nRBC 0.0 0.0 - 0.0 /100 WBCs    RBC 2.52 (L) 4.00 - 5.20 x10*6/uL    Hemoglobin 7.8 (L) 12.0 - 16.0 g/dL    Hematocrit 22.3 (L) 36.0 - 46.0 %    MCV 89 80 - 100 fL    MCH 31.0 26.0 - 34.0 pg    MCHC 35.0 32.0 - 36.0 g/dL    RDW 18.3 (H) 11.5 - 14.5 %    Platelets 61 (L) 150 - 450 x10*3/uL   POCT GLUCOSE   Result Value Ref Range    POCT Glucose 109 (H) 74 - 99 mg/dL   POCT GLUCOSE   Result Value Ref Range    POCT Glucose 110 (H) 74 - 99 mg/dL   POCT GLUCOSE   Result Value Ref Range    POCT Glucose 79 74 - 99 mg/dL   POCT GLUCOSE   Result Value Ref Range    POCT Glucose 68 (L) 74 - 99 mg/dL   CBC and Auto Differential   Result Value Ref Range    WBC 9.3 4.4 - 11.3 x10*3/uL    nRBC 0.0 0.0 - 0.0 /100 WBCs    RBC 2.29 (L) 4.00 - 5.20 x10*6/uL    Hemoglobin 7.0 (L) 12.0 - 16.0 g/dL    Hematocrit 22.0 (L) 36.0 - 46.0 %    MCV 96 80 - 100 fL    MCH 30.6 26.0 - 34.0 pg    MCHC 31.8 (L) 32.0 - 36.0 g/dL    RDW 18.9 (H) 11.5 - 14.5 %    Platelets 46 (L) 150 - 450 x10*3/uL    Neutrophils % 66.5 40.0 - 80.0 %    Immature Granulocytes %, Automated 0.5 0.0 - 0.9 %    Lymphocytes % 29.0 13.0 - 44.0 %    Monocytes % 3.7 2.0 - 10.0 %    Eosinophils % 0.2 0.0 - 6.0 %    Basophils % 0.1 0.0 - 2.0 %    Neutrophils Absolute 6.18 1.20 - 7.70 x10*3/uL    Immature Granulocytes Absolute, Automated 0.05 0.00 - 0.70 x10*3/uL    Lymphocytes Absolute 2.69 1.20 - 4.80 x10*3/uL    Monocytes Absolute 0.34 0.10 - 1.00 x10*3/uL    Eosinophils Absolute 0.02 0.00 - 0.70 x10*3/uL     Basophils Absolute 0.01 0.00 - 0.10 x10*3/uL   Magnesium   Result Value Ref Range    Magnesium 1.54 (L) 1.60 - 2.40 mg/dL   Renal Function Panel   Result Value Ref Range    Glucose 124 (H) 74 - 99 mg/dL    Sodium 144 136 - 145 mmol/L    Potassium 2.9 (LL) 3.5 - 5.3 mmol/L    Chloride 111 (H) 98 - 107 mmol/L    Bicarbonate 24 21 - 32 mmol/L    Anion Gap 12 10 - 20 mmol/L    Urea Nitrogen 31 (H) 6 - 23 mg/dL    Creatinine 1.10 (H) 0.50 - 1.05 mg/dL    eGFR 56 (L) >60 mL/min/1.73m*2    Calcium 7.3 (L) 8.6 - 10.6 mg/dL    Phosphorus 2.6 2.5 - 4.9 mg/dL    Albumin 2.1 (L) 3.4 - 5.0 g/dL   aPTT   Result Value Ref Range    aPTT 39 (H) 26 - 36 seconds   Fibrinogen   Result Value Ref Range    Fibrinogen 264 200 - 400 mg/dL   Protime-INR   Result Value Ref Range    Protime 31.0 (H) 9.8 - 12.4 seconds    INR 2.8 (H) 0.9 - 1.1   Morphology   Result Value Ref Range    RBC Morphology No significant RBC morphology present    POCT GLUCOSE   Result Value Ref Range    POCT Glucose 119 (H) 74 - 99 mg/dL   POCT GLUCOSE   Result Value Ref Range    POCT Glucose 103 (H) 74 - 99 mg/dL   Prepare RBC: 1 Units   Result Value Ref Range    PRODUCT CODE S3796T81     Unit Number [id number]     Unit ABO O     Unit RH POS     XM INTEP COMP     Dispense Status IS     Blood Expiration Date 8/23/2025 11:59:00 PM EDT     PRODUCT BLOOD TYPE 5100     UNIT VOLUME 350                    This patient has a urinary catheter   Reason for the urinary catheter remaining today? urinary retention/bladder outlet obstruction, acute or chronic              Assessment & Plan  AMS (altered mental status)    Hypoglycemia    Unresponsiveness    Bacteremia due to Klebsiella pneumoniae      Myrna Khan is a 65 y.o. female with PMHx of type 2 diabetes, CAD (s/p CABG 9/2023), hypertension 2/2 renal artery stenosis, PAD with dry gangrene (s/p LLE endarterectomy 3/6/25 and left BKA (guillotine amp 4/1/25 with formalization on 4/3/25)) who presented to the  emergency department via EMS from SNF for altered mental status. Patient started on D5 due to hypoglycemia, stopped fluids and patient has rebounded following increased PO intake. Endo rec starting SS1 and holding home DM meds. Hgb 6.4, transfused 1 unit PRBC, recheck 8.8. CT AP w/o c/f bleed, no active bleed noted. CT showed c/f ileus, patient with n/v, NPO, NG to LIWS w/ 200 ml output. Gi consulted due to hgb drop and tarry stools, will plan for scope once electrolytes, hgb, plt replacement.     Update 7/31  - Tarry stool overnight, Hgb drop to 7.0. Plt 46. Will transfuse 1 unit rbc and Plt  - Gi will delay EGD, until post transfusion labs result  - K+ 2.9, replacement ordered, RFP ordered  - NG in place with minimal output, will consider clamp trial     AMS, likely multifactorial (resolved)  Bacteremia  UTI  CAP  :: CTH negative for acute intracranial abnormality or mass effect.   :: BCx collected 7/26, pending result  :: CXR showing findings of patchy consolidation of left lung, concerning for left mid lung pneumonia   - UA showing turbid urine, 1+ protein, 3+ glucose, 500 LE. Nitrite and ketone negative, >50 WBC, 11-20 RBC, 1+ bacteria.   - c diff PCR (ordered d/t recent clindamycin course and diarrhea) negative  - UDS negative   Plan:  - Started CTX 1g daily iso amoxicillin allergy on 7/26)  and doxycycline 100mg BID iso QTc of 500. Plan for 5 days total of abx therapy  - Discontinued CTX  and started Zozyn (7/27-x)  -UA Cx 7/26: >100,000 Klebsiella   -Blood Cx 7/26: growing Klebsiella     Anemia  Thrombocytopenia  C/f GI bleed  - admission Hgb 10.5, likely hemo concentrated due to poor po intake   - Current Hgb 6.6  - Transfused 2x  PRBC  - PPI IV BID  - holding AC (resume ASA post EGD)  - Hgb drop 7.8 to 7.0, PLT 46, iso repeated tarry stool will transfuse PLT and PRBC  - Gi consulted, plan for EGD if post transfusion labs increment appropriately     C/f Ileus  - patient with intermittent n/a overnight  - CT  AP showed Multiple loops of distended fluid-filled small bowel with  air-fluid levels  - NPO  - NG to LIWS ordered, minimal output  - KUB showing improved distension/bowel gas  - will consider clamp trial    Hypokalemia  - K+ 2.9  - Ordered replacement  - RFP ordered    R adnexal mass  - large cystic appearing right adnexal mass seen on CT imaging  - TVUS ordered    T2DM  Hypoglycemia (resolved)  :: home regimen per pharm med rec: glargine 10u nightly, Farxiga 10mg daily, metformin 1g BID  :: unclear etiology of hypoglycemia; per SNF report, takes 10u glargine nightly. Unknown what patient's HS BG was or how much insulin she actually received last night.   :: last A1c 6.2% (5/9/2025)  :: patient's BG levels have been labile, even while on D5NS mIVF (103, 347, 104, 228, 70, 157). These are all POCT reads, currently pending repeat RFP. VBG from this afternoon showing glucose of 129.  - has received 2 amps of D50. On D5NS at 50ml/hr, increased to 75ml/h5r  Plan:  - all home medications held at this time  - continue to monitor POCT glucose q4h  - Endo consulted, rec holding OP DM meds, starting SS1 and continue carb controlled diet     MAGUI resolved  Hypomagnesemia  Hypophosphatemia, resolved    chronic urinary retention with indwelling day catheter  :: Cr 1.21 on admission (baseline 0.7-0.8), eGFR 50  :: Mg 1.23, s/p 2g IV Mg in the ED. Has had chronic low Mg in the past on chart review  :: Phos 2.4 with mild hemolysis detected  :: UA showing turbid urine, 1+ protein, 3+ glucose, 500 LE. Nitrite and ketone negative, >50 WBC, 11-20 RBC, 1+ bacteria. Reflex culture pending.   - Cr 1.06  - daily rfp     Malnutrition  - patient cachectic, BMI 15.12  - on home vit D, calcium supplements  Plan:  - continue home vit D, calcium supplements, ordered  - follow up B12 and folate labs  - thiamine, B12, and folate supplements initiated   - q12 RFPs to monitor for refeeding, replete electrolytes for goal of K>4, phos>3, Mg>2  -  inpatient consult to dietician placed, appreciate recs  -- ensure TID     CAD s/p CABG 9/2023  HTN  FROYLAN  :: home antihypertensives: carvedilol 25mg BID, hydralazine 75mg TID, nifedipine 90mg  :: on atorvastatin 80mg nightly  :: EKG showing sinus rhythm with PACs, low voltage QRS, possible inferior infarct (age undetermined), nonspecific T wave abnormality now evident in Inferior leads and T wave inversion now evident in Lateral leads  Plan:  - home meds ordered as above  - Cont tele   - patient currently stable, continue to monitor BP     PAD  :: s/p LLE endarterectomy 3/6/25 and left BKA (guillotine amp 4/1/25 with formalization on 4/3/25)  :: on clopidigrel 75mg daily and ASA 81mg daily  - wound of LLE at level of BKA. Per recent vasc surgery note, medial ulceration had progressed to a tunneling wound (did not probe to bone).  Was given course of clinda at the time, completed on 7/16  - Xray of L tib/fib without evidence of OM.   Plan:  - continue DAPT, ordered  - wound care consult for evaluation of wounds, appreciate recs     F: PRN  E: replete for goal of K>4, phos>3, Mg>2  N: NPO  DVT ppx: subcutaneous hep (held)  Abx therapy: Zosyn doxy  Code status: full code    Patient discussed with attending physician Dr. Berger  Plan preliminary until cosigned by attending physician.    Jesse Granados MD  Family Medicine  PGY-3         [1] [Held by provider] aspirin, 81 mg, oral, Daily  atorvastatin, 80 mg, oral, Daily  calcium citrate, 200 mg of elemental calcium, oral, Daily  [Held by provider] carvedilol, 25 mg, oral, BID  cholecalciferol, 50 mcg, oral, Daily  [Held by provider] clopidogrel, 75 mg, oral, Daily  cyanocobalamin, 500 mcg, oral, Daily  [Held by provider] dapagliflozin propanediol, 10 mg, oral, Daily with breakfast  gabapentin, 300 mg, oral, BID  [Held by provider] heparin (porcine), 5,000 Units, subcutaneous, q8h BOLIVAR  [Held by provider] hydrALAZINE, 75 mg, oral, TID  insulin lispro, 0-5 Units,  subcutaneous, TID AC  iohexol, 1,000 mL, oral, Once in imaging  melatonin, 10 mg, oral, Nightly  [Held by provider] NIFEdipine ER, 90 mg, oral, Daily before breakfast  pantoprazole, 40 mg, intravenous, BID  piperacillin-tazobactam, 3.375 g, intravenous, q6h  potassium chloride, 20 mEq, intravenous, q2h  [Held by provider] ranolazine, 500 mg, oral, BID  thiamine, 100 mg, oral, Daily  vitamin B complex-vitamin C-folic acid, 1 capsule, oral, Daily     [2]    [3] PRN medications: acetaminophen **OR** acetaminophen **OR** acetaminophen, dextrose, dextrose, diclofenac sodium, glucagon, glucagon, naloxone, oxyCODONE, oxygen, trimethobenzamide

## 2025-07-31 NOTE — PROGRESS NOTES
07/31/25 1224   Rapid Rounds   Attendance Provider;Care Transitions   Expected Discharge Disposition Inter   Today we still await: Clinical stability;Consultant recommendations (Comment)  (per team, patient needs GI intervention prior to discharge)   Review at Escalation Rounds No escalation needed     Updated progress notes sent to Devonte Ahumada.      Alyssa Bryant RN, BSN  Transitional Care Coordinator

## 2025-08-01 ENCOUNTER — ANESTHESIA EVENT (OUTPATIENT)
Dept: GASTROENTEROLOGY | Facility: HOSPITAL | Age: 65
End: 2025-08-01
Payer: MEDICARE

## 2025-08-01 ENCOUNTER — APPOINTMENT (OUTPATIENT)
Dept: RADIOLOGY | Facility: HOSPITAL | Age: 65
DRG: 637 | End: 2025-08-01
Payer: MEDICARE

## 2025-08-01 ENCOUNTER — ANESTHESIA (OUTPATIENT)
Dept: GASTROENTEROLOGY | Facility: HOSPITAL | Age: 65
End: 2025-08-01
Payer: MEDICARE

## 2025-08-01 ENCOUNTER — APPOINTMENT (OUTPATIENT)
Dept: GASTROENTEROLOGY | Facility: HOSPITAL | Age: 65
DRG: 637 | End: 2025-08-01
Payer: MEDICARE

## 2025-08-01 LAB
ALBUMIN SERPL BCP-MCNC: 2.3 G/DL (ref 3.4–5)
ANION GAP SERPL CALC-SCNC: 13 MMOL/L (ref 10–20)
APTT PPP: 40 SECONDS (ref 26–36)
BASOPHILS # BLD AUTO: 0.02 X10*3/UL (ref 0–0.1)
BASOPHILS NFR BLD AUTO: 0.2 %
BLOOD EXPIRATION DATE: NORMAL
BUN SERPL-MCNC: 29 MG/DL (ref 6–23)
BURR CELLS BLD QL SMEAR: NORMAL
CALCIUM SERPL-MCNC: 8 MG/DL (ref 8.6–10.6)
CHLORIDE SERPL-SCNC: 114 MMOL/L (ref 98–107)
CO2 SERPL-SCNC: 22 MMOL/L (ref 21–32)
CREAT SERPL-MCNC: 1.07 MG/DL (ref 0.5–1.05)
DISPENSE STATUS: NORMAL
EGFRCR SERPLBLD CKD-EPI 2021: 58 ML/MIN/1.73M*2
EOSINOPHIL # BLD AUTO: 0.01 X10*3/UL (ref 0–0.7)
EOSINOPHIL NFR BLD AUTO: 0.1 %
ERYTHROCYTE [DISTWIDTH] IN BLOOD BY AUTOMATED COUNT: 22.7 % (ref 11.5–14.5)
ERYTHROCYTE [DISTWIDTH] IN BLOOD BY AUTOMATED COUNT: 22.9 % (ref 11.5–14.5)
FACT VII ACT/NOR PPP: 19 % (ref 50–150)
FACT VIII ACT/NOR PPP: 249 % (ref 55–180)
FACT XI ACT/NOR PPP: 63 % (ref 65–150)
FIBRINOGEN PPP-MCNC: 306 MG/DL (ref 200–400)
GLUCOSE BLD MANUAL STRIP-MCNC: 103 MG/DL (ref 74–99)
GLUCOSE BLD MANUAL STRIP-MCNC: 117 MG/DL (ref 74–99)
GLUCOSE BLD MANUAL STRIP-MCNC: 128 MG/DL (ref 74–99)
GLUCOSE BLD MANUAL STRIP-MCNC: 132 MG/DL (ref 74–99)
GLUCOSE BLD MANUAL STRIP-MCNC: 145 MG/DL (ref 74–99)
GLUCOSE BLD MANUAL STRIP-MCNC: 60 MG/DL (ref 74–99)
GLUCOSE SERPL-MCNC: 107 MG/DL (ref 74–99)
HCT VFR BLD AUTO: 30.8 % (ref 36–46)
HCT VFR BLD AUTO: 32.4 % (ref 36–46)
HCV AB SER QL: REACTIVE
HGB BLD-MCNC: 10 G/DL (ref 12–16)
HGB BLD-MCNC: 10.3 G/DL (ref 12–16)
HGB RETIC QN: 34 PG (ref 28–38)
HIV 1+2 AB+HIV1 P24 AG SERPL QL IA: NONREACTIVE
HOLD SPECIMEN: NORMAL
IMM GRANULOCYTES # BLD AUTO: 0.07 X10*3/UL (ref 0–0.7)
IMM GRANULOCYTES NFR BLD AUTO: 0.8 % (ref 0–0.9)
IMMATURE RETIC FRACTION: 17.3 %
INR PPP: 2.5 (ref 0.9–1.1)
LYMPHOCYTES # BLD AUTO: 2.69 X10*3/UL (ref 1.2–4.8)
LYMPHOCYTES NFR BLD AUTO: 31.9 %
MAGNESIUM SERPL-MCNC: 1.53 MG/DL (ref 1.6–2.4)
MCH RBC QN AUTO: 27.6 PG (ref 26–34)
MCH RBC QN AUTO: 27.9 PG (ref 26–34)
MCHC RBC AUTO-ENTMCNC: 31.8 G/DL (ref 32–36)
MCHC RBC AUTO-ENTMCNC: 32.5 G/DL (ref 32–36)
MCV RBC AUTO: 86 FL (ref 80–100)
MCV RBC AUTO: 87 FL (ref 80–100)
MONOCYTES # BLD AUTO: 0.45 X10*3/UL (ref 0.1–1)
MONOCYTES NFR BLD AUTO: 5.3 %
NEUTROPHILS # BLD AUTO: 5.2 X10*3/UL (ref 1.2–7.7)
NEUTROPHILS NFR BLD AUTO: 61.7 %
NRBC BLD-RTO: 0.2 /100 WBCS (ref 0–0)
NRBC BLD-RTO: 0.2 /100 WBCS (ref 0–0)
OVALOCYTES BLD QL SMEAR: NORMAL
PHOSPHATE SERPL-MCNC: 2.8 MG/DL (ref 2.5–4.9)
PLATELET # BLD AUTO: 64 X10*3/UL (ref 150–450)
PLATELET # BLD AUTO: 84 X10*3/UL (ref 150–450)
POTASSIUM SERPL-SCNC: 3.5 MMOL/L (ref 3.5–5.3)
PRODUCT BLOOD TYPE: 7300
PRODUCT CODE: NORMAL
PROTHROM ACT/NOR PPP: 30 % (ref 80–130)
PROTHROMBIN TIME: 27.6 SECONDS (ref 9.8–12.4)
RBC # BLD AUTO: 3.59 X10*6/UL (ref 4–5.2)
RBC # BLD AUTO: 3.73 X10*6/UL (ref 4–5.2)
RBC MORPH BLD: NORMAL
RETICS #: 0.05 X10*6/UL (ref 0.02–0.08)
RETICS/RBC NFR AUTO: 1.4 % (ref 0.5–2)
SODIUM SERPL-SCNC: 145 MMOL/L (ref 136–145)
UNIT ABO: NORMAL
UNIT NUMBER: NORMAL
UNIT RH: NORMAL
UNIT VOLUME: 259
WBC # BLD AUTO: 12 X10*3/UL (ref 4.4–11.3)
WBC # BLD AUTO: 8.4 X10*3/UL (ref 4.4–11.3)

## 2025-08-01 PROCEDURE — 36415 COLL VENOUS BLD VENIPUNCTURE: CPT

## 2025-08-01 PROCEDURE — 85025 COMPLETE CBC W/AUTO DIFF WBC: CPT

## 2025-08-01 PROCEDURE — 85027 COMPLETE CBC AUTOMATED: CPT

## 2025-08-01 PROCEDURE — 2500000004 HC RX 250 GENERAL PHARMACY W/ HCPCS (ALT 636 FOR OP/ED): Mod: JZ,TB

## 2025-08-01 PROCEDURE — 86803 HEPATITIS C AB TEST: CPT

## 2025-08-01 PROCEDURE — 7100000010 HC PHASE TWO TIME - EACH INCREMENTAL 1 MINUTE

## 2025-08-01 PROCEDURE — 87389 HIV-1 AG W/HIV-1&-2 AB AG IA: CPT

## 2025-08-01 PROCEDURE — 1100000001 HC PRIVATE ROOM DAILY

## 2025-08-01 PROCEDURE — 0DB78ZX EXCISION OF STOMACH, PYLORUS, VIA NATURAL OR ARTIFICIAL OPENING ENDOSCOPIC, DIAGNOSTIC: ICD-10-PCS | Performed by: INTERNAL MEDICINE

## 2025-08-01 PROCEDURE — 43239 EGD BIOPSY SINGLE/MULTIPLE: CPT | Performed by: INTERNAL MEDICINE

## 2025-08-01 PROCEDURE — 99223 1ST HOSP IP/OBS HIGH 75: CPT

## 2025-08-01 PROCEDURE — 2500000001 HC RX 250 WO HCPCS SELF ADMINISTERED DRUGS (ALT 637 FOR MEDICARE OP)

## 2025-08-01 PROCEDURE — 85270 CLOT FACTOR XI PTA: CPT

## 2025-08-01 PROCEDURE — 85210 CLOT FACTOR II PROTHROM SPEC: CPT

## 2025-08-01 PROCEDURE — 85045 AUTOMATED RETICULOCYTE COUNT: CPT

## 2025-08-01 PROCEDURE — 7100000009 HC PHASE TWO TIME - INITIAL BASE CHARGE

## 2025-08-01 PROCEDURE — 71045 X-RAY EXAM CHEST 1 VIEW: CPT | Performed by: RADIOLOGY

## 2025-08-01 PROCEDURE — 3700000002 HC GENERAL ANESTHESIA TIME - EACH INCREMENTAL 1 MINUTE

## 2025-08-01 PROCEDURE — 80069 RENAL FUNCTION PANEL: CPT

## 2025-08-01 PROCEDURE — 2500000004 HC RX 250 GENERAL PHARMACY W/ HCPCS (ALT 636 FOR OP/ED)

## 2025-08-01 PROCEDURE — 82947 ASSAY GLUCOSE BLOOD QUANT: CPT

## 2025-08-01 PROCEDURE — 2500000002 HC RX 250 W HCPCS SELF ADMINISTERED DRUGS (ALT 637 FOR MEDICARE OP, ALT 636 FOR OP/ED)

## 2025-08-01 PROCEDURE — 85610 PROTHROMBIN TIME: CPT

## 2025-08-01 PROCEDURE — 71045 X-RAY EXAM CHEST 1 VIEW: CPT

## 2025-08-01 PROCEDURE — 3700000001 HC GENERAL ANESTHESIA TIME - INITIAL BASE CHARGE

## 2025-08-01 PROCEDURE — 99232 SBSQ HOSP IP/OBS MODERATE 35: CPT

## 2025-08-01 PROCEDURE — 92526 ORAL FUNCTION THERAPY: CPT | Mod: GN

## 2025-08-01 PROCEDURE — 85230 CLOT FACTOR VII PROCONVERTIN: CPT

## 2025-08-01 PROCEDURE — 2500000001 HC RX 250 WO HCPCS SELF ADMINISTERED DRUGS (ALT 637 FOR MEDICARE OP): Performed by: STUDENT IN AN ORGANIZED HEALTH CARE EDUCATION/TRAINING PROGRAM

## 2025-08-01 PROCEDURE — 87522 HEPATITIS C REVRS TRNSCRPJ: CPT

## 2025-08-01 PROCEDURE — 85240 CLOT FACTOR VIII AHG 1 STAGE: CPT

## 2025-08-01 PROCEDURE — 83735 ASSAY OF MAGNESIUM: CPT

## 2025-08-01 PROCEDURE — 85384 FIBRINOGEN ACTIVITY: CPT

## 2025-08-01 RX ORDER — SODIUM CHLORIDE, SODIUM LACTATE, POTASSIUM CHLORIDE, CALCIUM CHLORIDE 600; 310; 30; 20 MG/100ML; MG/100ML; MG/100ML; MG/100ML
100 INJECTION, SOLUTION INTRAVENOUS CONTINUOUS
OUTPATIENT
Start: 2025-08-01 | End: 2025-08-02

## 2025-08-01 RX ORDER — MIDAZOLAM HYDROCHLORIDE 1 MG/ML
INJECTION, SOLUTION INTRAMUSCULAR; INTRAVENOUS AS NEEDED
Status: DISCONTINUED | OUTPATIENT
Start: 2025-08-01 | End: 2025-08-01

## 2025-08-01 RX ORDER — PROPOFOL 10 MG/ML
INJECTION, EMULSION INTRAVENOUS AS NEEDED
Status: DISCONTINUED | OUTPATIENT
Start: 2025-08-01 | End: 2025-08-01

## 2025-08-01 RX ORDER — MAGNESIUM SULFATE HEPTAHYDRATE 40 MG/ML
2 INJECTION, SOLUTION INTRAVENOUS ONCE
Status: COMPLETED | OUTPATIENT
Start: 2025-08-01 | End: 2025-08-01

## 2025-08-01 RX ORDER — LIDOCAINE HYDROCHLORIDE 10 MG/ML
0.1 INJECTION, SOLUTION EPIDURAL; INFILTRATION; INTRACAUDAL; PERINEURAL ONCE
OUTPATIENT
Start: 2025-08-01 | End: 2025-08-01

## 2025-08-01 RX ORDER — ASPIRIN 81 MG/1
81 TABLET ORAL DAILY
Status: DISCONTINUED | OUTPATIENT
Start: 2025-08-01 | End: 2025-08-05 | Stop reason: HOSPADM

## 2025-08-01 RX ORDER — DEXTROSE MONOHYDRATE AND SODIUM CHLORIDE 5; .9 G/100ML; G/100ML
INJECTION, SOLUTION INTRAVENOUS CONTINUOUS PRN
Status: DISCONTINUED | OUTPATIENT
Start: 2025-08-01 | End: 2025-08-01

## 2025-08-01 RX ORDER — OXYCODONE HYDROCHLORIDE 5 MG/1
10 TABLET ORAL EVERY 4 HOURS PRN
Refills: 0 | OUTPATIENT
Start: 2025-08-01

## 2025-08-01 RX ORDER — LABETALOL HYDROCHLORIDE 5 MG/ML
5 INJECTION, SOLUTION INTRAVENOUS ONCE AS NEEDED
OUTPATIENT
Start: 2025-08-01

## 2025-08-01 RX ORDER — PHYTONADIONE 5 MG/1
10 TABLET ORAL ONCE
Status: COMPLETED | OUTPATIENT
Start: 2025-08-01 | End: 2025-08-01

## 2025-08-01 RX ORDER — DROPERIDOL 2.5 MG/ML
0.62 INJECTION, SOLUTION INTRAMUSCULAR; INTRAVENOUS ONCE AS NEEDED
OUTPATIENT
Start: 2025-08-01

## 2025-08-01 RX ORDER — ACETAMINOPHEN 325 MG/1
650 TABLET ORAL EVERY 4 HOURS PRN
OUTPATIENT
Start: 2025-08-01

## 2025-08-01 RX ORDER — METOCLOPRAMIDE HYDROCHLORIDE 5 MG/ML
10 INJECTION INTRAMUSCULAR; INTRAVENOUS ONCE AS NEEDED
OUTPATIENT
Start: 2025-08-01

## 2025-08-01 RX ORDER — FENTANYL CITRATE 50 UG/ML
INJECTION, SOLUTION INTRAMUSCULAR; INTRAVENOUS AS NEEDED
Status: DISCONTINUED | OUTPATIENT
Start: 2025-08-01 | End: 2025-08-01

## 2025-08-01 RX ADMIN — FENTANYL CITRATE 25 MCG: 50 INJECTION, SOLUTION INTRAMUSCULAR; INTRAVENOUS at 08:49

## 2025-08-01 RX ADMIN — MIDAZOLAM 2 MG: 1 INJECTION INTRAMUSCULAR; INTRAVENOUS at 08:38

## 2025-08-01 RX ADMIN — PHYTONADIONE 10 MG: 5 TABLET ORAL at 21:59

## 2025-08-01 RX ADMIN — PROPOFOL 20 MG: 10 INJECTION, EMULSION INTRAVENOUS at 08:43

## 2025-08-01 RX ADMIN — DEXTROSE AND SODIUM CHLORIDE: 5; 900 INJECTION, SOLUTION INTRAVENOUS at 08:37

## 2025-08-01 RX ADMIN — FENTANYL CITRATE 25 MCG: 50 INJECTION, SOLUTION INTRAMUSCULAR; INTRAVENOUS at 08:42

## 2025-08-01 RX ADMIN — DEXTROSE AND SODIUM CHLORIDE 50 ML/HR: 5; .45 INJECTION, SOLUTION INTRAVENOUS at 01:20

## 2025-08-01 RX ADMIN — GABAPENTIN 300 MG: 300 CAPSULE ORAL at 21:57

## 2025-08-01 RX ADMIN — OXYCODONE HYDROCHLORIDE 2.5 MG: 5 TABLET ORAL at 22:02

## 2025-08-01 RX ADMIN — PROPOFOL 25 MCG/KG/MIN: 10 INJECTION, EMULSION INTRAVENOUS at 08:42

## 2025-08-01 RX ADMIN — MAGNESIUM SULFATE HEPTAHYDRATE 2 G: 40 INJECTION, SOLUTION INTRAVENOUS at 11:33

## 2025-08-01 RX ADMIN — Medication 10 MG: at 21:57

## 2025-08-01 RX ADMIN — PANTOPRAZOLE SODIUM 40 MG: 40 INJECTION, POWDER, LYOPHILIZED, FOR SOLUTION INTRAVENOUS at 21:57

## 2025-08-01 RX ADMIN — ASPIRIN 81 MG: 81 TABLET, COATED ORAL at 15:40

## 2025-08-01 RX ADMIN — GLUCAGON 1 MG: KIT at 01:07

## 2025-08-01 RX ADMIN — PROPOFOL 10 MG: 10 INJECTION, EMULSION INTRAVENOUS at 08:47

## 2025-08-01 ASSESSMENT — PAIN - FUNCTIONAL ASSESSMENT
PAIN_FUNCTIONAL_ASSESSMENT: 0-10
PAIN_FUNCTIONAL_ASSESSMENT: 0-10
PAIN_FUNCTIONAL_ASSESSMENT: UNABLE TO SELF-REPORT
PAIN_FUNCTIONAL_ASSESSMENT: 0-10
PAIN_FUNCTIONAL_ASSESSMENT: 0-10

## 2025-08-01 ASSESSMENT — COGNITIVE AND FUNCTIONAL STATUS - GENERAL
MOBILITY SCORE: 12
MOVING TO AND FROM BED TO CHAIR: A LOT
DAILY ACTIVITIY SCORE: 15
HELP NEEDED FOR BATHING: A LOT
PERSONAL GROOMING: A LITTLE
CLIMB 3 TO 5 STEPS WITH RAILING: A LOT
DRESSING REGULAR UPPER BODY CLOTHING: A LOT
STANDING UP FROM CHAIR USING ARMS: A LOT
TURNING FROM BACK TO SIDE WHILE IN FLAT BAD: A LOT
DRESSING REGULAR LOWER BODY CLOTHING: A LOT
TOILETING: A LOT
WALKING IN HOSPITAL ROOM: A LOT
MOVING FROM LYING ON BACK TO SITTING ON SIDE OF FLAT BED WITH BEDRAILS: A LOT

## 2025-08-01 ASSESSMENT — PAIN SCALES - GENERAL
PAINLEVEL_OUTOF10: 0 - NO PAIN
PAINLEVEL_OUTOF10: 8

## 2025-08-01 NOTE — PROGRESS NOTES
08/01/25 1347   Rapid Rounds   Attendance Provider;Care Transitions   Expected Discharge Disposition Inter   Today we still await: Clinical stability   Review at Escalation Rounds No escalation needed     Per team, anticipating discharge in 2-4 days. [facility] notified.      Alyssa Bryant RN, BSN  Transitional Care Coordinator

## 2025-08-01 NOTE — CONSULTS
Name: Myrna Khan  MRN: [id number]  Encounter Date: 8/1/2025  PCP: Reena Pickard MD  Heme-Onc: Dr. Verduzco     Reason for consult: Progressive thrombocytopenia and abnormal coagulation labs.      Hematology/ Oncology Consult Note      History of Present Illness   Myrna Khan is a 65 y.o. female with PMHx of type 2 diabetes, CAD (s/p CABG 9/2023), hypertension 2/2 renal artery stenosis, PAD with dry gangrene (s/p LLE endarterectomy 3/6/25 and left BKA (guillotine amp 4/1/25 with formalization on 4/3/25)) who presented to the emergency department via EMS from SNF for altered mental status and episodes of hypoglycemia. Patient found to be unresponsive when morning med pass time came at her SNF. On admission, she was found to bacteremia 2/2 Klebsiella pneumonia and MAGUI. She was started on zosyn and doxycyline and one day of ceftriaxone. The patient has also had episodes of melena with Hgb drop to 6.4  s/p 2 units of RBCs in total, she is also s/p EGD that shows Grade C reflux esophagitis with multiple mucosal breaks. She has had progressive thrombocytopenia since admission s/p 1 unit of platelet transfusion 7/31.       Heme/Onc History    Per patient, no hx of unexplained bruises, bleeding gums, epistaxis     Past Medical history   [Medical History]    [Medical History]  Past Medical History  Diagnosis Date    CAD (coronary artery disease)     Carotid artery stenosis     Diabetes mellitus (Multi)     Heart disease     HF (heart failure)     Hypertension     NSTEMI (non-ST elevated myocardial infarction) (Multi)     PAD (peripheral artery disease)     Renal artery stenosis          Past Surgical History   [Surgical History]    [Surgical History]  Past Surgical History  Procedure Laterality Date    CORONARY ARTERY BYPASS GRAFT      9/2023    FEMORAL ARTERY STENT Left     left SFA         Family History    [Family History]    [Family History]  Problem Relation Name Age of Onset    Peripheral vascular disease  Mother      Hypertension Mother      Diabetes type II Mother      Hypertension Sister      Diabetes type II Sister           Social History   [Social History]    [Social History]  Socioeconomic History    Marital status: Single   Tobacco Use    Smoking status: Some Days     Current packs/day: 1.00     Average packs/day: 1 pack/day for 30.6 years (30.6 ttl pk-yrs)     Types: Cigarettes     Start date: 1995     Passive exposure: Never    Smokeless tobacco: Never   Vaping Use    Vaping status: Never Used   Substance and Sexual Activity    Alcohol use: Not Currently    Drug use: Never    Sexual activity: Defer     Social Drivers of Health     Financial Resource Strain: Low Risk  (7/27/2025)    Overall Financial Resource Strain (CARDIA)     Difficulty of Paying Living Expenses: Not hard at all   Food Insecurity: No Food Insecurity (7/26/2025)    Hunger Vital Sign     Worried About Running Out of Food in the Last Year: Never true     Ran Out of Food in the Last Year: Never true   Transportation Needs: No Transportation Needs (7/27/2025)    PRAPARE - Transportation     Lack of Transportation (Medical): No     Lack of Transportation (Non-Medical): No   Physical Activity: Unknown (7/16/2024)    Exercise Vital Sign     Days of Exercise per Week: 0 days   Intimate Partner Violence: Not At Risk (7/26/2025)    Humiliation, Afraid, Rape, and Kick questionnaire     Fear of Current or Ex-Partner: No     Emotionally Abused: No     Physically Abused: No     Sexually Abused: No   Housing Stability: Low Risk  (7/27/2025)    Housing Stability Vital Sign     Unable to Pay for Housing in the Last Year: No     Number of Times Moved in the Last Year: 0     Homeless in the Last Year: No         Allergies   [Allergies]    [Allergies]  Allergen Reactions    Lisinopril Itching    Amoxicillin Rash    Losartan Itching and Rash       Medications   aspirin, 81 mg, Daily  atorvastatin, 80 mg, Daily  calcium citrate, 200 mg of elemental calcium,  "Daily  [Held by provider] carvedilol, 25 mg, BID  cholecalciferol, 50 mcg, Daily  [Held by provider] clopidogrel, 75 mg, Daily  cyanocobalamin, 500 mcg, Daily  [Held by provider] dapagliflozin propanediol, 10 mg, Daily with breakfast  gabapentin, 300 mg, BID  [Held by provider] heparin (porcine), 5,000 Units, q8h BOLIVAR  [Held by provider] hydrALAZINE, 75 mg, TID  insulin lispro, 0-5 Units, TID AC  iohexol, 1,000 mL, Once in imaging  melatonin, 10 mg, Nightly  [Held by provider] NIFEdipine ER, 90 mg, Daily before breakfast  pantoprazole, 40 mg, BID  [Held by provider] piperacillin-tazobactam, 3.375 g, q6h  [Held by provider] ranolazine, 500 mg, BID  thiamine, 100 mg, Daily  vitamin B complex-vitamin C-folic acid, 1 capsule, Daily         acetaminophen, 975 mg, q6h PRN   Or  acetaminophen, 650 mg, q4h PRN   Or  acetaminophen, 650 mg, q4h PRN  dextrose, 12.5 g, q15 min PRN  dextrose, 25 g, q15 min PRN  diclofenac sodium, 4 g, 4x daily PRN  glucagon, 1 mg, q15 min PRN  glucagon, 1 mg, q15 min PRN  naloxone, 0.2 mg, q5 min PRN  oxyCODONE, 2.5 mg, q6h PRN  oxygen, 1 Dose, Continuous - O2/gases  trimethobenzamide, 200 mg, q6h PRN        Review of Systems   Review of Systems   10 pt ROS reviewed and negative aside from above    Physical Exam   Blood pressure 103/75, pulse 71, temperature 36 °C (96.8 °F), temperature source Temporal, resp. rate 14, height (!) 1.549 m (5' 1\"), weight (!) 36.3 kg (80 lb 0.4 oz), SpO2 97%.    ECOG:    Gen: awake, alert, in no acute distress, cachectic   HEENT: AT/NC, PEERL, EOMI, no LAD  CV: RRR, no m/r/g  Pulm: CTAB, without w/r/r  Abd: soft, NT/ND  Ext: L BKA  Skin: warm and dry  Neuro: A&Ox4, moves all 4 extremities spontaneously     Labs     Lab Results   Component Value Date    GLUCOSE 107 (H) 08/01/2025    CALCIUM 8.0 (L) 08/01/2025     08/01/2025    K 3.5 08/01/2025    CO2 22 08/01/2025     (H) 08/01/2025    BUN 29 (H) 08/01/2025    CREATININE 1.07 (H) 08/01/2025       Lab " Results   Component Value Date    WBC 8.4 08/01/2025    HGB 10.0 (L) 08/01/2025    HCT 30.8 (L) 08/01/2025    MCV 86 08/01/2025    PLT 64 (L) 08/01/2025       Lab Results   Component Value Date    ALT 10 07/30/2025    AST 13 07/30/2025    ALKPHOS 98 07/30/2025    BILITOT 0.3 07/30/2025       Imaging   8/1 EGD  Grade C reflux esophagitis with multiple mucosal breaks measuring 5 mm or more, continuous between folds, covering less than 75% of the circumference in the lower third of the esophagus. Reflux was likely related to nasogastric tube resulting in continuously open LES.  Regular Z-line 35 cm from the incisors  Moderate, patchy erythematous, friable and hemorrhagic mucosa with multiple erosions in the body of the stomach and antrum consistent with nasogastric tube trauma; performed cold forceps biopsy to rule out H pylori.   Single 5 mm x 5 mm small, superficial, round ulcer in the duodenal bulb with clean base (Dillon III)  Erythematous and friable mucosa with multiple scattered erosions in the duodenal bulb, 1st part of the duodenum and 2nd part of the duodenum  The previously placed NG tube had migrated back into the esophagus during the procedure. Given that patient's ileus was clinically improved without any issues with nausea and vomiting, the nasogastric tube was removed at the end of the procedure        Assessment/Plan     Myrna Khan is a 65 y.o. female with PMHx of type 2 diabetes, CAD (s/p CABG 9/2023), hypertension 2/2 renal artery stenosis, PAD with dry gangrene (s/p LLE endarterectomy 3/6/25 and left BKA (guillotine amp 4/1/25 with formalization on 4/3/25)) who presented to the emergency department via EMS from SNF for altered mental status and episodes of hypoglycemia. Patient found to be unresponsive when morning med pass time came at her SNF. On admission, she was found to bacteremia 2/2 Klebsiella pneumonia and MAGUI. She was started on zosyn and doxycyline and one day of ceftriaxone. The  patient has also had episodes of melena with Hgb drop to 6.4  s/p 2 units of pRBCs in total, she is also s/p EGD that shows Grade C reflux esophagitis with multiple mucosal breaks. She has had progressive thrombocytopenia since admission s/p 1 unit of platelet transfusion 7/31.     #Anemia of blood loss  #GI bleed  #ACD  #Thrombocytopenia  :: Multifactorial etiology - GI bleed vs DITP vs  bacteriemia  :: Episodes of melena s/p EGD  :: EGD-Grade C reflux esophagitis with multiple mucosal breaks  :: s/p 2 units PRBC and 1 unit of platelets  :: HIT 4Ts score - 3 ,antiplatelet factor Ab- negative. Low concern for HIT  :: one day on ceftriaxone, 6 days on Zosyn  :: B12 - normal, Folate- normal   :: Fibrinogen-normal, LDH normal  :: Factor II-30 , Factor VII 19, Factor VIII 249, Factor II 63  :: PT 27.6, INR 2.5, aPTT 40  :: Ferritin 260, UIBC <55, MCV 86  :: no splenomegaly, no LDH, no hemolysis    Recommendations  -continue to monitor CBC, transfuse if Hgb <7  -PO vitamin k 10mg once  -transfuse platelets if <10 unless for procedure then to goal >50.  - can continue Zosyn   - follow up HIV and Hep C labs  - follow up Reticulocytes     Thank you for this consult, we will continue to follow. Patient seen and discussed with attending physician, Dr. Llanos, who agrees with the above.     Ailin Alanis MD  PGY-1  Hematology Consult Pager: [phone]  Oncology Consult Pager: [phone]

## 2025-08-01 NOTE — CONSULTS
"Nutrition Follow Up Assessment:   Nutrition Assessment    Reason for Assessment: Dietitian discretion    Patient is a 65 y.o. female on day 6 of admission presenting with Bacteremia d/t Klebsiella pneumoniae     7/29- c/f ileus NG to LIWS   7/30- 2 dark stools c/f GIB   8/1- EGD, NG removed       Nutrition History:  Energy Intake: Poor < 50 %  Food and Nutrient History: Initial assessment completed on 7/28. Pt has been NPO 7/29-7/31 and is now on clear liquid diet. Met with pt this afternoon and stated that she's hungry now and wants to eat something. Informed pt that she is on a clear liquid diet and pt was wanting chicken broth. Pt was previously receiving Ensure plus TID and pt stated that she liked them and is agreeable to continue with supplements if diet is advanced. Pt previously had vomiting and pt stated that she has not had any vomiting recently and can't remember when her last episode was. Pt denied any nausea, diarrhea, or constipation. Spoke with MD via secure chat and plan for SLP eval today.       Anthropometrics:  Height: (!) 154.9 cm (5' 1\")   Weight: (!) 36.3 kg (80 lb 0.4 oz)   BMI (Calculated): 15.13  IBW/kg (Dietitian Calculated): 45 kg (adjusted for L BKA)  Percent of IBW: 81 %                      Weight History:   Date/Time Weight   07/27/25 0500 36.3 kg (80 lb 0.4 oz) Abnormal    07/26/25 0604 36.3 kg (80 lb) Abnormal      Weight Change %:       Nutrition Focused Physical Exam Findings:    Subcutaneous Fat Loss:   Defer Subcutaneous Fat Loss Assessment: Defer all  Defer All Reason: Completed on  7/28  Muscle Wasting:  Defer Muscle Wasting Assessment: Defer all  Edema:  Edema: none  Physical Findings:  Hair: Negative  Eyes: Negative  Nails: Negative  Skin: Positive (wound LLE)    Nutrition Significant Labs:  BMP Trend:   Results from last 7 days   Lab Units 08/01/25  0557 07/31/25  1137 07/31/25  0603 07/30/25  0557 07/29/25  0618   GLUCOSE mg/dL 107*  --  124* 136* 101*   CALCIUM mg/dL 8.0*  " --  7.3* 6.7* 6.5*   SODIUM mmol/L 145  --  144 142 137   POTASSIUM mmol/L 3.5   < > 2.9* 3.6 4.3   CO2 mmol/L 22  --  24 24 21   CHLORIDE mmol/L 114*  --  111* 110* 105   BUN mg/dL 29*  --  31* 37* 34*   CREATININE mg/dL 1.07*  --  1.10* 1.06* 1.06*   PHOSPHORUS mg/dL 2.8  --  2.6 2.6 3.0    < > = values in this interval not displayed.        Nutrition Specific Medications:  Scheduled medications  atorvastatin, 80 mg, oral, Daily  calcium citrate, 200 mg of elemental calcium, oral, Daily  cholecalciferol, 50 mcg, oral, Daily  cyanocobalamin, 500 mcg, oral, Daily  insulin lispro, 0-5 Units, subcutaneous, TID AC  magnesium sulfate, 2 g, intravenous, Once  melatonin, 10 mg, oral, Nightly  pantoprazole, 40 mg, intravenous, BID  [Held by provider] piperacillin-tazobactam, 3.375 g, intravenous, q6h  thiamine, 100 mg, oral, Daily  vitamin B complex-vitamin C-folic acid, 1 capsule, oral, Daily      I/O:   Last BM Date: 07/31/25; Stool Appearance: Loose (08/01/25 0926)    Dietary Orders (From admission, onward)       Start     Ordered    08/01/25 1104  Adult diet Clear Liquid  Diet effective now        Question:  Diet type  Answer:  Clear Liquid    08/01/25 1103    07/28/25 1201  Oral nutritional supplements  Until discontinued        Question Answer Comment   Deliver with Breakfast    Deliver with Lunch    Deliver with Dinner    Select supplement: Ensure Plus        07/28/25 1200    07/26/25 2018  May Participate in Room Service With Assistance  ( ROOM SERVICE MAY PARTICIPATE WITH ASSISTANCE)  Once        Question:  .  Answer:  Yes    07/26/25 2017                     Estimated Needs:   Total Energy Estimated Needs in 24 hours (kCal): 1200 kCal  Method for Estimating Needs: 35kcal/kg CBW  Total Protein Estimated Needs in 24 Hours (g): 50 g  Method for Estimating 24 Hour Protein Needs: 1.2g/kg IBW  Total Fluid Estimated Needs in 24 Hours (mL): 1200 mL  Method for Estimating 24 Hour Fluid Needs: 1ml/kcal or per MD team         Nutrition Diagnosis   Malnutrition Diagnosis  Patient has Malnutrition Diagnosis: Yes  Diagnosis Status: Active  Malnutrition Diagnosis: Severe malnutrition related to acute disease or injury  As Evidenced by: pt consuming <50% of estimated needs for >/=1 month, 30% wt loss in the past 3 months, and severe muscle and fat losses            Nutrition Interventions/Recommendations   Nutrition prescription for oral nutrition, Nutrition prescription for enteral nutrition    Nutrition Recommendations:  Individualized Nutrition Prescription Provided for :   1. If unable to advance from clear liquid diet please provide Isosource 1.5 @ 15ml/hr increasing by 10ml q10-12h until goal rate of 35ml/hrx24 hours   -FWF per team (TF provides 642mls free water)   -D/t presence of severe malnutrition please refer to the following.   Refeeding Precautions:   Start 100mg thiamine daily x 7 days    Monitor RFP+Mg Q12H   Replete electrolytes PRN + ensure lytes are replenished prior to starting nutrition support    Do not start nutrition support if serum potassium </= 3 mEq/L, phosphorus </= 2mg/dL, and/or magnesium </= 1.2mEq/L   If significant drop in electrolytes happen during nutrition support advancement, do not further advance     2. If pt passes for PO diet continue Ensure plus TID (Each containing 350kcals and 13g PRO)    Nutrition Interventions/Goals:   Meals and Snacks: General healthful diet  Enteral Intake: Management of composition of enteral nutrition  Goal: TF provides 840mls, 1260kcals, 57g PRO, and 642mls H2O  Medical Food Supplement: Commercial beverage medical food supplement therapy  Goal: consume 100% of 2-3 ONS/day          Nutrition Monitoring and Evaluation   Intake / Amount of food: Consumes at least 50% or more of meals/snacks/supplements  Enteral and Parenteral Nutrition Intake Determination: Enteral nutrition intake - Tolerate TF at goal rate    Body Weight: Body weight - Maintain stable  weight    Electrolyte and Renal Panel: Electrolytes within normal limits  Glucose/Endocrine Profile: Glucose within normal limits ( mg/dL)         Goal Status: New goal(s) identified    Time Spent (min): 45 minutes

## 2025-08-01 NOTE — CONSULTS
VASCULAR SURGERY CONSULT NOTE    Assessment/Plan   Myrna Khan is 65 y.o. female who initially presented to ED here for AMS, found to have melena and anemia acute blood loss. S/p BKA and formalization in 4/1-4/3/2025 with [facility] with hx of L external iliac stent placement and femoral endarterectomy on 3/6/25; consulted due to AC being held iso GI bleed  Plan:  Resume aspirin as soon as able  Ok to hold plavix; when GI bleeding issues resolve, please resume  At this time, no acute concerns from vascular surgery. Will sign off, please page for further concerns    D/w attending, Dr. Sean Muller MD  General Surgery PGY-1  Vascular Surgery [id number]    Subjective     History of Present Illness  Myrna Khan is a 65 y.o. female with history of DM, HTN, HLD, FROYLAN, PAD, CABG in 2023 who presented to the ED on 7/26 from SNF for altered mental status. On 7/30 was transfused 1 unit PRBC due to Hgb 6.4, recheck 8.8. DVT prophylaxis,  and BP meds were held at the time and AC continues to be held. Gi consulted due to hgb drop and tarry stools, plan for scope today. Hgb and Plt improved s/p repeat transfusion.        Vascular History:  3/6/2025 L external iliac stent placement and femoral endarterectomy with [facility]  4/3/25 left BKA with TMR on 4/3/2025 for extensive left calf and foot wounds with [facility]      Past Medical History  She has a past medical history of CAD (coronary artery disease), Carotid artery stenosis, Diabetes mellitus (Multi), Heart disease, HF (heart failure), Hypertension, NSTEMI (non-ST elevated myocardial infarction) (Multi), PAD (peripheral artery disease), and Renal artery stenosis.     Past Surgical History  Coronary artery bypass graft in 2023 and Femoral artery stent 3/6/25.     Social History  She reports that she has been smoking cigarettes. She started smoking about 30 years ago. She has a 30.5 pack-year smoking history. She has never been exposed to tobacco smoke. She has never used  smokeless tobacco. She reports that she does not currently use alcohol. She reports that she does not use drugs.    Home Meds:  Medications Ordered Prior to Encounter[1]     Allergies:  RX Allergies[2]      ROS: 12 system negative except HPI    Objective   Vitals:  Heart Rate:  [63-90]   Temp:  [35.8 °C (96.4 °F)-36.7 °C (98.1 °F)]   Resp:  [14-18]   BP: ()/(72-97)   SpO2:  [95 %-100 %]     Exam:  Constitutional: ill appearing, cachectic   ENMT: moist mucous membranes  Head/neck: atraumatic  CV: no tachycardia  Pulm: non-labored on room air  GI: soft, non-tender, non-distended  Skin: warm and dry  Extremities: L BKA site dry with kerlix overtop and no strikethrough.    Labs:  Results from last 7 days   Lab Units 08/01/25  0557 08/01/25  0116 07/31/25  0603   WBC AUTO x10*3/uL 8.4 12.0* 9.3   HEMOGLOBIN g/dL 10.0* 10.3* 7.0*   PLATELETS AUTO x10*3/uL 64* 84* 46*      Results from last 7 days   Lab Units 08/01/25  0557 07/31/25  1137 07/31/25  0603 07/30/25  0557   SODIUM mmol/L 145  --  144 142   POTASSIUM mmol/L 3.5   < > 2.9* 3.6   CHLORIDE mmol/L 114*  --  111* 110*   CO2 mmol/L 22  --  24 24   BUN mg/dL 29*  --  31* 37*   CREATININE mg/dL 1.07*  --  1.10* 1.06*   GLUCOSE mg/dL 107*  --  124* 136*   MAGNESIUM mg/dL 1.53*  --  1.54* 1.76   PHOSPHORUS mg/dL 2.8  --  2.6 2.6    < > = values in this interval not displayed.      Results from last 7 days   Lab Units 08/01/25  0557 07/31/25  0603   INR  2.5* 2.8*   PROTIME seconds 27.6* 31.0*   APTT seconds 40* 39*                [1]   No current facility-administered medications on file prior to encounter.     Current Outpatient Medications on File Prior to Encounter   Medication Sig Dispense Refill    acetaminophen (Tylenol) 325 mg tablet Take 2 tablets (650 mg) by mouth every 6 hours if needed for mild pain (1 - 3) or moderate pain (4 - 6). (Patient taking differently: Take 2 tablets (650 mg) by mouth every 8 hours if needed for mild pain (1 - 3) or moderate  "pain (4 - 6). Do not exceed 3 grams per day from all sources)      acetaminophen (Tylenol) 500 mg tablet Take 2 tablets (1,000 mg) by mouth 3 times a day. Do not exceed 3 grams per day from all sources      aspirin 81 mg EC tablet Take 1 tablet (81 mg) by mouth once daily. 90 tablet 1    atorvastatin (Lipitor) 80 mg tablet TAKE 1 TABLET BY MOUTH ONCE DAILY 30 tablet 2    BD Ultra-Fine Micro Pen Needle 32 gauge x 1/4\" needle       blood sugar diagnostic strip Use 1 strip to check blood sugar 3 times a day with meals. 100 each 0    calcium citrate 250 mg calcium tablet Take 1 tablet (250 mg) by mouth once daily.      carvedilol (Coreg) 25 mg tablet TAKE 1 TABLET BY MOUTH TWO TIMES A DAY 60 tablet 2    cholecalciferol (Vitamin D3) 25 mcg (1,000 units) tablet Take 2 tablets (50 mcg) by mouth once daily.      clopidogrel (Plavix) 75 mg tablet Take 1 tablet (75 mg) by mouth once daily. 90 tablet 3    diclofenac sodium (Voltaren) 1 % gel Apply 4.5 inches (4 g) topically 4 times a day as needed (pain). (Patient taking differently: Apply 4.5 inches (4 g) topically 4 times a day as needed (for bilateral leg pain).)      docusate sodium (Colace) 100 mg tablet Take 1 tablet (100 mg) by mouth once daily as needed for constipation.      Easy Touch Alcohol Prep Pads pads, medicated       Farxiga 10 mg Take 1 tablet (10 mg) by mouth once daily with breakfast.      ferrous sulfate 325 (65 Fe) mg EC tablet Take 1 tablet by mouth every other day. Do not crush, chew, or split.      gabapentin (Neurontin) 300 mg capsule Take 1 capsule (300 mg) by mouth 2 times a day.      glucagon (Glucagen) 1 mg injection Inject 1 mg into the muscle every 15 minutes if needed for other (<70 and cannot take oral due to altered mentation). 1 each 12    hydrALAZINE (Apresoline) 50 mg tablet Take 1.5 tablets (75 mg) by mouth 3 times a day. (Patient taking differently: Take 1.5 tablets (75 mg) by mouth 3 times a day. Give 1 tablet every 4 hours as needed " for SBP greater than 160mmHg) 150 tablet 10    insulin aspart (NovoLOG U-100 Insulin aspart) 100 unit/mL injection Inject under the skin. Inject as per sliding scale: if   151-200=2 units  201-250=3 units  251-300= 4 units  301-350= 5 units  351-400= 6 units  If above 400 give 6 units and notify MD (Patient not taking: Reported on 7/26/2025)      insulin glargine (Lantus) 100 unit/mL injection Inject 10 Units under the skin once daily at bedtime. Take as directed per insulin instructions.      insulin lispro 100 unit/mL injection Inject 0-6 Units under the skin see administration instructions. Take as directed per insulin instructions.  Inject under the skin with meals. Inject as per sliding scale: if 151-200=2 units 201-250=3 units 251-300= 4 units 301-350= 5 units 351-400= 6 units If above 400 give 6 units and notify MD      lancets misc Use three times a day to test blood sugar w/ meals 102 each 0    loperamide (Imodium) 2 mg capsule Take 1 capsule (2 mg) by mouth if needed for diarrhea (May give after each loose stool, dose not to exceed 16mg per day).      melatonin 10 mg tablet Take 1 tablet (10 mg) by mouth once daily at bedtime.      metFORMIN (Glucophage) 1,000 mg tablet Take 1 tablet (1,000 mg) by mouth 2 times daily (morning and late afternoon).      multivitamin with minerals tablet Take 1 tablet by mouth once daily.      NIFEdipine ER (Adalat CC) 90 mg 24 hr tablet Take 1 tablet (90 mg) by mouth once daily in the morning. Take before meals. Do not crush, chew, or split. 30 tablet 0    ranolazine (Ranexa) 500 mg 12 hr tablet Take 1 tablet (500 mg) by mouth 2 times a day. Do not crush, chew, or split.      sennosides (Senokot) 8.6 mg tablet Take 1 tablet (8.6 mg) by mouth 2 times a day as needed for constipation.      traMADol (Ultram) 50 mg tablet Take 0.5 tablets (25 mg) by mouth every 8 hours if needed for severe pain (7 - 10) (for 14 days).      [DISCONTINUED] Basaglar KwikPen U-100 Insulin 100  unit/mL (3 mL) pen Inject 8 Units under the skin once daily at bedtime. (Patient taking differently: Inject 10 Units under the skin once daily at bedtime.)      [DISCONTINUED] ciprofloxacin (Cipro) 500 mg tablet Take 1 tablet (500 mg) by mouth 2 times a day. (Patient not taking: Reported on 7/26/2025)      [DISCONTINUED] hydroCHLOROthiazide (Microzide) 12.5 mg capsule Take 1 capsule (12.5 mg) by mouth once daily in the morning. (Patient not taking: Reported on 7/26/2025)      [DISCONTINUED] oxyCODONE (Oxy-IR) 5 mg immediate release capsule Take 1 capsule (5 mg) by mouth every 6 hours if needed for severe pain (7 - 10). (Patient not taking: Reported on 6/9/2025)      [DISCONTINUED] sennosides-docusate sodium (Ayanna-Colace) 8.6-50 mg tablet Take 1 tablet by mouth 2 times a day as needed for constipation.     [2]   Allergies  Allergen Reactions    Lisinopril Itching    Amoxicillin Rash    Losartan Itching and Rash

## 2025-08-01 NOTE — H&P
Name: Myrna Khan  MRN: [id number]  Encounter Date: 8/1/2025  PCP: Reena Pickard MD  Heme-Onc: Dr. Verduzco     Reason for consult: Progressive thrombocytopenia and abnormal coagulation labs.      Hematology/ Oncology Consult Note      History of Present Illness   Myrna Khan is a 65 y.o. female with PMHx of type 2 diabetes, CAD (s/p CABG 9/2023), hypertension 2/2 renal artery stenosis, PAD with dry gangrene (s/p LLE endarterectomy 3/6/25 and left BKA (guillotine amp 4/1/25 with formalization on 4/3/25)) who presented to the emergency department via EMS from SNF for altered mental status and episodes of hypoglycemia. Patient found to be unresponsive when morning med pass time came at her SNF. On admission, she was found to bacteremia 2/2 Klebsiella pneumonia and MAGUI. She was started on zosyn and doxycyline and one day of ceftriaxone. The patient has also had episodes of melena with Hgb drop to 6.4  s/p 2 units of RBCs in total, she is also s/p EGD that shows Grade C reflux esophagitis with multiple mucosal breaks. She has had progressive thrombocytopenia since admission s/p 1 unit of platelet transfusion 7/31.       Heme/Onc History    Per patient, no hx of unexplained bruises, bleeding gums, epistaxis     Past Medical history   Medical History[1]      Past Surgical History   Surgical History[2]      Family History    Family History[3]      Social History   Social History[4]      Allergies   Allergies[5]    Medications   aspirin, 81 mg, Daily  atorvastatin, 80 mg, Daily  calcium citrate, 200 mg of elemental calcium, Daily  [Held by provider] carvedilol, 25 mg, BID  cholecalciferol, 50 mcg, Daily  [Held by provider] clopidogrel, 75 mg, Daily  cyanocobalamin, 500 mcg, Daily  [Held by provider] dapagliflozin propanediol, 10 mg, Daily with breakfast  gabapentin, 300 mg, BID  [Held by provider] heparin (porcine), 5,000 Units, q8h BOLIVAR  [Held by provider] hydrALAZINE, 75 mg, TID  insulin lispro, 0-5 Units, TID  "AC  iohexol, 1,000 mL, Once in imaging  melatonin, 10 mg, Nightly  [Held by provider] NIFEdipine ER, 90 mg, Daily before breakfast  pantoprazole, 40 mg, BID  [Held by provider] piperacillin-tazobactam, 3.375 g, q6h  [Held by provider] ranolazine, 500 mg, BID  thiamine, 100 mg, Daily  vitamin B complex-vitamin C-folic acid, 1 capsule, Daily         acetaminophen, 975 mg, q6h PRN   Or  acetaminophen, 650 mg, q4h PRN   Or  acetaminophen, 650 mg, q4h PRN  dextrose, 12.5 g, q15 min PRN  dextrose, 25 g, q15 min PRN  diclofenac sodium, 4 g, 4x daily PRN  glucagon, 1 mg, q15 min PRN  glucagon, 1 mg, q15 min PRN  naloxone, 0.2 mg, q5 min PRN  oxyCODONE, 2.5 mg, q6h PRN  oxygen, 1 Dose, Continuous - O2/gases  trimethobenzamide, 200 mg, q6h PRN        Review of Systems   Review of Systems   10 pt ROS reviewed and negative aside from above    Physical Exam   Blood pressure 103/75, pulse 71, temperature 36 °C (96.8 °F), temperature source Temporal, resp. rate 14, height (!) 1.549 m (5' 1\"), weight (!) 36.3 kg (80 lb 0.4 oz), SpO2 97%.    ECOG:    Gen: awake, alert, in no acute distress, cachectic   HEENT: AT/NC, PEERL, EOMI, no LAD  CV: RRR, no m/r/g  Pulm: CTAB, without w/r/r  Abd: soft, NT/ND  Ext: L BKA  Skin: warm and dry  Neuro: A&Ox4, moves all 4 extremities spontaneously     Labs     Lab Results   Component Value Date    GLUCOSE 107 (H) 08/01/2025    CALCIUM 8.0 (L) 08/01/2025     08/01/2025    K 3.5 08/01/2025    CO2 22 08/01/2025     (H) 08/01/2025    BUN 29 (H) 08/01/2025    CREATININE 1.07 (H) 08/01/2025       Lab Results   Component Value Date    WBC 8.4 08/01/2025    HGB 10.0 (L) 08/01/2025    HCT 30.8 (L) 08/01/2025    MCV 86 08/01/2025    PLT 64 (L) 08/01/2025       Lab Results   Component Value Date    ALT 10 07/30/2025    AST 13 07/30/2025    ALKPHOS 98 07/30/2025    BILITOT 0.3 07/30/2025       Imaging   8/1 EGD  Grade C reflux esophagitis with multiple mucosal breaks measuring 5 mm or more, " continuous between folds, covering less than 75% of the circumference in the lower third of the esophagus. Reflux was likely related to nasogastric tube resulting in continuously open LES.  Regular Z-line 35 cm from the incisors  Moderate, patchy erythematous, friable and hemorrhagic mucosa with multiple erosions in the body of the stomach and antrum consistent with nasogastric tube trauma; performed cold forceps biopsy to rule out H pylori.   Single 5 mm x 5 mm small, superficial, round ulcer in the duodenal bulb with clean base (Dillon III)  Erythematous and friable mucosa with multiple scattered erosions in the duodenal bulb, 1st part of the duodenum and 2nd part of the duodenum  The previously placed NG tube had migrated back into the esophagus during the procedure. Given that patient's ileus was clinically improved without any issues with nausea and vomiting, the nasogastric tube was removed at the end of the procedure        Assessment/Plan     Myrna Khan is a 65 y.o. female with PMHx of type 2 diabetes, CAD (s/p CABG 9/2023), hypertension 2/2 renal artery stenosis, PAD with dry gangrene (s/p LLE endarterectomy 3/6/25 and left BKA (guillotine amp 4/1/25 with formalization on 4/3/25)) who presented to the emergency department via EMS from SNF for altered mental status and episodes of hypoglycemia. Patient found to be unresponsive when morning med pass time came at her SNF. On admission, she was found to bacteremia 2/2 Klebsiella pneumonia and MAGUI. She was started on zosyn and doxycyline and one day of ceftriaxone. The patient has also had episodes of melena with Hgb drop to 6.4  s/p 2 units of pRBCs in total, she is also s/p EGD that shows Grade C reflux esophagitis with multiple mucosal breaks. She has had progressive thrombocytopenia since admission s/p 1 unit of platelet transfusion 7/31.     #Anemia of blood loss  #GI bleed  #ACD  #Thrombocytopenia  :: Multifactorial etiology - GI bleed vs DITP vs   bacteriemia  :: Episodes of melena s/p EGD  :: EGD-Grade C reflux esophagitis with multiple mucosal breaks  :: s/p 2 units PRBC and 1 unit of platelets  :: HIT 4Ts score - 3 ,antiplatelet factor Ab- negative. Low concern for HIT  :: one day on ceftriaxone, 6 days on Zosyn  :: B12 - normal, Folate- normal   :: Fibrinogen-normal, LDH normal  :: Factor II-30 , Factor VII 19, Factor VIII 249, Factor II 63  :: PT 27.6, INR 2.5, aPTT 40  :: Ferritin 260, UIBC <55, MCV 86  :: no splenomegaly, no LDH, no hemolysis    Recommendations  -continue to monitor CBC, transfuse if Hgb <7  -PO vitamin k 10mg once  -transfuse platelets if <10 unless for procedure then to goal >50.  - can continue Zosyn   - follow up HIV and Hep C labs  - follow up Reticulocytes     Thank you for this consult, we will continue to follow. Patient seen and discussed with attending physician, Dr. Llanos, who agrees with the above.     Ailin Alanis MD  PGY-1  Hematology Consult Pager: [phone]  Oncology Consult Pager: [phone]         [1]   Past Medical History:  Diagnosis Date    CAD (coronary artery disease)     Carotid artery stenosis     Diabetes mellitus (Multi)     Heart disease     HF (heart failure)     Hypertension     NSTEMI (non-ST elevated myocardial infarction) (Multi)     PAD (peripheral artery disease)     Renal artery stenosis    [2]   Past Surgical History:  Procedure Laterality Date    CORONARY ARTERY BYPASS GRAFT      9/2023    FEMORAL ARTERY STENT Left     left SFA   [3]   Family History  Problem Relation Name Age of Onset    Peripheral vascular disease Mother      Hypertension Mother      Diabetes type II Mother      Hypertension Sister      Diabetes type II Sister     [4]   Social History  Socioeconomic History    Marital status: Single   Tobacco Use    Smoking status: Some Days     Current packs/day: 1.00     Average packs/day: 1 pack/day for 30.6 years (30.6 ttl pk-yrs)     Types: Cigarettes     Start date: 1995     Passive exposure:  Never    Smokeless tobacco: Never   Vaping Use    Vaping status: Never Used   Substance and Sexual Activity    Alcohol use: Not Currently    Drug use: Never    Sexual activity: Defer     Social Drivers of Health     Financial Resource Strain: Low Risk  (7/27/2025)    Overall Financial Resource Strain (CARDIA)     Difficulty of Paying Living Expenses: Not hard at all   Food Insecurity: No Food Insecurity (7/26/2025)    Hunger Vital Sign     Worried About Running Out of Food in the Last Year: Never true     Ran Out of Food in the Last Year: Never true   Transportation Needs: No Transportation Needs (7/27/2025)    PRAPARE - Transportation     Lack of Transportation (Medical): No     Lack of Transportation (Non-Medical): No   Physical Activity: Unknown (7/16/2024)    Exercise Vital Sign     Days of Exercise per Week: 0 days   Intimate Partner Violence: Not At Risk (7/26/2025)    Humiliation, Afraid, Rape, and Kick questionnaire     Fear of Current or Ex-Partner: No     Emotionally Abused: No     Physically Abused: No     Sexually Abused: No   Housing Stability: Low Risk  (7/27/2025)    Housing Stability Vital Sign     Unable to Pay for Housing in the Last Year: No     Number of Times Moved in the Last Year: 0     Homeless in the Last Year: No   [5]   Allergies  Allergen Reactions    Lisinopril Itching    Amoxicillin Rash    Losartan Itching and Rash

## 2025-08-01 NOTE — SIGNIFICANT EVENT
EGD today with results below:  Findings  Grade C reflux esophagitis with multiple mucosal breaks measuring 5 mm or more, continuous between folds, covering less than 75% of the circumference in the lower third of the esophagus. Reflux was likely related to nasogastric tube resulting in continuously open LES.  Regular Z-line 35 cm from the incisors  Moderate, patchy erythematous, friable and hemorrhagic mucosa with multiple erosions in the body of the stomach and antrum consistent with nasogastric tube trauma; performed cold forceps biopsy to rule out H pylori.   Single 5 mm x 5 mm small, superficial, round ulcer in the duodenal bulb with clean base (Dillon III)  Erythematous and friable mucosa with multiple scattered erosions in the duodenal bulb, 1st part of the duodenum and 2nd part of the duodenum  The previously placed NG tube had migrated back into the esophagus during the procedure. Given that patient's ileus was clinically improved without any issues with nausea and vomiting, the nasogastric tube was removed at the end of the procedure.    Recommendations:  -Recommend PPI twice daily for three months  -No indication for repeat EGD to assess for mucosal healing and screen for underlying Irizarry's given patient's comorbidities and functional status  -Await pathology  -Management of ileus per primary team. Mobilize patient as able. Advance diet as tolerated.  -Correction of metabolic derangements and coagulopathy per primary team.    Thank you for the consultation.  The consulting team will sign off now.  Please do not hesitate to contact us again on by paging the consultation team again between the weekday hours of 7 AM - 5 PM.  If there is an urgent concern during the weekend, after-hours, or holidays; then please page the on-call GI fellow at [phone]. Thank you.

## 2025-08-01 NOTE — PROGRESS NOTES
"Speech-Language Pathology      SLP Adult Inpatient Speech-Language Pathology Clinical Swallow Evaluation    Patient Name: Myrna Khan  MRN: [id number]  Today's Date: 8/1/2025   Time Calculation  Start Time: 1140  Stop Time: 1155  Time Calculation (min): 15 min           Assessment/Plan:  #dysphagia    SLP consulted for reevaluation of swallow function following EGD. Pt fatigued upon arrival, although easily aroused and agreeable. Pt completed trials of thin liquids via straw w/ consistent overt s/s of airway compromise. Significant change from previous evaluation. ? Change 2/2 pt w/ recent anaesthesia from EGD a few hours prior vs. Dysphagia. Recommend reassessment once pt is aware.        Recommendations:  - Recommend reassessment of function once pt more alert.         Inpatient/Swing Bed or Outpatient: Inpatient  SLP Plan: Skilled SLP  SLP Frequency: 1x per week  Duration: 30 days  SLP Discharge Recommendations:  (TBD)  SLP - OK to Discharge: Yes     Goals:  Encounter Problems       Encounter Problems (Active)       Swallowing       LTG - Patient will tolerate the least restrictive diet without overt difficulty by time of discharge.       Start:  07/30/25    Expected End:  08/07/25            SLP Goal 1       Start:  07/30/25    Expected End:  08/07/25       STG - Patient will tolerate therapeutic trials of recommended consistency without clinical signs and symptoms of aspiration on 100% of trials                  Subjective     Baseline Assessment:  Respiratory Status: Room air  History of Intubation: No  Behavior/Cognition: Alert, Cooperative, Pleasant mood  Vision: Functional for self-feeding  Hearing: Within Functional Limits  Baseline Vocal Quality: Weak    History and Physical:    Per H&P:  \" Myrna Khan is a 65 y.o. female with PMHx of type 2 diabetes, CAD (s/p CABG 9/2023), hypertension 2/2 renal artery stenosis, PAD with dry gangrene (s/p LLE endarterectomy 3/6/25 and left BKA (guillotine amp " "4/1/25 with formalization on 4/3/25)) who presented to the emergency department via EMS from SNF for altered mental status. Patient started on D5 due to hypoglycemia, stopped fluids and patient has rebounded following increased PO intake. Endo rec starting SS1 and holding home DM meds. Hgb 6.4, transfused 1 unit PRBC, recheck 8.8. CT AP w/o c/f bleed, no active bleed noted. CT showed c/f ileus, patient with n/v, NPO, NG to LIWS w/ 200 ml output. Hgb drop to 6.4, 3 tarry stools, GI consulted, transfused 1 unit.  \"    Medical History[1]  Family History[2]    Allergies[3]    Objective     Oral/Motor Assessment:  Oral Hygiene: dry secretions; generally poor oral care  Oral Motor: Within Functional Limits  Vocal Quality: Exceptions to WFL  Vocal Quality Impairment: Weak  Intelligibility: Intelligible  Hearing: Within Functional Limits      Clinical Observations:  Consistencies Trialed: Yes  Consistencies Trialed: Ice Chips, Thin (IDDSI Level 0) - Straw    - Overt s/s of airway compromise     Inpatient Education:  Extensive education provided to patient and/or Caregivers regarding current swallow function, recommendations/results, and POC. Demonstrated verbal understanding and were agreeable w/ the details/recommendations reviewed.    Education Documentation  No documentation found.  Education Comments  No comments found.                          [1]   Past Medical History:  Diagnosis Date    CAD (coronary artery disease)     Carotid artery stenosis     Diabetes mellitus (Multi)     Heart disease     HF (heart failure)     Hypertension     NSTEMI (non-ST elevated myocardial infarction) (Multi)     PAD (peripheral artery disease)     Renal artery stenosis    [2]   Family History  Problem Relation Name Age of Onset    Peripheral vascular disease Mother      Hypertension Mother      Diabetes type II Mother      Hypertension Sister      Diabetes type II Sister     [3]   Allergies  Allergen Reactions    Lisinopril Itching    " Amoxicillin Rash    Losartan Itching and Rash

## 2025-08-01 NOTE — PROGRESS NOTES
Gastroenterology Consult Service Progress Note  Department of Gastroenterology & Hepatology  [facility]    [facility]  July 31, 2025   Patient: Myrna Khan    Medical Record: [id number]  Reason for Initial Consult: Melena, anemia     Interval History:   - NAEO   - hgb continues to downtrend   - pink NG output       Physical Exam:    Vitals:    07/31/25 1618 07/31/25 1641 07/31/25 2009 07/31/25 2336   BP: (!) 172/93 (!) 166/97 (!) 141/95 133/90   Pulse: 80 77 90 76   Resp: 16 16 18 17   Temp: 36.3 °C (97.3 °F) 36.5 °C (97.7 °F) 36.6 °C (97.9 °F)    TempSrc: Tympanic      SpO2: 100% 100% 95% 100%   Weight:       Height:             Intake/Output Summary (Last 24 hours) at 7/31/2025 2351  Last data filed at 7/31/2025 1345  Gross per 24 hour   Intake 250 ml   Output 50 ml   Net 200 ml       General: cachectic, no acute distress  HEENT: EOM intact, no scleral icterus, dry MM  Respiratory: nonlabored breathing on room air  Cardiovascular: Tachycardic   Abdomen: Soft, nontender, nondistended  Extremities: no edema, no asterixis  Neuro: disoriented, answering most questions appropriately, moves all 4 extremities with no focal deficits  Psych: calm and cooperative    Labs:  CBC  Results from last 7 days   Lab Units 07/31/25  0603 07/30/25  1505 07/30/25  0907 07/30/25  0557   HEMOGLOBIN g/dL 7.0* 7.8* 6.4* 6.8*   HEMATOCRIT % 22.0* 22.3* 19.5* 20.6*   WBC AUTO x10*3/uL 9.3 11.6*  --  12.6*   PLATELETS AUTO x10*3/uL 46* 61*  --  82*      BMP  Results from last 7 days   Lab Units 07/31/25  1137 07/31/25  0603 07/30/25  0557 07/29/25  0618 07/28/25  0608   SODIUM mmol/L  --  144 142 137 133*   POTASSIUM mmol/L 4.5 2.9* 3.6 4.3 4.5   CHLORIDE mmol/L  --  111* 110* 105 103   CO2 mmol/L  --  24 24 21 23   BUN mg/dL  --  31* 37* 34* 33*   CREATININE mg/dL  --  1.10* 1.06* 1.06* 1.13*   MAGNESIUM mg/dL  --  1.54* 1.76 1.82 1.89   PHOSPHORUS mg/dL  --  2.6 2.6 3.0 1.8*   ANION GAP  mmol/L  --  12 12 15 12   GLUCOSE mg/dL  --  124* 136* 101* 164*   CALCIUM mg/dL  --  7.3* 6.7* 6.5* 6.3*     LFTS  Results from last 7 days   Lab Units 07/30/25  0557 07/26/25  0643   ALT U/L 10 17   AST U/L 13 33   ALK PHOS U/L 98 145*   BILIRUBIN TOTAL mg/dL 0.3 0.4   BILIRUBIN DIRECT mg/dL 0.1  --      COAGS  Results from last 7 days   Lab Units 07/31/25  0603   INR  2.8*        Imaging:  === 02/28/25 ===    US PELVIS    - Impression -  1. Complex partially cystic right adnexal mass with a solid component  demonstrating internal vascularity. Findings raise concern for a  cystic ovarian neoplasm, especially given elevated tumor markers.  Gynecology oncology consultation is recommended.  2. Markedly distended urinary bladder. Consider Malone placement.  3. Partially calcified uterine leiomyomas.      I personally reviewed the images/study and I agree with the findings  as stated by Radiology resident.    MACRO:  None    Signed by: Moose Shaw 3/1/2025 6:59 PM  Dictation workstation:   [id number]  === 07/26/25 ===    CT ABDOMEN PELVIS W IV CONTRAST    - Impression -  1. No definite evidence of intra-abdominal or retroperitoneal  hemorrhage.  2. Bilateral pleural effusions with the adjacent noncontiguous cysts.  Consolidation in the left lower lobe likely represent superimposed  infectious process..  3. Multiple loops of distended fluid-filled small bowel with  air-fluid levels. No discrete transition point. No pneumoperitoneum.  Findings likely represent adynamic ileus.  4. Redemonstration of the large cystic appearing right adnexal mass.  Follow-up with ultrasound is recommended.  5. Increased abdominopelvic ascites.  6. Circumferential thickening of the urinary bladder with the mucosal  enhancement likely representing cystitis. Small 8 mm urinary bladder  calculus.    I personally reviewed the images/study and I agree with the findings  as stated by Ney Segundo MD.    MACRO:  None    Signed by: Moose  Migue lAngel Shaw 7/29/2025 11:02 AM  Dictation workstation:   [id number]  === 05/23/25 ===    MR PELVIS W AND WO CONTRAST    - Impression -  1.  Enlarged multifibroid uterus.  2.  Complex cyst in the right ovary with internal  hemorrhagic/proteinaceous material. Given the size of the lesion and  postmenopausal status of the patient recommend close follow-up versus  resection as hemorrhagic cyst would be atypical and cystic ovarian  neoplasm is in the differential.  3. Left ovary not seen.      MACRO:  None    Signed by: Venkat Mejia 5/28/2025 1:21 PM  Dictation workstation:   [id number]    GI procedures    Assessment and Plan:        Myrna Khan is a 65 y.o. female admitted on 7/26/2025 with encephalopathy likely secondary to klebsiella bacteremia and hypoglycemia. GI is consulted for melena with acute anemia.     Pt admitted from her nursing facility for lack of responsiveness, found to be profoundly hypoglycemic and infectious workup revealing klebsiella pneuominae in the urine and blood. She has been on zosyn since 7/27. Has been overall clinically improving however had worsening abdominal distention and pain on 7/28 with KUB concerning for ileus. Had NG placed at that time to [facility] which improved her symptoms. She has had a slow Hgb drop from admission 10 ->8.5, then more acutely to 6.4. Began having black tarry stools x4 along with dark brown output from NG on 7/29. She has been transfused 2u pRBCs. Denies any previous GI bleeding that she can recall and no endoscopic eval on record. Denies any NSAID use however has been on asa and plavix for the past 4 months. Likely having acute upper GI bleeding now either in the setting of NG trauma that was exacerbated by antiplatelets vs PUD vs. Esophagitis/gastritis (less likely given acute hgb drop).      Recommendations:  - EGD postponed to 8/1   - Keep NPO at 0001 on 8/1   - Continue PPI IV BID   - Consider NGT clamping trial as abdomen is soft, imaging is  reassuring, and pt is having BMs  - Transfuse to maintain Hgb >7, continue to trend CBC   - Maintain an active type and screen  - Maintain 2 large bore IV's    Patient seen and discussed with attending, Dr. Natalie Payne.       Fabiano Myers MD, PhD  Gastroenterology Fellow, PGY-7  [facility]   Division of Gastroenterology and Liver Disease       Thank you for the consultation. Gastroenterology will continue to the follow the patient.   Please do not hesitate to contact me on DocHalo or page [phone] if there are any further questions between the weekday hours of 7 AM - 5 PM.   If there is an urgent concern during the weekend, after-hours, or holidays; then please page the [place] fellow [place] [phone]. Thank you.    SIGNATURE: Fabiano Myers MD PATIENT NAME: Myrna Khan   DATE: July 31, 2025 MRN: [id number]

## 2025-08-01 NOTE — H&P
"History Of Present Illness  Myrna Khan is a 65 y.o. female presenting here for inpatient EGD for melena and anemia acute blood loss.     Past Medical History  Medical History[1]  Surgical History  Surgical History[2]  Social History  She reports that she has been smoking cigarettes. She started smoking about 30 years ago. She has a 30.6 pack-year smoking history. She has never been exposed to tobacco smoke. She has never used smokeless tobacco. She reports that she does not currently use alcohol. She reports that she does not use drugs.    Family History  Family History[3]     Allergies  Allergies[4]       Physical Exam  Constitutional:       Appearance: She is cachectic.   HENT:      Mouth/Throat:      Mouth: Mucous membranes are moist.     Eyes:      Conjunctiva/sclera: Conjunctivae normal.       Cardiovascular:      Rate and Rhythm: Normal rate.   Pulmonary:      Effort: Pulmonary effort is normal.   Abdominal:      Palpations: Abdomen is soft.     Skin:     General: Skin is warm.          Last Recorded Vitals  Blood pressure (!) 129/94, pulse 83, temperature 35.8 °C (96.4 °F), temperature source Temporal, resp. rate 16, height (!) 1.549 m (5' 1\"), weight (!) 36.3 kg (80 lb 0.4 oz), SpO2 99%.    Assessment/Plan    inpatient EGD for melena and anemia acute blood loss.     Rajinder Mac MD       [1]   Past Medical History:  Diagnosis Date    CAD (coronary artery disease)     Carotid artery stenosis     Diabetes mellitus (Multi)     Heart disease     HF (heart failure)     Hypertension     NSTEMI (non-ST elevated myocardial infarction) (Multi)     PAD (peripheral artery disease)     Renal artery stenosis    [2]   Past Surgical History:  Procedure Laterality Date    CORONARY ARTERY BYPASS GRAFT      9/2023    FEMORAL ARTERY STENT Left     left SFA   [3]   Family History  Problem Relation Name Age of Onset    Peripheral vascular disease Mother      Hypertension Mother      Diabetes type II Mother      " Hypertension Sister      Diabetes type II Sister     [4]   Allergies  Allergen Reactions    Lisinopril Itching    Amoxicillin Rash    Losartan Itching and Rash

## 2025-08-01 NOTE — CARE PLAN
Problem: Pain - Adult  Goal: Verbalizes/displays adequate comfort level or baseline comfort level  Outcome: Progressing     Problem: Safety - Adult  Goal: Free from fall injury  Outcome: Progressing     Problem: Discharge Planning  Goal: Discharge to home or other facility with appropriate resources  Outcome: Progressing     Problem: Chronic Conditions and Co-morbidities  Goal: Patient's chronic conditions and co-morbidity symptoms are monitored and maintained or improved  Outcome: Progressing     Problem: Nutrition  Goal: Nutrient intake appropriate for maintaining nutritional needs  Outcome: Progressing     Problem: Skin  Goal: Decreased wound size/increased tissue granulation at next dressing change  Outcome: Progressing  Goal: Participates in plan/prevention/treatment measures  Outcome: Progressing  Goal: Prevent/manage excess moisture  Outcome: Progressing  Goal: Prevent/minimize sheer/friction injuries  Outcome: Progressing  Goal: Promote/optimize nutrition  Outcome: Progressing  Goal: Promote skin healing  Outcome: Progressing     Problem: Fall/Injury  Goal: Not fall by end of shift  Outcome: Progressing  Goal: Be free from injury by end of the shift  Outcome: Progressing  Goal: Verbalize understanding of personal risk factors for fall in the hospital  Outcome: Progressing  Goal: Verbalize understanding of risk factor reduction measures to prevent injury from fall in the home  Outcome: Progressing  Goal: Use assistive devices by end of the shift  Outcome: Progressing  Goal: Pace activities to prevent fatigue by end of the shift  Outcome: Progressing   The patient's goals for the shift include Patient will safe and stable during shift    The clinical goals for the shift include pt's post transfusion cbc becomes normal, blood glucose will be normal, pt remain safe stable throughout shift

## 2025-08-01 NOTE — ANESTHESIA POSTPROCEDURE EVALUATION
Patient: Myrna Khan    Procedure Summary       Date: 08/01/25 Room / Location: [facility]    Anesthesia Start: 0839 Anesthesia Stop: 0858    Procedure: EGD Diagnosis: Upper GI bleed    Scheduled Providers: Rajinder Mac MD Responsible Provider: Kishor Sutherland MD    Anesthesia Type: MAC ASA Status: 3            Anesthesia Type: MAC    Vitals Value Taken Time   /75 08/01/25 09:26   Temp 36 °C (96.8 °F) 08/01/25 08:56   Pulse 71 08/01/25 09:26   Resp 14 08/01/25 09:26   SpO2 97 % 08/01/25 09:26       Anesthesia Post Evaluation    Patient location during evaluation: PACU  Patient participation: complete - patient participated  Level of consciousness: awake  Pain management: adequate  Airway patency: patent  Cardiovascular status: acceptable  Respiratory status: acceptable  Hydration status: acceptable  Postoperative Nausea and Vomiting: none        There were no known notable events for this encounter.

## 2025-08-01 NOTE — PROGRESS NOTES
"Myrna Khan is a 65 y.o. female on day 6 of admission presenting with Bacteremia due to Klebsiella pneumoniae.    Subjective   Overnight patient had another dark tarry stool. Patient also temporarily lost IV access, rapid called and had two IV's placed. Episode of hypoglycemia, as she was unable to receive d5 fluids, corrected after receiving orange juice.        Objective     Physical Exam  Constitutional:       General: She is not in acute distress.     Comments: Chronically ill appearing    HENT:      Head: Normocephalic and atraumatic.      Nose: Nose normal.      Mouth/Throat:      Mouth: Mucous membranes are moist.     Eyes:      Extraocular Movements: Extraocular movements intact.      Conjunctiva/sclera: Conjunctivae normal.       Cardiovascular:      Rate and Rhythm: Normal rate and regular rhythm.      Heart sounds: Normal heart sounds.   Pulmonary:      Effort: Pulmonary effort is normal.      Breath sounds: Normal breath sounds.      Comments: Intermittently wearing 1L NC for comfort  Abdominal:      General: Bowel sounds are normal. There is no distension.      Palpations: Abdomen is soft.      Tenderness: There is no abdominal tenderness.      Comments: NG in place, minimal output     Musculoskeletal:         General: Normal range of motion.      Cervical back: Normal range of motion.      Comments: L BKA      Neurological:      General: No focal deficit present.      Mental Status: She is alert. Mental status is at baseline.     Psychiatric:         Behavior: Behavior normal.         Last Recorded Vitals  Blood pressure 103/75, pulse 71, temperature 36 °C (96.8 °F), temperature source Temporal, resp. rate 14, height (!) 1.549 m (5' 1\"), weight (!) 36.3 kg (80 lb 0.4 oz), SpO2 97%.  Intake/Output last 3 Shifts:  I/O last 3 completed shifts:  In: 1841.2 (50.7 mL/kg) [I.V.:314.2 (8.7 mL/kg); Blood:1477; IV Piggyback:50]  Out: 50 (1.4 mL/kg) [Emesis/NG output:50]  Weight: 36.3 kg     Relevant " Results  Scheduled medications  Scheduled Medications[1]  Continuous medications  Continuous Medications[2]  PRN medications  PRN Medications[3]     Results for orders placed or performed during the hospital encounter of 07/26/25 (from the past 24 hours)   Potassium   Result Value Ref Range    Potassium 4.5 3.5 - 5.3 mmol/L   POCT GLUCOSE   Result Value Ref Range    POCT Glucose 80 74 - 99 mg/dL   POCT GLUCOSE   Result Value Ref Range    POCT Glucose 70 (L) 74 - 99 mg/dL   POCT GLUCOSE   Result Value Ref Range    POCT Glucose 60 (L) 74 - 99 mg/dL   POCT GLUCOSE   Result Value Ref Range    POCT Glucose 69 (L) 74 - 99 mg/dL   POCT GLUCOSE   Result Value Ref Range    POCT Glucose 60 (L) 74 - 99 mg/dL   CBC   Result Value Ref Range    WBC 12.0 (H) 4.4 - 11.3 x10*3/uL    nRBC 0.2 (H) 0.0 - 0.0 /100 WBCs    RBC 3.73 (L) 4.00 - 5.20 x10*6/uL    Hemoglobin 10.3 (L) 12.0 - 16.0 g/dL    Hematocrit 32.4 (L) 36.0 - 46.0 %    MCV 87 80 - 100 fL    MCH 27.6 26.0 - 34.0 pg    MCHC 31.8 (L) 32.0 - 36.0 g/dL    RDW 22.7 (H) 11.5 - 14.5 %    Platelets 84 (L) 150 - 450 x10*3/uL   POCT GLUCOSE   Result Value Ref Range    POCT Glucose 103 (H) 74 - 99 mg/dL   POCT GLUCOSE   Result Value Ref Range    POCT Glucose 117 (H) 74 - 99 mg/dL   CBC and Auto Differential   Result Value Ref Range    WBC 8.4 4.4 - 11.3 x10*3/uL    nRBC 0.2 (H) 0.0 - 0.0 /100 WBCs    RBC 3.59 (L) 4.00 - 5.20 x10*6/uL    Hemoglobin 10.0 (L) 12.0 - 16.0 g/dL    Hematocrit 30.8 (L) 36.0 - 46.0 %    MCV 86 80 - 100 fL    MCH 27.9 26.0 - 34.0 pg    MCHC 32.5 32.0 - 36.0 g/dL    RDW 22.9 (H) 11.5 - 14.5 %    Platelets 64 (L) 150 - 450 x10*3/uL    Neutrophils % 61.7 40.0 - 80.0 %    Immature Granulocytes %, Automated 0.8 0.0 - 0.9 %    Lymphocytes % 31.9 13.0 - 44.0 %    Monocytes % 5.3 2.0 - 10.0 %    Eosinophils % 0.1 0.0 - 6.0 %    Basophils % 0.2 0.0 - 2.0 %    Neutrophils Absolute 5.20 1.20 - 7.70 x10*3/uL    Immature Granulocytes Absolute, Automated 0.07 0.00 - 0.70  x10*3/uL    Lymphocytes Absolute 2.69 1.20 - 4.80 x10*3/uL    Monocytes Absolute 0.45 0.10 - 1.00 x10*3/uL    Eosinophils Absolute 0.01 0.00 - 0.70 x10*3/uL    Basophils Absolute 0.02 0.00 - 0.10 x10*3/uL   Magnesium   Result Value Ref Range    Magnesium 1.53 (L) 1.60 - 2.40 mg/dL   Renal Function Panel   Result Value Ref Range    Glucose 107 (H) 74 - 99 mg/dL    Sodium 145 136 - 145 mmol/L    Potassium 3.5 3.5 - 5.3 mmol/L    Chloride 114 (H) 98 - 107 mmol/L    Bicarbonate 22 21 - 32 mmol/L    Anion Gap 13 10 - 20 mmol/L    Urea Nitrogen 29 (H) 6 - 23 mg/dL    Creatinine 1.07 (H) 0.50 - 1.05 mg/dL    eGFR 58 (L) >60 mL/min/1.73m*2    Calcium 8.0 (L) 8.6 - 10.6 mg/dL    Phosphorus 2.8 2.5 - 4.9 mg/dL    Albumin 2.3 (L) 3.4 - 5.0 g/dL   Fibrinogen   Result Value Ref Range    Fibrinogen 306 200 - 400 mg/dL   Coagulation Screen   Result Value Ref Range    Protime 27.6 (H) 9.8 - 12.4 seconds    INR 2.5 (H) 0.9 - 1.1    aPTT 40 (H) 26 - 36 seconds   Factor 7 Activity   Result Value Ref Range    Factor VII Activity 19 (L) 50 - 150 %   Factor 2 Activity   Result Value Ref Range    Factor II Activity 30 (L) 80 - 130 %   Factor 8 Activity   Result Value Ref Range    Factor VIII Activity 249 (H) 55 - 180 %   Factor 11 Activity   Result Value Ref Range    Factor XI Activity 63 (L) 65 - 150 %   Morphology   Result Value Ref Range    RBC Morphology See Below     Ovalocytes Few     Vin Cells Many    POCT GLUCOSE   Result Value Ref Range    POCT Glucose 132 (H) 74 - 99 mg/dL   Lavender Top   Result Value Ref Range    Extra Tube Hold for add-ons.                    This patient has a urinary catheter   Reason for the urinary catheter remaining today? urinary retention/bladder outlet obstruction, acute or chronic              Assessment & Plan  AMS (altered mental status)    Hypoglycemia    Unresponsiveness    Bacteremia due to Klebsiella pneumoniae      Myrna LANDON Bill is a 65 y.o. female with PMHx of type 2 diabetes, CAD (s/p  CABG 9/2023), hypertension 2/2 renal artery stenosis, PAD with dry gangrene (s/p LLE endarterectomy 3/6/25 and left BKA (guillotine amp 4/1/25 with formalization on 4/3/25)) who presented to the emergency department via EMS from SNF for altered mental status. Patient started on D5 due to hypoglycemia, stopped fluids and patient has rebounded following increased PO intake. Endo rec starting SS1 and holding home DM meds. Hgb 6.4, transfused 1 unit PRBC, recheck 8.8. CT AP w/o c/f bleed, no active bleed noted. CT showed c/f ileus, patient with n/v, NPO, NG to LIWS w/ 200 ml output. Gi consulted due to hgb drop and tarry stools, plan for scope today. Hgb and Plt improved s/p repeat transfusion. Vasc surg consulted given AC concerns. Hematology consulted given thrombocytopenia and coagulopathy concerns.     Update 8/1  - Tarry stool overnight, hgb and plt improved s/p transfusion  - Gi plan for EGD today  - Hypoglycemic overnight, rebounded s/p PO intake  - Vasc surg consulted due to AC being held iso GI bleed, hx of end arterectomy and BKA  - Hematology consulted given repeated thrombocytopenia, factor abnormalities     AMS, likely multifactorial (resolved)  Bacteremia  UTI  CAP  :: CTH negative for acute intracranial abnormality or mass effect.   :: BCx collected 7/26, pending result  :: CXR showing findings of patchy consolidation of left lung, concerning for left mid lung pneumonia   - UA showing turbid urine, 1+ protein, 3+ glucose, 500 LE. Nitrite and ketone negative, >50 WBC, 11-20 RBC, 1+ bacteria.   - c diff PCR (ordered d/t recent clindamycin course and diarrhea) negative  - UDS negative   Plan:  - Started CTX 1g daily iso amoxicillin allergy on 7/26)  and doxycycline 100mg BID iso QTc of 500. Plan for 5 days total of abx therapy  - Discontinued CTX  and started Zozyn (7/27-x)  -UA Cx 7/26: >100,000 Klebsiella   -Blood Cx 7/26: growing Klebsiella   - repeat cultures NGTD    Anemia  Thrombocytopenia  C/f GI  bleed  - admission Hgb 10.5, likely hemo concentrated due to poor po intake   - Current Hgb 6.6  - Transfused 2x  PRBC  - PPI IV BID  - holding AC (resume ASA post EGD)  - Hgb drop 7.8 to 7.0, PLT 46, iso repeated tarry stool will transfuse PLT and PRBC  -- Post transfusion Hgb 10.3, Plt 84  - Gi consulted, plan for EGD today   - Hematology consulted given persistent thrombocytopenia and factor abnormalities    C/f Ileus  - patient with intermittent n/a overnight  - CT AP showed Multiple loops of distended fluid-filled small bowel with  air-fluid levels  - NPO  - NG to LIWS ordered, minimal output  - KUB showing improved distension/bowel gas  - will consider clamp trial    Hypokalemia  - K+ 2.9  - Ordered replacement  - RFP ordered    R adnexal mass  - large cystic appearing right adnexal mass seen on CT imaging  - TVUS ordered    T2DM  Hypoglycemia (resolved)  :: home regimen per pharm med rec: glargine 10u nightly, Farxiga 10mg daily, metformin 1g BID  :: unclear etiology of hypoglycemia; per SNF report, takes 10u glargine nightly. Unknown what patient's HS BG was or how much insulin she actually received last night.   :: last A1c 6.2% (5/9/2025)  :: patient's BG levels have been labile, even while on D5NS mIVF (103, 347, 104, 228, 70, 157). These are all POCT reads, currently pending repeat RFP. VBG from this afternoon showing glucose of 129.  - has received 2 amps of D50. On D5NS at 50ml/hr, increased to 75ml/h5r  Plan:  - all home medications held at this time  - continue to monitor POCT glucose q4h  - Endo consulted, rec holding OP DM meds, starting SS1 and continue carb controlled diet     MAGUI resolved  Hypomagnesemia  Hypophosphatemia, resolved    chronic urinary retention with indwelling day catheter  :: Cr 1.21 on admission (baseline 0.7-0.8), eGFR 50  :: Mg 1.23, s/p 2g IV Mg in the ED. Has had chronic low Mg in the past on chart review  :: Phos 2.4 with mild hemolysis detected  :: UA showing turbid  urine, 1+ protein, 3+ glucose, 500 LE. Nitrite and ketone negative, >50 WBC, 11-20 RBC, 1+ bacteria. Reflex culture pending.   - Cr 1.06  - daily rfp     Malnutrition  - patient cachectic, BMI 15.12  - on home vit D, calcium supplements  Plan:  - continue home vit D, calcium supplements, ordered  - follow up B12 and folate labs  - thiamine, B12, and folate supplements initiated   - q12 RFPs to monitor for refeeding, replete electrolytes for goal of K>4, phos>3, Mg>2  - inpatient consult to dietician placed, appreciate recs  -- ensure TID     CAD s/p CABG 9/2023  HTN  FROYLAN  :: home antihypertensives: carvedilol 25mg BID, hydralazine 75mg TID, nifedipine 90mg  :: on atorvastatin 80mg nightly  :: EKG showing sinus rhythm with PACs, low voltage QRS, possible inferior infarct (age undetermined), nonspecific T wave abnormality now evident in Inferior leads and T wave inversion now evident in Lateral leads  Plan:  - home meds ordered as above  - Cont tele   - patient currently stable, continue to monitor BP     PAD  :: s/p LLE endarterectomy 3/6/25 and left BKA (guillotine amp 4/1/25 with formalization on 4/3/25)  :: on clopidigrel 75mg daily and ASA 81mg daily  - wound of LLE at level of BKA. Per recent vasc surgery note, medial ulceration had progressed to a tunneling wound (did not probe to bone).  Was given course of clinda at the time, completed on 7/16  - Xray of L tib/fib without evidence of OM.   Plan:  - continue DAPT, ordered  - wound care consult for evaluation of wounds, appreciate recs  - Vasc surg consulted due to AC being held iso GI bleed, hx of end arterectomy and BKA     F: PRN  E: replete for goal of K>4, phos>3, Mg>2  N: NPO  DVT ppx: subcutaneous hep (held)  Abx therapy: Zosyn doxy  Code status: full code    Patient discussed with attending physician Dr. Weiner-Jese  Plan preliminary until cosigned by attending physician.    Jesse Granados MD  Family Medicine  PGY-3           [1] [Held by provider]  aspirin, 81 mg, oral, Daily  atorvastatin, 80 mg, oral, Daily  calcium citrate, 200 mg of elemental calcium, oral, Daily  [Held by provider] carvedilol, 25 mg, oral, BID  cholecalciferol, 50 mcg, oral, Daily  [Held by provider] clopidogrel, 75 mg, oral, Daily  cyanocobalamin, 500 mcg, oral, Daily  [Held by provider] dapagliflozin propanediol, 10 mg, oral, Daily with breakfast  gabapentin, 300 mg, oral, BID  [Held by provider] heparin (porcine), 5,000 Units, subcutaneous, q8h BOLIVAR  [Held by provider] hydrALAZINE, 75 mg, oral, TID  insulin lispro, 0-5 Units, subcutaneous, TID AC  iohexol, 1,000 mL, oral, Once in imaging  melatonin, 10 mg, oral, Nightly  [Held by provider] NIFEdipine ER, 90 mg, oral, Daily before breakfast  pantoprazole, 40 mg, intravenous, BID  [Held by provider] piperacillin-tazobactam, 3.375 g, intravenous, q6h  [Held by provider] ranolazine, 500 mg, oral, BID  thiamine, 100 mg, oral, Daily  vitamin B complex-vitamin C-folic acid, 1 capsule, oral, Daily     [2] dextrose 5%-0.45 % sodium chloride, 50 mL/hr, Last Rate: 50 mL/hr (08/01/25 0953)     [3] PRN medications: acetaminophen **OR** acetaminophen **OR** acetaminophen, dextrose, dextrose, diclofenac sodium, glucagon, glucagon, naloxone, oxyCODONE, oxygen, trimethobenzamide

## 2025-08-01 NOTE — ANESTHESIA PREPROCEDURE EVALUATION
Patient: Myrna Khan    Procedure Information       Date/Time: 08/01/25 0815    Scheduled providers: Rajinder Mac MD    Procedure: EGD    Location: [facility]            Relevant Problems   Cardiac   (+) CAD (coronary artery disease)   (+) HTN (hypertension)   (+) Hypertensive urgency   (+) PAD (peripheral artery disease)   (+) Stented coronary artery      Pulmonary   (+) Chronic obstructive pulmonary disease with (acute) lower respiratory infection      Neuro   (+) Bilateral carotid artery stenosis      Hematology   (+) Thrombocytopenia, unspecified      ID   (+) Bacteremia due to Klebsiella pneumoniae   (+) Chronic obstructive pulmonary disease with (acute) lower respiratory infection       Clinical information reviewed:   Tobacco  Allergies  Meds   Med Hx  Surg Hx   Fam Hx  Soc Hx        NPO Detail:  NPO/Void Status  Date of Last Liquid: 07/31/25  Date of Last Solid: 07/31/25 (patient stated she doesnt know)         Physical Exam    Airway  Mallampati: II  TM distance: <3 FB  Neck ROM: limited     Cardiovascular    Dental    Pulmonary    Abdominal            Anesthesia Plan    History of general anesthesia?: yes  History of complications of general anesthesia?: no    ASA 3     MAC     intravenous induction     Plan discussed with CAA and attending.

## 2025-08-01 NOTE — NURSING NOTE
Fernie consulted for PIV placement by bedside RN. Fernie RN to bedside to assess. Scanned bilateral upper extremities, very LIMITED sustainable vessels noted for IV cannulation. Bedside RN made aware, alternative access recommended based GI bleed and medical needs. Rapid may need to come assess her as it is not safe for patient to go the night with no access.

## 2025-08-01 NOTE — SIGNIFICANT EVENT
Rapid Response Nurse Note: Rapid Response    Pager time: 46  Arrival time: 51  Event end time: 125  Location: [facility] [place]  [] Triage by phone or secure messaging    Rapid response initiated by:  [] Rapid response RN [] Family [] Nursing Supervisor [] Physician   [] RADAR auto page [] Sepsis auto-page [x] RN [] RT   [] NP/PA [] Other:     Primary reason for call:   [] BAT [] New CPAP/BiPAP [] Bleeding [] Change in mental status   [] Chest pain [] Code blue [] FiO2 >/= 50% [] HR </= 40 bpm   [] HR >/= 130 bpm [] Hyperglycemia [x] Hypoglycemia [] RADAR    [] RR </= 8 bpm [] RR >/= 30 bpm [] SBP </= 90 mmHg [] SpO2 < 90%   [] Seizure [] Sepsis [] Shortness of breath  [] Staff concern: see comments     Initial VS and/or RADAR VS: T 36.5 °C; HR 76; RR 17; /90; SPO2 100%.    Providers present at bedside (if applicable): DO Brandon (Primary)    Name of ICU Provider contacted (if applicable): NA    Interventions:  [] None [] ABG/VBG [] Assist w/ICU transfer [] BAT paged    [] Bag mask [] Blood [] Cardioversion [] Code Blue   [] Code blue for intubation [] Code status changed [] Chest x-ray [] EKG   [] IV fluid/bolus [] KUB x-ray [] Labs/cultures [x] Medication   [] Nebulizer treatment [] NIPPV (CPAP/BiPAP) [] Oxygen [] Oral airway   [x] Peripheral IV [] Palliative care consult [] CT/MRI [] Sepsis protocol    [] Suctioned [] Other:     Outcome:  [] Coded and  [] Code blue for intubation [] Coded and transferred to ICU []  on division   [x] Remained on division (no change) [] Remained on division + additional monitoring [] Remained in ED [] Transferred to ED   [] Transferred to ICU [] Transferred to inpatient status [] Transferred for interventions (procedure) [] Transferred to ICU stepdown    [] Transferred to surgery [] Transferred to telemetry [] Sepsis protocol [] STEMI protocol   [] Stroke protocol [x] Bedside nurse instructed to page rapid response for any concerns or acute change in condition/VS      Additional Comments:   Rapid Response called for lost IV access with hypoglycemia. Upon arrival pt awake and alert, c/o of being hungry. B. Rapid Response canceled by RR RN, DO aware and at bedside. Attempted to give orange juice (ok per Brandon) but pt didn't tolerated, concern for aspiration noted. IM glucagon given. USGPIV placed x 2. CBC sent (ordered prior to call). D5 0.45 NaCl restarted at 50. BG improved to 103. DO updated. No additional interventions by rapid response team indicated at this time.  Staff to page rapid response for any concerns or acute change in condition/VS.

## 2025-08-02 LAB
ALBUMIN SERPL BCP-MCNC: 2.1 G/DL (ref 3.4–5)
ANION GAP SERPL CALC-SCNC: 13 MMOL/L (ref 10–20)
APTT PPP: 32 SECONDS (ref 26–36)
BASOPHILS # BLD MANUAL: 0 X10*3/UL (ref 0–0.1)
BASOPHILS # BLD MANUAL: 0 X10*3/UL (ref 0–0.1)
BASOPHILS NFR BLD MANUAL: 0 %
BASOPHILS NFR BLD MANUAL: 0 %
BLASTS # BLD MANUAL: 0 X10*3/UL
BLASTS # BLD MANUAL: 0 X10*3/UL
BLASTS NFR BLD MANUAL: 0 %
BLASTS NFR BLD MANUAL: 0 %
BUN SERPL-MCNC: 25 MG/DL (ref 6–23)
BURR CELLS BLD QL SMEAR: NORMAL
BURR CELLS BLD QL SMEAR: NORMAL
CALCIUM SERPL-MCNC: 7.7 MG/DL (ref 8.6–10.6)
CHLORIDE SERPL-SCNC: 112 MMOL/L (ref 98–107)
CO2 SERPL-SCNC: 18 MMOL/L (ref 21–32)
CREAT SERPL-MCNC: 0.97 MG/DL (ref 0.5–1.05)
EGFRCR SERPLBLD CKD-EPI 2021: 65 ML/MIN/1.73M*2
EOSINOPHIL # BLD MANUAL: 0 X10*3/UL (ref 0–0.7)
EOSINOPHIL # BLD MANUAL: 0 X10*3/UL (ref 0–0.7)
EOSINOPHIL NFR BLD MANUAL: 0 %
EOSINOPHIL NFR BLD MANUAL: 0 %
ERYTHROCYTE [DISTWIDTH] IN BLOOD BY AUTOMATED COUNT: 21.6 % (ref 11.5–14.5)
ERYTHROCYTE [DISTWIDTH] IN BLOOD BY AUTOMATED COUNT: 22.5 % (ref 11.5–14.5)
FIBRINOGEN PPP-MCNC: 266 MG/DL (ref 200–400)
GLUCOSE BLD MANUAL STRIP-MCNC: 217 MG/DL (ref 74–99)
GLUCOSE BLD MANUAL STRIP-MCNC: 262 MG/DL (ref 74–99)
GLUCOSE BLD MANUAL STRIP-MCNC: 292 MG/DL (ref 74–99)
GLUCOSE BLD MANUAL STRIP-MCNC: 303 MG/DL (ref 74–99)
GLUCOSE BLD MANUAL STRIP-MCNC: 391 MG/DL (ref 74–99)
GLUCOSE BLD MANUAL STRIP-MCNC: 398 MG/DL (ref 74–99)
GLUCOSE SERPL-MCNC: 341 MG/DL (ref 74–99)
HCT VFR BLD AUTO: 31.9 % (ref 36–46)
HCT VFR BLD AUTO: 33.5 % (ref 36–46)
HGB BLD-MCNC: 10 G/DL (ref 12–16)
HGB BLD-MCNC: 10.1 G/DL (ref 12–16)
IMM GRANULOCYTES # BLD AUTO: 0.03 X10*3/UL (ref 0–0.7)
IMM GRANULOCYTES # BLD AUTO: 0.05 X10*3/UL (ref 0–0.7)
IMM GRANULOCYTES NFR BLD AUTO: 0.4 % (ref 0–0.9)
IMM GRANULOCYTES NFR BLD AUTO: 0.7 % (ref 0–0.9)
INR PPP: 2.1 (ref 0.9–1.1)
LYMPHOCYTES # BLD MANUAL: 2.28 X10*3/UL (ref 1.2–4.8)
LYMPHOCYTES # BLD MANUAL: 2.47 X10*3/UL (ref 1.2–4.8)
LYMPHOCYTES NFR BLD MANUAL: 29.6 %
LYMPHOCYTES NFR BLD MANUAL: 33.4 %
MAGNESIUM SERPL-MCNC: 2.24 MG/DL (ref 1.6–2.4)
MCH RBC QN AUTO: 28.2 PG (ref 26–34)
MCH RBC QN AUTO: 28.7 PG (ref 26–34)
MCHC RBC AUTO-ENTMCNC: 29.9 G/DL (ref 32–36)
MCHC RBC AUTO-ENTMCNC: 31.7 G/DL (ref 32–36)
MCV RBC AUTO: 91 FL (ref 80–100)
MCV RBC AUTO: 94 FL (ref 80–100)
METAMYELOCYTES # BLD MANUAL: 0 X10*3/UL
METAMYELOCYTES # BLD MANUAL: 0 X10*3/UL
METAMYELOCYTES NFR BLD MANUAL: 0 %
METAMYELOCYTES NFR BLD MANUAL: 0 %
MONOCYTES # BLD MANUAL: 0.26 X10*3/UL (ref 0.1–1)
MONOCYTES # BLD MANUAL: 0.4 X10*3/UL (ref 0.1–1)
MONOCYTES NFR BLD MANUAL: 3.5 %
MONOCYTES NFR BLD MANUAL: 5.2 %
MYELOCYTES # BLD MANUAL: 0 X10*3/UL
MYELOCYTES # BLD MANUAL: 0 X10*3/UL
MYELOCYTES NFR BLD MANUAL: 0 %
MYELOCYTES NFR BLD MANUAL: 0 %
NEUTROPHILS # BLD MANUAL: 4.48 X10*3/UL (ref 1.2–7.7)
NEUTROPHILS # BLD MANUAL: 5.02 X10*3/UL (ref 1.2–7.7)
NEUTS BAND # BLD MANUAL: 0 X10*3/UL (ref 0–0.7)
NEUTS BAND # BLD MANUAL: 0 X10*3/UL (ref 0–0.7)
NEUTS BAND NFR BLD MANUAL: 0 %
NEUTS BAND NFR BLD MANUAL: 0 %
NEUTS SEG # BLD MANUAL: 4.48 X10*3/UL (ref 1.2–7)
NEUTS SEG # BLD MANUAL: 5.02 X10*3/UL (ref 1.2–7)
NEUTS SEG NFR BLD MANUAL: 60.5 %
NEUTS SEG NFR BLD MANUAL: 65.2 %
NRBC BLD MANUAL-RTO: 0 % (ref 0–0)
NRBC BLD MANUAL-RTO: 0 % (ref 0–0)
NRBC BLD-RTO: 0 /100 WBCS (ref 0–0)
NRBC BLD-RTO: 0 /100 WBCS (ref 0–0)
PHOSPHATE SERPL-MCNC: 2.5 MG/DL (ref 2.5–4.9)
PLASMA CELLS # BLD MANUAL: 0 X10*3/UL
PLASMA CELLS # BLD MANUAL: 0 X10*3/UL
PLASMA CELLS NFR BLD MANUAL: 0 %
PLASMA CELLS NFR BLD MANUAL: 0 %
PLATELET # BLD AUTO: 24 X10*3/UL (ref 150–450)
PLATELET # BLD AUTO: 42 X10*3/UL (ref 150–450)
PLATELET # BLD AUTO: 42 X10*3/UL (ref 150–450)
PLATELET # BLD AUTO: 51 X10*3/UL (ref 150–450)
PLATELET CLUMP BLD QL SMEAR: PRESENT
POTASSIUM SERPL-SCNC: 4 MMOL/L (ref 3.5–5.3)
PROMYELOCYTES # BLD MANUAL: 0 X10*3/UL
PROMYELOCYTES # BLD MANUAL: 0 X10*3/UL
PROMYELOCYTES NFR BLD MANUAL: 0 %
PROMYELOCYTES NFR BLD MANUAL: 0 %
PROTHROMBIN TIME: 23.5 SECONDS (ref 9.8–12.4)
RBC # BLD AUTO: 3.52 X10*6/UL (ref 4–5.2)
RBC # BLD AUTO: 3.55 X10*6/UL (ref 4–5.2)
RBC MORPH BLD: NORMAL
RBC MORPH BLD: NORMAL
SODIUM SERPL-SCNC: 139 MMOL/L (ref 136–145)
TOTAL CELLS COUNTED BLD: 114
TOTAL CELLS COUNTED BLD: 115
VARIANT LYMPHS # BLD MANUAL: 0 X10*3/UL (ref 0–0.5)
VARIANT LYMPHS # BLD MANUAL: 0.19 X10*3/UL (ref 0–0.5)
VARIANT LYMPHS NFR BLD: 0 %
VARIANT LYMPHS NFR BLD: 2.6 %
WBC # BLD AUTO: 7.4 X10*3/UL (ref 4.4–11.3)
WBC # BLD AUTO: 7.7 X10*3/UL (ref 4.4–11.3)
WBC OTHER # BLD MANUAL: 0 X10*3/UL
WBC OTHER # BLD MANUAL: 0 X10*3/UL
WBC OTHER NFR BLD MANUAL: 0 %
WBC OTHER NFR BLD MANUAL: 0 %

## 2025-08-02 PROCEDURE — 85007 BL SMEAR W/DIFF WBC COUNT: CPT

## 2025-08-02 PROCEDURE — 85049 AUTOMATED PLATELET COUNT: CPT

## 2025-08-02 PROCEDURE — 99233 SBSQ HOSP IP/OBS HIGH 50: CPT

## 2025-08-02 PROCEDURE — 85730 THROMBOPLASTIN TIME PARTIAL: CPT

## 2025-08-02 PROCEDURE — 85610 PROTHROMBIN TIME: CPT

## 2025-08-02 PROCEDURE — 80069 RENAL FUNCTION PANEL: CPT

## 2025-08-02 PROCEDURE — 2500000004 HC RX 250 GENERAL PHARMACY W/ HCPCS (ALT 636 FOR OP/ED)

## 2025-08-02 PROCEDURE — 83735 ASSAY OF MAGNESIUM: CPT

## 2025-08-02 PROCEDURE — 36415 COLL VENOUS BLD VENIPUNCTURE: CPT

## 2025-08-02 PROCEDURE — 2500000002 HC RX 250 W HCPCS SELF ADMINISTERED DRUGS (ALT 637 FOR MEDICARE OP, ALT 636 FOR OP/ED)

## 2025-08-02 PROCEDURE — 1100000001 HC PRIVATE ROOM DAILY

## 2025-08-02 PROCEDURE — 85384 FIBRINOGEN ACTIVITY: CPT

## 2025-08-02 PROCEDURE — 85027 COMPLETE CBC AUTOMATED: CPT

## 2025-08-02 PROCEDURE — 82947 ASSAY GLUCOSE BLOOD QUANT: CPT

## 2025-08-02 PROCEDURE — 2500000001 HC RX 250 WO HCPCS SELF ADMINISTERED DRUGS (ALT 637 FOR MEDICARE OP)

## 2025-08-02 RX ORDER — PHYTONADIONE 5 MG/1
10 TABLET ORAL DAILY
Status: COMPLETED | OUTPATIENT
Start: 2025-08-02 | End: 2025-08-03

## 2025-08-02 RX ADMIN — INSULIN LISPRO 3 UNITS: 100 INJECTION, SOLUTION INTRAVENOUS; SUBCUTANEOUS at 13:22

## 2025-08-02 RX ADMIN — ATORVASTATIN CALCIUM 80 MG: 80 TABLET, FILM COATED ORAL at 09:19

## 2025-08-02 RX ADMIN — THIAMINE HCL TAB 100 MG 100 MG: 100 TAB at 09:19

## 2025-08-02 RX ADMIN — ASPIRIN 81 MG: 81 TABLET, COATED ORAL at 09:19

## 2025-08-02 RX ADMIN — GABAPENTIN 300 MG: 300 CAPSULE ORAL at 20:15

## 2025-08-02 RX ADMIN — GABAPENTIN 300 MG: 300 CAPSULE ORAL at 09:19

## 2025-08-02 RX ADMIN — INSULIN LISPRO 2 UNITS: 100 INJECTION, SOLUTION INTRAVENOUS; SUBCUTANEOUS at 18:40

## 2025-08-02 RX ADMIN — PHYTONADIONE 10 MG: 5 TABLET ORAL at 22:53

## 2025-08-02 RX ADMIN — PANTOPRAZOLE SODIUM 40 MG: 40 INJECTION, POWDER, LYOPHILIZED, FOR SOLUTION INTRAVENOUS at 09:19

## 2025-08-02 RX ADMIN — Medication 50 MCG: at 09:19

## 2025-08-02 RX ADMIN — INSULIN LISPRO 4 UNITS: 100 INJECTION, SOLUTION INTRAVENOUS; SUBCUTANEOUS at 09:41

## 2025-08-02 RX ADMIN — CYANOCOBALAMIN TAB 1000 MCG 500 MCG: 1000 TAB at 09:19

## 2025-08-02 RX ADMIN — ASCORBIC ACID, THIAMINE MONONITRATE,RIBOFLAVIN, NIACINAMIDE, PYRIDOXINE HYDROCHLORIDE, FOLIC ACID, CYANOCOBALAMIN, BIOTIN, CALCIUM PANTOTHENATE, 1 CAPSULE: 100; 1.5; 1.7; 20; 10; 1; 6000; 150000; 5 CAPSULE, LIQUID FILLED ORAL at 09:19

## 2025-08-02 RX ADMIN — Medication 1 TABLET: at 09:19

## 2025-08-02 RX ADMIN — Medication 10 MG: at 20:15

## 2025-08-02 RX ADMIN — PANTOPRAZOLE SODIUM 40 MG: 40 INJECTION, POWDER, LYOPHILIZED, FOR SOLUTION INTRAVENOUS at 20:15

## 2025-08-02 ASSESSMENT — COGNITIVE AND FUNCTIONAL STATUS - GENERAL
DRESSING REGULAR UPPER BODY CLOTHING: A LOT
DRESSING REGULAR LOWER BODY CLOTHING: A LOT
HELP NEEDED FOR BATHING: A LOT
CLIMB 3 TO 5 STEPS WITH RAILING: TOTAL
MOVING TO AND FROM BED TO CHAIR: A LOT
PERSONAL GROOMING: A LOT
WALKING IN HOSPITAL ROOM: A LOT
MOVING FROM LYING ON BACK TO SITTING ON SIDE OF FLAT BED WITH BEDRAILS: A LITTLE
DAILY ACTIVITIY SCORE: 14
MOBILITY SCORE: 13
STANDING UP FROM CHAIR USING ARMS: A LOT
TURNING FROM BACK TO SIDE WHILE IN FLAT BAD: A LITTLE
TOILETING: A LOT

## 2025-08-02 ASSESSMENT — PAIN SCALES - GENERAL
PAINLEVEL_OUTOF10: 6
PAINLEVEL_OUTOF10: 0 - NO PAIN

## 2025-08-02 ASSESSMENT — PAIN - FUNCTIONAL ASSESSMENT: PAIN_FUNCTIONAL_ASSESSMENT: 0-10

## 2025-08-02 NOTE — CARE PLAN
The patient's goals for the shift include Patient will safe and stable during shift    The clinical goals for the shift include Pt will remain hemodynamically stable during this shift.    Problem: Pain - Adult  Goal: Verbalizes/displays adequate comfort level or baseline comfort level  Outcome: Progressing     Problem: Safety - Adult  Goal: Free from fall injury  Outcome: Progressing     Problem: Skin  Goal: Decreased wound size/increased tissue granulation at next dressing change  Outcome: Progressing  Goal: Participates in plan/prevention/treatment measures  Outcome: Progressing  Goal: Prevent/manage excess moisture  Outcome: Progressing  Goal: Prevent/minimize sheer/friction injuries  Outcome: Progressing  Flowsheets (Taken 8/2/2025 1711)  Prevent/minimize sheer/friction injuries:   Complete micro-shifts as needed if patient unable. Adjust patient position to relieve pressure points, not a full turn   Turn/reposition every 2 hours/use positioning/transfer devices

## 2025-08-02 NOTE — PROGRESS NOTES
"Myrna Khan is a 65 y.o. female on day 7 of admission presenting with Bacteremia due to Klebsiella pneumoniae.    Attending Provider Kalyani Berger MD    Daily Hematology Progress Note    Interval Events   No acute events overnight.     Subjective   Pt feels well this morning. Last night received vitamin K. Denies fevers, chills, chest pain, shortness of breath, nausea, vomiting, diarrhea, constipation, abdominal pain, bleeding, or bruising.     Review of Systems   12 Point ROS negative, except as above     Allergies   Allergies[1]    Medications   aspirin, 81 mg, Daily  atorvastatin, 80 mg, Daily  calcium citrate, 200 mg of elemental calcium, Daily  [Held by provider] carvedilol, 25 mg, BID  cholecalciferol, 50 mcg, Daily  [Held by provider] clopidogrel, 75 mg, Daily  cyanocobalamin, 500 mcg, Daily  [Held by provider] dapagliflozin propanediol, 10 mg, Daily with breakfast  gabapentin, 300 mg, BID  [Held by provider] heparin (porcine), 5,000 Units, q8h BOLIVAR  [Held by provider] hydrALAZINE, 75 mg, TID  insulin lispro, 0-5 Units, TID AC  iohexol, 1,000 mL, Once in imaging  melatonin, 10 mg, Nightly  [Held by provider] NIFEdipine ER, 90 mg, Daily before breakfast  pantoprazole, 40 mg, BID  [Held by provider] piperacillin-tazobactam, 3.375 g, q6h  [Held by provider] ranolazine, 500 mg, BID  thiamine, 100 mg, Daily  vitamin B complex-vitamin C-folic acid, 1 capsule, Daily         acetaminophen, 975 mg, q6h PRN   Or  acetaminophen, 650 mg, q4h PRN   Or  acetaminophen, 650 mg, q4h PRN  dextrose, 12.5 g, q15 min PRN  dextrose, 25 g, q15 min PRN  diclofenac sodium, 4 g, 4x daily PRN  glucagon, 1 mg, q15 min PRN  glucagon, 1 mg, q15 min PRN  naloxone, 0.2 mg, q5 min PRN  oxyCODONE, 2.5 mg, q6h PRN  oxygen, 1 Dose, Continuous - O2/gases  trimethobenzamide, 200 mg, q6h PRN        Physical Exam   Blood pressure 116/74, pulse 72, temperature 36.4 °C (97.5 °F), resp. rate 14, height (!) 1.549 m (5' 1\"), weight (!) 36.3 " kg (80 lb 0.4 oz), SpO2 99%.    Gen: awake, alert, in no acute distress, cachectic   HEENT: AT/NC, PEERL, EOMI, no LAD  CV: RRR, no m/r/g  Pulm: CTAB, without w/r/r  Abd: soft, NT/ND  Ext: L BKA  Skin: warm and dry  Neuro: A&Ox4, moves all 4 extremities spontaneously     Labs     Lab Results   Component Value Date    WBC 7.4 08/02/2025    HGB 10.0 (L) 08/02/2025    HCT 33.5 (L) 08/02/2025    MCV 94 08/02/2025    PLT 42 (L) 08/02/2025    PLT 42 (L) 08/02/2025     Lab Results   Component Value Date    GLUCOSE 341 (H) 08/02/2025    CALCIUM 7.7 (L) 08/02/2025     08/02/2025    K 4.0 08/02/2025    CO2 18 (L) 08/02/2025     (H) 08/02/2025    BUN 25 (H) 08/02/2025    CREATININE 0.97 08/02/2025     Lab Results   Component Value Date    ALT 10 07/30/2025    AST 13 07/30/2025    ALKPHOS 98 07/30/2025    BILITOT 0.3 07/30/2025     Lab Results   Component Value Date    INR 2.1 (H) 08/02/2025    INR 2.5 (H) 08/01/2025    INR 2.8 (H) 07/31/2025    PROTIME 23.5 (H) 08/02/2025    PROTIME 27.6 (H) 08/01/2025    PROTIME 31.0 (H) 07/31/2025     Lab Results   Component Value Date    RETICCTPCT 1.4 08/01/2025     Peripheral blood smear (8/2/25): no schistocytes, many korey cells present, few platelets    Imaging   Reviewed    Assessment/Plan     Myrna Khan is a 65 y.o. female with PMHx of type 2 diabetes, CAD (s/p CABG 9/2023), hypertension 2/2 renal artery stenosis, PAD with dry gangrene (s/p LLE endarterectomy 3/6/25 and left BKA (guillotine amp 4/1/25 with formalization on 4/3/25)) who presented to the emergency department via EMS from SNF for altered mental status and episodes of hypoglycemia. Patient found to be unresponsive when morning med pass time came at her SNF. On admission, she was found to bacteremia 2/2 Klebsiella pneumonia and MAGUI. She was started on zosyn and doxycyline and one day of ceftriaxone. The patient has also had episodes of melena with Hgb drop to 6.4  s/p 2 units of pRBCs in total, she is  also s/p EGD that shows Grade C reflux esophagitis with multiple mucosal breaks.     She has had progressive thrombocytopenia since admission s/p 1 unit of platelet transfusion 7/31. Etiology most likely multifactorial (GI bleed, bacteremia, DITP from abx). Citrated platelet count today 42K with stable hgb and no signs of active bleeding. We expect platelet count to start improving.     Pt also noted to have coagulopathy from factor II & VII deficiencies which likely contributed to GI bleed. Pt noted to be cachectic, suspected vitamin K deficiency form malnourishment.     #Anemia of blood loss  #GI bleed  #ACD  #Thrombocytopenia  # Factor II & VII deficiencies secondary to suspected vitamin K deficiency  :: multifactorial thrombocytopenia etiology - GI bleed vs DITP vs bacteremia  - suspected vitamin K deficiency from malnourishment  :: Episodes of melena s/p EGD  :: EGD-Grade C reflux esophagitis with multiple mucosal breaks  :: HIT 4Ts score - 3 ,antiplatelet factor Ab- negative. Low concern for HIT  :: one day on ceftriaxone, 6 days on Zosyn  :: B12 - normal, Folate- normal   :: Fibrinogen-normal, LDH normal  :: Factor II-30 , Factor VII 19, Factor VIII 249, Factor II 63  :: PT 27.6, INR 2.5, aPTT 40  :: Ferritin 260, UIBC <55, MCV 86  :: no splenomegaly, evidence of hemolysis  :: negative HIV screen     Recommendations  -trend CBC, PT/INR/aPTT, fibrinogen levels.  -repeat vitamin K 10 mg daily for 2 more days  - follow up Hep C labs  - platelet transfusion thresholds:   100K for CNS bleeding  50K for other active bleeding  20K if fevering  10K if none of the above     Thank you for this consult, we will continue to follow. Patient staffed with attending physician, Dr. Llanos, who agrees with the above.      Farrukh Thomas MD  Hematology/Oncology Fellow  Hematology Consult Pager: [phone]  Oncology Consult Pager: [phone]       [1]   Allergies  Allergen Reactions    Lisinopril Itching    Amoxicillin Rash    Losartan Itching  and Rash

## 2025-08-02 NOTE — PROGRESS NOTES
INTERNAL MEDICINE PROGRESS NOTE         SUBJECTIVE     Pt was seen and examined at beside. No acute events overnight.  Patient asking for regular diet, tolerated clear liquid diet, nurse mentioned she had 1 loose stools not black tarry ( couldn't see it as it has had been discarded.)  Had NG tube taken out during EGD procedure. review of system Nil of note    OBJECTIVE      Visit Vitals  /74   Pulse 72   Temp 36.4 °C (97.5 °F)   Resp 14      No intake or output data in the 24 hours ending 08/02/25 1538    Physical Exam     Physical Exam  Constitutional:       General: She is not in acute distress.     Comments: Chronically ill appearing    HENT:      Head: Normocephalic and atraumatic.      Nose: Nose normal.      Mouth/Throat:      Mouth: Mucous membranes are moist.      Eyes:      Extraocular Movements: Extraocular movements intact.      Conjunctiva/sclera: Conjunctivae normal.         Cardiovascular:      Rate and Rhythm: Normal rate and regular rhythm.      Heart sounds: Normal heart sounds.   Pulmonary:      Effort: Pulmonary effort is normal.      Breath sounds: Normal breath sounds.      Comments: Intermittently wearing 1L NC for comfort  Abdominal:      General: Bowel sounds are normal. There is no distension.      Palpations: Abdomen is soft.      Tenderness: There is no abdominal tenderness.        Musculoskeletal:         General: Normal range of motion.      Cervical back: Normal range of motion.      Comments: L BKA       Neurological:      General: No focal deficit present.      Mental Status: She is alert. Mental status is at baseline.      Psychiatric:         Behavior: Behavior normal.   Current Meds   Scheduled  Medications[1]   PRN Medications[2]     LABS and IMAGING     WBC   Date Value Ref Range Status   08/02/2025 7.4 4.4 - 11.3 x10*3/uL Final   08/01/2025 8.4 4.4 - 11.3 x10*3/uL Final   08/01/2025 12.0 (H) 4.4 - 11.3 x10*3/uL Final     Hemoglobin   Date Value Ref Range Status   08/02/2025 10.0 (L) 12.0 - 16.0 g/dL Final   08/01/2025 10.0 (L) 12.0 - 16.0 g/dL Final   08/01/2025 10.3 (L) 12.0 - 16.0 g/dL Final     Hematocrit   Date Value Ref Range Status   08/02/2025 33.5 (L) 36.0 - 46.0 % Final   08/01/2025 30.8 (L) 36.0 - 46.0 % Final   08/01/2025 32.4 (L) 36.0 - 46.0 % Final     Bicarbonate   Date Value Ref Range Status   08/02/2025 18 (L) 21 - 32 mmol/L Final   08/01/2025 22 21 - 32 mmol/L Final   07/31/2025 24 21 - 32 mmol/L Final     Creatinine   Date Value Ref Range Status   08/02/2025 0.97 0.50 - 1.05 mg/dL Final   08/01/2025 1.07 (H) 0.50 - 1.05 mg/dL Final   07/31/2025 1.10 (H) 0.50 - 1.05 mg/dL Final     Calcium   Date Value Ref Range Status   08/02/2025 7.7 (L) 8.6 - 10.6 mg/dL Final   08/01/2025 8.0 (L) 8.6 - 10.6 mg/dL Final   07/31/2025 7.3 (L) 8.6 - 10.6 mg/dL Final     INR   Date Value Ref Range Status   08/02/2025 2.1 (H) 0.9 - 1.1 Final   08/01/2025 2.5 (H) 0.9 - 1.1 Final   07/31/2025 2.8 (H) 0.9 - 1.1 Final       XR chest 1 view  Narrative: Interpreted By:  Cherry Galeano and Sheng Max   STUDY:  XR CHEST 1 VIEW;  8/1/2025 2:29 am      INDICATION:  Signs/Symptoms:concern for aspiration.          COMPARISON:  CT ABDOMEN PELVIS W IV CONTRAST 7/28/2025, XR CHEST 1 VIEW 7/26/2025      ACCESSION NUMBER(S):  [id number]      ORDERING CLINICIAN:  MEGHAN BOLAND-ADJAYARMANI      FINDINGS:  AP radiograph of the chest:      LINES AND DEVICES:  Interval insertion of enteric tube coursing below the left  hemidiaphragm with its tip outside the field of view. Multiple intact  median sternotomy wires and hardware plating fixating the sternum.      CARDIOMEDIASTINAL SILHOUETTE:  The cardiomediastinal silhouette  is normal in size and configuration.  Aortic knob calcifications are seen.      LUNGS:  Minimal residual airspace opacity overlying the lingula. Trace left  pleural effusion. Continued overall improved lung aeration. No  pneumothorax.      ABDOMEN:  No remarkable upper abdominal findings.      BONES:  No acute osseous abnormality.      Impression: 1. Minimal residual airspace opacities overlying the lingula with  overall improved lung aeration.  2. Trace left pleural effusion.  3. Medical devices as detailed above.          I personally reviewed the images/study and I agree with the findings  as stated by Dr. Scout Graves. This study was interpreted at [place], [place].      MACRO:  None      Signed by: Cherry Banegas 8/1/2025 10:15 AM  Dictation workstation:   [id number]  Esophagogastroduodenoscopy (EGD)  Table formatting from the original result was not included.  Findings  Grade C reflux esophagitis with multiple mucosal breaks measuring 5 mm or   more, continuous between folds, covering less than 75% of the   circumference in the lower third of the esophagus. Reflux was likely   related to nasogastric tube resulting in continuously open LES.  Regular Z-line 35 cm from the incisors  Moderate, patchy erythematous, friable and hemorrhagic mucosa with   multiple erosions in the body of the stomach and antrum consistent with   nasogastric tube trauma; performed cold forceps biopsy to rule out H   pylori.   Single 5 mm x 5 mm small, superficial, round ulcer in the duodenal bulb   with clean base (Dillon III)  Erythematous and friable mucosa with multiple scattered erosions in the   duodenal bulb, 1st part of the duodenum and 2nd part of the duodenum  The previously placed NG tube had migrated back into the esophagus during   the procedure. Given that patient's ileus was clinically improved without   any issues with nausea and vomiting, the nasogastric tube was  removed at   the end of the procedure.    Recommendations  Await pathology results   Follow up with Chata Dye MD and Natalie Payne MD - Inpatient   GI Consult Service.  Follow up with Kalyani Berger MD - Inpatient Medicine Service.  Follow up with primary care physician, Reena Pickard MD.     Indication  Upper GI bleed    Staff  Staff Role   Chata Dye MD Fellow   Rajinder Mac MD Proceduralist     Medications  See Anesthesia Record.     Preprocedure  A history and physical has been performed, and patient medication   allergies have been reviewed. The patient's tolerance of previous   anesthesia has been reviewed. The risks and benefits of the procedure and   the sedation options and risks were discussed with the patient. All   questions were answered and informed consent obtained.    Details of the Procedure  The patient underwent monitored anesthesia care, which was administered by   an anesthesia professional. The patient's blood pressure, ECG, ETCO2,   heart rate, level of consciousness, oxygen and respirations were monitored   throughout the procedure. The scope was introduced through the mouth and   advanced to the second part of the duodenum. Retroflexion was performed in   the cardia. Prior to the procedure, the patient's H. Pylori status was   unknown. The patient's estimated blood loss was minimal. The procedure was   not difficult. The patient tolerated the procedure well. There were no   apparent adverse events.     Events  Procedure Events   Event Event Time   ENDO SCOPE IN TIME 8/1/2025  8:43 AM   ENDO SCOPE OUT TIME 8/1/2025  8:52 AM     Specimens  ID Type Source Tests Collected by Time   1 : gastric body, antrum and incisura Tissue STOMACH BODY/CORPUS BIOPSY   SURGICAL PATHOLOGY EXAM Rajinder Mac MD 8/1/2025 0848     Procedure Location  [address]  [address]  [phone]    Referring Provider  Diane Nunes MD    Procedure Provider  Rajinder Mac MD       Relevant labs and studies independently reviewed and interpreted.      PROBLEM LISTS      Problem List Items Addressed This Visit       Hypoglycemia    Relevant Orders    Surgical Pathology Exam    Hypomagnesemia - Primary    Relevant Orders    Surgical Pathology Exam     Other Visit Diagnoses         Upper GI bleed        Relevant Orders    Esophagogastroduodenoscopy (EGD) (Completed)    Surgical Pathology Exam              ASSESSMENT / PLANS    Myrna Khan is a 65 y.o. female with PMHx of type 2 diabetes, CAD (s/p CABG 9/2023), hypertension 2/2 renal artery stenosis, PAD with dry gangrene (s/p LLE endarterectomy 3/6/25 and left BKA (guillotine amp 4/1/25 with formalization on 4/3/25)) who presented to the emergency department via EMS from SNF for altered mental status. Patient started on D5 due to hypoglycemia, stopped fluids and patient has rebounded following increased PO intake. Endo rec starting SS1 and holding home DM meds. Hgb 6.4, transfused 1 unit PRBC, recheck 8.8. CT AP w/o c/f bleed, no active bleed noted. CT showed c/f ileus, patient with n/v, NPO, NG to LIWS w/ 200 ml output. Gi consulted due to hgb drop and tarry stools, plan for scope today. Hgb and Plt improved s/p repeat transfusion. Vasc surg consulted given AC concerns. Hematology consulted given thrombocytopenia and coagulopathy concerns.      Update 8/2  -Pt restarted on Aspirin as per vascular recs  -Heme recommended to give po Vit K 10mg,(8/1) follow up HIV and HepcAb positive pending HepC RNA labs  -EGD with esophagitis and duodenitis, with rec to continue IV PPI twice daily  -Noticed no tarry stools as per nurse today, will continue to monitor  -Patient's platelets down to 24 this morning,Hb however stable at 10, heme wants a platelet  clumping panel, will follow up recs and labs    AMS, likely multifactorial  (resolved)  Bacteremia  UTI  CAP  :: CTH negative for acute intracranial abnormality or mass effect.   :: BCx collected 7/26, pending result  :: CXR showing findings of patchy consolidation of left lung, concerning for left mid lung pneumonia   - UA showing turbid urine, 1+ protein, 3+ glucose, 500 LE. Nitrite and ketone negative, >50 WBC, 11-20 RBC, 1+ bacteria.   - c diff PCR (ordered d/t recent clindamycin course and diarrhea) negative  - UDS negative   Plan:  - Started CTX 1g daily iso amoxicillin allergy on 7/26)  and doxycycline 100mg BID iso QTc of 500. Plan for 5 days total of abx therapy  - Discontinued CTX  and started Zozyn (7/27-7/31)  -UA Cx 7/26: >100,000 Klebsiella   -Blood Cx 7/26: growing Klebsiella   - repeat cultures NGTD     Anemia  Thrombocytopenia  C/f GI bleed  - admission Hgb 10.5, likely hemo concentrated due to poor po intake   - Current Hgb 6.6  - Transfused 2x  PRBC  - PPI IV BID  - holding AC (resume ASA post EGD)  - Hgb drop 7.8 to 7.0, PLT 46, iso repeated tarry stool will transfuse PLT and PRBC  -- Post transfusion Hgb 10.3, Plt 84>>24 (post transfusion of 1unit)  - Gi consulted, EGD done 8/1  - Hematology consulted given persistent thrombocytopenia and factor abnormalities, recommended to give po Vit K 10mg,  follow up HIV and HepcAb positive pending HepC RNA labs     C/f Ileus  - patient with intermittent n/a overnight  - CT AP showed Multiple loops of distended fluid-filled small bowel with  air-fluid levels  - Patient tolerated clear liquid diet, will advance diet today  - NG out (8/1)  -SLP per note will tolerate therapeutic trials of recommended consistency without clinical signs and symptoms of aspiration on 100% of trials       Hypokalemia  - K+ 2.9>>4  - will replete prn pressure from the patient are very  - RFP ordered     R adnexal mass  - large cystic appearing right adnexal mass seen on CT imaging  - TVUS ordered     T2DM  Hypoglycemia (resolved)  :: home regimen per  pharm med rec: glargine 10u nightly, Farxiga 10mg daily, metformin 1g BID  :: unclear etiology of hypoglycemia; per SNF report, takes 10u glargine nightly. Unknown what patient's HS BG was or how much insulin she actually received last night.   :: last A1c 6.2% (5/9/2025)  :: patient's BG levels have been labile, even while on D5NS mIVF (103, 347, 104, 228, 70, 157). These are all POCT reads, currently pending repeat RFP. VBG from this afternoon showing glucose of 129.  - has received 2 amps of D50. On D5NS at 50ml/hr, increased to 75ml/h5r  Plan:  - all home medications held at this time  - continue to monitor POCT glucose q4h  - Endo consulted, rec holding OP DM meds, starting SS1 and continue carb controlled diet     MAGUI resolved  Hypomagnesemia  Hypophosphatemia, resolved    chronic urinary retention with indwelling day catheter  :: Cr 1.21 on admission (baseline 0.7-0.8), eGFR 50  :: Mg 1.23, s/p 2g IV Mg in the ED. Has had chronic low Mg in the past on chart review  :: Phos 2.4 with mild hemolysis detected  :: UA showing turbid urine, 1+ protein, 3+ glucose, 500 LE. Nitrite and ketone negative, >50 WBC, 11-20 RBC, 1+ bacteria. Reflex culture pending.   - Cr 1.06  - daily rfp     Malnutrition  - patient cachectic, BMI 15.12  - on home vit D, calcium supplements  Plan:  - continue home vit D, calcium supplements, ordered  - follow up B12 and folate labs  - thiamine, B12, and folate supplements initiated   - q12 RFPs to monitor for refeeding, replete electrolytes for goal of K>4, phos>3, Mg>2  - inpatient consult to dietician placed, appreciate recs  -- ensure TID     CAD s/p CABG 9/2023  HTN  FROYLAN  :: home antihypertensives: carvedilol 25mg BID, hydralazine 75mg TID, nifedipine 90mg  :: on atorvastatin 80mg nightly  :: EKG showing sinus rhythm with PACs, low voltage QRS, possible inferior infarct (age undetermined), nonspecific T wave abnormality now evident in Inferior leads and T wave inversion now evident in  Lateral leads  Plan:  - home meds ordered as above  - Cont tele   - patient currently stable, continue to monitor BP     PAD  :: s/p LLE endarterectomy 3/6/25 and left BKA (guillotine amp 4/1/25 with formalization on 4/3/25)  :: on clopidigrel 75mg daily and ASA 81mg daily  - wound of LLE at level of BKA. Per recent vasc surgery note, medial ulceration had progressed to a tunneling wound (did not probe to bone).  Was given course of clinda at the time, completed on 7/16  - Xray of L tib/fib without evidence of OM.   Plan:  - continue DAPT, ordered  - wound care consult for evaluation of wounds, appreciate recs  - Vasc surg consulted due to AC being held iso GI bleed, hx of end arterectomy and BKA, started on Aspirin, will plan to restart Clopidogrel later     F: PRN  E: replete for goal of K>4, phos>3, Mg>2  N: NPO  DVT ppx: subcutaneous hep (held), SCD, will plan to restart if Hb reamins stable  Abx therapy: Zosyn doxy completed  Code status: full code      Kalyani Berger MD                 [1] aspirin, 81 mg, oral, Daily  atorvastatin, 80 mg, oral, Daily  calcium citrate, 200 mg of elemental calcium, oral, Daily  [Held by provider] carvedilol, 25 mg, oral, BID  cholecalciferol, 50 mcg, oral, Daily  [Held by provider] clopidogrel, 75 mg, oral, Daily  cyanocobalamin, 500 mcg, oral, Daily  [Held by provider] dapagliflozin propanediol, 10 mg, oral, Daily with breakfast  gabapentin, 300 mg, oral, BID  [Held by provider] heparin (porcine), 5,000 Units, subcutaneous, q8h BOLIVAR  [Held by provider] hydrALAZINE, 75 mg, oral, TID  insulin lispro, 0-5 Units, subcutaneous, TID AC  iohexol, 1,000 mL, oral, Once in imaging  melatonin, 10 mg, oral, Nightly  [Held by provider] NIFEdipine ER, 90 mg, oral, Daily before breakfast  pantoprazole, 40 mg, intravenous, BID  [Held by provider] piperacillin-tazobactam, 3.375 g, intravenous, q6h  [Held by provider] ranolazine, 500 mg, oral, BID  thiamine, 100 mg, oral, Daily  vitamin B  complex-vitamin C-folic acid, 1 capsule, oral, Daily  [2] PRN medications: acetaminophen **OR** acetaminophen **OR** acetaminophen, dextrose, dextrose, diclofenac sodium, glucagon, glucagon, naloxone, oxyCODONE, oxygen, trimethobenzamide

## 2025-08-02 NOTE — CARE PLAN
Problem: Pain - Adult  Goal: Verbalizes/displays adequate comfort level or baseline comfort level  Outcome: Progressing     Problem: Safety - Adult  Goal: Free from fall injury  Outcome: Progressing     Problem: Discharge Planning  Goal: Discharge to home or other facility with appropriate resources  Outcome: Progressing     Problem: Chronic Conditions and Co-morbidities  Goal: Patient's chronic conditions and co-morbidity symptoms are monitored and maintained or improved  Outcome: Progressing     Problem: Nutrition  Goal: Nutrient intake appropriate for maintaining nutritional needs  Outcome: Progressing     Problem: Skin  Goal: Decreased wound size/increased tissue granulation at next dressing change  Outcome: Progressing  Goal: Participates in plan/prevention/treatment measures  Outcome: Progressing  Goal: Prevent/manage excess moisture  Outcome: Progressing  Goal: Prevent/minimize sheer/friction injuries  Outcome: Progressing  Goal: Promote/optimize nutrition  Outcome: Progressing  Goal: Promote skin healing  Outcome: Progressing     Problem: Fall/Injury  Goal: Not fall by end of shift  Outcome: Progressing  Goal: Be free from injury by end of the shift  Outcome: Progressing  Goal: Verbalize understanding of personal risk factors for fall in the hospital  Outcome: Progressing  Goal: Verbalize understanding of risk factor reduction measures to prevent injury from fall in the home  Outcome: Progressing  Goal: Use assistive devices by end of the shift  Outcome: Progressing  Goal: Pace activities to prevent fatigue by end of the shift  Outcome: Progressing     Problem: Diabetes  Goal: Achieve decreasing blood glucose levels by end of shift  Outcome: Progressing  Goal: Increase stability of blood glucose readings by end of shift  Outcome: Progressing  Goal: Decrease in ketones present in urine by end of shift  Outcome: Progressing  Goal: Maintain electrolyte levels within acceptable range throughout shift  Outcome:  Progressing  Goal: Maintain glucose levels >70mg/dl to <250mg/dl throughout shift  Outcome: Progressing  Goal: No changes in neurological exam by end of shift  Outcome: Progressing  Goal: Learn about and adhere to nutrition recommendations by end of shift  Outcome: Progressing  Goal: Vital signs within normal range for age by end of shift  Outcome: Progressing  Goal: Increase self care and/or family involovement by end of shift  Outcome: Progressing  Goal: Receive DSME education by end of shift  Outcome: Progressing   The patient's goals for the shift include Patient will safe and stable during shift    The clinical goals for the shift include pt does not have anymore bloody stool through the night

## 2025-08-03 VITALS
DIASTOLIC BLOOD PRESSURE: 86 MMHG | HEIGHT: 61 IN | RESPIRATION RATE: 17 BRPM | BODY MASS INDEX: 15.11 KG/M2 | OXYGEN SATURATION: 100 % | TEMPERATURE: 97.9 F | SYSTOLIC BLOOD PRESSURE: 134 MMHG | HEART RATE: 91 BPM | WEIGHT: 80.03 LBS

## 2025-08-03 LAB
ACANTHOCYTES BLD QL SMEAR: NORMAL
ALBUMIN SERPL BCP-MCNC: 2 G/DL (ref 3.4–5)
ANION GAP SERPL CALC-SCNC: 10 MMOL/L (ref 10–20)
APTT PPP: 30 SECONDS (ref 26–36)
BASOPHILS # BLD MANUAL: 0 X10*3/UL (ref 0–0.1)
BASOPHILS # BLD MANUAL: 0.07 X10*3/UL (ref 0–0.1)
BASOPHILS NFR BLD MANUAL: 0 %
BASOPHILS NFR BLD MANUAL: 0.9 %
BLASTS # BLD MANUAL: 0 X10*3/UL
BLASTS # BLD MANUAL: 0 X10*3/UL
BLASTS NFR BLD MANUAL: 0 %
BLASTS NFR BLD MANUAL: 0 %
BUN SERPL-MCNC: 18 MG/DL (ref 6–23)
BURR CELLS BLD QL SMEAR: NORMAL
BURR CELLS BLD QL SMEAR: NORMAL
CALCIUM SERPL-MCNC: 7.8 MG/DL (ref 8.6–10.6)
CHLORIDE SERPL-SCNC: 115 MMOL/L (ref 98–107)
CO2 SERPL-SCNC: 21 MMOL/L (ref 21–32)
CREAT SERPL-MCNC: 0.98 MG/DL (ref 0.5–1.05)
EGFRCR SERPLBLD CKD-EPI 2021: 64 ML/MIN/1.73M*2
EOSINOPHIL # BLD MANUAL: 0 X10*3/UL (ref 0–0.7)
EOSINOPHIL # BLD MANUAL: 0 X10*3/UL (ref 0–0.7)
EOSINOPHIL NFR BLD MANUAL: 0 %
EOSINOPHIL NFR BLD MANUAL: 0 %
ERYTHROCYTE [DISTWIDTH] IN BLOOD BY AUTOMATED COUNT: 21.4 % (ref 11.5–14.5)
ERYTHROCYTE [DISTWIDTH] IN BLOOD BY AUTOMATED COUNT: 21.6 % (ref 11.5–14.5)
FIBRINOGEN PPP-MCNC: 298 MG/DL (ref 200–400)
GLUCOSE BLD MANUAL STRIP-MCNC: 208 MG/DL (ref 74–99)
GLUCOSE BLD MANUAL STRIP-MCNC: 233 MG/DL (ref 74–99)
GLUCOSE BLD MANUAL STRIP-MCNC: 243 MG/DL (ref 74–99)
GLUCOSE BLD MANUAL STRIP-MCNC: 245 MG/DL (ref 74–99)
GLUCOSE BLD MANUAL STRIP-MCNC: 322 MG/DL (ref 74–99)
GLUCOSE SERPL-MCNC: 226 MG/DL (ref 74–99)
HCT VFR BLD AUTO: 31.4 % (ref 36–46)
HCT VFR BLD AUTO: 34.6 % (ref 36–46)
HCV RNA SERPL NAA+PROBE-ACNC: NOT DETECTED K[IU]/ML
HCV RNA SERPL NAA+PROBE-LOG IU: NORMAL {LOG_IU}/ML
HGB BLD-MCNC: 10 G/DL (ref 12–16)
HGB BLD-MCNC: 9.6 G/DL (ref 12–16)
IMM GRANULOCYTES # BLD AUTO: 0.03 X10*3/UL (ref 0–0.7)
IMM GRANULOCYTES # BLD AUTO: 0.04 X10*3/UL (ref 0–0.7)
IMM GRANULOCYTES NFR BLD AUTO: 0.4 % (ref 0–0.9)
IMM GRANULOCYTES NFR BLD AUTO: 0.4 % (ref 0–0.9)
INR PPP: 1.5 (ref 0.9–1.1)
LYMPHOCYTES # BLD MANUAL: 2.53 X10*3/UL (ref 1.2–4.8)
LYMPHOCYTES # BLD MANUAL: 2.66 X10*3/UL (ref 1.2–4.8)
LYMPHOCYTES NFR BLD MANUAL: 29.9 %
LYMPHOCYTES NFR BLD MANUAL: 34.2 %
MAGNESIUM SERPL-MCNC: 1.52 MG/DL (ref 1.6–2.4)
MCH RBC QN AUTO: 28.2 PG (ref 26–34)
MCH RBC QN AUTO: 28.3 PG (ref 26–34)
MCHC RBC AUTO-ENTMCNC: 28.9 G/DL (ref 32–36)
MCHC RBC AUTO-ENTMCNC: 30.6 G/DL (ref 32–36)
MCV RBC AUTO: 92 FL (ref 80–100)
MCV RBC AUTO: 98 FL (ref 80–100)
METAMYELOCYTES # BLD MANUAL: 0 X10*3/UL
METAMYELOCYTES # BLD MANUAL: 0 X10*3/UL
METAMYELOCYTES NFR BLD MANUAL: 0 %
METAMYELOCYTES NFR BLD MANUAL: 0 %
MONOCYTES # BLD MANUAL: 0.25 X10*3/UL (ref 0.1–1)
MONOCYTES # BLD MANUAL: 0.38 X10*3/UL (ref 0.1–1)
MONOCYTES NFR BLD MANUAL: 3.4 %
MONOCYTES NFR BLD MANUAL: 4.3 %
MYELOCYTES # BLD MANUAL: 0 X10*3/UL
MYELOCYTES # BLD MANUAL: 0 X10*3/UL
MYELOCYTES NFR BLD MANUAL: 0 %
MYELOCYTES NFR BLD MANUAL: 0 %
NEUTROPHILS # BLD MANUAL: 4.55 X10*3/UL (ref 1.2–7.7)
NEUTROPHILS # BLD MANUAL: 5.86 X10*3/UL (ref 1.2–7.7)
NEUTS BAND # BLD MANUAL: 0 X10*3/UL (ref 0–0.7)
NEUTS BAND # BLD MANUAL: 0 X10*3/UL (ref 0–0.7)
NEUTS BAND NFR BLD MANUAL: 0 %
NEUTS BAND NFR BLD MANUAL: 0 %
NEUTS SEG # BLD MANUAL: 4.55 X10*3/UL (ref 1.2–7)
NEUTS SEG # BLD MANUAL: 5.86 X10*3/UL (ref 1.2–7)
NEUTS SEG NFR BLD MANUAL: 61.5 %
NEUTS SEG NFR BLD MANUAL: 65.8 %
NRBC BLD MANUAL-RTO: 0 % (ref 0–0)
NRBC BLD MANUAL-RTO: 0 % (ref 0–0)
NRBC BLD-RTO: 0 /100 WBCS (ref 0–0)
NRBC BLD-RTO: 0 /100 WBCS (ref 0–0)
OVALOCYTES BLD QL SMEAR: NORMAL
OVALOCYTES BLD QL SMEAR: NORMAL
PHOSPHATE SERPL-MCNC: 2.1 MG/DL (ref 2.5–4.9)
PLASMA CELLS # BLD MANUAL: 0 X10*3/UL
PLASMA CELLS # BLD MANUAL: 0 X10*3/UL
PLASMA CELLS NFR BLD MANUAL: 0 %
PLASMA CELLS NFR BLD MANUAL: 0 %
PLATELET # BLD AUTO: 50 X10*3/UL (ref 150–450)
PLATELET # BLD AUTO: 55 X10*3/UL (ref 150–450)
POTASSIUM SERPL-SCNC: 3.8 MMOL/L (ref 3.5–5.3)
PROMYELOCYTES # BLD MANUAL: 0 X10*3/UL
PROMYELOCYTES # BLD MANUAL: 0 X10*3/UL
PROMYELOCYTES NFR BLD MANUAL: 0 %
PROMYELOCYTES NFR BLD MANUAL: 0 %
PROTHROMBIN TIME: 16.8 SECONDS (ref 9.8–12.4)
RBC # BLD AUTO: 3.4 X10*6/UL (ref 4–5.2)
RBC # BLD AUTO: 3.53 X10*6/UL (ref 4–5.2)
RBC MORPH BLD: NORMAL
RBC MORPH BLD: NORMAL
SODIUM SERPL-SCNC: 142 MMOL/L (ref 136–145)
TOTAL CELLS COUNTED BLD: 117
TOTAL CELLS COUNTED BLD: 117
VARIANT LYMPHS # BLD MANUAL: 0 X10*3/UL (ref 0–0.5)
VARIANT LYMPHS # BLD MANUAL: 0 X10*3/UL (ref 0–0.5)
VARIANT LYMPHS NFR BLD: 0 %
VARIANT LYMPHS NFR BLD: 0 %
WBC # BLD AUTO: 7.4 X10*3/UL (ref 4.4–11.3)
WBC # BLD AUTO: 8.9 X10*3/UL (ref 4.4–11.3)
WBC OTHER # BLD MANUAL: 0 X10*3/UL
WBC OTHER # BLD MANUAL: 0 X10*3/UL
WBC OTHER NFR BLD MANUAL: 0 %
WBC OTHER NFR BLD MANUAL: 0 %

## 2025-08-03 PROCEDURE — 1100000001 HC PRIVATE ROOM DAILY

## 2025-08-03 PROCEDURE — 83735 ASSAY OF MAGNESIUM: CPT

## 2025-08-03 PROCEDURE — 2500000002 HC RX 250 W HCPCS SELF ADMINISTERED DRUGS (ALT 637 FOR MEDICARE OP, ALT 636 FOR OP/ED)

## 2025-08-03 PROCEDURE — 99232 SBSQ HOSP IP/OBS MODERATE 35: CPT

## 2025-08-03 PROCEDURE — 80069 RENAL FUNCTION PANEL: CPT

## 2025-08-03 PROCEDURE — 2500000001 HC RX 250 WO HCPCS SELF ADMINISTERED DRUGS (ALT 637 FOR MEDICARE OP)

## 2025-08-03 PROCEDURE — 85384 FIBRINOGEN ACTIVITY: CPT

## 2025-08-03 PROCEDURE — 82947 ASSAY GLUCOSE BLOOD QUANT: CPT

## 2025-08-03 PROCEDURE — 85027 COMPLETE CBC AUTOMATED: CPT

## 2025-08-03 PROCEDURE — 85007 BL SMEAR W/DIFF WBC COUNT: CPT

## 2025-08-03 PROCEDURE — 2500000005 HC RX 250 GENERAL PHARMACY W/O HCPCS

## 2025-08-03 PROCEDURE — 36415 COLL VENOUS BLD VENIPUNCTURE: CPT

## 2025-08-03 PROCEDURE — 2500000004 HC RX 250 GENERAL PHARMACY W/ HCPCS (ALT 636 FOR OP/ED)

## 2025-08-03 PROCEDURE — 85730 THROMBOPLASTIN TIME PARTIAL: CPT

## 2025-08-03 PROCEDURE — 85610 PROTHROMBIN TIME: CPT

## 2025-08-03 RX ORDER — MAGNESIUM SULFATE HEPTAHYDRATE 40 MG/ML
2 INJECTION, SOLUTION INTRAVENOUS ONCE
Status: COMPLETED | OUTPATIENT
Start: 2025-08-03 | End: 2025-08-03

## 2025-08-03 RX ORDER — PANTOPRAZOLE SODIUM 40 MG/1
40 TABLET, DELAYED RELEASE ORAL 2 TIMES DAILY
Status: DISCONTINUED | OUTPATIENT
Start: 2025-08-03 | End: 2025-08-05 | Stop reason: HOSPADM

## 2025-08-03 RX ORDER — PANTOPRAZOLE SODIUM 40 MG/10ML
40 INJECTION, POWDER, LYOPHILIZED, FOR SOLUTION INTRAVENOUS 2 TIMES DAILY
Status: DISCONTINUED | OUTPATIENT
Start: 2025-08-03 | End: 2025-08-03

## 2025-08-03 RX ADMIN — GABAPENTIN 300 MG: 300 CAPSULE ORAL at 20:53

## 2025-08-03 RX ADMIN — ASPIRIN 81 MG: 81 TABLET, COATED ORAL at 09:09

## 2025-08-03 RX ADMIN — ASCORBIC ACID, THIAMINE MONONITRATE,RIBOFLAVIN, NIACINAMIDE, PYRIDOXINE HYDROCHLORIDE, FOLIC ACID, CYANOCOBALAMIN, BIOTIN, CALCIUM PANTOTHENATE, 1 CAPSULE: 100; 1.5; 1.7; 20; 10; 1; 6000; 150000; 5 CAPSULE, LIQUID FILLED ORAL at 09:08

## 2025-08-03 RX ADMIN — CYANOCOBALAMIN TAB 1000 MCG 500 MCG: 1000 TAB at 09:09

## 2025-08-03 RX ADMIN — Medication 10 MG: at 20:53

## 2025-08-03 RX ADMIN — ATORVASTATIN CALCIUM 80 MG: 80 TABLET, FILM COATED ORAL at 09:09

## 2025-08-03 RX ADMIN — PHYTONADIONE 10 MG: 5 TABLET ORAL at 20:53

## 2025-08-03 RX ADMIN — Medication 1 TABLET: at 09:15

## 2025-08-03 RX ADMIN — INSULIN LISPRO 2 UNITS: 100 INJECTION, SOLUTION INTRAVENOUS; SUBCUTANEOUS at 12:53

## 2025-08-03 RX ADMIN — POTASSIUM PHOSPHATE 15 MMOL: 236; 224 INJECTION, SOLUTION INTRAVENOUS at 12:49

## 2025-08-03 RX ADMIN — GABAPENTIN 300 MG: 300 CAPSULE ORAL at 09:09

## 2025-08-03 RX ADMIN — PANTOPRAZOLE SODIUM 40 MG: 40 TABLET, DELAYED RELEASE ORAL at 20:53

## 2025-08-03 RX ADMIN — Medication 50 MCG: at 09:09

## 2025-08-03 RX ADMIN — INSULIN LISPRO 2 UNITS: 100 INJECTION, SOLUTION INTRAVENOUS; SUBCUTANEOUS at 09:14

## 2025-08-03 RX ADMIN — THIAMINE HCL TAB 100 MG 100 MG: 100 TAB at 09:09

## 2025-08-03 RX ADMIN — INSULIN LISPRO 4 UNITS: 100 INJECTION, SOLUTION INTRAVENOUS; SUBCUTANEOUS at 18:33

## 2025-08-03 RX ADMIN — PANTOPRAZOLE SODIUM 40 MG: 40 INJECTION, POWDER, LYOPHILIZED, FOR SOLUTION INTRAVENOUS at 09:08

## 2025-08-03 RX ADMIN — MAGNESIUM SULFATE HEPTAHYDRATE 2 G: 40 INJECTION, SOLUTION INTRAVENOUS at 12:02

## 2025-08-03 ASSESSMENT — COGNITIVE AND FUNCTIONAL STATUS - GENERAL
MOVING TO AND FROM BED TO CHAIR: A LOT
DAILY ACTIVITIY SCORE: 15
HELP NEEDED FOR BATHING: A LOT
TOILETING: A LOT
MOBILITY SCORE: 14
MOVING TO AND FROM BED TO CHAIR: A LOT
PERSONAL GROOMING: A LITTLE
STANDING UP FROM CHAIR USING ARMS: A LOT
DRESSING REGULAR UPPER BODY CLOTHING: A LOT
MOVING FROM LYING ON BACK TO SITTING ON SIDE OF FLAT BED WITH BEDRAILS: A LITTLE
DRESSING REGULAR LOWER BODY CLOTHING: A LOT
CLIMB 3 TO 5 STEPS WITH RAILING: TOTAL
HELP NEEDED FOR BATHING: A LOT
WALKING IN HOSPITAL ROOM: A LOT
MOBILITY SCORE: 13
DRESSING REGULAR UPPER BODY CLOTHING: A LOT
DAILY ACTIVITIY SCORE: 15
TURNING FROM BACK TO SIDE WHILE IN FLAT BAD: A LITTLE
TURNING FROM BACK TO SIDE WHILE IN FLAT BAD: A LITTLE
PERSONAL GROOMING: A LITTLE
DRESSING REGULAR LOWER BODY CLOTHING: A LOT
WALKING IN HOSPITAL ROOM: A LOT
CLIMB 3 TO 5 STEPS WITH RAILING: TOTAL
STANDING UP FROM CHAIR USING ARMS: A LOT
TOILETING: A LOT

## 2025-08-03 ASSESSMENT — PAIN SCALES - GENERAL
PAINLEVEL_OUTOF10: 0 - NO PAIN
PAINLEVEL_OUTOF10: 0 - NO PAIN

## 2025-08-03 ASSESSMENT — PAIN - FUNCTIONAL ASSESSMENT: PAIN_FUNCTIONAL_ASSESSMENT: 0-10

## 2025-08-03 NOTE — CARE PLAN
The patient's goals for the shift include Patient will safe and stable during shift    The clinical goals for the shift include Pt will remain hemodynamically stable during this shift.

## 2025-08-03 NOTE — PROGRESS NOTES
"Myrna Khan is a 65 y.o. female on day 8 of admission presenting with Bacteremia due to Klebsiella pneumoniae.    Subjective   No overnight events reported. Patient seen reswting in bed this morning, in no acute distress. Patient states she is feeling good, excited to eat breaskfast. No longer having abdominal pain.        Objective     Physical Exam  Constitutional:       General: She is not in acute distress.     Comments: Chronically ill appearing    HENT:      Head: Normocephalic and atraumatic.      Nose: Nose normal.      Mouth/Throat:      Mouth: Mucous membranes are moist.     Eyes:      Extraocular Movements: Extraocular movements intact.      Conjunctiva/sclera: Conjunctivae normal.       Cardiovascular:      Rate and Rhythm: Normal rate and regular rhythm.      Heart sounds: Normal heart sounds.   Pulmonary:      Effort: Pulmonary effort is normal.      Breath sounds: Normal breath sounds.      Comments: Intermittently wearing 1L NC for comfort  Abdominal:      General: Bowel sounds are normal. There is no distension.      Palpations: Abdomen is soft.      Tenderness: There is no abdominal tenderness.     Musculoskeletal:         General: Normal range of motion.      Cervical back: Normal range of motion.      Comments: L BKA      Neurological:      General: No focal deficit present.      Mental Status: She is alert. Mental status is at baseline.     Psychiatric:         Behavior: Behavior normal.         Last Recorded Vitals  Blood pressure 135/80, pulse 79, temperature 36.6 °C (97.9 °F), resp. rate 19, height (!) 1.549 m (5' 1\"), weight (!) 36.3 kg (80 lb 0.4 oz), SpO2 98%.  Intake/Output last 3 Shifts:  I/O last 3 completed shifts:  In: 470 (12.9 mL/kg) [P.O.:470]  Out: - (0 mL/kg)   Weight: 36.3 kg     Relevant Results  Scheduled medications  Scheduled Medications[1]  Continuous medications  Continuous Medications[2]  PRN medications  PRN Medications[3]     Results for orders placed or performed " during the hospital encounter of 07/26/25 (from the past 24 hours)   POCT GLUCOSE   Result Value Ref Range    POCT Glucose 262 (H) 74 - 99 mg/dL   Platelet Count   Result Value Ref Range    Platelets 42 (L) 150 - 450 x10*3/uL   Platelet Count in Citrate   Result Value Ref Range    Platelets in Citrate Calculated 42 (L) 150 - 450 x10*3/uL   POCT GLUCOSE   Result Value Ref Range    POCT Glucose 217 (H) 74 - 99 mg/dL   CBC and Auto Differential   Result Value Ref Range    WBC 7.7 4.4 - 11.3 x10*3/uL    nRBC 0.0 0.0 - 0.0 /100 WBCs    RBC 3.52 (L) 4.00 - 5.20 x10*6/uL    Hemoglobin 10.1 (L) 12.0 - 16.0 g/dL    Hematocrit 31.9 (L) 36.0 - 46.0 %    MCV 91 80 - 100 fL    MCH 28.7 26.0 - 34.0 pg    MCHC 31.7 (L) 32.0 - 36.0 g/dL    RDW 21.6 (H) 11.5 - 14.5 %    Platelets 51 (L) 150 - 450 x10*3/uL    Immature Granulocytes %, Automated 0.4 0.0 - 0.9 %    Immature Granulocytes Absolute, Automated 0.03 0.00 - 0.70 x10*3/uL   Manual Differential   Result Value Ref Range    Neutrophils %, Manual 65.2 40.0 - 80.0 %    Bands %, Manual 0.0 0.0 - 5.0 %    Lymphocytes %, Manual 29.6 13.0 - 44.0 %    Monocytes %, Manual 5.2 2.0 - 10.0 %    Eosinophils %, Manual 0.0 0.0 - 6.0 %    Basophils %, Manual 0.0 0.0 - 2.0 %    Atypical Lymphocytes %, Manual 0.0 0.0 - 2.0 %    Metamyelocytes %, Manual 0.0 0.0 - 0.0 %    Myelocytes %, Manual 0.0 0.0 - 0.0 %    Plasma Cells %, Manual 0.0 0.00 - 0.00 %    Promyelocytes %, Manual 0.0 0.0 - 0.0 %    Blasts %, Manual 0.0 0.0 - 0.0 %    Others %, Manual 0.0 %    Seg Neutrophils Absolute, Manual 5.02 1.20 - 7.00 x10*3/uL    Bands Absolute, Manual 0.00 0.00 - 0.70 x10*3/uL    Lymphocytes Absolute, Manual 2.28 1.20 - 4.80 x10*3/uL    Monocytes Absolute, Manual 0.40 0.10 - 1.00 x10*3/uL    Eosinophils Absolute, Manual 0.00 0.00 - 0.70 x10*3/uL    Basophils Absolute, Manual 0.00 0.00 - 0.10 x10*3/uL    Atypical Lymphs Absolute, Manual 0.00 0.00 - 0.50 x10*3/uL    Metamyelocytes Absolute, Manual 0.00 0.00 -  0.00 x10*3/uL    Myelocytes Absolute, Manual 0.00 0.00 - 0.00 x10*3/uL    Plasma Cells Absolute, Manual 0.00 0.00 - 0.00 x10*3/uL    Promyelocytes Absolute, Manual 0.00 0.00 - 0.00 x10*3/uL    Blasts Absolute, Manual 0.00 0.00 - 0.00 x10*3/uL    Others Absolute, Manual 0.00 x10*3/uL    Total Cells Counted 115     Neutrophils Absolute, Manual 5.02 1.20 - 7.70 x10*3/uL    Manual nRBC per 100 Cells 0.0 0.0 - 0.0 %    RBC Morphology See Below     Vin Cells Few    CBC and Auto Differential   Result Value Ref Range    WBC 7.4 4.4 - 11.3 x10*3/uL    nRBC 0.0 0.0 - 0.0 /100 WBCs    RBC 3.40 (L) 4.00 - 5.20 x10*6/uL    Hemoglobin 9.6 (L) 12.0 - 16.0 g/dL    Hematocrit 31.4 (L) 36.0 - 46.0 %    MCV 92 80 - 100 fL    MCH 28.2 26.0 - 34.0 pg    MCHC 30.6 (L) 32.0 - 36.0 g/dL    RDW 21.4 (H) 11.5 - 14.5 %    Platelets 50 (L) 150 - 450 x10*3/uL    Immature Granulocytes %, Automated 0.4 0.0 - 0.9 %    Immature Granulocytes Absolute, Automated 0.03 0.00 - 0.70 x10*3/uL   Magnesium   Result Value Ref Range    Magnesium 1.52 (L) 1.60 - 2.40 mg/dL   Renal Function Panel   Result Value Ref Range    Glucose 226 (H) 74 - 99 mg/dL    Sodium 142 136 - 145 mmol/L    Potassium 3.8 3.5 - 5.3 mmol/L    Chloride 115 (H) 98 - 107 mmol/L    Bicarbonate 21 21 - 32 mmol/L    Anion Gap 10 10 - 20 mmol/L    Urea Nitrogen 18 6 - 23 mg/dL    Creatinine 0.98 0.50 - 1.05 mg/dL    eGFR 64 >60 mL/min/1.73m*2    Calcium 7.8 (L) 8.6 - 10.6 mg/dL    Phosphorus 2.1 (L) 2.5 - 4.9 mg/dL    Albumin 2.0 (L) 3.4 - 5.0 g/dL   aPTT   Result Value Ref Range    aPTT 30 26 - 36 seconds   Fibrinogen   Result Value Ref Range    Fibrinogen 298 200 - 400 mg/dL   Protime-INR   Result Value Ref Range    Protime 16.8 (H) 9.8 - 12.4 seconds    INR 1.5 (H) 0.9 - 1.1   Manual Differential   Result Value Ref Range    Neutrophils %, Manual 61.5 40.0 - 80.0 %    Bands %, Manual 0.0 0.0 - 5.0 %    Lymphocytes %, Manual 34.2 13.0 - 44.0 %    Monocytes %, Manual 3.4 2.0 - 10.0 %     Eosinophils %, Manual 0.0 0.0 - 6.0 %    Basophils %, Manual 0.9 0.0 - 2.0 %    Atypical Lymphocytes %, Manual 0.0 0.0 - 2.0 %    Metamyelocytes %, Manual 0.0 0.0 - 0.0 %    Myelocytes %, Manual 0.0 0.0 - 0.0 %    Plasma Cells %, Manual 0.0 0.00 - 0.00 %    Promyelocytes %, Manual 0.0 0.0 - 0.0 %    Blasts %, Manual 0.0 0.0 - 0.0 %    Others %, Manual 0.0 %    Seg Neutrophils Absolute, Manual 4.55 1.20 - 7.00 x10*3/uL    Bands Absolute, Manual 0.00 0.00 - 0.70 x10*3/uL    Lymphocytes Absolute, Manual 2.53 1.20 - 4.80 x10*3/uL    Monocytes Absolute, Manual 0.25 0.10 - 1.00 x10*3/uL    Eosinophils Absolute, Manual 0.00 0.00 - 0.70 x10*3/uL    Basophils Absolute, Manual 0.07 0.00 - 0.10 x10*3/uL    Atypical Lymphs Absolute, Manual 0.00 0.00 - 0.50 x10*3/uL    Metamyelocytes Absolute, Manual 0.00 0.00 - 0.00 x10*3/uL    Myelocytes Absolute, Manual 0.00 0.00 - 0.00 x10*3/uL    Plasma Cells Absolute, Manual 0.00 0.00 - 0.00 x10*3/uL    Promyelocytes Absolute, Manual 0.00 0.00 - 0.00 x10*3/uL    Blasts Absolute, Manual 0.00 0.00 - 0.00 x10*3/uL    Others Absolute, Manual 0.00 x10*3/uL    Total Cells Counted 117     Neutrophils Absolute, Manual 4.55 1.20 - 7.70 x10*3/uL    Manual nRBC per 100 Cells 0.0 0.0 - 0.0 %    RBC Morphology See Below     Ovalocytes Few     Bovina Cells Many    POCT GLUCOSE   Result Value Ref Range    POCT Glucose 208 (H) 74 - 99 mg/dL                   This patient has a urinary catheter   Reason for the urinary catheter remaining today? urinary retention/bladder outlet obstruction, acute or chronic              Assessment & Plan  AMS (altered mental status)    Hypoglycemia    Unresponsiveness    Bacteremia due to Klebsiella pneumoniae      Myrna Khan is a 65 y.o. female with PMHx of type 2 diabetes, CAD (s/p CABG 9/2023), hypertension 2/2 renal artery stenosis, PAD with dry gangrene (s/p LLE endarterectomy 3/6/25 and left BKA (guillotine amp 4/1/25 with formalization on 4/3/25)) who presented  to the emergency department via EMS from SNF for altered mental status. Patient started on D5 due to hypoglycemia, stopped fluids and patient has rebounded following increased PO intake. Endo rec starting SS1 and holding home DM meds. Hgb 6.4, transfused 1 unit PRBC, recheck 8.8. CT AP w/o c/f bleed, no active bleed noted. CT showed c/f ileus, patient with n/v, NPO, NG to LIWS w/ 200 ml output. Gi consulted due to hgb drop and tarry stools, plan for scope today. Hgb and Plt improved s/p repeat transfusion. Vasc surg consulted given AC concerns. Hematology consulted, rec Vit K and following labs.     Update 8/3  - Heme rec Vit K and following labs  - stable Hgb, no longer having tarry stools  - will consider restarting plavix and dvt ppx tomorrow  -   AMS, likely multifactorial (resolved)  Bacteremia  UTI  CAP  :: CTH negative for acute intracranial abnormality or mass effect.   :: BCx collected 7/26, pending result  :: CXR showing findings of patchy consolidation of left lung, concerning for left mid lung pneumonia   - UA showing turbid urine, 1+ protein, 3+ glucose, 500 LE. Nitrite and ketone negative, >50 WBC, 11-20 RBC, 1+ bacteria.   - c diff PCR (ordered d/t recent clindamycin course and diarrhea) negative  - UDS negative   Plan:  - Started CTX 1g daily iso amoxicillin allergy on 7/26)  and doxycycline 100mg BID iso QTc of 500. Plan for 5 days total of abx therapy  - Discontinued CTX  and started Zozyn (7/27-x)  -UA Cx 7/26: >100,000 Klebsiella   -Blood Cx 7/26: growing Klebsiella   - repeat cultures NGTD    Anemia  Thrombocytopenia  C/f GI bleed  - admission Hgb 10.5, likely hemo concentrated due to poor po intake   - Current Hgb 6.6  - Transfused 2x  PRBC  - PPI IV BID  - holding AC (resume ASA post EGD)  - Hgb drop 7.8 to 7.0, PLT 46, iso repeated tarry stool will transfuse PLT and PRBC  -- Post transfusion Hgb 10.3, Plt 84  - Gi consulted, EGD done 8/1 shoiwing extensive inflammation, will follow up path  -  Hematology consulted, rec vitamin K as likely malnutrition component, following labs    C/f Ileus  - patient with intermittent n/a overnight  - CT AP showed Multiple loops of distended fluid-filled small bowel with  air-fluid levels  - NG out 8/1  - tolerating PO intake     Hypokalemia  - K+ 2.9  - Ordered replacement  - Resolved    R adnexal mass  - large cystic appearing right adnexal mass seen on CT imaging  - will require OP Fu    T2DM  Hypoglycemia (resolved)  :: home regimen per pharm med rec: glargine 10u nightly, Farxiga 10mg daily, metformin 1g BID  :: unclear etiology of hypoglycemia; per SNF report, takes 10u glargine nightly. Unknown what patient's HS BG was or how much insulin she actually received last night.   :: last A1c 6.2% (5/9/2025)  :: patient's BG levels have been labile, even while on D5NS mIVF (103, 347, 104, 228, 70, 157). These are all POCT reads, currently pending repeat RFP. VBG from this afternoon showing glucose of 129.  - has received 2 amps of D50. On D5NS at 50ml/hr, increased to 75ml/h5r  Plan:  - all home medications held at this time  - continue to monitor POCT glucose q4h  - Endo consulted, rec holding OP DM meds, starting SS1 and continue carb controlled diet     MAGUI resolved  Hypomagnesemia  Hypophosphatemia, resolved    chronic urinary retention with indwelling day catheter  :: Cr 1.21 on admission (baseline 0.7-0.8), eGFR 50  :: Mg 1.23, s/p 2g IV Mg in the ED. Has had chronic low Mg in the past on chart review  :: Phos 2.4 with mild hemolysis detected  :: UA showing turbid urine, 1+ protein, 3+ glucose, 500 LE. Nitrite and ketone negative, >50 WBC, 11-20 RBC, 1+ bacteria. Reflex culture pending.   - Cr 1.06  - daily rfp     Malnutrition  - patient cachectic, BMI 15.12  - on home vit D, calcium supplements  Plan:  - continue home vit D, calcium supplements, ordered  - follow up B12 and folate labs  - thiamine, B12, and folate supplements initiated   - q12 RFPs to monitor  for refeeding, replete electrolytes for goal of K>4, phos>3, Mg>2  - inpatient consult to dietician placed, appreciate recs  -- ensure TID     CAD s/p CABG 9/2023  HTN  FROYLAN  :: home antihypertensives: carvedilol 25mg BID, hydralazine 75mg TID, nifedipine 90mg  :: on atorvastatin 80mg nightly  :: EKG showing sinus rhythm with PACs, low voltage QRS, possible inferior infarct (age undetermined), nonspecific T wave abnormality now evident in Inferior leads and T wave inversion now evident in Lateral leads  Plan:  - home meds ordered as above  - Cont tele   - patient currently stable, continue to monitor BP     PAD  :: s/p LLE endarterectomy 3/6/25 and left BKA (guillotine amp 4/1/25 with formalization on 4/3/25)  :: on clopidigrel 75mg daily and ASA 81mg daily  - wound of LLE at level of BKA. Per recent vasc surgery note, medial ulceration had progressed to a tunneling wound (did not probe to bone).  Was given course of clinda at the time, completed on 7/16  - Xray of L tib/fib without evidence of OM.   Plan:  - continue DAPT, ordered  - wound care consult for evaluation of wounds, appreciate recs  - Vasc surg consulted due to AC being held iso GI bleed, hx of end arterectomy and BKA     F: PRN  E: replete for goal of K>4, phos>3, Mg>2  N: NPO  DVT ppx: subcutaneous hep (held)  Abx therapy: Zosyn doxy  Code status: full code    Patient discussed with attending physician Dr. Berger  Plan preliminary until cosigned by attending physician.    Jesse Granados MD  Family Medicine  PGY-3         [1] aspirin, 81 mg, oral, Daily  atorvastatin, 80 mg, oral, Daily  calcium citrate, 200 mg of elemental calcium, oral, Daily  [Held by provider] carvedilol, 25 mg, oral, BID  cholecalciferol, 50 mcg, oral, Daily  [Held by provider] clopidogrel, 75 mg, oral, Daily  cyanocobalamin, 500 mcg, oral, Daily  [Held by provider] dapagliflozin propanediol, 10 mg, oral, Daily with breakfast  gabapentin, 300 mg, oral, BID  [Held by provider]  heparin (porcine), 5,000 Units, subcutaneous, q8h BOLIVAR  [Held by provider] hydrALAZINE, 75 mg, oral, TID  insulin lispro, 0-5 Units, subcutaneous, TID AC  iohexol, 1,000 mL, oral, Once in imaging  magnesium sulfate, 2 g, intravenous, Once  melatonin, 10 mg, oral, Nightly  [Held by provider] NIFEdipine ER, 90 mg, oral, Daily before breakfast  pantoprazole, 40 mg, intravenous, BID  phytonadione, 10 mg, oral, Daily  [Held by provider] piperacillin-tazobactam, 3.375 g, intravenous, q6h  potassium phosphate, 15 mmol, intravenous, Once  [Held by provider] ranolazine, 500 mg, oral, BID  thiamine, 100 mg, oral, Daily  vitamin B complex-vitamin C-folic acid, 1 capsule, oral, Daily     [2]    [3] PRN medications: acetaminophen **OR** acetaminophen **OR** acetaminophen, dextrose, dextrose, diclofenac sodium, glucagon, glucagon, naloxone, oxyCODONE, oxygen, trimethobenzamide

## 2025-08-03 NOTE — CARE PLAN
The patient's goals for the shift include Patient will safe and stable during shift    The clinical goals for the shift include Pt will remain hemodynamically stable during this shift.    Problem: Safety - Adult  Goal: Free from fall injury  Outcome: Progressing     Problem: Skin  Goal: Decreased wound size/increased tissue granulation at next dressing change  Outcome: Progressing  Goal: Participates in plan/prevention/treatment measures  Outcome: Progressing  Goal: Prevent/manage excess moisture  Outcome: Progressing  Goal: Prevent/minimize sheer/friction injuries  Outcome: Progressing  Flowsheets (Taken 8/3/2025 1435)  Prevent/minimize sheer/friction injuries:   Complete micro-shifts as needed if patient unable. Adjust patient position to relieve pressure points, not a full turn   Turn/reposition every 2 hours/use positioning/transfer devices

## 2025-08-03 NOTE — PROGRESS NOTES
08/03/25 1004   Discharge Planning   Home or Post Acute Services Post acute facilities (Rehab/SNF/etc)   Expected Discharge Disposition Inter       Patient NG tube removed on 8/1. Tolerating clear liquid diet, team to advance as tolerated. EGD performed and showed esophagitis and duodenitis. Hematology is consulted for thrombocytopenia. Patient is a long term resident at [facility]. No precert needed when ready to return.    Alyssa Bryant RN, BSN  Transitional Care Coordinator

## 2025-08-04 LAB
ALBUMIN SERPL BCP-MCNC: 1.9 G/DL (ref 3.4–5)
ANION GAP SERPL CALC-SCNC: 9 MMOL/L (ref 10–20)
APTT PPP: 34 SECONDS (ref 26–36)
BASOPHILS # BLD AUTO: 0.05 X10*3/UL (ref 0–0.1)
BASOPHILS # BLD MANUAL: 0 X10*3/UL (ref 0–0.1)
BASOPHILS NFR BLD AUTO: 0.4 %
BASOPHILS NFR BLD MANUAL: 0 %
BLASTS # BLD MANUAL: 0 X10*3/UL
BLASTS NFR BLD MANUAL: 0 %
BUN SERPL-MCNC: 13 MG/DL (ref 6–23)
BURR CELLS BLD QL SMEAR: NORMAL
BURR CELLS BLD QL SMEAR: NORMAL
CALCIUM SERPL-MCNC: 7.7 MG/DL (ref 8.6–10.6)
CHLORIDE SERPL-SCNC: 114 MMOL/L (ref 98–107)
CO2 SERPL-SCNC: 22 MMOL/L (ref 21–32)
CREAT SERPL-MCNC: 0.92 MG/DL (ref 0.5–1.05)
EGFRCR SERPLBLD CKD-EPI 2021: 69 ML/MIN/1.73M*2
EOSINOPHIL # BLD AUTO: 0.07 X10*3/UL (ref 0–0.7)
EOSINOPHIL # BLD MANUAL: 0.08 X10*3/UL (ref 0–0.7)
EOSINOPHIL NFR BLD AUTO: 0.6 %
EOSINOPHIL NFR BLD MANUAL: 0.8 %
ERYTHROCYTE [DISTWIDTH] IN BLOOD BY AUTOMATED COUNT: 20.6 % (ref 11.5–14.5)
ERYTHROCYTE [DISTWIDTH] IN BLOOD BY AUTOMATED COUNT: 21 % (ref 11.5–14.5)
FIBRINOGEN PPP-MCNC: 284 MG/DL (ref 200–400)
GLUCOSE BLD MANUAL STRIP-MCNC: 149 MG/DL (ref 74–99)
GLUCOSE BLD MANUAL STRIP-MCNC: 165 MG/DL (ref 74–99)
GLUCOSE BLD MANUAL STRIP-MCNC: 168 MG/DL (ref 74–99)
GLUCOSE BLD MANUAL STRIP-MCNC: 208 MG/DL (ref 74–99)
GLUCOSE BLD MANUAL STRIP-MCNC: 241 MG/DL (ref 74–99)
GLUCOSE SERPL-MCNC: 146 MG/DL (ref 74–99)
HCT VFR BLD AUTO: 31.3 % (ref 36–46)
HCT VFR BLD AUTO: 33.4 % (ref 36–46)
HGB BLD-MCNC: 10 G/DL (ref 12–16)
HGB BLD-MCNC: 9.8 G/DL (ref 12–16)
IMM GRANULOCYTES # BLD AUTO: 0.03 X10*3/UL (ref 0–0.7)
IMM GRANULOCYTES # BLD AUTO: 0.05 X10*3/UL (ref 0–0.7)
IMM GRANULOCYTES NFR BLD AUTO: 0.3 % (ref 0–0.9)
IMM GRANULOCYTES NFR BLD AUTO: 0.4 % (ref 0–0.9)
INR PPP: 1.3 (ref 0.9–1.1)
LYMPHOCYTES # BLD AUTO: 4.01 X10*3/UL (ref 1.2–4.8)
LYMPHOCYTES # BLD MANUAL: 2.49 X10*3/UL (ref 1.2–4.8)
LYMPHOCYTES NFR BLD AUTO: 33.5 %
LYMPHOCYTES NFR BLD MANUAL: 25.9 %
MAGNESIUM SERPL-MCNC: 1.66 MG/DL (ref 1.6–2.4)
MCH RBC QN AUTO: 28.2 PG (ref 26–34)
MCH RBC QN AUTO: 28.3 PG (ref 26–34)
MCHC RBC AUTO-ENTMCNC: 29.9 G/DL (ref 32–36)
MCHC RBC AUTO-ENTMCNC: 31.3 G/DL (ref 32–36)
MCV RBC AUTO: 91 FL (ref 80–100)
MCV RBC AUTO: 94 FL (ref 80–100)
METAMYELOCYTES # BLD MANUAL: 0 X10*3/UL
METAMYELOCYTES NFR BLD MANUAL: 0 %
MONOCYTES # BLD AUTO: 0.69 X10*3/UL (ref 0.1–1)
MONOCYTES # BLD MANUAL: 0.41 X10*3/UL (ref 0.1–1)
MONOCYTES NFR BLD AUTO: 5.8 %
MONOCYTES NFR BLD MANUAL: 4.3 %
MYELOCYTES # BLD MANUAL: 0 X10*3/UL
MYELOCYTES NFR BLD MANUAL: 0 %
NEUTROPHILS # BLD AUTO: 7.09 X10*3/UL (ref 1.2–7.7)
NEUTROPHILS # BLD MANUAL: 6.62 X10*3/UL (ref 1.2–7.7)
NEUTROPHILS NFR BLD AUTO: 59.3 %
NEUTS BAND # BLD MANUAL: 0 X10*3/UL (ref 0–0.7)
NEUTS BAND NFR BLD MANUAL: 0 %
NEUTS SEG # BLD MANUAL: 6.62 X10*3/UL (ref 1.2–7)
NEUTS SEG NFR BLD MANUAL: 69 %
NRBC BLD MANUAL-RTO: 0 % (ref 0–0)
NRBC BLD-RTO: 0 /100 WBCS (ref 0–0)
NRBC BLD-RTO: 0 /100 WBCS (ref 0–0)
OVALOCYTES BLD QL SMEAR: NORMAL
PHOSPHATE SERPL-MCNC: 3.4 MG/DL (ref 2.5–4.9)
PLASMA CELLS # BLD MANUAL: 0 X10*3/UL
PLASMA CELLS NFR BLD MANUAL: 0 %
PLATELET # BLD AUTO: 62 X10*3/UL (ref 150–450)
PLATELET # BLD AUTO: 64 X10*3/UL (ref 150–450)
POTASSIUM SERPL-SCNC: 4.2 MMOL/L (ref 3.5–5.3)
PROMYELOCYTES # BLD MANUAL: 0 X10*3/UL
PROMYELOCYTES NFR BLD MANUAL: 0 %
PROTHROMBIN TIME: 14.9 SECONDS (ref 9.8–12.4)
RBC # BLD AUTO: 3.46 X10*6/UL (ref 4–5.2)
RBC # BLD AUTO: 3.55 X10*6/UL (ref 4–5.2)
RBC MORPH BLD: NORMAL
RBC MORPH BLD: NORMAL
SODIUM SERPL-SCNC: 141 MMOL/L (ref 136–145)
TOTAL CELLS COUNTED BLD: 116
VARIANT LYMPHS # BLD MANUAL: 0 X10*3/UL (ref 0–0.5)
VARIANT LYMPHS NFR BLD: 0 %
WBC # BLD AUTO: 12 X10*3/UL (ref 4.4–11.3)
WBC # BLD AUTO: 9.6 X10*3/UL (ref 4.4–11.3)
WBC OTHER # BLD MANUAL: 0 X10*3/UL
WBC OTHER NFR BLD MANUAL: 0 %

## 2025-08-04 PROCEDURE — 85610 PROTHROMBIN TIME: CPT

## 2025-08-04 PROCEDURE — 99233 SBSQ HOSP IP/OBS HIGH 50: CPT

## 2025-08-04 PROCEDURE — 1100000001 HC PRIVATE ROOM DAILY

## 2025-08-04 PROCEDURE — 82947 ASSAY GLUCOSE BLOOD QUANT: CPT

## 2025-08-04 PROCEDURE — 83735 ASSAY OF MAGNESIUM: CPT

## 2025-08-04 PROCEDURE — 2500000001 HC RX 250 WO HCPCS SELF ADMINISTERED DRUGS (ALT 637 FOR MEDICARE OP)

## 2025-08-04 PROCEDURE — 36415 COLL VENOUS BLD VENIPUNCTURE: CPT

## 2025-08-04 PROCEDURE — 85007 BL SMEAR W/DIFF WBC COUNT: CPT

## 2025-08-04 PROCEDURE — 2500000002 HC RX 250 W HCPCS SELF ADMINISTERED DRUGS (ALT 637 FOR MEDICARE OP, ALT 636 FOR OP/ED)

## 2025-08-04 PROCEDURE — 85730 THROMBOPLASTIN TIME PARTIAL: CPT

## 2025-08-04 PROCEDURE — 84100 ASSAY OF PHOSPHORUS: CPT

## 2025-08-04 PROCEDURE — 85384 FIBRINOGEN ACTIVITY: CPT

## 2025-08-04 PROCEDURE — 85025 COMPLETE CBC W/AUTO DIFF WBC: CPT

## 2025-08-04 PROCEDURE — 85027 COMPLETE CBC AUTOMATED: CPT

## 2025-08-04 PROCEDURE — 2500000004 HC RX 250 GENERAL PHARMACY W/ HCPCS (ALT 636 FOR OP/ED)

## 2025-08-04 RX ADMIN — Medication 50 MCG: at 08:41

## 2025-08-04 RX ADMIN — Medication 10 MG: at 21:17

## 2025-08-04 RX ADMIN — HEPARIN SODIUM 5000 UNITS: 5000 INJECTION, SOLUTION INTRAVENOUS; SUBCUTANEOUS at 14:36

## 2025-08-04 RX ADMIN — RANOLAZINE 500 MG: 500 TABLET, EXTENDED RELEASE ORAL at 21:17

## 2025-08-04 RX ADMIN — ATORVASTATIN CALCIUM 80 MG: 80 TABLET, FILM COATED ORAL at 08:41

## 2025-08-04 RX ADMIN — INSULIN LISPRO 2 UNITS: 100 INJECTION, SOLUTION INTRAVENOUS; SUBCUTANEOUS at 16:38

## 2025-08-04 RX ADMIN — INSULIN LISPRO 2 UNITS: 100 INJECTION, SOLUTION INTRAVENOUS; SUBCUTANEOUS at 12:24

## 2025-08-04 RX ADMIN — GABAPENTIN 300 MG: 300 CAPSULE ORAL at 21:18

## 2025-08-04 RX ADMIN — CYANOCOBALAMIN TAB 1000 MCG 500 MCG: 1000 TAB at 08:43

## 2025-08-04 RX ADMIN — GABAPENTIN 300 MG: 300 CAPSULE ORAL at 08:41

## 2025-08-04 RX ADMIN — THIAMINE HCL TAB 100 MG 100 MG: 100 TAB at 08:41

## 2025-08-04 RX ADMIN — ASPIRIN 81 MG: 81 TABLET, COATED ORAL at 08:41

## 2025-08-04 RX ADMIN — PANTOPRAZOLE SODIUM 40 MG: 40 TABLET, DELAYED RELEASE ORAL at 08:41

## 2025-08-04 RX ADMIN — ASCORBIC ACID, THIAMINE MONONITRATE,RIBOFLAVIN, NIACINAMIDE, PYRIDOXINE HYDROCHLORIDE, FOLIC ACID, CYANOCOBALAMIN, BIOTIN, CALCIUM PANTOTHENATE, 1 CAPSULE: 100; 1.5; 1.7; 20; 10; 1; 6000; 150000; 5 CAPSULE, LIQUID FILLED ORAL at 08:41

## 2025-08-04 RX ADMIN — PANTOPRAZOLE SODIUM 40 MG: 40 TABLET, DELAYED RELEASE ORAL at 21:17

## 2025-08-04 RX ADMIN — HEPARIN SODIUM 5000 UNITS: 5000 INJECTION, SOLUTION INTRAVENOUS; SUBCUTANEOUS at 21:18

## 2025-08-04 RX ADMIN — Medication 1 TABLET: at 08:41

## 2025-08-04 ASSESSMENT — COGNITIVE AND FUNCTIONAL STATUS - GENERAL
CLIMB 3 TO 5 STEPS WITH RAILING: TOTAL
HELP NEEDED FOR BATHING: A LITTLE
DAILY ACTIVITIY SCORE: 18
STANDING UP FROM CHAIR USING ARMS: A LOT
MOBILITY SCORE: 15
EATING MEALS: A LITTLE
TOILETING: A LITTLE
PERSONAL GROOMING: A LITTLE
WALKING IN HOSPITAL ROOM: TOTAL
DRESSING REGULAR UPPER BODY CLOTHING: A LITTLE
DRESSING REGULAR LOWER BODY CLOTHING: A LITTLE
MOVING TO AND FROM BED TO CHAIR: A LITTLE

## 2025-08-04 ASSESSMENT — PAIN SCALES - GENERAL
PAINLEVEL_OUTOF10: 0 - NO PAIN

## 2025-08-04 ASSESSMENT — PAIN - FUNCTIONAL ASSESSMENT
PAIN_FUNCTIONAL_ASSESSMENT: 0-10
PAIN_FUNCTIONAL_ASSESSMENT: 0-10

## 2025-08-04 NOTE — PROGRESS NOTES
"Myrna Khan is a 65 y.o. female on day 9 of admission presenting with Bacteremia due to Klebsiella pneumoniae.    Subjective   No overnight events reported. Patient seen resting in bed this morning, in no acute distress, eager to order breakfast. Patient states she has not had abominal pain or bloating in multiple days and feels better. Patient updated on next steps.         Objective     Physical Exam  Constitutional:       General: She is not in acute distress.     Comments: Chronically ill appearing    HENT:      Head: Normocephalic and atraumatic.      Nose: Nose normal.      Mouth/Throat:      Mouth: Mucous membranes are moist.     Eyes:      Extraocular Movements: Extraocular movements intact.      Conjunctiva/sclera: Conjunctivae normal.       Cardiovascular:      Rate and Rhythm: Normal rate and regular rhythm.      Heart sounds: Normal heart sounds.   Pulmonary:      Effort: Pulmonary effort is normal.      Breath sounds: Normal breath sounds.   Abdominal:      General: Bowel sounds are normal. There is no distension.      Palpations: Abdomen is soft.      Tenderness: There is no abdominal tenderness.     Musculoskeletal:         General: Normal range of motion.      Cervical back: Normal range of motion.      Comments: L BKA      Neurological:      General: No focal deficit present.      Mental Status: She is alert. Mental status is at baseline.     Psychiatric:         Behavior: Behavior normal.         Last Recorded Vitals  Blood pressure 124/83, pulse 79, temperature 36.7 °C (98.1 °F), temperature source Temporal, resp. rate 18, height (!) 1.549 m (5' 1\"), weight (!) 36.3 kg (80 lb 0.4 oz), SpO2 99%.  Intake/Output last 3 Shifts:  I/O last 3 completed shifts:  In: 1270 (35 mL/kg) [P.O.:1020; IV Piggyback:250]  Out: - (0 mL/kg)   Weight: 36.3 kg     Relevant Results  Scheduled medications  Scheduled Medications[1]  Continuous medications  Continuous Medications[2]  PRN medications  PRN " Medications[3]     Results for orders placed or performed during the hospital encounter of 07/26/25 (from the past 24 hours)   POCT GLUCOSE   Result Value Ref Range    POCT Glucose 233 (H) 74 - 99 mg/dL   CBC and Auto Differential   Result Value Ref Range    WBC 8.9 4.4 - 11.3 x10*3/uL    nRBC 0.0 0.0 - 0.0 /100 WBCs    RBC 3.53 (L) 4.00 - 5.20 x10*6/uL    Hemoglobin 10.0 (L) 12.0 - 16.0 g/dL    Hematocrit 34.6 (L) 36.0 - 46.0 %    MCV 98 80 - 100 fL    MCH 28.3 26.0 - 34.0 pg    MCHC 28.9 (L) 32.0 - 36.0 g/dL    RDW 21.6 (H) 11.5 - 14.5 %    Platelets 55 (L) 150 - 450 x10*3/uL    Immature Granulocytes %, Automated 0.4 0.0 - 0.9 %    Immature Granulocytes Absolute, Automated 0.04 0.00 - 0.70 x10*3/uL   Manual Differential   Result Value Ref Range    Neutrophils %, Manual 65.8 40.0 - 80.0 %    Bands %, Manual 0.0 0.0 - 5.0 %    Lymphocytes %, Manual 29.9 13.0 - 44.0 %    Monocytes %, Manual 4.3 2.0 - 10.0 %    Eosinophils %, Manual 0.0 0.0 - 6.0 %    Basophils %, Manual 0.0 0.0 - 2.0 %    Atypical Lymphocytes %, Manual 0.0 0.0 - 2.0 %    Metamyelocytes %, Manual 0.0 0.0 - 0.0 %    Myelocytes %, Manual 0.0 0.0 - 0.0 %    Plasma Cells %, Manual 0.0 0.00 - 0.00 %    Promyelocytes %, Manual 0.0 0.0 - 0.0 %    Blasts %, Manual 0.0 0.0 - 0.0 %    Others %, Manual 0.0 %    Seg Neutrophils Absolute, Manual 5.86 1.20 - 7.00 x10*3/uL    Bands Absolute, Manual 0.00 0.00 - 0.70 x10*3/uL    Lymphocytes Absolute, Manual 2.66 1.20 - 4.80 x10*3/uL    Monocytes Absolute, Manual 0.38 0.10 - 1.00 x10*3/uL    Eosinophils Absolute, Manual 0.00 0.00 - 0.70 x10*3/uL    Basophils Absolute, Manual 0.00 0.00 - 0.10 x10*3/uL    Atypical Lymphs Absolute, Manual 0.00 0.00 - 0.50 x10*3/uL    Metamyelocytes Absolute, Manual 0.00 0.00 - 0.00 x10*3/uL    Myelocytes Absolute, Manual 0.00 0.00 - 0.00 x10*3/uL    Plasma Cells Absolute, Manual 0.00 0.00 - 0.00 x10*3/uL    Promyelocytes Absolute, Manual 0.00 0.00 - 0.00 x10*3/uL    Blasts Absolute,  Manual 0.00 0.00 - 0.00 x10*3/uL    Others Absolute, Manual 0.00 x10*3/uL    Total Cells Counted 117     Neutrophils Absolute, Manual 5.86 1.20 - 7.70 x10*3/uL    Manual nRBC per 100 Cells 0.0 0.0 - 0.0 %    RBC Morphology See Below     Ovalocytes Few     Vin Cells Many     Acanthocytes Few    POCT GLUCOSE   Result Value Ref Range    POCT Glucose 322 (H) 74 - 99 mg/dL   POCT GLUCOSE   Result Value Ref Range    POCT Glucose 245 (H) 74 - 99 mg/dL   POCT GLUCOSE   Result Value Ref Range    POCT Glucose 243 (H) 74 - 99 mg/dL   POCT GLUCOSE   Result Value Ref Range    POCT Glucose 168 (H) 74 - 99 mg/dL   CBC and Auto Differential   Result Value Ref Range    WBC 9.6 4.4 - 11.3 x10*3/uL    nRBC 0.0 0.0 - 0.0 /100 WBCs    RBC 3.46 (L) 4.00 - 5.20 x10*6/uL    Hemoglobin 9.8 (L) 12.0 - 16.0 g/dL    Hematocrit 31.3 (L) 36.0 - 46.0 %    MCV 91 80 - 100 fL    MCH 28.3 26.0 - 34.0 pg    MCHC 31.3 (L) 32.0 - 36.0 g/dL    RDW 21.0 (H) 11.5 - 14.5 %    Platelets 62 (L) 150 - 450 x10*3/uL    Immature Granulocytes %, Automated 0.3 0.0 - 0.9 %    Immature Granulocytes Absolute, Automated 0.03 0.00 - 0.70 x10*3/uL   Magnesium   Result Value Ref Range    Magnesium 1.66 1.60 - 2.40 mg/dL   Renal Function Panel   Result Value Ref Range    Glucose 146 (H) 74 - 99 mg/dL    Sodium 141 136 - 145 mmol/L    Potassium 4.2 3.5 - 5.3 mmol/L    Chloride 114 (H) 98 - 107 mmol/L    Bicarbonate 22 21 - 32 mmol/L    Anion Gap 9 (L) 10 - 20 mmol/L    Urea Nitrogen 13 6 - 23 mg/dL    Creatinine 0.92 0.50 - 1.05 mg/dL    eGFR 69 >60 mL/min/1.73m*2    Calcium 7.7 (L) 8.6 - 10.6 mg/dL    Phosphorus 3.4 2.5 - 4.9 mg/dL    Albumin 1.9 (L) 3.4 - 5.0 g/dL   aPTT   Result Value Ref Range    aPTT 34 26 - 36 seconds   Fibrinogen   Result Value Ref Range    Fibrinogen 284 200 - 400 mg/dL   Protime-INR   Result Value Ref Range    Protime 14.9 (H) 9.8 - 12.4 seconds    INR 1.3 (H) 0.9 - 1.1   Manual Differential   Result Value Ref Range    Neutrophils %, Manual 69.0  40.0 - 80.0 %    Bands %, Manual 0.0 0.0 - 5.0 %    Lymphocytes %, Manual 25.9 13.0 - 44.0 %    Monocytes %, Manual 4.3 2.0 - 10.0 %    Eosinophils %, Manual 0.8 0.0 - 6.0 %    Basophils %, Manual 0.0 0.0 - 2.0 %    Atypical Lymphocytes %, Manual 0.0 0.0 - 2.0 %    Metamyelocytes %, Manual 0.0 0.0 - 0.0 %    Myelocytes %, Manual 0.0 0.0 - 0.0 %    Plasma Cells %, Manual 0.0 0.00 - 0.00 %    Promyelocytes %, Manual 0.0 0.0 - 0.0 %    Blasts %, Manual 0.0 0.0 - 0.0 %    Others %, Manual 0.0 %    Seg Neutrophils Absolute, Manual 6.62 1.20 - 7.00 x10*3/uL    Bands Absolute, Manual 0.00 0.00 - 0.70 x10*3/uL    Lymphocytes Absolute, Manual 2.49 1.20 - 4.80 x10*3/uL    Monocytes Absolute, Manual 0.41 0.10 - 1.00 x10*3/uL    Eosinophils Absolute, Manual 0.08 0.00 - 0.70 x10*3/uL    Basophils Absolute, Manual 0.00 0.00 - 0.10 x10*3/uL    Atypical Lymphs Absolute, Manual 0.00 0.00 - 0.50 x10*3/uL    Metamyelocytes Absolute, Manual 0.00 0.00 - 0.00 x10*3/uL    Myelocytes Absolute, Manual 0.00 0.00 - 0.00 x10*3/uL    Plasma Cells Absolute, Manual 0.00 0.00 - 0.00 x10*3/uL    Promyelocytes Absolute, Manual 0.00 0.00 - 0.00 x10*3/uL    Blasts Absolute, Manual 0.00 0.00 - 0.00 x10*3/uL    Others Absolute, Manual 0.00 x10*3/uL    Total Cells Counted 116     Neutrophils Absolute, Manual 6.62 1.20 - 7.70 x10*3/uL    Manual nRBC per 100 Cells 0.0 0.0 - 0.0 %    RBC Morphology See Below     Ovalocytes Few     Vin Cells Many    POCT GLUCOSE   Result Value Ref Range    POCT Glucose 149 (H) 74 - 99 mg/dL                   This patient has a urinary catheter   Reason for the urinary catheter remaining today? urinary retention/bladder outlet obstruction, acute or chronic              Assessment & Plan  AMS (altered mental status)    Hypoglycemia    Unresponsiveness    Bacteremia due to Klebsiella pneumoniae      Myrna Khan is a 65 y.o. female with PMHx of type 2 diabetes, CAD (s/p CABG 9/2023), hypertension 2/2 renal artery stenosis,  PAD with dry gangrene (s/p LLE endarterectomy 3/6/25 and left BKA (guillotine amp 4/1/25 with formalization on 4/3/25)) who presented to the emergency department via EMS from SNF for altered mental status. Patient started on D5 due to hypoglycemia, stopped fluids and patient has rebounded following increased PO intake. Endo rec starting SS1 and holding home DM meds. Hgb 6.4, transfused 1 unit PRBC, recheck 8.8. CT AP w/o c/f bleed, no active bleed noted. CT showed c/f ileus, patient with n/v, NPO, NG to LIWS w/ 200 ml output. Gi consulted due to hgb drop and tarry stools, plan for scope today. Hgb and Plt improved s/p repeat transfusion. Vasc surg consulted given AC concerns. Hematology consulted, rec Vit K and following labs.     Update 8/4  - Platelets stable on morning labs, pending Heme recs  - will restart plavix and dvt ppx given no further tarry stools and stable hgb  -   AMS, likely multifactorial (resolved)  Bacteremia  UTI  CAP  :: CTH negative for acute intracranial abnormality or mass effect.   :: BCx collected 7/26, pending result  :: CXR showing findings of patchy consolidation of left lung, concerning for left mid lung pneumonia   - UA showing turbid urine, 1+ protein, 3+ glucose, 500 LE. Nitrite and ketone negative, >50 WBC, 11-20 RBC, 1+ bacteria.   - c diff PCR (ordered d/t recent clindamycin course and diarrhea) negative  - UDS negative   Plan:  - Started CTX 1g daily iso amoxicillin allergy on 7/26)  and doxycycline 100mg BID iso QTc of 500. Plan for 5 days total of abx therapy  - Discontinued CTX  and started Zozyn (7/27-7/31)  -UA Cx 7/26: >100,000 Klebsiella   -Blood Cx 7/26: growing Klebsiella   - repeat cultures NGTD    Anemia  Thrombocytopenia  C/f GI bleed  - admission Hgb 10.5, likely hemo concentrated due to poor po intake   - Current Hgb 6.6  - Transfused 2x  PRBC  - PPI IV BID  - holding AC (resume ASA post EGD)  - Hgb drop 7.8 to 7.0, PLT 46, iso repeated tarry stool will transfuse  PLT and PRBC  -- Post transfusion Hgb 10.3, Plt 84  - Gi consulted, EGD done 8/1 shoiwing extensive inflammation, will follow up path  - Hematology consulted, rec vitamin K as likely malnutrition component, pending updated recs  - Restarted Plavix and DVT ppx    C/f Ileus (resolved)  - patient with intermittent n/a overnight  - CT AP showed Multiple loops of distended fluid-filled small bowel with  air-fluid levels  - NG out 8/1  - tolerating PO intake     Hypokalemia  - K+ 2.9  - Ordered replacement  - Resolved    R adnexal mass  - large cystic appearing right adnexal mass seen on CT imaging  - will require OP Fu    T2DM  Hypoglycemia (resolved)  :: home regimen per pharm med rec: glargine 10u nightly, Farxiga 10mg daily, metformin 1g BID  :: unclear etiology of hypoglycemia; per SNF report, takes 10u glargine nightly. Unknown what patient's HS BG was or how much insulin she actually received last night.   :: last A1c 6.2% (5/9/2025)  :: patient's BG levels have been labile, even while on D5NS mIVF (103, 347, 104, 228, 70, 157). These are all POCT reads, currently pending repeat RFP. VBG from this afternoon showing glucose of 129.  - has received 2 amps of D50. On D5NS at 50ml/hr, increased to 75ml/h5r  Plan:  - all home medications held at this time  - continue to monitor POCT glucose q4h  - Endo consulted, rec holding OP DM meds, starting SS1 and continue carb controlled diet     MAGUI resolved  Hypomagnesemia  Hypophosphatemia, resolved    chronic urinary retention with indwelling day catheter  :: Cr 1.21 on admission (baseline 0.7-0.8), eGFR 50  :: Mg 1.23, s/p 2g IV Mg in the ED. Has had chronic low Mg in the past on chart review  :: Phos 2.4 with mild hemolysis detected  :: UA showing turbid urine, 1+ protein, 3+ glucose, 500 LE. Nitrite and ketone negative, >50 WBC, 11-20 RBC, 1+ bacteria. Reflex culture pending.   - Cr 1.06  - daily rfp     Malnutrition  - patient cachectic, BMI 15.12  - on home vit D,  calcium supplements  Plan:  - continue home vit D, calcium supplements, ordered  - follow up B12 and folate labs  - thiamine, B12, and folate supplements initiated   - q12 RFPs to monitor for refeeding, replete electrolytes for goal of K>4, phos>3, Mg>2  - inpatient consult to dietician placed, appreciate recs  -- ensure TID     CAD s/p CABG 9/2023  HTN  FROYLAN  :: home antihypertensives: carvedilol 25mg BID, hydralazine 75mg TID, nifedipine 90mg  :: on atorvastatin 80mg nightly  :: EKG showing sinus rhythm with PACs, low voltage QRS, possible inferior infarct (age undetermined), nonspecific T wave abnormality now evident in Inferior leads and T wave inversion now evident in Lateral leads  Plan:  - home meds ordered as above  - Cont tele   - patient currently stable, continue to monitor BP     PAD  :: s/p LLE endarterectomy 3/6/25 and left BKA (guillotine amp 4/1/25 with formalization on 4/3/25)  :: on clopidigrel 75mg daily and ASA 81mg daily  - wound of LLE at level of BKA. Per recent vasc surgery note, medial ulceration had progressed to a tunneling wound (did not probe to bone).  Was given course of clinda at the time, completed on 7/16  - Xray of L tib/fib without evidence of OM.   Plan:  - continue DAPT, ordered  - wound care consult for evaluation of wounds, appreciate recs  - Vasc surg consulted due to AC being held iso GI bleed, hx of end arterectomy and BKA  - Restarted plavix and dvt ppx     F: PRN  E: replete for goal of K>4, phos>3, Mg>2  N: NPO  DVT ppx: subcutaneous hep  Abx therapy: Zosyn doxy  Code status: full code    Patient discussed with attending physician Dr. Weiner-Jese  Plan preliminary until cosigned by attending physician.    Jesse Granados MD  Family Medicine  PGY-3           [1] aspirin, 81 mg, oral, Daily  atorvastatin, 80 mg, oral, Daily  calcium citrate, 200 mg of elemental calcium, oral, Daily  [Held by provider] carvedilol, 25 mg, oral, BID  cholecalciferol, 50 mcg, oral, Daily  [Held by  provider] clopidogrel, 75 mg, oral, Daily  cyanocobalamin, 500 mcg, oral, Daily  [Held by provider] dapagliflozin propanediol, 10 mg, oral, Daily with breakfast  gabapentin, 300 mg, oral, BID  [Held by provider] heparin (porcine), 5,000 Units, subcutaneous, q8h BOLIVAR  [Held by provider] hydrALAZINE, 75 mg, oral, TID  insulin lispro, 0-5 Units, subcutaneous, TID AC  iohexol, 1,000 mL, oral, Once in imaging  melatonin, 10 mg, oral, Nightly  [Held by provider] NIFEdipine ER, 90 mg, oral, Daily before breakfast  pantoprazole, 40 mg, oral, BID  [Held by provider] piperacillin-tazobactam, 3.375 g, intravenous, q6h  [Held by provider] ranolazine, 500 mg, oral, BID  thiamine, 100 mg, oral, Daily  vitamin B complex-vitamin C-folic acid, 1 capsule, oral, Daily     [2]    [3] PRN medications: acetaminophen **OR** acetaminophen **OR** acetaminophen, dextrose, dextrose, diclofenac sodium, glucagon, glucagon, naloxone, oxyCODONE, oxygen, trimethobenzamide

## 2025-08-05 VITALS
RESPIRATION RATE: 16 BRPM | DIASTOLIC BLOOD PRESSURE: 81 MMHG | HEART RATE: 81 BPM | TEMPERATURE: 97.7 F | OXYGEN SATURATION: 100 % | HEIGHT: 61 IN | WEIGHT: 80.03 LBS | SYSTOLIC BLOOD PRESSURE: 128 MMHG | BODY MASS INDEX: 15.11 KG/M2

## 2025-08-05 LAB
ALBUMIN SERPL BCP-MCNC: 1.9 G/DL (ref 3.4–5)
ANION GAP SERPL CALC-SCNC: 11 MMOL/L (ref 10–20)
BASOPHILS # BLD MANUAL: 0 X10*3/UL (ref 0–0.1)
BASOPHILS NFR BLD MANUAL: 0 %
BLASTS # BLD MANUAL: 0 X10*3/UL
BLASTS NFR BLD MANUAL: 0 %
BUN SERPL-MCNC: 15 MG/DL (ref 6–23)
BURR CELLS BLD QL SMEAR: ABNORMAL
CALCIUM SERPL-MCNC: 7.7 MG/DL (ref 8.6–10.6)
CHLORIDE SERPL-SCNC: 111 MMOL/L (ref 98–107)
CO2 SERPL-SCNC: 21 MMOL/L (ref 21–32)
CREAT SERPL-MCNC: 0.96 MG/DL (ref 0.5–1.05)
EGFRCR SERPLBLD CKD-EPI 2021: 66 ML/MIN/1.73M*2
EOSINOPHIL # BLD MANUAL: 0 X10*3/UL (ref 0–0.7)
EOSINOPHIL NFR BLD MANUAL: 0 %
ERYTHROCYTE [DISTWIDTH] IN BLOOD BY AUTOMATED COUNT: 20.3 % (ref 11.5–14.5)
GLUCOSE BLD MANUAL STRIP-MCNC: 130 MG/DL (ref 74–99)
GLUCOSE BLD MANUAL STRIP-MCNC: 139 MG/DL (ref 74–99)
GLUCOSE BLD MANUAL STRIP-MCNC: 154 MG/DL (ref 74–99)
GLUCOSE BLD MANUAL STRIP-MCNC: 180 MG/DL (ref 74–99)
GLUCOSE SERPL-MCNC: 167 MG/DL (ref 74–99)
HCT VFR BLD AUTO: 32.5 % (ref 36–46)
HGB BLD-MCNC: 9.8 G/DL (ref 12–16)
HYPOCHROMIA BLD QL SMEAR: ABNORMAL
IMM GRANULOCYTES # BLD AUTO: 0.06 X10*3/UL (ref 0–0.7)
IMM GRANULOCYTES NFR BLD AUTO: 0.5 % (ref 0–0.9)
LYMPHOCYTES # BLD MANUAL: 1.84 X10*3/UL (ref 1.2–4.8)
LYMPHOCYTES NFR BLD MANUAL: 16.7 %
MAGNESIUM SERPL-MCNC: 1.44 MG/DL (ref 1.6–2.4)
MCH RBC QN AUTO: 27.8 PG (ref 26–34)
MCHC RBC AUTO-ENTMCNC: 30.2 G/DL (ref 32–36)
MCV RBC AUTO: 92 FL (ref 80–100)
METAMYELOCYTES # BLD MANUAL: 0 X10*3/UL
METAMYELOCYTES NFR BLD MANUAL: 0 %
MONOCYTES # BLD MANUAL: 0 X10*3/UL (ref 0.1–1)
MONOCYTES NFR BLD MANUAL: 0 %
MYELOCYTES # BLD MANUAL: 0 X10*3/UL
MYELOCYTES NFR BLD MANUAL: 0 %
NEUTROPHILS # BLD MANUAL: 9.16 X10*3/UL (ref 1.2–7.7)
NEUTS BAND # BLD MANUAL: 0 X10*3/UL (ref 0–0.7)
NEUTS BAND NFR BLD MANUAL: 0 %
NEUTS SEG # BLD MANUAL: 9.16 X10*3/UL (ref 1.2–7)
NEUTS SEG NFR BLD MANUAL: 83.3 %
NRBC BLD MANUAL-RTO: 0 % (ref 0–0)
NRBC BLD-RTO: 0 /100 WBCS (ref 0–0)
PHOSPHATE SERPL-MCNC: 3.5 MG/DL (ref 2.5–4.9)
PLASMA CELLS # BLD MANUAL: 0 X10*3/UL
PLASMA CELLS NFR BLD MANUAL: 0 %
PLATELET # BLD AUTO: 75 X10*3/UL (ref 150–450)
POTASSIUM SERPL-SCNC: 4.5 MMOL/L (ref 3.5–5.3)
PROMYELOCYTES # BLD MANUAL: 0 X10*3/UL
PROMYELOCYTES NFR BLD MANUAL: 0 %
RBC # BLD AUTO: 3.52 X10*6/UL (ref 4–5.2)
RBC MORPH BLD: ABNORMAL
SODIUM SERPL-SCNC: 138 MMOL/L (ref 136–145)
TOTAL CELLS COUNTED BLD: 114
VARIANT LYMPHS # BLD MANUAL: 0 X10*3/UL (ref 0–0.5)
VARIANT LYMPHS NFR BLD: 0 %
WBC # BLD AUTO: 11 X10*3/UL (ref 4.4–11.3)
WBC OTHER # BLD MANUAL: 0 X10*3/UL
WBC OTHER NFR BLD MANUAL: 0 %

## 2025-08-05 PROCEDURE — 83735 ASSAY OF MAGNESIUM: CPT

## 2025-08-05 PROCEDURE — 2500000001 HC RX 250 WO HCPCS SELF ADMINISTERED DRUGS (ALT 637 FOR MEDICARE OP)

## 2025-08-05 PROCEDURE — 82947 ASSAY GLUCOSE BLOOD QUANT: CPT

## 2025-08-05 PROCEDURE — 2500000004 HC RX 250 GENERAL PHARMACY W/ HCPCS (ALT 636 FOR OP/ED)

## 2025-08-05 PROCEDURE — 80069 RENAL FUNCTION PANEL: CPT

## 2025-08-05 PROCEDURE — 85027 COMPLETE CBC AUTOMATED: CPT

## 2025-08-05 PROCEDURE — 2500000002 HC RX 250 W HCPCS SELF ADMINISTERED DRUGS (ALT 637 FOR MEDICARE OP, ALT 636 FOR OP/ED)

## 2025-08-05 PROCEDURE — 85007 BL SMEAR W/DIFF WBC COUNT: CPT

## 2025-08-05 PROCEDURE — 36415 COLL VENOUS BLD VENIPUNCTURE: CPT

## 2025-08-05 PROCEDURE — 99238 HOSP IP/OBS DSCHRG MGMT 30/<: CPT

## 2025-08-05 RX ORDER — PANTOPRAZOLE SODIUM 40 MG/1
40 TABLET, DELAYED RELEASE ORAL 2 TIMES DAILY
Start: 2025-08-05

## 2025-08-05 RX ORDER — LANOLIN ALCOHOL/MO/W.PET/CERES
100 CREAM (GRAM) TOPICAL DAILY
Start: 2025-08-06

## 2025-08-05 RX ORDER — MAGNESIUM SULFATE HEPTAHYDRATE 40 MG/ML
2 INJECTION, SOLUTION INTRAVENOUS ONCE
Status: COMPLETED | OUTPATIENT
Start: 2025-08-05 | End: 2025-08-05

## 2025-08-05 RX ADMIN — THIAMINE HCL TAB 100 MG 100 MG: 100 TAB at 08:12

## 2025-08-05 RX ADMIN — RANOLAZINE 500 MG: 500 TABLET, EXTENDED RELEASE ORAL at 08:13

## 2025-08-05 RX ADMIN — ATORVASTATIN CALCIUM 80 MG: 80 TABLET, FILM COATED ORAL at 08:13

## 2025-08-05 RX ADMIN — MAGNESIUM SULFATE HEPTAHYDRATE 2 G: 40 INJECTION, SOLUTION INTRAVENOUS at 11:37

## 2025-08-05 RX ADMIN — HEPARIN SODIUM 5000 UNITS: 5000 INJECTION, SOLUTION INTRAVENOUS; SUBCUTANEOUS at 05:23

## 2025-08-05 RX ADMIN — INSULIN LISPRO 1 UNITS: 100 INJECTION, SOLUTION INTRAVENOUS; SUBCUTANEOUS at 08:13

## 2025-08-05 RX ADMIN — CLOPIDOGREL BISULFATE 75 MG: 75 TABLET, FILM COATED ORAL at 06:07

## 2025-08-05 RX ADMIN — Medication 1 TABLET: at 08:13

## 2025-08-05 RX ADMIN — INSULIN LISPRO 1 UNITS: 100 INJECTION, SOLUTION INTRAVENOUS; SUBCUTANEOUS at 11:37

## 2025-08-05 RX ADMIN — PANTOPRAZOLE SODIUM 40 MG: 40 TABLET, DELAYED RELEASE ORAL at 06:06

## 2025-08-05 RX ADMIN — ASPIRIN 81 MG: 81 TABLET, COATED ORAL at 08:12

## 2025-08-05 RX ADMIN — ASCORBIC ACID, THIAMINE MONONITRATE,RIBOFLAVIN, NIACINAMIDE, PYRIDOXINE HYDROCHLORIDE, FOLIC ACID, CYANOCOBALAMIN, BIOTIN, CALCIUM PANTOTHENATE, 1 CAPSULE: 100; 1.5; 1.7; 20; 10; 1; 6000; 150000; 5 CAPSULE, LIQUID FILLED ORAL at 08:12

## 2025-08-05 RX ADMIN — GABAPENTIN 300 MG: 300 CAPSULE ORAL at 08:12

## 2025-08-05 RX ADMIN — Medication 50 MCG: at 08:16

## 2025-08-05 RX ADMIN — CYANOCOBALAMIN TAB 1000 MCG 500 MCG: 1000 TAB at 08:12

## 2025-08-05 ASSESSMENT — PAIN SCALES - GENERAL
PAINLEVEL_OUTOF10: 0 - NO PAIN
PAINLEVEL_OUTOF10: 0 - NO PAIN

## 2025-08-05 ASSESSMENT — PAIN - FUNCTIONAL ASSESSMENT: PAIN_FUNCTIONAL_ASSESSMENT: 0-10

## 2025-08-05 NOTE — CARE PLAN
The patient's goals for the shift include patient sleeps at least 6 hours throughout shift     The clinical goals for the shift include no fall or injury by the end  of shft    Problem: Safety - Adult  Goal: Free from fall injury  Outcome: Progressing     Problem: Nutrition  Goal: Nutrient intake appropriate for maintaining nutritional needs  Outcome: Progressing     Problem: Skin  Goal: Participates in plan/prevention/treatment measures  Outcome: Progressing     Problem: Skin  Goal: Decreased wound size/increased tissue granulation at next dressing change  Outcome: Progressing     Problem: Fall/Injury  Goal: Not fall by end of shift  Outcome: Progressing  Goal: Be free from injury by end of the shift  Outcome: Progressing  Goal: Verbalize understanding of personal risk factors for fall in the hospital  Outcome: Progressing  Goal: Verbalize understanding of risk factor reduction measures to prevent injury from fall in the home  Outcome: Progressing  Goal: Use assistive devices by end of the shift  Outcome: Progressing  Goal: Pace activities to prevent fatigue by end of the shift  Outcome: Progressing

## 2025-08-05 NOTE — NURSING NOTE
This nurse attempted to call facility to give report to nurse getting pt. No answer. Call ed again still no answer.

## 2025-08-05 NOTE — PROGRESS NOTES
08/05/25 1111   Rapid Rounds   Attendance Provider;Care Transitions   Expected Discharge Disposition Inter   Review at Escalation Rounds No escalation needed     Social Work Note    LSW met with Pt Med Team. Per med team Pt medically ready to DC back to [facility] and LTC Pt. Care transitions available to assist with any future discharge needs.     Update 1:06pm  DC to [facility]  [id number]: [phone]  Transport: [facility] 2:30pm    [employer]

## 2025-08-05 NOTE — DISCHARGE SUMMARY
Discharge Diagnosis  Bacteremia due to Klebsiella pneumoniae  UTI due to Klebsiella  Symptomatic hypoglycemia  Ileus  Anemia and thrombocytopenia  Upper GI bleed    Issues Requiring Follow-Up  GI - Upper gi bleed s/p EGD  Hematology - thrombocytopenia  PCP - hospital follow up and medication management    Test Results Pending At Discharge  Pending Labs       Order Current Status    Surgical Pathology Exam In process            Hospital Course  Myrna Khan is a 65 y.o. female with PMHx of type 2 diabetes, CAD (s/p CABG 9/2023), hypertension 2/2 renal artery stenosis, PAD with dry gangrene (s/p LLE endarterectomy 3/6/25 and left BKA (guillotine amp 4/1/25 with formalization on 4/3/25)) who presented to the emergency department via EMS from SNF for altered mental status. Admitted for further evaluation of AMS and management of MAGUI. CT Head without acute abnormalities. Blood cultures came back positive growing Klebsiella. UA grew >100,000 Klebsiella. Patient was started on Zosyn and Doxycycline and completed a total of 5 days of antibiotics. Endocrine was consulted for continued hypoglycemia in the setting of being on D5. Endocrine said recommended holding all OP DM medications, starting sliding scale #1 and carb controlled diet. Patient had glycemic improvement and resolution of AMS and improving kidney function. 7/28 patient had unexpected hgb drop to 6.4 with H&H confirmation, transfused 1 unit PRBC with follow up cbc with appropriate hgb increase. Patient developed abdominal distension, n/v and imaging c/f ileus. NG placed to LIWS. Concurrently patient developed dark tarry stools and had repeated hgb drop below 7, as well as, platelets below 50 (at lowest 24). Patient again transfused with 1 unit PRBC and 1 unit Plt. Vasc Surgery consulted due to gi bleed and holding AC given patients PAD hx, rec continuing ASA and restarting plavix when GI bleed has resolved. GI consulted and performed EGD on 8/1  showing  inflammation of the esophagus, stomach and duodenum with small superficial ulceration. NG removed following EGD, started BID PPI and carb controlled diet restart with patient tolerating PO intake. Hematology consulted for unexplained thrombocytopenia, recommended vitamin K, believed derangement possibly 2/2 due malnutrition. Hgb then stabilized at ~10 and platelets now consistently over 50 with no concern for ongoing bleed. Patient remains afebrile, hemodynamically stable, with resolved mentation, resolved hypoglycemia, stabilized hgb and plt, and tolerating PO intake. Patient appropriate for discharge back to facility at this time. She will need follow up with GI, hepatology and PCP. Patient updated on discharge plan and all questions answered.         Visit Vitals  /81 (BP Location: Left arm, Patient Position: Lying)   Pulse 81   Temp 36.5 °C (97.7 °F) (Temporal)   Resp 16     Vitals:    07/27/25 0500   Weight: (!) 36.3 kg (80 lb 0.4 oz)       Immunization History   Administered Date(s) Administered    COVID-19, mRNA, LNP-S, PF, 30 mcg/0.3 mL dose 07/19/2021, 08/26/2021    Flu vaccine (IIV4), preservative free *Check age/dose* 10/12/2016    Hepatitis B vaccine, adult *Check Product/Dose* 09/11/2014, 02/18/2015    Influenza, injectable, quadrivalent 11/02/2017, 01/30/2019, 12/09/2021    Influenza, seasonal, injectable 11/13/2014, 12/02/2015    Pneumococcal conjugate vaccine, 13-valent (PREVNAR 13) 07/19/2017    Pneumococcal polysaccharide vaccine, 23-valent, age 2 years and older (PNEUMOVAX 23) 05/15/2019    Tdap vaccine, age 7 year and older (BOOSTRIX, ADACEL) 08/09/2018, 01/18/2025       Results      Pertinent Physical Exam At Time of Discharge  Physical Exam  Constitutional:       General: She is not in acute distress.     Comments: Chronically ill appearing    HENT:      Head: Normocephalic and atraumatic.      Nose: Nose normal.      Mouth/Throat:      Mouth: Mucous membranes are moist.     Eyes:       Extraocular Movements: Extraocular movements intact.      Conjunctiva/sclera: Conjunctivae normal.       Cardiovascular:      Rate and Rhythm: Normal rate and regular rhythm.      Heart sounds: Normal heart sounds.   Pulmonary:      Effort: Pulmonary effort is normal.      Breath sounds: Normal breath sounds.   Abdominal:      General: Bowel sounds are normal. There is no distension.      Palpations: Abdomen is soft.      Tenderness: There is no abdominal tenderness.     Musculoskeletal:         General: Normal range of motion.      Cervical back: Normal range of motion.      Comments: L BKMACY      Neurological:      General: No focal deficit present.      Mental Status: She is alert. Mental status is at baseline.     Psychiatric:         Behavior: Behavior normal.         Home Medications     Medication List      PAUSE taking these medications     carvedilol 25 mg tablet; Wait to take this until your doctor or other   care provider tells you to start again.; Commonly known as: Coreg; TAKE 1   TABLET BY MOUTH TWO TIMES A DAY   hydrALAZINE 50 mg tablet; Wait to take this until your doctor or other   care provider tells you to start again.; Commonly known as: Apresoline;   Take 1.5 tablets (75 mg) by mouth 3 times a day.; What changed: additional   instructions   NIFEdipine ER 90 mg 24 hr tablet; Wait to take this until your doctor or   other care provider tells you to start again.; Commonly known as: Adalat   CC; Take 1 tablet (90 mg) by mouth once daily in the morning. Take before   meals. Do not crush, chew, or split.     START taking these medications     pantoprazole 40 mg EC tablet; Commonly known as: ProtoNix; Take 1 tablet   (40 mg) by mouth 2 times a day. Do not crush, chew, or split.   SITagliptin phosphate 100 mg tablet; Commonly known as: Januvia; Take 1   tablet (100 mg) by mouth once daily.   thiamine 100 mg tablet; Commonly known as: Vitamin B-1; Take 1 tablet   (100 mg) by mouth once daily.; Start taking  "on: August 6, 2025   vitamin B complex-vitamin C-folic acid 1 mg capsule; Commonly known as:   Nephrocaps; Take 1 capsule by mouth once daily.; Start taking on: August 6, 2025     CHANGE how you take these medications     acetaminophen 325 mg tablet; Commonly known as: Tylenol; Take 2 tablets   (650 mg) by mouth every 6 hours if needed for mild pain (1 - 3) or   moderate pain (4 - 6).; What changed: Another medication with the same   name was removed. Continue taking this medication, and follow the   directions you see here.     CONTINUE taking these medications     Accu-Chek Jennifer Plus test strp; Generic drug: blood sugar diagnostic;   Use 1 strip to check blood sugar 3 times a day with meals.   Accu-Chek Softclix Lancets misc; Generic drug: lancets; Use three times   a day to test blood sugar w/ meals   aspirin 81 mg EC tablet; Take 1 tablet (81 mg) by mouth once daily.   atorvastatin 80 mg tablet; Commonly known as: Lipitor; TAKE 1 TABLET BY   MOUTH ONCE DAILY   BD Ultra-Fine Micro Pen Needle 32 gauge x 1/4\" needle; Generic drug: pen   needle, diabetic   calcium citrate 250 mg calcium tablet   clopidogrel 75 mg tablet; Commonly known as: Plavix; Take 1 tablet (75   mg) by mouth once daily.   diclofenac sodium 1 % gel; Commonly known as: Voltaren; Apply 4.5 inches   (4 g) topically 4 times a day as needed (pain).   docusate sodium 100 mg tablet; Commonly known as: Colace   Easy Touch Alcohol Prep Pads; Generic drug: alcohol swabs   Farxiga 10 mg tablet; Generic drug: dapagliflozin propanediol; Take 1   tablet (10 mg) by mouth once daily with breakfast.   ferrous sulfate 325 (65 Fe) mg EC tablet   gabapentin 300 mg capsule; Commonly known as: Neurontin; Take 1 capsule   (300 mg) by mouth 2 times a day.   glucagon 1 mg injection; Commonly known as: Glucagen; Inject 1 mg into   the muscle every 15 minutes if needed for other (<70 and cannot take oral   due to altered mentation).   insulin lispro 100 unit/mL " injection   loperamide 2 mg capsule; Commonly known as: Imodium   melatonin 10 mg tablet   multivitamin with minerals tablet   ranolazine 500 mg 12 hr tablet; Commonly known as: Ranexa; Take 1 tablet   (500 mg) by mouth 2 times a day. Do not crush, chew, or split.   sennosides 8.6 mg tablet; Commonly known as: Senokot   Vitamin D3 25 mcg (1,000 units) tablet; Generic drug: cholecalciferol     STOP taking these medications     insulin glargine 100 unit/mL injection; Commonly known as: Lantus   metFORMIN 1,000 mg tablet; Commonly known as: Glucophage   NovoLOG U-100 Insulin aspart 100 unit/mL injection; Generic drug:   insulin aspart   traMADol 50 mg tablet; Commonly known as: Ultram       Outpatient Follow-Up  Future Appointments   Date Time Provider Department Center   8/18/2025  2:00 PM [facility] CT 1 [facility]   8/22/2025  1:40 PM [facility]       Patient discussed with attending physician Dr. Dsouza  Plan preliminary until cosigned by attending physician.    Jesse Granados MD  Family Medicine  PGY-3

## 2025-08-05 NOTE — NURSING NOTE
Discharge Note:   Bilateral IV's removed with gauze and tape applied to the area. No pain, discomfort or bleeding to sites. Patient cleaned prior to leaving the floor. No personal belongings found bedside.  All closets and drawers checked. Primary nurse attempted to call report 2 times with no response.

## 2025-08-08 ENCOUNTER — RESULTS FOLLOW-UP (OUTPATIENT)
Dept: GASTROENTEROLOGY | Facility: HOSPITAL | Age: 65
End: 2025-08-08
Payer: MEDICARE

## 2025-08-11 DIAGNOSIS — D69.6 THROMBOCYTOPENIA, UNSPECIFIED: ICD-10-CM

## 2025-08-11 DIAGNOSIS — K92.2 UPPER GI BLEED: ICD-10-CM

## 2025-08-15 DIAGNOSIS — R19.00 PELVIC MASS: Primary | ICD-10-CM

## 2025-08-16 ENCOUNTER — HOSPITAL ENCOUNTER (INPATIENT)
Facility: HOSPITAL | Age: 65
DRG: 870 | End: 2025-08-16
Attending: EMERGENCY MEDICINE | Admitting: INTERNAL MEDICINE
Payer: MEDICARE

## 2025-08-16 DIAGNOSIS — I16.0 HYPERTENSIVE URGENCY: ICD-10-CM

## 2025-08-16 DIAGNOSIS — R94.2 ABNORMAL RESULTS OF PULMONARY FUNCTION STUDIES: ICD-10-CM

## 2025-08-16 DIAGNOSIS — I25.85 CHRONIC CORONARY MICROVASCULAR DYSFUNCTION: ICD-10-CM

## 2025-08-16 DIAGNOSIS — R65.21 SEPTIC SHOCK (MULTI): ICD-10-CM

## 2025-08-16 DIAGNOSIS — R41.82 ALTERED MENTAL STATUS, UNSPECIFIED ALTERED MENTAL STATUS TYPE: Primary | ICD-10-CM

## 2025-08-16 DIAGNOSIS — A41.9 SEPTIC SHOCK (MULTI): ICD-10-CM

## 2025-08-16 LAB
ABO GROUP (TYPE) IN BLOOD: NORMAL
ALBUMIN SERPL BCP-MCNC: 2.2 G/DL (ref 3.4–5)
ALP SERPL-CCNC: 144 U/L (ref 33–136)
ALT SERPL W P-5'-P-CCNC: 25 U/L (ref 7–45)
ANION GAP BLDV CALCULATED.4IONS-SCNC: 11 MMOL/L (ref 10–25)
ANION GAP SERPL CALC-SCNC: 13 MMOL/L (ref 10–20)
ANTIBODY SCREEN: NORMAL
APPEARANCE UR: ABNORMAL
APTT PPP: 46 SECONDS (ref 26–36)
AST SERPL W P-5'-P-CCNC: 47 U/L (ref 9–39)
ATRIAL RATE: 53 BPM
ATRIAL RATE: 59 BPM
BASE EXCESS BLDV CALC-SCNC: -5.5 MMOL/L (ref -2–3)
BASOPHILS # BLD AUTO: 0.03 X10*3/UL (ref 0–0.1)
BASOPHILS NFR BLD AUTO: 0.2 %
BILIRUB SERPL-MCNC: 0.2 MG/DL (ref 0–1.2)
BILIRUB UR STRIP.AUTO-MCNC: NEGATIVE MG/DL
BODY TEMPERATURE: 37 DEGREES CELSIUS
BUN SERPL-MCNC: 28 MG/DL (ref 6–23)
BURR CELLS BLD QL SMEAR: NORMAL
CA-I BLDV-SCNC: 1.17 MMOL/L (ref 1.1–1.33)
CALCIUM SERPL-MCNC: 7.9 MG/DL (ref 8.6–10.6)
CARDIAC TROPONIN I PNL SERPL HS: 46 NG/L (ref 0–34)
CARDIAC TROPONIN I PNL SERPL HS: 49 NG/L (ref 0–34)
CHLORIDE BLDV-SCNC: 107 MMOL/L (ref 98–107)
CHLORIDE SERPL-SCNC: 108 MMOL/L (ref 98–107)
CO2 SERPL-SCNC: 21 MMOL/L (ref 21–32)
COLOR UR: ABNORMAL
CREAT SERPL-MCNC: 1.57 MG/DL (ref 0.5–1.05)
CREAT UR-MCNC: 27.8 MG/DL (ref 20–320)
EGFRCR SERPLBLD CKD-EPI 2021: 36 ML/MIN/1.73M*2
EOSINOPHIL # BLD AUTO: 0.01 X10*3/UL (ref 0–0.7)
EOSINOPHIL NFR BLD AUTO: 0.1 %
ERYTHROCYTE [DISTWIDTH] IN BLOOD BY AUTOMATED COUNT: 20.3 % (ref 11.5–14.5)
GLUCOSE BLD MANUAL STRIP-MCNC: 139 MG/DL (ref 74–99)
GLUCOSE BLD MANUAL STRIP-MCNC: 141 MG/DL (ref 74–99)
GLUCOSE BLD MANUAL STRIP-MCNC: 199 MG/DL (ref 74–99)
GLUCOSE BLDV-MCNC: 36 MG/DL (ref 74–99)
GLUCOSE SERPL-MCNC: 35 MG/DL (ref 74–99)
GLUCOSE UR STRIP.AUTO-MCNC: ABNORMAL MG/DL
HCO3 BLDV-SCNC: 21.9 MMOL/L (ref 22–26)
HCT VFR BLD AUTO: 33.4 % (ref 36–46)
HCT VFR BLD EST: 36 % (ref 36–46)
HGB BLD-MCNC: 11 G/DL (ref 12–16)
HGB BLDV-MCNC: 12 G/DL (ref 12–16)
IMM GRANULOCYTES # BLD AUTO: 0.09 X10*3/UL (ref 0–0.7)
IMM GRANULOCYTES NFR BLD AUTO: 0.7 % (ref 0–0.9)
INHALED O2 CONCENTRATION: 21 %
INR PPP: 1.4 (ref 0.9–1.1)
KETONES UR STRIP.AUTO-MCNC: NEGATIVE MG/DL
LACTATE BLDV-SCNC: 1.6 MMOL/L (ref 0.4–2)
LACTATE SERPL-SCNC: 2.2 MMOL/L (ref 0.4–2)
LACTATE SERPL-SCNC: 2.5 MMOL/L (ref 0.4–2)
LEUKOCYTE ESTERASE UR QL STRIP.AUTO: ABNORMAL
LYMPHOCYTES # BLD AUTO: 2.56 X10*3/UL (ref 1.2–4.8)
LYMPHOCYTES NFR BLD AUTO: 19.6 %
MAGNESIUM SERPL-MCNC: 1.51 MG/DL (ref 1.6–2.4)
MAGNESIUM SERPL-MCNC: 1.67 MG/DL (ref 1.6–2.4)
MCH RBC QN AUTO: 28.1 PG (ref 26–34)
MCHC RBC AUTO-ENTMCNC: 32.9 G/DL (ref 32–36)
MCV RBC AUTO: 85 FL (ref 80–100)
MONOCYTES # BLD AUTO: 0.41 X10*3/UL (ref 0.1–1)
MONOCYTES NFR BLD AUTO: 3.1 %
MUCOUS THREADS #/AREA URNS AUTO: ABNORMAL /LPF
NEUTROPHILS # BLD AUTO: 9.97 X10*3/UL (ref 1.2–7.7)
NEUTROPHILS NFR BLD AUTO: 76.3 %
NITRITE UR QL STRIP.AUTO: NEGATIVE
NRBC BLD-RTO: 0 /100 WBCS (ref 0–0)
OXYHGB MFR BLDV: 56.8 % (ref 45–75)
P AXIS: 79 DEGREES
P AXIS: 83 DEGREES
P OFFSET: 155 MS
P OFFSET: 158 MS
P ONSET: 94 MS
P ONSET: 95 MS
PCO2 BLDV: 50 MM HG (ref 41–51)
PH BLDV: 7.25 PH (ref 7.33–7.43)
PH UR STRIP.AUTO: 6 [PH]
PLATELET # BLD AUTO: 83 X10*3/UL (ref 150–450)
PO2 BLDV: 36 MM HG (ref 35–45)
POTASSIUM BLDV-SCNC: 3 MMOL/L (ref 3.5–5.3)
POTASSIUM SERPL-SCNC: 3 MMOL/L (ref 3.5–5.3)
PR INTERVAL: 222 MS
PR INTERVAL: 222 MS
PROT SERPL-MCNC: 6 G/DL (ref 6.4–8.2)
PROT UR STRIP.AUTO-MCNC: ABNORMAL MG/DL
PROTHROMBIN TIME: 15 SECONDS (ref 9.8–12.4)
Q ONSET: 205 MS
Q ONSET: 206 MS
QRS COUNT: 10 BEATS
QRS COUNT: 9 BEATS
QRS DURATION: 118 MS
QRS DURATION: 128 MS
QT INTERVAL: 596 MS
QT INTERVAL: 628 MS
QTC CALCULATION(BAZETT): 589 MS
QTC CALCULATION(BAZETT): 590 MS
QTC FREDERICIA: 593 MS
QTC FREDERICIA: 602 MS
R AXIS: 61 DEGREES
R AXIS: 74 DEGREES
RBC # BLD AUTO: 3.92 X10*6/UL (ref 4–5.2)
RBC # UR STRIP.AUTO: ABNORMAL MG/DL
RBC #/AREA URNS AUTO: >20 /HPF
RBC MORPH BLD: NORMAL
RH FACTOR (ANTIGEN D): NORMAL
SALICYLATES SERPL-MCNC: <3 MG/DL (ref ?–20)
SAO2 % BLDV: 58 % (ref 45–75)
SODIUM BLDV-SCNC: 137 MMOL/L (ref 136–145)
SODIUM SERPL-SCNC: 139 MMOL/L (ref 136–145)
SODIUM UR-SCNC: 50 MMOL/L
SODIUM/CREAT UR-RTO: 180 MMOL/G CREAT
SP GR UR STRIP.AUTO: 1.01
SQUAMOUS #/AREA URNS AUTO: ABNORMAL /HPF
T AXIS: 227 DEGREES
T AXIS: 249 DEGREES
T OFFSET: 504 MS
T OFFSET: 519 MS
UROBILINOGEN UR STRIP.AUTO-MCNC: NORMAL MG/DL
VENTRICULAR RATE: 53 BPM
VENTRICULAR RATE: 59 BPM
WBC # BLD AUTO: 13.1 X10*3/UL (ref 4.4–11.3)
WBC #/AREA URNS AUTO: >50 /HPF
YEAST BUDDING #/AREA UR COMP ASSIST: PRESENT /HPF
YEAST HYPHAE #/AREA UR COMP ASSIST: PRESENT /HPF

## 2025-08-16 PROCEDURE — 96376 TX/PRO/DX INJ SAME DRUG ADON: CPT | Mod: 59

## 2025-08-16 PROCEDURE — 36415 COLL VENOUS BLD VENIPUNCTURE: CPT

## 2025-08-16 PROCEDURE — 82947 ASSAY GLUCOSE BLOOD QUANT: CPT

## 2025-08-16 PROCEDURE — 96375 TX/PRO/DX INJ NEW DRUG ADDON: CPT | Mod: 59

## 2025-08-16 PROCEDURE — 85730 THROMBOPLASTIN TIME PARTIAL: CPT | Performed by: EMERGENCY MEDICINE

## 2025-08-16 PROCEDURE — 84132 ASSAY OF SERUM POTASSIUM: CPT

## 2025-08-16 PROCEDURE — 2500000005 HC RX 250 GENERAL PHARMACY W/O HCPCS

## 2025-08-16 PROCEDURE — 2500000004 HC RX 250 GENERAL PHARMACY W/ HCPCS (ALT 636 FOR OP/ED): Performed by: EMERGENCY MEDICINE

## 2025-08-16 PROCEDURE — 83605 ASSAY OF LACTIC ACID: CPT | Performed by: EMERGENCY MEDICINE

## 2025-08-16 PROCEDURE — 85025 COMPLETE CBC W/AUTO DIFF WBC: CPT | Performed by: EMERGENCY MEDICINE

## 2025-08-16 PROCEDURE — 96361 HYDRATE IV INFUSION ADD-ON: CPT | Mod: 59

## 2025-08-16 PROCEDURE — 70496 CT ANGIOGRAPHY HEAD: CPT | Performed by: RADIOLOGY

## 2025-08-16 PROCEDURE — 71045 X-RAY EXAM CHEST 1 VIEW: CPT | Mod: FOREIGN READ | Performed by: RADIOLOGY

## 2025-08-16 PROCEDURE — 84132 ASSAY OF SERUM POTASSIUM: CPT | Performed by: EMERGENCY MEDICINE

## 2025-08-16 PROCEDURE — 70450 CT HEAD/BRAIN W/O DYE: CPT | Performed by: RADIOLOGY

## 2025-08-16 PROCEDURE — 87040 BLOOD CULTURE FOR BACTERIA: CPT | Performed by: EMERGENCY MEDICINE

## 2025-08-16 PROCEDURE — 87086 URINE CULTURE/COLONY COUNT: CPT | Performed by: EMERGENCY MEDICINE

## 2025-08-16 PROCEDURE — 2550000001 HC RX 255 CONTRASTS: Performed by: EMERGENCY MEDICINE

## 2025-08-16 PROCEDURE — 96367 TX/PROPH/DG ADDL SEQ IV INF: CPT | Mod: 59

## 2025-08-16 PROCEDURE — 2020000001 HC ICU ROOM DAILY

## 2025-08-16 PROCEDURE — 93308 TTE F-UP OR LMTD: CPT | Performed by: EMERGENCY MEDICINE

## 2025-08-16 PROCEDURE — 99291 CRITICAL CARE FIRST HOUR: CPT | Performed by: EMERGENCY MEDICINE

## 2025-08-16 PROCEDURE — 96368 THER/DIAG CONCURRENT INF: CPT | Mod: 59

## 2025-08-16 PROCEDURE — 2500000004 HC RX 250 GENERAL PHARMACY W/ HCPCS (ALT 636 FOR OP/ED)

## 2025-08-16 PROCEDURE — 86901 BLOOD TYPING SEROLOGIC RH(D): CPT | Performed by: EMERGENCY MEDICINE

## 2025-08-16 PROCEDURE — 80179 DRUG ASSAY SALICYLATE: CPT | Performed by: EMERGENCY MEDICINE

## 2025-08-16 PROCEDURE — 70498 CT ANGIOGRAPHY NECK: CPT | Performed by: RADIOLOGY

## 2025-08-16 PROCEDURE — 2500000005 HC RX 250 GENERAL PHARMACY W/O HCPCS: Performed by: EMERGENCY MEDICINE

## 2025-08-16 PROCEDURE — 36415 COLL VENOUS BLD VENIPUNCTURE: CPT | Performed by: EMERGENCY MEDICINE

## 2025-08-16 PROCEDURE — 99291 CRITICAL CARE FIRST HOUR: CPT

## 2025-08-16 PROCEDURE — 76604 US EXAM CHEST: CPT

## 2025-08-16 PROCEDURE — 84484 ASSAY OF TROPONIN QUANT: CPT | Performed by: EMERGENCY MEDICINE

## 2025-08-16 PROCEDURE — 94799 UNLISTED PULMONARY SVC/PX: CPT

## 2025-08-16 PROCEDURE — 81001 URINALYSIS AUTO W/SCOPE: CPT | Performed by: EMERGENCY MEDICINE

## 2025-08-16 PROCEDURE — 31500 INSERT EMERGENCY AIRWAY: CPT

## 2025-08-16 PROCEDURE — 94002 VENT MGMT INPAT INIT DAY: CPT

## 2025-08-16 PROCEDURE — 85610 PROTHROMBIN TIME: CPT | Performed by: EMERGENCY MEDICINE

## 2025-08-16 PROCEDURE — 31500 INSERT EMERGENCY AIRWAY: CPT | Performed by: EMERGENCY MEDICINE

## 2025-08-16 PROCEDURE — 87449 NOS EACH ORGANISM AG IA: CPT | Performed by: EMERGENCY MEDICINE

## 2025-08-16 PROCEDURE — 82436 ASSAY OF URINE CHLORIDE: CPT

## 2025-08-16 PROCEDURE — 82570 ASSAY OF URINE CREATININE: CPT | Performed by: EMERGENCY MEDICINE

## 2025-08-16 PROCEDURE — 87899 AGENT NOS ASSAY W/OPTIC: CPT | Performed by: EMERGENCY MEDICINE

## 2025-08-16 PROCEDURE — 99222 1ST HOSP IP/OBS MODERATE 55: CPT

## 2025-08-16 PROCEDURE — 76604 US EXAM CHEST: CPT | Performed by: EMERGENCY MEDICINE

## 2025-08-16 PROCEDURE — 93010 ELECTROCARDIOGRAM REPORT: CPT | Performed by: EMERGENCY MEDICINE

## 2025-08-16 PROCEDURE — 83735 ASSAY OF MAGNESIUM: CPT | Performed by: EMERGENCY MEDICINE

## 2025-08-16 RX ORDER — ROCURONIUM BROMIDE 10 MG/ML
INJECTION, SOLUTION INTRAVENOUS CODE/TRAUMA/SEDATION MEDICATION
Status: COMPLETED | OUTPATIENT
Start: 2025-08-16 | End: 2025-08-16

## 2025-08-16 RX ORDER — PANTOPRAZOLE SODIUM 40 MG/10ML
40 INJECTION, POWDER, LYOPHILIZED, FOR SOLUTION INTRAVENOUS
Status: DISCONTINUED | OUTPATIENT
Start: 2025-08-17 | End: 2025-08-17

## 2025-08-16 RX ORDER — ROCURONIUM BROMIDE 10 MG/ML
INJECTION, SOLUTION INTRAVENOUS
Status: DISPENSED
Start: 2025-08-16 | End: 2025-08-17

## 2025-08-16 RX ORDER — ERTAPENEM 1 G/1
1 INJECTION, POWDER, LYOPHILIZED, FOR SOLUTION INTRAMUSCULAR; INTRAVENOUS DAILY
COMMUNITY
Start: 2025-08-16 | End: 2025-08-22

## 2025-08-16 RX ORDER — DEXTROSE 50 % IN WATER (D50W) INTRAVENOUS SYRINGE
25
Status: DISCONTINUED | OUTPATIENT
Start: 2025-08-16 | End: 2025-08-17

## 2025-08-16 RX ORDER — SODIUM BICARBONATE 1 MEQ/ML
SYRINGE (ML) INTRAVENOUS
Status: DISCONTINUED
Start: 2025-08-16 | End: 2025-08-16 | Stop reason: WASHOUT

## 2025-08-16 RX ORDER — HEPARIN SODIUM 5000 [USP'U]/ML
5000 INJECTION, SOLUTION INTRAVENOUS; SUBCUTANEOUS EVERY 8 HOURS SCHEDULED
Status: DISCONTINUED | OUTPATIENT
Start: 2025-08-17 | End: 2025-08-18

## 2025-08-16 RX ORDER — KETAMINE HYDROCHLORIDE 10 MG/ML
INJECTION, SOLUTION INTRAMUSCULAR; INTRAVENOUS CODE/TRAUMA/SEDATION MEDICATION
Status: COMPLETED | OUTPATIENT
Start: 2025-08-16 | End: 2025-08-16

## 2025-08-16 RX ORDER — MEROPENEM AND SODIUM CHLORIDE 1 G/50ML
2 INJECTION, SOLUTION INTRAVENOUS
Status: COMPLETED | OUTPATIENT
Start: 2025-08-16 | End: 2025-08-16

## 2025-08-16 RX ORDER — DEXTROSE 50 % IN WATER (D50W) INTRAVENOUS SYRINGE
Status: COMPLETED
Start: 2025-08-16 | End: 2025-08-16

## 2025-08-16 RX ORDER — VANCOMYCIN HYDROCHLORIDE 500 MG/100ML
INJECTION, SOLUTION INTRAVENOUS
Status: DISPENSED
Start: 2025-08-16 | End: 2025-08-17

## 2025-08-16 RX ORDER — ROCURONIUM BROMIDE 10 MG/ML
1 INJECTION, SOLUTION INTRAVENOUS ONCE
Status: DISCONTINUED | OUTPATIENT
Start: 2025-08-16 | End: 2025-08-23

## 2025-08-16 RX ORDER — FENTANYL CITRATE-0.9 % NACL/PF 10 MCG/ML
0-300 PLASTIC BAG, INJECTION (ML) INTRAVENOUS CONTINUOUS
Status: DISCONTINUED | OUTPATIENT
Start: 2025-08-16 | End: 2025-08-17

## 2025-08-16 RX ORDER — LEVETIRACETAM 15 MG/ML
1500 INJECTION INTRAVASCULAR ONCE
Status: COMPLETED | OUTPATIENT
Start: 2025-08-16 | End: 2025-08-16

## 2025-08-16 RX ORDER — PROPOFOL 10 MG/ML
5-50 INJECTION, EMULSION INTRAVENOUS CONTINUOUS
Status: DISCONTINUED | OUTPATIENT
Start: 2025-08-16 | End: 2025-08-17

## 2025-08-16 RX ORDER — PROPOFOL 10 MG/ML
INJECTION, EMULSION INTRAVENOUS
Status: COMPLETED
Start: 2025-08-16 | End: 2025-08-16

## 2025-08-16 RX ORDER — MAGNESIUM SULFATE HEPTAHYDRATE 40 MG/ML
2 INJECTION, SOLUTION INTRAVENOUS ONCE
Status: COMPLETED | OUTPATIENT
Start: 2025-08-16 | End: 2025-08-16

## 2025-08-16 RX ORDER — VANCOMYCIN HYDROCHLORIDE 500 MG/100ML
1 INJECTION, SOLUTION INTRAVENOUS ONCE
Status: COMPLETED | OUTPATIENT
Start: 2025-08-16 | End: 2025-08-16

## 2025-08-16 RX ORDER — DEXTROSE MONOHYDRATE AND SODIUM CHLORIDE 5; .45 G/100ML; G/100ML
75 INJECTION, SOLUTION INTRAVENOUS
COMMUNITY

## 2025-08-16 RX ORDER — PHENYLEPHRINE HCL IN 0.9% NACL 0.4MG/10ML
SYRINGE (ML) INTRAVENOUS
Status: DISPENSED
Start: 2025-08-16 | End: 2025-08-17

## 2025-08-16 RX ORDER — POTASSIUM CHLORIDE 14.9 MG/ML
20 INJECTION INTRAVENOUS
Status: COMPLETED | OUTPATIENT
Start: 2025-08-16 | End: 2025-08-16

## 2025-08-16 RX ORDER — PANTOPRAZOLE SODIUM 40 MG/1
40 TABLET, DELAYED RELEASE ORAL
Status: DISCONTINUED | OUTPATIENT
Start: 2025-08-17 | End: 2025-08-17

## 2025-08-16 RX ORDER — FENTANYL CITRATE-0.9 % NACL/PF 10 MCG/ML
PLASTIC BAG, INJECTION (ML) INTRAVENOUS
Status: COMPLETED
Start: 2025-08-16 | End: 2025-08-16

## 2025-08-16 RX ORDER — LEVETIRACETAM 10 MG/ML
1000 INJECTION INTRAVASCULAR ONCE
Status: COMPLETED | OUTPATIENT
Start: 2025-08-16 | End: 2025-08-16

## 2025-08-16 RX ORDER — DEXAMETHASONE SODIUM PHOSPHATE 10 MG/ML
10 INJECTION INTRAMUSCULAR; INTRAVENOUS ONCE
Status: COMPLETED | OUTPATIENT
Start: 2025-08-16 | End: 2025-08-16

## 2025-08-16 RX ORDER — MEROPENEM AND SODIUM CHLORIDE 1 G/50ML
1 INJECTION, SOLUTION INTRAVENOUS
Status: DISCONTINUED | OUTPATIENT
Start: 2025-08-16 | End: 2025-08-16

## 2025-08-16 RX ADMIN — IOHEXOL 90 ML: 350 INJECTION, SOLUTION INTRAVENOUS at 12:14

## 2025-08-16 RX ADMIN — Medication 25 MCG/HR: at 17:01

## 2025-08-16 RX ADMIN — MEROPENEM AND SODIUM CHLORIDE 2 G: 1 INJECTION, SOLUTION INTRAVENOUS at 18:03

## 2025-08-16 RX ADMIN — DEXAMETHASONE SODIUM PHOSPHATE 10 MG: 10 INJECTION INTRAMUSCULAR; INTRAVENOUS at 18:21

## 2025-08-16 RX ADMIN — ACYCLOVIR SODIUM 386 MG: 50 INJECTION, SOLUTION INTRAVENOUS at 22:55

## 2025-08-16 RX ADMIN — KETAMINE HYDROCHLORIDE 50 MG: 10 INJECTION, SOLUTION INTRAMUSCULAR; INTRAVENOUS at 16:30

## 2025-08-16 RX ADMIN — PROPOFOL 10 MCG/KG/MIN: 10 INJECTION, EMULSION INTRAVENOUS at 16:37

## 2025-08-16 RX ADMIN — Medication 1 DOSE: at 17:03

## 2025-08-16 RX ADMIN — ROCURONIUM BROMIDE 50 MG: 10 INJECTION, SOLUTION INTRAVENOUS at 16:31

## 2025-08-16 RX ADMIN — DEXTROSE MONOHYDRATE 50 G: 25 INJECTION, SOLUTION INTRAVENOUS at 12:55

## 2025-08-16 RX ADMIN — POTASSIUM CHLORIDE 20 MEQ: 14.9 INJECTION, SOLUTION INTRAVENOUS at 13:22

## 2025-08-16 RX ADMIN — POTASSIUM CHLORIDE 20 MEQ: 14.9 INJECTION, SOLUTION INTRAVENOUS at 15:43

## 2025-08-16 RX ADMIN — LEVETIRACETAM 1500 MG: 15 INJECTION IONTOPHORESIS at 18:14

## 2025-08-16 RX ADMIN — Medication 1 DOSE: at 23:32

## 2025-08-16 RX ADMIN — SODIUM CHLORIDE, POTASSIUM CHLORIDE, SODIUM LACTATE AND CALCIUM CHLORIDE 1000 ML: 600; 310; 30; 20 INJECTION, SOLUTION INTRAVENOUS at 13:32

## 2025-08-16 RX ADMIN — Medication: at 19:33

## 2025-08-16 RX ADMIN — SODIUM CHLORIDE, SODIUM LACTATE, POTASSIUM CHLORIDE, AND CALCIUM CHLORIDE 500 ML: .6; .31; .03; .02 INJECTION, SOLUTION INTRAVENOUS at 20:42

## 2025-08-16 RX ADMIN — VANCOMYCIN HYDROCHLORIDE 1 G: 500 INJECTION, SOLUTION INTRAVENOUS at 18:32

## 2025-08-16 RX ADMIN — Medication 1 DOSE: at 17:02

## 2025-08-16 RX ADMIN — MAGNESIUM SULFATE HEPTAHYDRATE 2 G: 40 INJECTION, SOLUTION INTRAVENOUS at 15:42

## 2025-08-16 RX ADMIN — DEXTROSE 50 % IN WATER (D50W) INTRAVENOUS SYRINGE 50 G: at 12:55

## 2025-08-16 RX ADMIN — LEVETIRACETAM 1000 MG: 10 INJECTION INTRAVENOUS at 18:14

## 2025-08-16 ASSESSMENT — ENCOUNTER SYMPTOMS: LOSS OF CONSCIOUSNESS: 1

## 2025-08-16 ASSESSMENT — LIFESTYLE VARIABLES
EVER FELT BAD OR GUILTY ABOUT YOUR DRINKING: NO
TOTAL SCORE: 0
EVER HAD A DRINK FIRST THING IN THE MORNING TO STEADY YOUR NERVES TO GET RID OF A HANGOVER: NO
HAVE PEOPLE ANNOYED YOU BY CRITICIZING YOUR DRINKING: NO
HAVE YOU EVER FELT YOU SHOULD CUT DOWN ON YOUR DRINKING: NO

## 2025-08-16 ASSESSMENT — PAIN - FUNCTIONAL ASSESSMENT: PAIN_FUNCTIONAL_ASSESSMENT: 0-10

## 2025-08-16 ASSESSMENT — PAIN SCALES - GENERAL
PAINLEVEL_OUTOF10: 0 - NO PAIN
PAINLEVEL_OUTOF10: 0 - NO PAIN

## 2025-08-17 ENCOUNTER — APPOINTMENT (OUTPATIENT)
Dept: NEUROLOGY | Facility: HOSPITAL | Age: 65
DRG: 870 | End: 2025-08-17
Payer: MEDICARE

## 2025-08-17 LAB
ALBUMIN SERPL BCP-MCNC: 1.6 G/DL (ref 3.4–5)
ALBUMIN SERPL BCP-MCNC: 1.8 G/DL (ref 3.4–5)
ALP SERPL-CCNC: 169 U/L (ref 33–136)
ALP SERPL-CCNC: 185 U/L (ref 33–136)
ALT SERPL W P-5'-P-CCNC: 34 U/L (ref 7–45)
ALT SERPL W P-5'-P-CCNC: 37 U/L (ref 7–45)
ANION GAP BLDA CALCULATED.4IONS-SCNC: 13 MMO/L (ref 10–25)
ANION GAP BLDV CALCULATED.4IONS-SCNC: 11 MMOL/L (ref 10–25)
ANION GAP SERPL CALC-SCNC: 13 MMOL/L (ref 10–20)
ANION GAP SERPL CALC-SCNC: 14 MMOL/L (ref 10–20)
APPEARANCE UR: ABNORMAL
AST SERPL W P-5'-P-CCNC: 109 U/L (ref 9–39)
AST SERPL W P-5'-P-CCNC: 78 U/L (ref 9–39)
BACTERIA BLD CULT: NORMAL
BACTERIA BLD CULT: NORMAL
BACTERIA UR CULT: NORMAL
BASE EXCESS BLDA CALC-SCNC: -5.2 MMOL/L (ref -2–3)
BASE EXCESS BLDV CALC-SCNC: -2.6 MMOL/L (ref -2–3)
BASOPHILS # BLD AUTO: 0.03 X10*3/UL (ref 0–0.1)
BASOPHILS # BLD MANUAL: 0 X10*3/UL (ref 0–0.1)
BASOPHILS NFR BLD AUTO: 0.2 %
BASOPHILS NFR BLD MANUAL: 0 %
BILIRUB SERPL-MCNC: 0.3 MG/DL (ref 0–1.2)
BILIRUB SERPL-MCNC: 0.3 MG/DL (ref 0–1.2)
BILIRUB UR STRIP.AUTO-MCNC: NEGATIVE MG/DL
BLASTS # BLD MANUAL: 0 X10*3/UL
BLASTS NFR BLD MANUAL: 0 %
BODY TEMPERATURE: 37 DEGREES CELSIUS
BODY TEMPERATURE: 37 DEGREES CELSIUS
BUN SERPL-MCNC: 24 MG/DL (ref 6–23)
BUN SERPL-MCNC: 25 MG/DL (ref 6–23)
BURR CELLS BLD QL SMEAR: ABNORMAL
CA-I BLDA-SCNC: 0.99 MMOL/L (ref 1.1–1.33)
CA-I BLDV-SCNC: 0.9 MMOL/L (ref 1.1–1.33)
CALCIUM SERPL-MCNC: 6.9 MG/DL (ref 8.6–10.6)
CALCIUM SERPL-MCNC: 7 MG/DL (ref 8.6–10.6)
CARDIAC TROPONIN I PNL SERPL HS: 41 NG/L (ref 0–34)
CARDIAC TROPONIN I PNL SERPL HS: 41 NG/L (ref 0–34)
CHLORIDE BLDA-SCNC: 110 MMOL/L (ref 98–107)
CHLORIDE BLDV-SCNC: 112 MMOL/L (ref 98–107)
CHLORIDE SERPL-SCNC: 108 MMOL/L (ref 98–107)
CHLORIDE SERPL-SCNC: 109 MMOL/L (ref 98–107)
CHLORIDE UR-SCNC: 53 MMOL/L
CHLORIDE/CREATININE (MMOL/G) IN URINE: 191 MMOL/G CREAT (ref 38–318)
CO2 SERPL-SCNC: 19 MMOL/L (ref 21–32)
CO2 SERPL-SCNC: 21 MMOL/L (ref 21–32)
COLOR UR: ABNORMAL
CREAT SERPL-MCNC: 1.38 MG/DL (ref 0.5–1.05)
CREAT SERPL-MCNC: 1.49 MG/DL (ref 0.5–1.05)
CREAT UR-MCNC: 27.8 MG/DL (ref 20–320)
EGFRCR SERPLBLD CKD-EPI 2021: 39 ML/MIN/1.73M*2
EGFRCR SERPLBLD CKD-EPI 2021: 43 ML/MIN/1.73M*2
EOSINOPHIL # BLD AUTO: 0 X10*3/UL (ref 0–0.7)
EOSINOPHIL # BLD MANUAL: 0 X10*3/UL (ref 0–0.7)
EOSINOPHIL NFR BLD AUTO: 0 %
EOSINOPHIL NFR BLD MANUAL: 0 %
ERYTHROCYTE [DISTWIDTH] IN BLOOD BY AUTOMATED COUNT: 19.9 % (ref 11.5–14.5)
ERYTHROCYTE [DISTWIDTH] IN BLOOD BY AUTOMATED COUNT: 19.9 % (ref 11.5–14.5)
FERRITIN SERPL-MCNC: 711 NG/ML (ref 8–150)
FIBRINOGEN PPP-MCNC: 252 MG/DL (ref 200–400)
GLUCOSE BLD MANUAL STRIP-MCNC: 117 MG/DL (ref 74–99)
GLUCOSE BLD MANUAL STRIP-MCNC: 121 MG/DL (ref 74–99)
GLUCOSE BLD MANUAL STRIP-MCNC: 123 MG/DL (ref 74–99)
GLUCOSE BLD MANUAL STRIP-MCNC: 132 MG/DL (ref 74–99)
GLUCOSE BLD MANUAL STRIP-MCNC: 133 MG/DL (ref 74–99)
GLUCOSE BLD MANUAL STRIP-MCNC: 141 MG/DL (ref 74–99)
GLUCOSE BLD MANUAL STRIP-MCNC: 144 MG/DL (ref 74–99)
GLUCOSE BLDA-MCNC: 166 MG/DL (ref 74–99)
GLUCOSE BLDV-MCNC: 101 MG/DL (ref 74–99)
GLUCOSE SERPL-MCNC: 125 MG/DL (ref 74–99)
GLUCOSE SERPL-MCNC: 193 MG/DL (ref 74–99)
GLUCOSE UR STRIP.AUTO-MCNC: NORMAL MG/DL
GRAM STN SPEC: NORMAL
GRAM STN SPEC: NORMAL
HAPTOGLOB SERPL NEPH-MCNC: 197 MG/DL (ref 30–200)
HCO3 BLDA-SCNC: 15.4 MMOL/L (ref 22–26)
HCO3 BLDV-SCNC: 19.2 MMOL/L (ref 22–26)
HCT VFR BLD AUTO: 31 % (ref 36–46)
HCT VFR BLD AUTO: 32.5 % (ref 36–46)
HCT VFR BLD EST: 27 % (ref 36–46)
HCT VFR BLD EST: 32 % (ref 36–46)
HGB BLD-MCNC: 10.7 G/DL (ref 12–16)
HGB BLD-MCNC: 11.1 G/DL (ref 12–16)
HGB BLDA-MCNC: 10.8 G/DL (ref 12–16)
HGB BLDV-MCNC: 8.9 G/DL (ref 12–16)
HGB RETIC QN: 30 PG (ref 28–38)
HIV 1+2 AB+HIV1 P24 AG SERPL QL IA: NONREACTIVE
IMM GRANULOCYTES # BLD AUTO: 0.12 X10*3/UL (ref 0–0.7)
IMM GRANULOCYTES # BLD AUTO: 0.27 X10*3/UL (ref 0–0.7)
IMM GRANULOCYTES NFR BLD AUTO: 0.9 % (ref 0–0.9)
IMM GRANULOCYTES NFR BLD AUTO: 2.1 % (ref 0–0.9)
IMMATURE RETIC FRACTION: 7.5 %
INHALED O2 CONCENTRATION: 30 %
INHALED O2 CONCENTRATION: 30 %
IRON SATN MFR SERPL: ABNORMAL %
IRON SERPL-MCNC: 43 UG/DL (ref 35–150)
KETONES UR STRIP.AUTO-MCNC: NEGATIVE MG/DL
LACTATE BLDA-SCNC: 2 MMOL/L (ref 0.4–2)
LACTATE BLDV-SCNC: 1.9 MMOL/L (ref 0.4–2)
LDH SERPL L TO P-CCNC: 235 U/L (ref 84–246)
LEGIONELLA AG UR QL: NEGATIVE
LEUKOCYTE ESTERASE UR QL STRIP.AUTO: ABNORMAL
LYMPHOCYTES # BLD AUTO: 1.46 X10*3/UL (ref 1.2–4.8)
LYMPHOCYTES # BLD MANUAL: 0.12 X10*3/UL (ref 1.2–4.8)
LYMPHOCYTES NFR BLD AUTO: 11.2 %
LYMPHOCYTES NFR BLD MANUAL: 0.9 %
MAGNESIUM SERPL-MCNC: 1.54 MG/DL (ref 1.6–2.4)
MAGNESIUM SERPL-MCNC: 2.26 MG/DL (ref 1.6–2.4)
MCH RBC QN AUTO: 28.1 PG (ref 26–34)
MCH RBC QN AUTO: 28.3 PG (ref 26–34)
MCHC RBC AUTO-ENTMCNC: 34.2 G/DL (ref 32–36)
MCHC RBC AUTO-ENTMCNC: 34.5 G/DL (ref 32–36)
MCV RBC AUTO: 82 FL (ref 80–100)
MCV RBC AUTO: 82 FL (ref 80–100)
METAMYELOCYTES # BLD MANUAL: 0 X10*3/UL
METAMYELOCYTES NFR BLD MANUAL: 0 %
MONOCYTES # BLD AUTO: 0.11 X10*3/UL (ref 0.1–1)
MONOCYTES # BLD MANUAL: 0 X10*3/UL (ref 0.1–1)
MONOCYTES NFR BLD AUTO: 0.8 %
MONOCYTES NFR BLD MANUAL: 0 %
MYELOCYTES # BLD MANUAL: 0 X10*3/UL
MYELOCYTES NFR BLD MANUAL: 0 %
NEUTROPHILS # BLD AUTO: 11.2 X10*3/UL (ref 1.2–7.7)
NEUTROPHILS # BLD MANUAL: 12.68 X10*3/UL (ref 1.2–7.7)
NEUTROPHILS NFR BLD AUTO: 85.7 %
NEUTS BAND # BLD MANUAL: 0.33 X10*3/UL (ref 0–0.7)
NEUTS BAND NFR BLD MANUAL: 2.6 %
NEUTS SEG # BLD MANUAL: 12.35 X10*3/UL (ref 1.2–7)
NEUTS SEG NFR BLD MANUAL: 96.5 %
NITRITE UR QL STRIP.AUTO: NEGATIVE
NRBC BLD MANUAL-RTO: 0 % (ref 0–0)
NRBC BLD-RTO: 0.2 /100 WBCS (ref 0–0)
NRBC BLD-RTO: 0.7 /100 WBCS (ref 0–0)
OXYHGB MFR BLDA: 97.4 % (ref 94–98)
OXYHGB MFR BLDV: 95 % (ref 45–75)
PCO2 BLDA: 18 MM HG (ref 38–42)
PCO2 BLDV: 23 MM HG (ref 41–51)
PH BLDA: 7.54 PH (ref 7.38–7.42)
PH BLDV: 7.53 PH (ref 7.33–7.43)
PH UR STRIP.AUTO: 6.5 [PH]
PHOSPHATE SERPL-MCNC: 2.9 MG/DL (ref 2.5–4.9)
PHOSPHATE SERPL-MCNC: 3.5 MG/DL (ref 2.5–4.9)
PLASMA CELLS # BLD MANUAL: 0 X10*3/UL
PLASMA CELLS NFR BLD MANUAL: 0 %
PLATELET # BLD AUTO: 51 X10*3/UL (ref 150–450)
PLATELET # BLD AUTO: 64 X10*3/UL (ref 150–450)
PO2 BLDA: 144 MM HG (ref 85–95)
PO2 BLDV: 67 MM HG (ref 35–45)
POTASSIUM BLDA-SCNC: 3.5 MMOL/L (ref 3.5–5.3)
POTASSIUM BLDV-SCNC: 3 MMOL/L (ref 3.5–5.3)
POTASSIUM SERPL-SCNC: 3.8 MMOL/L (ref 3.5–5.3)
POTASSIUM SERPL-SCNC: 4.1 MMOL/L (ref 3.5–5.3)
POTASSIUM UR-SCNC: 20 MMOL/L
POTASSIUM/CREAT UR-RTO: 72 MMOL/G CREAT
PROMYELOCYTES # BLD MANUAL: 0 X10*3/UL
PROMYELOCYTES NFR BLD MANUAL: 0 %
PROT SERPL-MCNC: 4.4 G/DL (ref 6.4–8.2)
PROT SERPL-MCNC: 4.8 G/DL (ref 6.4–8.2)
PROT UR STRIP.AUTO-MCNC: ABNORMAL MG/DL
RBC # BLD AUTO: 3.78 X10*6/UL (ref 4–5.2)
RBC # BLD AUTO: 3.95 X10*6/UL (ref 4–5.2)
RBC # UR STRIP.AUTO: ABNORMAL MG/DL
RBC #/AREA URNS AUTO: >20 /HPF
RBC MORPH BLD: ABNORMAL
RETICS #: 0.05 X10*6/UL (ref 0.02–0.08)
RETICS/RBC NFR AUTO: 1.3 % (ref 0.5–2)
S PNEUM AG UR QL: NEGATIVE
SAO2 % BLDA: 99 % (ref 94–100)
SAO2 % BLDV: 97 % (ref 45–75)
SODIUM BLDA-SCNC: 135 MMOL/L (ref 136–145)
SODIUM BLDV-SCNC: 139 MMOL/L (ref 136–145)
SODIUM SERPL-SCNC: 137 MMOL/L (ref 136–145)
SODIUM SERPL-SCNC: 139 MMOL/L (ref 136–145)
SODIUM UR-SCNC: 50 MMOL/L
SODIUM/CREAT UR-RTO: 180 MMOL/G CREAT
SP GR UR STRIP.AUTO: 1.04
T4 FREE SERPL-MCNC: 0.96 NG/DL (ref 0.78–1.48)
TIBC SERPL-MCNC: ABNORMAL UG/DL
TOTAL CELLS COUNTED BLD: 115
TREPONEMA PALLIDUM IGG+IGM AB [PRESENCE] IN SERUM OR PLASMA BY IMMUNOASSAY: REACTIVE
TSH SERPL-ACNC: 5.33 MIU/L (ref 0.44–3.98)
UIBC SERPL-MCNC: <55 UG/DL (ref 110–370)
UROBILINOGEN UR STRIP.AUTO-MCNC: ABNORMAL MG/DL
VARIANT LYMPHS # BLD MANUAL: 0 X10*3/UL (ref 0–0.5)
VARIANT LYMPHS NFR BLD: 0 %
VIT B12 SERPL-MCNC: 1977 PG/ML (ref 211–911)
WBC # BLD AUTO: 12.8 X10*3/UL (ref 4.4–11.3)
WBC # BLD AUTO: 13.1 X10*3/UL (ref 4.4–11.3)
WBC #/AREA URNS AUTO: >50 /HPF
WBC CLUMPS #/AREA URNS AUTO: ABNORMAL /HPF
WBC OTHER # BLD MANUAL: 0 X10*3/UL
WBC OTHER NFR BLD MANUAL: 0 %

## 2025-08-17 PROCEDURE — 95700 EEG CONT REC W/VID EEG TECH: CPT

## 2025-08-17 PROCEDURE — 84100 ASSAY OF PHOSPHORUS: CPT

## 2025-08-17 PROCEDURE — 99223 1ST HOSP IP/OBS HIGH 75: CPT | Performed by: STUDENT IN AN ORGANIZED HEALTH CARE EDUCATION/TRAINING PROGRAM

## 2025-08-17 PROCEDURE — 87081 CULTURE SCREEN ONLY: CPT

## 2025-08-17 PROCEDURE — 2550000001 HC RX 255 CONTRASTS: Performed by: INTERNAL MEDICINE

## 2025-08-17 PROCEDURE — 86780 TREPONEMA PALLIDUM: CPT

## 2025-08-17 PROCEDURE — G0545 PR INHERENT VISIT TO INPT: HCPCS | Performed by: STUDENT IN AN ORGANIZED HEALTH CARE EDUCATION/TRAINING PROGRAM

## 2025-08-17 PROCEDURE — 84132 ASSAY OF SERUM POTASSIUM: CPT

## 2025-08-17 PROCEDURE — 85027 COMPLETE CBC AUTOMATED: CPT

## 2025-08-17 PROCEDURE — 84484 ASSAY OF TROPONIN QUANT: CPT

## 2025-08-17 PROCEDURE — 36556 INSERT NON-TUNNEL CV CATH: CPT | Mod: GC

## 2025-08-17 PROCEDURE — 83735 ASSAY OF MAGNESIUM: CPT

## 2025-08-17 PROCEDURE — 2500000005 HC RX 250 GENERAL PHARMACY W/O HCPCS

## 2025-08-17 PROCEDURE — 85025 COMPLETE CBC W/AUTO DIFF WBC: CPT

## 2025-08-17 PROCEDURE — 71045 X-RAY EXAM CHEST 1 VIEW: CPT

## 2025-08-17 PROCEDURE — 83010 ASSAY OF HAPTOGLOBIN QUANT: CPT

## 2025-08-17 PROCEDURE — 36620 INSERTION CATHETER ARTERY: CPT

## 2025-08-17 PROCEDURE — 37799 UNLISTED PX VASCULAR SURGERY: CPT

## 2025-08-17 PROCEDURE — 81001 URINALYSIS AUTO W/SCOPE: CPT

## 2025-08-17 PROCEDURE — 05HY33Z INSERTION OF INFUSION DEVICE INTO UPPER VEIN, PERCUTANEOUS APPROACH: ICD-10-PCS

## 2025-08-17 PROCEDURE — 87077 CULTURE AEROBIC IDENTIFY: CPT

## 2025-08-17 PROCEDURE — 84439 ASSAY OF FREE THYROXINE: CPT

## 2025-08-17 PROCEDURE — 2500000004 HC RX 250 GENERAL PHARMACY W/ HCPCS (ALT 636 FOR OP/ED)

## 2025-08-17 PROCEDURE — 71260 CT THORAX DX C+: CPT | Performed by: RADIOLOGY

## 2025-08-17 PROCEDURE — 36620 INSERTION CATHETER ARTERY: CPT | Mod: GC

## 2025-08-17 PROCEDURE — 82728 ASSAY OF FERRITIN: CPT

## 2025-08-17 PROCEDURE — 85384 FIBRINOGEN ACTIVITY: CPT

## 2025-08-17 PROCEDURE — 2020000001 HC ICU ROOM DAILY

## 2025-08-17 PROCEDURE — 84075 ASSAY ALKALINE PHOSPHATASE: CPT

## 2025-08-17 PROCEDURE — 5A1955Z RESPIRATORY VENTILATION, GREATER THAN 96 CONSECUTIVE HOURS: ICD-10-PCS | Performed by: EMERGENCY MEDICINE

## 2025-08-17 PROCEDURE — 95715 VEEG EA 12-26HR INTMT MNTR: CPT

## 2025-08-17 PROCEDURE — 82947 ASSAY GLUCOSE BLOOD QUANT: CPT

## 2025-08-17 PROCEDURE — 0BH17EZ INSERTION OF ENDOTRACHEAL AIRWAY INTO TRACHEA, VIA NATURAL OR ARTIFICIAL OPENING: ICD-10-PCS | Performed by: EMERGENCY MEDICINE

## 2025-08-17 PROCEDURE — 3E043XZ INTRODUCTION OF VASOPRESSOR INTO CENTRAL VEIN, PERCUTANEOUS APPROACH: ICD-10-PCS | Performed by: INTERNAL MEDICINE

## 2025-08-17 PROCEDURE — 2500000001 HC RX 250 WO HCPCS SELF ADMINISTERED DRUGS (ALT 637 FOR MEDICARE OP)

## 2025-08-17 PROCEDURE — 86318 IA INFECTIOUS AGENT ANTIBODY: CPT

## 2025-08-17 PROCEDURE — 36415 COLL VENOUS BLD VENIPUNCTURE: CPT

## 2025-08-17 PROCEDURE — 83540 ASSAY OF IRON: CPT

## 2025-08-17 PROCEDURE — 95816 EEG AWAKE AND DROWSY: CPT

## 2025-08-17 PROCEDURE — 85007 BL SMEAR W/DIFF WBC COUNT: CPT

## 2025-08-17 PROCEDURE — 84443 ASSAY THYROID STIM HORMONE: CPT

## 2025-08-17 PROCEDURE — 87086 URINE CULTURE/COLONY COUNT: CPT

## 2025-08-17 PROCEDURE — 87389 HIV-1 AG W/HIV-1&-2 AB AG IA: CPT

## 2025-08-17 PROCEDURE — 95720 EEG PHY/QHP EA INCR W/VEEG: CPT | Performed by: PSYCHIATRY & NEUROLOGY

## 2025-08-17 PROCEDURE — 83615 LACTATE (LD) (LDH) ENZYME: CPT

## 2025-08-17 PROCEDURE — 82607 VITAMIN B-12: CPT

## 2025-08-17 PROCEDURE — 85045 AUTOMATED RETICULOCYTE COUNT: CPT

## 2025-08-17 PROCEDURE — 74177 CT ABD & PELVIS W/CONTRAST: CPT | Performed by: RADIOLOGY

## 2025-08-17 PROCEDURE — 99291 CRITICAL CARE FIRST HOUR: CPT

## 2025-08-17 RX ORDER — DEXTROSE 50 % IN WATER (D50W) INTRAVENOUS SYRINGE
25
Status: DISCONTINUED | OUTPATIENT
Start: 2025-08-17 | End: 2025-08-23

## 2025-08-17 RX ORDER — MICAFUNGIN SODIUM 100 MG/5ML
100 INJECTION, POWDER, LYOPHILIZED, FOR SOLUTION INTRAVENOUS EVERY 24 HOURS
Status: DISCONTINUED | OUTPATIENT
Start: 2025-08-17 | End: 2025-08-18

## 2025-08-17 RX ORDER — NOREPINEPHRINE BITARTRATE/D5W 8 MG/250ML
.01-1 PLASTIC BAG, INJECTION (ML) INTRAVENOUS CONTINUOUS
Status: DISCONTINUED | OUTPATIENT
Start: 2025-08-17 | End: 2025-08-18

## 2025-08-17 RX ORDER — NOREPINEPHRINE BITARTRATE/D5W 8 MG/250ML
PLASTIC BAG, INJECTION (ML) INTRAVENOUS
Status: COMPLETED
Start: 2025-08-17 | End: 2025-08-17

## 2025-08-17 RX ORDER — POLYETHYLENE GLYCOL 3350 17 G/17G
17 POWDER, FOR SOLUTION ORAL DAILY PRN
Status: DISCONTINUED | OUTPATIENT
Start: 2025-08-17 | End: 2025-08-24

## 2025-08-17 RX ORDER — MAGNESIUM SULFATE HEPTAHYDRATE 40 MG/ML
2 INJECTION, SOLUTION INTRAVENOUS ONCE
Status: COMPLETED | OUTPATIENT
Start: 2025-08-17 | End: 2025-08-17

## 2025-08-17 RX ORDER — PANTOPRAZOLE SODIUM 40 MG/10ML
40 INJECTION, POWDER, LYOPHILIZED, FOR SOLUTION INTRAVENOUS
Status: DISCONTINUED | OUTPATIENT
Start: 2025-08-17 | End: 2025-08-20

## 2025-08-17 RX ORDER — PANTOPRAZOLE SODIUM 40 MG/1
40 TABLET, DELAYED RELEASE ORAL
Status: DISCONTINUED | OUTPATIENT
Start: 2025-08-17 | End: 2025-08-20

## 2025-08-17 RX ORDER — DEXTROSE 50 % IN WATER (D50W) INTRAVENOUS SYRINGE
12.5
Status: DISCONTINUED | OUTPATIENT
Start: 2025-08-17 | End: 2025-08-23

## 2025-08-17 RX ORDER — ATORVASTATIN CALCIUM 80 MG/1
80 TABLET, FILM COATED ORAL NIGHTLY
Status: DISCONTINUED | OUTPATIENT
Start: 2025-08-17 | End: 2025-08-23

## 2025-08-17 RX ADMIN — VASOPRESSIN 0.03 UNITS/MIN: 0.2 INJECTION INTRAVENOUS at 08:54

## 2025-08-17 RX ADMIN — WATER 50 MG: 1 INJECTION INTRAMUSCULAR; INTRAVENOUS; SUBCUTANEOUS at 16:48

## 2025-08-17 RX ADMIN — ATORVASTATIN CALCIUM 80 MG: 80 TABLET, FILM COATED ORAL at 21:03

## 2025-08-17 RX ADMIN — HEPARIN SODIUM 5000 UNITS: 5000 INJECTION, SOLUTION INTRAVENOUS; SUBCUTANEOUS at 05:59

## 2025-08-17 RX ADMIN — WATER 50 MG: 1 INJECTION INTRAMUSCULAR; INTRAVENOUS; SUBCUTANEOUS at 03:52

## 2025-08-17 RX ADMIN — VASOPRESSIN 0.03 UNITS/MIN: 0.2 INJECTION INTRAVENOUS at 17:53

## 2025-08-17 RX ADMIN — HEPARIN SODIUM 5000 UNITS: 5000 INJECTION, SOLUTION INTRAVENOUS; SUBCUTANEOUS at 14:54

## 2025-08-17 RX ADMIN — ACYCLOVIR SODIUM 473 MG: 50 INJECTION, SOLUTION INTRAVENOUS at 11:18

## 2025-08-17 RX ADMIN — SODIUM CHLORIDE 2 G: 9 INJECTION, SOLUTION INTRAVENOUS at 05:59

## 2025-08-17 RX ADMIN — PANTOPRAZOLE SODIUM 40 MG: 40 INJECTION, POWDER, FOR SOLUTION INTRAVENOUS at 08:37

## 2025-08-17 RX ADMIN — PANTOPRAZOLE SODIUM 40 MG: 40 INJECTION, POWDER, FOR SOLUTION INTRAVENOUS at 16:47

## 2025-08-17 RX ADMIN — SODIUM CHLORIDE, SODIUM LACTATE, POTASSIUM CHLORIDE, AND CALCIUM CHLORIDE 500 ML: .6; .31; .03; .02 INJECTION, SOLUTION INTRAVENOUS at 23:59

## 2025-08-17 RX ADMIN — MAGNESIUM SULFATE HEPTAHYDRATE 2 G: 40 INJECTION, SOLUTION INTRAVENOUS at 05:59

## 2025-08-17 RX ADMIN — HEPARIN SODIUM 5000 UNITS: 5000 INJECTION, SOLUTION INTRAVENOUS; SUBCUTANEOUS at 21:03

## 2025-08-17 RX ADMIN — Medication: at 19:59

## 2025-08-17 RX ADMIN — SODIUM CHLORIDE 2 G: 9 INJECTION, SOLUTION INTRAVENOUS at 18:35

## 2025-08-17 RX ADMIN — WATER 50 MG: 1 INJECTION INTRAMUSCULAR; INTRAVENOUS; SUBCUTANEOUS at 22:02

## 2025-08-17 RX ADMIN — IOHEXOL 75 ML: 350 INJECTION, SOLUTION INTRAVENOUS at 14:01

## 2025-08-17 RX ADMIN — WATER 50 MG: 1 INJECTION INTRAMUSCULAR; INTRAVENOUS; SUBCUTANEOUS at 10:17

## 2025-08-17 RX ADMIN — ACYCLOVIR SODIUM 473 MG: 50 INJECTION, SOLUTION INTRAVENOUS at 21:03

## 2025-08-17 RX ADMIN — MICAFUNGIN SODIUM 100 MG: 100 INJECTION, POWDER, LYOPHILIZED, FOR SOLUTION INTRAVENOUS at 03:52

## 2025-08-17 RX ADMIN — Medication 0.05 MCG/KG/MIN: at 05:00

## 2025-08-17 RX ADMIN — TOBRAMYCIN 236 MG: 40 INJECTION INTRAMUSCULAR; INTRAVENOUS at 03:52

## 2025-08-17 RX ADMIN — NOREPINEPHRINE BITARTRATE 0.05 MCG/KG/MIN: 8 INJECTION, SOLUTION INTRAVENOUS at 05:00

## 2025-08-17 SDOH — ECONOMIC STABILITY: HOUSING INSECURITY: IN THE PAST 12 MONTHS, HOW MANY TIMES HAVE YOU MOVED WHERE YOU WERE LIVING?: 1

## 2025-08-17 SDOH — ECONOMIC STABILITY: HOUSING INSECURITY
IN THE LAST 12 MONTHS, WAS THERE A TIME WHEN YOU WERE NOT ABLE TO PAY THE MORTGAGE OR RENT ON TIME?: PATIENT UNABLE TO ANSWER

## 2025-08-17 SDOH — ECONOMIC STABILITY: INCOME INSECURITY
IN THE PAST 12 MONTHS HAS THE ELECTRIC, GAS, OIL, OR WATER COMPANY THREATENED TO SHUT OFF SERVICES IN YOUR HOME?: PATIENT UNABLE TO ANSWER

## 2025-08-17 SDOH — SOCIAL STABILITY: SOCIAL INSECURITY: ARE THERE ANY APPARENT SIGNS OF INJURIES/BEHAVIORS THAT COULD BE RELATED TO ABUSE/NEGLECT?: UNABLE TO ASSESS

## 2025-08-17 SDOH — SOCIAL STABILITY: SOCIAL INSECURITY: DO YOU FEEL ANYONE HAS EXPLOITED OR TAKEN ADVANTAGE OF YOU FINANCIALLY OR OF YOUR PERSONAL PROPERTY?: UNABLE TO ASSESS

## 2025-08-17 SDOH — SOCIAL STABILITY: SOCIAL INSECURITY: HAVE YOU HAD ANY THOUGHTS OF HARMING ANYONE ELSE?: UNABLE TO ASSESS

## 2025-08-17 SDOH — SOCIAL STABILITY: SOCIAL INSECURITY: WITHIN THE LAST YEAR, HAVE YOU BEEN AFRAID OF YOUR PARTNER OR EX-PARTNER?: PATIENT UNABLE TO ANSWER

## 2025-08-17 SDOH — SOCIAL STABILITY: SOCIAL INSECURITY: HAVE YOU HAD THOUGHTS OF HARMING ANYONE ELSE?: UNABLE TO ASSESS

## 2025-08-17 SDOH — SOCIAL STABILITY: SOCIAL INSECURITY: DOES ANYONE TRY TO KEEP YOU FROM HAVING/CONTACTING OTHER FRIENDS OR DOING THINGS OUTSIDE YOUR HOME?: UNABLE TO ASSESS

## 2025-08-17 SDOH — SOCIAL STABILITY: SOCIAL INSECURITY: ABUSE: ADULT

## 2025-08-17 SDOH — ECONOMIC STABILITY: FOOD INSECURITY
HOW HARD IS IT FOR YOU TO PAY FOR THE VERY BASICS LIKE FOOD, HOUSING, MEDICAL CARE, AND HEATING?: PATIENT UNABLE TO ANSWER

## 2025-08-17 SDOH — ECONOMIC STABILITY: HOUSING INSECURITY: AT ANY TIME IN THE PAST 12 MONTHS, WERE YOU HOMELESS OR LIVING IN A SHELTER (INCLUDING NOW)?: PATIENT UNABLE TO ANSWER

## 2025-08-17 SDOH — SOCIAL STABILITY: SOCIAL INSECURITY: ARE YOU OR HAVE YOU BEEN THREATENED OR ABUSED PHYSICALLY, EMOTIONALLY, OR SEXUALLY BY ANYONE?: UNABLE TO ASSESS

## 2025-08-17 SDOH — SOCIAL STABILITY: SOCIAL INSECURITY: WERE YOU ABLE TO COMPLETE ALL THE BEHAVIORAL HEALTH SCREENINGS?: NO

## 2025-08-17 SDOH — SOCIAL STABILITY: SOCIAL INSECURITY: HAS ANYONE EVER THREATENED TO HURT YOUR FAMILY OR YOUR PETS?: UNABLE TO ASSESS

## 2025-08-17 SDOH — ECONOMIC STABILITY: TRANSPORTATION INSECURITY
IN THE PAST 12 MONTHS, HAS LACK OF TRANSPORTATION KEPT YOU FROM MEDICAL APPOINTMENTS OR FROM GETTING MEDICATIONS?: PATIENT UNABLE TO ANSWER

## 2025-08-17 SDOH — SOCIAL STABILITY: SOCIAL INSECURITY: DO YOU FEEL UNSAFE GOING BACK TO THE PLACE WHERE YOU ARE LIVING?: UNABLE TO ASSESS

## 2025-08-17 ASSESSMENT — LIFESTYLE VARIABLES
HOW MANY STANDARD DRINKS CONTAINING ALCOHOL DO YOU HAVE ON A TYPICAL DAY: PATIENT UNABLE TO ANSWER
HOW OFTEN DO YOU HAVE 6 OR MORE DRINKS ON ONE OCCASION: PATIENT UNABLE TO ANSWER
SKIP TO QUESTIONS 9-10: 0
HOW OFTEN DO YOU HAVE A DRINK CONTAINING ALCOHOL: PATIENT UNABLE TO ANSWER
AUDIT-C TOTAL SCORE: -1
AUDIT-C TOTAL SCORE: -1

## 2025-08-17 ASSESSMENT — ACTIVITIES OF DAILY LIVING (ADL)
FEEDING YOURSELF: UNABLE TO ASSESS
WALKS IN HOME: UNABLE TO ASSESS
DRESSING YOURSELF: UNABLE TO ASSESS
LACK_OF_TRANSPORTATION: NO
LACK_OF_TRANSPORTATION: PATIENT UNABLE TO ANSWER
GROOMING: UNABLE TO ASSESS
HEARING - LEFT EAR: UNABLE TO ASSESS
PATIENT'S MEMORY ADEQUATE TO SAFELY COMPLETE DAILY ACTIVITIES?: UNABLE TO ASSESS
HEARING - RIGHT EAR: UNABLE TO ASSESS
BATHING: UNABLE TO ASSESS
TOILETING: UNABLE TO ASSESS
LACK_OF_TRANSPORTATION: PATIENT UNABLE TO ANSWER
JUDGMENT_ADEQUATE_SAFELY_COMPLETE_DAILY_ACTIVITIES: UNABLE TO ASSESS
LACK_OF_TRANSPORTATION: PATIENT UNABLE TO ANSWER
ADEQUATE_TO_COMPLETE_ADL: UNABLE TO ASSESS

## 2025-08-17 ASSESSMENT — PAIN - FUNCTIONAL ASSESSMENT
PAIN_FUNCTIONAL_ASSESSMENT: CPOT (CRITICAL CARE PAIN OBSERVATION TOOL)

## 2025-08-17 ASSESSMENT — COGNITIVE AND FUNCTIONAL STATUS - GENERAL: PATIENT BASELINE BEDBOUND: UNABLE TO ASSESS AT THIS TIME

## 2025-08-17 ASSESSMENT — PAIN SCALES - GENERAL
PAINLEVEL_OUTOF10: 0 - NO PAIN

## 2025-08-18 ENCOUNTER — HOSPITAL ENCOUNTER (OUTPATIENT)
Dept: RADIOLOGY | Facility: HOSPITAL | Age: 65
End: 2025-08-18
Payer: MEDICARE

## 2025-08-18 VITALS
BODY MASS INDEX: 17.8 KG/M2 | OXYGEN SATURATION: 100 % | RESPIRATION RATE: 14 BRPM | SYSTOLIC BLOOD PRESSURE: 122 MMHG | HEART RATE: 85 BPM | TEMPERATURE: 97 F | HEIGHT: 64 IN | WEIGHT: 104.28 LBS | DIASTOLIC BLOOD PRESSURE: 95 MMHG

## 2025-08-18 LAB
ABO GROUP (TYPE) IN BLOOD: NORMAL
ALBUMIN SERPL BCP-MCNC: 1.7 G/DL (ref 3.4–5)
ALP SERPL-CCNC: 184 U/L (ref 33–136)
ALT SERPL W P-5'-P-CCNC: 41 U/L (ref 7–45)
ANION GAP BLDV CALCULATED.4IONS-SCNC: 11 MMOL/L (ref 10–25)
ANION GAP SERPL CALC-SCNC: 15 MMOL/L (ref 10–20)
ANTIBODY SCREEN: NORMAL
AST SERPL W P-5'-P-CCNC: 80 U/L (ref 9–39)
BACTERIA UR CULT: NORMAL
BASE EXCESS BLDV CALC-SCNC: -7.4 MMOL/L (ref -2–3)
BASOPHILS # BLD AUTO: 0.02 X10*3/UL (ref 0–0.1)
BASOPHILS NFR BLD AUTO: 0.1 %
BILIRUB SERPL-MCNC: 0.2 MG/DL (ref 0–1.2)
BODY TEMPERATURE: 37 DEGREES CELSIUS
BUN SERPL-MCNC: 29 MG/DL (ref 6–23)
CA-I BLDV-SCNC: 1.13 MMOL/L (ref 1.1–1.33)
CALCIUM SERPL-MCNC: 7.3 MG/DL (ref 8.6–10.6)
CHLORIDE BLDV-SCNC: 109 MMOL/L (ref 98–107)
CHLORIDE SERPL-SCNC: 107 MMOL/L (ref 98–107)
CK SERPL-CCNC: 57 U/L (ref 0–215)
CO2 SERPL-SCNC: 18 MMOL/L (ref 21–32)
CREAT SERPL-MCNC: 1.76 MG/DL (ref 0.5–1.05)
EGFRCR SERPLBLD CKD-EPI 2021: 32 ML/MIN/1.73M*2
EOSINOPHIL # BLD AUTO: 0 X10*3/UL (ref 0–0.7)
EOSINOPHIL NFR BLD AUTO: 0 %
ERYTHROCYTE [DISTWIDTH] IN BLOOD BY AUTOMATED COUNT: 21 % (ref 11.5–14.5)
GLUCOSE BLD MANUAL STRIP-MCNC: 127 MG/DL (ref 74–99)
GLUCOSE BLD MANUAL STRIP-MCNC: 166 MG/DL (ref 74–99)
GLUCOSE BLD MANUAL STRIP-MCNC: 184 MG/DL (ref 74–99)
GLUCOSE BLD MANUAL STRIP-MCNC: 189 MG/DL (ref 74–99)
GLUCOSE BLD MANUAL STRIP-MCNC: 190 MG/DL (ref 74–99)
GLUCOSE BLD MANUAL STRIP-MCNC: 210 MG/DL (ref 74–99)
GLUCOSE BLDV-MCNC: 137 MG/DL (ref 74–99)
GLUCOSE SERPL-MCNC: 214 MG/DL (ref 74–99)
HCO3 BLDV-SCNC: 18.8 MMOL/L (ref 22–26)
HCT VFR BLD AUTO: 23.7 % (ref 36–46)
HCT VFR BLD AUTO: 24.1 % (ref 36–46)
HCT VFR BLD EST: 29 % (ref 36–46)
HGB BLD-MCNC: 8.3 G/DL (ref 12–16)
HGB BLD-MCNC: 8.4 G/DL (ref 12–16)
HGB BLDV-MCNC: 9.5 G/DL (ref 12–16)
HOLD SPECIMEN: NORMAL
HOLD SPECIMEN: NORMAL
IMM GRANULOCYTES # BLD AUTO: 0.26 X10*3/UL (ref 0–0.7)
IMM GRANULOCYTES NFR BLD AUTO: 1.7 % (ref 0–0.9)
INHALED O2 CONCENTRATION: 100 %
LACTATE BLDV-SCNC: 2.4 MMOL/L (ref 0.4–2)
LYMPHOCYTES # BLD AUTO: 2.36 X10*3/UL (ref 1.2–4.8)
LYMPHOCYTES NFR BLD AUTO: 15.8 %
MAGNESIUM SERPL-MCNC: 1.97 MG/DL (ref 1.6–2.4)
MCH RBC QN AUTO: 28.8 PG (ref 26–34)
MCHC RBC AUTO-ENTMCNC: 35 G/DL (ref 32–36)
MCV RBC AUTO: 82 FL (ref 80–100)
MONOCYTES # BLD AUTO: 0.36 X10*3/UL (ref 0.1–1)
MONOCYTES NFR BLD AUTO: 2.4 %
MRSA DNA SPEC QL NAA+PROBE: DETECTED
NEUTROPHILS # BLD AUTO: 11.94 X10*3/UL (ref 1.2–7.7)
NEUTROPHILS NFR BLD AUTO: 80 %
NRBC BLD-RTO: 0.7 /100 WBCS (ref 0–0)
OXYHGB MFR BLDV: 69.4 % (ref 45–75)
PATH REVIEW-CBC DIFFERENTIAL: NORMAL
PCO2 BLDV: 40 MM HG (ref 41–51)
PH BLDV: 7.28 PH (ref 7.33–7.43)
PHOSPHATE SERPL-MCNC: 4.9 MG/DL (ref 2.5–4.9)
PLATELET # BLD AUTO: 58 X10*3/UL (ref 150–450)
PO2 BLDV: 41 MM HG (ref 35–45)
POTASSIUM BLDV-SCNC: 3.9 MMOL/L (ref 3.5–5.3)
POTASSIUM SERPL-SCNC: 3.8 MMOL/L (ref 3.5–5.3)
PROT SERPL-MCNC: 4.6 G/DL (ref 6.4–8.2)
RBC # BLD AUTO: 2.88 X10*6/UL (ref 4–5.2)
RH FACTOR (ANTIGEN D): NORMAL
RPR SER QL: NONREACTIVE
SAO2 % BLDV: 71 % (ref 45–75)
SODIUM BLDV-SCNC: 135 MMOL/L (ref 136–145)
SODIUM SERPL-SCNC: 136 MMOL/L (ref 136–145)
STAPHYLOCOCCUS SPEC CULT: NORMAL
T PALLIDUM AB SER QL AGGL: REACTIVE
VANCOMYCIN SERPL-MCNC: 10.3 UG/ML (ref 5–20)
WBC # BLD AUTO: 14.9 X10*3/UL (ref 4.4–11.3)

## 2025-08-18 PROCEDURE — 83735 ASSAY OF MAGNESIUM: CPT

## 2025-08-18 PROCEDURE — 2500000004 HC RX 250 GENERAL PHARMACY W/ HCPCS (ALT 636 FOR OP/ED)

## 2025-08-18 PROCEDURE — 3E0G76Z INTRODUCTION OF NUTRITIONAL SUBSTANCE INTO UPPER GI, VIA NATURAL OR ARTIFICIAL OPENING: ICD-10-PCS | Performed by: INTERNAL MEDICINE

## 2025-08-18 PROCEDURE — 82550 ASSAY OF CK (CPK): CPT

## 2025-08-18 PROCEDURE — 2500000005 HC RX 250 GENERAL PHARMACY W/O HCPCS

## 2025-08-18 PROCEDURE — 86901 BLOOD TYPING SEROLOGIC RH(D): CPT

## 2025-08-18 PROCEDURE — 85014 HEMATOCRIT: CPT

## 2025-08-18 PROCEDURE — 37799 UNLISTED PX VASCULAR SURGERY: CPT

## 2025-08-18 PROCEDURE — G0545 PR INHERENT VISIT TO INPT: HCPCS | Performed by: STUDENT IN AN ORGANIZED HEALTH CARE EDUCATION/TRAINING PROGRAM

## 2025-08-18 PROCEDURE — 99233 SBSQ HOSP IP/OBS HIGH 50: CPT | Performed by: STUDENT IN AN ORGANIZED HEALTH CARE EDUCATION/TRAINING PROGRAM

## 2025-08-18 PROCEDURE — 82947 ASSAY GLUCOSE BLOOD QUANT: CPT

## 2025-08-18 PROCEDURE — 99232 SBSQ HOSP IP/OBS MODERATE 35: CPT

## 2025-08-18 PROCEDURE — 95720 EEG PHY/QHP EA INCR W/VEEG: CPT | Performed by: PSYCHIATRY & NEUROLOGY

## 2025-08-18 PROCEDURE — 80202 ASSAY OF VANCOMYCIN: CPT

## 2025-08-18 PROCEDURE — 80053 COMPREHEN METABOLIC PANEL: CPT

## 2025-08-18 PROCEDURE — 2020000001 HC ICU ROOM DAILY

## 2025-08-18 PROCEDURE — 84100 ASSAY OF PHOSPHORUS: CPT

## 2025-08-18 PROCEDURE — 99291 CRITICAL CARE FIRST HOUR: CPT

## 2025-08-18 PROCEDURE — 87641 MR-STAPH DNA AMP PROBE: CPT

## 2025-08-18 PROCEDURE — 2500000001 HC RX 250 WO HCPCS SELF ADMINISTERED DRUGS (ALT 637 FOR MEDICARE OP)

## 2025-08-18 PROCEDURE — 85025 COMPLETE CBC W/AUTO DIFF WBC: CPT

## 2025-08-18 PROCEDURE — 95715 VEEG EA 12-26HR INTMT MNTR: CPT

## 2025-08-18 PROCEDURE — 94003 VENT MGMT INPAT SUBQ DAY: CPT

## 2025-08-18 RX ORDER — LEVETIRACETAM 5 MG/ML
500 INJECTION INTRAVASCULAR EVERY 12 HOURS
Status: DISCONTINUED | OUTPATIENT
Start: 2025-08-18 | End: 2025-08-19

## 2025-08-18 RX ORDER — MEROPENEM AND SODIUM CHLORIDE 1 G/50ML
1 INJECTION, SOLUTION INTRAVENOUS EVERY 12 HOURS
Status: DISCONTINUED | OUTPATIENT
Start: 2025-08-18 | End: 2025-08-19

## 2025-08-18 RX ORDER — VANCOMYCIN HYDROCHLORIDE 750 MG/150ML
750 INJECTION, SOLUTION INTRAVENOUS ONCE
Status: COMPLETED | OUTPATIENT
Start: 2025-08-18 | End: 2025-08-18

## 2025-08-18 RX ORDER — LANOLIN ALCOHOL/MO/W.PET/CERES
100 CREAM (GRAM) TOPICAL DAILY
Status: DISCONTINUED | OUTPATIENT
Start: 2025-08-19 | End: 2025-08-23

## 2025-08-18 RX ORDER — VANCOMYCIN HYDROCHLORIDE 1 G/20ML
INJECTION, POWDER, LYOPHILIZED, FOR SOLUTION INTRAVENOUS DAILY PRN
Status: DISCONTINUED | OUTPATIENT
Start: 2025-08-18 | End: 2025-08-23

## 2025-08-18 RX ADMIN — Medication: at 20:31

## 2025-08-18 RX ADMIN — ACYCLOVIR SODIUM 480 MG: 50 INJECTION, SOLUTION INTRAVENOUS at 17:40

## 2025-08-18 RX ADMIN — LEVETIRACETAM 2000 MG: 500 INJECTION, SOLUTION INTRAVENOUS at 11:30

## 2025-08-18 RX ADMIN — ATORVASTATIN CALCIUM 80 MG: 80 TABLET, FILM COATED ORAL at 20:52

## 2025-08-18 RX ADMIN — MEROPENEM AND SODIUM CHLORIDE 1 G: 1 INJECTION, SOLUTION INTRAVENOUS at 18:42

## 2025-08-18 RX ADMIN — VASOPRESSIN 0.03 UNITS/MIN: 0.2 INJECTION INTRAVENOUS at 04:48

## 2025-08-18 RX ADMIN — Medication: at 07:16

## 2025-08-18 RX ADMIN — SODIUM CHLORIDE 2 G: 9 INJECTION, SOLUTION INTRAVENOUS at 06:11

## 2025-08-18 RX ADMIN — SODIUM CHLORIDE, SODIUM LACTATE, POTASSIUM CHLORIDE, AND CALCIUM CHLORIDE 500 ML: .6; .31; .03; .02 INJECTION, SOLUTION INTRAVENOUS at 17:00

## 2025-08-18 RX ADMIN — VANCOMYCIN HYDROCHLORIDE 750 MG: 750 INJECTION, SOLUTION INTRAVENOUS at 16:50

## 2025-08-18 RX ADMIN — PANTOPRAZOLE SODIUM 40 MG: 40 INJECTION, POWDER, FOR SOLUTION INTRAVENOUS at 16:02

## 2025-08-18 RX ADMIN — MINOCYCLINE HYDROCHLORIDE 200 MG: 100 INJECTION INTRAVENOUS at 20:52

## 2025-08-18 RX ADMIN — Medication: at 14:53

## 2025-08-18 RX ADMIN — WATER 50 MG: 1 INJECTION INTRAMUSCULAR; INTRAVENOUS; SUBCUTANEOUS at 04:49

## 2025-08-18 RX ADMIN — HEPARIN SODIUM 5000 UNITS: 5000 INJECTION, SOLUTION INTRAVENOUS; SUBCUTANEOUS at 06:11

## 2025-08-18 RX ADMIN — WATER 50 MG: 1 INJECTION INTRAMUSCULAR; INTRAVENOUS; SUBCUTANEOUS at 09:40

## 2025-08-18 RX ADMIN — LEVETIRACETAM 500 MG: 5 INJECTION INTRAVENOUS at 21:00

## 2025-08-18 RX ADMIN — MICAFUNGIN SODIUM 100 MG: 100 INJECTION, POWDER, LYOPHILIZED, FOR SOLUTION INTRAVENOUS at 00:59

## 2025-08-18 RX ADMIN — PANTOPRAZOLE SODIUM 40 MG: 40 INJECTION, POWDER, FOR SOLUTION INTRAVENOUS at 06:11

## 2025-08-18 ASSESSMENT — PAIN - FUNCTIONAL ASSESSMENT
PAIN_FUNCTIONAL_ASSESSMENT: CPOT (CRITICAL CARE PAIN OBSERVATION TOOL)

## 2025-08-19 LAB
ALBUMIN SERPL BCP-MCNC: 1.8 G/DL (ref 3.4–5)
ALP SERPL-CCNC: 173 U/L (ref 33–136)
ALT SERPL W P-5'-P-CCNC: 34 U/L (ref 7–45)
ANION GAP SERPL CALC-SCNC: 13 MMOL/L (ref 10–20)
AST SERPL W P-5'-P-CCNC: 53 U/L (ref 9–39)
BASOPHILS # BLD AUTO: 0.03 X10*3/UL (ref 0–0.1)
BASOPHILS # BLD AUTO: 0.03 X10*3/UL (ref 0–0.1)
BASOPHILS NFR BLD AUTO: 0.2 %
BASOPHILS NFR BLD AUTO: 0.2 %
BILIRUB SERPL-MCNC: 0.3 MG/DL (ref 0–1.2)
BUN SERPL-MCNC: 32 MG/DL (ref 6–23)
CALCIUM SERPL-MCNC: 7.5 MG/DL (ref 8.6–10.6)
CHLORIDE SERPL-SCNC: 104 MMOL/L (ref 98–107)
CK SERPL-CCNC: 134 U/L (ref 0–215)
CO2 SERPL-SCNC: 19 MMOL/L (ref 21–32)
CREAT SERPL-MCNC: 1.95 MG/DL (ref 0.5–1.05)
EGFRCR SERPLBLD CKD-EPI 2021: 28 ML/MIN/1.73M*2
EOSINOPHIL # BLD AUTO: 0 X10*3/UL (ref 0–0.7)
EOSINOPHIL # BLD AUTO: 0 X10*3/UL (ref 0–0.7)
EOSINOPHIL NFR BLD AUTO: 0 %
EOSINOPHIL NFR BLD AUTO: 0 %
ERYTHROCYTE [DISTWIDTH] IN BLOOD BY AUTOMATED COUNT: 21.2 % (ref 11.5–14.5)
ERYTHROCYTE [DISTWIDTH] IN BLOOD BY AUTOMATED COUNT: 21.2 % (ref 11.5–14.5)
GLUCOSE BLD MANUAL STRIP-MCNC: 173 MG/DL (ref 74–99)
GLUCOSE BLD MANUAL STRIP-MCNC: 173 MG/DL (ref 74–99)
GLUCOSE BLD MANUAL STRIP-MCNC: 188 MG/DL (ref 74–99)
GLUCOSE BLD MANUAL STRIP-MCNC: 195 MG/DL (ref 74–99)
GLUCOSE BLD MANUAL STRIP-MCNC: 195 MG/DL (ref 74–99)
GLUCOSE BLD MANUAL STRIP-MCNC: 200 MG/DL (ref 74–99)
GLUCOSE BLD MANUAL STRIP-MCNC: 212 MG/DL (ref 74–99)
GLUCOSE SERPL-MCNC: 200 MG/DL (ref 74–99)
HCT VFR BLD AUTO: 22 % (ref 36–46)
HCT VFR BLD AUTO: 23.1 % (ref 36–46)
HGB BLD-MCNC: 7.8 G/DL (ref 12–16)
HGB BLD-MCNC: 8.1 G/DL (ref 12–16)
IMM GRANULOCYTES # BLD AUTO: 0.32 X10*3/UL (ref 0–0.7)
IMM GRANULOCYTES # BLD AUTO: 0.41 X10*3/UL (ref 0–0.7)
IMM GRANULOCYTES NFR BLD AUTO: 1.7 % (ref 0–0.9)
IMM GRANULOCYTES NFR BLD AUTO: 2.4 % (ref 0–0.9)
LYMPHOCYTES # BLD AUTO: 2.14 X10*3/UL (ref 1.2–4.8)
LYMPHOCYTES # BLD AUTO: 2.41 X10*3/UL (ref 1.2–4.8)
LYMPHOCYTES NFR BLD AUTO: 12.7 %
LYMPHOCYTES NFR BLD AUTO: 12.9 %
MAGNESIUM SERPL-MCNC: 2.04 MG/DL (ref 1.6–2.4)
MCH RBC QN AUTO: 28.4 PG (ref 26–34)
MCH RBC QN AUTO: 28.8 PG (ref 26–34)
MCHC RBC AUTO-ENTMCNC: 35.1 G/DL (ref 32–36)
MCHC RBC AUTO-ENTMCNC: 35.5 G/DL (ref 32–36)
MCV RBC AUTO: 81 FL (ref 80–100)
MCV RBC AUTO: 81 FL (ref 80–100)
MONOCYTES # BLD AUTO: 0.57 X10*3/UL (ref 0.1–1)
MONOCYTES # BLD AUTO: 0.58 X10*3/UL (ref 0.1–1)
MONOCYTES NFR BLD AUTO: 3 %
MONOCYTES NFR BLD AUTO: 3.5 %
NEUTROPHILS # BLD AUTO: 13.63 X10*3/UL (ref 1.2–7.7)
NEUTROPHILS # BLD AUTO: 15.39 X10*3/UL (ref 1.2–7.7)
NEUTROPHILS NFR BLD AUTO: 81.2 %
NEUTROPHILS NFR BLD AUTO: 82.2 %
NRBC BLD-RTO: 1 /100 WBCS (ref 0–0)
NRBC BLD-RTO: 1.1 /100 WBCS (ref 0–0)
PLATELET # BLD AUTO: 60 X10*3/UL (ref 150–450)
PLATELET # BLD AUTO: 70 X10*3/UL (ref 150–450)
POTASSIUM SERPL-SCNC: 3.6 MMOL/L (ref 3.5–5.3)
PROT SERPL-MCNC: 4.6 G/DL (ref 6.4–8.2)
RBC # BLD AUTO: 2.71 X10*6/UL (ref 4–5.2)
RBC # BLD AUTO: 2.85 X10*6/UL (ref 4–5.2)
SODIUM SERPL-SCNC: 132 MMOL/L (ref 136–145)
VANCOMYCIN SERPL-MCNC: 16 UG/ML (ref 5–20)
WBC # BLD AUTO: 16.8 X10*3/UL (ref 4.4–11.3)
WBC # BLD AUTO: 18.7 X10*3/UL (ref 4.4–11.3)

## 2025-08-19 PROCEDURE — 80202 ASSAY OF VANCOMYCIN: CPT

## 2025-08-19 PROCEDURE — 2500000002 HC RX 250 W HCPCS SELF ADMINISTERED DRUGS (ALT 637 FOR MEDICARE OP, ALT 636 FOR OP/ED)

## 2025-08-19 PROCEDURE — 85025 COMPLETE CBC W/AUTO DIFF WBC: CPT

## 2025-08-19 PROCEDURE — 2500000001 HC RX 250 WO HCPCS SELF ADMINISTERED DRUGS (ALT 637 FOR MEDICARE OP)

## 2025-08-19 PROCEDURE — 2500000004 HC RX 250 GENERAL PHARMACY W/ HCPCS (ALT 636 FOR OP/ED)

## 2025-08-19 PROCEDURE — 2020000001 HC ICU ROOM DAILY

## 2025-08-19 PROCEDURE — 83735 ASSAY OF MAGNESIUM: CPT

## 2025-08-19 PROCEDURE — 94003 VENT MGMT INPAT SUBQ DAY: CPT

## 2025-08-19 PROCEDURE — 74018 RADEX ABDOMEN 1 VIEW: CPT

## 2025-08-19 PROCEDURE — 37799 UNLISTED PX VASCULAR SURGERY: CPT

## 2025-08-19 PROCEDURE — 84075 ASSAY ALKALINE PHOSPHATASE: CPT

## 2025-08-19 PROCEDURE — 82947 ASSAY GLUCOSE BLOOD QUANT: CPT

## 2025-08-19 PROCEDURE — 82550 ASSAY OF CK (CPK): CPT

## 2025-08-19 PROCEDURE — 2550000001 HC RX 255 CONTRASTS: Performed by: INTERNAL MEDICINE

## 2025-08-19 PROCEDURE — 99291 CRITICAL CARE FIRST HOUR: CPT

## 2025-08-19 PROCEDURE — 74174 CTA ABD&PLVS W/CONTRAST: CPT | Performed by: RADIOLOGY

## 2025-08-19 PROCEDURE — 99233 SBSQ HOSP IP/OBS HIGH 50: CPT | Performed by: STUDENT IN AN ORGANIZED HEALTH CARE EDUCATION/TRAINING PROGRAM

## 2025-08-19 RX ORDER — DIPHENHYDRAMINE HYDROCHLORIDE 50 MG/ML
25 INJECTION, SOLUTION INTRAMUSCULAR; INTRAVENOUS ONCE AS NEEDED
Status: DISCONTINUED | OUTPATIENT
Start: 2025-08-19 | End: 2025-08-25 | Stop reason: HOSPADM

## 2025-08-19 RX ORDER — NAPROXEN SODIUM 220 MG/1
81 TABLET, FILM COATED ORAL DAILY
Status: DISCONTINUED | OUTPATIENT
Start: 2025-08-19 | End: 2025-08-22

## 2025-08-19 RX ORDER — LEVETIRACETAM 10 MG/ML
1000 INJECTION INTRAVASCULAR ONCE
Status: COMPLETED | OUTPATIENT
Start: 2025-08-19 | End: 2025-08-19

## 2025-08-19 RX ORDER — VANCOMYCIN HYDROCHLORIDE 750 MG/150ML
750 INJECTION, SOLUTION INTRAVENOUS ONCE
Status: COMPLETED | OUTPATIENT
Start: 2025-08-19 | End: 2025-08-19

## 2025-08-19 RX ADMIN — AMPICILLIN SODIUM AND SULBACTAM SODIUM 3 G: 2; 1 INJECTION, POWDER, FOR SOLUTION INTRAMUSCULAR; INTRAVENOUS at 19:32

## 2025-08-19 RX ADMIN — LEVETIRACETAM 1000 MG: 10 INJECTION INTRAVENOUS at 16:36

## 2025-08-19 RX ADMIN — Medication: at 12:49

## 2025-08-19 RX ADMIN — Medication: at 02:10

## 2025-08-19 RX ADMIN — Medication: at 20:41

## 2025-08-19 RX ADMIN — LEVETIRACETAM 500 MG: 5 INJECTION INTRAVENOUS at 09:08

## 2025-08-19 RX ADMIN — ATORVASTATIN CALCIUM 80 MG: 80 TABLET, FILM COATED ORAL at 20:53

## 2025-08-19 RX ADMIN — Medication: at 01:13

## 2025-08-19 RX ADMIN — ASPIRIN 81 MG: 81 TABLET, CHEWABLE ORAL at 16:36

## 2025-08-19 RX ADMIN — MINOCYCLINE HYDROCHLORIDE 200 MG: 100 INJECTION INTRAVENOUS at 20:18

## 2025-08-19 RX ADMIN — HUMAN INSULIN 1 UNITS: 100 INJECTION, SOLUTION SUBCUTANEOUS at 17:18

## 2025-08-19 RX ADMIN — PANTOPRAZOLE SODIUM 40 MG: 40 INJECTION, POWDER, FOR SOLUTION INTRAVENOUS at 16:36

## 2025-08-19 RX ADMIN — VANCOMYCIN HYDROCHLORIDE 750 MG: 750 INJECTION, SOLUTION INTRAVENOUS at 17:44

## 2025-08-19 RX ADMIN — IOHEXOL 95 ML: 350 INJECTION, SOLUTION INTRAVENOUS at 01:56

## 2025-08-19 RX ADMIN — THIAMINE HCL TAB 100 MG 100 MG: 100 TAB at 09:08

## 2025-08-19 RX ADMIN — MEROPENEM AND SODIUM CHLORIDE 1 G: 1 INJECTION, SOLUTION INTRAVENOUS at 05:34

## 2025-08-19 RX ADMIN — Medication: at 10:00

## 2025-08-19 RX ADMIN — MINOCYCLINE HYDROCHLORIDE 200 MG: 100 INJECTION INTRAVENOUS at 06:47

## 2025-08-19 RX ADMIN — Medication: at 07:05

## 2025-08-19 RX ADMIN — ACYCLOVIR SODIUM 480 MG: 500 INJECTION, SOLUTION INTRAVENOUS at 13:26

## 2025-08-19 RX ADMIN — HUMAN INSULIN 2 UNITS: 100 INJECTION, SOLUTION SUBCUTANEOUS at 23:26

## 2025-08-19 RX ADMIN — Medication: at 13:56

## 2025-08-19 RX ADMIN — PANTOPRAZOLE SODIUM 40 MG: 40 INJECTION, POWDER, FOR SOLUTION INTRAVENOUS at 05:34

## 2025-08-19 RX ADMIN — HUMAN INSULIN 1 UNITS: 100 INJECTION, SOLUTION SUBCUTANEOUS at 12:35

## 2025-08-19 ASSESSMENT — PAIN - FUNCTIONAL ASSESSMENT
PAIN_FUNCTIONAL_ASSESSMENT: CPOT (CRITICAL CARE PAIN OBSERVATION TOOL)

## 2025-08-20 LAB
ALBUMIN SERPL BCP-MCNC: 1.8 G/DL (ref 3.4–5)
ALP SERPL-CCNC: 193 U/L (ref 33–136)
ALT SERPL W P-5'-P-CCNC: 34 U/L (ref 7–45)
ANION GAP SERPL CALC-SCNC: 14 MMOL/L (ref 10–20)
APPEARANCE UR: ABNORMAL
AST SERPL W P-5'-P-CCNC: 58 U/L (ref 9–39)
BACTERIA BLD CULT: NORMAL
BACTERIA BLD CULT: NORMAL
BACTERIA SPEC RESP CULT: ABNORMAL
BACTERIA SPEC RESP CULT: ABNORMAL
BASOPHILS # BLD AUTO: 0.03 X10*3/UL (ref 0–0.1)
BASOPHILS NFR BLD AUTO: 0.2 %
BILIRUB SERPL-MCNC: 0.3 MG/DL (ref 0–1.2)
BILIRUB UR STRIP.AUTO-MCNC: NEGATIVE MG/DL
BUN SERPL-MCNC: 36 MG/DL (ref 6–23)
CALCIUM SERPL-MCNC: 7.7 MG/DL (ref 8.6–10.6)
CHLORIDE SERPL-SCNC: 104 MMOL/L (ref 98–107)
CHLORIDE UR-SCNC: 30 MMOL/L
CHLORIDE/CREATININE (MMOL/G) IN URINE: 87 MMOL/G CREAT (ref 38–318)
CO2 SERPL-SCNC: 17 MMOL/L (ref 21–32)
COLOR UR: ABNORMAL
CORTIS SERPL-MCNC: 29.5 UG/DL (ref 2.5–20)
CREAT SERPL-MCNC: 2.23 MG/DL (ref 0.5–1.05)
CREAT UR-MCNC: 34.6 MG/DL (ref 20–320)
EGFRCR SERPLBLD CKD-EPI 2021: 24 ML/MIN/1.73M*2
EOSINOPHIL # BLD AUTO: 0 X10*3/UL (ref 0–0.7)
EOSINOPHIL NFR BLD AUTO: 0 %
ERYTHROCYTE [DISTWIDTH] IN BLOOD BY AUTOMATED COUNT: 20.7 % (ref 11.5–14.5)
GLUCOSE BLD MANUAL STRIP-MCNC: 192 MG/DL (ref 74–99)
GLUCOSE BLD MANUAL STRIP-MCNC: 221 MG/DL (ref 74–99)
GLUCOSE BLD MANUAL STRIP-MCNC: 235 MG/DL (ref 74–99)
GLUCOSE BLD MANUAL STRIP-MCNC: 258 MG/DL (ref 74–99)
GLUCOSE BLD MANUAL STRIP-MCNC: 270 MG/DL (ref 74–99)
GLUCOSE SERPL-MCNC: 185 MG/DL (ref 74–99)
GLUCOSE UR STRIP.AUTO-MCNC: NORMAL MG/DL
GRAM STN SPEC: ABNORMAL
GRAM STN SPEC: ABNORMAL
HCT VFR BLD AUTO: 24.9 % (ref 36–46)
HGB BLD-MCNC: 8.8 G/DL (ref 12–16)
HYALINE CASTS #/AREA URNS AUTO: ABNORMAL /LPF
IMM GRANULOCYTES # BLD AUTO: 0.33 X10*3/UL (ref 0–0.7)
IMM GRANULOCYTES NFR BLD AUTO: 2.4 % (ref 0–0.9)
KETONES UR STRIP.AUTO-MCNC: NEGATIVE MG/DL
LEUKOCYTE ESTERASE UR QL STRIP.AUTO: ABNORMAL
LEVETIRACETAM SERPL-MCNC: 118 UG/ML (ref 10–40)
LYMPHOCYTES # BLD AUTO: 2.85 X10*3/UL (ref 1.2–4.8)
LYMPHOCYTES NFR BLD AUTO: 20.4 %
MAGNESIUM SERPL-MCNC: 2.2 MG/DL (ref 1.6–2.4)
MCH RBC QN AUTO: 28.9 PG (ref 26–34)
MCHC RBC AUTO-ENTMCNC: 35.3 G/DL (ref 32–36)
MCV RBC AUTO: 82 FL (ref 80–100)
MONOCYTES # BLD AUTO: 0.5 X10*3/UL (ref 0.1–1)
MONOCYTES NFR BLD AUTO: 3.6 %
MUCOUS THREADS #/AREA URNS AUTO: ABNORMAL /LPF
NEUTROPHILS # BLD AUTO: 10.25 X10*3/UL (ref 1.2–7.7)
NEUTROPHILS NFR BLD AUTO: 73.4 %
NITRITE UR QL STRIP.AUTO: NEGATIVE
NRBC BLD-RTO: 1.7 /100 WBCS (ref 0–0)
PH UR STRIP.AUTO: 6.5 [PH]
PLATELET # BLD AUTO: 77 X10*3/UL (ref 150–450)
POTASSIUM SERPL-SCNC: 3.2 MMOL/L (ref 3.5–5.3)
POTASSIUM UR-SCNC: 30 MMOL/L
POTASSIUM/CREAT UR-RTO: 87 MMOL/G CREAT
PROT SERPL-MCNC: 4.6 G/DL (ref 6.4–8.2)
PROT UR STRIP.AUTO-MCNC: ABNORMAL MG/DL
RBC # BLD AUTO: 3.04 X10*6/UL (ref 4–5.2)
RBC # UR STRIP.AUTO: ABNORMAL MG/DL
RBC #/AREA URNS AUTO: >20 /HPF
SODIUM SERPL-SCNC: 132 MMOL/L (ref 136–145)
SODIUM UR-SCNC: 41 MMOL/L
SODIUM/CREAT UR-RTO: 118 MMOL/G CREAT
SP GR UR STRIP.AUTO: 1.04
SQUAMOUS #/AREA URNS AUTO: ABNORMAL /HPF
UROBILINOGEN UR STRIP.AUTO-MCNC: ABNORMAL MG/DL
VANCOMYCIN SERPL-MCNC: 17.5 UG/ML (ref 5–20)
WBC # BLD AUTO: 14 X10*3/UL (ref 4.4–11.3)
WBC #/AREA URNS AUTO: >50 /HPF
WBC CLUMPS #/AREA URNS AUTO: ABNORMAL /HPF
YEAST BUDDING #/AREA UR COMP ASSIST: PRESENT /HPF

## 2025-08-20 PROCEDURE — 70553 MRI BRAIN STEM W/O & W/DYE: CPT | Performed by: RADIOLOGY

## 2025-08-20 PROCEDURE — 71045 X-RAY EXAM CHEST 1 VIEW: CPT

## 2025-08-20 PROCEDURE — 37799 UNLISTED PX VASCULAR SURGERY: CPT

## 2025-08-20 PROCEDURE — 2500000004 HC RX 250 GENERAL PHARMACY W/ HCPCS (ALT 636 FOR OP/ED)

## 2025-08-20 PROCEDURE — 80177 DRUG SCRN QUAN LEVETIRACETAM: CPT

## 2025-08-20 PROCEDURE — 80202 ASSAY OF VANCOMYCIN: CPT

## 2025-08-20 PROCEDURE — 82436 ASSAY OF URINE CHLORIDE: CPT

## 2025-08-20 PROCEDURE — 99233 SBSQ HOSP IP/OBS HIGH 50: CPT | Performed by: STUDENT IN AN ORGANIZED HEALTH CARE EDUCATION/TRAINING PROGRAM

## 2025-08-20 PROCEDURE — 87086 URINE CULTURE/COLONY COUNT: CPT

## 2025-08-20 PROCEDURE — 2500000001 HC RX 250 WO HCPCS SELF ADMINISTERED DRUGS (ALT 637 FOR MEDICARE OP)

## 2025-08-20 PROCEDURE — 36415 COLL VENOUS BLD VENIPUNCTURE: CPT

## 2025-08-20 PROCEDURE — G0545 PR INHERENT VISIT TO INPT: HCPCS | Performed by: STUDENT IN AN ORGANIZED HEALTH CARE EDUCATION/TRAINING PROGRAM

## 2025-08-20 PROCEDURE — 81001 URINALYSIS AUTO W/SCOPE: CPT

## 2025-08-20 PROCEDURE — 82533 TOTAL CORTISOL: CPT

## 2025-08-20 PROCEDURE — A9575 INJ GADOTERATE MEGLUMI 0.1ML: HCPCS | Performed by: INTERNAL MEDICINE

## 2025-08-20 PROCEDURE — 94003 VENT MGMT INPAT SUBQ DAY: CPT

## 2025-08-20 PROCEDURE — 2020000001 HC ICU ROOM DAILY

## 2025-08-20 PROCEDURE — 2500000002 HC RX 250 W HCPCS SELF ADMINISTERED DRUGS (ALT 637 FOR MEDICARE OP, ALT 636 FOR OP/ED)

## 2025-08-20 PROCEDURE — 95720 EEG PHY/QHP EA INCR W/VEEG: CPT | Performed by: PSYCHIATRY & NEUROLOGY

## 2025-08-20 PROCEDURE — 2550000001 HC RX 255 CONTRASTS: Performed by: INTERNAL MEDICINE

## 2025-08-20 PROCEDURE — 99291 CRITICAL CARE FIRST HOUR: CPT

## 2025-08-20 PROCEDURE — 95715 VEEG EA 12-26HR INTMT MNTR: CPT

## 2025-08-20 PROCEDURE — 84075 ASSAY ALKALINE PHOSPHATASE: CPT

## 2025-08-20 PROCEDURE — 82947 ASSAY GLUCOSE BLOOD QUANT: CPT

## 2025-08-20 PROCEDURE — 83735 ASSAY OF MAGNESIUM: CPT

## 2025-08-20 PROCEDURE — 87040 BLOOD CULTURE FOR BACTERIA: CPT

## 2025-08-20 PROCEDURE — 85025 COMPLETE CBC W/AUTO DIFF WBC: CPT

## 2025-08-20 RX ORDER — LEVETIRACETAM 10 MG/ML
1000 INJECTION INTRAVASCULAR EVERY 12 HOURS
Status: DISCONTINUED | OUTPATIENT
Start: 2025-08-20 | End: 2025-08-21

## 2025-08-20 RX ORDER — GADOTERATE MEGLUMINE 376.9 MG/ML
10 INJECTION INTRAVENOUS
Status: COMPLETED | OUTPATIENT
Start: 2025-08-20 | End: 2025-08-20

## 2025-08-20 RX ORDER — CLOPIDOGREL BISULFATE 75 MG/1
75 TABLET ORAL DAILY
Status: DISCONTINUED | OUTPATIENT
Start: 2025-08-20 | End: 2025-08-22

## 2025-08-20 RX ORDER — POTASSIUM CHLORIDE 1.5 G/1.58G
40 POWDER, FOR SOLUTION ORAL ONCE
Status: COMPLETED | OUTPATIENT
Start: 2025-08-20 | End: 2025-08-20

## 2025-08-20 RX ORDER — VANCOMYCIN HYDROCHLORIDE 750 MG/150ML
750 INJECTION, SOLUTION INTRAVENOUS ONCE
Status: COMPLETED | OUTPATIENT
Start: 2025-08-20 | End: 2025-08-20

## 2025-08-20 RX ORDER — ESOMEPRAZOLE MAGNESIUM 40 MG/1
40 GRANULE, DELAYED RELEASE ORAL 2 TIMES DAILY
Status: DISCONTINUED | OUTPATIENT
Start: 2025-08-20 | End: 2025-08-23

## 2025-08-20 RX ADMIN — HUMAN INSULIN 2 UNITS: 100 INJECTION, SOLUTION SUBCUTANEOUS at 23:40

## 2025-08-20 RX ADMIN — ACYCLOVIR SODIUM 480 MG: 500 INJECTION, SOLUTION INTRAVENOUS at 13:20

## 2025-08-20 RX ADMIN — AMPICILLIN SODIUM AND SULBACTAM SODIUM 3 G: 2; 1 INJECTION, POWDER, FOR SOLUTION INTRAMUSCULAR; INTRAVENOUS at 17:54

## 2025-08-20 RX ADMIN — AMPICILLIN SODIUM AND SULBACTAM SODIUM 3 G: 2; 1 INJECTION, POWDER, FOR SOLUTION INTRAMUSCULAR; INTRAVENOUS at 12:21

## 2025-08-20 RX ADMIN — AMPICILLIN SODIUM AND SULBACTAM SODIUM 3 G: 2; 1 INJECTION, POWDER, FOR SOLUTION INTRAMUSCULAR; INTRAVENOUS at 01:51

## 2025-08-20 RX ADMIN — HUMAN INSULIN 3 UNITS: 100 INJECTION, SOLUTION SUBCUTANEOUS at 18:00

## 2025-08-20 RX ADMIN — GADOTERATE MEGLUMINE 10 ML: 376.9 INJECTION INTRAVENOUS at 11:36

## 2025-08-20 RX ADMIN — LEVETIRACETAM 1000 MG: 10 INJECTION INTRAVENOUS at 16:21

## 2025-08-20 RX ADMIN — HUMAN INSULIN 3 UNITS: 100 INJECTION, SOLUTION SUBCUTANEOUS at 09:31

## 2025-08-20 RX ADMIN — Medication: at 00:41

## 2025-08-20 RX ADMIN — HUMAN INSULIN 1 UNITS: 100 INJECTION, SOLUTION SUBCUTANEOUS at 04:41

## 2025-08-20 RX ADMIN — CLOPIDOGREL BISULFATE 75 MG: 75 TABLET, FILM COATED ORAL at 09:44

## 2025-08-20 RX ADMIN — PIPERACILLIN SODIUM AND TAZOBACTAM SODIUM 3.38 G: 3; .375 INJECTION, SOLUTION INTRAVENOUS at 22:55

## 2025-08-20 RX ADMIN — SODIUM CHLORIDE, SODIUM LACTATE, POTASSIUM CHLORIDE, AND CALCIUM CHLORIDE 1000 ML: .6; .31; .03; .02 INJECTION, SOLUTION INTRAVENOUS at 09:31

## 2025-08-20 RX ADMIN — PANTOPRAZOLE SODIUM 40 MG: 40 INJECTION, POWDER, FOR SOLUTION INTRAVENOUS at 08:07

## 2025-08-20 RX ADMIN — THIAMINE HCL TAB 100 MG 100 MG: 100 TAB at 08:08

## 2025-08-20 RX ADMIN — ASPIRIN 81 MG: 81 TABLET, CHEWABLE ORAL at 08:08

## 2025-08-20 RX ADMIN — POTASSIUM CHLORIDE 40 MEQ: 1.5 POWDER, FOR SOLUTION ORAL at 08:08

## 2025-08-20 RX ADMIN — MINOCYCLINE HYDROCHLORIDE 200 MG: 100 INJECTION INTRAVENOUS at 06:40

## 2025-08-20 RX ADMIN — Medication: at 20:36

## 2025-08-20 RX ADMIN — LEVETIRACETAM 750 MG: 500 INJECTION, SOLUTION INTRAVENOUS at 04:40

## 2025-08-20 RX ADMIN — Medication: at 12:15

## 2025-08-20 RX ADMIN — HUMAN INSULIN 2 UNITS: 100 INJECTION, SOLUTION SUBCUTANEOUS at 12:33

## 2025-08-20 RX ADMIN — Medication: at 14:00

## 2025-08-20 RX ADMIN — ESOMEPRAZOLE MAGNESIUM 40 MG: 40 FOR SUSPENSION ORAL at 20:50

## 2025-08-20 RX ADMIN — ATORVASTATIN CALCIUM 80 MG: 80 TABLET, FILM COATED ORAL at 20:50

## 2025-08-20 RX ADMIN — VANCOMYCIN HYDROCHLORIDE 750 MG: 750 INJECTION, SOLUTION INTRAVENOUS at 16:51

## 2025-08-20 RX ADMIN — Medication: at 05:06

## 2025-08-20 RX ADMIN — Medication: at 07:05

## 2025-08-20 ASSESSMENT — COGNITIVE AND FUNCTIONAL STATUS - GENERAL
EATING MEALS: TOTAL
TOILETING: TOTAL
CLIMB 3 TO 5 STEPS WITH RAILING: TOTAL
STANDING UP FROM CHAIR USING ARMS: TOTAL
DAILY ACTIVITIY SCORE: 6
DRESSING REGULAR LOWER BODY CLOTHING: TOTAL
MOVING TO AND FROM BED TO CHAIR: TOTAL
WALKING IN HOSPITAL ROOM: TOTAL
MOVING FROM LYING ON BACK TO SITTING ON SIDE OF FLAT BED WITH BEDRAILS: TOTAL
DRESSING REGULAR UPPER BODY CLOTHING: TOTAL
PERSONAL GROOMING: TOTAL
MOBILITY SCORE: 6
TURNING FROM BACK TO SIDE WHILE IN FLAT BAD: TOTAL
HELP NEEDED FOR BATHING: TOTAL

## 2025-08-20 ASSESSMENT — PAIN - FUNCTIONAL ASSESSMENT: PAIN_FUNCTIONAL_ASSESSMENT: CPOT (CRITICAL CARE PAIN OBSERVATION TOOL)

## 2025-08-21 LAB
ALBUMIN SERPL BCP-MCNC: 1.6 G/DL (ref 3.4–5)
ALBUMIN SERPL BCP-MCNC: 1.6 G/DL (ref 3.4–5)
ALBUMIN SERPL BCP-MCNC: 1.7 G/DL (ref 3.4–5)
ALP SERPL-CCNC: 162 U/L (ref 33–136)
ALP SERPL-CCNC: 164 U/L (ref 33–136)
ALP SERPL-CCNC: 176 U/L (ref 33–136)
ALT SERPL W P-5'-P-CCNC: 25 U/L (ref 7–45)
ALT SERPL W P-5'-P-CCNC: 28 U/L (ref 7–45)
ALT SERPL W P-5'-P-CCNC: 29 U/L (ref 7–45)
ANION GAP BLDA CALCULATED.4IONS-SCNC: 13 MMO/L (ref 10–25)
ANION GAP BLDA CALCULATED.4IONS-SCNC: 14 MMO/L (ref 10–25)
ANION GAP BLDA CALCULATED.4IONS-SCNC: 14 MMO/L (ref 10–25)
ANION GAP BLDA CALCULATED.4IONS-SCNC: 15 MMO/L (ref 10–25)
ANION GAP SERPL CALC-SCNC: 14 MMOL/L (ref 10–20)
ANION GAP SERPL CALC-SCNC: 17 MMOL/L (ref 10–20)
ANION GAP SERPL CALC-SCNC: 17 MMOL/L (ref 10–20)
APTT PPP: 42 SECONDS (ref 26–36)
AST SERPL W P-5'-P-CCNC: 36 U/L (ref 9–39)
AST SERPL W P-5'-P-CCNC: 59 U/L (ref 9–39)
AST SERPL W P-5'-P-CCNC: 65 U/L (ref 9–39)
BACTERIA UR CULT: ABNORMAL
BASE EXCESS BLDA CALC-SCNC: -11.3 MMOL/L (ref -2–3)
BASE EXCESS BLDA CALC-SCNC: -13.5 MMOL/L (ref -2–3)
BASE EXCESS BLDA CALC-SCNC: -14.4 MMOL/L (ref -2–3)
BASE EXCESS BLDA CALC-SCNC: -7.5 MMOL/L (ref -2–3)
BASOPHILS # BLD AUTO: 0.02 X10*3/UL (ref 0–0.1)
BASOPHILS # BLD AUTO: 0.06 X10*3/UL (ref 0–0.1)
BASOPHILS NFR BLD AUTO: 0.2 %
BASOPHILS NFR BLD AUTO: 0.3 %
BILIRUB SERPL-MCNC: 0.2 MG/DL (ref 0–1.2)
BILIRUB SERPL-MCNC: 0.3 MG/DL (ref 0–1.2)
BILIRUB SERPL-MCNC: 0.3 MG/DL (ref 0–1.2)
BODY TEMPERATURE: 37 DEGREES CELSIUS
BUN SERPL-MCNC: 36 MG/DL (ref 6–23)
BUN SERPL-MCNC: 38 MG/DL (ref 6–23)
BUN SERPL-MCNC: 41 MG/DL (ref 6–23)
CA-I BLD-SCNC: 1.12 MMOL/L (ref 1.1–1.33)
CA-I BLDA-SCNC: 1.11 MMOL/L (ref 1.1–1.33)
CA-I BLDA-SCNC: 1.13 MMOL/L (ref 1.1–1.33)
CA-I BLDA-SCNC: 1.14 MMOL/L (ref 1.1–1.33)
CA-I BLDA-SCNC: 1.17 MMOL/L (ref 1.1–1.33)
CALCIUM SERPL-MCNC: 7.3 MG/DL (ref 8.6–10.6)
CALCIUM SERPL-MCNC: 7.5 MG/DL (ref 8.6–10.6)
CALCIUM SERPL-MCNC: 7.5 MG/DL (ref 8.6–10.6)
CARDIAC TROPONIN I PNL SERPL HS: 486 NG/L (ref 0–34)
CARDIAC TROPONIN I PNL SERPL HS: 524 NG/L (ref 0–34)
CHLORIDE BLDA-SCNC: 103 MMOL/L (ref 98–107)
CHLORIDE BLDA-SCNC: 104 MMOL/L (ref 98–107)
CHLORIDE BLDA-SCNC: 105 MMOL/L (ref 98–107)
CHLORIDE BLDA-SCNC: 106 MMOL/L (ref 98–107)
CHLORIDE SERPL-SCNC: 103 MMOL/L (ref 98–107)
CHLORIDE SERPL-SCNC: 104 MMOL/L (ref 98–107)
CHLORIDE SERPL-SCNC: 104 MMOL/L (ref 98–107)
CO2 SERPL-SCNC: 13 MMOL/L (ref 21–32)
CO2 SERPL-SCNC: 14 MMOL/L (ref 21–32)
CO2 SERPL-SCNC: 18 MMOL/L (ref 21–32)
CREAT SERPL-MCNC: 2.27 MG/DL (ref 0.5–1.05)
CREAT SERPL-MCNC: 2.32 MG/DL (ref 0.5–1.05)
CREAT SERPL-MCNC: 2.32 MG/DL (ref 0.5–1.05)
EGFRCR SERPLBLD CKD-EPI 2021: 23 ML/MIN/1.73M*2
EOSINOPHIL # BLD AUTO: 0.01 X10*3/UL (ref 0–0.7)
EOSINOPHIL # BLD AUTO: 0.04 X10*3/UL (ref 0–0.7)
EOSINOPHIL NFR BLD AUTO: 0.1 %
EOSINOPHIL NFR BLD AUTO: 0.2 %
ERYTHROCYTE [DISTWIDTH] IN BLOOD BY AUTOMATED COUNT: 20.4 % (ref 11.5–14.5)
ERYTHROCYTE [DISTWIDTH] IN BLOOD BY AUTOMATED COUNT: 20.4 % (ref 11.5–14.5)
GLUCOSE BLD MANUAL STRIP-MCNC: 141 MG/DL (ref 74–99)
GLUCOSE BLD MANUAL STRIP-MCNC: 169 MG/DL (ref 74–99)
GLUCOSE BLD MANUAL STRIP-MCNC: 178 MG/DL (ref 74–99)
GLUCOSE BLD MANUAL STRIP-MCNC: 183 MG/DL (ref 74–99)
GLUCOSE BLD MANUAL STRIP-MCNC: 68 MG/DL (ref 74–99)
GLUCOSE BLDA-MCNC: 132 MG/DL (ref 74–99)
GLUCOSE BLDA-MCNC: 175 MG/DL (ref 74–99)
GLUCOSE BLDA-MCNC: 186 MG/DL (ref 74–99)
GLUCOSE BLDA-MCNC: 241 MG/DL (ref 74–99)
GLUCOSE SERPL-MCNC: 137 MG/DL (ref 74–99)
GLUCOSE SERPL-MCNC: 194 MG/DL (ref 74–99)
GLUCOSE SERPL-MCNC: 246 MG/DL (ref 74–99)
HCO3 BLDA-SCNC: 10.2 MMOL/L (ref 22–26)
HCO3 BLDA-SCNC: 11.4 MMOL/L (ref 22–26)
HCO3 BLDA-SCNC: 12.4 MMOL/L (ref 22–26)
HCO3 BLDA-SCNC: 15.6 MMOL/L (ref 22–26)
HCT VFR BLD AUTO: 24.2 % (ref 36–46)
HCT VFR BLD AUTO: 25.3 % (ref 36–46)
HCT VFR BLD EST: 25 % (ref 36–46)
HCT VFR BLD EST: 26 % (ref 36–46)
HCT VFR BLD EST: 28 % (ref 36–46)
HCT VFR BLD EST: 30 % (ref 36–46)
HGB BLD-MCNC: 7.9 G/DL (ref 12–16)
HGB BLD-MCNC: 8.8 G/DL (ref 12–16)
HGB BLDA-MCNC: 10 G/DL (ref 12–16)
HGB BLDA-MCNC: 8.3 G/DL (ref 12–16)
HGB BLDA-MCNC: 8.8 G/DL (ref 12–16)
HGB BLDA-MCNC: 9.4 G/DL (ref 12–16)
HOLD SPECIMEN: NORMAL
IMM GRANULOCYTES # BLD AUTO: 0.2 X10*3/UL (ref 0–0.7)
IMM GRANULOCYTES # BLD AUTO: 0.76 X10*3/UL (ref 0–0.7)
IMM GRANULOCYTES NFR BLD AUTO: 1.6 % (ref 0–0.9)
IMM GRANULOCYTES NFR BLD AUTO: 3.5 % (ref 0–0.9)
INHALED O2 CONCENTRATION: 100 %
INHALED O2 CONCENTRATION: 100 %
INHALED O2 CONCENTRATION: 30 %
INHALED O2 CONCENTRATION: 50 %
INR PPP: 1.4 (ref 0.9–1.1)
LACTATE BLDA-SCNC: 1.4 MMOL/L (ref 0.4–2)
LACTATE BLDA-SCNC: 2.3 MMOL/L (ref 0.4–2)
LACTATE BLDA-SCNC: 2.7 MMOL/L (ref 0.4–2)
LACTATE BLDA-SCNC: 3.2 MMOL/L (ref 0.4–2)
LACTATE SERPL-SCNC: 3.5 MMOL/L (ref 0.4–2)
LACTATE SERPL-SCNC: 3.6 MMOL/L (ref 0.4–2)
LEVETIRACETAM SERPL-MCNC: 118 UG/ML (ref 10–40)
LYMPHOCYTES # BLD AUTO: 2.75 X10*3/UL (ref 1.2–4.8)
LYMPHOCYTES # BLD AUTO: 7.39 X10*3/UL (ref 1.2–4.8)
LYMPHOCYTES NFR BLD AUTO: 21.9 %
LYMPHOCYTES NFR BLD AUTO: 33.7 %
MAGNESIUM SERPL-MCNC: 2.09 MG/DL (ref 1.6–2.4)
MAGNESIUM SERPL-MCNC: 3.4 MG/DL (ref 1.6–2.4)
MCH RBC QN AUTO: 28.6 PG (ref 26–34)
MCH RBC QN AUTO: 28.7 PG (ref 26–34)
MCHC RBC AUTO-ENTMCNC: 32.6 G/DL (ref 32–36)
MCHC RBC AUTO-ENTMCNC: 34.8 G/DL (ref 32–36)
MCV RBC AUTO: 82 FL (ref 80–100)
MCV RBC AUTO: 88 FL (ref 80–100)
MONOCYTES # BLD AUTO: 0.37 X10*3/UL (ref 0.1–1)
MONOCYTES # BLD AUTO: 0.62 X10*3/UL (ref 0.1–1)
MONOCYTES NFR BLD AUTO: 2.8 %
MONOCYTES NFR BLD AUTO: 2.9 %
NEUTROPHILS # BLD AUTO: 13.08 X10*3/UL (ref 1.2–7.7)
NEUTROPHILS # BLD AUTO: 9.22 X10*3/UL (ref 1.2–7.7)
NEUTROPHILS NFR BLD AUTO: 59.5 %
NEUTROPHILS NFR BLD AUTO: 73.3 %
NRBC BLD-RTO: 1 /100 WBCS (ref 0–0)
NRBC BLD-RTO: 2.9 /100 WBCS (ref 0–0)
OXYHGB MFR BLDA: 97.1 % (ref 94–98)
OXYHGB MFR BLDA: 97.4 % (ref 94–98)
OXYHGB MFR BLDA: 98.2 % (ref 94–98)
OXYHGB MFR BLDA: 98.2 % (ref 94–98)
PCO2 BLDA: 18 MM HG (ref 38–42)
PCO2 BLDA: 21 MM HG (ref 38–42)
PCO2 BLDA: 24 MM HG (ref 38–42)
PCO2 BLDA: 26 MM HG (ref 38–42)
PH BLDA: 7.25 PH (ref 7.38–7.42)
PH BLDA: 7.36 PH (ref 7.38–7.42)
PH BLDA: 7.38 PH (ref 7.38–7.42)
PH BLDA: 7.42 PH (ref 7.38–7.42)
PHOSPHATE SERPL-MCNC: 5.8 MG/DL (ref 2.5–4.9)
PLATELET # BLD AUTO: 71 X10*3/UL (ref 150–450)
PLATELET # BLD AUTO: 74 X10*3/UL (ref 150–450)
PO2 BLDA: 143 MM HG (ref 85–95)
PO2 BLDA: 271 MM HG (ref 85–95)
PO2 BLDA: 371 MM HG (ref 85–95)
PO2 BLDA: 386 MM HG (ref 85–95)
POTASSIUM BLDA-SCNC: 3.4 MMOL/L (ref 3.5–5.3)
POTASSIUM BLDA-SCNC: 3.6 MMOL/L (ref 3.5–5.3)
POTASSIUM BLDA-SCNC: 3.8 MMOL/L (ref 3.5–5.3)
POTASSIUM BLDA-SCNC: 4 MMOL/L (ref 3.5–5.3)
POTASSIUM SERPL-SCNC: 3.6 MMOL/L (ref 3.5–5.3)
POTASSIUM SERPL-SCNC: 3.9 MMOL/L (ref 3.5–5.3)
POTASSIUM SERPL-SCNC: 4.1 MMOL/L (ref 3.5–5.3)
PROT SERPL-MCNC: 4.2 G/DL (ref 6.4–8.2)
PROTHROMBIN TIME: 15.4 SECONDS (ref 9.8–12.4)
RBC # BLD AUTO: 2.76 X10*6/UL (ref 4–5.2)
RBC # BLD AUTO: 3.07 X10*6/UL (ref 4–5.2)
SAO2 % BLDA: 100 % (ref 94–100)
SAO2 % BLDA: 100 % (ref 94–100)
SAO2 % BLDA: 99 % (ref 94–100)
SAO2 % BLDA: 99 % (ref 94–100)
SODIUM BLDA-SCNC: 125 MMOL/L (ref 136–145)
SODIUM BLDA-SCNC: 127 MMOL/L (ref 136–145)
SODIUM BLDA-SCNC: 128 MMOL/L (ref 136–145)
SODIUM BLDA-SCNC: 129 MMOL/L (ref 136–145)
SODIUM SERPL-SCNC: 129 MMOL/L (ref 136–145)
SODIUM SERPL-SCNC: 131 MMOL/L (ref 136–145)
SODIUM SERPL-SCNC: 132 MMOL/L (ref 136–145)
T4 FREE SERPL-MCNC: 1 NG/DL (ref 0.78–1.48)
TSH SERPL-ACNC: 7.84 MIU/L (ref 0.44–3.98)
VANCOMYCIN SERPL-MCNC: 26.1 UG/ML (ref 5–20)
WBC # BLD AUTO: 12.6 X10*3/UL (ref 4.4–11.3)
WBC # BLD AUTO: 22 X10*3/UL (ref 4.4–11.3)

## 2025-08-21 PROCEDURE — 2500000004 HC RX 250 GENERAL PHARMACY W/ HCPCS (ALT 636 FOR OP/ED)

## 2025-08-21 PROCEDURE — 36415 COLL VENOUS BLD VENIPUNCTURE: CPT

## 2025-08-21 PROCEDURE — 80202 ASSAY OF VANCOMYCIN: CPT

## 2025-08-21 PROCEDURE — 2500000002 HC RX 250 W HCPCS SELF ADMINISTERED DRUGS (ALT 637 FOR MEDICARE OP, ALT 636 FOR OP/ED)

## 2025-08-21 PROCEDURE — 94003 VENT MGMT INPAT SUBQ DAY: CPT

## 2025-08-21 PROCEDURE — 2500000001 HC RX 250 WO HCPCS SELF ADMINISTERED DRUGS (ALT 637 FOR MEDICARE OP)

## 2025-08-21 PROCEDURE — 95720 EEG PHY/QHP EA INCR W/VEEG: CPT | Performed by: PSYCHIATRY & NEUROLOGY

## 2025-08-21 PROCEDURE — 84439 ASSAY OF FREE THYROXINE: CPT

## 2025-08-21 PROCEDURE — 36556 INSERT NON-TUNNEL CV CATH: CPT | Mod: GC

## 2025-08-21 PROCEDURE — 2500000005 HC RX 250 GENERAL PHARMACY W/O HCPCS

## 2025-08-21 PROCEDURE — 83735 ASSAY OF MAGNESIUM: CPT

## 2025-08-21 PROCEDURE — 87205 SMEAR GRAM STAIN: CPT

## 2025-08-21 PROCEDURE — 84075 ASSAY ALKALINE PHOSPHATASE: CPT

## 2025-08-21 PROCEDURE — 82947 ASSAY GLUCOSE BLOOD QUANT: CPT

## 2025-08-21 PROCEDURE — 84132 ASSAY OF SERUM POTASSIUM: CPT

## 2025-08-21 PROCEDURE — 84484 ASSAY OF TROPONIN QUANT: CPT

## 2025-08-21 PROCEDURE — 87040 BLOOD CULTURE FOR BACTERIA: CPT

## 2025-08-21 PROCEDURE — 71045 X-RAY EXAM CHEST 1 VIEW: CPT

## 2025-08-21 PROCEDURE — 99223 1ST HOSP IP/OBS HIGH 75: CPT

## 2025-08-21 PROCEDURE — 2500000005 HC RX 250 GENERAL PHARMACY W/O HCPCS: Performed by: INTERNAL MEDICINE

## 2025-08-21 PROCEDURE — 99223 1ST HOSP IP/OBS HIGH 75: CPT | Performed by: INTERNAL MEDICINE

## 2025-08-21 PROCEDURE — 37799 UNLISTED PX VASCULAR SURGERY: CPT

## 2025-08-21 PROCEDURE — 95715 VEEG EA 12-26HR INTMT MNTR: CPT

## 2025-08-21 PROCEDURE — 2020000001 HC ICU ROOM DAILY

## 2025-08-21 PROCEDURE — 80177 DRUG SCRN QUAN LEVETIRACETAM: CPT

## 2025-08-21 PROCEDURE — 84443 ASSAY THYROID STIM HORMONE: CPT

## 2025-08-21 PROCEDURE — 93970 EXTREMITY STUDY: CPT | Performed by: RADIOLOGY

## 2025-08-21 PROCEDURE — 36556 INSERT NON-TUNNEL CV CATH: CPT

## 2025-08-21 PROCEDURE — 85025 COMPLETE CBC W/AUTO DIFF WBC: CPT

## 2025-08-21 PROCEDURE — 99291 CRITICAL CARE FIRST HOUR: CPT | Performed by: STUDENT IN AN ORGANIZED HEALTH CARE EDUCATION/TRAINING PROGRAM

## 2025-08-21 PROCEDURE — 85610 PROTHROMBIN TIME: CPT

## 2025-08-21 PROCEDURE — 92950 HEART/LUNG RESUSCITATION CPR: CPT | Performed by: INTERNAL MEDICINE

## 2025-08-21 PROCEDURE — 83605 ASSAY OF LACTIC ACID: CPT

## 2025-08-21 PROCEDURE — 82330 ASSAY OF CALCIUM: CPT

## 2025-08-21 PROCEDURE — 05HY33Z INSERTION OF INFUSION DEVICE INTO UPPER VEIN, PERCUTANEOUS APPROACH: ICD-10-PCS | Performed by: INTERNAL MEDICINE

## 2025-08-21 PROCEDURE — 5A12012 PERFORMANCE OF CARDIAC OUTPUT, SINGLE, MANUAL: ICD-10-PCS | Performed by: INTERNAL MEDICINE

## 2025-08-21 PROCEDURE — 99291 CRITICAL CARE FIRST HOUR: CPT

## 2025-08-21 PROCEDURE — 84100 ASSAY OF PHOSPHORUS: CPT

## 2025-08-21 RX ORDER — LEVETIRACETAM 10 MG/ML
1000 INJECTION INTRAVASCULAR EVERY 12 HOURS
Status: DISCONTINUED | OUTPATIENT
Start: 2025-08-21 | End: 2025-08-21

## 2025-08-21 RX ORDER — NOREPINEPHRINE BITARTRATE/D5W 8 MG/250ML
0-.3 PLASTIC BAG, INJECTION (ML) INTRAVENOUS CONTINUOUS
Status: DISCONTINUED | OUTPATIENT
Start: 2025-08-21 | End: 2025-08-23

## 2025-08-21 RX ORDER — EPINEPHRINE 1 MG/ML
1 INJECTION INTRAMUSCULAR; INTRAVENOUS; SUBCUTANEOUS ONCE
Status: DISCONTINUED | OUTPATIENT
Start: 2025-08-21 | End: 2025-08-21

## 2025-08-21 RX ORDER — NOREPINEPHRINE BITARTRATE/D5W 8 MG/250ML
PLASTIC BAG, INJECTION (ML) INTRAVENOUS
Status: DISPENSED
Start: 2025-08-21 | End: 2025-08-21

## 2025-08-21 RX ORDER — EPINEPHRINE IN 0.9 % SOD CHLOR 4MG/250ML
0-1 PLASTIC BAG, INJECTION (ML) INTRAVENOUS CONTINUOUS
Status: DISCONTINUED | OUTPATIENT
Start: 2025-08-21 | End: 2025-08-21

## 2025-08-21 RX ORDER — NOREPINEPHRINE BITARTRATE/D5W 8 MG/250ML
PLASTIC BAG, INJECTION (ML) INTRAVENOUS
Status: COMPLETED
Start: 2025-08-21 | End: 2025-08-21

## 2025-08-21 RX ORDER — PHENYLEPHRINE HCL IN 0.9% NACL 0.4MG/10ML
200 SYRINGE (ML) INTRAVENOUS ONCE
Status: COMPLETED | OUTPATIENT
Start: 2025-08-21 | End: 2025-08-21

## 2025-08-21 RX ORDER — SODIUM BICARBONATE 650 MG/1
1300 TABLET ORAL 3 TIMES DAILY
Status: DISCONTINUED | OUTPATIENT
Start: 2025-08-21 | End: 2025-08-22

## 2025-08-21 RX ORDER — PHENYLEPHRINE HCL IN 0.9% NACL 0.4MG/10ML
SYRINGE (ML) INTRAVENOUS
Status: COMPLETED
Start: 2025-08-21 | End: 2025-08-21

## 2025-08-21 RX ORDER — EPINEPHRINE IN 0.9 % SOD CHLOR 4MG/250ML
PLASTIC BAG, INJECTION (ML) INTRAVENOUS
Status: COMPLETED
Start: 2025-08-21 | End: 2025-08-21

## 2025-08-21 RX ADMIN — Medication: at 06:16

## 2025-08-21 RX ADMIN — Medication 200 MCG: at 07:30

## 2025-08-21 RX ADMIN — Medication: at 15:21

## 2025-08-21 RX ADMIN — LEVETIRACETAM 1000 MG: 10 INJECTION INTRAVENOUS at 11:17

## 2025-08-21 RX ADMIN — ATORVASTATIN CALCIUM 80 MG: 80 TABLET, FILM COATED ORAL at 20:38

## 2025-08-21 RX ADMIN — EPINEPHRINE IN SODIUM CHLORIDE 0.05 MCG/KG/MIN: 16 INJECTION INTRAVENOUS at 07:46

## 2025-08-21 RX ADMIN — AMIODARONE HYDROCHLORIDE 1 MG/MIN: 1.8 INJECTION, SOLUTION INTRAVENOUS at 08:39

## 2025-08-21 RX ADMIN — SODIUM BICARBONATE 25 ML/HR: 84 INJECTION, SOLUTION INTRAVENOUS at 16:32

## 2025-08-21 RX ADMIN — AMIODARONE HYDROCHLORIDE 1 MG/MIN: 1.8 INJECTION, SOLUTION INTRAVENOUS at 08:52

## 2025-08-21 RX ADMIN — VASOPRESSIN 0.03 UNITS/MIN: 0.2 INJECTION INTRAVENOUS at 07:43

## 2025-08-21 RX ADMIN — NOREPINEPHRINE BITARTRATE 0.1 MCG/KG/MIN: 8 INJECTION, SOLUTION INTRAVENOUS at 07:59

## 2025-08-21 RX ADMIN — AMIODARONE HYDROCHLORIDE 1 MG/MIN: 1.8 INJECTION, SOLUTION INTRAVENOUS at 13:46

## 2025-08-21 RX ADMIN — VASOPRESSIN 0.03 UNITS/MIN: 0.2 INJECTION INTRAVENOUS at 08:54

## 2025-08-21 RX ADMIN — VASOPRESSIN 0.03 UNITS/MIN: 0.2 INJECTION INTRAVENOUS at 16:32

## 2025-08-21 RX ADMIN — SODIUM BICARBONATE 1300 MG: 650 TABLET ORAL at 13:41

## 2025-08-21 RX ADMIN — HUMAN INSULIN 1 UNITS: 100 INJECTION, SOLUTION SUBCUTANEOUS at 18:06

## 2025-08-21 RX ADMIN — Medication: at 20:18

## 2025-08-21 RX ADMIN — HUMAN INSULIN 1 UNITS: 100 INJECTION, SOLUTION SUBCUTANEOUS at 11:59

## 2025-08-21 RX ADMIN — AMPICILLIN SODIUM AND SULBACTAM SODIUM 3 G: 2; 1 INJECTION, POWDER, FOR SOLUTION INTRAMUSCULAR; INTRAVENOUS at 18:07

## 2025-08-21 RX ADMIN — PIPERACILLIN SODIUM AND TAZOBACTAM SODIUM 3.38 G: 3; .375 INJECTION, SOLUTION INTRAVENOUS at 04:23

## 2025-08-21 RX ADMIN — AMPICILLIN SODIUM AND SULBACTAM SODIUM 3 G: 2; 1 INJECTION, POWDER, FOR SOLUTION INTRAMUSCULAR; INTRAVENOUS at 09:40

## 2025-08-21 RX ADMIN — SODIUM BICARBONATE 1300 MG: 650 TABLET ORAL at 20:38

## 2025-08-21 RX ADMIN — SODIUM CHLORIDE, SODIUM LACTATE, POTASSIUM CHLORIDE, AND CALCIUM CHLORIDE 500 ML: 600; 310; 30; 20 INJECTION, SOLUTION INTRAVENOUS at 08:44

## 2025-08-21 RX ADMIN — ESOMEPRAZOLE MAGNESIUM 40 MG: 40 FOR SUSPENSION ORAL at 08:52

## 2025-08-21 RX ADMIN — VASOPRESSIN 0.03 UNITS/MIN: 0.2 INJECTION INTRAVENOUS at 16:34

## 2025-08-21 RX ADMIN — THIAMINE HCL TAB 100 MG 100 MG: 100 TAB at 08:54

## 2025-08-21 RX ADMIN — ANGIOTENSIN II 20 NG/KG/MIN: 2.5 INJECTION INTRAVENOUS at 14:53

## 2025-08-21 RX ADMIN — Medication: at 07:50

## 2025-08-21 RX ADMIN — NOREPINEPHRINE BITARTRATE 0.2 MCG/KG/MIN: 8 INJECTION, SOLUTION INTRAVENOUS at 16:40

## 2025-08-21 RX ADMIN — VALPROATE SODIUM 500 MG: 100 INJECTION, SOLUTION INTRAVENOUS at 22:29

## 2025-08-21 RX ADMIN — ESOMEPRAZOLE MAGNESIUM 40 MG: 40 FOR SUSPENSION ORAL at 20:38

## 2025-08-21 RX ADMIN — VALPROATE SODIUM 1120 MG: 100 INJECTION, SOLUTION INTRAVENOUS at 18:06

## 2025-08-21 ASSESSMENT — COGNITIVE AND FUNCTIONAL STATUS - GENERAL
TURNING FROM BACK TO SIDE WHILE IN FLAT BAD: TOTAL
MOVING TO AND FROM BED TO CHAIR: TOTAL
STANDING UP FROM CHAIR USING ARMS: TOTAL
CLIMB 3 TO 5 STEPS WITH RAILING: TOTAL
DRESSING REGULAR UPPER BODY CLOTHING: TOTAL
PERSONAL GROOMING: TOTAL
MOVING FROM LYING ON BACK TO SITTING ON SIDE OF FLAT BED WITH BEDRAILS: TOTAL
EATING MEALS: TOTAL
DAILY ACTIVITIY SCORE: 6
HELP NEEDED FOR BATHING: TOTAL
DRESSING REGULAR LOWER BODY CLOTHING: TOTAL
MOBILITY SCORE: 6
WALKING IN HOSPITAL ROOM: TOTAL
TOILETING: TOTAL

## 2025-08-21 ASSESSMENT — PAIN - FUNCTIONAL ASSESSMENT
PAIN_FUNCTIONAL_ASSESSMENT: CPOT (CRITICAL CARE PAIN OBSERVATION TOOL)

## 2025-08-21 ASSESSMENT — PAIN SCALES - GENERAL
PAINLEVEL_OUTOF10: 0 - NO PAIN
PAINLEVEL_OUTOF10: 0 - NO PAIN

## 2025-08-22 ENCOUNTER — APPOINTMENT (OUTPATIENT)
Dept: GYNECOLOGIC ONCOLOGY | Facility: HOSPITAL | Age: 65
End: 2025-08-22
Payer: MEDICARE

## 2025-08-22 ENCOUNTER — TUMOR BOARD CONFERENCE (OUTPATIENT)
Dept: HEMATOLOGY/ONCOLOGY | Facility: HOSPITAL | Age: 65
End: 2025-08-22
Payer: MEDICARE

## 2025-08-22 LAB
ABO GROUP (TYPE) IN BLOOD: NORMAL
ALBUMIN SERPL BCP-MCNC: 1.5 G/DL (ref 3.4–5)
ALP SERPL-CCNC: 142 U/L (ref 33–136)
ALT SERPL W P-5'-P-CCNC: 26 U/L (ref 7–45)
ANION GAP BLDA CALCULATED.4IONS-SCNC: 15 MMO/L (ref 10–25)
ANION GAP BLDA CALCULATED.4IONS-SCNC: 18 MMO/L (ref 10–25)
ANION GAP SERPL CALC-SCNC: 19 MMOL/L (ref 10–20)
ANTIBODY SCREEN: NORMAL
AST SERPL W P-5'-P-CCNC: 84 U/L (ref 9–39)
BASE EXCESS BLDA CALC-SCNC: -12.2 MMOL/L (ref -2–3)
BASE EXCESS BLDA CALC-SCNC: -9.6 MMOL/L (ref -2–3)
BASOPHILS # BLD AUTO: 0.06 X10*3/UL (ref 0–0.1)
BASOPHILS NFR BLD AUTO: 0.3 %
BILIRUB SERPL-MCNC: 0.3 MG/DL (ref 0–1.2)
BLOOD EXPIRATION DATE: NORMAL
BODY TEMPERATURE: 37 DEGREES CELSIUS
BODY TEMPERATURE: 37 DEGREES CELSIUS
BUN SERPL-MCNC: 40 MG/DL (ref 6–23)
CA-I BLDA-SCNC: 1.04 MMOL/L (ref 1.1–1.33)
CA-I BLDA-SCNC: 1.09 MMOL/L (ref 1.1–1.33)
CALCIUM SERPL-MCNC: 7 MG/DL (ref 8.6–10.6)
CARDIAC TROPONIN I PNL SERPL HS: 626 NG/L (ref 0–34)
CHLORIDE BLDA-SCNC: 102 MMOL/L (ref 98–107)
CHLORIDE BLDA-SCNC: 102 MMOL/L (ref 98–107)
CHLORIDE SERPL-SCNC: 101 MMOL/L (ref 98–107)
CO2 SERPL-SCNC: 13 MMOL/L (ref 21–32)
CREAT SERPL-MCNC: 2.31 MG/DL (ref 0.5–1.05)
DISPENSE STATUS: NORMAL
EGFRCR SERPLBLD CKD-EPI 2021: 23 ML/MIN/1.73M*2
EOSINOPHIL # BLD AUTO: 0 X10*3/UL (ref 0–0.7)
EOSINOPHIL NFR BLD AUTO: 0 %
ERYTHROCYTE [DISTWIDTH] IN BLOOD BY AUTOMATED COUNT: 18.4 % (ref 11.5–14.5)
ERYTHROCYTE [DISTWIDTH] IN BLOOD BY AUTOMATED COUNT: 20.7 % (ref 11.5–14.5)
GLUCOSE BLD MANUAL STRIP-MCNC: 115 MG/DL (ref 74–99)
GLUCOSE BLD MANUAL STRIP-MCNC: 115 MG/DL (ref 74–99)
GLUCOSE BLD MANUAL STRIP-MCNC: 91 MG/DL (ref 74–99)
GLUCOSE BLDA-MCNC: 100 MG/DL (ref 74–99)
GLUCOSE BLDA-MCNC: 125 MG/DL (ref 74–99)
GLUCOSE SERPL-MCNC: 131 MG/DL (ref 74–99)
HCO3 BLDA-SCNC: 10.8 MMOL/L (ref 22–26)
HCO3 BLDA-SCNC: 14.2 MMOL/L (ref 22–26)
HCT VFR BLD AUTO: 20.6 % (ref 36–46)
HCT VFR BLD AUTO: 24 % (ref 36–46)
HCT VFR BLD EST: 25 % (ref 36–46)
HCT VFR BLD EST: 28 % (ref 36–46)
HGB BLD-MCNC: 6.7 G/DL (ref 12–16)
HGB BLD-MCNC: 8.6 G/DL (ref 12–16)
HGB BLDA-MCNC: 8.2 G/DL (ref 12–16)
HGB BLDA-MCNC: 9.4 G/DL (ref 12–16)
IMM GRANULOCYTES # BLD AUTO: 0.37 X10*3/UL (ref 0–0.7)
IMM GRANULOCYTES NFR BLD AUTO: 1.6 % (ref 0–0.9)
INHALED O2 CONCENTRATION: 35 %
INHALED O2 CONCENTRATION: 35 %
LACTATE BLDA-SCNC: 2 MMOL/L (ref 0.4–2)
LACTATE BLDA-SCNC: 3.3 MMOL/L (ref 0.4–2)
LYMPHOCYTES # BLD AUTO: 4.21 X10*3/UL (ref 1.2–4.8)
LYMPHOCYTES NFR BLD AUTO: 18.7 %
MAGNESIUM SERPL-MCNC: 2.59 MG/DL (ref 1.6–2.4)
MCH RBC QN AUTO: 28.4 PG (ref 26–34)
MCH RBC QN AUTO: 30.1 PG (ref 26–34)
MCHC RBC AUTO-ENTMCNC: 32.5 G/DL (ref 32–36)
MCHC RBC AUTO-ENTMCNC: 35.8 G/DL (ref 32–36)
MCV RBC AUTO: 84 FL (ref 80–100)
MCV RBC AUTO: 87 FL (ref 80–100)
MONOCYTES # BLD AUTO: 0.41 X10*3/UL (ref 0.1–1)
MONOCYTES NFR BLD AUTO: 1.8 %
NEUTROPHILS # BLD AUTO: 17.42 X10*3/UL (ref 1.2–7.7)
NEUTROPHILS NFR BLD AUTO: 77.6 %
NRBC BLD-RTO: 0.8 /100 WBCS (ref 0–0)
NRBC BLD-RTO: 2.3 /100 WBCS (ref 0–0)
OXYHGB MFR BLDA: 96.9 % (ref 94–98)
OXYHGB MFR BLDA: 98 % (ref 94–98)
PCO2 BLDA: 17 MM HG (ref 38–42)
PCO2 BLDA: 24 MM HG (ref 38–42)
PH BLDA: 7.38 PH (ref 7.38–7.42)
PH BLDA: 7.41 PH (ref 7.38–7.42)
PLATELET # BLD AUTO: 25 X10*3/UL (ref 150–450)
PLATELET # BLD AUTO: 43 X10*3/UL (ref 150–450)
PO2 BLDA: 122 MM HG (ref 85–95)
PO2 BLDA: 169 MM HG (ref 85–95)
POTASSIUM BLDA-SCNC: 3.8 MMOL/L (ref 3.5–5.3)
POTASSIUM BLDA-SCNC: 3.9 MMOL/L (ref 3.5–5.3)
POTASSIUM SERPL-SCNC: 3.9 MMOL/L (ref 3.5–5.3)
PRODUCT BLOOD TYPE: 5100
PRODUCT CODE: NORMAL
PROT SERPL-MCNC: 3.8 G/DL (ref 6.4–8.2)
RBC # BLD AUTO: 2.36 X10*6/UL (ref 4–5.2)
RBC # BLD AUTO: 2.86 X10*6/UL (ref 4–5.2)
RH FACTOR (ANTIGEN D): NORMAL
SAO2 % BLDA: 100 % (ref 94–100)
SAO2 % BLDA: 99 % (ref 94–100)
SODIUM BLDA-SCNC: 127 MMOL/L (ref 136–145)
SODIUM BLDA-SCNC: 127 MMOL/L (ref 136–145)
SODIUM SERPL-SCNC: 129 MMOL/L (ref 136–145)
UNIT ABO: NORMAL
UNIT NUMBER: NORMAL
UNIT RH: NORMAL
UNIT VOLUME: 281
VALPROATE SERPL-MCNC: 25 UG/ML (ref 50–100)
VALPROATE SERPL-MCNC: 48 UG/ML (ref 50–100)
VANCOMYCIN SERPL-MCNC: 22.4 UG/ML (ref 5–20)
WBC # BLD AUTO: 22.2 X10*3/UL (ref 4.4–11.3)
WBC # BLD AUTO: 22.5 X10*3/UL (ref 4.4–11.3)
XM INTEP: NORMAL

## 2025-08-22 PROCEDURE — 36430 TRANSFUSION BLD/BLD COMPNT: CPT

## 2025-08-22 PROCEDURE — 71045 X-RAY EXAM CHEST 1 VIEW: CPT

## 2025-08-22 PROCEDURE — 2500000005 HC RX 250 GENERAL PHARMACY W/O HCPCS

## 2025-08-22 PROCEDURE — 99232 SBSQ HOSP IP/OBS MODERATE 35: CPT | Performed by: STUDENT IN AN ORGANIZED HEALTH CARE EDUCATION/TRAINING PROGRAM

## 2025-08-22 PROCEDURE — 84132 ASSAY OF SERUM POTASSIUM: CPT

## 2025-08-22 PROCEDURE — 86923 COMPATIBILITY TEST ELECTRIC: CPT

## 2025-08-22 PROCEDURE — 99233 SBSQ HOSP IP/OBS HIGH 50: CPT | Performed by: INTERNAL MEDICINE

## 2025-08-22 PROCEDURE — 80202 ASSAY OF VANCOMYCIN: CPT

## 2025-08-22 PROCEDURE — 99291 CRITICAL CARE FIRST HOUR: CPT

## 2025-08-22 PROCEDURE — 86901 BLOOD TYPING SEROLOGIC RH(D): CPT

## 2025-08-22 PROCEDURE — 82947 ASSAY GLUCOSE BLOOD QUANT: CPT

## 2025-08-22 PROCEDURE — 2500000004 HC RX 250 GENERAL PHARMACY W/ HCPCS (ALT 636 FOR OP/ED)

## 2025-08-22 PROCEDURE — 37799 UNLISTED PX VASCULAR SURGERY: CPT

## 2025-08-22 PROCEDURE — 2500000002 HC RX 250 W HCPCS SELF ADMINISTERED DRUGS (ALT 637 FOR MEDICARE OP, ALT 636 FOR OP/ED)

## 2025-08-22 PROCEDURE — 80164 ASSAY DIPROPYLACETIC ACD TOT: CPT

## 2025-08-22 PROCEDURE — 83735 ASSAY OF MAGNESIUM: CPT

## 2025-08-22 PROCEDURE — 94003 VENT MGMT INPAT SUBQ DAY: CPT

## 2025-08-22 PROCEDURE — 85025 COMPLETE CBC W/AUTO DIFF WBC: CPT

## 2025-08-22 PROCEDURE — 2500000001 HC RX 250 WO HCPCS SELF ADMINISTERED DRUGS (ALT 637 FOR MEDICARE OP)

## 2025-08-22 PROCEDURE — 2020000001 HC ICU ROOM DAILY

## 2025-08-22 PROCEDURE — 85027 COMPLETE CBC AUTOMATED: CPT

## 2025-08-22 PROCEDURE — 84484 ASSAY OF TROPONIN QUANT: CPT

## 2025-08-22 PROCEDURE — 95715 VEEG EA 12-26HR INTMT MNTR: CPT

## 2025-08-22 PROCEDURE — P9016 RBC LEUKOCYTES REDUCED: HCPCS

## 2025-08-22 PROCEDURE — 95720 EEG PHY/QHP EA INCR W/VEEG: CPT | Performed by: PSYCHIATRY & NEUROLOGY

## 2025-08-22 RX ADMIN — VASOPRESSIN 0.03 UNITS/MIN: 0.2 INJECTION INTRAVENOUS at 04:13

## 2025-08-22 RX ADMIN — ANGIOTENSIN II 10 NG/KG/MIN: 2.5 INJECTION INTRAVENOUS at 12:29

## 2025-08-22 RX ADMIN — VALPROATE SODIUM 500 MG: 100 INJECTION, SOLUTION INTRAVENOUS at 04:11

## 2025-08-22 RX ADMIN — Medication: at 19:35

## 2025-08-22 RX ADMIN — ATORVASTATIN CALCIUM 80 MG: 80 TABLET, FILM COATED ORAL at 20:29

## 2025-08-22 RX ADMIN — Medication: at 07:00

## 2025-08-22 RX ADMIN — ESOMEPRAZOLE MAGNESIUM 40 MG: 40 FOR SUSPENSION ORAL at 20:29

## 2025-08-22 RX ADMIN — AMPICILLIN SODIUM AND SULBACTAM SODIUM 3 G: 2; 1 INJECTION, POWDER, FOR SOLUTION INTRAMUSCULAR; INTRAVENOUS at 00:40

## 2025-08-22 RX ADMIN — Medication: at 09:56

## 2025-08-22 RX ADMIN — AMPICILLIN SODIUM AND SULBACTAM SODIUM 3 G: 2; 1 INJECTION, POWDER, FOR SOLUTION INTRAMUSCULAR; INTRAVENOUS at 09:04

## 2025-08-22 RX ADMIN — VALPROATE SODIUM 500 MG: 100 INJECTION, SOLUTION INTRAVENOUS at 10:24

## 2025-08-22 RX ADMIN — SODIUM BICARBONATE 1300 MG: 650 TABLET ORAL at 09:01

## 2025-08-22 RX ADMIN — AMPICILLIN SODIUM AND SULBACTAM SODIUM 3 G: 2; 1 INJECTION, POWDER, FOR SOLUTION INTRAMUSCULAR; INTRAVENOUS at 17:11

## 2025-08-22 RX ADMIN — Medication: at 14:25

## 2025-08-22 RX ADMIN — HUMAN INSULIN 1 UNITS: 100 INJECTION, SOLUTION SUBCUTANEOUS at 00:38

## 2025-08-22 RX ADMIN — ESOMEPRAZOLE MAGNESIUM 40 MG: 40 FOR SUSPENSION ORAL at 09:01

## 2025-08-22 RX ADMIN — VALPROATE SODIUM 500 MG: 100 INJECTION, SOLUTION INTRAVENOUS at 17:58

## 2025-08-22 RX ADMIN — THIAMINE HCL TAB 100 MG 100 MG: 100 TAB at 09:03

## 2025-08-22 ASSESSMENT — PAIN - FUNCTIONAL ASSESSMENT
PAIN_FUNCTIONAL_ASSESSMENT: CPOT (CRITICAL CARE PAIN OBSERVATION TOOL)

## 2025-08-23 LAB
ALBUMIN SERPL BCP-MCNC: 1.6 G/DL (ref 3.4–5)
ALP SERPL-CCNC: 137 U/L (ref 33–136)
ALT SERPL W P-5'-P-CCNC: 25 U/L (ref 7–45)
ANION GAP SERPL CALC-SCNC: 16 MMOL/L (ref 10–20)
AST SERPL W P-5'-P-CCNC: 117 U/L (ref 9–39)
BACTERIA SPEC RESP CULT: NORMAL
BASOPHILS # BLD AUTO: 0.06 X10*3/UL (ref 0–0.1)
BASOPHILS NFR BLD AUTO: 0.2 %
BILIRUB SERPL-MCNC: 0.4 MG/DL (ref 0–1.2)
BUN SERPL-MCNC: 42 MG/DL (ref 6–23)
BURR CELLS BLD QL SMEAR: NORMAL
CALCIUM SERPL-MCNC: 7.5 MG/DL (ref 8.6–10.6)
CHLORIDE SERPL-SCNC: 99 MMOL/L (ref 98–107)
CO2 SERPL-SCNC: 20 MMOL/L (ref 21–32)
CREAT SERPL-MCNC: 2.4 MG/DL (ref 0.5–1.05)
EGFRCR SERPLBLD CKD-EPI 2021: 22 ML/MIN/1.73M*2
EOSINOPHIL # BLD AUTO: 0.01 X10*3/UL (ref 0–0.7)
EOSINOPHIL NFR BLD AUTO: 0 %
ERYTHROCYTE [DISTWIDTH] IN BLOOD BY AUTOMATED COUNT: 17.9 % (ref 11.5–14.5)
GLUCOSE BLD MANUAL STRIP-MCNC: 122 MG/DL (ref 74–99)
GLUCOSE BLD MANUAL STRIP-MCNC: 71 MG/DL (ref 74–99)
GLUCOSE BLD MANUAL STRIP-MCNC: 83 MG/DL (ref 74–99)
GLUCOSE BLD MANUAL STRIP-MCNC: 93 MG/DL (ref 74–99)
GLUCOSE SERPL-MCNC: 71 MG/DL (ref 74–99)
GRAM STN SPEC: NORMAL
GRAM STN SPEC: NORMAL
HCT VFR BLD AUTO: 26.7 % (ref 36–46)
HGB BLD-MCNC: 9.2 G/DL (ref 12–16)
IMM GRANULOCYTES # BLD AUTO: 0.2 X10*3/UL (ref 0–0.7)
IMM GRANULOCYTES NFR BLD AUTO: 0.8 % (ref 0–0.9)
LYMPHOCYTES # BLD AUTO: 2.85 X10*3/UL (ref 1.2–4.8)
LYMPHOCYTES NFR BLD AUTO: 11.1 %
MAGNESIUM SERPL-MCNC: 2.44 MG/DL (ref 1.6–2.4)
MCH RBC QN AUTO: 29.4 PG (ref 26–34)
MCHC RBC AUTO-ENTMCNC: 34.5 G/DL (ref 32–36)
MCV RBC AUTO: 85 FL (ref 80–100)
MONOCYTES # BLD AUTO: 0.73 X10*3/UL (ref 0.1–1)
MONOCYTES NFR BLD AUTO: 2.8 %
NEUTROPHILS # BLD AUTO: 21.83 X10*3/UL (ref 1.2–7.7)
NEUTROPHILS NFR BLD AUTO: 85.1 %
NRBC BLD-RTO: 0.9 /100 WBCS (ref 0–0)
PLATELET # BLD AUTO: 30 X10*3/UL (ref 150–450)
POTASSIUM SERPL-SCNC: 4 MMOL/L (ref 3.5–5.3)
PROT SERPL-MCNC: 4.2 G/DL (ref 6.4–8.2)
RBC # BLD AUTO: 3.13 X10*6/UL (ref 4–5.2)
RBC MORPH BLD: NORMAL
SODIUM SERPL-SCNC: 131 MMOL/L (ref 136–145)
VANCOMYCIN SERPL-MCNC: 28.1 UG/ML (ref 5–20)
WBC # BLD AUTO: 25.7 X10*3/UL (ref 4.4–11.3)

## 2025-08-23 PROCEDURE — 83735 ASSAY OF MAGNESIUM: CPT

## 2025-08-23 PROCEDURE — 2500000001 HC RX 250 WO HCPCS SELF ADMINISTERED DRUGS (ALT 637 FOR MEDICARE OP)

## 2025-08-23 PROCEDURE — 2500000004 HC RX 250 GENERAL PHARMACY W/ HCPCS (ALT 636 FOR OP/ED): Performed by: NURSE PRACTITIONER

## 2025-08-23 PROCEDURE — 94003 VENT MGMT INPAT SUBQ DAY: CPT

## 2025-08-23 PROCEDURE — 82947 ASSAY GLUCOSE BLOOD QUANT: CPT

## 2025-08-23 PROCEDURE — 85025 COMPLETE CBC W/AUTO DIFF WBC: CPT

## 2025-08-23 PROCEDURE — 1100000001 HC PRIVATE ROOM DAILY

## 2025-08-23 PROCEDURE — 2500000004 HC RX 250 GENERAL PHARMACY W/ HCPCS (ALT 636 FOR OP/ED)

## 2025-08-23 PROCEDURE — 37799 UNLISTED PX VASCULAR SURGERY: CPT

## 2025-08-23 PROCEDURE — 80202 ASSAY OF VANCOMYCIN: CPT | Performed by: INTERNAL MEDICINE

## 2025-08-23 PROCEDURE — 2500000005 HC RX 250 GENERAL PHARMACY W/O HCPCS

## 2025-08-23 PROCEDURE — 80053 COMPREHEN METABOLIC PANEL: CPT

## 2025-08-23 PROCEDURE — 95718 EEG PHYS/QHP 2-12 HR W/VEEG: CPT | Performed by: PSYCHIATRY & NEUROLOGY

## 2025-08-23 PROCEDURE — 99291 CRITICAL CARE FIRST HOUR: CPT | Performed by: NURSE PRACTITIONER

## 2025-08-23 PROCEDURE — 95715 VEEG EA 12-26HR INTMT MNTR: CPT

## 2025-08-23 PROCEDURE — 99232 SBSQ HOSP IP/OBS MODERATE 35: CPT | Performed by: INTERNAL MEDICINE

## 2025-08-23 PROCEDURE — 2500000005 HC RX 250 GENERAL PHARMACY W/O HCPCS: Performed by: NURSE PRACTITIONER

## 2025-08-23 RX ORDER — DIAZEPAM 5 MG/ML
2 INJECTION, SOLUTION INTRAMUSCULAR; INTRAVENOUS ONCE
Status: COMPLETED | OUTPATIENT
Start: 2025-08-23 | End: 2025-08-23

## 2025-08-23 RX ORDER — HYDROMORPHONE HYDROCHLORIDE 0.2 MG/ML
0.2 INJECTION INTRAMUSCULAR; INTRAVENOUS; SUBCUTANEOUS
Status: DISCONTINUED | OUTPATIENT
Start: 2025-08-23 | End: 2025-08-23 | Stop reason: CLARIF

## 2025-08-23 RX ORDER — ACETAMINOPHEN 10 MG/ML
1000 INJECTION, SOLUTION INTRAVENOUS EVERY 6 HOURS
Status: DISCONTINUED | OUTPATIENT
Start: 2025-08-23 | End: 2025-08-23

## 2025-08-23 RX ORDER — HYDROMORPHONE HYDROCHLORIDE 0.2 MG/ML
0.2 INJECTION INTRAMUSCULAR; INTRAVENOUS; SUBCUTANEOUS
Status: DISCONTINUED | OUTPATIENT
Start: 2025-08-23 | End: 2025-08-23

## 2025-08-23 RX ORDER — DIAZEPAM 5 MG/ML
2 INJECTION, SOLUTION INTRAMUSCULAR; INTRAVENOUS EVERY 4 HOURS PRN
Status: DISCONTINUED | OUTPATIENT
Start: 2025-08-23 | End: 2025-08-25 | Stop reason: HOSPADM

## 2025-08-23 RX ORDER — GLYCOPYRROLATE 0.2 MG/ML
0.2 INJECTION INTRAMUSCULAR; INTRAVENOUS EVERY 4 HOURS PRN
Status: DISCONTINUED | OUTPATIENT
Start: 2025-08-23 | End: 2025-08-25 | Stop reason: HOSPADM

## 2025-08-23 RX ORDER — HYDROMORPHONE HYDROCHLORIDE 1 MG/ML
1 INJECTION, SOLUTION INTRAMUSCULAR; INTRAVENOUS; SUBCUTANEOUS ONCE
Status: COMPLETED | OUTPATIENT
Start: 2025-08-23 | End: 2025-08-23

## 2025-08-23 RX ADMIN — VALPROATE SODIUM 500 MG: 100 INJECTION, SOLUTION INTRAVENOUS at 09:53

## 2025-08-23 RX ADMIN — WHITE PETROLATUM 57.7 %-MINERAL OIL 31.9 % EYE OINTMENT 1 APPLICATION: at 16:28

## 2025-08-23 RX ADMIN — ACETAMINOPHEN 1000 MG: 10 INJECTION INTRAVENOUS at 11:57

## 2025-08-23 RX ADMIN — ESOMEPRAZOLE MAGNESIUM 40 MG: 40 FOR SUSPENSION ORAL at 08:26

## 2025-08-23 RX ADMIN — Medication: at 03:48

## 2025-08-23 RX ADMIN — DEXTROSE MONOHYDRATE 12.5 G: 25 INJECTION, SOLUTION INTRAVENOUS at 11:57

## 2025-08-23 RX ADMIN — HYDROMORPHONE HYDROCHLORIDE 1 MG: 1 INJECTION, SOLUTION INTRAMUSCULAR; INTRAVENOUS; SUBCUTANEOUS at 16:29

## 2025-08-23 RX ADMIN — AMPICILLIN SODIUM AND SULBACTAM SODIUM 3 G: 2; 1 INJECTION, POWDER, FOR SOLUTION INTRAMUSCULAR; INTRAVENOUS at 02:12

## 2025-08-23 RX ADMIN — Medication: at 00:02

## 2025-08-23 RX ADMIN — VALPROATE SODIUM 500 MG: 100 INJECTION, SOLUTION INTRAVENOUS at 03:30

## 2025-08-23 RX ADMIN — THIAMINE HCL TAB 100 MG 100 MG: 100 TAB at 08:26

## 2025-08-23 RX ADMIN — DIAZEPAM 2 MG: 5 INJECTION, SOLUTION INTRAMUSCULAR; INTRAVENOUS at 16:28

## 2025-08-23 RX ADMIN — WHITE PETROLATUM 57.7 %-MINERAL OIL 31.9 % EYE OINTMENT 1 APPLICATION: at 20:48

## 2025-08-23 RX ADMIN — AMPICILLIN SODIUM AND SULBACTAM SODIUM 3 G: 2; 1 INJECTION, POWDER, FOR SOLUTION INTRAMUSCULAR; INTRAVENOUS at 09:05

## 2025-08-23 RX ADMIN — SODIUM BICARBONATE 50 ML/HR: 84 INJECTION, SOLUTION INTRAVENOUS at 00:02

## 2025-08-23 ASSESSMENT — PAIN - FUNCTIONAL ASSESSMENT
PAIN_FUNCTIONAL_ASSESSMENT: CPOT (CRITICAL CARE PAIN OBSERVATION TOOL)

## 2025-08-23 ASSESSMENT — RESPIRATORY DISTRESS OBSERVATION SCALE (RDOS)
LOOK OF FEAR: 0 - NONE
RDOS TOTAL SCORE: 0
RESPIRATORY RATE PER MINUTE: 0 - <19 BREATHS
RESTLESS NONPURPOSEFUL MOVEMENTS: 0 - NONE
ACCESSORY MUSCLE RISE IN CLAVICLE DURING INSPIRATION: 0 - NONE
RESTLESS NONPURPOSEFUL MOVEMENTS: 0 - NONE
PARADOXICAL BREATHING PATTERN: 0 - NONE
LOOK OF FEAR: 0 - NONE
HEART RATE PER MINUTE: 0 - <90 BEATS
RESPIRATORY RATE PER MINUTE: 0 - <19 BREATHS
ACCESSORY MUSCLE RISE IN CLAVICLE DURING INSPIRATION: 0 - NONE
PARADOXICAL BREATHING PATTERN: 0 - NONE
GRUNTING AT END OF EXPIRATION: 0 - NONE
RDOS TOTAL SCORE: 0
GRUNTING AT END OF EXPIRATION: 0 - NONE
INVOLUNTARY NASAL FLARING: 0 - NONE
INVOLUNTARY NASAL FLARING: 0 - NONE
HEART RATE PER MINUTE: 0 - <90 BEATS

## 2025-08-24 VITALS
HEIGHT: 64 IN | RESPIRATION RATE: 7 BRPM | DIASTOLIC BLOOD PRESSURE: 60 MMHG | SYSTOLIC BLOOD PRESSURE: 104 MMHG | TEMPERATURE: 96.4 F | WEIGHT: 135.36 LBS | OXYGEN SATURATION: 96 % | HEART RATE: 63 BPM | BODY MASS INDEX: 23.11 KG/M2

## 2025-08-24 LAB — BACTERIA BLD CULT: NORMAL

## 2025-08-24 PROCEDURE — 99233 SBSQ HOSP IP/OBS HIGH 50: CPT | Performed by: INTERNAL MEDICINE

## 2025-08-24 PROCEDURE — 2500000005 HC RX 250 GENERAL PHARMACY W/O HCPCS: Performed by: STUDENT IN AN ORGANIZED HEALTH CARE EDUCATION/TRAINING PROGRAM

## 2025-08-24 PROCEDURE — 1100000001 HC PRIVATE ROOM DAILY

## 2025-08-24 PROCEDURE — 2500000004 HC RX 250 GENERAL PHARMACY W/ HCPCS (ALT 636 FOR OP/ED): Performed by: INTERNAL MEDICINE

## 2025-08-24 RX ORDER — ATROPINE SULFATE 10 MG/ML
1 SOLUTION/ DROPS OPHTHALMIC EVERY 2 HOUR PRN
Status: DISCONTINUED | OUTPATIENT
Start: 2025-08-24 | End: 2025-08-25 | Stop reason: HOSPADM

## 2025-08-24 RX ORDER — SCOPOLAMINE 1 MG/3D
1 PATCH, EXTENDED RELEASE TRANSDERMAL
Status: DISCONTINUED | OUTPATIENT
Start: 2025-08-24 | End: 2025-08-25 | Stop reason: HOSPADM

## 2025-08-24 RX ADMIN — SCOPOLAMINE 1 PATCH: 1.5 PATCH, EXTENDED RELEASE TRANSDERMAL at 18:32

## 2025-08-24 RX ADMIN — WHITE PETROLATUM 57.7 %-MINERAL OIL 31.9 % EYE OINTMENT 1 APPLICATION: at 09:19

## 2025-08-24 RX ADMIN — WHITE PETROLATUM 57.7 %-MINERAL OIL 31.9 % EYE OINTMENT 1 APPLICATION: at 20:00

## 2025-08-24 ASSESSMENT — COGNITIVE AND FUNCTIONAL STATUS - GENERAL
DRESSING REGULAR UPPER BODY CLOTHING: TOTAL
MOVING FROM LYING ON BACK TO SITTING ON SIDE OF FLAT BED WITH BEDRAILS: TOTAL
STANDING UP FROM CHAIR USING ARMS: TOTAL
CLIMB 3 TO 5 STEPS WITH RAILING: TOTAL
TURNING FROM BACK TO SIDE WHILE IN FLAT BAD: TOTAL
PERSONAL GROOMING: TOTAL
TOILETING: TOTAL
MOVING TO AND FROM BED TO CHAIR: TOTAL
MOBILITY SCORE: 6
DAILY ACTIVITIY SCORE: 6
DRESSING REGULAR LOWER BODY CLOTHING: TOTAL
MOVING TO AND FROM BED TO CHAIR: TOTAL
CLIMB 3 TO 5 STEPS WITH RAILING: TOTAL
HELP NEEDED FOR BATHING: TOTAL
EATING MEALS: TOTAL
HELP NEEDED FOR BATHING: TOTAL
WALKING IN HOSPITAL ROOM: TOTAL
DRESSING REGULAR UPPER BODY CLOTHING: TOTAL
EATING MEALS: TOTAL
DAILY ACTIVITIY SCORE: 6
DRESSING REGULAR LOWER BODY CLOTHING: TOTAL
PERSONAL GROOMING: TOTAL
TOILETING: TOTAL
MOBILITY SCORE: 6
WALKING IN HOSPITAL ROOM: TOTAL
MOVING FROM LYING ON BACK TO SITTING ON SIDE OF FLAT BED WITH BEDRAILS: TOTAL
TURNING FROM BACK TO SIDE WHILE IN FLAT BAD: TOTAL
STANDING UP FROM CHAIR USING ARMS: TOTAL

## 2025-08-24 ASSESSMENT — PAIN SCALES - GENERAL
PAINLEVEL_OUTOF10: 0 - NO PAIN
PAINLEVEL_OUTOF10: 0 - NO PAIN

## 2025-08-25 LAB
BACTERIA BLD CULT: NORMAL
BACTERIA BLD CULT: NORMAL

## (undated) DEVICE — BANDAGE, ELASTIC, SELF-CLOSE, 4 IN, HONEYCOMB, STERILE

## (undated) DEVICE — TAPE, UMBILICAL, 1/8 X 30 IN, MULTIPACK, COTTON, WHITE

## (undated) DEVICE — DRESSING, PREVENA, PEEL AND PLACE, 13CM

## (undated) DEVICE — SUTURE, PROLENE, 6-0, 30 IN, BV-1 BV-1

## (undated) DEVICE — DRESSING, ISLAND, ADHESIVE, TELFA, 4 X 8 IN

## (undated) DEVICE — PROTECTOR, NERVE, ULNAR, PINK

## (undated) DEVICE — SPONGE, HEMOSTAT, SURGICEL FIBRILLAR, ABS, 4 X 4, LF

## (undated) DEVICE — CATHETER, ANGIOGRAPHIC, OMNI-FLUSH, 0.035 IN,  5F X 100 CM

## (undated) DEVICE — PREP TRAY, SKIN, DRY, W/GLOVES

## (undated) DEVICE — GUIDEWIRE, STIFF SHAFT, ANGLE TIP, .035 DIA, 260 CM,  3 CM TIP"

## (undated) DEVICE — SHEATH, 45CM W/DILATOR, 8FR STR, CROSS-CUT VALVE

## (undated) DEVICE — STAPLER, SKIN PROXIMATE, 35 WIDE

## (undated) DEVICE — DRAPE, INCISE, ANTIMICROBIAL, IOBAN 2, LARGE, 17 X 23 IN, DISPOSABLE, STERILE

## (undated) DEVICE — Device

## (undated) DEVICE — STOPCOCK, 4 WAY, SMALL BODY, W/SWIVEL, ULTRA, LIPD RESISTANT, LUER LOCK, MALE, LF

## (undated) DEVICE — GLOVE, SURGICAL, PROTEXIS PI MICRO, 6.0, PF, LF

## (undated) DEVICE — DRESSING KIT, VACUUM ASSISTED CLOSURE, W/DRAPE/TUBING, SMALL, FOAM, BLACK

## (undated) DEVICE — THERAPY UNIT, PREVENA PLUS 125

## (undated) DEVICE — GUIDEWIRE, ANGLE TIP,  .035 DIA, 260 CM, 3 CM TIP"

## (undated) DEVICE — WOUND SYSTEM, DEBRIDEMENT & CLEANING, O.R DUOPAK

## (undated) DEVICE — MANIFOLD, 4 PORT NEPTUNE STANDARD

## (undated) DEVICE — BOLSTER, HIP

## (undated) DEVICE — DRAPE, MAGENTIC INSTRUMENT, 12X16

## (undated) DEVICE — DRESSING, TRANSPARENT, TEGADERM, 4 X 4-3/4 IN

## (undated) DEVICE — SUTURE, ETHILON, 2-0, FSLX 30, BLACK

## (undated) DEVICE — SUTURE, VICRYL PLUS 3-0, SH, 27IN

## (undated) DEVICE — SPONGE, HEMOSTATIC, GELATIN, SURGIFOAM, 8 X 12.5 CM X 10 MM

## (undated) DEVICE — TOWEL, SURGICAL, NEURO, O/R, 16 X 26, BLUE, STERILE

## (undated) DEVICE — DRAPE, SHEET, EXTREMITY, W/ARM BOARD COVERS, 87 X 106 X 128 IN, DISPOSABLE, LF, STERILE

## (undated) DEVICE — CASSETTE, SONOPET IQ IRRIGATION SUCTION

## (undated) DEVICE — SUTURE, SILK, 3-0, 30 IN, BR SH, BLACK

## (undated) DEVICE — COVER, CART, 45 X 27 X 48 IN, CLEAR

## (undated) DEVICE — DRAPE, PAD, PREP, W/ 9 IN CUFF, 24 X 41, LF, NS

## (undated) DEVICE — SHEATH, INTRODUCER, 10CM, 7FR, R/O RADIOPAQUE MARKER, 7FR DIA, 2.5 CM DILATOR.

## (undated) DEVICE — GUIDEWIRE, COMMAND ST, HI-TORQUE, 0.18 X 300CM

## (undated) DEVICE — DEVICE, INFLATION, ENCORE 26, SGL

## (undated) DEVICE — STRAP, CIRCUMFERENTIAL, 2 X 76""

## (undated) DEVICE — SUTURE, SILK, 3-0, 18 IN SH/CR, BLACK

## (undated) DEVICE — BLADE, SAW, SAGITTAL, 18.5 X 73 X 0.76 MM, STAINLESS STEEL, STERILE

## (undated) DEVICE — SUTURE, VICRYL, 2-0, 18 IN, CT-1, UNDYED

## (undated) DEVICE — SEALANT, HEMOSTATIC, FLOSEAL, 10 ML

## (undated) DEVICE — BANDAGE, COFLEX, 4 X 5 YDS, TAN, STERILE, LF

## (undated) DEVICE — COUNTER, NEEDLE, FOAM BLOCK, POP-N-COUNT, W/BLADEGUARD, W/ADHESIVE 40 COUNT, RED

## (undated) DEVICE — TORQUE DEVICE, ACCOMODATES WIRES W/DIA .010 TO .038"."

## (undated) DEVICE — SPEAR, EYE, SURGICAL, WECK-CEL, CELLULOSE

## (undated) DEVICE — COVER PROBE, SOFT FLEX W/ GEL, 5 X 48 IN (13X122CM)

## (undated) DEVICE — SHEATH, PINNACLE, 10 CM,  5FR INTRODUCER, 5FR DIA, 2.5 CM DIALATOR

## (undated) DEVICE — BANDAGE, GAUZE, CONFORMING, KERLIX, 6 PLY, 4.5 IN X 4.1 YD

## (undated) DEVICE — DRESSING, ADHESIVE, ISLAND, TELFA, 4 X 10 IN

## (undated) DEVICE — DRAPE, SHEET, U, W/ADHESIVE STRIP, IMPERVIOUS, 60 X 70 IN, DISPOSABLE, LF, STERILE

## (undated) DEVICE — DRAPE, C-ARM IMAGE

## (undated) DEVICE — CATHETER, ANGIOGRAPHIC, OMNI-FLUSH, 0.038 IN, 5FR X 70 CM

## (undated) DEVICE — SUTURE, MONOCRYL, 4-0, 18 IN, PS2, UNDYED

## (undated) DEVICE — INSERT, CLAMP, SURGICAL, SOFT/TRACTION, STEALTH, 1 MM

## (undated) DEVICE — STOCKINETTE, DOUBLE PLY, 6 X 48 IN, STERILE

## (undated) DEVICE — CATHETER, VISIONS PV .018 (IVUS)

## (undated) DEVICE — GLOVE, SURGICAL, PROTEXIS PI BLUE W/NEUTHERA, 6.0, PF, LF

## (undated) DEVICE — CATHETER TRAY, SURESTEP, 16FR, URINE METER W/STATLOCK

## (undated) DEVICE — INTRODUCER, GLIDESHEATH SLENDER A-KIT, 5FR 10CM

## (undated) DEVICE — INTRODUCER SET, MICROPUNCT, STIFF, 4FR 10CM,PLATINUM TIP,NITINOLWIRE

## (undated) DEVICE — SUTURE, SILK, 2-0, 18 IN, BLACK

## (undated) DEVICE — CATHETER, 4 FR. 65CM, ANGLE NON-TAPER AT TIP

## (undated) DEVICE — DRESSING, ABDOMINAL, WET PRUF, TENDERSORB, 5 X 9 IN, STERILE

## (undated) DEVICE — DRAPE, INSTRUMENT, W/POUCH, STERI DRAPE, 7 X 11 IN, DISPOSABLE, STERILE

## (undated) DEVICE — SUTURE, VICRYL, 2-0, 27 IN, BR/SH 27, VIOLET

## (undated) DEVICE — DRAPE, SHEET, FAN FOLDED, HALF, 44 X 58 IN, DISPOSABLE, LF, STERILE

## (undated) DEVICE — GOWN, SURGICAL, SMARTGOWN, XLARGE, STERILE

## (undated) DEVICE — SUTURE, PROLENE, 5-0, 36 IN, C-1, CV-11, BLUE

## (undated) DEVICE — APPLICATOR, CHLORAPREP, W/ORANGE TINT, 26ML

## (undated) DEVICE — COVER, TABLE, 44 X 75 IN, DISPOSABLE, LF, STERILE

## (undated) DEVICE — CATHETER, EMBOLECTOMY, 3F X 80CM, SYNTEL, SILICONE

## (undated) DEVICE — SUTURE, VICRYL, 3-0, 27 IN, SH

## (undated) DEVICE — SUTURE, PERMA HAND 2-0, TAPER POINT, SH BLACK 8-18 INCH

## (undated) DEVICE — EXTENSION SET W/MALE LUER LOCK ADAPTER, VOLUME 2.4ML

## (undated) DEVICE — STIMULATOR, NERVE LOCATOR, HEAD&NECK

## (undated) DEVICE — CATHETER, 5 FR. 100CM, ANGLE TAPER AT TIP

## (undated) DEVICE — SHEATH, PINNACLE, 10 CM,  8FR INTRODUCER, 8FR DIA, 2.5 CM DIALATOR

## (undated) DEVICE — GEL, ULTRASOUND, AQUASONIC 100, 20 GM, STERILE

## (undated) DEVICE — BOWL, BASIN, 32 OZ, STERILE

## (undated) DEVICE — SUTURE, PROLENE, 7-0, 24 IN, BV1, DA, BLUE

## (undated) DEVICE — WOUND VAC KIT, W/CANISTER, 500ML

## (undated) DEVICE — DRAPE, ISOLATION, BAG, DRAW STRING CLOSURE, STERI DRAPE, LARGE, 20 X 20 IN, DISPOSABLE, STERILE

## (undated) DEVICE — IMMOBILIZER, KNEE, POST OP, SUPER LITE, W/STRAIGHT STAYS, MEDIUM, 20 IN

## (undated) DEVICE — CANNULA, VESSEL, FREE FLOW, BLUNT TIP, 3 MM X 5 CM

## (undated) DEVICE — GUIDEWIRE, STRAIGHT, AMPLATZ SUPER STIFF ST-1, 0.035 IN X 260 CM

## (undated) DEVICE — TOWEL, OR XRAY, RFID DETECT, WHITE, 17X26, STERILE

## (undated) DEVICE — KNIFE, SONOPET IQ 12CM APEX 360

## (undated) DEVICE — WOUND VAC KIT, W/CANISTER, 500ML (5/CS)

## (undated) DEVICE — SYRINGE, 20 CC, LUER LOCK, MONOJECT, W/O CAP, LF